# Patient Record
Sex: FEMALE | Race: WHITE | NOT HISPANIC OR LATINO | Employment: OTHER | URBAN - METROPOLITAN AREA
[De-identification: names, ages, dates, MRNs, and addresses within clinical notes are randomized per-mention and may not be internally consistent; named-entity substitution may affect disease eponyms.]

---

## 2017-01-15 ENCOUNTER — RESULTS ENCOUNTER (OUTPATIENT)
Dept: FAMILY MEDICINE CLINIC | Facility: CLINIC | Age: 62
End: 2017-01-15

## 2017-01-15 DIAGNOSIS — K58.2 IRRITABLE BOWEL SYNDROME WITH BOTH CONSTIPATION AND DIARRHEA: ICD-10-CM

## 2017-01-15 DIAGNOSIS — K57.30 DIVERTICULOSIS OF LARGE INTESTINE WITHOUT HEMORRHAGE: ICD-10-CM

## 2017-01-15 DIAGNOSIS — F33.9 RECURRENT MAJOR DEPRESSIVE DISORDER, REMISSION STATUS UNSPECIFIED (HCC): ICD-10-CM

## 2017-01-20 ENCOUNTER — OFFICE VISIT (OUTPATIENT)
Dept: FAMILY MEDICINE CLINIC | Facility: CLINIC | Age: 62
End: 2017-01-20

## 2017-01-20 ENCOUNTER — APPOINTMENT (OUTPATIENT)
Dept: LAB | Facility: HOSPITAL | Age: 62
End: 2017-01-20

## 2017-01-20 VITALS
SYSTOLIC BLOOD PRESSURE: 134 MMHG | WEIGHT: 185 LBS | BODY MASS INDEX: 29.73 KG/M2 | TEMPERATURE: 98.3 F | HEIGHT: 66 IN | OXYGEN SATURATION: 99 % | DIASTOLIC BLOOD PRESSURE: 88 MMHG | HEART RATE: 77 BPM

## 2017-01-20 DIAGNOSIS — R10.84 GENERALIZED ABDOMINAL PAIN: Primary | ICD-10-CM

## 2017-01-20 LAB
ALBUMIN SERPL-MCNC: 4.4 G/DL (ref 3.5–5.2)
ALBUMIN/GLOB SERPL: 1.5 G/DL
ALP SERPL-CCNC: 111 U/L (ref 39–117)
ALT SERPL W P-5'-P-CCNC: 24 U/L (ref 1–33)
ANION GAP SERPL CALCULATED.3IONS-SCNC: 15.1 MMOL/L
AST SERPL-CCNC: 22 U/L (ref 1–32)
BASOPHILS # BLD AUTO: 0.11 10*3/MM3 (ref 0–0.2)
BASOPHILS NFR BLD AUTO: 0.8 % (ref 0–1.5)
BILIRUB SERPL-MCNC: 0.5 MG/DL (ref 0.1–1.2)
BUN BLD-MCNC: 13 MG/DL (ref 8–23)
BUN/CREAT SERPL: 12.7 (ref 7–25)
CALCIUM SPEC-SCNC: 9.6 MG/DL (ref 8.6–10.5)
CHLORIDE SERPL-SCNC: 106 MMOL/L (ref 98–107)
CO2 SERPL-SCNC: 21.9 MMOL/L (ref 22–29)
CREAT BLD-MCNC: 1.02 MG/DL (ref 0.57–1)
DEPRECATED RDW RBC AUTO: 44.2 FL (ref 37–54)
EOSINOPHIL # BLD AUTO: 0.08 10*3/MM3 (ref 0–0.7)
EOSINOPHIL NFR BLD AUTO: 0.6 % (ref 0.3–6.2)
ERYTHROCYTE [DISTWIDTH] IN BLOOD BY AUTOMATED COUNT: 12.9 % (ref 11.7–13)
GFR SERPL CREATININE-BSD FRML MDRD: 55 ML/MIN/1.73
GLOBULIN UR ELPH-MCNC: 3 GM/DL
GLUCOSE BLD-MCNC: 100 MG/DL (ref 65–99)
HCT VFR BLD AUTO: 43.9 % (ref 35.6–45.5)
HGB BLD-MCNC: 14.4 G/DL (ref 11.9–15.5)
IMM GRANULOCYTES # BLD: 0.04 10*3/MM3 (ref 0–0.03)
IMM GRANULOCYTES NFR BLD: 0.3 % (ref 0–0.5)
LYMPHOCYTES # BLD AUTO: 3.38 10*3/MM3 (ref 0.9–4.8)
LYMPHOCYTES NFR BLD AUTO: 24.7 % (ref 19.6–45.3)
MCH RBC QN AUTO: 30.8 PG (ref 26.9–32)
MCHC RBC AUTO-ENTMCNC: 32.8 G/DL (ref 32.4–36.3)
MCV RBC AUTO: 93.8 FL (ref 80.5–98.2)
MONOCYTES # BLD AUTO: 0.75 10*3/MM3 (ref 0.2–1.2)
MONOCYTES NFR BLD AUTO: 5.5 % (ref 5–12)
NEUTROPHILS # BLD AUTO: 9.32 10*3/MM3 (ref 1.9–8.1)
NEUTROPHILS NFR BLD AUTO: 68.1 % (ref 42.7–76)
PLATELET # BLD AUTO: 338 10*3/MM3 (ref 140–500)
PMV BLD AUTO: 10.1 FL (ref 6–12)
POTASSIUM BLD-SCNC: 4.4 MMOL/L (ref 3.5–5.2)
PROT SERPL-MCNC: 7.4 G/DL (ref 6–8.5)
RBC # BLD AUTO: 4.68 10*6/MM3 (ref 3.9–5.2)
SODIUM BLD-SCNC: 143 MMOL/L (ref 136–145)
WBC NRBC COR # BLD: 13.68 10*3/MM3 (ref 4.5–10.7)

## 2017-01-20 PROCEDURE — 85025 COMPLETE CBC W/AUTO DIFF WBC: CPT | Performed by: INTERNAL MEDICINE

## 2017-01-20 PROCEDURE — 36415 COLL VENOUS BLD VENIPUNCTURE: CPT | Performed by: INTERNAL MEDICINE

## 2017-01-20 PROCEDURE — 99214 OFFICE O/P EST MOD 30 MIN: CPT | Performed by: INTERNAL MEDICINE

## 2017-01-20 PROCEDURE — 80053 COMPREHEN METABOLIC PANEL: CPT | Performed by: INTERNAL MEDICINE

## 2017-01-20 PROCEDURE — 74000 XR ABDOMEN KUB: CPT | Performed by: INTERNAL MEDICINE

## 2017-01-20 RX ORDER — METRONIDAZOLE 500 MG/1
500 TABLET ORAL 3 TIMES DAILY
Qty: 30 TABLET | Refills: 0 | Status: SHIPPED | OUTPATIENT
Start: 2017-01-20 | End: 2017-02-07

## 2017-01-20 RX ORDER — CIPROFLOXACIN 500 MG/1
500 TABLET, FILM COATED ORAL 2 TIMES DAILY
Qty: 20 TABLET | Refills: 0 | Status: SHIPPED | OUTPATIENT
Start: 2017-01-20 | End: 2017-02-07

## 2017-01-20 RX ORDER — DICYCLOMINE HYDROCHLORIDE 10 MG/1
10 CAPSULE ORAL 4 TIMES DAILY PRN
Qty: 30 CAPSULE | Refills: 1 | Status: SHIPPED | OUTPATIENT
Start: 2017-01-20 | End: 2017-08-01 | Stop reason: SDUPTHER

## 2017-01-20 NOTE — PROGRESS NOTES
Subjective   Magdalena Dickey is a 61 y.o. female who presents today for:    Abdominal Pain (Intermittent abdominal pain and distention) and Nausea    History of Present Illness   Constipated 8 days ago, no better with Miralax prior (routine use) relieved with mag citrate (loose stools).  Bloating and pain have persisted, but nausea has improved.     She has a history of recurrent abdominal pain that was last evaluated in 2015. A CT scan done in July, 2015, suspecting diverticulitis, did not show diverticulitis.      Ms. Dickey  reports that she quit smoking about 2 years ago. Her smoking use included Cigarettes. She has never used smokeless tobacco. She reports that she does not drink alcohol or use illicit drugs.         Current Outpatient Prescriptions:   •  ALPRAZolam (XANAX) 0.5 MG tablet, TAKE 1 TABLET BY MOUTH 2 TIMES A DAY, Disp: 60 tablet, Rfl: 0  •  DULoxetine (CYMBALTA) 60 MG capsule, Take 1 capsule by mouth Daily., Disp: 30 capsule, Rfl: 5  •  gabapentin (NEURONTIN) 600 MG tablet, Take 1 tablet by mouth 2 (Two) Times a Day., Disp: 180 tablet, Rfl: 1  •  Multiple Vitamin (MULTI-VITAMINS PO), Take  by mouth daily., Disp: , Rfl:   •  pantoprazole (PROTONIX) 40 MG EC tablet, Take 40 mg by mouth daily., Disp: , Rfl:   •  Polyethylene Glycol 3350 (MIRALAX PO), Take  by mouth Daily., Disp: , Rfl:   •  promethazine (PHENERGAN) 25 MG tablet, Take 25 mg by mouth as needed., Disp: , Rfl:   •  SUMAtriptan (IMITREX) 100 MG tablet, Take 1 tablet by mouth at onset of headache, may repeat every 2 hours as needed (max 200mg/day), Disp: 9 tablet, Rfl: 2  •  traZODone (DESYREL) 50 MG tablet, Take 1 tablet by mouth Every Night., Disp: 90 tablet, Rfl: 1      The following portions of the patient's history were reviewed and updated as appropriate: allergies, current medications, past social history and problem list.  S/p choley and s/p LETY/BSO; known diverticulosis.     Review of Systems   Constitutional: Positive for appetite  "change and chills (initially, improved now). Negative for fever.   Respiratory: Negative for shortness of breath.    Cardiovascular: Negative for chest pain.   Gastrointestinal: Positive for abdominal distention, abdominal pain and nausea (improved). Negative for blood in stool and vomiting.   Genitourinary: Positive for urgency. Negative for dysuria, flank pain and frequency.         Objective   Vitals:    01/20/17 0952   BP: 134/88   BP Location: Right arm   Patient Position: Sitting   Cuff Size: Adult   Pulse: 77   Temp: 98.3 °F (36.8 °C)   TempSrc: Oral   SpO2: 99%   Weight: 185 lb (83.9 kg)   Height: 66\" (167.6 cm)     Physical Exam   Constitutional: She is oriented to person, place, and time. She appears well-developed and well-nourished. No distress.   Eyes: Conjunctivae are normal. No scleral icterus.   Cardiovascular: Normal rate and regular rhythm.    Pulmonary/Chest: Effort normal and breath sounds normal.   Abdominal: Soft. Normal appearance and bowel sounds are normal. There is no hepatosplenomegaly. There is tenderness in the right upper quadrant, right lower quadrant and left lower quadrant. There is no tenderness at McBurney's point and negative Harvey's sign.   Neurological: She is alert and oriented to person, place, and time.   Psychiatric: She has a normal mood and affect. Her behavior is normal.       Assessment/Plan   Magdalena was seen today for abdominal pain and nausea.    Diagnoses and all orders for this visit:    Generalized abdominal pain  -     CBC & Differential  -     Comprehensive Metabolic Panel  -     XR Abdomen KUB (In Office)    Other orders  -     dicyclomine (BENTYL) 10 MG capsule; Take 1 capsule by mouth 4 (Four) Times a Day As Needed (abdominal pain).    She does not look toxic, and recommends that this is diverticulitis.  We will check plain films today, but if this shows something ominous, we will get a CT scan.  We will also ask her to get the labs at [facility] where she " works.  If there is evidence of a leukocytosis, we will pursue additional imaging and probably start her empirically on antibiotics.  Otherwise, we will try for symptom control with the Bentyl.    Supine views of the abdomen were reviewed today.  They showed nonspecific bowel gas pattern.  There is no overt abnormality seen.  There are gallbladder clips evident in the right upper quadrant.  There is no prior films for comparison.    Addendum: In looking at her labs from earlier today, it shows a leukocytosis with a left shift.  Although she has had persistently elevated white counts in the past, left shift is new.  We will treat empirically with ciprofloxacin and Flagyl.  We have counseled her to partake of a clear liquid diet this weekend and gradually advance of next week as tolerated.  She should avoid all alcohol with the Flagyl.  She knows to contact me this weekend since I am on call if any further problems develop.  She should contact me Sunday night if her symptoms do not begin to subside over the weekend so that we can arrange for CT scan on Monday morning.  Information in this addendum was discussed with the patient by phone after office hours, on the same date as her office visit.

## 2017-01-20 NOTE — MR AVS SNAPSHOT
Magdalena Dickey   1/20/2017 9:40 AM   Office Visit    Dept Phone:  [phone]   Encounter #:  [id number]    Provider:  Fermín Bella MD   Department:  [facility] INTERNAL MEDICINE                Your Full Care Plan              Today's Medication Changes          These changes are accurate as of: 1/20/17 10:51 AM.  If you have any questions, ask your nurse or doctor.               New Medication(s)Ordered:     dicyclomine 10 MG capsule   Commonly known as:  BENTYL   Take 1 capsule by mouth 4 (Four) Times a Day As Needed (abdominal pain).   Started by:  Fermín Bella MD         Medication(s)that have changed:     DULoxetine 60 MG capsule   Commonly known as:  CYMBALTA   Take 1 capsule by mouth Daily.   What changed:  Another medication with the same name was removed. Continue taking this medication, and follow the directions you see here.   Changed by:  Fermín Bella MD            Where to Get Your Medications      These medications were sent to [place] [address] [place] [address] [phone] [facility] [phone] [address] [address]     Phone:  [phone]     dicyclomine 10 MG capsule                  Your Updated Medication List          This list is accurate as of: 1/20/17 10:51 AM.  Always use your most recent med list.                ALPRAZolam 0.5 MG tablet   Commonly known as:  XANAX   TAKE 1 TABLET BY MOUTH 2 TIMES A DAY       dicyclomine 10 MG capsule   Commonly known as:  BENTYL   Take 1 capsule by mouth 4 (Four) Times a Day As Needed (abdominal pain).       DULoxetine 60 MG capsule   Commonly known as:  CYMBALTA   Take 1 capsule by mouth Daily.       gabapentin 600 MG tablet   Commonly known as:  NEURONTIN   Take 1 tablet by mouth 2 (Two) Times a Day.       MIRALAX PO       MULTI-VITAMINS PO       pantoprazole 40 MG EC tablet   Commonly known as:  PROTONIX       promethazine 25 MG tablet      Commonly known as:  PHENERGAN       SUMAtriptan 100 MG tablet   Commonly known as:  IMITREX   Take 1 tablet by mouth at onset of headache, may repeat every 2 hours as needed (max 200mg/day)       traZODone 50 MG tablet   Commonly known as:  DESYREL   Take 1 tablet by mouth Every Night.               We Performed the Following     CBC & Differential     Comprehensive Metabolic Panel     XR Abdomen KUB (In Office)       You Were Diagnosed With        Codes Comments    Generalized abdominal pain    -  Primary ICD-10-CM: R10.84  ICD-9-CM: 789.07       Instructions     None    Patient Instructions History      Upcoming Appointments     Visit Type Date Time Department    ACUTE           1/20/2017  9:40 AM [email or web address]    [email or web address]    OFFICE VISIT 2/7/2017  8:00 AM [facility] [place]  [facility]      [employer] Signup     Our records indicate that you have an active [employer] account.    You can view your After Visit Summary by going to [employer] and logging in with your Bl[employer] username and password.  If you don't have a [employer] username and password but a parent or guardian has access to your record, the parent or guardian should login with their own [employer] username and password and access your record to view the After Visit Summary.    If you have questions, you can email [email or web address] or call [phone] to talk to our Blend[employer] staff.  Remember, [employer] is NOT to be used for urgent needs.  For medical emergencies, dial 911.               Other Info from Your Visit           Your Appointments     Jan 31, 2017 12:00 PM EST   [facility] with [facility]   [facility] INTERNAL MEDICINE (--)    [address] [place] [id number]   [phone]            Feb 07, 2017  8:00 AM EST   Office Visit with Fermín Bella MD   [facility] INTERNAL MEDICINE (--)    [address] [place] [address]  "[address] [address]   [phone]           Arrive 15 minutes prior to appointment.              Allergies     No Known Drug Allergy        Reason for Visit     Abdominal Pain Intermittent abdominal pain and distention    Nausea           Vital Signs     Blood Pressure Pulse Temperature Height Weight Oxygen Saturation    134/88 (BP Location: Right arm, Patient Position: Sitting, Cuff Size: Adult) 77 98.3 °F (36.8 °C) (Oral) 66\" (167.6 cm) 185 lb (83.9 kg) 99%    Body Mass Index Smoking Status                29.86 kg/m2 Former Smoker          Problems and Diagnoses Noted     Generalized abdominal pain    -  Primary        "

## 2017-01-23 DIAGNOSIS — R10.84 GENERALIZED ABDOMINAL PAIN: Primary | ICD-10-CM

## 2017-01-23 NOTE — PROGRESS NOTES
1/23/16: Discussed with pt by phone last night her persistent nausea (w/out vomiting) and diffuse abdominal pain, minimally improved since onset and since starting ABX and trying clear liquid diet over the weekend.  Will get CT scan and look for colitis in light of her elevated WBC.    TAS

## 2017-01-24 ENCOUNTER — HOSPITAL ENCOUNTER (OUTPATIENT)
Dept: CT IMAGING | Facility: HOSPITAL | Age: 62
Discharge: HOME | End: 2017-01-24
Attending: INTERNAL MEDICINE | Admitting: INTERNAL MEDICINE

## 2017-01-24 DIAGNOSIS — R10.84 GENERALIZED ABDOMINAL PAIN: ICD-10-CM

## 2017-01-24 LAB — CREAT BLDA-MCNC: 1.1 MG/DL (ref 0.6–1.3)

## 2017-01-24 PROCEDURE — 0 IOPAMIDOL 61 % SOLUTION: Performed by: INTERNAL MEDICINE

## 2017-01-24 PROCEDURE — 25510000001 DIATRIZOATE MEGLUMINE & SODIUM PER 1 ML: Performed by: INTERNAL MEDICINE

## 2017-01-24 PROCEDURE — 82565 ASSAY OF CREATININE: CPT

## 2017-01-24 PROCEDURE — 74177 CT ABD & PELVIS W/CONTRAST: CPT

## 2017-01-24 RX ADMIN — IOPAMIDOL 85 ML: 612 INJECTION, SOLUTION INTRAVENOUS at 14:45

## 2017-01-24 RX ADMIN — DIATRIZOATE MEGLUMINE AND DIATRIZOATE SODIUM 30 ML: 660; 100 LIQUID ORAL; RECTAL at 14:45

## 2017-01-31 ENCOUNTER — OFFICE (OUTPATIENT)
Dept: URBAN - METROPOLITAN AREA OTHER 6 | Facility: OTHER | Age: 62
End: 2017-01-31

## 2017-01-31 ENCOUNTER — APPOINTMENT (OUTPATIENT)
Dept: LAB | Facility: HOSPITAL | Age: 62
End: 2017-01-31

## 2017-01-31 VITALS
SYSTOLIC BLOOD PRESSURE: 114 MMHG | WEIGHT: 185 LBS | DIASTOLIC BLOOD PRESSURE: 80 MMHG | HEART RATE: 76 BPM | HEIGHT: 66 IN

## 2017-01-31 DIAGNOSIS — R10.32 LEFT LOWER QUADRANT PAIN: ICD-10-CM

## 2017-01-31 DIAGNOSIS — R11.0 NAUSEA: ICD-10-CM

## 2017-01-31 DIAGNOSIS — K59.1 FUNCTIONAL DIARRHEA: ICD-10-CM

## 2017-01-31 DIAGNOSIS — R14.0 ABDOMINAL DISTENSION (GASEOUS): ICD-10-CM

## 2017-01-31 LAB
ALBUMIN SERPL-MCNC: 3.9 G/DL (ref 3.5–5.2)
ALBUMIN/GLOB SERPL: 1.4 G/DL
ALP SERPL-CCNC: 82 U/L (ref 39–117)
ALT SERPL W P-5'-P-CCNC: 24 U/L (ref 1–33)
ANION GAP SERPL CALCULATED.3IONS-SCNC: 13 MMOL/L
AST SERPL-CCNC: 19 U/L (ref 1–32)
BILIRUB SERPL-MCNC: 0.3 MG/DL (ref 0.1–1.2)
BUN BLD-MCNC: 13 MG/DL (ref 8–23)
BUN/CREAT SERPL: 11.3 (ref 7–25)
CALCIUM SPEC-SCNC: 9 MG/DL (ref 8.6–10.5)
CHLORIDE SERPL-SCNC: 107 MMOL/L (ref 98–107)
CO2 SERPL-SCNC: 22 MMOL/L (ref 22–29)
CREAT BLD-MCNC: 1.15 MG/DL (ref 0.57–1)
DEPRECATED RDW RBC AUTO: 44.4 FL (ref 37–54)
ERYTHROCYTE [DISTWIDTH] IN BLOOD BY AUTOMATED COUNT: 13.4 % (ref 11.7–13)
GFR SERPL CREATININE-BSD FRML MDRD: 48 ML/MIN/1.73
GLOBULIN UR ELPH-MCNC: 2.8 GM/DL
GLUCOSE BLD-MCNC: 99 MG/DL (ref 65–99)
HCT VFR BLD AUTO: 42.9 % (ref 35.6–45.5)
HGB BLD-MCNC: 14.2 G/DL (ref 11.9–15.5)
MCH RBC QN AUTO: 30.3 PG (ref 26.9–32)
MCHC RBC AUTO-ENTMCNC: 33.1 G/DL (ref 32.4–36.3)
MCV RBC AUTO: 91.7 FL (ref 80.5–98.2)
PLATELET # BLD AUTO: 306 10*3/MM3 (ref 140–500)
PMV BLD AUTO: 10.2 FL (ref 6–12)
POTASSIUM BLD-SCNC: 4.9 MMOL/L (ref 3.5–5.2)
PROT SERPL-MCNC: 6.7 G/DL (ref 6–8.5)
RBC # BLD AUTO: 4.68 10*6/MM3 (ref 3.9–5.2)
SODIUM BLD-SCNC: 142 MMOL/L (ref 136–145)
WBC NRBC COR # BLD: 10.09 10*3/MM3 (ref 4.5–10.7)

## 2017-01-31 PROCEDURE — 99213 OFFICE O/P EST LOW 20 MIN: CPT | Performed by: INTERNAL MEDICINE

## 2017-01-31 PROCEDURE — 36415 COLL VENOUS BLD VENIPUNCTURE: CPT | Performed by: INTERNAL MEDICINE

## 2017-01-31 PROCEDURE — 80053 COMPREHEN METABOLIC PANEL: CPT | Performed by: INTERNAL MEDICINE

## 2017-01-31 PROCEDURE — 85027 COMPLETE CBC AUTOMATED: CPT | Performed by: INTERNAL MEDICINE

## 2017-02-07 ENCOUNTER — OFFICE VISIT (OUTPATIENT)
Dept: FAMILY MEDICINE CLINIC | Facility: CLINIC | Age: 62
End: 2017-02-07

## 2017-02-07 VITALS
DIASTOLIC BLOOD PRESSURE: 88 MMHG | WEIGHT: 182.9 LBS | BODY MASS INDEX: 29.39 KG/M2 | SYSTOLIC BLOOD PRESSURE: 134 MMHG | OXYGEN SATURATION: 97 % | HEART RATE: 107 BPM | HEIGHT: 66 IN

## 2017-02-07 DIAGNOSIS — F33.9 RECURRENT MAJOR DEPRESSIVE DISORDER, REMISSION STATUS UNSPECIFIED (HCC): ICD-10-CM

## 2017-02-07 DIAGNOSIS — K58.2 IRRITABLE BOWEL SYNDROME WITH BOTH CONSTIPATION AND DIARRHEA: ICD-10-CM

## 2017-02-07 DIAGNOSIS — Z12.31 ENCOUNTER FOR SCREENING MAMMOGRAM FOR BREAST CANCER: ICD-10-CM

## 2017-02-07 DIAGNOSIS — Z79.899 ENCOUNTER FOR LONG-TERM (CURRENT) USE OF MEDICATIONS: ICD-10-CM

## 2017-02-07 DIAGNOSIS — F41.9 ANXIETY: Primary | ICD-10-CM

## 2017-02-07 PROCEDURE — 99213 OFFICE O/P EST LOW 20 MIN: CPT | Performed by: INTERNAL MEDICINE

## 2017-02-07 NOTE — PROGRESS NOTES
Subjective   Magdalena Dickey is a 61 y.o. female who presents today for:    Anxiety (4 month f/u & review labs)    History of Present Illness   Depression with anxiety chronically for years, with variable severity.  Symptoms of anxiety are largely related to stress at work, but are relieved by Xanax prn.  She usually only needs 1/2 tablet.  She goes through days (almost weeks) without needing it at times.  She tried taking it for a bout of severe abdominal pain without impact on those symptoms.  She also uses Cymbalta daily with good benefit and takes trazodone at night to help with sleep.  We have tried Prozac, Zoloft, bupropion, Lexapro, and buspirone in the past.    A recent episode of acute abdominal pain that was disabling was treated with Cipro and Flagyl for presumed colitis versus diverticulitis.  CT scan was obtained when symptoms did not improve right away and when we saw an elevated white count (13.9 K).  Symptoms gradually improved with antibiotics.  She has not had a colonoscopy in quite some time, so she contacted her gastroenterologist, Dr. Gerardo Tan, who also evaluated her.  We will do her colonoscopy next week.  Symptoms have fully resolved and she feels abdominal distention she had during the episode has resolved to the extent that she notices her clothes fitting differently.    Ms. Dickey  reports that she quit smoking about 2 years ago. Her smoking use included Cigarettes. She has never used smokeless tobacco. She reports that she does not drink alcohol or use illicit drugs.         Current Outpatient Prescriptions:   •  ALPRAZolam (XANAX) 0.5 MG tablet, TAKE 1 TABLET BY MOUTH 2 TIMES A DAY, Disp: 60 tablet, Rfl: 0  •  dicyclomine (BENTYL) 10 MG capsule, Take 1 capsule by mouth 4 (Four) Times a Day As Needed (abdominal pain)., Disp: 30 capsule, Rfl: 1  •  DULoxetine (CYMBALTA) 60 MG capsule, Take 1 capsule by mouth Daily., Disp: 30 capsule, Rfl: 5  •  gabapentin (NEURONTIN) 600 MG tablet, Take 1  "tablet by mouth 2 (Two) Times a Day., Disp: 180 tablet, Rfl: 1  •  Multiple Vitamin (MULTI-VITAMINS PO), Take  by mouth daily., Disp: , Rfl:   •  pantoprazole (PROTONIX) 40 MG EC tablet, Take 40 mg by mouth daily., Disp: , Rfl:   •  Polyethylene Glycol 3350 (MIRALAX PO), Take  by mouth Daily., Disp: , Rfl:   •  promethazine (PHENERGAN) 25 MG tablet, Take 25 mg by mouth as needed., Disp: , Rfl:   •  SUMAtriptan (IMITREX) 100 MG tablet, Take 1 tablet by mouth at onset of headache, may repeat every 2 hours as needed (max 200mg/day), Disp: 9 tablet, Rfl: 2  •  traZODone (DESYREL) 50 MG tablet, Take 1 tablet by mouth Every Night., Disp: 90 tablet, Rfl: 1      The following portions of the patient's history were reviewed and updated as appropriate: allergies, current medications, past social history and problem list.    Review of Systems   Constitutional: Negative for chills, fatigue, fever and unexpected weight change.   HENT: Negative for trouble swallowing.    Eyes: Negative for visual disturbance.   Respiratory: Negative for chest tightness and shortness of breath.    Cardiovascular: Negative for chest pain.   Gastrointestinal: Positive for abdominal pain (resolved) and constipation.        IBS symptoms   Genitourinary: Negative for decreased urine volume and difficulty urinating.   Neurological: Positive for headaches (migraines).   Hematological: Negative for adenopathy. Does not bruise/bleed easily.   Psychiatric/Behavioral: Positive for dysphoric mood and sleep disturbance. The patient is nervous/anxious.          Objective   Vitals:    02/07/17 0759   BP: 134/88   BP Location: Left arm   Patient Position: Sitting   Cuff Size: Large Adult   Pulse: 107  --> 80 on recheck   SpO2: 97%   Weight: 182 lb 14.4 oz (83 kg)   Height: 66\" (167.6 cm)     Physical Exam  Well-developed, well-nourished, in no acute distress.  Sclerae are anicteric and the conjunctivae are pink.  No thyromegaly or mass.  Regular rate and rhythm " without murmur.  Clear to auscultation bilaterall.  Normal respiratory effort.  Abdomen is soft, with normoactive bowel sounds.  There is no organomegaly or mass appreciated.  She is diffusely tender only with deep palpation.  No lower extremity edema.    Assessment/Plan   Magdalena was seen today for anxiety.    Diagnoses and all orders for this visit:    Anxiety  -     [id number] 7+Oxycodone-Bund    Recurrent major depressive disorder, remission status unspecified    Irritable bowel syndrome with both constipation and diarrhea    Encounter for long-term (current) use of medications  -     [id number] 7+Oxycodone-Bund    Encounter for screening mammogram for breast cancer  -     Mammo Screening Bilateral With CAD; Future    Mood appears adequately controlled with the current regimen.  However, the Cymbalta may be contributing to her bowel symptoms, but we'll await her colonoscopy from before making any decisions in this regard.  She should continue taking the Cymbalta during the day and the trazodone at night.  Xanax may be taken on a when necessary basis.  She reports having a sufficient quantity that she does not need a refill today.    We will review a Raciel report today and obtain a urine drug testing She needs a refill within the next 90 days.

## 2017-02-11 LAB
AMPHETAMINES UR QL SCN: NEGATIVE NG/ML
BARBITURATES UR QL SCN: NEGATIVE NG/ML
BENZODIAZ UR QL: NEGATIVE
BENZODIAZ UR QL: NORMAL NG/ML
BZE UR QL: NEGATIVE NG/ML
CANNABINOIDS UR QL SCN: NEGATIVE NG/ML
OPIATES UR QL SCN: NEGATIVE NG/ML
OXYCODONE+OXYMORPHONE UR QL SCN: NEGATIVE NG/ML
PCP UR QL: NEGATIVE NG/ML

## 2017-02-15 ENCOUNTER — ANESTHESIA EVENT (OUTPATIENT)
Dept: GASTROENTEROLOGY | Facility: HOSPITAL | Age: 62
End: 2017-02-15

## 2017-02-15 ENCOUNTER — ON CAMPUS - OUTPATIENT (OUTPATIENT)
Dept: URBAN - METROPOLITAN AREA HOSPITAL 114 | Facility: HOSPITAL | Age: 62
End: 2017-02-15

## 2017-02-15 ENCOUNTER — HOSPITAL ENCOUNTER (OUTPATIENT)
Facility: HOSPITAL | Age: 62
Setting detail: HOSPITAL OUTPATIENT SURGERY
Discharge: HOME | End: 2017-02-15
Attending: INTERNAL MEDICINE | Admitting: INTERNAL MEDICINE

## 2017-02-15 ENCOUNTER — ANESTHESIA (OUTPATIENT)
Dept: GASTROENTEROLOGY | Facility: HOSPITAL | Age: 62
End: 2017-02-15

## 2017-02-15 VITALS
DIASTOLIC BLOOD PRESSURE: 90 MMHG | BODY MASS INDEX: 29.01 KG/M2 | HEIGHT: 66 IN | SYSTOLIC BLOOD PRESSURE: 124 MMHG | OXYGEN SATURATION: 98 % | TEMPERATURE: 98.5 F | RESPIRATION RATE: 15 BRPM | HEART RATE: 60 BPM | WEIGHT: 180.5 LBS

## 2017-02-15 DIAGNOSIS — D12.9 BENIGN NEOPLASM OF ANUS AND ANAL CANAL: ICD-10-CM

## 2017-02-15 DIAGNOSIS — R10.13 DYSPEPSIA: ICD-10-CM

## 2017-02-15 DIAGNOSIS — K30 FUNCTIONAL DYSPEPSIA: ICD-10-CM

## 2017-02-15 DIAGNOSIS — R11.2 NAUSEA WITH VOMITING, UNSPECIFIED: ICD-10-CM

## 2017-02-15 DIAGNOSIS — R19.7 DIARRHEA, UNSPECIFIED: ICD-10-CM

## 2017-02-15 DIAGNOSIS — R10.84 GENERALIZED ABDOMINAL PAIN: ICD-10-CM

## 2017-02-15 DIAGNOSIS — R19.7 DIARRHEA: ICD-10-CM

## 2017-02-15 DIAGNOSIS — K29.50 UNSPECIFIED CHRONIC GASTRITIS WITHOUT BLEEDING: ICD-10-CM

## 2017-02-15 DIAGNOSIS — R10.9 ABDOMINAL PAIN: ICD-10-CM

## 2017-02-15 DIAGNOSIS — D12.3 BENIGN NEOPLASM OF TRANSVERSE COLON: ICD-10-CM

## 2017-02-15 DIAGNOSIS — K21.0 GASTRO-ESOPHAGEAL REFLUX DISEASE WITH ESOPHAGITIS: ICD-10-CM

## 2017-02-15 DIAGNOSIS — K57.30 DIVERTICULOSIS OF LARGE INTESTINE WITHOUT PERFORATION OR ABS: ICD-10-CM

## 2017-02-15 PROCEDURE — 88305 TISSUE EXAM BY PATHOLOGIST: CPT | Performed by: INTERNAL MEDICINE

## 2017-02-15 PROCEDURE — 43239 EGD BIOPSY SINGLE/MULTIPLE: CPT | Performed by: INTERNAL MEDICINE

## 2017-02-15 PROCEDURE — 45380 COLONOSCOPY AND BIOPSY: CPT | Performed by: INTERNAL MEDICINE

## 2017-02-15 PROCEDURE — 25010000002 ONDANSETRON PER 1 MG: Performed by: ANESTHESIOLOGY

## 2017-02-15 PROCEDURE — 25010000002 PROPOFOL 10 MG/ML EMULSION: Performed by: ANESTHESIOLOGY

## 2017-02-15 PROCEDURE — 25010000002 MIDAZOLAM PER 1 MG: Performed by: ANESTHESIOLOGY

## 2017-02-15 PROCEDURE — 88312 SPECIAL STAINS GROUP 1: CPT | Performed by: INTERNAL MEDICINE

## 2017-02-15 RX ORDER — SODIUM CHLORIDE, SODIUM LACTATE, POTASSIUM CHLORIDE, CALCIUM CHLORIDE 600; 310; 30; 20 MG/100ML; MG/100ML; MG/100ML; MG/100ML
1000 INJECTION, SOLUTION INTRAVENOUS CONTINUOUS PRN
Status: DISCONTINUED | OUTPATIENT
Start: 2017-02-15 | End: 2017-02-15 | Stop reason: HOSPADM

## 2017-02-15 RX ORDER — PROPOFOL 10 MG/ML
VIAL (ML) INTRAVENOUS AS NEEDED
Status: DISCONTINUED | OUTPATIENT
Start: 2017-02-15 | End: 2017-02-15 | Stop reason: SURG

## 2017-02-15 RX ORDER — LIDOCAINE HYDROCHLORIDE 20 MG/ML
INJECTION, SOLUTION INFILTRATION; PERINEURAL AS NEEDED
Status: DISCONTINUED | OUTPATIENT
Start: 2017-02-15 | End: 2017-02-15 | Stop reason: SURG

## 2017-02-15 RX ORDER — SODIUM CHLORIDE 0.9 % (FLUSH) 0.9 %
3 SYRINGE (ML) INJECTION AS NEEDED
Status: DISCONTINUED | OUTPATIENT
Start: 2017-02-15 | End: 2017-02-15 | Stop reason: HOSPADM

## 2017-02-15 RX ORDER — MIDAZOLAM HYDROCHLORIDE 1 MG/ML
INJECTION INTRAMUSCULAR; INTRAVENOUS AS NEEDED
Status: DISCONTINUED | OUTPATIENT
Start: 2017-02-15 | End: 2017-02-15 | Stop reason: SURG

## 2017-02-15 RX ORDER — PROPOFOL 10 MG/ML
VIAL (ML) INTRAVENOUS CONTINUOUS PRN
Status: DISCONTINUED | OUTPATIENT
Start: 2017-02-15 | End: 2017-02-15 | Stop reason: SURG

## 2017-02-15 RX ORDER — ONDANSETRON 2 MG/ML
INJECTION INTRAMUSCULAR; INTRAVENOUS AS NEEDED
Status: DISCONTINUED | OUTPATIENT
Start: 2017-02-15 | End: 2017-02-15 | Stop reason: SURG

## 2017-02-15 RX ORDER — LIDOCAINE HYDROCHLORIDE 10 MG/ML
0.5 INJECTION, SOLUTION INFILTRATION; PERINEURAL ONCE AS NEEDED
Status: DISCONTINUED | OUTPATIENT
Start: 2017-02-15 | End: 2017-02-15 | Stop reason: HOSPADM

## 2017-02-15 RX ADMIN — LIDOCAINE HYDROCHLORIDE 60 MG: 20 INJECTION, SOLUTION INFILTRATION; PERINEURAL at 08:50

## 2017-02-15 RX ADMIN — PROPOFOL 100 MCG/KG/MIN: 10 INJECTION, EMULSION INTRAVENOUS at 08:50

## 2017-02-15 RX ADMIN — ALFENTANIL HYDROCHLORIDE 250 MCG: 500 INJECTION, SOLUTION INTRAVENOUS at 08:49

## 2017-02-15 RX ADMIN — ONDANSETRON 4 MG: 2 INJECTION INTRAMUSCULAR; INTRAVENOUS at 08:51

## 2017-02-15 RX ADMIN — MIDAZOLAM HYDROCHLORIDE 1 MG: 1 INJECTION, SOLUTION INTRAMUSCULAR; INTRAVENOUS at 08:49

## 2017-02-15 RX ADMIN — PROPOFOL 100 MG: 10 INJECTION, EMULSION INTRAVENOUS at 08:50

## 2017-02-15 RX ADMIN — SODIUM CHLORIDE, POTASSIUM CHLORIDE, SODIUM LACTATE AND CALCIUM CHLORIDE 1000 ML: 600; 310; 30; 20 INJECTION, SOLUTION INTRAVENOUS at 07:58

## 2017-02-15 NOTE — ANESTHESIA PREPROCEDURE EVALUATION
Anesthesia Evaluation     Patient summary reviewed and Nursing notes reviewed   no history of anesthetic complications:  NPO Status: > 8 hours   Airway   Mallampati: II  Dental      Pulmonary - negative pulmonary ROS and normal exam   Cardiovascular - negative cardio ROS and normal exam        Neuro/Psych  GI/Hepatic/Renal/Endo    (+)  GERD well controlled,     Musculoskeletal     Abdominal    Substance History      OB/GYN          Other                                    Anesthesia Plan    ASA 2     MAC     intravenous induction   Anesthetic plan and risks discussed with patient.

## 2017-02-15 NOTE — DISCHARGE INSTRUCTIONS
For the next 24 hours patient needs to be with a responsible adult.    For 24 hours DO NOT drive, operate machinery, appliances, drink alcohol, make important decisions or sign legal documents.    Start with a light or bland diet and advance to regular diet as tolerated.    Follow recommendations on procedure report provided by your doctor.    Call Dr Andriy Tan for problems [phone]    Problems may include but not limited to: large amounts of bleeding, trouble breathing, repeated vomiting, severe unrelieved pain, fever or chills.   See Provation report.

## 2017-02-15 NOTE — ANESTHESIA POSTPROCEDURE EVALUATION
Patient: Magdalena Dickey    Procedure Summary     Date Anesthesia Start Anesthesia Stop Room / Location    02/15/17 0847 0939  [facility]       Procedure Diagnosis Surgeon Provider    COLONOSCOPY to cecum with biospsies with cold polypectomy (N/A ); ESOPHAGOGASTRODUODENOSCOPY with biopsies (N/A Esophagus) No diagnosis on file. MD Eddie Alanis MD          Anesthesia Type: MAC  Last vitals  BP      Temp      Pulse     Resp      SpO2        Post Anesthesia Care and Evaluation    Patient location during evaluation: bedside  Patient participation: complete - patient participated  Level of consciousness: awake and alert  Pain management: adequate  Airway patency: patent  Anesthetic complications: No anesthetic complications    Cardiovascular status: acceptable  Respiratory status: acceptable  Hydration status: acceptable

## 2017-02-15 NOTE — PLAN OF CARE
Problem: Patient Care Overview (Adult)  Goal: Adult Individualization and Mutuality  Outcome: Ongoing (interventions implemented as appropriate)    02/15/17 0739   Individualization   Patient Specific Interventions denies   Mutuality/Individual Preferences   What Anxieties, Fears or Concerns Do You Have About Your Health or Care? denies       Goal: Discharge Needs Assessment  Outcome: Ongoing (interventions implemented as appropriate)    02/15/17 0739   Discharge Needs Assessment   Concerns To Be Addressed no discharge needs identified   Discharge Disposition home or self-care         Problem: GI Endoscopy (Adult)  Goal: Signs and Symptoms of Listed Potential Problems Will be Absent or Manageable (GI Endoscopy)  Outcome: Ongoing (interventions implemented as appropriate)    02/15/17 0739   GI Endoscopy   Problems Assessed (GI Endoscopy) pain;bleeding;fluid imbalance;hypoxia/hypoxemia   Problems Present (GI Endoscopy) none

## 2017-02-15 NOTE — H&P
Patient Care Team:  Fermín Bella MD as PCP - General  Fermín Bella MD as PCP - Family Medicine    Chief complaint nausea , diarrhea    Subjective     History of Present Illness  Also LLQ pain, bloating   Review of Systems   Constitutional: Positive for fatigue.   HENT: Negative.    Eyes: Negative.    Respiratory: Negative.    Cardiovascular: Negative.    Gastrointestinal: Positive for diarrhea and nausea.   Endocrine: Negative.    Genitourinary: Negative.    Musculoskeletal: Negative.    Skin: Negative.    Allergic/Immunologic: Negative.    Neurological: Negative.    Hematological: Negative.    Psychiatric/Behavioral: Negative.         Past Medical History   Diagnosis Date   • Anxiety    • Depression 5/25/2016   • Diverticulosis of large intestine without hemorrhage 10/5/2016   • GERD (gastroesophageal reflux disease)    • Irritable bowel syndrome (IBS)    • Low back pain    • Lumbosacral disc disease    • Migraine      Past Surgical History   Procedure Laterality Date   • Cholecystectomy     • Hysterectomy       Family History   Problem Relation Age of Onset   • Alzheimer's disease Mother      SDAT   • Hypertension Mother    • Diabetes type II Mother    • Lung cancer Mother      POSSIBLE   • Heart attack Mother    • Alcohol abuse Father    • Prostate cancer Father    • Heart disease Father      THIRD DEGREE HEART BLOCK   • Ovarian cancer Sister    • No Known Problems Brother    • Ovarian cancer Sister    • Drug abuse Neg Hx      Social History   Substance Use Topics   • Smoking status: Former Smoker     Types: Cigarettes     Quit date: 3/10/2014   • Smokeless tobacco: Never Used   • Alcohol use No     Prescriptions Prior to Admission   Medication Sig Dispense Refill Last Dose   • ALPRAZolam (XANAX) 0.5 MG tablet TAKE 1 TABLET BY MOUTH 2 TIMES A DAY 60 tablet 0 2/14/2017 at Unknown time   • dicyclomine (BENTYL) 10 MG capsule Take 1 capsule by mouth 4 (Four) Times a Day As Needed (abdominal  pain). 30 capsule 1 Past Week at Unknown time   • DULoxetine (CYMBALTA) 60 MG capsule Take 1 capsule by mouth Daily. 30 capsule 5 2/14/2017 at Unknown time   • gabapentin (NEURONTIN) 600 MG tablet Take 1 tablet by mouth 2 (Two) Times a Day. 180 tablet 1 2/14/2017 at Unknown time   • Multiple Vitamin (MULTI-VITAMINS PO) Take  by mouth daily.   2/14/2017 at Unknown time   • pantoprazole (PROTONIX) 40 MG EC tablet Take 40 mg by mouth daily.   2/14/2017 at Unknown time   • Polyethylene Glycol 3350 (MIRALAX PO) Take  by mouth Daily.   Past Week at Unknown time   • promethazine (PHENERGAN) 25 MG tablet Take 25 mg by mouth as needed.   2/14/2017 at Unknown time   • SUMAtriptan (IMITREX) 100 MG tablet Take 1 tablet by mouth at onset of headache, may repeat every 2 hours as needed (max 200mg/day) 9 tablet 2 Past Month at Unknown time   • traZODone (DESYREL) 50 MG tablet Take 1 tablet by mouth Every Night. 90 tablet 1 Past Week at Unknown time     Allergies:  No known drug allergy    Objective      Vital Signs  Temp:  [98.5 °F (36.9 °C)] 98.5 °F (36.9 °C)  Heart Rate:  [69] 69  Resp:  [17] 17  BP: (135)/(87) 135/87    Physical Exam   Constitutional: She is oriented to person, place, and time. She appears well-developed and well-nourished.   HENT:   Mouth/Throat: Oropharynx is clear and moist.   Eyes: Conjunctivae are normal.   Cardiovascular: Normal rate and regular rhythm.    Pulmonary/Chest: Effort normal and breath sounds normal.   Abdominal: Soft. There is tenderness in the left lower quadrant.   Neurological: She is alert and oriented to person, place, and time.   Skin: Skin is warm and dry.   Psychiatric: She has a normal mood and affect.       Results Review:   I reviewed the patient's new clinical results.      Assessment/Plan     Active Problems:    * No active hospital problems. *      Assessment:  (Nausea  Dyspepsia  diarrhea).     Plan:   (Upper and lower tract endoscopy, risks, alternatives and benefits dicussed   with patient and patient is agreeable to proceed.).       I discussed the patients findings and my recommendations with patient, family and nursing staff    Gerardo Tan MD  02/15/17  8:51 AM

## 2017-02-16 LAB
CYTO UR: NORMAL
LAB AP CASE REPORT: NORMAL
Lab: NORMAL
PATH REPORT.FINAL DX SPEC: NORMAL
PATH REPORT.GROSS SPEC: NORMAL

## 2017-02-28 ENCOUNTER — HOSPITAL ENCOUNTER (OUTPATIENT)
Dept: MAMMOGRAPHY | Facility: HOSPITAL | Age: 62
Discharge: HOME | End: 2017-02-28
Attending: INTERNAL MEDICINE | Admitting: INTERNAL MEDICINE

## 2017-02-28 DIAGNOSIS — Z12.31 ENCOUNTER FOR SCREENING MAMMOGRAM FOR BREAST CANCER: ICD-10-CM

## 2017-02-28 PROCEDURE — G0202 SCR MAMMO BI INCL CAD: HCPCS

## 2017-04-25 RX ORDER — GABAPENTIN 600 MG/1
TABLET ORAL
Qty: 180 TABLET | Refills: 1 | Status: SHIPPED | OUTPATIENT
Start: 2017-04-25 | End: 2017-08-01 | Stop reason: SDUPTHER

## 2017-04-25 RX ORDER — TRAZODONE HYDROCHLORIDE 50 MG/1
TABLET ORAL
Qty: 90 TABLET | Refills: 1 | Status: SHIPPED | OUTPATIENT
Start: 2017-04-25 | End: 2017-10-18 | Stop reason: SDUPTHER

## 2017-04-26 ENCOUNTER — OFFICE VISIT (OUTPATIENT)
Dept: FAMILY MEDICINE CLINIC | Facility: CLINIC | Age: 62
End: 2017-04-26

## 2017-04-26 VITALS
SYSTOLIC BLOOD PRESSURE: 138 MMHG | DIASTOLIC BLOOD PRESSURE: 88 MMHG | OXYGEN SATURATION: 98 % | HEIGHT: 66 IN | HEART RATE: 97 BPM | WEIGHT: 189.2 LBS | BODY MASS INDEX: 30.41 KG/M2

## 2017-04-26 DIAGNOSIS — M79.10 MYALGIA: Primary | ICD-10-CM

## 2017-04-26 DIAGNOSIS — M54.50 CHRONIC BILATERAL LOW BACK PAIN WITHOUT SCIATICA: ICD-10-CM

## 2017-04-26 DIAGNOSIS — G89.29 CHRONIC BILATERAL LOW BACK PAIN WITHOUT SCIATICA: ICD-10-CM

## 2017-04-26 DIAGNOSIS — M25.50 PAIN IN JOINT INVOLVING MULTIPLE SITES: ICD-10-CM

## 2017-04-26 LAB — ERYTHROCYTE [SEDIMENTATION RATE] IN BLOOD BY WESTERGREN METHOD: 13 MM/HR (ref 0–30)

## 2017-04-26 PROCEDURE — 99214 OFFICE O/P EST MOD 30 MIN: CPT | Performed by: INTERNAL MEDICINE

## 2017-04-26 RX ORDER — METAXALONE 800 MG/1
800 TABLET ORAL 3 TIMES DAILY PRN
Qty: 60 TABLET | Refills: 0 | Status: SHIPPED | OUTPATIENT
Start: 2017-04-26 | End: 2017-05-14 | Stop reason: SDUPTHER

## 2017-04-26 NOTE — PROGRESS NOTES
Subjective   Magdalena Dickey is a 61 y.o. female who presents today for:    Back Pain; Hip Pain (both); and Shoulder Pain (both)    History of Present Illness       Ms. Dickey  reports that she quit smoking about 3 years ago. Her smoking use included Cigarettes. She has never used smokeless tobacco. She reports that she does not drink alcohol or use illicit drugs.         Current Outpatient Prescriptions:   •  ALPRAZolam (XANAX) 0.5 MG tablet, TAKE 1 TABLET BY MOUTH 2 TIMES A DAY (Patient taking differently: TAKE 1 TABLET BY MOUTH 2 TIMES A DAY AS NEEDED), Disp: 60 tablet, Rfl: 0  •  dicyclomine (BENTYL) 10 MG capsule, Take 1 capsule by mouth 4 (Four) Times a Day As Needed (abdominal pain)., Disp: 30 capsule, Rfl: 1  •  DULoxetine (CYMBALTA) 60 MG capsule, Take 1 capsule by mouth Daily., Disp: 30 capsule, Rfl: 5  •  gabapentin (NEURONTIN) 600 MG tablet, TAKE 1 TABLET BY MOUTH 2 (TWO) TIMES A DAY., Disp: 180 tablet, Rfl: 1  •  Multiple Vitamin (MULTI-VITAMINS PO), Take  by mouth daily., Disp: , Rfl:   •  pantoprazole (PROTONIX) 40 MG EC tablet, Take 40 mg by mouth daily., Disp: , Rfl:   •  Polyethylene Glycol 3350 (MIRALAX PO), Take  by mouth Daily., Disp: , Rfl:   •  promethazine (PHENERGAN) 25 MG tablet, Take 25 mg by mouth as needed., Disp: , Rfl:   •  SUMAtriptan (IMITREX) 100 MG tablet, Take 1 tablet by mouth at onset of headache, may repeat every 2 hours as needed (max 200mg/day), Disp: 9 tablet, Rfl: 2  •  traZODone (DESYREL) 50 MG tablet, TAKE 1 TABLET BY MOUTH EVERY NIGHT., Disp: 90 tablet, Rfl: 1      The following portions of the patient's history were reviewed and updated as appropriate: allergies, current medications, past social history and problem list.    Review of Systems   Gastrointestinal: Negative for abdominal pain.   Musculoskeletal: Positive for arthralgias, back pain, neck pain and neck stiffness.   Neurological: Positive for headaches (migraines; improving).   Psychiatric/Behavioral: Positive  "for dysphoric mood. The patient is nervous/anxious.        Objective   Vitals:    04/26/17 0754   BP: 138/88   BP Location: Left arm   Patient Position: Sitting   Cuff Size: Adult   Pulse: 97   SpO2: 98%   Weight: 189 lb 3.2 oz (85.8 kg)   Height: 66\" (167.6 cm)     Physical Exam  Well-developed, well-nourished, in no acute distress.  Sclerae are anicteric and conjunctiva are pink and not injected.  No erythema, warmth, or effusion about the glenohumeral joint.  Nontender to palpation about the GHJ's.  Full range of motion of both shoulders, with mild crepitus bilaterally and a click with external rotation on the left.  No pain with forced adduction; mild discomfort with abduction to the limits of range of motion (180°).  Nontender to palpation over the spinous processes of the cervical, thoracic, and lumbar spine.  Nontender over the SI areas bilaterally.  Functional range of motion at the spine, from the neck, to the lumbar spine.  Mild tightness, but no reproducible pain with right and left side bending at the cervical spine.  Mild pain bilaterally with hip flexion and internal rotation (only on the right); tight hamstrings bilaterally.  Straight leg raise and negative bowstring sign bilaterally.  5+/5 strength in both lower extremities.  Affect is blunted and mood is depressed.    Assessment/Plan   Magdalena was seen today for back pain, hip pain and shoulder pain.    Diagnoses and all orders for this visit:    Myalgia  -     Sedimentation Rate    Chronic bilateral low back pain without sciatica    Pain in joint involving multiple sites    Other orders  -     metaxalone (SKELAXIN) 800 MG tablet; Take 1 tablet by mouth 3 (Three) Times a Day As Needed for Muscle Spasms.    We will make sure the sedimentation rate is not elevated/ rule out PMR.    We discussed the probability that this is as much functional as it is anything else.  She has no focal findings on her exam.,  She admits to marked inactivity, and " "prolonged periods of time sitting at a desk working on her computer.  She needs to increase her activity.  I demonstrated for her stretching exercises for both the neck and the low back and hips.  We will try these first before referring her to physical therapy.  I also demonstrated strengthening exercises for her core muscles.    By her tone and her body language, it appears that she is dismissive of my recommendations today.  We will try Skelaxin to see if that helps to diminish some of her pain, but we discussed avoidance of narcotics and any other sedating muscle relaxants.  She expressed the desire to avoid NSAIDs because of her fear that they will \"poison her liver\".  I feel is the best way to avoid needing more medication to help with her pain is to improve her functional status.  We will reassess her progress in approximately 6 weeks.  We will not try any other medications until she is reevaluated and has tried stretching and strengthening exercises as discussed above.  "

## 2017-05-15 RX ORDER — METAXALONE 800 MG/1
TABLET ORAL
Qty: 60 TABLET | Refills: 0 | Status: SHIPPED | OUTPATIENT
Start: 2017-05-15 | End: 2017-10-12 | Stop reason: SDUPTHER

## 2017-07-20 RX ORDER — GABAPENTIN 600 MG/1
TABLET ORAL
Qty: 180 TABLET | Refills: 1 | OUTPATIENT
Start: 2017-07-20

## 2017-08-01 ENCOUNTER — OFFICE VISIT (OUTPATIENT)
Dept: FAMILY MEDICINE CLINIC | Facility: CLINIC | Age: 62
End: 2017-08-01

## 2017-08-01 VITALS
WEIGHT: 191.6 LBS | DIASTOLIC BLOOD PRESSURE: 84 MMHG | SYSTOLIC BLOOD PRESSURE: 138 MMHG | HEIGHT: 66 IN | HEART RATE: 107 BPM | OXYGEN SATURATION: 96 % | BODY MASS INDEX: 30.79 KG/M2

## 2017-08-01 DIAGNOSIS — M25.50 PAIN IN JOINT INVOLVING MULTIPLE SITES: ICD-10-CM

## 2017-08-01 DIAGNOSIS — F33.9 RECURRENT MAJOR DEPRESSIVE DISORDER, REMISSION STATUS UNSPECIFIED (HCC): ICD-10-CM

## 2017-08-01 DIAGNOSIS — K58.2 IRRITABLE BOWEL SYNDROME WITH BOTH CONSTIPATION AND DIARRHEA: Primary | ICD-10-CM

## 2017-08-01 DIAGNOSIS — M54.50 CHRONIC BILATERAL LOW BACK PAIN WITHOUT SCIATICA: ICD-10-CM

## 2017-08-01 DIAGNOSIS — F41.9 ANXIETY: ICD-10-CM

## 2017-08-01 DIAGNOSIS — R51.9 HEADACHE, UNSPECIFIED HEADACHE TYPE: ICD-10-CM

## 2017-08-01 DIAGNOSIS — L29.9 PRURITUS: ICD-10-CM

## 2017-08-01 DIAGNOSIS — G89.29 CHRONIC BILATERAL LOW BACK PAIN WITHOUT SCIATICA: ICD-10-CM

## 2017-08-01 PROCEDURE — 99214 OFFICE O/P EST MOD 30 MIN: CPT | Performed by: INTERNAL MEDICINE

## 2017-08-01 RX ORDER — SUMATRIPTAN 100 MG/1
TABLET, FILM COATED ORAL
Qty: 9 TABLET | Refills: 5 | Status: SHIPPED | OUTPATIENT
Start: 2017-08-01 | End: 2017-08-03 | Stop reason: SDUPTHER

## 2017-08-01 RX ORDER — DICYCLOMINE HYDROCHLORIDE 10 MG/1
10 CAPSULE ORAL 4 TIMES DAILY PRN
Qty: 30 CAPSULE | Refills: 5 | Status: SHIPPED | OUTPATIENT
Start: 2017-08-01 | End: 2018-08-29 | Stop reason: SDUPTHER

## 2017-08-01 RX ORDER — ALPRAZOLAM 0.5 MG/1
0.5 TABLET ORAL 2 TIMES DAILY PRN
Qty: 60 TABLET | Refills: 1 | Status: SHIPPED | OUTPATIENT
Start: 2017-08-01 | End: 2022-04-18 | Stop reason: SDUPTHER

## 2017-08-01 RX ORDER — DULOXETIN HYDROCHLORIDE 60 MG/1
60 CAPSULE, DELAYED RELEASE ORAL DAILY
Qty: 30 CAPSULE | Refills: 5 | Status: SHIPPED | OUTPATIENT
Start: 2017-08-01 | End: 2017-11-28

## 2017-08-01 RX ORDER — ACETAMINOPHEN AND CODEINE PHOSPHATE 300; 30 MG/1; MG/1
TABLET ORAL
Refills: 0 | COMMUNITY
Start: 2017-07-13 | End: 2017-10-12

## 2017-08-01 RX ORDER — DULOXETIN HYDROCHLORIDE 30 MG/1
30 CAPSULE, DELAYED RELEASE ORAL DAILY
Qty: 30 CAPSULE | Refills: 5 | Status: SHIPPED | OUTPATIENT
Start: 2017-08-01 | End: 2017-11-28

## 2017-08-01 RX ORDER — GABAPENTIN 600 MG/1
600 TABLET ORAL 2 TIMES DAILY
Qty: 180 TABLET | Refills: 1 | Status: SHIPPED | OUTPATIENT
Start: 2017-08-01 | End: 2018-01-27 | Stop reason: SDUPTHER

## 2017-08-01 NOTE — PROGRESS NOTES
Chief Complaint   Patient presents with   • Myalgia     CSE 6 week f/u   • Anxiety   • Migraine   • Depression     wants to up dose   • Med Refill     Depression with anxiety chronically for years, with variable severity.  Symptoms of anxiety are largely related to stress at work, and they are relieved by Xanax prn. She usually only needs 1/2 tablet.  She goes through days (almost weeks) without needing it at times.  She tried taking it for a bout of severe abdominal pain without impact on those symptoms.  She also uses Cymbalta daily with fair benefit and takes trazodone at night to help with sleep, with very good benefit. We have tried Prozac, Zoloft, bupropion, Lexapro, and buspirone in the past.    She also has diffuse joint and muscle pain, primarily affecting her neck and back, improved since we saw her last.  She has been doing neck stretching exercises and paying attention to her posture, but she has not tried doing any low back/hamstring stretching exercises.    Allergies   Allergen Reactions   • No Known Drug Allergy          She  reports that she quit smoking about 3 years ago. Her smoking use included Cigarettes. She has never used smokeless tobacco. She reports that she does not drink alcohol or use illicit drugs.     ROS:   General: Negative for weight loss / gain.  CV: Negative for chest pain and palpitations.  Gastrointestinal: Negative for abdominal pain.   Musculoskeletal: Positive for arthralgias, back pain, neck pain and neck stiffness.   Neurological: Positive for headaches (migraines; improving).   Psychiatric/Behavioral: Positive for dysphoric mood. The patient is nervous/anxious.    Skin: Generalized, intermittent pruritus    Magdalena was seen today for myalgia, anxiety, migraine, depression and med refill.    Diagnoses and all orders for this visit:    Irritable bowel syndrome with both constipation and diarrhea    Anxiety  -     ALPRAZolam (XANAX) 0.5 MG tablet; Take 1 tablet by mouth 2  (Two) Times a Day As Needed for Anxiety.  -     DULoxetine (CYMBALTA) 60 MG capsule; Take 1 capsule by mouth Daily.    Recurrent major depressive disorder, remission status unspecified    Headache, unspecified headache type  -     SUMAtriptan (IMITREX) 100 MG tablet; Take 1 tablet by mouth at onset of headache, may repeat every 2 hours as needed (max 200mg/day)    Chronic bilateral low back pain without sciatica    Pruritus    Other orders  -     dicyclomine (BENTYL) 10 MG capsule; Take 1 capsule by mouth 4 (Four) Times a Day As Needed (abdominal pain).  -     gabapentin (NEURONTIN) 600 MG tablet; Take 1 tablet by mouth 2 (Two) Times a Day.  -     DULoxetine (CYMBALTA) 30 MG capsule; Take 1 capsule by mouth Daily.      MAXIME report was obtained and reviewed.  Xanax and gabapentin were both prescribed for her today.  We will make no changes to her migraine medicine, but we will increase her Cymbalta, which hopefully will help with both.    She should continue to increase her activity and continue the regular stretches. I encouraged her to add the low back stretches demonstrated for her at the last ov.    She will continue the Bentyl for her IBS symptoms.  Cymbalta may make this worse, but it is needed to help with her mood.

## 2017-08-02 DIAGNOSIS — R51.9 HEADACHE, UNSPECIFIED HEADACHE TYPE: ICD-10-CM

## 2017-08-02 RX ORDER — TRIAMCINOLONE ACETONIDE 1 MG/G
CREAM TOPICAL 2 TIMES DAILY
Qty: 454 G | Refills: 0 | Status: SHIPPED | OUTPATIENT
Start: 2017-08-02 | End: 2019-11-19

## 2017-08-03 ENCOUNTER — APPOINTMENT (OUTPATIENT)
Dept: LAB | Facility: HOSPITAL | Age: 62
End: 2017-08-03

## 2017-08-03 LAB
ALBUMIN SERPL-MCNC: 4.1 G/DL (ref 3.5–5.2)
ALBUMIN/GLOB SERPL: 1.4 G/DL
ALP SERPL-CCNC: 101 U/L (ref 39–117)
ALT SERPL W P-5'-P-CCNC: 19 U/L (ref 1–33)
ANION GAP SERPL CALCULATED.3IONS-SCNC: 12.3 MMOL/L
AST SERPL-CCNC: 12 U/L (ref 1–32)
BASOPHILS # BLD AUTO: 0.12 10*3/MM3 (ref 0–0.2)
BASOPHILS NFR BLD AUTO: 1.2 % (ref 0–1.5)
BILIRUB SERPL-MCNC: 0.2 MG/DL (ref 0.1–1.2)
BUN BLD-MCNC: 8 MG/DL (ref 8–23)
BUN/CREAT SERPL: 9.2 (ref 7–25)
CALCIUM SPEC-SCNC: 9.2 MG/DL (ref 8.6–10.5)
CHLORIDE SERPL-SCNC: 108 MMOL/L (ref 98–107)
CO2 SERPL-SCNC: 24.7 MMOL/L (ref 22–29)
CREAT BLD-MCNC: 0.87 MG/DL (ref 0.57–1)
CRP SERPL-MCNC: 1.14 MG/DL (ref 0–0.5)
DEPRECATED RDW RBC AUTO: 44.3 FL (ref 37–54)
EOSINOPHIL # BLD AUTO: 0.38 10*3/MM3 (ref 0–0.7)
EOSINOPHIL NFR BLD AUTO: 3.7 % (ref 0.3–6.2)
ERYTHROCYTE [DISTWIDTH] IN BLOOD BY AUTOMATED COUNT: 12.9 % (ref 11.7–13)
GFR SERPL CREATININE-BSD FRML MDRD: 66 ML/MIN/1.73
GLOBULIN UR ELPH-MCNC: 3 GM/DL
GLUCOSE BLD-MCNC: 109 MG/DL (ref 65–99)
HCT VFR BLD AUTO: 44.3 % (ref 35.6–45.5)
HGB BLD-MCNC: 14.6 G/DL (ref 11.9–15.5)
IMM GRANULOCYTES # BLD: 0.06 10*3/MM3 (ref 0–0.03)
IMM GRANULOCYTES NFR BLD: 0.6 % (ref 0–0.5)
LYMPHOCYTES # BLD AUTO: 3.27 10*3/MM3 (ref 0.9–4.8)
LYMPHOCYTES NFR BLD AUTO: 32.2 % (ref 19.6–45.3)
MCH RBC QN AUTO: 31 PG (ref 26.9–32)
MCHC RBC AUTO-ENTMCNC: 33 G/DL (ref 32.4–36.3)
MCV RBC AUTO: 94.1 FL (ref 80.5–98.2)
MONOCYTES # BLD AUTO: 0.82 10*3/MM3 (ref 0.2–1.2)
MONOCYTES NFR BLD AUTO: 8.1 % (ref 5–12)
NEUTROPHILS # BLD AUTO: 5.51 10*3/MM3 (ref 1.9–8.1)
NEUTROPHILS NFR BLD AUTO: 54.2 % (ref 42.7–76)
PLATELET # BLD AUTO: 290 10*3/MM3 (ref 140–500)
PMV BLD AUTO: 10.2 FL (ref 6–12)
POTASSIUM BLD-SCNC: 4.4 MMOL/L (ref 3.5–5.2)
PROT SERPL-MCNC: 7.1 G/DL (ref 6–8.5)
RBC # BLD AUTO: 4.71 10*6/MM3 (ref 3.9–5.2)
SODIUM BLD-SCNC: 145 MMOL/L (ref 136–145)
TSH SERPL DL<=0.05 MIU/L-ACNC: 1.4 MIU/ML (ref 0.27–4.2)
WBC NRBC COR # BLD: 10.16 10*3/MM3 (ref 4.5–10.7)

## 2017-08-03 PROCEDURE — 86140 C-REACTIVE PROTEIN: CPT | Performed by: INTERNAL MEDICINE

## 2017-08-03 PROCEDURE — 80053 COMPREHEN METABOLIC PANEL: CPT | Performed by: INTERNAL MEDICINE

## 2017-08-03 PROCEDURE — 85025 COMPLETE CBC W/AUTO DIFF WBC: CPT | Performed by: INTERNAL MEDICINE

## 2017-08-03 PROCEDURE — 84443 ASSAY THYROID STIM HORMONE: CPT | Performed by: INTERNAL MEDICINE

## 2017-08-03 PROCEDURE — 86060 ANTISTREPTOLYSIN O TITER: CPT | Performed by: INTERNAL MEDICINE

## 2017-08-03 PROCEDURE — 36415 COLL VENOUS BLD VENIPUNCTURE: CPT | Performed by: INTERNAL MEDICINE

## 2017-08-03 RX ORDER — SUMATRIPTAN 100 MG/1
TABLET, FILM COATED ORAL
Qty: 9 TABLET | Refills: 2 | Status: SHIPPED | OUTPATIENT
Start: 2017-08-03 | End: 2017-09-28 | Stop reason: SDUPTHER

## 2017-08-04 LAB — ASO AB SERPL-ACNC: 200.1 IU/ML (ref 0–200)

## 2017-08-11 ENCOUNTER — HOSPITAL ENCOUNTER (OUTPATIENT)
Dept: PHYSICAL THERAPY | Facility: HOSPITAL | Age: 62
Setting detail: THERAPIES SERIES
Discharge: HOME | End: 2017-08-11
Attending: INTERNAL MEDICINE

## 2017-08-11 DIAGNOSIS — G89.29 CHRONIC BILATERAL LOW BACK PAIN WITHOUT SCIATICA: Primary | ICD-10-CM

## 2017-08-11 DIAGNOSIS — M54.50 CHRONIC BILATERAL LOW BACK PAIN WITHOUT SCIATICA: Primary | ICD-10-CM

## 2017-08-11 PROCEDURE — 97161 PT EVAL LOW COMPLEX 20 MIN: CPT | Performed by: PHYSICAL THERAPIST

## 2017-08-11 PROCEDURE — 97110 THERAPEUTIC EXERCISES: CPT | Performed by: PHYSICAL THERAPIST

## 2017-08-11 PROCEDURE — 97012 MECHANICAL TRACTION THERAPY: CPT | Performed by: PHYSICAL THERAPIST

## 2017-08-15 NOTE — THERAPY EVALUATION
Outpatient Physical Therapy Ortho Initial Evaluation  [facility]     Patient Name: Magdalena Dickey  : 1955  MRN: [id number]  Today's Date: 8/15/2017      Visit Date: 2017    Patient Active Problem List   Diagnosis   • Migraine without aura and without status migrainosus, not intractable   • Anxiety   • GERD (gastroesophageal reflux disease)   • Low back pain   • Depression   • Irritable bowel syndrome with both constipation and diarrhea   • Diverticulosis of large intestine without hemorrhage        Past Medical History:   Diagnosis Date   • Anxiety    • Depression 2016   • Diverticulosis of large intestine without hemorrhage 10/5/2016   • GERD (gastroesophageal reflux disease)    • Irritable bowel syndrome (IBS)    • Low back pain    • Lumbosacral disc disease    • Migraine    • Migraine without aura and without status migrainosus, not intractable 2016        Past Surgical History:   Procedure Laterality Date   • CHOLECYSTECTOMY     • COLONOSCOPY N/A 2/15/2017    Procedure: COLONOSCOPY to cecum with biospsies with cold polypectomy;  Surgeon: Gerardo Tan MD;  Location: [facility] ENDOSCOPY;  Service:    • ENDOSCOPY N/A 2/15/2017    Procedure: ESOPHAGOGASTRODUODENOSCOPY with biopsies;  Surgeon: Gerardo Tan MD;  Location: [facility] ENDOSCOPY;  Service:    • HYSTERECTOMY         Visit Dx:     ICD-10-CM ICD-9-CM   1. Chronic bilateral low back pain without sciatica M54.5 724.2    G89.29 338.29           17 1700   History   Chief Complaint Pain   Type of Pain Back pain;Hip pain   Date Current Problem(s) Began 17   Brief Description of Current Complaint LBP worsening over the past year. Had x-ray in past and showed degeneration. Worse with standing gin one spot (>10min), prolonged sitting >1 hour. Had CINDY's in past and that helped, within last 2 years. Did two shots. Has not done therapy. Pain is getting more constant. Hasn't' gone down L leg since second shot. Now goes to B hips  "at times. No formal exercise program \"not good at it.\" 5/10 today, 7/10 at worst. 0/10 at times. Uses heating pad, ibuprofen. MD gave muscle relaxer that is mild and it does help. 4/7 takes something. Job is mostly sitting but gets up and walks. No change with valsalva.    Patient/Caregiver Goals Relieve pain;Know what to do to help the symptoms   Fall Risk Assessment   Any falls in the past year: Yes   Number of falls reported in the last 12 months 1   Factors that contributed to the fall: Tripped  (up step)           08/11/17 1100   Sensory Screen for Light Touch- Lower Quarter Clearing   L5 (lateral lower leg/great toe) Right:;Diminished   Myotomal Screen- Lower Quarter Clearing   Hip flexion (L2) Bilateral:;4+ (Good +)   Knee extension (L3) Bilateral:;4+ (Good +)   Ankle DF (L4) Bilateral:;4+ (Good +)   Great toe extension (L5) Bilateral:;4+ (Good +)   Ankle PF (S1) Bilateral:;4+ (Good +)  (tested in sitting)   Knee flexion (S2) Bilateral:;4+ (Good +)   Lumbar ROM Screen- Lower Quarter Clearing   Lumbar Flexion Normal   Lumbar Extension Normal  (painful)   Lumbar Lateral Flexion Normal   Lumbar Rotation Normal   Lumbosacral Palpation   Quadratus Lumborum Bilateral:;Tender;Guarded/taut  (mod/severe tenderness)   Erector Spinae (Paraspinals) Bilateral:;Guarded/taut  (mild)   Lumbosacral Accessory Motions   Lumbosacral Accessory Motions Tested? (painful throughout, most L4, S1, sacrum)   Lumbar/SI Special Tests   SLR (Neural Tension) Bilateral:;Negative  (LAD (+) for decreased pain)   Sacral Spring Test (SI Dysfunction) Positive   Hip/Thigh Palpation   Gluteus Medius Right:;Tender;Guarded/taut;Trigger point   Lower Extremity Flexibility   Hamstrings Bilateral:;Moderately limited   Hip External Rotators Bilateral:;Mildly limited;Moderately limited   Hip Internal Rotators Bilateral:;Mildly limited;Moderately limited         08/11/17 1700   Moist Heat   Location Lumbar spine with LE over stool   Rx Minutes 12 mins "   Traction 13281   Traction Type Lumbar   Rx Minutes 10   Duration Intermittent   Position Hook-lying   Weight 50  (/35)   Hold 30   Relax 10           08/11/17 1100   Exercise 1   Exercise Name 1 SKTC   Reps 1 5   Time (Seconds) 1 5   Exercise 2   Exercise Name 2 DKTC   Reps 2 5   Time (Seconds) 2 5   Exercise 3   Exercise Name 3 LTR   Reps 3 5   Time (Seconds) 3 5   Exercise 4   Exercise Name 4 PPT   Reps 4 5           08/11/17 1150   Therapy Education   Education Details Evaluation findings, likely causes of symptoms, DDD, need for core strength and exercise, goals of therapy, treatment options.    Given HEP;Symptoms/condition management;Pain management;Posture/body mechanics   Program New   How Provided Verbal;Demonstration;Written   Provided to Patient   Level of Understanding Teach back education performed;Verbalized;Demonstrated          08/11/17 3296   PT Assessment   Functional Limitations Performance in leisure activities;Performance in self-care ADL;Limitation in home management;Limitations in community activities;Limitations in functional capacity and performance   Impairments Impaired muscle length;Joint mobility;Joint integrity;Pain;Muscle strength;Posture;Range of motion   Assessment Comments Pt. is a 62 year old female with long term history of evolving lumbar spine issues referred to outpatient physical therapy.  Pt. presents with painful lumbar AROM, painful lumbar PIVM, trigger points B QL, and Oswestry Disability score of 51% where 0% represents no perceived functional disability. Pt. currently has no formal exericse program. Pt. will benefit from skilled physical therapy to address these issues.    Please refer to paper survey for additional self-reported information Yes   Rehab Potential Good   Patient/caregiver participated in establishment of treatment plan and goals Yes   Patient would benefit from skilled therapy intervention Yes   PT Plan   PT Frequency 2x/week   Predicted Duration of  Therapy Intervention (days/wks) 6 weeks   Planned CPT's? PT EVAL LOW COMPLEXITY: 18565;PT ULTRASOUND EA 15 MIN: 42025;PT AQUATIC THERAPY EA 15 MIN: 82129;PT MANUAL THERAPY EA 15 MIN: 27726   PT Plan Comments Assess response to HEP, progress, trial of traction. Possibly needling to B QL.           HC PT HOT OR COLD PACK TREAT MCARE [phone]   8/11/2017 Nina Nash, PT GP 1 Filed      HC PT TRACTION LUMBAR [id number]   8/11/2017 Nina Nash, PT GP 1 Filed     HC PT THER PROC EA 15 MIN [id number]   8/11/2017 Nina Nash, PT GP 1 Filed     HC PT EVAL LOW COMPLEXITY 2 [id number]   8/11/2017 Nina Nash, PT GP 1 Filed                             08/11/17 1250   PT Short Term Goals   STG Date to Achieve 08/29/17   STG 1 Pt. will be independent and compliant with initial home exercise program.   STG 1 Progress New   STG 2 Pt. will report low back pain and B hips pain </= 4-5/10 to increase ease of preparing a meal.    STG 2 Progress New   STG 3 Pt. will report standing tolerance >15 minutes.    STG 3 Progress New   Long Term Goals   LTG Date to Achieve 09/14/17   LTG 1 Pt. will be independent and compliant with advanced home exercise program.   LTG 1 Progress New   LTG 2 Pt. will report low back pain </=2-3/10 to increase ease of sitting through dinner.   LTG 2 Progress New   LTG 3 Pt. will score </=31% on Oswestry Disability indicating decreased perceived functional disability.    LTG 3 Progress New   Time Calculation   PT Goal Re-Cert Due Date 09/14/17                Time Calculation:   Start Time: 1100  Stop Time: 1145  Time Calculation (min): 45 min         PT G-Codes  Outcome Measure Options: Abner Nash, PT  8/15/2017

## 2017-08-22 ENCOUNTER — HOSPITAL ENCOUNTER (OUTPATIENT)
Dept: PHYSICAL THERAPY | Facility: HOSPITAL | Age: 62
Setting detail: THERAPIES SERIES
Discharge: HOME | End: 2017-08-22

## 2017-08-22 DIAGNOSIS — M54.50 CHRONIC BILATERAL LOW BACK PAIN WITHOUT SCIATICA: Primary | ICD-10-CM

## 2017-08-22 DIAGNOSIS — G89.29 CHRONIC BILATERAL LOW BACK PAIN WITHOUT SCIATICA: Primary | ICD-10-CM

## 2017-08-22 PROCEDURE — 97012 MECHANICAL TRACTION THERAPY: CPT | Performed by: PHYSICAL THERAPIST

## 2017-08-22 PROCEDURE — 97110 THERAPEUTIC EXERCISES: CPT | Performed by: PHYSICAL THERAPIST

## 2017-08-22 NOTE — THERAPY TREATMENT NOTE
Outpatient Physical Therapy Ortho Treatment Note  [facility]     Patient Name: Magdalena Dickey  : 1955  MRN: [id number]  Today's Date: 2017      Visit Date: 2017    Visit Dx:    ICD-10-CM ICD-9-CM   1. Chronic bilateral low back pain without sciatica M54.5 724.2    G89.29 338.29       Patient Active Problem List   Diagnosis   • Migraine without aura and without status migrainosus, not intractable   • Anxiety   • GERD (gastroesophageal reflux disease)   • Low back pain   • Depression   • Irritable bowel syndrome with both constipation and diarrhea   • Diverticulosis of large intestine without hemorrhage        Past Medical History:   Diagnosis Date   • Anxiety    • Depression 2016   • Diverticulosis of large intestine without hemorrhage 10/5/2016   • GERD (gastroesophageal reflux disease)    • Irritable bowel syndrome (IBS)    • Low back pain    • Lumbosacral disc disease    • Migraine    • Migraine without aura and without status migrainosus, not intractable 2016        Past Surgical History:   Procedure Laterality Date   • CHOLECYSTECTOMY     • COLONOSCOPY N/A 2/15/2017    Procedure: COLONOSCOPY to cecum with biospsies with cold polypectomy;  Surgeon: Gerardo Tan MD;  Location: [facility] ENDOSCOPY;  Service:    • ENDOSCOPY N/A 2/15/2017    Procedure: ESOPHAGOGASTRODUODENOSCOPY with biopsies;  Surgeon: Gerardo Tan MD;  Location: [facility] ENDOSCOPY;  Service:    • HYSTERECTOMY                               PT Assessment/Plan       17 1614       PT Assessment    Assessment Comments Pt. reporting increased pain today with prolonged sitting in poor chairs in  meetings today. PRogressed ther ex and traction. Pt. reporting decreased pain in B hips at end of session, indicating centralization of symptoms.   -KJ     PT Plan    PT Plan Comments Assess repsonse to progressed HEP. Trial of DDN to B QL if indicated.   -KJ       User Key  (r) = Recorded By, (t) = Taken By, (c) =  Cosigned By    Initials Name Provider Type    JENNIFER Nash, MARY Physical Therapist                Modalities       08/22/17 1500          Moist Heat    Location Lumbar spine with LE over stool  -KJ      Rx Minutes 12 mins  -KJ      Traction 23630    Traction Type Lumbar  -KJ      Rx Minutes 10  -KJ      Position Hook-lying  -KJ      Weight 50   /35  -KJ      Hold 30  -KJ      Relax 10  -KJ        User Key  (r) = Recorded By, (t) = Taken By, (c) = Cosigned By    Initials Name Provider Type    JENNIFER Nash, MARY Physical Therapist                Exercises       08/22/17 1400          Subjective Comments    Subjective Comments My back is hurting today, more than usual. Had to sit in a few meetings with bad chairs.   -KJ      Subjective Pain    Able to rate subjective pain? yes  -KJ      Pre-Treatment Pain Level 5  -KJ      Exercise 1    Exercise Name 1 SKTC  -KJ      Reps 1 5  -KJ      Time (Seconds) 1 5  -KJ      Exercise 2    Exercise Name 2 DKTC  -KJ      Reps 2 5  -KJ      Time (Seconds) 2 5  -KJ      Exercise 3    Exercise Name 3 LTR  -KJ      Reps 3 5  -KJ      Time (Seconds) 3 5  -KJ      Exercise 4    Exercise Name 4 PPT  -KJ      Reps 4 5  -KJ      Exercise 5    Exercise Name 5 Nu-step L3 U/LE  -KJ      Time (Minutes) 5 6  -KJ      Exercise 6    Exercise Name 6 Shoulder extension with RTB and TA focus  -KJ      Reps 6 10  -KJ        User Key  (r) = Recorded By, (t) = Taken By, (c) = Cosigned By    Initials Name Provider Type    JENNIFER Nash PT Physical Therapist                               PT OP Goals       08/22/17 1600       PT Short Term Goals    STG Date to Achieve 08/29/17  -KJ     STG 1 Pt. will be independent and compliant with initial home exercise program.  -KJ     STG 1 Progress Progressing  -KJ     STG 1 Progress Comments Reporting compliance.   -KJ     STG 2 Pt. will report low back pain and B hips pain </= 4-5/10 to increase ease of preparing a meal.   -KJ     STG 2  Progress Ongoing  -KJ     STG 2 Progress Comments Reporting pain 5/10 today, will continue to monitor.   -KJ     STG 3 Pt. will report standing tolerance >15 minutes.   -KJ     STG 3 Progress Ongoing  -KJ     Long Term Goals    LTG Date to Achieve 09/14/17  -KJ     LTG 1 Pt. will be independent and compliant with advanced home exercise program.  -KJ     LTG 1 Progress Progressing  -KJ     LTG 1 Progress Comments Advanced today.   -KJ     LTG 2 Pt. will report low back pain </=2-3/10 to increase ease of sitting through dinner.  -KJ     LTG 2 Progress Ongoing  -KJ     LTG 2 Progress Comments Reporting pain 5/10 today, will continue to monitor.   -KJ     LTG 3 Pt. will score </=31% on Oswestry Disability indicating decreased perceived functional disability.   -KJ     LTG 3 Progress Ongoing  -KJ       User Key  (r) = Recorded By, (t) = Taken By, (c) = Cosigned By    Initials Name Provider Type    JENNIFER Nash PT Physical Therapist                Therapy Education       08/22/17 1517          Therapy Education    Given HEP;Symptoms/condition management;Pain management;Posture/body mechanics  -KJ      Program Reinforced  -KJ      How Provided Verbal;Demonstration;Written  -KJ      Provided to Patient  -KJ      Level of Understanding Teach back education performed;Verbalized;Demonstrated  -KJ        User Key  (r) = Recorded By, (t) = Taken By, (c) = Cosigned By    Initials Name Provider Type    JENNIFER Nash PT Physical Therapist                Time Calculation:   Start Time: 1445  Stop Time: 1530  Time Calculation (min): 45 min    Therapy Charges for Today     Code Description Service Date Service Provider Modifiers Qty    [id number]  PT HOT OR COLD PACK TREAT MCARE 8/22/2017 Nina Nash, PT GP 1    [id number]  PT TRACTION LUMBAR 8/22/2017 Nina Nash, PT GP 1    [id number]  PT THER PROC EA 15 MIN 8/22/2017 Nina Nash, PT GP 2                    Nina Nash  PT  8/22/2017

## 2017-08-24 ENCOUNTER — HOSPITAL ENCOUNTER (OUTPATIENT)
Dept: PHYSICAL THERAPY | Facility: HOSPITAL | Age: 62
Setting detail: THERAPIES SERIES
Discharge: HOME | End: 2017-08-24

## 2017-08-24 DIAGNOSIS — M54.50 CHRONIC BILATERAL LOW BACK PAIN WITHOUT SCIATICA: Primary | ICD-10-CM

## 2017-08-24 DIAGNOSIS — G89.29 CHRONIC BILATERAL LOW BACK PAIN WITHOUT SCIATICA: Primary | ICD-10-CM

## 2017-08-24 PROCEDURE — 97012 MECHANICAL TRACTION THERAPY: CPT | Performed by: PHYSICAL THERAPIST

## 2017-08-24 PROCEDURE — 97140 MANUAL THERAPY 1/> REGIONS: CPT | Performed by: PHYSICAL THERAPIST

## 2017-08-24 NOTE — THERAPY TREATMENT NOTE
Outpatient Physical Therapy Ortho Treatment Note  [facility]     Patient Name: Magdalena Dickey  : 1955  MRN: [id number]  Today's Date: 2017      Visit Date: 2017    Visit Dx:    ICD-10-CM ICD-9-CM   1. Chronic bilateral low back pain without sciatica M54.5 724.2    G89.29 338.29       Patient Active Problem List   Diagnosis   • Migraine without aura and without status migrainosus, not intractable   • Anxiety   • GERD (gastroesophageal reflux disease)   • Low back pain   • Depression   • Irritable bowel syndrome with both constipation and diarrhea   • Diverticulosis of large intestine without hemorrhage        Past Medical History:   Diagnosis Date   • Anxiety    • Depression 2016   • Diverticulosis of large intestine without hemorrhage 10/5/2016   • GERD (gastroesophageal reflux disease)    • Irritable bowel syndrome (IBS)    • Low back pain    • Lumbosacral disc disease    • Migraine    • Migraine without aura and without status migrainosus, not intractable 2016        Past Surgical History:   Procedure Laterality Date   • CHOLECYSTECTOMY     • COLONOSCOPY N/A 2/15/2017    Procedure: COLONOSCOPY to cecum with biospsies with cold polypectomy;  Surgeon: Gerardo Tan MD;  Location: [facility] ENDOSCOPY;  Service:    • ENDOSCOPY N/A 2/15/2017    Procedure: ESOPHAGOGASTRODUODENOSCOPY with biopsies;  Surgeon: Gerardo Tan MD;  Location: [facility] ENDOSCOPY;  Service:    • HYSTERECTOMY                               PT Assessment/Plan       17 0920       PT Assessment    Assessment Comments Initiated trial of DDN today and pt. felt an immediate release in muscle tension.   -KJ     PT Plan    PT Plan Comments Assess response to needling, continue if helpful. Continue traction. Progress core stab program.  -JENNIFER       User Key  (r) = Recorded By, (t) = Taken By, (c) = Cosigned By    Initials Name Provider Type    JENNIFER Nash, PT Physical Therapist                Modalities        08/24/17 0700          Subjective Comments    Subjective Comments I was sore Tuesday but then  better Wed so I think it's worth it. 2-3/10 today.   -KJ      Subjective Pain    Pre-Treatment Pain Level 3  -KJ      Moist Heat    Location Lumbar spine with LE over stool  -KJ      Rx Minutes 12 mins  -KJ      Ice    Location to B QL in supine 90/90 following needling.  -KJ      Rx Minutes 10 mins  -KJ      Traction 21640    Traction Type Lumbar  -KJ      Rx Minutes 12  -KJ      Position Hook-lying  -KJ      Weight 55   /35  -KJ      Hold 45  -KJ      Relax 10  -KJ        User Key  (r) = Recorded By, (t) = Taken By, (c) = Cosigned By    Initials Name Provider Type    JENNIFER Nash, PT Physical Therapist                Exercises       08/24/17 0700          Subjective Comments    Subjective Comments I was sore Tuesday but then  better Wed so I think it's worth it. 2-3/10 today.   -KJ      Subjective Pain    Pre-Treatment Pain Level 3  -KJ        User Key  (r) = Recorded By, (t) = Taken By, (c) = Cosigned By    Initials Name Provider Type    JENNIFER Nash, PT Physical Therapist                        Manual Rx (last 36 hours)      Manual Treatments       08/24/17 1300          Manual Rx 1    Manual Rx 1 Type Intramuscular manual therapy with DDN.  After reviewing all risks (including pneumothorax, bruising, infection, nerve injury, and soreness) written informed consent for dry needling was obtained.     Patient position during treatment: contralateral side lying with pillow between knees     Muscles treated: B QL     Response: several LTRs noted B, stronger on R. Immediate decrease in muscle tension noted.    Clean needle technique observed at all times, precautions for lung fields, neurovascular structures observed.     Manual palpation and assessment performed before, during, and after session.    Written after-care instruction issued.     -KJ        User Key  (r) = Recorded By, (t) = Taken By, (c) =  Cosigned By    Initials Name Provider Type    JENNIFER Nash PT Physical Therapist                    Therapy Education       08/24/17 0920          Therapy Education    Education Details Causes of trigger points, plan of care, needling, traction, benefits of therapy and expected soreness from treatments.   -KJ      Given HEP;Symptoms/condition management;Pain management;Posture/body mechanics  -KJ      Program Reinforced  -KJ      How Provided Verbal;Demonstration;Written  -KJ      Provided to Patient  -KJ      Level of Understanding Teach back education performed;Verbalized;Demonstrated  -KJ        User Key  (r) = Recorded By, (t) = Taken By, (c) = Cosigned By    Initials Name Provider Type    JENNIFER Nash PT Physical Therapist                Time Calculation:   Start Time: 0834  Stop Time: 0920  Time Calculation (min): 46 min    Therapy Charges for Today     Code Description Service Date Service Provider Modifiers Qty    64692235601 HC PT HOT OR COLD PACK TREAT MCARE 8/24/2017 Nina Nash, PT GP 1    61556044721 HC PT TRACTION LUMBAR 8/24/2017 Nina Nash, PT 59, GP 1    27589603053 HC PT MANUAL THERAPY EA 15 MIN 8/24/2017 Nina Nash, PT 59, GP 2                    Nina Nash, PT  8/24/2017

## 2017-08-28 ENCOUNTER — HOSPITAL ENCOUNTER (OUTPATIENT)
Dept: PHYSICAL THERAPY | Facility: HOSPITAL | Age: 62
Setting detail: THERAPIES SERIES
Discharge: HOME | End: 2017-08-28

## 2017-08-28 DIAGNOSIS — G89.29 CHRONIC BILATERAL LOW BACK PAIN WITHOUT SCIATICA: Primary | ICD-10-CM

## 2017-08-28 DIAGNOSIS — M54.50 CHRONIC BILATERAL LOW BACK PAIN WITHOUT SCIATICA: Primary | ICD-10-CM

## 2017-08-28 PROCEDURE — 97110 THERAPEUTIC EXERCISES: CPT | Performed by: PHYSICAL THERAPIST

## 2017-08-28 PROCEDURE — 97012 MECHANICAL TRACTION THERAPY: CPT | Performed by: PHYSICAL THERAPIST

## 2017-08-28 NOTE — THERAPY TREATMENT NOTE
Outpatient Physical Therapy Ortho Treatment Note  [facility]     Patient Name: Magdalena Dickey  : 1955  MRN: [id number]  Today's Date: 2017      Visit Date: 2017    Visit Dx:    ICD-10-CM ICD-9-CM   1. Chronic bilateral low back pain without sciatica M54.5 724.2    G89.29 338.29       Patient Active Problem List   Diagnosis   • Migraine without aura and without status migrainosus, not intractable   • Anxiety   • GERD (gastroesophageal reflux disease)   • Low back pain   • Depression   • Irritable bowel syndrome with both constipation and diarrhea   • Diverticulosis of large intestine without hemorrhage        Past Medical History:   Diagnosis Date   • Anxiety    • Depression 2016   • Diverticulosis of large intestine without hemorrhage 10/5/2016   • GERD (gastroesophageal reflux disease)    • Irritable bowel syndrome (IBS)    • Low back pain    • Lumbosacral disc disease    • Migraine    • Migraine without aura and without status migrainosus, not intractable 2016        Past Surgical History:   Procedure Laterality Date   • CHOLECYSTECTOMY     • COLONOSCOPY N/A 2/15/2017    Procedure: COLONOSCOPY to cecum with biospsies with cold polypectomy;  Surgeon: Gerardo Tan MD;  Location: [facility] ENDOSCOPY;  Service:    • ENDOSCOPY N/A 2/15/2017    Procedure: ESOPHAGOGASTRODUODENOSCOPY with biopsies;  Surgeon: Gerardo Tan MD;  Location: [facility] ENDOSCOPY;  Service:    • HYSTERECTOMY                               PT Assessment/Plan       17 0918       PT Assessment    Assessment Comments Pt. reporting significantly decreased pain after first session of needling to B QL. Main complaint now is R lateral hip pain consistent with bursitis. Pt. will benefit from needling to R gtuteals, possibly to QL's again.   -KJ     PT Plan    PT Plan Comments Assess response to progress ther ex, traction. Needle R gluteals, possibly B QL.   -KJ       User Key  (r) = Recorded By, (t) = Taken By, (c)  = Cosigned By    Initials Name Provider Type    JENNIFER Nash, PT Physical Therapist                Modalities       08/28/17 0800          Moist Heat    Location Lumbar spine with LE over stool  -KJ      Rx Minutes 15 mins  -KJ      Traction 63819    Traction Type Lumbar  -KJ      Rx Minutes 15  -KJ      Position Hook-lying  -KJ      Weight 60   /35  -KJ      Hold 45  -KJ      Relax 10  -KJ        User Key  (r) = Recorded By, (t) = Taken By, (c) = Cosigned By    Initials Name Provider Type    JENNIFER Nash, MARY Physical Therapist                Exercises       08/28/17 0800          Subjective Comments    Subjective Comments I loved the needling, so much better. 1/10. Can you needle  my hip? It's always sore (points to R greater trochanter). Dog walked up on it and it was so tender.   -KJ      Subjective Pain    Pre-Treatment Pain Level 3   in R hip, 1/10 in low back  -KJ      Exercise 1    Exercise Name 1 SKTC  -KJ      Reps 1 5  -KJ      Time (Seconds) 1 5  -KJ      Exercise 2    Exercise Name 2 DKTC  -KJ      Reps 2 5  -KJ      Time (Seconds) 2 5  -KJ      Exercise 3    Exercise Name 3 LTR  -KJ      Reps 3 5  -KJ      Time (Seconds) 3 5  -KJ      Exercise 4    Exercise Name 4 PPT  -KJ      Reps 4 15  -KJ      Exercise 5    Exercise Name 5 Nu-step L4 U/LE  -KJ      Time (Minutes) 5 8  -KJ      Exercise 6    Exercise Name 6 Shoulder extension with RTB and TA focus  -KJ      Reps 6 15  -KJ      Exercise 7    Exercise Name 7 Piriformis stretc  -KJ      Reps 7 3  -KJ      Time (Seconds) 7 20  -KJ      Exercise 8    Exercise Name 8 Hip adduction  -KJ      Reps 8 10  -KJ      Time (Seconds) 8 5  -KJ      Exercise 9    Exercise Name 9 Hip Abduction/Er in hooklying with TA focus   -KJ      Reps 9 10  -KJ      Additional Comments RTB  -KJ        User Key  (r) = Recorded By, (t) = Taken By, (c) = Cosigned By    Initials Name Provider Type    JENNIFER Nash PT Physical Therapist                                PT OP Goals       08/28/17 0900       PT Short Term Goals    STG Date to Achieve 08/29/17  -KJ     STG 1 Pt. will be independent and compliant with initial home exercise program.  -KJ     STG 1 Progress Met  -KJ     STG 2 Pt. will report low back pain and B hips pain </= 4-5/10 to increase ease of preparing a meal.   -KJ     STG 2 Progress Progressing  -KJ     STG 2 Progress Comments Decreased LBP to 1/10, R hip 3/10.  -KJ     STG 3 Pt. will report standing tolerance >15 minutes.   -KJ     STG 3 Progress Ongoing  -KJ     Long Term Goals    LTG Date to Achieve 09/14/17  -KJ     LTG 1 Pt. will be independent and compliant with advanced home exercise program.  -KJ     LTG 1 Progress Progressing  -KJ     LTG 1 Progress Comments Advanced today.   -KJ     LTG 2 Pt. will report low back pain </=2-3/10 to increase ease of sitting through dinner.  -KJ     LTG 2 Progress Progressing  -KJ     LTG 2 Progress Comments Decreased LBP to 1/10, R hip 3/10.  -KJ     LTG 3 Pt. will score </=31% on Oswestry Disability indicating decreased perceived functional disability.   -KJ     LTG 3 Progress Ongoing  -KJ     LTG 3 Progress Comments Not formally assessed to date.   -KJ       User Key  (r) = Recorded By, (t) = Taken By, (c) = Cosigned By    Initials Name Provider Type    JENNIFER Nash, PT Physical Therapist                Therapy Education       08/28/17 0916          Therapy Education    Education Details Causes of trigger points, their prognosis, importance of core strength, causes of lateral hip pain. Log roll.   -KJ      Given HEP;Symptoms/condition management;Pain management;Posture/body mechanics  -KJ      Program Progressed  -KJ      How Provided Verbal;Demonstration;Written  -KJ      Provided to Patient  -KJ      Level of Understanding Teach back education performed;Verbalized;Demonstrated  -KJ        User Key  (r) = Recorded By, (t) = Taken By, (c) = Cosigned By    Initials Name Provider Type    JENNIFER HUNTER  Saad, PT Physical Therapist                Time Calculation:   Start Time: 0832  Stop Time: 0930  Time Calculation (min): 58 min    Therapy Charges for Today     Code Description Service Date Service Provider Modifiers Qty    80768601549  PT HOT OR COLD PACK TREAT MCARE 8/28/2017 Nina Nash, PT GP 1    [id number]  PT TRACTION LUMBAR 8/28/2017 Nina Nash, PT GP 1    [id number]  PT THER PROC EA 15 MIN 8/28/2017 Nina Nash, PT GP 3                    Nina Nash, PT  8/28/2017

## 2017-08-31 ENCOUNTER — APPOINTMENT (OUTPATIENT)
Dept: PHYSICAL THERAPY | Facility: HOSPITAL | Age: 62
End: 2017-08-31

## 2017-09-05 ENCOUNTER — HOSPITAL ENCOUNTER (OUTPATIENT)
Dept: PHYSICAL THERAPY | Facility: HOSPITAL | Age: 62
Setting detail: THERAPIES SERIES
Discharge: HOME | End: 2017-09-05

## 2017-09-05 DIAGNOSIS — M54.50 CHRONIC BILATERAL LOW BACK PAIN WITHOUT SCIATICA: Primary | ICD-10-CM

## 2017-09-05 DIAGNOSIS — G89.29 CHRONIC BILATERAL LOW BACK PAIN WITHOUT SCIATICA: Primary | ICD-10-CM

## 2017-09-05 PROCEDURE — 97140 MANUAL THERAPY 1/> REGIONS: CPT | Performed by: PHYSICAL THERAPIST

## 2017-09-05 PROCEDURE — 97012 MECHANICAL TRACTION THERAPY: CPT | Performed by: PHYSICAL THERAPIST

## 2017-09-05 NOTE — THERAPY TREATMENT NOTE
Outpatient Physical Therapy Ortho Treatment Note  [facility]     Patient Name: Magdalena Dickey  : 1955  MRN: [id number]  Today's Date: 2017      Visit Date: 2017    Visit Dx:    ICD-10-CM ICD-9-CM   1. Chronic bilateral low back pain without sciatica M54.5 724.2    G89.29 338.29       Patient Active Problem List   Diagnosis   • Migraine without aura and without status migrainosus, not intractable   • Anxiety   • GERD (gastroesophageal reflux disease)   • Low back pain   • Depression   • Irritable bowel syndrome with both constipation and diarrhea   • Diverticulosis of large intestine without hemorrhage        Past Medical History:   Diagnosis Date   • Anxiety    • Depression 2016   • Diverticulosis of large intestine without hemorrhage 10/5/2016   • GERD (gastroesophageal reflux disease)    • Irritable bowel syndrome (IBS)    • Low back pain    • Lumbosacral disc disease    • Migraine    • Migraine without aura and without status migrainosus, not intractable 2016        Past Surgical History:   Procedure Laterality Date   • CHOLECYSTECTOMY     • COLONOSCOPY N/A 2/15/2017    Procedure: COLONOSCOPY to cecum with biospsies with cold polypectomy;  Surgeon: Gerardo Tan MD;  Location: [facility] ENDOSCOPY;  Service:    • ENDOSCOPY N/A 2/15/2017    Procedure: ESOPHAGOGASTRODUODENOSCOPY with biopsies;  Surgeon: Gerardo Tan MD;  Location: [facility] ENDOSCOPY;  Service:    • HYSTERECTOMY                               PT Assessment/Plan       17 1402       PT Assessment    Assessment Comments Pt. reporting overall decreased pain until today, increased low back pain, 3-4/10. Continued traction and needling today to B QL and piriformis and gluteus medius.   -KJ     PT Plan    PT Plan Comments Assess response to progressed traction and continued needling. Progress core stab. next session.   -KJ       User Key  (r) = Recorded By, (t) = Taken By, (c) = Cosigned By    Initials Name Provider  Type    JENNIFER Nash PT Physical Therapist                Modalities       09/05/17 1100          Subjective Comments    Subjective Comments Had been feeling so much better but back is hurting today, 3-4/10. Usually hips are worst but back is today. Maybe the weather.  -KJ      Subjective Pain    Pre-Treatment Pain Level 4  -KJ      Moist Heat    Location Lumbar spine with LE over stool  -KJ      Rx Minutes 15 mins  -KJ      Traction 16225    Traction Type Lumbar  -KJ      Rx Minutes 15  -KJ      Position Hook-lying  -KJ      Weight 65   /35  -KJ      Hold 45  -KJ      Relax 10  -KJ        User Key  (r) = Recorded By, (t) = Taken By, (c) = Cosigned By    Initials Name Provider Type    JENNIFER Nash PT Physical Therapist                Exercises       09/05/17 1100          Subjective Comments    Subjective Comments Had been feeling so much better but back is hurting today, 3-4/10. Usually hips are worst but back is today. Maybe the weather.  -KJ      Subjective Pain    Pre-Treatment Pain Level 4  -KJ        User Key  (r) = Recorded By, (t) = Taken By, (c) = Cosigned By    Initials Name Provider Type    JENNIFER Nash PT Physical Therapist                        Manual Rx (last 36 hours)      Manual Treatments       09/05/17 1300          Manual Rx 1    Manual Rx 1 Type Intramuscular manual therapy with DDN.    Patient position during treatment: contralateral side lying     Muscles treated: B QL, B gluteus medius and piriformis    Response: several LTRs noted B, minimal to no pain response.     Clean needle technique observed at all times, precautions for lung fields, neurovascular structures observed.     Manual palpation and assessment performed before, during, and after session.    -KJ        User Key  (r) = Recorded By, (t) = Taken By, (c) = Cosigned By    Initials Name Provider Type    JENNIFER Nash PT Physical Therapist                PT OP Goals       09/05/17 1400       PT  Short Term Goals    STG Date to Achieve 08/29/17  -KJ     STG 1 Pt. will be independent and compliant with initial home exercise program.  -KJ     STG 1 Progress Met  -KJ     STG 2 Pt. will report low back pain and B hips pain </= 4-5/10 to increase ease of preparing a meal.   -KJ     STG 2 Progress Met  -KJ     STG 2 Progress Comments Reporting pain 3-4/10 at worst.   -KJ     STG 3 Pt. will report standing tolerance >15 minutes.   -KJ     STG 3 Progress Ongoing  -KJ     Long Term Goals    LTG Date to Achieve 09/14/17  -KJ     LTG 1 Pt. will be independent and compliant with advanced home exercise program.  -KJ     LTG 1 Progress Progressing  -KJ     LTG 1 Progress Comments Advancing as needed.   -KJ     LTG 2 Pt. will report low back pain </=2-3/10 to increase ease of sitting through dinner.  -KJ     LTG 2 Progress Progressing  -KJ     LTG 2 Progress Comments Reporting pain 3-4/10 at worst.   -KJ     LTG 3 Pt. will score </=31% on Oswestry Disability indicating decreased perceived functional disability.   -KJ     LTG 3 Progress Ongoing  -KJ       User Key  (r) = Recorded By, (t) = Taken By, (c) = Cosigned By    Initials Name Provider Type    JENNIFER Nash, PT Physical Therapist                Therapy Education       09/05/17 1304          Therapy Education    Education Details Possible causes of pain, plan of care.   -KJ      Given HEP;Symptoms/condition management;Pain management;Posture/body mechanics  -KJ      Program Progressed  -KJ      How Provided Verbal;Demonstration;Written  -KJ      Provided to Patient  -KJ      Level of Understanding Teach back education performed;Verbalized;Demonstrated  -KJ        User Key  (r) = Recorded By, (t) = Taken By, (c) = Cosigned By    Initials Name Provider Type    JENNIFER Nash, PT Physical Therapist                Time Calculation:   Start Time: 1145  Stop Time: 1235  Time Calculation (min): 50 min    Therapy Charges for Today     Code Description Service  Date Service Provider Modifiers Qty    58620312348 HC PT HOT OR COLD PACK TREAT MCARE 9/5/2017 Nnia Nash, PT GP 1    67270137874 HC PT TRACTION LUMBAR 9/5/2017 Nina Nash, PT GP 1    [id number] HC PT MANUAL THERAPY EA 15 MIN 9/5/2017 Nina Nash, PT GP 2                    Nina Nash, PT  9/5/2017

## 2017-09-07 ENCOUNTER — HOSPITAL ENCOUNTER (OUTPATIENT)
Dept: PHYSICAL THERAPY | Facility: HOSPITAL | Age: 62
Setting detail: THERAPIES SERIES
Discharge: HOME | End: 2017-09-07

## 2017-09-07 DIAGNOSIS — M54.50 CHRONIC BILATERAL LOW BACK PAIN WITHOUT SCIATICA: Primary | ICD-10-CM

## 2017-09-07 DIAGNOSIS — G89.29 CHRONIC BILATERAL LOW BACK PAIN WITHOUT SCIATICA: Primary | ICD-10-CM

## 2017-09-07 PROCEDURE — 97012 MECHANICAL TRACTION THERAPY: CPT | Performed by: PHYSICAL THERAPIST

## 2017-09-07 PROCEDURE — 97110 THERAPEUTIC EXERCISES: CPT | Performed by: PHYSICAL THERAPIST

## 2017-09-07 NOTE — THERAPY TREATMENT NOTE
Outpatient Physical Therapy Ortho Treatment Note  [facility]     Patient Name: Magdalena Dickey  : 1955  MRN: [id number]  Today's Date: 2017      Visit Date: 2017    Visit Dx:    ICD-10-CM ICD-9-CM   1. Chronic bilateral low back pain without sciatica M54.5 724.2    G89.29 338.29       Patient Active Problem List   Diagnosis   • Migraine without aura and without status migrainosus, not intractable   • Anxiety   • GERD (gastroesophageal reflux disease)   • Low back pain   • Depression   • Irritable bowel syndrome with both constipation and diarrhea   • Diverticulosis of large intestine without hemorrhage        Past Medical History:   Diagnosis Date   • Anxiety    • Depression 2016   • Diverticulosis of large intestine without hemorrhage 10/5/2016   • GERD (gastroesophageal reflux disease)    • Irritable bowel syndrome (IBS)    • Low back pain    • Lumbosacral disc disease    • Migraine    • Migraine without aura and without status migrainosus, not intractable 2016        Past Surgical History:   Procedure Laterality Date   • CHOLECYSTECTOMY     • COLONOSCOPY N/A 2/15/2017    Procedure: COLONOSCOPY to cecum with biospsies with cold polypectomy;  Surgeon: Gerardo Tan MD;  Location: [facility] ENDOSCOPY;  Service:    • ENDOSCOPY N/A 2/15/2017    Procedure: ESOPHAGOGASTRODUODENOSCOPY with biopsies;  Surgeon: Gerardo Tan MD;  Location: [facility] ENDOSCOPY;  Service:    • HYSTERECTOMY                               PT Assessment/Plan       17 0920       PT Assessment    Assessment Comments Pt. reporting overall improvement with therapy, less hip and back pain today. Likely will benefit from one more session of needling. Consider D/C over next 1-2 weeks if still doing well.   -JENNIFER     PT Plan    PT Plan Comments Continue needling next session.   -JENNIFER       User Key  (r) = Recorded By, (t) = Taken By, (c) = Cosigned By    Initials Name Provider Type    JENNIFER Nash, PT Physical  Therapist                Modalities       09/07/17 0800          Moist Heat    Location Lumbar spine with LE over stool  -KJ      Rx Minutes 15 mins  -KJ      Traction 25633    Traction Type Lumbar  -KJ      Rx Minutes 15  -KJ      Position Hook-lying  -KJ      Weight 65   /35  -KJ      Hold 45  -KJ      Relax 10  -KJ        User Key  (r) = Recorded By, (t) = Taken By, (c) = Cosigned By    Initials Name Provider Type    JENNIFER Nash, MARY Physical Therapist                Exercises       09/07/17 0800          Subjective Comments    Subjective Comments I'm doing better, it's still there some, some tightness in the hips but better. Back pain was better after the needling. No issues sleeping.   -KJ      Subjective Pain    Pre-Treatment Pain Level 1  -KJ      Exercise 1    Exercise Name 1 SKTC  -KJ      Reps 1 5  -KJ      Time (Seconds) 1 5  -KJ      Exercise 2    Exercise Name 2 DKTC  -KJ      Reps 2 5  -KJ      Time (Seconds) 2 5  -KJ      Exercise 3    Exercise Name 3 LTR  -KJ      Reps 3 5  -KJ      Time (Seconds) 3 5  -KJ      Exercise 4    Exercise Name 4 PPT  -KJ      Reps 4 15  -KJ      Exercise 5    Exercise Name 5 Nu-step L4 U/LE  -KJ      Time (Minutes) 5 8  -KJ      Exercise 6    Exercise Name 6 Shoulder extension with RTB and TA focus  -KJ      Reps 6 15  -KJ      Exercise 7    Exercise Name 7 Piriformis stretc  -KJ      Reps 7 3  -KJ      Time (Seconds) 7 20  -KJ      Exercise 8    Exercise Name 8 Hip adduction  -KJ      Reps 8 10  -KJ      Time (Seconds) 8 5  -KJ      Exercise 9    Exercise Name 9 Hip Abduction/Er in hooklying with TA focus   -KJ      Reps 9 10  -KJ        User Key  (r) = Recorded By, (t) = Taken By, (c) = Cosigned By    Initials Name Provider Type    JENNIFER Nash PT Physical Therapist                                       Time Calculation:   Start Time: 0833  Stop Time: 0920  Time Calculation (min): 47 min    Therapy Charges for Today     Code Description Service Date  Service Provider Modifiers Qty    61946078991 HC PT HOT OR COLD PACK TREAT MCARE 9/7/2017 Nina Nash, PT GP 1    68301050998 HC PT TRACTION LUMBAR 9/7/2017 Nina Nash, PT GP 1    01534369986 HC PT THER PROC EA 15 MIN 9/7/2017 Nina Nash, PT GP 2                    Nina Nash, PT  9/7/2017

## 2017-09-07 NOTE — THERAPY TREATMENT NOTE
Outpatient Physical Therapy Ortho Treatment Note  [facility]     Patient Name: Magdalena Dickey  : 1955  MRN: [id number]  Today's Date: 2017      Visit Date: 2017    Visit Dx:    ICD-10-CM ICD-9-CM   1. Chronic bilateral low back pain without sciatica M54.5 724.2    G89.29 338.29       Patient Active Problem List   Diagnosis   • Migraine without aura and without status migrainosus, not intractable   • Anxiety   • GERD (gastroesophageal reflux disease)   • Low back pain   • Depression   • Irritable bowel syndrome with both constipation and diarrhea   • Diverticulosis of large intestine without hemorrhage        Past Medical History:   Diagnosis Date   • Anxiety    • Depression 2016   • Diverticulosis of large intestine without hemorrhage 10/5/2016   • GERD (gastroesophageal reflux disease)    • Irritable bowel syndrome (IBS)    • Low back pain    • Lumbosacral disc disease    • Migraine    • Migraine without aura and without status migrainosus, not intractable 2016        Past Surgical History:   Procedure Laterality Date   • CHOLECYSTECTOMY     • COLONOSCOPY N/A 2/15/2017    Procedure: COLONOSCOPY to cecum with biospsies with cold polypectomy;  Surgeon: Gerardo Tan MD;  Location: [facility] ENDOSCOPY;  Service:    • ENDOSCOPY N/A 2/15/2017    Procedure: ESOPHAGOGASTRODUODENOSCOPY with biopsies;  Surgeon: Gerardo Tan MD;  Location: [facility] ENDOSCOPY;  Service:    • HYSTERECTOMY                               PT Assessment/Plan       17 0920       PT Assessment    Assessment Comments Pt. reporting overall improvement with therapy, less hip and back pain today. Likely will benefit from one more session of needling. Consider D/C over next 1-2 weeks if still doing well.   -JENNIFER     PT Plan    PT Plan Comments Continue needling next session.   -JENNIFER       User Key  (r) = Recorded By, (t) = Taken By, (c) = Cosigned By    Initials Name Provider Type    JENNIFER Nash, PT Physical  Therapist                Modalities       09/07/17 0800          Moist Heat    Location Lumbar spine with LE over stool  -KJ      Rx Minutes 15 mins  -KJ      Traction 80393    Traction Type Lumbar  -KJ      Rx Minutes 15  -KJ      Position Hook-lying  -KJ      Weight 65   /35  -KJ      Hold 45  -KJ      Relax 10  -KJ        User Key  (r) = Recorded By, (t) = Taken By, (c) = Cosigned By    Initials Name Provider Type    JENNIFER Nash, MARY Physical Therapist                Exercises       09/07/17 0800          Subjective Comments    Subjective Comments I'm doing better, it's still there some, some tightness in the hips but better. Back pain was better after the needling. No issues sleeping.   -KJ      Subjective Pain    Pre-Treatment Pain Level 1  -KJ      Exercise 1    Exercise Name 1 SKTC  -KJ      Reps 1 5  -KJ      Time (Seconds) 1 5  -KJ      Exercise 2    Exercise Name 2 DKTC  -KJ      Reps 2 5  -KJ      Time (Seconds) 2 5  -KJ      Exercise 3    Exercise Name 3 LTR  -KJ      Reps 3 5  -KJ      Time (Seconds) 3 5  -KJ      Exercise 4    Exercise Name 4 PPT  -KJ      Reps 4 15  -KJ      Exercise 5    Exercise Name 5 Nu-step L4 U/LE  -KJ      Time (Minutes) 5 8  -KJ      Exercise 6    Exercise Name 6 Shoulder extension with RTB and TA focus  -KJ      Reps 6 15  -KJ      Exercise 7    Exercise Name 7 Piriformis stretc  -KJ      Reps 7 3  -KJ      Time (Seconds) 7 20  -KJ      Exercise 8    Exercise Name 8 Hip adduction  -KJ      Reps 8 10  -KJ      Time (Seconds) 8 5  -KJ      Exercise 9    Exercise Name 9 Hip Abduction/Er in hooklying with TA focus   -KJ      Reps 9 10  -KJ        User Key  (r) = Recorded By, (t) = Taken By, (c) = Cosigned By    Initials Name Provider Type    JENNIFER Nash PT Physical Therapist                                       Time Calculation:   Start Time: 0833  Stop Time: 0920  Time Calculation (min): 47 min    Therapy Charges for Today     Code Description Service Date  Service Provider Modifiers Qty    80136074526 HC PT HOT OR COLD PACK TREAT MCARE 9/7/2017 Nina Nash, PT GP 1    35083955898 HC PT TRACTION LUMBAR 9/7/2017 Nina Nash, PT GP 1    38041768810 HC PT THER PROC EA 15 MIN 9/7/2017 Nina Nash, PT GP 2                    Nina Nash, PT  9/7/2017

## 2017-09-12 ENCOUNTER — APPOINTMENT (OUTPATIENT)
Dept: PHYSICAL THERAPY | Facility: HOSPITAL | Age: 62
End: 2017-09-12

## 2017-09-14 ENCOUNTER — HOSPITAL ENCOUNTER (OUTPATIENT)
Dept: PHYSICAL THERAPY | Facility: HOSPITAL | Age: 62
Setting detail: THERAPIES SERIES
Discharge: HOME | End: 2017-09-14

## 2017-09-14 DIAGNOSIS — G89.29 CHRONIC BILATERAL LOW BACK PAIN WITHOUT SCIATICA: Primary | ICD-10-CM

## 2017-09-14 DIAGNOSIS — M54.50 CHRONIC BILATERAL LOW BACK PAIN WITHOUT SCIATICA: Primary | ICD-10-CM

## 2017-09-14 PROCEDURE — 97110 THERAPEUTIC EXERCISES: CPT | Performed by: PHYSICAL THERAPIST

## 2017-09-14 PROCEDURE — 97012 MECHANICAL TRACTION THERAPY: CPT | Performed by: PHYSICAL THERAPIST

## 2017-09-15 NOTE — THERAPY RE-EVALUATION
Outpatient Physical Therapy Ortho Re-Assessment  [facility]     Patient Name: Magdalena Dickey  : 1955  MRN: [id number]  Today's Date: 9/15/2017      Visit Date: 2017    Patient Active Problem List   Diagnosis   • Migraine without aura and without status migrainosus, not intractable   • Anxiety   • GERD (gastroesophageal reflux disease)   • Low back pain   • Depression   • Irritable bowel syndrome with both constipation and diarrhea   • Diverticulosis of large intestine without hemorrhage        Past Medical History:   Diagnosis Date   • Anxiety    • Depression 2016   • Diverticulosis of large intestine without hemorrhage 10/5/2016   • GERD (gastroesophageal reflux disease)    • Irritable bowel syndrome (IBS)    • Low back pain    • Lumbosacral disc disease    • Migraine    • Migraine without aura and without status migrainosus, not intractable 2016        Past Surgical History:   Procedure Laterality Date   • CHOLECYSTECTOMY     • COLONOSCOPY N/A 2/15/2017    Procedure: COLONOSCOPY to cecum with biospsies with cold polypectomy;  Surgeon: Gerardo Tan MD;  Location: [facility] ENDOSCOPY;  Service:    • ENDOSCOPY N/A 2/15/2017    Procedure: ESOPHAGOGASTRODUODENOSCOPY with biopsies;  Surgeon: Gerardo Tan MD;  Location: [facility] ENDOSCOPY;  Service:    • HYSTERECTOMY         Visit Dx:     ICD-10-CM ICD-9-CM   1. Chronic bilateral low back pain without sciatica M54.5 724.2    G89.29 338.29            17 1100   Subjective Comments   Subjective Comments Sore this morning in back, 6/10 down to 4/10. Sck so haven't been able to do as many exercises.    Subjective Pain   Pre-Treatment Pain Level 4   Moist Heat   Location Lumbar spine with LE over stool   Rx Minutes 15 mins   Traction 73513   Traction Type Lumbar   Rx Minutes 15   Position Hook-lying   Weight 65  (/35)   Hold 45   Relax 10             17 1246   Therapy Education   Education Details Possible cuases of pain, benefits of  MRI, if it would change care,    Given HEP;Symptoms/condition management;Pain management;Posture/body mechanics   Program Progressed   How Provided Verbal;Demonstration;Written   Provided to Patient   Level of Understanding Teach back education performed;Verbalized;Demonstrated             09/14/17 1251   PT Assessment   Functional Limitations Performance in leisure activities;Performance in self-care ADL;Limitation in home management;Limitations in community activities;Limitations in functional capacity and performance   Impairments Impaired muscle length;Joint mobility;Joint integrity;Pain;Muscle strength;Posture;Range of motion   Assessment Comments Pt. is a 62 year old female who has been seen for one month for low back and B hip pain who has responded well to therapy (especially lumbar traction and trigger point dry needlng) and now reports significantly decreased overall pain. She continues to have very limited standing tolerance. Pt. may benefit from lumbar imaging and/or CINDY's if pain continues or worsens. Pt. will benefit from continued skilled PT 1-2 more weeks with d/c likely.    Please refer to paper survey for additional self-reported information Yes   Rehab Potential Good   Patient/caregiver participated in establishment of treatment plan and goals Yes   Patient would benefit from skilled therapy intervention Yes   PT Plan   PT Frequency 1x/week   Predicted Duration of Therapy Intervention (days/wks) 3 weeks   Planned CPT's? PT AQUATIC THERAPY EA 15 MIN: 39574;PT ULTRASOUND EA 15 MIN: 90117;PT MANUAL THERAPY EA 15 MIN: 91363;PT NEUROMUSC RE-EDUCATION EA 15 MIN: 62226;PT HOT OR COLD PACK TREAT MCARE;PT TRACTION LUMBAR: 38318;PT ELECTRICAL STIM UNATTEND: ;PT THER PROC EA 15 MIN: 47261   PT Plan Comments Assess response to decreasing to once/week and holding needling today. Consider resuming needling next session with likely D/C soon.                                   09/14/17 1200   PT Short Term  Goals   STG Date to Achieve 08/29/17   STG 1 Pt. will be independent and compliant with initial home exercise program.   STG 1 Progress Met   STG 2 Pt. will report low back pain and B hips pain </= 4-5/10 to increase ease of preparing a meal.    STG 2 Progress Partially Met   STG 2 Progress Comments Up to 6/10 this moring.    STG 3 Pt. will report standing tolerance >15 minutes.    STG 3 Progress Ongoing   STG 3 Progress Comments Still very limited in low back.    Long Term Goals   LTG Date to Achieve 09/14/17   LTG 1 Pt. will be independent and compliant with advanced home exercise program.   LTG 1 Progress Progressing   LTG 2 Pt. will report low back pain </=2-3/10 to increase ease of sitting through dinner.   LTG 2 Progress Progressing   LTG 2 Progress Comments Up to 6/10 at times.    LTG 3 Pt. will score </=31% on Oswestry Disability indicating decreased perceived functional disability.    LTG 3 Progress Ongoing   LTG 3 Progress Comments Currently 40%                    Time Calculation:   Start Time: 1228  Stop Time: 1305  Time Calculation (min): 37 min     Therapy Charges for Today     Code Description Service Date Service Provider Modifiers Qty    49845305910 HC PT HOT OR COLD PACK TREAT MCARE 9/14/2017 Nina Nash, PT GP 1    52436907686 HC PT TRACTION LUMBAR 9/14/2017 Nina Nash, PT GP 1    39488448256 HC PT THER PROC EA 15 MIN 9/14/2017 Nina Nash, PT GP 1                    Nina Nash, PT  9/15/2017

## 2017-09-19 ENCOUNTER — APPOINTMENT (OUTPATIENT)
Dept: PHYSICAL THERAPY | Facility: HOSPITAL | Age: 62
End: 2017-09-19

## 2017-09-21 ENCOUNTER — HOSPITAL ENCOUNTER (OUTPATIENT)
Dept: PHYSICAL THERAPY | Facility: HOSPITAL | Age: 62
Setting detail: THERAPIES SERIES
Discharge: HOME | End: 2017-09-21

## 2017-09-21 DIAGNOSIS — M54.50 CHRONIC BILATERAL LOW BACK PAIN WITHOUT SCIATICA: Primary | ICD-10-CM

## 2017-09-21 DIAGNOSIS — G89.29 CHRONIC BILATERAL LOW BACK PAIN WITHOUT SCIATICA: Primary | ICD-10-CM

## 2017-09-21 PROCEDURE — 97012 MECHANICAL TRACTION THERAPY: CPT | Performed by: PHYSICAL THERAPIST

## 2017-09-21 PROCEDURE — 97110 THERAPEUTIC EXERCISES: CPT | Performed by: PHYSICAL THERAPIST

## 2017-09-21 PROCEDURE — 97140 MANUAL THERAPY 1/> REGIONS: CPT | Performed by: PHYSICAL THERAPIST

## 2017-09-22 DIAGNOSIS — R51.9 HEADACHE, UNSPECIFIED HEADACHE TYPE: ICD-10-CM

## 2017-09-22 NOTE — THERAPY TREATMENT NOTE
Outpatient Physical Therapy Ortho Treatment Note  [facility]     Patient Name: Magdalena Dickey  : 1955  MRN: [id number]  Today's Date: 2017      Visit Date: 2017    Visit Dx:    ICD-10-CM ICD-9-CM   1. Chronic bilateral low back pain without sciatica M54.5 724.2    G89.29 338.29       Patient Active Problem List   Diagnosis   • Migraine without aura and without status migrainosus, not intractable   • Anxiety   • GERD (gastroesophageal reflux disease)   • Low back pain   • Depression   • Irritable bowel syndrome with both constipation and diarrhea   • Diverticulosis of large intestine without hemorrhage        Past Medical History:   Diagnosis Date   • Anxiety    • Depression 2016   • Diverticulosis of large intestine without hemorrhage 10/5/2016   • GERD (gastroesophageal reflux disease)    • Irritable bowel syndrome (IBS)    • Low back pain    • Lumbosacral disc disease    • Migraine    • Migraine without aura and without status migrainosus, not intractable 2016        Past Surgical History:   Procedure Laterality Date   • CHOLECYSTECTOMY     • COLONOSCOPY N/A 2/15/2017    Procedure: COLONOSCOPY to cecum with biospsies with cold polypectomy;  Surgeon: Gerardo Tan MD;  Location: [facility] ENDOSCOPY;  Service:    • ENDOSCOPY N/A 2/15/2017    Procedure: ESOPHAGOGASTRODUODENOSCOPY with biopsies;  Surgeon: Gerardo Tan MD;  Location: [facility] ENDOSCOPY;  Service:    • HYSTERECTOMY               17 1100   Subjective Comments   Subjective Comments My back is a lot better, just the pain in my hips, 3-4/10   Subjective Pain   Pre-Treatment Pain Level 4   Moist Heat   Location Lumbar spine with LE over stool   Rx Minutes 15 mins   Traction 45149   Traction Type Lumbar   Rx Minutes 15   Position Hook-lying   Weight 70  (/35)   Hold 45   Relax 10                               Exercises       17 1723          Subjective Pain    Pre-Treatment Pain Level 4  -KJ        User Key  (r)  = Recorded By, (t) = Taken By, (c) = Cosigned By    Initials Name Provider Type    JENNIFER Nash PT Physical Therapist                        Manual Rx (last 36 hours)      Manual Treatments       09/21/17 1509          Manual Rx 1    Manual Rx 1 Type Intramuscular manual therapy with DDN.  Patient position during treatment: contralateral side lying with pillow between knees     Muscles treated: B QL and gluteus medius     Response: several LTRs in glut. Med and QL on L, less on R. Mild pain response.     Clean needle technique observed at all times, precautions for lung fields, neurovascular structures observed.     Manual palpation and assessment performed before, during, and after session.    -JENNIFER        User Key  (r) = Recorded By, (t) = Taken By, (c) = Cosigned By    Initials Name Provider Type    JENNIFER Nash PT Physical Therapist                        Time Calculation:   Start Time: 1215  Stop Time: 1305  Time Calculation (min): 50 min    Therapy Charges for Today     Code Description Service Date Service Provider Modifiers Qty    91405702019 HC PT TRACTION LUMBAR 9/21/2017 Nina Nash, PT 59, GP 1    95809740804 HC PT MANUAL THERAPY EA 15 MIN 9/21/2017 Nina Nash, PT 59, GP 1    14931416262 HC PT THER PROC EA 15 MIN 9/21/2017 Nina Nash, PT GP 1    39211021688 HC PT HOT OR COLD PACK TREAT MCARE 9/21/2017 Nina Nash, PT GP 1                    Nina Nash, PT  9/22/2017

## 2017-09-28 RX ORDER — SUMATRIPTAN 100 MG/1
TABLET, FILM COATED ORAL
Qty: 9 TABLET | Refills: 2 | Status: SHIPPED | OUTPATIENT
Start: 2017-09-28 | End: 2018-01-12

## 2017-09-29 ENCOUNTER — HOSPITAL ENCOUNTER (OUTPATIENT)
Dept: PHYSICAL THERAPY | Facility: HOSPITAL | Age: 62
Setting detail: THERAPIES SERIES
Discharge: HOME | End: 2017-09-29

## 2017-09-29 DIAGNOSIS — M54.50 CHRONIC BILATERAL LOW BACK PAIN WITHOUT SCIATICA: Primary | ICD-10-CM

## 2017-09-29 DIAGNOSIS — G89.29 CHRONIC BILATERAL LOW BACK PAIN WITHOUT SCIATICA: Primary | ICD-10-CM

## 2017-09-29 PROCEDURE — 97012 MECHANICAL TRACTION THERAPY: CPT | Performed by: PHYSICAL THERAPIST

## 2017-09-29 PROCEDURE — 97110 THERAPEUTIC EXERCISES: CPT | Performed by: PHYSICAL THERAPIST

## 2017-09-29 NOTE — THERAPY TREATMENT NOTE
Outpatient Physical Therapy Ortho Treatment Note  [facility]     Patient Name: Magdalena Dickey  : 1955  MRN: [id number]  Today's Date: 2017      Visit Date: 2017    Visit Dx:    ICD-10-CM ICD-9-CM   1. Chronic bilateral low back pain without sciatica M54.5 724.2    G89.29 338.29       Patient Active Problem List   Diagnosis   • Migraine without aura and without status migrainosus, not intractable   • Anxiety   • GERD (gastroesophageal reflux disease)   • Low back pain   • Depression   • Irritable bowel syndrome with both constipation and diarrhea   • Diverticulosis of large intestine without hemorrhage        Past Medical History:   Diagnosis Date   • Anxiety    • Depression 2016   • Diverticulosis of large intestine without hemorrhage 10/5/2016   • GERD (gastroesophageal reflux disease)    • Irritable bowel syndrome (IBS)    • Low back pain    • Lumbosacral disc disease    • Migraine    • Migraine without aura and without status migrainosus, not intractable 2016        Past Surgical History:   Procedure Laterality Date   • CHOLECYSTECTOMY     • COLONOSCOPY N/A 2/15/2017    Procedure: COLONOSCOPY to cecum with biospsies with cold polypectomy;  Surgeon: Gerardo Tan MD;  Location: [facility] ENDOSCOPY;  Service:    • ENDOSCOPY N/A 2/15/2017    Procedure: ESOPHAGOGASTRODUODENOSCOPY with biopsies;  Surgeon: Gerardo Tan MD;  Location: [facility] ENDOSCOPY;  Service:    • HYSTERECTOMY                               PT Assessment/Plan       17 1709       PT Assessment    Assessment Comments Pt. continuing to report severe pain at times, beyond reasonable expectations for known condition. Pt. would benefit from imaging and CINDY's. Will hold therapy at this time.   -KJ     PT Plan    PT Plan Comments Likely hold unless pt. benefits from today's session. Await MD decision regarding imaging and CINDY referral.   -JENNIFER       User Key  (r) = Recorded By, (t) = Taken By, (c) = Cosigned By     Initials Name Provider Type    JENNIFER Nash, PT Physical Therapist                Modalities       09/29/17 1300          Subjective Comments    Subjective Comments Super severe Saturday, in tears. Took muscle relaxers. Then no pain Sunday.  I was going to ask if it's time for MRi.   -KJ      Subjective Pain    Pre-Treatment Pain Level 2  -KJ      Moist Heat    Location Lumbar spine with LE over stool  -KJ      Rx Minutes 15 mins  -KJ      Traction 94815    Traction Type Lumbar  -KJ      Rx Minutes 15  -KJ      Position Hook-lying  -KJ      Weight 70   /35  -KJ      Hold 45  -KJ      Relax 10  -KJ        User Key  (r) = Recorded By, (t) = Taken By, (c) = Cosigned By    Initials Name Provider Type    JENNIFER Nash, PT Physical Therapist                Exercises       09/29/17 1300          Subjective Comments    Subjective Comments Super severe Saturday, in tears. Took muscle relaxers. Then no pain Sunday.  I was going to ask if it's time for MRi.   -KJ      Subjective Pain    Pre-Treatment Pain Level 2  -KJ      Exercise 1    Exercise Name 1 Reviewed piriformis and instructed in sitting instead of supine.   -KJ        User Key  (r) = Recorded By, (t) = Taken By, (c) = Cosigned By    Initials Name Provider Type    JENNIFER Nash, PT Physical Therapist                               PT OP Goals       09/29/17 1700       PT Short Term Goals    STG Date to Achieve 08/29/17  -KJ     STG 1 Pt. will be independent and compliant with initial home exercise program.  -KJ     STG 1 Progress Met  -KJ     STG 2 Pt. will report low back pain and B hips pain </= 4-5/10 to increase ease of preparing a meal.   -KJ     STG 2 Progress Partially Met  -KJ     STG 2 Progress Comments Up to 10/10 at times.   -KJ     STG 3 Pt. will report standing tolerance >15 minutes.   -KJ     STG 3 Progress Ongoing  -KJ     STG 3 Progress Comments Contines to be limited by B hip pain.   -KJ     Long Term Goals    LTG Date to Achieve  09/14/17  -KJ     LTG 1 Pt. will be independent and compliant with advanced home exercise program.  -KJ     LTG 1 Progress Progressing  -KJ     LTG 2 Pt. will report low back pain </=2-3/10 to increase ease of sitting through dinner.  -KJ     LTG 2 Progress Progressing  -KJ     LTG 2 Progress Comments Contines to be limited by B hip pain.   -KJ     LTG 3 Pt. will score </=31% on Oswestry Disability indicating decreased perceived functional disability.   -KJ     LTG 3 Progress Ongoing  -KJ       User Key  (r) = Recorded By, (t) = Taken By, (c) = Cosigned By    Initials Name Provider Type    JENNIFER Nash PT Physical Therapist                Therapy Education       09/29/17 1709          Therapy Education    Education Details Need for imaging, CINDY. Holding PT.   -KJ      Given HEP;Symptoms/condition management;Pain management;Posture/body mechanics  -KJ      Program Progressed  -KJ      How Provided Verbal;Demonstration;Written  -KJ      Provided to Patient  -KJ      Level of Understanding Teach back education performed;Verbalized;Demonstrated  -KJ        User Key  (r) = Recorded By, (t) = Taken By, (c) = Cosigned By    Initials Name Provider Type    JENNIFER Nash, PT Physical Therapist                Time Calculation:   Start Time: 1406  Stop Time: 1445  Time Calculation (min): 39 min    Therapy Charges for Today     Code Description Service Date Service Provider Modifiers Qty    95049106465  PT HOT OR COLD PACK TREAT MCARE 9/29/2017 Nina Nash, PT GP 1    34920879079 HC PT TRACTION LUMBAR 9/29/2017 Nina Nash, PT GP 1    32819039207 HC PT THER PROC EA 15 MIN 9/29/2017 Nina Nash, PT GP 1                    Nina Nash, PT  9/29/2017

## 2017-10-04 DIAGNOSIS — G89.29 CHRONIC BILATERAL LOW BACK PAIN WITHOUT SCIATICA: Primary | ICD-10-CM

## 2017-10-04 DIAGNOSIS — M54.50 CHRONIC BILATERAL LOW BACK PAIN WITHOUT SCIATICA: Primary | ICD-10-CM

## 2017-10-05 ENCOUNTER — APPOINTMENT (OUTPATIENT)
Dept: PHYSICAL THERAPY | Facility: HOSPITAL | Age: 62
End: 2017-10-05

## 2017-10-09 ENCOUNTER — HOSPITAL ENCOUNTER (OUTPATIENT)
Dept: GENERAL RADIOLOGY | Facility: HOSPITAL | Age: 62
Discharge: HOME | End: 2017-10-09
Attending: INTERNAL MEDICINE | Admitting: INTERNAL MEDICINE

## 2017-10-09 PROCEDURE — 72110 X-RAY EXAM L-2 SPINE 4/>VWS: CPT

## 2017-10-12 ENCOUNTER — APPOINTMENT (OUTPATIENT)
Dept: PHYSICAL THERAPY | Facility: HOSPITAL | Age: 62
End: 2017-10-12

## 2017-10-12 ENCOUNTER — OFFICE VISIT (OUTPATIENT)
Dept: FAMILY MEDICINE CLINIC | Facility: CLINIC | Age: 62
End: 2017-10-12

## 2017-10-12 VITALS
WEIGHT: 193.5 LBS | SYSTOLIC BLOOD PRESSURE: 138 MMHG | HEIGHT: 66 IN | BODY MASS INDEX: 31.1 KG/M2 | DIASTOLIC BLOOD PRESSURE: 90 MMHG | HEART RATE: 98 BPM | OXYGEN SATURATION: 98 %

## 2017-10-12 DIAGNOSIS — G89.29 CHRONIC BILATERAL LOW BACK PAIN WITHOUT SCIATICA: Primary | ICD-10-CM

## 2017-10-12 DIAGNOSIS — R93.7 ABNORMAL X-RAY OF LUMBAR SPINE: ICD-10-CM

## 2017-10-12 DIAGNOSIS — F41.8 DEPRESSION WITH ANXIETY: ICD-10-CM

## 2017-10-12 DIAGNOSIS — M54.50 CHRONIC BILATERAL LOW BACK PAIN WITHOUT SCIATICA: Primary | ICD-10-CM

## 2017-10-12 PROCEDURE — 99213 OFFICE O/P EST LOW 20 MIN: CPT | Performed by: INTERNAL MEDICINE

## 2017-10-12 RX ORDER — METAXALONE 800 MG/1
800 TABLET ORAL 3 TIMES DAILY
Qty: 90 TABLET | Refills: 1 | Status: SHIPPED | OUTPATIENT
Start: 2017-10-12 | End: 2017-12-26 | Stop reason: SDUPTHER

## 2017-10-12 NOTE — PROGRESS NOTES
Subjective   Magdalena Dickey is a 62 y.o. female who presents today for:    Back Pain (Review xrays)    History of Present Illness   She has had a progressive problem with myalgias and arthralgias, and in particular low back pain, since earlier this year.  Previous evaluations have been negative.  She was referred for physical therapy which has helped somewhat, but she has plateaued in her response.  She has persistent low back pain that is bilateral, perhaps a bit worse on the left.  It radiates into the buttocks/upper legs, but not lower.  She denies weakness in the lower legs.  She fell around January, landing on her but.  She denies any severe, residual pain thereafter.  The pain fluctuates in its severity; she may have some good days where she has mild aching in the low back and upper legs, and some days where the pain is disabling.  This occurs randomly, without precipitation.     Ms. Dickey  reports that she quit smoking about 3 years ago. Her smoking use included Cigarettes. She has never used smokeless tobacco. She reports that she does not drink alcohol or use illicit drugs.     Allergies   Allergen Reactions   • No Known Drug Allergy        Current Outpatient Prescriptions:   •  ALPRAZolam (XANAX) 0.5 MG tablet, Take 1 tablet by mouth 2 (Two) Times a Day As Needed for Anxiety., Disp: 60 tablet, Rfl: 1  •  dicyclomine (BENTYL) 10 MG capsule, Take 1 capsule by mouth 4 (Four) Times a Day As Needed (abdominal pain)., Disp: 30 capsule, Rfl: 5  •  DULoxetine (CYMBALTA) 30 MG capsule, Take 1 capsule by mouth Daily., Disp: 30 capsule, Rfl: 5  •  DULoxetine (CYMBALTA) 60 MG capsule, Take 1 capsule by mouth Daily., Disp: 30 capsule, Rfl: 5  •  gabapentin (NEURONTIN) 600 MG tablet, Take 1 tablet by mouth 2 (Two) Times a Day., Disp: 180 tablet, Rfl: 1  •  metaxalone (SKELAXIN) 800 MG tablet, Take 1 tablet by mouth 3 (Three) Times a Day., Disp: 90 tablet, Rfl: 1  •  Multiple Vitamin (MULTI-VITAMINS PO), Take  by mouth  "daily., Disp: , Rfl:   •  pantoprazole (PROTONIX) 40 MG EC tablet, Take 40 mg by mouth daily., Disp: , Rfl:   •  Polyethylene Glycol 3350 (MIRALAX PO), Take  by mouth Daily., Disp: , Rfl:   •  Probiotic Product (PROBIOTIC DAILY PO), Take  by mouth., Disp: , Rfl:   •  promethazine (PHENERGAN) 25 MG tablet, Take 25 mg by mouth as needed., Disp: , Rfl:   •  SUMAtriptan (IMITREX) 100 MG tablet, TAKE 1 TABLET BY MOUTH AT ONSET OF HEADACHE, MAY REPEAT EVERY 2 HOURS AS NEEDED (MAX 200MG/DAY), Disp: 9 tablet, Rfl: 2  •  traZODone (DESYREL) 50 MG tablet, TAKE 1 TABLET BY MOUTH EVERY NIGHT., Disp: 90 tablet, Rfl: 1  •  triamcinolone (KENALOG) 0.1 % cream, Apply  topically 2 (Two) Times a Day., Disp: 454 g, Rfl: 0      Review of Systems   Constitutional: Negative for activity change and appetite change.   Musculoskeletal: Positive for arthralgias, back pain and myalgias. Negative for gait problem and joint swelling.   Neurological: Negative for weakness and numbness.   Psychiatric/Behavioral: Positive for sleep disturbance (better with trazodone). The patient is nervous/anxious.          Objective   Vitals:    10/12/17 0930   BP: 138/90   BP Location: Left arm   Patient Position: Sitting   Cuff Size: Large Adult   Pulse: 98   SpO2: 98%   Weight: 193 lb 8 oz (87.8 kg)   Height: 66\" (167.6 cm)     Physical Exam   Overweight female, in no acute distress.  Mildly tender over the lumbar spine and the SI area on the left.  Functional range of motion at the back without pain.  Tight hamstrings bilaterally.  Full range of motion of both hips, without pain on internal and external rotation.  Nontender over the trochanteric bursa bilaterally, but tender over the IT bands of both legs, more so on the right than the left.  Lower extremity strength is 5+/5 all the way to the toes.  Deep tendon reflexes are 2/4 at the patella and absent at both Achilles bilaterally.  Negative straight leg raise and negative bowstring sign " bilaterally.    Assessment/Plan   Magdalena was seen today for back pain.    Diagnoses and all orders for this visit:    Chronic bilateral low back pain without sciatica  -     MRI Lumbar Spine Without Contrast    Abnormal x-ray of lumbar spine  -     MRI Lumbar Spine Without Contrast    Depression with anxiety  Comments:  Cymbalta: 60 mg/d through the end of Oct. Then take 30 mg/day for the first 2weeks of Nov; then take 30 mg every other day through the end of Nov.    Other orders  -     metaxalone (SKELAXIN) 800 MG tablet; Take 1 tablet by mouth 3 (Three) Times a Day.    Although there are no radicular findings, she has not responded to conservative treatment.  X-rays done prior to today's office visit were reviewed independently by me; the mild superior endplate compression deformity at L1 was evident, as was mild-moderate joint space narrowing at the lower lumbar spine.  Anterior spurring of the lower lumbar spine was also appreciated.  No spondylolisthesis is evident.    I have asked her to take the Skelaxin on a regular basis, continue the gabapentin unchanged, and begin weaning the Cymbalta since that has not helped with her pain along the way.  She will defer physical therapy pending our evaluation.  Additional recommendations will follow as per the results of the MRI and her response to the medication adjustments.

## 2017-10-18 RX ORDER — TRAZODONE HYDROCHLORIDE 50 MG/1
TABLET ORAL
Qty: 90 TABLET | Refills: 1 | Status: SHIPPED | OUTPATIENT
Start: 2017-10-18 | End: 2018-01-12

## 2017-10-19 ENCOUNTER — APPOINTMENT (OUTPATIENT)
Dept: PHYSICAL THERAPY | Facility: HOSPITAL | Age: 62
End: 2017-10-19

## 2017-10-22 ENCOUNTER — HOSPITAL ENCOUNTER (OUTPATIENT)
Dept: MRI IMAGING | Facility: HOSPITAL | Age: 62
Discharge: HOME | End: 2017-10-22
Attending: INTERNAL MEDICINE | Admitting: INTERNAL MEDICINE

## 2017-10-22 PROCEDURE — 72148 MRI LUMBAR SPINE W/O DYE: CPT

## 2017-11-24 ENCOUNTER — HOSPITAL ENCOUNTER (EMERGENCY)
Facility: HOSPITAL | Age: 62
Discharge: HOME | End: 2017-11-24
Attending: EMERGENCY MEDICINE | Admitting: EMERGENCY MEDICINE

## 2017-11-24 ENCOUNTER — APPOINTMENT (OUTPATIENT)
Dept: CT IMAGING | Facility: HOSPITAL | Age: 62
End: 2017-11-24

## 2017-11-24 VITALS
OXYGEN SATURATION: 98 % | BODY MASS INDEX: 30.53 KG/M2 | WEIGHT: 190 LBS | HEIGHT: 66 IN | TEMPERATURE: 98.6 F | DIASTOLIC BLOOD PRESSURE: 80 MMHG | SYSTOLIC BLOOD PRESSURE: 125 MMHG | RESPIRATION RATE: 18 BRPM | HEART RATE: 80 BPM

## 2017-11-24 DIAGNOSIS — K57.32 DIVERTICULITIS OF LARGE INTESTINE WITHOUT PERFORATION OR ABSCESS WITHOUT BLEEDING: Primary | ICD-10-CM

## 2017-11-24 LAB
ALBUMIN SERPL-MCNC: 4.2 G/DL (ref 3.5–5.2)
ALBUMIN/GLOB SERPL: 1.3 G/DL
ALP SERPL-CCNC: 106 U/L (ref 39–117)
ALT SERPL W P-5'-P-CCNC: 17 U/L (ref 1–33)
ANION GAP SERPL CALCULATED.3IONS-SCNC: 11.6 MMOL/L
AST SERPL-CCNC: 12 U/L (ref 1–32)
BASOPHILS # BLD AUTO: 0.04 10*3/MM3 (ref 0–0.2)
BASOPHILS NFR BLD AUTO: 0.3 % (ref 0–1.5)
BILIRUB SERPL-MCNC: 0.4 MG/DL (ref 0.1–1.2)
BILIRUB UR QL STRIP: NEGATIVE
BUN BLD-MCNC: 10 MG/DL (ref 8–23)
BUN/CREAT SERPL: 11.5 (ref 7–25)
CALCIUM SPEC-SCNC: 9.5 MG/DL (ref 8.6–10.5)
CHLORIDE SERPL-SCNC: 105 MMOL/L (ref 98–107)
CLARITY UR: CLEAR
CO2 SERPL-SCNC: 24.4 MMOL/L (ref 22–29)
COLOR UR: YELLOW
CREAT BLD-MCNC: 0.87 MG/DL (ref 0.57–1)
D-LACTATE SERPL-SCNC: 1.4 MMOL/L (ref 0.5–2)
DEPRECATED RDW RBC AUTO: 45.1 FL (ref 37–54)
EOSINOPHIL # BLD AUTO: 0.16 10*3/MM3 (ref 0–0.7)
EOSINOPHIL NFR BLD AUTO: 1.3 % (ref 0.3–6.2)
ERYTHROCYTE [DISTWIDTH] IN BLOOD BY AUTOMATED COUNT: 13.4 % (ref 11.7–13)
GFR SERPL CREATININE-BSD FRML MDRD: 66 ML/MIN/1.73
GLOBULIN UR ELPH-MCNC: 3.3 GM/DL
GLUCOSE BLD-MCNC: 92 MG/DL (ref 65–99)
GLUCOSE UR STRIP-MCNC: NEGATIVE MG/DL
HCT VFR BLD AUTO: 43 % (ref 35.6–45.5)
HGB BLD-MCNC: 14.4 G/DL (ref 11.9–15.5)
HGB UR QL STRIP.AUTO: NEGATIVE
HOLD SPECIMEN: NORMAL
HOLD SPECIMEN: NORMAL
IMM GRANULOCYTES # BLD: 0.05 10*3/MM3 (ref 0–0.03)
IMM GRANULOCYTES NFR BLD: 0.4 % (ref 0–0.5)
KETONES UR QL STRIP: NEGATIVE
LEUKOCYTE ESTERASE UR QL STRIP.AUTO: NEGATIVE
LIPASE SERPL-CCNC: 31 U/L (ref 13–60)
LYMPHOCYTES # BLD AUTO: 3.19 10*3/MM3 (ref 0.9–4.8)
LYMPHOCYTES NFR BLD AUTO: 26.5 % (ref 19.6–45.3)
MCH RBC QN AUTO: 31.2 PG (ref 26.9–32)
MCHC RBC AUTO-ENTMCNC: 33.5 G/DL (ref 32.4–36.3)
MCV RBC AUTO: 93.1 FL (ref 80.5–98.2)
MONOCYTES # BLD AUTO: 0.91 10*3/MM3 (ref 0.2–1.2)
MONOCYTES NFR BLD AUTO: 7.5 % (ref 5–12)
NEUTROPHILS # BLD AUTO: 7.71 10*3/MM3 (ref 1.9–8.1)
NEUTROPHILS NFR BLD AUTO: 64 % (ref 42.7–76)
NITRITE UR QL STRIP: NEGATIVE
NRBC BLD MANUAL-RTO: 0 /100 WBC (ref 0–0)
PH UR STRIP.AUTO: 6 [PH] (ref 5–8)
PLATELET # BLD AUTO: 294 10*3/MM3 (ref 140–500)
PMV BLD AUTO: 10.5 FL (ref 6–12)
POTASSIUM BLD-SCNC: 4.3 MMOL/L (ref 3.5–5.2)
PROT SERPL-MCNC: 7.5 G/DL (ref 6–8.5)
PROT UR QL STRIP: NEGATIVE
RBC # BLD AUTO: 4.62 10*6/MM3 (ref 3.9–5.2)
SODIUM BLD-SCNC: 141 MMOL/L (ref 136–145)
SP GR UR STRIP: 1.01 (ref 1–1.03)
UROBILINOGEN UR QL STRIP: NORMAL
WBC NRBC COR # BLD: 12.06 10*3/MM3 (ref 4.5–10.7)
WHOLE BLOOD HOLD SPECIMEN: NORMAL
WHOLE BLOOD HOLD SPECIMEN: NORMAL

## 2017-11-24 PROCEDURE — 80053 COMPREHEN METABOLIC PANEL: CPT | Performed by: EMERGENCY MEDICINE

## 2017-11-24 PROCEDURE — 83690 ASSAY OF LIPASE: CPT | Performed by: EMERGENCY MEDICINE

## 2017-11-24 PROCEDURE — 99284 EMERGENCY DEPT VISIT MOD MDM: CPT

## 2017-11-24 PROCEDURE — 25010000002 LEVOFLOXACIN PER 250 MG: Performed by: EMERGENCY MEDICINE

## 2017-11-24 PROCEDURE — 25010000002 HYDROMORPHONE PER 4 MG: Performed by: EMERGENCY MEDICINE

## 2017-11-24 PROCEDURE — 85025 COMPLETE CBC W/AUTO DIFF WBC: CPT | Performed by: EMERGENCY MEDICINE

## 2017-11-24 PROCEDURE — 0 IOPAMIDOL 61 % SOLUTION: Performed by: EMERGENCY MEDICINE

## 2017-11-24 PROCEDURE — 36415 COLL VENOUS BLD VENIPUNCTURE: CPT | Performed by: EMERGENCY MEDICINE

## 2017-11-24 PROCEDURE — 83605 ASSAY OF LACTIC ACID: CPT | Performed by: EMERGENCY MEDICINE

## 2017-11-24 PROCEDURE — 74177 CT ABD & PELVIS W/CONTRAST: CPT

## 2017-11-24 PROCEDURE — 96365 THER/PROPH/DIAG IV INF INIT: CPT

## 2017-11-24 PROCEDURE — 81003 URINALYSIS AUTO W/O SCOPE: CPT | Performed by: EMERGENCY MEDICINE

## 2017-11-24 PROCEDURE — 25010000002 ONDANSETRON PER 1 MG: Performed by: EMERGENCY MEDICINE

## 2017-11-24 PROCEDURE — 96375 TX/PRO/DX INJ NEW DRUG ADDON: CPT

## 2017-11-24 PROCEDURE — 96361 HYDRATE IV INFUSION ADD-ON: CPT

## 2017-11-24 RX ORDER — HYDROCODONE BITARTRATE AND ACETAMINOPHEN 5; 325 MG/1; MG/1
1 TABLET ORAL EVERY 6 HOURS PRN
Qty: 15 TABLET | Refills: 0 | Status: SHIPPED | OUTPATIENT
Start: 2017-11-24 | End: 2018-01-04

## 2017-11-24 RX ORDER — LEVOFLOXACIN 5 MG/ML
500 INJECTION, SOLUTION INTRAVENOUS ONCE
Status: COMPLETED | OUTPATIENT
Start: 2017-11-24 | End: 2017-11-24

## 2017-11-24 RX ORDER — AMOXICILLIN AND CLAVULANATE POTASSIUM 875; 125 MG/1; MG/1
1 TABLET, FILM COATED ORAL 2 TIMES DAILY
Qty: 14 TABLET | Refills: 0 | Status: SHIPPED | OUTPATIENT
Start: 2017-11-24 | End: 2017-12-26

## 2017-11-24 RX ORDER — HYDROMORPHONE HYDROCHLORIDE 1 MG/ML
0.5 INJECTION, SOLUTION INTRAMUSCULAR; INTRAVENOUS; SUBCUTANEOUS ONCE
Status: COMPLETED | OUTPATIENT
Start: 2017-11-24 | End: 2017-11-24

## 2017-11-24 RX ORDER — SODIUM CHLORIDE 0.9 % (FLUSH) 0.9 %
10 SYRINGE (ML) INJECTION AS NEEDED
Status: DISCONTINUED | OUTPATIENT
Start: 2017-11-24 | End: 2017-11-24 | Stop reason: HOSPADM

## 2017-11-24 RX ORDER — ONDANSETRON 2 MG/ML
4 INJECTION INTRAMUSCULAR; INTRAVENOUS ONCE
Status: COMPLETED | OUTPATIENT
Start: 2017-11-24 | End: 2017-11-24

## 2017-11-24 RX ADMIN — SODIUM CHLORIDE 1000 ML: 9 INJECTION, SOLUTION INTRAVENOUS at 14:47

## 2017-11-24 RX ADMIN — IOPAMIDOL 85 ML: 612 INJECTION, SOLUTION INTRAVENOUS at 15:10

## 2017-11-24 RX ADMIN — HYDROMORPHONE HYDROCHLORIDE 0.5 MG: 2 INJECTION INTRAMUSCULAR; INTRAVENOUS; SUBCUTANEOUS at 14:43

## 2017-11-24 RX ADMIN — LEVOFLOXACIN 500 MG: 5 INJECTION, SOLUTION INTRAVENOUS at 16:10

## 2017-11-24 RX ADMIN — ONDANSETRON 4 MG: 2 INJECTION INTRAMUSCULAR; INTRAVENOUS at 14:42

## 2017-11-24 NOTE — ED NOTES
Abdominal pain and terrible cramping since wed, pain has been since Monday.      Ashley Trinh RN  11/24/17 7725

## 2017-11-24 NOTE — DISCHARGE INSTRUCTIONS
Continue Xifaxan and start new antibiotic.  Use pain medications as needed and follow up with Dr. Tan.  Please return to the ED if symptoms worsen with fever or increasing pain.

## 2017-11-24 NOTE — ED PROVIDER NOTES
" EMERGENCY DEPARTMENT ENCOUNTER    CHIEF COMPLAINT  Chief Complaint: abdominal pain  History given by: pt  History limited by: nothing  Room Number: 27/27  PMD: Fermín Bella MD      HPI:  Pt is a 62 y.o. female who presents complaining of intermittent abdominal pain which began 5 days ago. The pt states that her pain began diffusely but localized to her LLQ. The pt describes her pain as a \"severe cramping.\" The pt also c/o diarrhea, nausea, and chills. The pt denies bloody stool, fever, and vomiting. The pt denies aggravating factors. She states that stretching help alleviate her pain. The pt was seen by GI and was given xifaxan.     Duration:  5 days  Onset: gradual  Timing: intermittent  Location: LLQ  Radiation: none  Quality: cramping  Intensity/Severity: moderate  Progression: unchanged  Associated Symptoms:  diarrhea, nausea, and chills  Aggravating Factors: none  Alleviating Factors: stretching  Previous Episodes: none  Treatment before arrival: The pt was given xifaxan which she has taken with no relief.       PAST MEDICAL HISTORY  Active Ambulatory Problems     Diagnosis Date Noted   • Migraine without aura and without status migrainosus, not intractable 05/25/2016   • GERD (gastroesophageal reflux disease) 05/25/2016   • Low back pain 05/25/2016   • Irritable bowel syndrome with both constipation and diarrhea 10/05/2016   • Diverticulosis of large intestine without hemorrhage 10/05/2016   • Depression with anxiety 10/12/2017     Resolved Ambulatory Problems     Diagnosis Date Noted   • No Resolved Ambulatory Problems     Past Medical History:   Diagnosis Date   • Anxiety    • Depression 5/25/2016   • Diverticulosis of large intestine without hemorrhage 10/5/2016   • GERD (gastroesophageal reflux disease)    • Irritable bowel syndrome (IBS)    • Low back pain    • Lumbosacral disc disease    • Migraine    • Migraine without aura and without status migrainosus, not intractable 5/25/2016       PAST " SURGICAL HISTORY  Past Surgical History:   Procedure Laterality Date   • CHOLECYSTECTOMY     • COLONOSCOPY N/A 2/15/2017    Procedure: COLONOSCOPY to cecum with biospsies with cold polypectomy;  Surgeon: Gerardo Tan MD;  Location: [facility] ENDOSCOPY;  Service:    • ENDOSCOPY N/A 2/15/2017    Procedure: ESOPHAGOGASTRODUODENOSCOPY with biopsies;  Surgeon: Gerardo Tan MD;  Location: [facility] ENDOSCOPY;  Service:    • HYSTERECTOMY         FAMILY HISTORY  Family History   Problem Relation Age of Onset   • Alzheimer's disease Mother      SDAT   • Hypertension Mother    • Diabetes type II Mother    • Lung cancer Mother      POSSIBLE   • Heart attack Mother    • Alcohol abuse Father    • Prostate cancer Father    • Heart disease Father      THIRD DEGREE HEART BLOCK   • Ovarian cancer Sister    • No Known Problems Brother    • Ovarian cancer Sister    • Drug abuse Neg Hx        SOCIAL HISTORY  Social History     Social History   • Marital status:      Spouse name: N/A   • Number of children: N/A   • Years of education: N/A     Occupational History   • Not on file.     Social History Main Topics   • Smoking status: Former Smoker     Types: Cigarettes     Quit date: 3/10/2014   • Smokeless tobacco: Never Used   • Alcohol use No   • Drug use: No   • Sexual activity: Defer     Other Topics Concern   • Not on file     Social History Narrative       ALLERGIES  No known drug allergy    REVIEW OF SYSTEMS  Review of Systems   Constitutional: Positive for chills. Negative for fever.   HENT: Negative for sore throat.    Eyes: Negative.    Respiratory: Negative for cough and shortness of breath.    Cardiovascular: Negative for chest pain.   Gastrointestinal: Positive for abdominal pain (LLQ), diarrhea and nausea. Negative for vomiting.   Genitourinary: Negative for dysuria.   Musculoskeletal: Negative for neck pain.   Skin: Negative for rash.   Allergic/Immunologic: Negative.    Neurological: Negative for weakness, numbness  and headaches.   Hematological: Negative.    Psychiatric/Behavioral: Negative.    All other systems reviewed and are negative.      PHYSICAL EXAM  ED Triage Vitals   Temp Heart Rate Resp BP SpO2   11/24/17 1133 11/24/17 1133 11/24/17 1133 11/24/17 1133 11/24/17 1133   98.8 °F (37.1 °C) 109 18 138/115 96 %      Temp src Heart Rate Source Patient Position BP Location FiO2 (%)   11/24/17 1133 -- -- -- --   Tympanic           Physical Exam   Constitutional: She is oriented to person, place, and time. She appears distressed (mild).   Eyes: EOM are normal.   Neck: Normal range of motion.   Cardiovascular: Normal rate, regular rhythm and normal heart sounds.    No murmur heard.  Pulmonary/Chest: Effort normal and breath sounds normal. No respiratory distress. She has no wheezes. She has no rales.   Abdominal: Soft. Bowel sounds are normal. There is tenderness (mild LLQ, moderate suprapubic) in the suprapubic area and left lower quadrant. There is guarding (involuntary to suprapubic).   Neurological: She is alert and oriented to person, place, and time. She has normal sensation and normal strength.   Skin: Skin is warm and dry.   Psychiatric: Affect normal.   Nursing note and vitals reviewed.      LAB RESULTS  Lab Results (last 24 hours)     Procedure Component Value Units Date/Time    CBC & Differential [[id number]] Collected:  11/24/17 1221    Specimen:  Blood Updated:  11/24/17 1251    Narrative:       The following orders were created for panel order CBC & Differential.  Procedure                               Abnormality         Status                     ---------                               -----------         ------                     CBC Auto Differential[[id number]]        Abnormal            Final result                 Please view results for these tests on the individual orders.    Comprehensive Metabolic Panel [[id number]] Collected:  11/24/17 1221    Specimen:  Blood Updated:  11/24/17 1258     Glucose 92  mg/dL      BUN 10 mg/dL      Creatinine 0.87 mg/dL      Sodium 141 mmol/L      Potassium 4.3 mmol/L      Chloride 105 mmol/L      CO2 24.4 mmol/L      Calcium 9.5 mg/dL      Total Protein 7.5 g/dL      Albumin 4.20 g/dL      ALT (SGPT) 17 U/L      AST (SGOT) 12 U/L      Alkaline Phosphatase 106 U/L      Total Bilirubin 0.4 mg/dL      eGFR Non African Amer 66 mL/min/1.73      Globulin 3.3 gm/dL      A/G Ratio 1.3 g/dL      BUN/Creatinine Ratio 11.5     Anion Gap 11.6 mmol/L     Lipase [[id number]]  (Normal) Collected:  11/24/17 1221    Specimen:  Blood Updated:  11/24/17 1258     Lipase 31 U/L     Lactic Acid, Plasma [[id number]]  (Normal) Collected:  11/24/17 1221    Specimen:  Blood Updated:  11/24/17 1246     Lactate 1.4 mmol/L     CBC Auto Differential [[id number]]  (Abnormal) Collected:  11/24/17 1221    Specimen:  Blood Updated:  11/24/17 1251     WBC 12.06 (H) 10*3/mm3      RBC 4.62 10*6/mm3      Hemoglobin 14.4 g/dL      Hematocrit 43.0 %      MCV 93.1 fL      MCH 31.2 pg      MCHC 33.5 g/dL      RDW 13.4 (H) %      RDW-SD 45.1 fl      MPV 10.5 fL      Platelets 294 10*3/mm3      Neutrophil % 64.0 %      Lymphocyte % 26.5 %      Monocyte % 7.5 %      Eosinophil % 1.3 %      Basophil % 0.3 %      Immature Grans % 0.4 %      Neutrophils, Absolute 7.71 10*3/mm3      Lymphocytes, Absolute 3.19 10*3/mm3      Monocytes, Absolute 0.91 10*3/mm3      Eosinophils, Absolute 0.16 10*3/mm3      Basophils, Absolute 0.04 10*3/mm3      Immature Grans, Absolute 0.05 (H) 10*3/mm3      nRBC 0.0 /100 WBC     Urinalysis With / Culture If Indicated - Urine, Clean Catch [[id number]]  (Normal) Collected:  11/24/17 1226    Specimen:  Urine from Urine, Clean Catch Updated:  11/24/17 1243     Color, UA Yellow     Appearance, UA Clear     pH, UA 6.0     Specific Gravity, UA 1.012     Glucose, UA Negative     Ketones, UA Negative     Bilirubin, UA Negative     Blood, UA Negative     Protein, UA Negative     Leuk Esterase, UA Negative      Nitrite, UA Negative     Urobilinogen, UA 1.0 E.U./dL    Narrative:       Urine microscopic not indicated.          I ordered the above labs and reviewed the results    RADIOLOGY  CT Abdomen Pelvis With Contrast   Final Result   FINDINGS:  1. Acute sigmoid diverticulitis without complication, no extravasation  obstruction free air abscess free fluid.  2. Bowel is otherwise normal without obstruction, free air nor  dilatation. The appendix is normal.  3. Mild hepatic steatosis, surgical absence of the gallbladder without  other hepatic nor biliary abnormality. For spleen, adrenal glands,  pancreas are normal.  4. No attenuating renal lesions are likely benign cysts though too small  accurate character accurately characterize.  5. Mild vascular calcification of the abdominal aorta with diffuse  luminal attenuation but no focal stenosis nor obstruction.  6. Stable 5.5 mm nodule at the left base peripherally no change since  02/20/2012, benign. Mild scarring at the lung bases without significant  abnormality on limited views.  7. Mild compression deformity of L1 stable since 07/23/2015, no  significant osseous abnormality     Results have been reviewed with ER healthcare provider Dr. Boone by Dr. Escobar per telephone at 15:25     This report was finalized on 11/24/2017 3:26 PM by Dr. Jaylon Escobar MD.        I ordered the above noted radiological studies. Interpreted by radiologist. Discussed with radiologist (Dr. Escobar). Reviewed by me in PACS.       PROCEDURES  Procedures      PROGRESS AND CONSULTS  ED Course     1204: Ordered labs for further evaluation.     1428: Ordered IVF. Ordered CT abd/pelvis for further evaluation.     1430: Ordered dilaudid and zofran for pain.     1529: Ordered Levaquin for sigmoid diverticulitis.     1530: Rechecked pt, she was resting comfortably. Discussed imaging results which showed small segment sigmoid diverticulitis. Discussed plan to discharge pt with antibiotics and pain  medication. The pt should f/o with GI. The pt agrees with and understands plan to discharge. All questions were addressed at this time.     MEDICAL DECISION MAKING  Results were reviewed/discussed with the patient and they were also made aware of online access. Pt also made aware that some labs, such as cultures, will not be resulted during ER visit and follow up with PMD is necessary.     MDM  Number of Diagnoses or Management Options     Amount and/or Complexity of Data Reviewed  Clinical lab tests: ordered and reviewed (WBC:12.06)  Tests in the radiology section of CPT®: ordered and reviewed (CT abd/pelvis: showed short segment sigmoid diverticulitis with no perferation and no abcess)  Discussion of test results with the performing providers: yes (Dr. Escobar (radiology) )  Decide to obtain previous medical records or to obtain history from someone other than the patient: yes  Review and summarize past medical records: yes    Patient Progress  Patient progress: stable         DIAGNOSIS  Final diagnoses:   Diverticulitis of large intestine without perforation or abscess without bleeding       DISPOSITION  DISCHARGE    Patient discharged in stable condition.    Reviewed implications of results, diagnosis, meds, responsibility to follow up, warning signs and symptoms of possible worsening, potential complications and reasons to return to ER.    Patient/Family voiced understanding of above instructions.    Discussed plan for discharge, as there is no emergent indication for admission.  Pt/family is agreeable and understands need for follow up and repeat testing.  Pt is aware that discharge does not mean that nothing is wrong but it indicates no emergency is present that requires admission and they must continue care with follow-up as given below or physician of their choice.     FOLLOW-UP  Fermín Bella MD  [address] [place] [address], [address]  [place] [id number] [phone]    Schedule an appointment as soon as  possible for a visit      Gerardo Tan MD  [address]  [place] [id number]  [phone]    Schedule an appointment as soon as possible for a visit           Medication List      New Prescriptions          amoxicillin-clavulanate 875-125 MG per tablet   Commonly known as:  AUGMENTIN   Take 1 tablet by mouth 2 (Two) Times a Day.       HYDROcodone-acetaminophen 5-325 MG per tablet   Commonly known as:  NORCO   Take 1 tablet by mouth Every 6 (Six) Hours As Needed for Moderate Pain .         Changed          DULoxetine 30 MG capsule   Commonly known as:  CYMBALTA   Take 1 capsule by mouth Daily.   What changed:  Another medication with the same name was removed. Continue   taking this medication, and follow the directions you see here.               Latest Documented Vital Signs:  As of 8:34 PM  BP- 125/80 HR- 80 Temp- 98.6 °F (37 °C) (Oral) O2 sat- 98%    --  Documentation assistance provided by lakshmi Baird for Dr. Boone.  Information recorded by the scribe was done at my direction and has been verified and validated by me.            Letitia Baird  11/24/17 1459       Andres Boone MD  11/24/17 0253

## 2017-11-27 ENCOUNTER — TELEPHONE (OUTPATIENT)
Dept: SOCIAL WORK | Facility: HOSPITAL | Age: 62
End: 2017-11-27

## 2017-11-27 NOTE — TELEPHONE ENCOUNTER
ED f/u call: states that she feels a bit better, is taking prescribed meds and has f/u sukh't w/ PCP tomorrow. No questions

## 2017-11-28 ENCOUNTER — OFFICE VISIT (OUTPATIENT)
Dept: FAMILY MEDICINE CLINIC | Facility: CLINIC | Age: 62
End: 2017-11-28

## 2017-11-28 ENCOUNTER — TELEPHONE (OUTPATIENT)
Dept: SURGERY | Facility: CLINIC | Age: 62
End: 2017-11-28

## 2017-11-28 VITALS
DIASTOLIC BLOOD PRESSURE: 78 MMHG | OXYGEN SATURATION: 98 % | BODY MASS INDEX: 31 KG/M2 | WEIGHT: 192.9 LBS | HEART RATE: 92 BPM | HEIGHT: 66 IN | SYSTOLIC BLOOD PRESSURE: 114 MMHG

## 2017-11-28 DIAGNOSIS — K57.92 ACUTE DIVERTICULITIS: Primary | ICD-10-CM

## 2017-11-28 DIAGNOSIS — K57.30 DIVERTICULOSIS OF LARGE INTESTINE WITHOUT HEMORRHAGE: ICD-10-CM

## 2017-11-28 PROCEDURE — 99214 OFFICE O/P EST MOD 30 MIN: CPT | Performed by: INTERNAL MEDICINE

## 2017-11-28 RX ORDER — PHENOL 1.4 %
600 AEROSOL, SPRAY (ML) MUCOUS MEMBRANE 2 TIMES DAILY
COMMUNITY

## 2017-11-28 NOTE — PROGRESS NOTES
Subjective   Magdalena Dickey is a 62 y.o. female who presents today for:    Diverticulitis ([facility] F/u)    History of Present Illness   Acute onset and rapid progression of left lower quadrant abdominal pain 1 week ago led to an ER visit 5 days ago.  There, CT scan showed diverticulitis with adjacent bowel involvement.  There was no perforation or abscess.  She was given Augmentin (added to the Xifaxan she had started midweek).  She presents today in follow-up.  She has continued left lower quadrant/suprapubic pain, but it is improved from 5 days ago.    She has a history of recurrent diverticulitis dating back to her 40s.    Ms. Dickey  reports that she quit smoking about 3 years ago. Her smoking use included Cigarettes. She has never used smokeless tobacco. She reports that she does not drink alcohol or use illicit drugs.     Allergies   Allergen Reactions   • No Known Drug Allergy        Current Outpatient Prescriptions:   •  ALPRAZolam (XANAX) 0.5 MG tablet, Take 1 tablet by mouth 2 (Two) Times a Day As Needed for Anxiety., Disp: 60 tablet, Rfl: 1  •  amoxicillin-clavulanate (AUGMENTIN) 875-125 MG per tablet, Take 1 tablet by mouth 2 (Two) Times a Day., Disp: 14 tablet, Rfl: 0  •  calcium carbonate (OS-MARY) 600 MG tablet, Take 600 mg by mouth Daily., Disp: , Rfl:   •  dicyclomine (BENTYL) 10 MG capsule, Take 1 capsule by mouth 4 (Four) Times a Day As Needed (abdominal pain)., Disp: 30 capsule, Rfl: 5  •  gabapentin (NEURONTIN) 600 MG tablet, Take 1 tablet by mouth 2 (Two) Times a Day., Disp: 180 tablet, Rfl: 1  •  HYDROcodone-acetaminophen (NORCO) 5-325 MG per tablet, Take 1 tablet by mouth Every 6 (Six) Hours As Needed for Moderate Pain ., Disp: 15 tablet, Rfl: 0  •  metaxalone (SKELAXIN) 800 MG tablet, Take 1 tablet by mouth 3 (Three) Times a Day., Disp: 90 tablet, Rfl: 1  •  Multiple Vitamin (MULTI-VITAMINS PO), Take  by mouth daily., Disp: , Rfl:   •  pantoprazole (PROTONIX) 40 MG EC tablet, Take 40 mg by mouth  "daily., Disp: , Rfl:   •  Polyethylene Glycol 3350 (MIRALAX PO), Take  by mouth Daily., Disp: , Rfl:   •  Probiotic Product (PROBIOTIC DAILY PO), Take  by mouth., Disp: , Rfl:   •  promethazine (PHENERGAN) 25 MG tablet, Take 25 mg by mouth as needed., Disp: , Rfl:   •  RifAXIMin (XIFAXAN PO), Take  by mouth 3 (Three) Times a Day., Disp: , Rfl:   •  SUMAtriptan (IMITREX) 100 MG tablet, TAKE 1 TABLET BY MOUTH AT ONSET OF HEADACHE, MAY REPEAT EVERY 2 HOURS AS NEEDED (MAX 200MG/DAY), Disp: 9 tablet, Rfl: 2  •  traZODone (DESYREL) 50 MG tablet, TAKE 1 TABLET BY MOUTH EVERY NIGHT., Disp: 90 tablet, Rfl: 1  •  triamcinolone (KENALOG) 0.1 % cream, Apply  topically 2 (Two) Times a Day., Disp: 454 g, Rfl: 0      Review of Systems   Constitutional: Positive for appetite change. Negative for chills, fever and unexpected weight change.   Respiratory: Negative for chest tightness and shortness of breath.    Gastrointestinal: Positive for abdominal pain. Negative for blood in stool, constipation and diarrhea.        Small amounts of loose stools multiple times a day.   Musculoskeletal: Positive for arthralgias and myalgias.       Objective   Vitals:    11/28/17 1137   BP: 114/78   BP Location: Right arm   Patient Position: Sitting   Cuff Size: Large Adult   Pulse: 92   SpO2: 98%   Weight: 192 lb 14.4 oz (87.5 kg)   Height: 66\" (167.6 cm)     Physical Exam    Well-developed, well-nourished, in no acute distress.  Regular rate and rhythm.  No murmur.  Abdomen is soft, with normoactive bowel sounds.  She is mildly tender in the right lower quadrant; palpation of the right lower quadrant does not induce pain on the left.  She is more significantly tender at the left lower quadrant and suprapubic area.  There is no rebound or guarding appreciated.    Assessment/Plan   Magdalena was seen today for diverticulitis.    Diagnoses and all orders for this visit:    Acute diverticulitis    Diverticulosis of large intestine without hemorrhage  -  "    Ambulatory Referral to General Surgery    She will finish the Augmentin and continue the Xifaxan together for her diverticulitis.  If her symptoms have not improved significantly by the end of this week, she will call us so that we can reimage her abdomen.  In the meantime, she will try going back to a more liquid diet, avoiding fried foods and dairy in particular.  She may advance her diet once her symptoms subside.    Because this represents about her 20th recurrence, I've asked her to see Dr. Davidson for consideration of an elective colectomy to remedy this problem.  Whether or not this helps with her IBS-type symptoms cannot be answered.  I do believe that eliminating this side of recurrent infection may help her feel better in other regards as well.    ER physician notes, labs (leukocytosis noted) and CT scan report (see history of present illness) were reviewed during the course of today's office visit.

## 2017-11-28 NOTE — TELEPHONE ENCOUNTER
----- Message from Eric Davidson MD sent at 11/28/2017  1:45 PM EST -----      ----- Message -----     From: Fermín Bella MD     Sent: 11/28/2017   1:37 PM       To: MD Eric Casey,  Sending you this nice lady, a [employer] employee with recurrent diverticulitis, for consideration of partial colectomy.  She had her umpteenth recurrence.  Colectomy time?  Thanks,  Vick

## 2017-12-26 ENCOUNTER — OFFICE VISIT (OUTPATIENT)
Dept: FAMILY MEDICINE CLINIC | Facility: CLINIC | Age: 62
End: 2017-12-26

## 2017-12-26 VITALS
SYSTOLIC BLOOD PRESSURE: 140 MMHG | HEART RATE: 92 BPM | WEIGHT: 196.2 LBS | OXYGEN SATURATION: 97 % | HEIGHT: 66 IN | BODY MASS INDEX: 31.53 KG/M2 | DIASTOLIC BLOOD PRESSURE: 84 MMHG

## 2017-12-26 DIAGNOSIS — G43.009 MIGRAINE WITHOUT AURA AND WITHOUT STATUS MIGRAINOSUS, NOT INTRACTABLE: ICD-10-CM

## 2017-12-26 DIAGNOSIS — G89.29 CHRONIC BILATERAL LOW BACK PAIN WITHOUT SCIATICA: Primary | ICD-10-CM

## 2017-12-26 DIAGNOSIS — M54.50 CHRONIC BILATERAL LOW BACK PAIN WITHOUT SCIATICA: Primary | ICD-10-CM

## 2017-12-26 DIAGNOSIS — K58.2 IRRITABLE BOWEL SYNDROME WITH BOTH CONSTIPATION AND DIARRHEA: ICD-10-CM

## 2017-12-26 DIAGNOSIS — F41.8 DEPRESSION WITH ANXIETY: ICD-10-CM

## 2017-12-26 DIAGNOSIS — K57.30 DIVERTICULOSIS OF LARGE INTESTINE WITHOUT HEMORRHAGE: ICD-10-CM

## 2017-12-26 PROCEDURE — 99214 OFFICE O/P EST MOD 30 MIN: CPT | Performed by: INTERNAL MEDICINE

## 2017-12-26 RX ORDER — ONDANSETRON 4 MG/1
4 TABLET, FILM COATED ORAL EVERY 8 HOURS PRN
Qty: 30 TABLET | Refills: 2 | Status: SHIPPED | OUTPATIENT
Start: 2017-12-26 | End: 2019-01-24 | Stop reason: SDUPTHER

## 2017-12-26 NOTE — PROGRESS NOTES
Subjective   Magdalena Dickey is a 62 y.o. female who presents today for:    Back Pain (F/u med review & MRI results)    History of Present Illness     She has had a progressive problem with myalgias and arthralgias, and in particular low back pain, since earlier this year.  Previous evaluations have been negative.  She was referred for physical therapy which has helped somewhat, but she has plateaued in her response.  She has persistent low back pain that is bilateral, perhaps a bit worse on the left.  It radiates into the buttocks/upper legs, but not lower.  She denies weakness in the lower legs.      She fell around January, landing on her but.  She denies any severe, residual pain thereafter.  The pain fluctuates in its severity; she may have some good days where she has mild aching in the low back and upper legs, and some days where the pain is disabling.  This occurs randomly, without precipitation.  Since starting Skelaxin and having her take it on a regular basis, her back pain has subsided.  She still does not do regular stretches or engage in regular exercise.  This has been limited by her recent flare and diverticulitis with persistent abdominal pain despite treatment.    Ms. Dickey  reports that she quit smoking about 3 years ago. Her smoking use included Cigarettes. She has never used smokeless tobacco. She reports that she does not drink alcohol or use illicit drugs.     Allergies   Allergen Reactions   • No Known Drug Allergy        Current Outpatient Prescriptions:   •  ALPRAZolam (XANAX) 0.5 MG tablet, Take 1 tablet by mouth 2 (Two) Times a Day As Needed for Anxiety., Disp: 60 tablet, Rfl: 1  •  calcium carbonate (OS-MARY) 600 MG tablet, Take 600 mg by mouth Daily., Disp: , Rfl:   •  dicyclomine (BENTYL) 10 MG capsule, Take 1 capsule by mouth 4 (Four) Times a Day As Needed (abdominal pain)., Disp: 30 capsule, Rfl: 5  •  gabapentin (NEURONTIN) 600 MG tablet, Take 1 tablet by mouth 2 (Two) Times a Day.,  "Disp: 180 tablet, Rfl: 1  •  HYDROcodone-acetaminophen (NORCO) 5-325 MG per tablet, Take 1 tablet by mouth Every 6 (Six) Hours As Needed for Moderate Pain ., Disp: 15 tablet, Rfl: 0  •  metaxalone (SKELAXIN) 800 MG tablet, Take 1 tablet by mouth 3 (Three) Times a Day., Disp: 90 tablet, Rfl: 1  •  Multiple Vitamin (MULTI-VITAMINS PO), Take  by mouth daily., Disp: , Rfl:   •  pantoprazole (PROTONIX) 40 MG EC tablet, Take 40 mg by mouth daily., Disp: , Rfl:   •  Polyethylene Glycol 3350 (MIRALAX PO), Take  by mouth Daily., Disp: , Rfl:   •  Probiotic Product (PROBIOTIC DAILY PO), Take  by mouth., Disp: , Rfl:   •  promethazine (PHENERGAN) 25 MG tablet, Take 25 mg by mouth as needed., Disp: , Rfl:   •  SUMAtriptan (IMITREX) 100 MG tablet, TAKE 1 TABLET BY MOUTH AT ONSET OF HEADACHE, MAY REPEAT EVERY 2 HOURS AS NEEDED (MAX 200MG/DAY), Disp: 9 tablet, Rfl: 2  •  traZODone (DESYREL) 50 MG tablet, TAKE 1 TABLET BY MOUTH EVERY NIGHT., Disp: 90 tablet, Rfl: 1  •  triamcinolone (KENALOG) 0.1 % cream, Apply  topically 2 (Two) Times a Day., Disp: 454 g, Rfl: 0      Review of Systems   Constitutional: Positive for fatigue and unexpected weight change (Gaining despite eating less per patient).   Gastrointestinal: Positive for abdominal pain and nausea. Negative for blood in stool, constipation and diarrhea.   Genitourinary: Negative.    Musculoskeletal: Positive for arthralgias and back pain.   Skin:        Changing mole   Psychiatric/Behavioral: Positive for dysphoric mood and sleep disturbance.         Objective   Vitals:    12/26/17 0759   BP: 140/84   BP Location: Left arm   Patient Position: Sitting   Cuff Size: Large Adult   Pulse: 92   SpO2: 97%   Weight: 89 kg (196 lb 3.2 oz)   Height: 167.6 cm (66\")     Physical Exam    Obese female in no acute distress.  Sclerae are anicteric and the conjunctivae are pink.  No point tenderness over the lumbar spine.  No paraspinal muscle tenderness.  No SI joint tenderness.  Station and " gait are normal.  Abdomen is soft, nondistended, with normoactive bowel sounds.  No abdominal bruit.  She has tenderness in the left lower quadrant and right lower quadrant.  No rebound or guarding.  No lower extremity edema.    Assessment/Plan   Magdalena was seen today for back pain.    Diagnoses and all orders for this visit:    Chronic bilateral low back pain without sciatica    Irritable bowel syndrome with both constipation and diarrhea  -     ondansetron (ZOFRAN) 4 MG tablet; Take 1 tablet by mouth Every 8 (Eight) Hours As Needed for Nausea or Vomiting.    Migraine without aura and without status migrainosus, not intractable    Diverticulosis of large intestine without hemorrhage    Depression with anxiety      For the back pain, she will continue with the Skelaxin unchanged.  MRI was reviewed with the patient today, showing a compression deformity of L1 and mild facet arthropathy but no other significant abnormality.  According to the CT report from November (for her diverticulitis) the wedge shaped deformity of L1 has been present for at least 2 years.    Because of the weight gain and the GI symptoms, she will cut the gabapentin back to 300 mg in the morning and 600 mg in the evening.  If she does well with this, she should cut back to 300 mg twice a day.    We will stop the Phenergan and start her on Zofran which she has used in the past.  Our goal is to decrease the sedation that she has been getting while on the Phenergan.    She is on several other medications which can affect her GI tract, but we will make no other changes at this time.    She has an appointment with Dr. Davidson on January 4 to discuss partial colectomy to address her recurrent diverticulitis.    She remains frustrated by her inability to lose weight, the persistent back pain, and the persistent GI problems.  Is much as I would like to cut back on the Cymbalta to see if that helps with her diet, I'm afraid her mood will deteriorate off  of it.

## 2017-12-27 RX ORDER — METAXALONE 800 MG/1
TABLET ORAL
Qty: 90 TABLET | Refills: 1 | Status: SHIPPED | OUTPATIENT
Start: 2017-12-27 | End: 2018-01-12

## 2018-01-04 ENCOUNTER — OFFICE VISIT (OUTPATIENT)
Dept: SURGERY | Facility: CLINIC | Age: 63
End: 2018-01-04

## 2018-01-04 VITALS — BODY MASS INDEX: 31.82 KG/M2 | OXYGEN SATURATION: 99 % | HEART RATE: 75 BPM | WEIGHT: 198 LBS | HEIGHT: 66 IN

## 2018-01-04 DIAGNOSIS — K57.32 DIVERTICULITIS OF LARGE INTESTINE WITHOUT PERFORATION OR ABSCESS WITHOUT BLEEDING: Primary | ICD-10-CM

## 2018-01-04 PROCEDURE — 99244 OFF/OP CNSLTJ NEW/EST MOD 40: CPT | Performed by: SURGERY

## 2018-01-05 NOTE — PROGRESS NOTES
SUMMARY (A/P):    62-year-old lady with long history of recurrent sigmoid diverticulitis who symptoms are becoming progressively more severe and frequent.  She wishes to proceed with laparoscopic sigmoid colectomy.  She understands the rationale for the procedure, the alternatives to the procedure, and the risks including but not limited to bleeding, infection, conversion to open procedure, injury to surrounding structures such as the ureter, and postoperative anastomotic leak requiring reoperation and temporary ostomy.      CC:  Referred for consultation by Dr. Bella for recurrent diverticulitis    HPI:  62-year-old lady with 10 year history of recurrent diverticulitis.  She says she averages about 3 episodes per year but that symptoms are becoming more severe and more frequent.  Presentation typically consists of moderate to severe right lower quadrant and suprapubic abdominal pain as well as some left lower quadrant pain.  There is no associated fever or chills.  The symptoms have always resolved with oral antibiotics.    PHYSICAL EXAM:   Constitutional: Well-developed well-nourished, no acute distress   Heart rate 75   Weight (pounds) 198   BMI 32   Height (inches) 66  Eyes: Conjunctiva normal, sclera nonicteric  ENMT: Hearing grossly normal, oral mucosa moist  Neck: Supple, no palpable mass, normal thyroid, trachea midline  Respiratory: Clear to auscultation, normal inspiratory effort  Cardiovascular: Regular rate, no murmur, no carotid bruit, no peripheral edema, no jugular venous distention  Gastrointestinal: Soft, nontender, no palpable mass, no hepatosplenomegaly, negative for hernia, bowel sounds normal  Lymphatics (palpable nodes):  cervical-negative, axillary-negative  Skin:  Warm, dry, no rash on visualized skin surfaces  Musculoskeletal: Symmetric strength, normal gait  Psychiatric: Alert and oriented ×3, normal affect     ALLERGIES: reviewed, in Epic    MEDICATIONS: reviewed, in Epic    PMH:     Gastroesophageal reflux disease  Migraines  Diverticulitis    PSH:    -Colonoscopy 2017 Dr. Tan  -Open cholecystectomy 1980  -Total abdominal hysterectomy with bilateral salpingo-oophorectomy 2001    FAMILY HISTORY:    Negative for colorectal cancer  2 sisters with ovarian cancer    SOCIAL HISTORY:   Denies tobacco use  Denies alcohol use    ROS:  No chest pain or shortness of air.  All other systems reviewed and negative other than presenting complaints.    RADIOLOGY/ENDOSCOPY:    -CT abdomen pelvis 11/24/2017 demonstrated acute sigmoid diverticulitis without complication.  I reviewed the images and concur.  -CT abdomen pelvis 1/24/2017 mild colitis    LABS:  (11/24/2017)  WBC 12, hemoglobin 14.4, platelets 294  Comprehensive metabolic panel normal    AMELIA IRIZARRY M.D.

## 2018-01-12 ENCOUNTER — APPOINTMENT (OUTPATIENT)
Dept: PREADMISSION TESTING | Facility: HOSPITAL | Age: 63
End: 2018-01-12

## 2018-01-12 VITALS
OXYGEN SATURATION: 97 % | TEMPERATURE: 98.1 F | DIASTOLIC BLOOD PRESSURE: 80 MMHG | RESPIRATION RATE: 20 BRPM | SYSTOLIC BLOOD PRESSURE: 138 MMHG | HEIGHT: 66 IN | WEIGHT: 196 LBS | HEART RATE: 77 BPM | BODY MASS INDEX: 31.5 KG/M2

## 2018-01-12 DIAGNOSIS — K57.32 DIVERTICULITIS OF LARGE INTESTINE WITHOUT PERFORATION OR ABSCESS WITHOUT BLEEDING: ICD-10-CM

## 2018-01-12 LAB
ALBUMIN SERPL-MCNC: 4.1 G/DL (ref 3.5–5.2)
ALBUMIN/GLOB SERPL: 1.4 G/DL
ALP SERPL-CCNC: 85 U/L (ref 39–117)
ALT SERPL W P-5'-P-CCNC: 16 U/L (ref 1–33)
ANION GAP SERPL CALCULATED.3IONS-SCNC: 12.5 MMOL/L
AST SERPL-CCNC: 15 U/L (ref 1–32)
BASOPHILS # BLD AUTO: 0.07 10*3/MM3 (ref 0–0.2)
BASOPHILS NFR BLD AUTO: 1 % (ref 0–1.5)
BILIRUB SERPL-MCNC: 0.3 MG/DL (ref 0.1–1.2)
BUN BLD-MCNC: 15 MG/DL (ref 8–23)
BUN/CREAT SERPL: 15.5 (ref 7–25)
CALCIUM SPEC-SCNC: 8.8 MG/DL (ref 8.6–10.5)
CHLORIDE SERPL-SCNC: 105 MMOL/L (ref 98–107)
CO2 SERPL-SCNC: 24.5 MMOL/L (ref 22–29)
CREAT BLD-MCNC: 0.97 MG/DL (ref 0.57–1)
DEPRECATED RDW RBC AUTO: 44.8 FL (ref 37–54)
EOSINOPHIL # BLD AUTO: 0.14 10*3/MM3 (ref 0–0.7)
EOSINOPHIL NFR BLD AUTO: 1.9 % (ref 0.3–6.2)
ERYTHROCYTE [DISTWIDTH] IN BLOOD BY AUTOMATED COUNT: 13.2 % (ref 11.7–13)
GFR SERPL CREATININE-BSD FRML MDRD: 58 ML/MIN/1.73
GLOBULIN UR ELPH-MCNC: 2.9 GM/DL
GLUCOSE BLD-MCNC: 99 MG/DL (ref 65–99)
HCT VFR BLD AUTO: 39.4 % (ref 35.6–45.5)
HGB BLD-MCNC: 13.3 G/DL (ref 11.9–15.5)
IMM GRANULOCYTES # BLD: 0.02 10*3/MM3 (ref 0–0.03)
IMM GRANULOCYTES NFR BLD: 0.3 % (ref 0–0.5)
LYMPHOCYTES # BLD AUTO: 2.21 10*3/MM3 (ref 0.9–4.8)
LYMPHOCYTES NFR BLD AUTO: 30.8 % (ref 19.6–45.3)
MCH RBC QN AUTO: 31.3 PG (ref 26.9–32)
MCHC RBC AUTO-ENTMCNC: 33.8 G/DL (ref 32.4–36.3)
MCV RBC AUTO: 92.7 FL (ref 80.5–98.2)
MONOCYTES # BLD AUTO: 0.53 10*3/MM3 (ref 0.2–1.2)
MONOCYTES NFR BLD AUTO: 7.4 % (ref 5–12)
NEUTROPHILS # BLD AUTO: 4.21 10*3/MM3 (ref 1.9–8.1)
NEUTROPHILS NFR BLD AUTO: 58.6 % (ref 42.7–76)
PLATELET # BLD AUTO: 282 10*3/MM3 (ref 140–500)
PMV BLD AUTO: 10.3 FL (ref 6–12)
POTASSIUM BLD-SCNC: 4.3 MMOL/L (ref 3.5–5.2)
PROT SERPL-MCNC: 7 G/DL (ref 6–8.5)
RBC # BLD AUTO: 4.25 10*6/MM3 (ref 3.9–5.2)
SODIUM BLD-SCNC: 142 MMOL/L (ref 136–145)
WBC NRBC COR # BLD: 7.18 10*3/MM3 (ref 4.5–10.7)

## 2018-01-12 PROCEDURE — 80053 COMPREHEN METABOLIC PANEL: CPT | Performed by: SURGERY

## 2018-01-12 PROCEDURE — 85025 COMPLETE CBC W/AUTO DIFF WBC: CPT | Performed by: SURGERY

## 2018-01-12 PROCEDURE — 36415 COLL VENOUS BLD VENIPUNCTURE: CPT

## 2018-01-12 RX ORDER — METAXALONE 800 MG/1
800 TABLET ORAL 3 TIMES DAILY
COMMUNITY
End: 2018-03-08 | Stop reason: SDUPTHER

## 2018-01-12 RX ORDER — TRAZODONE HYDROCHLORIDE 50 MG/1
50 TABLET ORAL NIGHTLY
COMMUNITY
End: 2018-04-16 | Stop reason: SDUPTHER

## 2018-01-12 RX ORDER — SUMATRIPTAN 100 MG/1
100 TABLET, FILM COATED ORAL
COMMUNITY
End: 2018-11-29 | Stop reason: SDUPTHER

## 2018-01-12 NOTE — DISCHARGE INSTRUCTIONS
Take the following medications the morning of surgery with a small sip of water:    PROTONIX AND GABAPENTIN    ARRIVE AT 7:00    General Instructions:  • Do not eat solid food after midnight the night before surgery.  • You may drink clear liquids day of surgery but must stop at least one hour before your hospital arrival time.  • It is beneficial for you to have a clear drink that contains carbohydrates the day of surgery.  We suggest a 12 to 20 ounce bottle of Gatorade or Powerade for non-diabetic patients or a 12 to 20 ounce bottle of G2 or Powerade Zero for diabetic patients. (Pediatric patients, are not advised to drink a 12 to 20 ounce carbohydrate drink)    Clear liquids are liquids you can see through.  Nothing red in color.     Plain water                               Sports drinks  Sodas                                   Gelatin (Jell-O)  Fruit juices without pulp such as white grape juice and apple juice  Popsicles that contain no fruit or yogurt  Tea or coffee (no cream or milk added)  Gatorade / Powerade  G2 / Powerade Zero    • Infants may have breast milk up to four hours before surgery.  • Infants drinking formula may drink formula up to six hours before surgery.   • Patients who avoid smoking, chewing tobacco and alcohol for 4 weeks prior to surgery have a reduced risk of post-operative complications.  Quit smoking as many days before surgery as you can.  • Do not smoke, use chewing tobacco or drink alcohol the day of surgery.   • If applicable bring your C-PAP/ BI-PAP machine.  • Bring any papers given to you in the doctor’s office.  • Wear clean comfortable clothes and socks.  • Do not wear contact lenses or make-up.  Bring a case for your glasses.   • Bring crutches or walker if applicable.  • Remove all piercings.  Leave jewelry and any other valuables at home.  • Hair extensions with metal clips must be removed prior to surgery.  • The Pre-Admission Testing nurse will instruct you to bring  medications if unable to obtain an accurate list in Pre-Admission Testing.        If you were given a blood bank ID arm band remember to bring it with you the day of surgery.    Preventing a Surgical Site Infection:  • For 2 to 3 days before surgery, avoid shaving with a razor because the razor can irritate skin and make it easier to develop an infection.  • The night prior to surgery sleep in a clean bed with clean clothing.  Do not allow pets to sleep with you.  • Shower on the morning of surgery using a fresh bar of anti-bacterial soap (such as Dial) and clean washcloth.  Dry with a clean towel and dress in clean clothing.  • Ask your surgeon if you will be receiving antibiotics prior to surgery.  • Make sure you, your family, and all healthcare providers clean their hands with soap and water or an alcohol based hand  before caring for you or your wound.    Day of surgery:  Upon arrival, a Pre-op nurse and Anesthesiologist will review your health history, obtain vital signs, and answer questions you may have.  The only belongings needed at this time will be your home medications and if applicable your C-PAP/BI-PAP machine.  If you are staying overnight your family can leave the rest of your belongings in the car and bring them to your room later.  A Pre-op nurse will start an IV and you may receive medication in preparation for surgery, including something to help you relax.  Your family will be able to see you in the Pre-op area.  While you are in surgery your family should notify the waiting room  if they leave the waiting room area and provide a contact phone number.    Please be aware that surgery does come with discomfort.  We want to make every effort to control your discomfort so please discuss any uncontrolled symptoms with your nurse.   Your doctor will most likely have prescribed pain medications.      If you are going home after surgery you will receive individualized written care  instructions before being discharged.  A responsible adult must drive you to and from the hospital on the day of your surgery and stay with you for 24 hours.    If you are staying overnight following surgery, you will be transported to your hospital room following the recovery period.  [facility] has all private rooms.    If you have any questions please call Pre-Admission Testing at [phone].  Deductibles and co-payments are collected on the day of service. Please be prepared to pay the required co-pay, deductible or deposit on the day of service as defined by your plan.

## 2018-01-16 ENCOUNTER — APPOINTMENT (OUTPATIENT)
Dept: GENERAL RADIOLOGY | Facility: HOSPITAL | Age: 63
End: 2018-01-16

## 2018-01-16 ENCOUNTER — ANESTHESIA (OUTPATIENT)
Dept: PERIOP | Facility: HOSPITAL | Age: 63
End: 2018-01-16

## 2018-01-16 ENCOUNTER — ANESTHESIA EVENT (OUTPATIENT)
Dept: PERIOP | Facility: HOSPITAL | Age: 63
End: 2018-01-16

## 2018-01-16 ENCOUNTER — HOSPITAL ENCOUNTER (INPATIENT)
Facility: HOSPITAL | Age: 63
LOS: 3 days | Discharge: HOME | End: 2018-01-19
Attending: SURGERY | Admitting: SURGERY

## 2018-01-16 DIAGNOSIS — K57.32 DIVERTICULITIS OF LARGE INTESTINE WITHOUT PERFORATION OR ABSCESS WITHOUT BLEEDING: ICD-10-CM

## 2018-01-16 LAB
ANION GAP SERPL CALCULATED.3IONS-SCNC: 13.2 MMOL/L
ARTERIAL PATENCY WRIST A: ABNORMAL
ARTERIAL PATENCY WRIST A: POSITIVE
ARTERIAL PATENCY WRIST A: POSITIVE
ATMOSPHERIC PRESS: 764.7 MMHG
ATMOSPHERIC PRESS: 765.2 MMHG
ATMOSPHERIC PRESS: 765.7 MMHG
BASE EXCESS BLDA CALC-SCNC: -3.9 MMOL/L (ref 0–2)
BASE EXCESS BLDA CALC-SCNC: -7.1 MMOL/L (ref 0–2)
BASE EXCESS BLDA CALC-SCNC: -8.3 MMOL/L (ref 0–2)
BASOPHILS # BLD AUTO: 0.02 10*3/MM3 (ref 0–0.2)
BASOPHILS NFR BLD AUTO: 0.1 % (ref 0–1.5)
BDY SITE: ABNORMAL
BUN BLD-MCNC: 13 MG/DL (ref 8–23)
BUN/CREAT SERPL: 15.3 (ref 7–25)
CALCIUM SPEC-SCNC: 8.1 MG/DL (ref 8.6–10.5)
CHLORIDE SERPL-SCNC: 105 MMOL/L (ref 98–107)
CO2 SERPL-SCNC: 19.8 MMOL/L (ref 22–29)
CREAT BLD-MCNC: 0.85 MG/DL (ref 0.57–1)
DEPRECATED RDW RBC AUTO: 44.8 FL (ref 37–54)
EOSINOPHIL # BLD AUTO: 0 10*3/MM3 (ref 0–0.7)
EOSINOPHIL NFR BLD AUTO: 0 % (ref 0.3–6.2)
ERYTHROCYTE [DISTWIDTH] IN BLOOD BY AUTOMATED COUNT: 13.1 % (ref 11.7–13)
GFR SERPL CREATININE-BSD FRML MDRD: 68 ML/MIN/1.73
GLUCOSE BLD-MCNC: 182 MG/DL (ref 65–99)
GLUCOSE BLDC GLUCOMTR-MCNC: 148 MG/DL (ref 70–130)
GLUCOSE BLDC GLUCOMTR-MCNC: 171 MG/DL (ref 70–130)
HCO3 BLDA-SCNC: 20.8 MMOL/L (ref 22–28)
HCO3 BLDA-SCNC: 21.1 MMOL/L (ref 22–28)
HCO3 BLDA-SCNC: 21.2 MMOL/L (ref 22–28)
HCT VFR BLD AUTO: 37 % (ref 35.6–45.5)
HGB BLD-MCNC: 12.3 G/DL (ref 11.9–15.5)
HOROWITZ INDEX BLD+IHG-RTO: 100 %
HOROWITZ INDEX BLD+IHG-RTO: 100 %
IMM GRANULOCYTES # BLD: 0.08 10*3/MM3 (ref 0–0.03)
IMM GRANULOCYTES NFR BLD: 0.4 % (ref 0–0.5)
LYMPHOCYTES # BLD AUTO: 1.02 10*3/MM3 (ref 0.9–4.8)
LYMPHOCYTES NFR BLD AUTO: 5 % (ref 19.6–45.3)
MCH RBC QN AUTO: 31.1 PG (ref 26.9–32)
MCHC RBC AUTO-ENTMCNC: 33.2 G/DL (ref 32.4–36.3)
MCV RBC AUTO: 93.7 FL (ref 80.5–98.2)
MODALITY: ABNORMAL
MONOCYTES # BLD AUTO: 0.82 10*3/MM3 (ref 0.2–1.2)
MONOCYTES NFR BLD AUTO: 4 % (ref 5–12)
NEUTROPHILS # BLD AUTO: 18.53 10*3/MM3 (ref 1.9–8.1)
NEUTROPHILS NFR BLD AUTO: 90.5 % (ref 42.7–76)
O2 A-A PPRESDIFF RESPIRATORY: 0.1 MMHG
O2 A-A PPRESDIFF RESPIRATORY: 0.3 MMHG
PCO2 BLDA: 37.5 MM HG (ref 35–45)
PCO2 BLDA: 53.5 MM HG (ref 35–45)
PCO2 BLDA: 56.7 MM HG (ref 35–45)
PEEP RESPIRATORY: 5 CM[H2O]
PEEP RESPIRATORY: 5 CM[H2O]
PH BLDA: 7.17 PH UNITS (ref 7.35–7.45)
PH BLDA: 7.21 PH UNITS (ref 7.35–7.45)
PH BLDA: 7.36 PH UNITS (ref 7.35–7.45)
PLATELET # BLD AUTO: 261 10*3/MM3 (ref 140–500)
PMV BLD AUTO: 10.3 FL (ref 6–12)
PO2 BLDA: 101.8 MM HG (ref 80–100)
PO2 BLDA: 226.4 MM HG (ref 80–100)
PO2 BLDA: 63.2 MM HG (ref 80–100)
POTASSIUM BLD-SCNC: 3.6 MMOL/L (ref 3.5–5.2)
RBC # BLD AUTO: 3.95 10*6/MM3 (ref 3.9–5.2)
SAO2 % BLDCOA: 91 % (ref 92–99)
SAO2 % BLDCOA: 95.8 % (ref 92–99)
SAO2 % BLDCOA: 99.6 % (ref 92–99)
SET MECH RESP RATE: 16
SET MECH RESP RATE: 18
SET MECH RESP RATE: 24
SODIUM BLD-SCNC: 138 MMOL/L (ref 136–145)
TOTAL RATE: 20 BREATHS/MINUTE
TOTAL RATE: 24 BREATHS/MINUTE
TROPONIN T SERPL-MCNC: <0.01 NG/ML (ref 0–0.03)
VENTILATOR MODE: ABNORMAL
VENTILATOR MODE: ABNORMAL
VT ON VENT VENT: 400 ML
VT ON VENT VENT: 500 ML
WBC NRBC COR # BLD: 20.47 10*3/MM3 (ref 4.5–10.7)

## 2018-01-16 PROCEDURE — 25010000002 PROPOFOL 10 MG/ML EMULSION: Performed by: NURSE ANESTHETIST, CERTIFIED REGISTERED

## 2018-01-16 PROCEDURE — 25010000002 PROPOFOL 10 MG/ML EMULSION

## 2018-01-16 PROCEDURE — 25010000002 HYDROMORPHONE PER 4 MG: Performed by: NURSE ANESTHETIST, CERTIFIED REGISTERED

## 2018-01-16 PROCEDURE — 25010000002 FENTANYL CITRATE (PF) 100 MCG/2ML SOLUTION: Performed by: NURSE ANESTHETIST, CERTIFIED REGISTERED

## 2018-01-16 PROCEDURE — 25010000002 DEXAMETHASONE PER 1 MG: Performed by: NURSE ANESTHETIST, CERTIFIED REGISTERED

## 2018-01-16 PROCEDURE — 44213 LAP MOBIL SPLENIC FL ADD-ON: CPT | Performed by: SPECIALIST/TECHNOLOGIST, OTHER

## 2018-01-16 PROCEDURE — 94002 VENT MGMT INPAT INIT DAY: CPT

## 2018-01-16 PROCEDURE — 44204 LAPARO PARTIAL COLECTOMY: CPT | Performed by: SPECIALIST/TECHNOLOGIST, OTHER

## 2018-01-16 PROCEDURE — 44213 LAP MOBIL SPLENIC FL ADD-ON: CPT | Performed by: SURGERY

## 2018-01-16 PROCEDURE — 36600 WITHDRAWAL OF ARTERIAL BLOOD: CPT

## 2018-01-16 PROCEDURE — 0DTN4ZZ RESECTION OF SIGMOID COLON, PERCUTANEOUS ENDOSCOPIC APPROACH: ICD-10-PCS | Performed by: SURGERY

## 2018-01-16 PROCEDURE — 25010000002 ONDANSETRON PER 1 MG: Performed by: NURSE ANESTHETIST, CERTIFIED REGISTERED

## 2018-01-16 PROCEDURE — 71045 X-RAY EXAM CHEST 1 VIEW: CPT

## 2018-01-16 PROCEDURE — 94799 UNLISTED PULMONARY SVC/PX: CPT

## 2018-01-16 PROCEDURE — 25010000002 HYDRALAZINE PER 20 MG: Performed by: NURSE ANESTHETIST, CERTIFIED REGISTERED

## 2018-01-16 PROCEDURE — 25010000002 LORAZEPAM PER 2 MG

## 2018-01-16 PROCEDURE — 84484 ASSAY OF TROPONIN QUANT: CPT | Performed by: INTERNAL MEDICINE

## 2018-01-16 PROCEDURE — 25010000002 CEFOXITIN PER 1 G: Performed by: SURGERY

## 2018-01-16 PROCEDURE — 82803 BLOOD GASES ANY COMBINATION: CPT

## 2018-01-16 PROCEDURE — 25010000002 MIDAZOLAM PER 1 MG: Performed by: ANESTHESIOLOGY

## 2018-01-16 PROCEDURE — 88307 TISSUE EXAM BY PATHOLOGIST: CPT | Performed by: SURGERY

## 2018-01-16 PROCEDURE — 25010000002 PROPOFOL 1000 MG/ML EMULSION: Performed by: INTERNAL MEDICINE

## 2018-01-16 PROCEDURE — 82962 GLUCOSE BLOOD TEST: CPT

## 2018-01-16 PROCEDURE — 85025 COMPLETE CBC W/AUTO DIFF WBC: CPT | Performed by: INTERNAL MEDICINE

## 2018-01-16 PROCEDURE — 44204 LAPARO PARTIAL COLECTOMY: CPT | Performed by: SURGERY

## 2018-01-16 PROCEDURE — 80048 BASIC METABOLIC PNL TOTAL CA: CPT | Performed by: INTERNAL MEDICINE

## 2018-01-16 PROCEDURE — 25010000002 FENTANYL CITRATE (PF) 250 MCG/5ML SOLUTION

## 2018-01-16 PROCEDURE — 25010000002 FENTANYL CITRATE (PF) 100 MCG/2ML SOLUTION: Performed by: INTERNAL MEDICINE

## 2018-01-16 RX ORDER — PROMETHAZINE HYDROCHLORIDE 25 MG/ML
12.5 INJECTION, SOLUTION INTRAMUSCULAR; INTRAVENOUS ONCE AS NEEDED
Status: DISCONTINUED | OUTPATIENT
Start: 2018-01-16 | End: 2018-01-16 | Stop reason: HOSPADM

## 2018-01-16 RX ORDER — NALOXONE HCL 0.4 MG/ML
0.2 VIAL (ML) INJECTION AS NEEDED
Status: DISCONTINUED | OUTPATIENT
Start: 2018-01-16 | End: 2018-01-16 | Stop reason: HOSPADM

## 2018-01-16 RX ORDER — OXYCODONE AND ACETAMINOPHEN 7.5; 325 MG/1; MG/1
1 TABLET ORAL ONCE AS NEEDED
Status: DISCONTINUED | OUTPATIENT
Start: 2018-01-16 | End: 2018-01-16 | Stop reason: HOSPADM

## 2018-01-16 RX ORDER — ALPRAZOLAM 0.5 MG/1
0.5 TABLET ORAL 2 TIMES DAILY PRN
Status: DISCONTINUED | OUTPATIENT
Start: 2018-01-16 | End: 2018-01-19 | Stop reason: HOSPADM

## 2018-01-16 RX ORDER — LORAZEPAM 2 MG/ML
INJECTION INTRAMUSCULAR
Status: COMPLETED
Start: 2018-01-16 | End: 2018-01-16

## 2018-01-16 RX ORDER — CEFTRIAXONE SODIUM 1 G/50ML
1 INJECTION, SOLUTION INTRAVENOUS EVERY 24 HOURS
Status: DISCONTINUED | OUTPATIENT
Start: 2018-01-17 | End: 2018-01-18

## 2018-01-16 RX ORDER — MIDAZOLAM HYDROCHLORIDE 1 MG/ML
1 INJECTION INTRAMUSCULAR; INTRAVENOUS
Status: DISCONTINUED | OUTPATIENT
Start: 2018-01-16 | End: 2018-01-16 | Stop reason: HOSPADM

## 2018-01-16 RX ORDER — LIDOCAINE HYDROCHLORIDE 40 MG/ML
SOLUTION TOPICAL AS NEEDED
Status: DISCONTINUED | OUTPATIENT
Start: 2018-01-16 | End: 2018-01-16 | Stop reason: SURG

## 2018-01-16 RX ORDER — PHYSOSTIGMINE SALICYLATE 1 MG/ML
1 INJECTION INTRAVENOUS ONCE
Status: COMPLETED | OUTPATIENT
Start: 2018-01-16 | End: 2018-01-16

## 2018-01-16 RX ORDER — ALVIMOPAN 12 MG/1
12 CAPSULE ORAL ONCE
Status: COMPLETED | OUTPATIENT
Start: 2018-01-16 | End: 2018-01-16

## 2018-01-16 RX ORDER — PANTOPRAZOLE SODIUM 40 MG/1
40 TABLET, DELAYED RELEASE ORAL EVERY MORNING
Status: DISCONTINUED | OUTPATIENT
Start: 2018-01-17 | End: 2018-01-19 | Stop reason: HOSPADM

## 2018-01-16 RX ORDER — HYDROCODONE BITARTRATE AND ACETAMINOPHEN 7.5; 325 MG/1; MG/1
1 TABLET ORAL ONCE AS NEEDED
Status: DISCONTINUED | OUTPATIENT
Start: 2018-01-16 | End: 2018-01-16 | Stop reason: HOSPADM

## 2018-01-16 RX ORDER — HYDROMORPHONE HCL 110MG/55ML
0.5 PATIENT CONTROLLED ANALGESIA SYRINGE INTRAVENOUS
Status: DISCONTINUED | OUTPATIENT
Start: 2018-01-16 | End: 2018-01-19 | Stop reason: HOSPADM

## 2018-01-16 RX ORDER — FAMOTIDINE 10 MG/ML
20 INJECTION, SOLUTION INTRAVENOUS ONCE
Status: COMPLETED | OUTPATIENT
Start: 2018-01-16 | End: 2018-01-16

## 2018-01-16 RX ORDER — BUPIVACAINE HYDROCHLORIDE AND EPINEPHRINE 5; 5 MG/ML; UG/ML
INJECTION, SOLUTION PERINEURAL AS NEEDED
Status: DISCONTINUED | OUTPATIENT
Start: 2018-01-16 | End: 2018-01-16 | Stop reason: HOSPADM

## 2018-01-16 RX ORDER — SODIUM CHLORIDE 0.9 % (FLUSH) 0.9 %
1-10 SYRINGE (ML) INJECTION AS NEEDED
Status: DISCONTINUED | OUTPATIENT
Start: 2018-01-16 | End: 2018-01-16 | Stop reason: HOSPADM

## 2018-01-16 RX ORDER — PROMETHAZINE HYDROCHLORIDE 25 MG/1
25 TABLET ORAL ONCE AS NEEDED
Status: DISCONTINUED | OUTPATIENT
Start: 2018-01-16 | End: 2018-01-16 | Stop reason: HOSPADM

## 2018-01-16 RX ORDER — LABETALOL HYDROCHLORIDE 5 MG/ML
5 INJECTION, SOLUTION INTRAVENOUS
Status: DISCONTINUED | OUTPATIENT
Start: 2018-01-16 | End: 2018-01-16 | Stop reason: HOSPADM

## 2018-01-16 RX ORDER — PROPOFOL 10 MG/ML
VIAL (ML) INTRAVENOUS
Status: COMPLETED
Start: 2018-01-16 | End: 2018-01-16

## 2018-01-16 RX ORDER — PROMETHAZINE HYDROCHLORIDE 25 MG/1
12.5 TABLET ORAL ONCE AS NEEDED
Status: DISCONTINUED | OUTPATIENT
Start: 2018-01-16 | End: 2018-01-16 | Stop reason: HOSPADM

## 2018-01-16 RX ORDER — MIDAZOLAM HYDROCHLORIDE 1 MG/ML
INJECTION INTRAMUSCULAR; INTRAVENOUS
Status: DISPENSED
Start: 2018-01-16 | End: 2018-01-17

## 2018-01-16 RX ORDER — FENTANYL CITRATE 50 UG/ML
INJECTION, SOLUTION INTRAMUSCULAR; INTRAVENOUS
Status: COMPLETED
Start: 2018-01-16 | End: 2018-01-16

## 2018-01-16 RX ORDER — ONDANSETRON 2 MG/ML
4 INJECTION INTRAMUSCULAR; INTRAVENOUS EVERY 4 HOURS PRN
Status: DISCONTINUED | OUTPATIENT
Start: 2018-01-16 | End: 2018-01-19 | Stop reason: HOSPADM

## 2018-01-16 RX ORDER — FENTANYL CITRATE 50 UG/ML
50 INJECTION, SOLUTION INTRAMUSCULAR; INTRAVENOUS
Status: DISCONTINUED | OUTPATIENT
Start: 2018-01-16 | End: 2018-01-16 | Stop reason: HOSPADM

## 2018-01-16 RX ORDER — NALOXONE HCL 0.4 MG/ML
0.1 VIAL (ML) INJECTION
Status: DISCONTINUED | OUTPATIENT
Start: 2018-01-16 | End: 2018-01-19 | Stop reason: HOSPADM

## 2018-01-16 RX ORDER — MAGNESIUM HYDROXIDE 1200 MG/15ML
LIQUID ORAL AS NEEDED
Status: DISCONTINUED | OUTPATIENT
Start: 2018-01-16 | End: 2018-01-16 | Stop reason: HOSPADM

## 2018-01-16 RX ORDER — LIDOCAINE HYDROCHLORIDE 10 MG/ML
0.5 INJECTION, SOLUTION EPIDURAL; INFILTRATION; INTRACAUDAL; PERINEURAL ONCE AS NEEDED
Status: DISCONTINUED | OUTPATIENT
Start: 2018-01-16 | End: 2018-01-16 | Stop reason: HOSPADM

## 2018-01-16 RX ORDER — HYDROMORPHONE HCL 110MG/55ML
PATIENT CONTROLLED ANALGESIA SYRINGE INTRAVENOUS AS NEEDED
Status: DISCONTINUED | OUTPATIENT
Start: 2018-01-16 | End: 2018-01-16 | Stop reason: SURG

## 2018-01-16 RX ORDER — ONDANSETRON 2 MG/ML
INJECTION INTRAMUSCULAR; INTRAVENOUS AS NEEDED
Status: DISCONTINUED | OUTPATIENT
Start: 2018-01-16 | End: 2018-01-16 | Stop reason: SURG

## 2018-01-16 RX ORDER — DEXMEDETOMIDINE HYDROCHLORIDE 4 UG/ML
0.2 INJECTION, SOLUTION INTRAVENOUS
Status: DISCONTINUED | OUTPATIENT
Start: 2018-01-16 | End: 2018-01-17

## 2018-01-16 RX ORDER — SODIUM CHLORIDE, SODIUM LACTATE, POTASSIUM CHLORIDE, CALCIUM CHLORIDE 600; 310; 30; 20 MG/100ML; MG/100ML; MG/100ML; MG/100ML
9 INJECTION, SOLUTION INTRAVENOUS CONTINUOUS
Status: DISCONTINUED | OUTPATIENT
Start: 2018-01-16 | End: 2018-01-16

## 2018-01-16 RX ORDER — LORAZEPAM 2 MG/ML
0.5 INJECTION INTRAMUSCULAR ONCE
Status: COMPLETED | OUTPATIENT
Start: 2018-01-16 | End: 2018-01-16

## 2018-01-16 RX ORDER — ONDANSETRON 4 MG/1
4 TABLET, FILM COATED ORAL EVERY 8 HOURS PRN
Status: DISCONTINUED | OUTPATIENT
Start: 2018-01-16 | End: 2018-01-18

## 2018-01-16 RX ORDER — HYDRALAZINE HYDROCHLORIDE 20 MG/ML
5 INJECTION INTRAMUSCULAR; INTRAVENOUS
Status: DISCONTINUED | OUTPATIENT
Start: 2018-01-16 | End: 2018-01-16 | Stop reason: HOSPADM

## 2018-01-16 RX ORDER — FLUMAZENIL 0.1 MG/ML
0.2 INJECTION INTRAVENOUS AS NEEDED
Status: DISCONTINUED | OUTPATIENT
Start: 2018-01-16 | End: 2018-01-16 | Stop reason: HOSPADM

## 2018-01-16 RX ORDER — HYDROMORPHONE HCL 110MG/55ML
0.5 PATIENT CONTROLLED ANALGESIA SYRINGE INTRAVENOUS
Status: DISCONTINUED | OUTPATIENT
Start: 2018-01-16 | End: 2018-01-16 | Stop reason: HOSPADM

## 2018-01-16 RX ORDER — FENTANYL CITRATE 50 UG/ML
INJECTION, SOLUTION INTRAMUSCULAR; INTRAVENOUS AS NEEDED
Status: DISCONTINUED | OUTPATIENT
Start: 2018-01-16 | End: 2018-01-16 | Stop reason: SURG

## 2018-01-16 RX ORDER — EPHEDRINE SULFATE 50 MG/ML
5 INJECTION, SOLUTION INTRAVENOUS ONCE AS NEEDED
Status: DISCONTINUED | OUTPATIENT
Start: 2018-01-16 | End: 2018-01-16 | Stop reason: HOSPADM

## 2018-01-16 RX ORDER — SODIUM CHLORIDE 9 MG/ML
INJECTION, SOLUTION INTRAVENOUS AS NEEDED
Status: DISCONTINUED | OUTPATIENT
Start: 2018-01-16 | End: 2018-01-16 | Stop reason: HOSPADM

## 2018-01-16 RX ORDER — HYDROCODONE BITARTRATE AND ACETAMINOPHEN 5; 325 MG/1; MG/1
2 TABLET ORAL EVERY 4 HOURS PRN
Status: DISCONTINUED | OUTPATIENT
Start: 2018-01-16 | End: 2018-01-19 | Stop reason: HOSPADM

## 2018-01-16 RX ORDER — ROCURONIUM BROMIDE 10 MG/ML
INJECTION, SOLUTION INTRAVENOUS AS NEEDED
Status: DISCONTINUED | OUTPATIENT
Start: 2018-01-16 | End: 2018-01-16 | Stop reason: SURG

## 2018-01-16 RX ORDER — PROMETHAZINE HYDROCHLORIDE 25 MG/1
25 SUPPOSITORY RECTAL ONCE AS NEEDED
Status: DISCONTINUED | OUTPATIENT
Start: 2018-01-16 | End: 2018-01-16 | Stop reason: HOSPADM

## 2018-01-16 RX ORDER — FENTANYL CITRATE 50 UG/ML
75 INJECTION, SOLUTION INTRAMUSCULAR; INTRAVENOUS
Status: DISCONTINUED | OUTPATIENT
Start: 2018-01-16 | End: 2018-01-17

## 2018-01-16 RX ORDER — LIDOCAINE HYDROCHLORIDE 20 MG/ML
INJECTION, SOLUTION INFILTRATION; PERINEURAL AS NEEDED
Status: DISCONTINUED | OUTPATIENT
Start: 2018-01-16 | End: 2018-01-16 | Stop reason: SURG

## 2018-01-16 RX ORDER — DIPHENHYDRAMINE HYDROCHLORIDE 50 MG/ML
12.5 INJECTION INTRAMUSCULAR; INTRAVENOUS
Status: DISCONTINUED | OUTPATIENT
Start: 2018-01-16 | End: 2018-01-16 | Stop reason: HOSPADM

## 2018-01-16 RX ORDER — SODIUM CHLORIDE 9 MG/ML
75 INJECTION, SOLUTION INTRAVENOUS CONTINUOUS
Status: DISCONTINUED | OUTPATIENT
Start: 2018-01-16 | End: 2018-01-19

## 2018-01-16 RX ORDER — PROPOFOL 10 MG/ML
VIAL (ML) INTRAVENOUS AS NEEDED
Status: DISCONTINUED | OUTPATIENT
Start: 2018-01-16 | End: 2018-01-16 | Stop reason: SURG

## 2018-01-16 RX ORDER — ONDANSETRON 2 MG/ML
4 INJECTION INTRAMUSCULAR; INTRAVENOUS ONCE AS NEEDED
Status: DISCONTINUED | OUTPATIENT
Start: 2018-01-16 | End: 2018-01-16 | Stop reason: HOSPADM

## 2018-01-16 RX ORDER — DEXAMETHASONE SODIUM PHOSPHATE 10 MG/ML
INJECTION INTRAMUSCULAR; INTRAVENOUS AS NEEDED
Status: DISCONTINUED | OUTPATIENT
Start: 2018-01-16 | End: 2018-01-16 | Stop reason: SURG

## 2018-01-16 RX ORDER — MIDAZOLAM HYDROCHLORIDE 1 MG/ML
2 INJECTION INTRAMUSCULAR; INTRAVENOUS
Status: DISCONTINUED | OUTPATIENT
Start: 2018-01-16 | End: 2018-01-16 | Stop reason: HOSPADM

## 2018-01-16 RX ADMIN — Medication 1 MG: at 07:09

## 2018-01-16 RX ADMIN — PROPOFOL 50 MG: 10 INJECTION, EMULSION INTRAVENOUS at 11:28

## 2018-01-16 RX ADMIN — FENTANYL CITRATE 100 MCG: 50 INJECTION INTRAMUSCULAR; INTRAVENOUS at 10:23

## 2018-01-16 RX ADMIN — FENTANYL CITRATE 50 MCG: 50 INJECTION INTRAMUSCULAR; INTRAVENOUS at 13:19

## 2018-01-16 RX ADMIN — ONDANSETRON 4 MG: 2 INJECTION INTRAMUSCULAR; INTRAVENOUS at 11:42

## 2018-01-16 RX ADMIN — LORAZEPAM 0.5 MG: 2 INJECTION INTRAMUSCULAR at 14:11

## 2018-01-16 RX ADMIN — FAMOTIDINE 20 MG: 10 INJECTION, SOLUTION INTRAVENOUS at 07:09

## 2018-01-16 RX ADMIN — FENTANYL CITRATE 100 MCG: 50 INJECTION INTRAMUSCULAR; INTRAVENOUS at 10:04

## 2018-01-16 RX ADMIN — LORAZEPAM 0.5 MG: 2 INJECTION INTRAMUSCULAR; INTRAVENOUS at 14:11

## 2018-01-16 RX ADMIN — DEXAMETHASONE SODIUM PHOSPHATE 6 MG: 10 INJECTION INTRAMUSCULAR; INTRAVENOUS at 10:30

## 2018-01-16 RX ADMIN — DEXMEDETOMIDINE HYDROCHLORIDE 0.2 MCG/KG/HR: 4 INJECTION, SOLUTION INTRAVENOUS at 14:25

## 2018-01-16 RX ADMIN — PROPOFOL 20 MCG/KG/MIN: 10 INJECTION, EMULSION INTRAVENOUS at 21:04

## 2018-01-16 RX ADMIN — SODIUM CHLORIDE, POTASSIUM CHLORIDE, SODIUM LACTATE AND CALCIUM CHLORIDE: 600; 310; 30; 20 INJECTION, SOLUTION INTRAVENOUS at 11:15

## 2018-01-16 RX ADMIN — SUGAMMADEX 350 MG: 100 INJECTION, SOLUTION INTRAVENOUS at 11:39

## 2018-01-16 RX ADMIN — Medication 1 MG: at 12:15

## 2018-01-16 RX ADMIN — LIDOCAINE HYDROCHLORIDE 1 EACH: 40 SPRAY LARYNGEAL; TRANSTRACHEAL at 10:05

## 2018-01-16 RX ADMIN — HYDROMORPHONE HYDROCHLORIDE 0.5 MG: 2 INJECTION, SOLUTION INTRAMUSCULAR; INTRAVENOUS; SUBCUTANEOUS at 13:16

## 2018-01-16 RX ADMIN — HYDROMORPHONE HYDROCHLORIDE 0.5 MG: 2 INJECTION, SOLUTION INTRAMUSCULAR; INTRAVENOUS; SUBCUTANEOUS at 13:11

## 2018-01-16 RX ADMIN — PROPOFOL 10 MCG/KG/MIN: 10 INJECTION, EMULSION INTRAVENOUS at 14:59

## 2018-01-16 RX ADMIN — ALVIMOPAN 12 MG: 12 CAPSULE ORAL at 09:35

## 2018-01-16 RX ADMIN — CEFOXITIN SODIUM 2 G: 2 POWDER, FOR SOLUTION INTRAVENOUS at 17:10

## 2018-01-16 RX ADMIN — PROPOFOL 150 MG: 10 INJECTION, EMULSION INTRAVENOUS at 10:04

## 2018-01-16 RX ADMIN — HYDRALAZINE HYDROCHLORIDE 5 MG: 20 INJECTION, SOLUTION INTRAMUSCULAR; INTRAVENOUS at 12:25

## 2018-01-16 RX ADMIN — PHYSOSTIGMINE SALICYLATE 1 MG: 1 INJECTION INTRAVENOUS at 13:45

## 2018-01-16 RX ADMIN — FENTANYL CITRATE 50 MCG: 50 INJECTION INTRAMUSCULAR; INTRAVENOUS at 13:37

## 2018-01-16 RX ADMIN — HYDRALAZINE HYDROCHLORIDE 5 MG: 20 INJECTION, SOLUTION INTRAMUSCULAR; INTRAVENOUS at 12:31

## 2018-01-16 RX ADMIN — Medication 1 MG: at 12:28

## 2018-01-16 RX ADMIN — HYDRALAZINE HYDROCHLORIDE 5 MG: 20 INJECTION, SOLUTION INTRAMUSCULAR; INTRAVENOUS at 12:45

## 2018-01-16 RX ADMIN — SODIUM CHLORIDE, POTASSIUM CHLORIDE, SODIUM LACTATE AND CALCIUM CHLORIDE 9 ML/HR: 600; 310; 30; 20 INJECTION, SOLUTION INTRAVENOUS at 07:09

## 2018-01-16 RX ADMIN — FENTANYL CITRATE 50 MCG: 50 INJECTION INTRAMUSCULAR; INTRAVENOUS at 10:50

## 2018-01-16 RX ADMIN — DEXMEDETOMIDINE HYDROCHLORIDE 0.5 MCG/KG/HR: 4 INJECTION, SOLUTION INTRAVENOUS at 21:04

## 2018-01-16 RX ADMIN — FLUMAZENIL 1 MG: 0.1 INJECTION, SOLUTION INTRAVENOUS at 14:28

## 2018-01-16 RX ADMIN — HYDROMORPHONE HYDROCHLORIDE 0.5 MG: 2 INJECTION, SOLUTION INTRAMUSCULAR; INTRAVENOUS; SUBCUTANEOUS at 12:41

## 2018-01-16 RX ADMIN — HYDROMORPHONE HYDROCHLORIDE 0.5 MG: 2 INJECTION, SOLUTION INTRAMUSCULAR; INTRAVENOUS; SUBCUTANEOUS at 12:26

## 2018-01-16 RX ADMIN — FENTANYL CITRATE 50 MCG: 50 INJECTION INTRAMUSCULAR; INTRAVENOUS at 10:30

## 2018-01-16 RX ADMIN — CEFOXITIN SODIUM 2 G: 2 POWDER, FOR SOLUTION INTRAVENOUS at 22:50

## 2018-01-16 RX ADMIN — HYDROMORPHONE HYDROCHLORIDE 0.5 MG: 2 INJECTION INTRAMUSCULAR; INTRAVENOUS; SUBCUTANEOUS at 10:45

## 2018-01-16 RX ADMIN — SODIUM CHLORIDE 75 ML/HR: 9 INJECTION, SOLUTION INTRAVENOUS at 16:45

## 2018-01-16 RX ADMIN — PROPOFOL 50 MG: 10 INJECTION, EMULSION INTRAVENOUS at 10:23

## 2018-01-16 RX ADMIN — ROCURONIUM BROMIDE 50 MG: 10 INJECTION INTRAVENOUS at 10:04

## 2018-01-16 RX ADMIN — LIDOCAINE HYDROCHLORIDE 60 MG: 20 INJECTION, SOLUTION INFILTRATION; PERINEURAL at 10:04

## 2018-01-16 RX ADMIN — FENTANYL CITRATE 75 MCG: 50 INJECTION, SOLUTION INTRAMUSCULAR; INTRAVENOUS at 23:03

## 2018-01-16 RX ADMIN — CEFOXITIN 2 G: 2 INJECTION, POWDER, FOR SOLUTION INTRAVENOUS at 10:15

## 2018-01-16 RX ADMIN — ROCURONIUM BROMIDE 20 MG: 10 INJECTION INTRAVENOUS at 11:06

## 2018-01-16 RX ADMIN — PROPOFOL 50 MG: 10 INJECTION, EMULSION INTRAVENOUS at 10:45

## 2018-01-16 NOTE — PLAN OF CARE
Problem: Patient Care Overview (Adult)  Goal: Plan of Care Review  Outcome: Ongoing (interventions implemented as appropriate)   01/16/18 0657   Coping/Psychosocial Response Interventions   Plan Of Care Reviewed With patient   Patient Care Overview   Progress no change     Goal: Adult Individualization and Mutuality  Outcome: Ongoing (interventions implemented as appropriate)   01/16/18 0657   Individualization   Patient Specific Preferences Call pt liz   Patient Specific Goals No infection after surgery.   Patient Specific Interventions Teach good hand hygiene.     Goal: Discharge Needs Assessment  Outcome: Ongoing (interventions implemented as appropriate)   01/16/18 0657   Discharge Needs Assessment   Concerns To Be Addressed denies needs/concerns at this time   Current Health   Anticipated Changes Related to Illness none   Self-Care   Equipment Currently Used at Home none       Problem: Perioperative Period (Adult)  Goal: Signs and Symptoms of Listed Potential Problems Will be Absent or Manageable (Perioperative Period)  Outcome: Ongoing (interventions implemented as appropriate)   01/16/18 0657   Perioperative Period   Problems Present (Perioperative Period) none

## 2018-01-16 NOTE — CONSULTS
Group: [facility]         CRITICAL CARE/PULMONARY CONSULT NOTE    Patient Identification:  Magdalena Dickey  62 y.o.  female  1955  [id number]            Requesting physician: Dr. Eric Davidson, Dr. Stanley Cummings    Reason for Consultation:  Acute postoperative hypoxic respiratory failure    CC: Delirium, shortness of breath and hypoxia after surgery    History of Present Illness:  62-year-old  female who presents for elective sigmoid colectomy with mobilization of splenic flexure by Dr. Davidson.  The patient apparently has a history of colonic diverticulitis without abscess/bleeding/perforation.  The surgical procedure was laparoscopic, and uneventful.  However, during postoperative anesthesia recovery, after extubation, she suddenly became very agitated.  She was manifesting delirium in spite of having received numerous medications such as fentanyl, Ativan and Dilaudid.  She then became lethargic and developed shallow respirations and became hypoxic requiring high FiO2 and reintubation by anesthesiologist.  She is now on the ventilator, in the intensive care unit, sedated, unresponsive.    I reviewed history and physical dictated by Dr. Davidson on January 4, 2018      Review of Systems   Unable to perform ROS: Intubated       Past Medical History:  Past Medical History:   Diagnosis Date   • Anxiety    • Colon polyp 2017   • Depression 5/25/2016   • Diverticulitis of colon    • Diverticulosis of large intestine without hemorrhage 10/5/2016   • GERD (gastroesophageal reflux disease)    • Hiatal hernia    • Irritable bowel syndrome (IBS)    • Liver disease     Mild Hepatic Steatosis   • Low back pain    • Lumbosacral disc disease    • Migraine    • Migraine without aura and without status migrainosus, not intractable 5/25/2016       Past Surgical History:  Past Surgical History:   Procedure Laterality Date   • CHOLECYSTECTOMY N/A 1980   • COLONOSCOPY N/A 2/15/2017    Procedure: COLONOSCOPY  to cecum with biospsies with cold polypectomy;  Surgeon: Gerardo Tan MD;  Location:  [facility];  Service:    • COLONOSCOPY N/A 03/01/2003    Internal hemorrhoids-Dr. Gerardo Tan   • DIAGNOSTIC LAPAROSCOPY N/A 06/06/2001    Cul-de-sac endometriosis and adhesions-Dr. Aamir Christine   • ENDOSCOPY N/A 2/15/2017    Procedure: ESOPHAGOGASTRODUODENOSCOPY with biopsies;  Surgeon: Gerardo Tan MD;  Location:  [facility];  Service:    • ENDOSCOPY N/A 02/04/2009    Small Hiatal hernia-Dr. Gerardo Tan   • TOTAL ABDOMINAL HYSTERECTOMY WITH SALPINGO OOPHORECTOMY Bilateral 07/31/2001    Dr. Aamir Christine        Home Meds:  Prescriptions Prior to Admission   Medication Sig Dispense Refill Last Dose   • calcium carbonate (OS-AMRY) 600 MG tablet Take 600 mg by mouth Daily.   Past Week at Unknown time   • gabapentin (NEURONTIN) 600 MG tablet Take 1 tablet by mouth 2 (Two) Times a Day. 180 tablet 1 1/15/2018 at 0800   • metaxalone (SKELAXIN) 800 MG tablet Take 800 mg by mouth 3 (Three) Times a Day.   1/15/2018 at 0800   • Multiple Vitamin (MULTI-VITAMINS PO) Take 1 tablet by mouth Daily.   Past Week at Unknown time   • pantoprazole (PROTONIX) 40 MG EC tablet Take 40 mg by mouth Every Morning.   1/15/2018 at 0800   • Probiotic Product (PROBIOTIC DAILY PO) Take 1 capsule by mouth Daily.   Past Week at Unknown time   • SUMAtriptan (IMITREX) 100 MG tablet Take 100 mg by mouth Every 2 (Two) Hours As Needed for Migraine.   Past Week at Unknown time   • ALPRAZolam (XANAX) 0.5 MG tablet Take 1 tablet by mouth 2 (Two) Times a Day As Needed for Anxiety. 60 tablet 1 1/14/2018   • dicyclomine (BENTYL) 10 MG capsule Take 1 capsule by mouth 4 (Four) Times a Day As Needed (abdominal pain). 30 capsule 5 1/14/2018   • ondansetron (ZOFRAN) 4 MG tablet Take 1 tablet by mouth Every 8 (Eight) Hours As Needed for Nausea or Vomiting. 30 tablet 2 1/14/2018   • Polyethylene Glycol 3350 (MIRALAX PO) Take  by mouth Daily As Needed.   Unknown at  "Unknown time   • traZODone (DESYREL) 50 MG tablet Take 50 mg by mouth Every Night.   1/14/2018   • triamcinolone (KENALOG) 0.1 % cream Apply  topically 2 (Two) Times a Day. (Patient taking differently: Apply 1 application topically As Needed.) 454 g 0 Unknown at Unknown time       Allergies:  Allergies   Allergen Reactions   • No Known Drug Allergy        Social History:   Social History     Social History   • Marital status:      Spouse name: N/A   • Number of children: N/A   • Years of education: N/A     Occupational History   • Not on file.     Social History Main Topics   • Smoking status: Former Smoker     Types: Cigarettes     Quit date: 3/10/2014   • Smokeless tobacco: Never Used   • Alcohol use No   • Drug use: No   • Sexual activity: Defer     Other Topics Concern   • Not on file     Social History Narrative       Family History:  Family History   Problem Relation Age of Onset   • Alzheimer's disease Mother      SDAT   • Hypertension Mother    • Diabetes type II Mother    • Lung cancer Mother      POSSIBLE   • Heart attack Mother    • Cancer Mother      lung   • COPD Mother    • Depression Mother    • Diabetes Mother    • Heart disease Mother    • Miscarriages / Stillbirths Mother    • Alcohol abuse Father    • Prostate cancer Father    • Heart disease Father      THIRD DEGREE HEART BLOCK   • Arthritis Father    • Cancer Father      prostate   • Drug abuse Father    • Learning disabilities Father    • Ovarian cancer Sister    • No Known Problems Brother    • Ovarian cancer Sister    • Arthritis Maternal Grandmother      rheumatoid   • Arthritis Sister    • Cancer Sister      ovarian   • Depression Sister    • Hypertension Sister    • Cancer Sister      ovarian   • Depression Sister    • Hypertension Sister    • Malig Hyperthermia Neg Hx        Physical Exam:  BP 93/58  Pulse 72  Temp 97.8 °F (36.6 °C) (Oral)   Resp 24  Ht 167.6 cm (66\")  Wt 86.9 kg (191 lb 9.6 oz)  SpO2 94%  BMI 30.93 kg/m2 " Body mass index is 30.93 kg/(m^2). 94% 86.9 kg (191 lb 9.6 oz)  Physical Exam   Constitutional: No distress.   HENT:   Head: Normocephalic.   Oral exam shows endotracheal tube in good position   Eyes: Conjunctivae are normal. No scleral icterus.   Neck: No tracheal deviation present. No thyromegaly present.   Cardiovascular: Normal rate, regular rhythm and normal heart sounds.    Pulmonary/Chest: No respiratory distress. She has no wheezes. She exhibits no tenderness.   Abdominal: She exhibits no distension.   Small abdominal incisions from laparoscopy are clean, dry and intact.  There is a drain in the right hemiabdomen   Musculoskeletal: She exhibits no deformity.   Neurological:   Patient is intubated, sedated and does not follow any commands.  She does grimace to painful sternal rub   Skin: Skin is warm. No rash noted. No erythema.   Psychiatric:   Cannot be assessed because patient is intubated and mechanically ventilated       LABS:  Lab Results   Component Value Date    CALCIUM 8.1 (L) 01/16/2018     Results from last 7 days  Lab Units 01/16/18  1539 01/12/18  0750   SODIUM mmol/L 138 142   POTASSIUM mmol/L 3.6 4.3   CHLORIDE mmol/L 105 105   CO2 mmol/L 19.8* 24.5   BUN mg/dL 13 15   CREATININE mg/dL 0.85 0.97   GLUCOSE mg/dL 182* 99   CALCIUM mg/dL 8.1* 8.8   WBC 10*3/mm3 20.47* 7.18   HEMOGLOBIN g/dL 12.3 13.3   PLATELETS 10*3/mm3 261 282   ALT (SGPT) U/L  --  16   AST (SGOT) U/L  --  15     Lab Results   Component Value Date    TROPONINT <0.010 01/16/2018       Results from last 7 days  Lab Units 01/16/18  1539   TROPONIN T ng/mL <0.010               Results from last 7 days  Lab Units 01/16/18  1700 01/16/18  1529 01/16/18  1435   PH, ARTERIAL pH units 7.358 7.206* 7.173*   PCO2, ARTERIAL mm Hg 37.5 53.5* 56.7*   PO2 ART mm Hg 63.2* 226.4* 101.8*   MODALITY  Adult Vent Adult Vent face tent   O2 SATURATION CALC % 91.0* 99.6* 95.8                 Lab Results   Component Value Date    TSH 1.400 08/03/2017      Estimated Creatinine Clearance: 76.2 mL/min (by C-G formula based on Cr of 0.85).         Imaging: I personally visualized the images of Portable chest x-ray.  Tip of endotracheal tube is approximately 2 cm from the nik.  She does have a right perihilar fullness suggesting Ultravate and possible aspiration      Assessment:  Diverticulitis of large intestine without perforation or abscess without bleeding  Postoperative delirium  Postoperative respiratory failure  Possible aspiration pneumonia  Status post laparoscopic sigmoid colectomy      Recommendations:  Admit to the intensive care unit.  Provide mechanical ventilation support.  Order ABG and chest x-ray for the morning.  Chest x-ray now suggest possible aspiration with right perihilar infiltrate.  It seems as if during her post anesthesia recovery time, she required additional narcotics and benzodiazepines, which caused respiratory depression, hypoxemia and possible aspiration.  I will start Rocephin for coverage of aspiration pneumonia.  We treat the patient's pain with IV narcotics.  We will stop all sedatives in the morning for sedation vacation, and neurologic assessment.  Hope to be able to extubate her tomorrow.  We will use Lovenox for DVT prophylaxis and Pepcid for stress ulcer prophylaxis.  Lovenox will be started tomorrow if okay with general surgery.    Total critical care time 35 minutes, excluding any separately billable procedure          Serafin Jane MD  1/16/2018  6:07 PM      Much of this encounter note is an electronic transcription/translation of spoken language to printed text using Dragon Software.

## 2018-01-16 NOTE — SIGNIFICANT NOTE
called. Explained to him that patient woke up in emergent delirium and that she is being treated appropriately. Told him we would keep him updated.

## 2018-01-16 NOTE — ANESTHESIA POSTPROCEDURE EVALUATION
"Patient: Magdalena Dickey    Procedure Summary     Date Anesthesia Start Anesthesia Stop Room / Location    01/16/18 1001 1159  [facility] / [facility]       Procedure Diagnosis Surgeon Provider    LAPAROSCOPIC SIGMOID COLON RESECTION WITH MOBILIZATION OF SPLENIC FLEXURE (N/A Abdomen) Diverticulitis of large intestine without perforation or abscess without bleeding  (Diverticulitis of large intestine without perforation or abscess without bleeding [K57.32]) MD Liu Casey MD          Anesthesia Type: general  Last vitals  BP   (!) 144/112 (01/16/18 1245)   Temp   36.4 °C (97.6 °F) (01/16/18 1154)   Pulse   88 (01/16/18 1245)   Resp   16 (01/16/18 1230)     SpO2   97 % (01/16/18 1230)     Post Anesthesia Care and Evaluation    Patient location during evaluation: PACU  Patient participation: complete - patient cannot participate  Level of consciousness: awake and responsive to noxious stimuli  Pain score: 2  Pain management: adequate  Airway patency: patent  Anesthetic complications: No anesthetic complications    Cardiovascular status: acceptable and stable  Respiratory status: acceptable, ETT, intubated and ventilator  Hydration status: acceptable    Comments: BP (!) 144/112  Pulse 88  Temp 36.4 °C (97.6 °F) (Oral)   Resp 16  Ht 167.6 cm (66\")  Wt 86.9 kg (191 lb 9.6 oz)  SpO2 97%  BMI 30.93 kg/m2  Pt developed delirium and became hypercarbic and acidotic requiring re-intubation.  Pt is being admitted to ICU      "

## 2018-01-16 NOTE — PLAN OF CARE
Problem: Patient Care Overview (Adult)  Goal: Plan of Care Review  Outcome: Ongoing (interventions implemented as appropriate)   01/16/18 1717   Coping/Psychosocial Response Interventions   Plan Of Care Reviewed With spouse   Patient Care Overview   Progress no change   Outcome Evaluation   Outcome Summary/Follow up Plan From pacu on vent and sedation. ruben well. moderately sedated weaning sedation slowly lap sites clean dry and intact nic drain with serosang drainage.  updated on ptcondition and plan of care   01/16/18 1717   Coping/Psychosocial Response Interventions   Plan Of Care Reviewed With spouse   Patient Care Overview   Progress no change   Outcome Evaluation   Outcome Summary/Follow up Plan From pacu on vent and sedation. ruben well. moderately sedated weaning sedation slowly lap sites clean dry and intact nic drain with serosang drainage.  un          Problem: Mechanical Ventilation, Invasive (Adult)  Goal: Signs and Symptoms of Listed Potential Problems Will be Absent or Manageable (Mechanical Ventilation, Invasive)  Outcome: Ongoing (interventions implemented as appropriate)      Problem: Fall Risk (Adult)  Goal: Identify Related Risk Factors and Signs and Symptoms  Outcome: Ongoing (interventions implemented as appropriate)    Goal: Absence of Falls  Outcome: Ongoing (interventions implemented as appropriate)      Problem: Pain, Acute (Adult)  Goal: Identify Related Risk Factors and Signs and Symptoms  Outcome: Ongoing (interventions implemented as appropriate)    Goal: Acceptable Pain Control/Comfort Level  Outcome: Ongoing (interventions implemented as appropriate)

## 2018-01-16 NOTE — OP NOTE
PREOPERATIVE DIAGNOSIS:  Chronic recurrent diverticulitis    POSTOPERATIVE DIAGNOSIS (FINDINGS):  Same    PROCEDURE:  Laparoscopic sigmoid colectomy with mobilization of splenic flexure    SURGEON:  Eric Davidson MD    ASSISTANT:  Brisa Reeves    ANESTHESIA:  General    EBL:  Minimal    SPECIMEN(S):  Sigmoid colon    DESCRIPTION:  In supine position under general anesthetic prepped and draped usual sterile manner.  Half percent Marcaine with epinephrine infiltrated in all incision sites.  Small incision made above the umbilicus and Veress needle inserted with upwards traction on the abdominal wall.  Abdomen slated 15 mmHg.  5 mm Optiview trocar inserted followed by left lower quadrant 5, right lower quadrant 12, right midabdomen 5 mm trochars.  Inferior mesenteric vein was isolated and divided with the vascular loaded stapler.  The splenic flexure was completely mobilized with the Harmonic scalpel.  The inferior mesenteric artery was isolated and divided with the vascular loaded stapler.  The sigmoid colon and descending colon were then completely mobilized with the harmonic scalpel dividing all retroperitoneal attachments.  Peritoneum was opened on either side of the rectum and the rectum was skeletonized at the rectosigmoid junction and divided just distal to that with the blue loaded stapler after skeletonizing the rectum with the Harmonic scalpel.  The left ureter was identified and carefully avoided throughout the procedure.  Left lower quadrant 5 mm trocar incision was extended in oblique manner and in an muscle-splitting manner perineal cavity was entered and a small wound protector was inserted.  The bowel was exteriorized.  A point of transection on the mid to proximal descending colon was chosen well proximal to any gross evidence of prior diverticulitis.  Bowel was skeletonized with the Harmonic scalpel and a 29 mm anvil was inserted and pursestring sutured in with 2-0 Prolene.  The bowel was returned  to the peritoneal cavity.  The fascia was closed in 2 layers of running 0 PDS.  Abdomen was reinsufflated and a purse inserted transrectally and 2 ends were brought together and fired.  2 complete donuts were seen in the stapling device.  Airtight anastomosis was confirmed within a saline filled pelvis.  There was no tension on the anastomosis.  19 Mongolian Agustín drain was placed in the pelvis.  Good hemostasis was noted.  CO2 was released.  Fascia of the pulmonary trocar site closed with 0 Vicryl.  Skin edges 5-0 Vicryl subcuticular.  Tolerated well, stable to recovery room.    Eric Davidson M.D.

## 2018-01-16 NOTE — ANESTHESIA PROCEDURE NOTES
Airway  Urgency: elective    Date/Time: 1/16/2018 10:05 AM  Airway not difficult    General Information and Staff    Patient location during procedure: OR  Anesthesiologist: EVON CASSIDY  CRNA: YEFRI GORDILLO    Indications and Patient Condition  Indications for airway management: airway protection    Preoxygenated: yes  MILS maintained throughout  Mask difficulty assessment: 1 - vent by mask    Final Airway Details  Final airway type: endotracheal airway      Successful airway: ETT  Cuffed: yes   Successful intubation technique: direct laryngoscopy  Endotracheal tube insertion site: oral  Blade: Chaya  Blade size: #3  ETT size: 7.0 mm  Cormack-Lehane Classification: grade IIa - partial view of glottis  Placement verified by: chest auscultation and capnometry   Measured from: gums  ETT to gums (cm): 20  Number of attempts at approach: 1

## 2018-01-16 NOTE — ANESTHESIA PREPROCEDURE EVALUATION
Anesthesia Evaluation     Patient summary reviewed and Nursing notes reviewed   NPO Solid Status: > 8 hours  NPO Liquid Status: > 2 hours     Airway   Mallampati: II  no difficulty expected  Dental - normal exam   (+) upper dentures and edentulous    Pulmonary     breath sounds clear to auscultation  (+) a smoker Former,     ROS comment: Quit 3 years ago  Cardiovascular     ECG reviewed  Rhythm: regular  Rate: normal        Neuro/Psych  (+) headaches, psychiatric history,     GI/Hepatic/Renal/Endo    (+) obesity,  hiatal hernia, GERD, liver disease,     Musculoskeletal     Abdominal    Substance History      OB/GYN          Other                                                Anesthesia Plan    ASA 3     general     intravenous induction

## 2018-01-17 ENCOUNTER — APPOINTMENT (OUTPATIENT)
Dept: GENERAL RADIOLOGY | Facility: HOSPITAL | Age: 63
End: 2018-01-17

## 2018-01-17 PROBLEM — T88.59XA DELAYED EMERGENCE FROM ANESTHESIA: Status: ACTIVE | Noted: 2018-01-17

## 2018-01-17 LAB
ANION GAP SERPL CALCULATED.3IONS-SCNC: 14 MMOL/L
BASOPHILS # BLD AUTO: 0.01 10*3/MM3 (ref 0–0.2)
BASOPHILS NFR BLD AUTO: 0.1 % (ref 0–1.5)
BUN BLD-MCNC: 11 MG/DL (ref 8–23)
BUN/CREAT SERPL: 13.4 (ref 7–25)
CALCIUM SPEC-SCNC: 8.4 MG/DL (ref 8.6–10.5)
CHLORIDE SERPL-SCNC: 107 MMOL/L (ref 98–107)
CO2 SERPL-SCNC: 17 MMOL/L (ref 22–29)
CREAT BLD-MCNC: 0.82 MG/DL (ref 0.57–1)
CYTO UR: NORMAL
D-LACTATE SERPL-SCNC: 1.4 MMOL/L (ref 0.5–2)
DEPRECATED RDW RBC AUTO: 43.4 FL (ref 37–54)
EOSINOPHIL # BLD AUTO: 0.01 10*3/MM3 (ref 0–0.7)
EOSINOPHIL NFR BLD AUTO: 0.1 % (ref 0.3–6.2)
ERYTHROCYTE [DISTWIDTH] IN BLOOD BY AUTOMATED COUNT: 12.7 % (ref 11.7–13)
GFR SERPL CREATININE-BSD FRML MDRD: 71 ML/MIN/1.73
GLUCOSE BLD-MCNC: 123 MG/DL (ref 65–99)
GLUCOSE BLDC GLUCOMTR-MCNC: 101 MG/DL (ref 70–130)
GLUCOSE BLDC GLUCOMTR-MCNC: 102 MG/DL (ref 70–130)
GLUCOSE BLDC GLUCOMTR-MCNC: 130 MG/DL (ref 70–130)
GLUCOSE BLDC GLUCOMTR-MCNC: 162 MG/DL (ref 70–130)
GLUCOSE BLDC GLUCOMTR-MCNC: 86 MG/DL (ref 70–130)
GLUCOSE BLDC GLUCOMTR-MCNC: 92 MG/DL (ref 70–130)
HCT VFR BLD AUTO: 35.5 % (ref 35.6–45.5)
HGB BLD-MCNC: 11.5 G/DL (ref 11.9–15.5)
IMM GRANULOCYTES # BLD: 0.03 10*3/MM3 (ref 0–0.03)
IMM GRANULOCYTES NFR BLD: 0.2 % (ref 0–0.5)
LAB AP CASE REPORT: NORMAL
LYMPHOCYTES # BLD AUTO: 1.58 10*3/MM3 (ref 0.9–4.8)
LYMPHOCYTES NFR BLD AUTO: 12.9 % (ref 19.6–45.3)
Lab: NORMAL
MAGNESIUM SERPL-MCNC: 2.3 MG/DL (ref 1.6–2.4)
MCH RBC QN AUTO: 30.4 PG (ref 26.9–32)
MCHC RBC AUTO-ENTMCNC: 32.4 G/DL (ref 32.4–36.3)
MCV RBC AUTO: 93.9 FL (ref 80.5–98.2)
MONOCYTES # BLD AUTO: 0.92 10*3/MM3 (ref 0.2–1.2)
MONOCYTES NFR BLD AUTO: 7.5 % (ref 5–12)
NEUTROPHILS # BLD AUTO: 9.68 10*3/MM3 (ref 1.9–8.1)
NEUTROPHILS NFR BLD AUTO: 79.2 % (ref 42.7–76)
NT-PROBNP SERPL-MCNC: 163.2 PG/ML (ref 5–900)
PATH REPORT.FINAL DX SPEC: NORMAL
PATH REPORT.GROSS SPEC: NORMAL
PHOSPHATE SERPL-MCNC: 3.6 MG/DL (ref 2.5–4.5)
PLATELET # BLD AUTO: 223 10*3/MM3 (ref 140–500)
PMV BLD AUTO: 10.4 FL (ref 6–12)
POTASSIUM BLD-SCNC: 4 MMOL/L (ref 3.5–5.2)
PROCALCITONIN SERPL-MCNC: 0.46 NG/ML (ref 0.1–0.25)
RBC # BLD AUTO: 3.78 10*6/MM3 (ref 3.9–5.2)
SODIUM BLD-SCNC: 138 MMOL/L (ref 136–145)
WBC NRBC COR # BLD: 12.23 10*3/MM3 (ref 4.5–10.7)

## 2018-01-17 PROCEDURE — 87205 SMEAR GRAM STAIN: CPT | Performed by: INTERNAL MEDICINE

## 2018-01-17 PROCEDURE — 25010000002 KETOROLAC TROMETHAMINE PER 15 MG: Performed by: SURGERY

## 2018-01-17 PROCEDURE — 94003 VENT MGMT INPAT SUBQ DAY: CPT

## 2018-01-17 PROCEDURE — 25010000002 HYDROMORPHONE PER 4 MG: Performed by: SURGERY

## 2018-01-17 PROCEDURE — 84100 ASSAY OF PHOSPHORUS: CPT | Performed by: INTERNAL MEDICINE

## 2018-01-17 PROCEDURE — 94799 UNLISTED PULMONARY SVC/PX: CPT

## 2018-01-17 PROCEDURE — 83735 ASSAY OF MAGNESIUM: CPT | Performed by: INTERNAL MEDICINE

## 2018-01-17 PROCEDURE — 25010000002 ENOXAPARIN PER 10 MG: Performed by: SURGERY

## 2018-01-17 PROCEDURE — 87070 CULTURE OTHR SPECIMN AEROBIC: CPT | Performed by: INTERNAL MEDICINE

## 2018-01-17 PROCEDURE — 71045 X-RAY EXAM CHEST 1 VIEW: CPT

## 2018-01-17 PROCEDURE — 83880 ASSAY OF NATRIURETIC PEPTIDE: CPT | Performed by: INTERNAL MEDICINE

## 2018-01-17 PROCEDURE — 82962 GLUCOSE BLOOD TEST: CPT

## 2018-01-17 PROCEDURE — 25010000002 CEFTRIAXONE PER 250 MG: Performed by: INTERNAL MEDICINE

## 2018-01-17 PROCEDURE — 85025 COMPLETE CBC W/AUTO DIFF WBC: CPT | Performed by: SURGERY

## 2018-01-17 PROCEDURE — 83605 ASSAY OF LACTIC ACID: CPT | Performed by: INTERNAL MEDICINE

## 2018-01-17 PROCEDURE — 25010000002 FENTANYL CITRATE (PF) 100 MCG/2ML SOLUTION: Performed by: INTERNAL MEDICINE

## 2018-01-17 PROCEDURE — 25010000002 CEFOXITIN PER 1 G: Performed by: SURGERY

## 2018-01-17 PROCEDURE — 25010000002 PROPOFOL 1000 MG/ML EMULSION: Performed by: INTERNAL MEDICINE

## 2018-01-17 PROCEDURE — 84145 PROCALCITONIN (PCT): CPT | Performed by: INTERNAL MEDICINE

## 2018-01-17 PROCEDURE — 80048 BASIC METABOLIC PNL TOTAL CA: CPT | Performed by: SURGERY

## 2018-01-17 PROCEDURE — 5A1935Z RESPIRATORY VENTILATION, LESS THAN 24 CONSECUTIVE HOURS: ICD-10-PCS | Performed by: SURGERY

## 2018-01-17 PROCEDURE — 0BH17EZ INSERTION OF ENDOTRACHEAL AIRWAY INTO TRACHEA, VIA NATURAL OR ARTIFICIAL OPENING: ICD-10-PCS | Performed by: SURGERY

## 2018-01-17 PROCEDURE — 25010000002 FENTANYL CITRATE (PF) 250 MCG/5ML SOLUTION: Performed by: INTERNAL MEDICINE

## 2018-01-17 PROCEDURE — 25010000002 DIPHENHYDRAMINE PER 50 MG: Performed by: INTERNAL MEDICINE

## 2018-01-17 RX ORDER — FENTANYL CITRATE 50 UG/ML
50 INJECTION, SOLUTION INTRAMUSCULAR; INTRAVENOUS
Status: DISCONTINUED | OUTPATIENT
Start: 2018-01-17 | End: 2018-01-18

## 2018-01-17 RX ORDER — FENTANYL CITRATE 50 UG/ML
25 INJECTION, SOLUTION INTRAMUSCULAR; INTRAVENOUS
Status: DISCONTINUED | OUTPATIENT
Start: 2018-01-17 | End: 2018-01-18

## 2018-01-17 RX ORDER — DIPHENHYDRAMINE HYDROCHLORIDE 50 MG/ML
12.5 INJECTION INTRAMUSCULAR; INTRAVENOUS EVERY 6 HOURS PRN
Status: DISCONTINUED | OUTPATIENT
Start: 2018-01-17 | End: 2018-01-19 | Stop reason: HOSPADM

## 2018-01-17 RX ORDER — KETOROLAC TROMETHAMINE 30 MG/ML
15 INJECTION, SOLUTION INTRAMUSCULAR; INTRAVENOUS EVERY 6 HOURS
Status: COMPLETED | OUTPATIENT
Start: 2018-01-17 | End: 2018-01-19

## 2018-01-17 RX ADMIN — CEFOXITIN SODIUM 2 G: 2 POWDER, FOR SOLUTION INTRAVENOUS at 04:42

## 2018-01-17 RX ADMIN — KETOROLAC TROMETHAMINE 15 MG: 30 INJECTION, SOLUTION INTRAMUSCULAR at 15:34

## 2018-01-17 RX ADMIN — SODIUM CHLORIDE 75 ML/HR: 9 INJECTION, SOLUTION INTRAVENOUS at 16:35

## 2018-01-17 RX ADMIN — DIPHENHYDRAMINE HYDROCHLORIDE 12.5 MG: 50 INJECTION, SOLUTION INTRAMUSCULAR; INTRAVENOUS at 22:05

## 2018-01-17 RX ADMIN — ENOXAPARIN SODIUM 40 MG: 40 INJECTION SUBCUTANEOUS at 10:39

## 2018-01-17 RX ADMIN — CEFTRIAXONE SODIUM 1 G: 1 INJECTION, SOLUTION INTRAVENOUS at 10:40

## 2018-01-17 RX ADMIN — FENTANYL CITRATE 75 MCG: 50 INJECTION, SOLUTION INTRAMUSCULAR; INTRAVENOUS at 02:07

## 2018-01-17 RX ADMIN — FENTANYL CITRATE 25 MCG: 50 INJECTION, SOLUTION INTRAMUSCULAR; INTRAVENOUS at 11:43

## 2018-01-17 RX ADMIN — FENTANYL CITRATE 25 MCG: 50 INJECTION, SOLUTION INTRAMUSCULAR; INTRAVENOUS at 12:41

## 2018-01-17 RX ADMIN — KETOROLAC TROMETHAMINE 15 MG: 30 INJECTION, SOLUTION INTRAMUSCULAR at 21:53

## 2018-01-17 RX ADMIN — FENTANYL CITRATE 75 MCG: 50 INJECTION, SOLUTION INTRAMUSCULAR; INTRAVENOUS at 08:26

## 2018-01-17 RX ADMIN — SODIUM CHLORIDE 75 ML/HR: 9 INJECTION, SOLUTION INTRAVENOUS at 05:17

## 2018-01-17 RX ADMIN — HYDROMORPHONE HYDROCHLORIDE 0.5 MG: 2 INJECTION, SOLUTION INTRAMUSCULAR; INTRAVENOUS; SUBCUTANEOUS at 19:48

## 2018-01-17 RX ADMIN — HYDROMORPHONE HYDROCHLORIDE 0.5 MG: 2 INJECTION, SOLUTION INTRAMUSCULAR; INTRAVENOUS; SUBCUTANEOUS at 16:34

## 2018-01-17 RX ADMIN — FENTANYL CITRATE 50 MCG: 50 INJECTION, SOLUTION INTRAMUSCULAR; INTRAVENOUS at 14:25

## 2018-01-17 RX ADMIN — PROPOFOL 15 MCG/KG/MIN: 10 INJECTION, EMULSION INTRAVENOUS at 05:17

## 2018-01-17 NOTE — PLAN OF CARE
Problem: Patient Care Overview (Adult)  Goal: Plan of Care Review  Outcome: Ongoing (interventions implemented as appropriate)   01/17/18 0634   Coping/Psychosocial Response Interventions   Plan Of Care Reviewed With patient   Patient Care Overview   Progress improving   Outcome Evaluation   Outcome Summary/Follow up Plan Sedation weaned during night. Pt. tolerating vent and following commands. Lap sites remain clean and dry. Booker site with scant serosanguineous drainage noted. BP within normal limits during night. Plans to potentially extubate today. Continuing to monitor.        Problem: Mechanical Ventilation, Invasive (Adult)  Goal: Signs and Symptoms of Listed Potential Problems Will be Absent or Manageable (Mechanical Ventilation, Invasive)  Outcome: Ongoing (interventions implemented as appropriate)      Problem: Fall Risk (Adult)  Goal: Absence of Falls  Outcome: Ongoing (interventions implemented as appropriate)      Problem: Pain, Acute (Adult)  Goal: Acceptable Pain Control/Comfort Level  Outcome: Ongoing (interventions implemented as appropriate)

## 2018-01-17 NOTE — PROGRESS NOTES
Discharge Planning Assessment  [facility]     Patient Name: Magdalena Dickey  MRN: [id number]  Today's Date: 1/17/2018    Admit Date: 1/16/2018          Discharge Needs Assessment       01/17/18 1027    Living Environment    Lives With spouse    Living Arrangements house    Home Accessibility stairs within home;bed and bath on same level    Transportation Available family or friend will provide;car    Living Environment    Quality Of Family Relationships supportive    Able to Return to Prior Living Arrangements yes    Discharge Needs Assessment    Equipment Currently Used at Home none            Discharge Plan       01/17/18 1028    Case Management/Social Work Plan    Plan undetermined; follow for patient's treatment progress.     Patient/Family In Agreement With Plan yes    Additional Comments CCP met with patient at bedside. Patient is on vent but was able to respond to questions by nodding her head. CCP role explained and discharge planning discussed. Face sheet verified. Patient lives with her spouse, in a house and has steps within the home. Patient has not used home health or been to SNF. Patient is independent with her ADLs and does not use medical equipment. CCP will continue to follow for patient's treatment course. Cat Silva W           Discharge Placement     No information found                Demographic Summary       01/17/18 1026    Referral Information    Admission Type inpatient    Arrived From admitted as an inpatient    Referral Source admission list    Reason For Consult discharge planning    Primary Care Physician Information    Name Fermín Kendall             Functional Status       01/17/18 1026    Functional Status Current    Ambulation 4-->completely dependent    Transferring 4-->completely dependent    Toileting 4-->completely dependent    Bathing 4-->completely dependent    Dressing 4-->completely dependent    Eating 4-->completely dependent    Communication 2-->difficulty speaking  (not related to language barrier)    Functional Status Prior    Ambulation 0-->independent    Transferring 0-->independent    Toileting 0-->independent    Bathing 0-->independent    Dressing 0-->independent    Eating 0-->independent    Communication 0-->understands/communicates without difficulty    IADL    Medications independent    Meal Preparation independent    Housekeeping independent    Laundry independent    Shopping independent    Oral Care independent    Activity Tolerance    Current Activity Limitations none    Employment/Financial    Financial Concerns none            Psychosocial     None            Abuse/Neglect     None            Legal     None            Substance Abuse     None            Patient Forms     None          ZEENAT Hummel

## 2018-01-17 NOTE — PROGRESS NOTES
Dr. ALO Jane    [facility] CORONARY CARE    1/17/2018    Patient ID:  Name:  Magdalena Dickey  MRN:  [id number]  1955  62 y.o.  female            CC/Reason for visit: Follow-up for emergence delirium, hypoxic respiratory failure    HPI: Patient remains intubated, mechanically ventilated.  Sedation has been stopped for sedation vacation and ventilation weaning trial but the patient continues to exhibit apneic episodes, and falls asleep during spontaneous weaning trial.    Mechanical ventilator settings were reviewed and adjusted by me.    Vitals:  Vitals:    01/17/18 0632 01/17/18 0635 01/17/18 0702 01/17/18 0752   BP: 95/63  104/66    BP Location:       Patient Position:       Pulse: 67 65 64    Resp:  24     Temp:    98.5 °F (36.9 °C)   TempSrc:       SpO2: 100% 100% 100%    Weight:       Height:         FiO2 (%): 35 %     Body mass index is 30.93 kg/(m^2).    Intake/Output Summary (Last 24 hours) at 01/17/18 0904  Last data filed at 01/17/18 0521   Gross per 24 hour   Intake          2619.97 ml   Output             1585 ml   Net          1034.97 ml       Exam:  GEN:  Intubated, mechanically ventilated, arousable but falls asleep without stimulation.  EYES:   anicteric sclera bilat  ENT:    Oral exam shows endotracheal tube in good position  LUNGS: Clear breath sounds bilat, nonlabored breathing  CV:  Normal S1S2, without murmur, no edema  ABD:  The abdomen is only mildly distended, nontender.  She has incisions which are clean, dry and intact  EXT:  No cyanosis or clubbing    Scheduled meds:    ceftriaxone 1 g Intravenous Q24H   enoxaparin 40 mg Subcutaneous Daily   pantoprazole 40 mg Oral QAM     IV meds:                        dexmedetomidine 0.2 mcg/kg/hr (Order-Specific) Last Rate: 0.4 mcg/kg/hr (01/17/18 0712)   propofol 20 mcg/kg/min Last Rate: 15 mcg/kg/min (01/17/18 0517)   sodium chloride 75 mL/hr Last Rate: 75 mL/hr (01/17/18 0517)       Data Review:   I reviewed the patient's  medications and new clinical results.  Lab Results   Component Value Date    CALCIUM 8.4 (L) 01/17/2018    PHOS 3.6 01/17/2018    MG 2.3 01/17/2018       Results from last 7 days  Lab Units 01/17/18  0457 01/16/18  1539 01/12/18  0750   SODIUM mmol/L 138 138 142   POTASSIUM mmol/L 4.0 3.6 4.3   CHLORIDE mmol/L 107 105 105   CO2 mmol/L 17.0* 19.8* 24.5   BUN mg/dL 11 13 15   CREATININE mg/dL 0.82 0.85 0.97   CALCIUM mg/dL 8.4* 8.1* 8.8   BILIRUBIN mg/dL  --   --  0.3   ALK PHOS U/L  --   --  85   ALT (SGPT) U/L  --   --  16   AST (SGOT) U/L  --   --  15   GLUCOSE mg/dL 123* 182* 99   WBC 10*3/mm3 12.23* 20.47* 7.18   HEMOGLOBIN g/dL 11.5* 12.3 13.3   PLATELETS 10*3/mm3 223 261 282   PROBNP pg/mL 163.2  --   --    PROCALCITONIN ng/mL 0.46*  --   --            Results from last 7 days  Lab Units 01/16/18  1700 01/16/18  1529 01/16/18  1435   PH, ARTERIAL pH units 7.358 7.206* 7.173*   PCO2, ARTERIAL mm Hg 37.5 53.5* 56.7*   PO2 ART mm Hg 63.2* 226.4* 101.8*   MODALITY  Adult Vent Adult Vent face tent   O2 SATURATION CALC % 91.0* 99.6* 95.8     I directly visualized chest x-ray this morning.  She does not have any infiltrate on the right side which was seen yesterday.  That image may have simply been bronchovascular crowding at the hilum due to poor expansion of the lung.  The endotracheal tube is in good position.      ASSESSMENT:   Principal Problem:    Diverticulitis of large intestine without perforation or abscess without bleeding  Diverticulitis of large intestine without perforation or abscess without bleeding    Postoperative respiratory failure  Possible aspiration pneumonia  Status post laparoscopic sigmoid colectomy      PLAN:  We are going to try to perform spontaneous breathing trial and see if I can extubate her today.  She still remains very sleepy even though all sedatives have been stopped.  We are trying to stimulate her so that she can breathe, because she has been exhibiting apneic spells while on  spontaneous breathing trial.    Her abdominal surgical sites appear good.    I think this patient essentially manifested general anesthesia, and I'm hoping to get her off the ventilator later today.      Serafin Jane MD  1/17/2018

## 2018-01-18 PROCEDURE — 25010000002 KETOROLAC TROMETHAMINE PER 15 MG: Performed by: SURGERY

## 2018-01-18 PROCEDURE — 94799 UNLISTED PULMONARY SVC/PX: CPT

## 2018-01-18 PROCEDURE — 25010000002 ENOXAPARIN PER 10 MG: Performed by: SURGERY

## 2018-01-18 PROCEDURE — 25010000002 DIPHENHYDRAMINE PER 50 MG: Performed by: INTERNAL MEDICINE

## 2018-01-18 PROCEDURE — 25010000002 HYDROMORPHONE PER 4 MG: Performed by: SURGERY

## 2018-01-18 RX ORDER — TRAZODONE HYDROCHLORIDE 50 MG/1
50 TABLET ORAL NIGHTLY
Status: DISCONTINUED | OUTPATIENT
Start: 2018-01-18 | End: 2018-01-19 | Stop reason: HOSPADM

## 2018-01-18 RX ADMIN — HYDROCODONE BITARTRATE AND ACETAMINOPHEN 2 TABLET: 5; 325 TABLET ORAL at 23:08

## 2018-01-18 RX ADMIN — HYDROMORPHONE HYDROCHLORIDE 0.5 MG: 2 INJECTION, SOLUTION INTRAMUSCULAR; INTRAVENOUS; SUBCUTANEOUS at 04:37

## 2018-01-18 RX ADMIN — SODIUM CHLORIDE 75 ML/HR: 9 INJECTION, SOLUTION INTRAVENOUS at 18:11

## 2018-01-18 RX ADMIN — HYDROCODONE BITARTRATE AND ACETAMINOPHEN 2 TABLET: 5; 325 TABLET ORAL at 09:14

## 2018-01-18 RX ADMIN — KETOROLAC TROMETHAMINE 15 MG: 30 INJECTION, SOLUTION INTRAMUSCULAR at 09:04

## 2018-01-18 RX ADMIN — DIPHENHYDRAMINE HYDROCHLORIDE 12.5 MG: 50 INJECTION, SOLUTION INTRAMUSCULAR; INTRAVENOUS at 15:23

## 2018-01-18 RX ADMIN — KETOROLAC TROMETHAMINE 15 MG: 30 INJECTION, SOLUTION INTRAMUSCULAR at 21:14

## 2018-01-18 RX ADMIN — HYDROCODONE BITARTRATE AND ACETAMINOPHEN 2 TABLET: 5; 325 TABLET ORAL at 12:58

## 2018-01-18 RX ADMIN — HYDROMORPHONE HYDROCHLORIDE 0.5 MG: 2 INJECTION, SOLUTION INTRAMUSCULAR; INTRAVENOUS; SUBCUTANEOUS at 00:56

## 2018-01-18 RX ADMIN — HYDROCODONE BITARTRATE AND ACETAMINOPHEN 2 TABLET: 5; 325 TABLET ORAL at 18:10

## 2018-01-18 RX ADMIN — SODIUM CHLORIDE 75 ML/HR: 9 INJECTION, SOLUTION INTRAVENOUS at 06:11

## 2018-01-18 RX ADMIN — KETOROLAC TROMETHAMINE 15 MG: 30 INJECTION, SOLUTION INTRAMUSCULAR at 15:27

## 2018-01-18 RX ADMIN — KETOROLAC TROMETHAMINE 15 MG: 30 INJECTION, SOLUTION INTRAMUSCULAR at 03:22

## 2018-01-18 RX ADMIN — TRAZODONE HYDROCHLORIDE 50 MG: 50 TABLET ORAL at 21:14

## 2018-01-18 RX ADMIN — ENOXAPARIN SODIUM 40 MG: 40 INJECTION SUBCUTANEOUS at 09:07

## 2018-01-18 RX ADMIN — PANTOPRAZOLE SODIUM 40 MG: 40 TABLET, DELAYED RELEASE ORAL at 09:10

## 2018-01-18 NOTE — PROGRESS NOTES
"  Dr. ALO Jane    [facility] CORONARY CARE    1/18/2018    Patient ID:  Name:  Magdalena Dickey  MRN:  [id number]  1955  62 y.o.  female            CC/Reason for visit: Follow-up for emergence delirium, hypoxic respiratory failure    HPI: Patient is doing very well today.  No new complaints.    Vitals:  Vitals:    01/18/18 0502 01/18/18 0603 01/18/18 0702 01/18/18 0844   BP: 126/81 133/64 137/74    BP Location:       Patient Position:       Pulse:       Resp:       Temp:   98.5 °F (36.9 °C)    TempSrc:   Oral    SpO2: 95% 99% 95%    Weight:       Height:    167.6 cm (65.98\")     FiO2 (%): 35 %     Body mass index is 30.93 kg/(m^2).    Intake/Output Summary (Last 24 hours) at 01/18/18 0935  Last data filed at 01/18/18 0615   Gross per 24 hour   Intake           1711.5 ml   Output              540 ml   Net           1171.5 ml       Exam:  GEN:  No distress, appears stated age  EYES:   EOM-i, anicteric sclera bilat  ENT:    External ears/nose normal, OP clear  NECK:  Supple, midline trachea  LUNGS: Clear breath sounds bilat, nonlabored breathing  CV:  Normal S1S2, without murmur, no edema  ABD:  Abdominal incisions are healing well.  EXT:  No cyanosis or clubbing    Scheduled meds:    ceftriaxone 1 g Intravenous Q24H   enoxaparin 40 mg Subcutaneous Daily   ketorolac 15 mg Intravenous Q6H   pantoprazole 40 mg Oral QAM     IV meds:                        sodium chloride 75 mL/hr Last Rate: 75 mL/hr (01/18/18 0611)       Data Review:   I reviewed the patient's medications and new clinical results.  Lab Results   Component Value Date    CALCIUM 8.4 (L) 01/17/2018    PHOS 3.6 01/17/2018    MG 2.3 01/17/2018       Results from last 7 days  Lab Units 01/17/18  0457 01/16/18  1539 01/12/18  0750   SODIUM mmol/L 138 138 142   POTASSIUM mmol/L 4.0 3.6 4.3   CHLORIDE mmol/L 107 105 105   CO2 mmol/L 17.0* 19.8* 24.5   BUN mg/dL 11 13 15   CREATININE mg/dL 0.82 0.85 0.97   CALCIUM mg/dL 8.4* 8.1* 8.8   BILIRUBIN " mg/dL  --   --  0.3   ALK PHOS U/L  --   --  85   ALT (SGPT) U/L  --   --  16   AST (SGOT) U/L  --   --  15   GLUCOSE mg/dL 123* 182* 99   WBC 10*3/mm3 12.23* 20.47* 7.18   HEMOGLOBIN g/dL 11.5* 12.3 13.3   PLATELETS 10*3/mm3 223 261 282   PROBNP pg/mL 163.2  --   --    PROCALCITONIN ng/mL 0.46*  --   --        Results from last 7 days  Lab Units 01/17/18  1056 01/17/18  0713   GRAM STAIN RESULT  Rare (1+) WBCs seen  No organisms seen Many (4+) WBCs seen  Occasional Gram negative coccobacilli             Results from last 7 days  Lab Units 01/16/18  1539   TROPONIN T ng/mL <0.010       Results from last 7 days  Lab Units 01/16/18  1700 01/16/18  1529 01/16/18  1435   PH, ARTERIAL pH units 7.358 7.206* 7.173*   PCO2, ARTERIAL mm Hg 37.5 53.5* 56.7*   PO2 ART mm Hg 63.2* 226.4* 101.8*   MODALITY  Adult Vent Adult Vent face tent   O2 SATURATION CALC % 91.0* 99.6* 95.8           ASSESSMENT:   Principal Problem:    Diverticulitis of large intestine without perforation or abscess without bleeding  Active Problems:    Delayed emergence from anesthesia  Acute respiratory failure due to anesthetics  Status post laparoscopic sigmoid colectomy      PLAN:  Chest x-ray did not show significant infiltrate the second day after being on mechanical ventilation.  I'm going to stop antibiotics.    Transfer out of ICU today.        Serafin Jane MD  1/18/2018

## 2018-01-18 NOTE — PLAN OF CARE
Problem: Patient Care Overview (Adult)  Goal: Plan of Care Review  Outcome: Ongoing (interventions implemented as appropriate)   01/18/18 0656   Coping/Psychosocial Response Interventions   Plan Of Care Reviewed With patient   Patient Care Overview   Progress improving   Outcome Evaluation   Outcome Summary/Follow up Plan Pt. with no acute events overnight. Pain treated with dilaudid and scheduled meds. Pt. ambulated in paz and to bathroom with no difficulty. Plans for patient to move to floor today. Continuing to monitor.        Problem: Fall Risk (Adult)  Goal: Identify Related Risk Factors and Signs and Symptoms  Outcome: Ongoing (interventions implemented as appropriate)    Goal: Absence of Falls  Outcome: Ongoing (interventions implemented as appropriate)      Problem: Pain, Acute (Adult)  Goal: Identify Related Risk Factors and Signs and Symptoms  Outcome: Ongoing (interventions implemented as appropriate)    Goal: Acceptable Pain Control/Comfort Level  Outcome: Ongoing (interventions implemented as appropriate)      Problem: Pressure Ulcer Risk (Jeevan Scale) (Adult,Obstetrics,Pediatric)  Goal: Skin Integrity  Outcome: Ongoing (interventions implemented as appropriate)

## 2018-01-18 NOTE — PROGRESS NOTES
Doing well, feels much better    Abdomen is soft, incisions are healing well, ANGEL LUIS is serous  Afebrile vital signs stable  Has had bowel function    -Start full liquid diet, advance as tolerated  -DC ANGEL LUIS drain  -Transfer out of intensive care unit

## 2018-01-18 NOTE — CONSULTS
"Adult Nutrition  Assessment/PES    Patient Name:  Magdalena Dickey  YOB: 1955  MRN: [id number]  Admit Date:  1/16/2018    Assessment Date:  1/18/2018            Reason for Assessment       01/18/18 0843    Reason for Assessment    Reason For Assessment/Visit multidisciplinary rounds    Diagnosis Diagnosis    Gastrointestinal Diverticulitis;IBS              Nutrition/Diet History       01/18/18 0843    Nutrition/Diet History    Typical Food/Fluid Intake tolerating clears, no request at this time            Anthropometrics       01/18/18 0844    Anthropometrics    Height 167.6 cm (65.98\")    RD Documented Current Weight  86.9 kg (191 lb 9.6 oz)    Ideal Body Weight (IBW)    Ideal Body Weight (IBW), Female 59.94    Body Mass Index (BMI)    BMI Grade 30 - 34.9- obesity - grade I            Labs/Tests/Procedures/Meds       01/18/18 0844    Labs/Tests/Procedures/Meds    Diagnostic Test/Procedure Review reviewed    Labs/Tests Review Reviewed;Hgb Hct;WBC    Medication Review Reviewed, pertinent;Antibiotic;Anticoag;Antacid    Significant Vitals reviewed              Estimated/Assessed Needs       01/18/18 0845    Calculation Measurements    Weight Used For Calculations 86.9 kg (191 lb 9.3 oz)    Estimated/Assessed Energy Needs    Energy Need Method Kcal/kg    kcal/kg 25    25 Kcal/Kg (kcal) 2172.5    Estimated/Assessed Protein Needs    Weight Used for Protein Calculation 86.9 kg (191 lb 9.3 oz)    Protein (gm/kg) 1.0    1.0 Gm Protein (gm) 86.9    Estimated/Assessed Fluid Needs    Fluid Need Method RDA method    RDA Method (mL)  34328            Nutrition Prescription Ordered       01/18/18 0846    Nutrition Prescription PO    Current PO Diet Clear Liquid              Problem/Interventions:        Problem 1       01/18/18 0846    Nutrition Diagnoses Problem 1    Problem 1 Altered GI Function    Etiology (related to) Medical Diagnosis    Gastrointestinal Diverticulosis                    Intervention Goal       " 01/18/18 0846    Intervention Goal    General Maintain nutrition    PO Advance diet;Tolerate PO    Weight No significant weight loss            Nutrition Intervention       01/18/18 0847    Nutrition Intervention    RD/Tech Action Follow Tx progress;Care plan reviewd;Interview for preference              Education/Evaluation       01/18/18 0847    Education    Education Will Instruct as appropriate    Monitor/Evaluation    Monitor Per protocol        Electronically signed by:  Lulu Belcher RD  01/18/18 8:47 AM

## 2018-01-19 VITALS
HEIGHT: 66 IN | TEMPERATURE: 97.8 F | SYSTOLIC BLOOD PRESSURE: 131 MMHG | WEIGHT: 191.6 LBS | RESPIRATION RATE: 16 BRPM | BODY MASS INDEX: 30.79 KG/M2 | DIASTOLIC BLOOD PRESSURE: 86 MMHG | HEART RATE: 64 BPM | OXYGEN SATURATION: 98 %

## 2018-01-19 LAB
BACTERIA SPEC RESP CULT: NO GROWTH
BACTERIA SPEC RESP CULT: NO GROWTH
GRAM STN SPEC: NORMAL

## 2018-01-19 PROCEDURE — 25010000002 KETOROLAC TROMETHAMINE PER 15 MG: Performed by: SURGERY

## 2018-01-19 PROCEDURE — 25010000002 ENOXAPARIN PER 10 MG: Performed by: SURGERY

## 2018-01-19 RX ORDER — HYDROCODONE BITARTRATE AND ACETAMINOPHEN 5; 325 MG/1; MG/1
TABLET ORAL
Qty: 30 TABLET | Refills: 0 | Status: SHIPPED | OUTPATIENT
Start: 2018-01-19 | End: 2018-01-25

## 2018-01-19 RX ADMIN — HYDROCODONE BITARTRATE AND ACETAMINOPHEN 2 TABLET: 5; 325 TABLET ORAL at 08:52

## 2018-01-19 RX ADMIN — PANTOPRAZOLE SODIUM 40 MG: 40 TABLET, DELAYED RELEASE ORAL at 06:16

## 2018-01-19 RX ADMIN — ENOXAPARIN SODIUM 40 MG: 40 INJECTION SUBCUTANEOUS at 08:52

## 2018-01-19 RX ADMIN — HYDROCODONE BITARTRATE AND ACETAMINOPHEN 2 TABLET: 5; 325 TABLET ORAL at 13:09

## 2018-01-19 RX ADMIN — HYDROCODONE BITARTRATE AND ACETAMINOPHEN 2 TABLET: 5; 325 TABLET ORAL at 04:13

## 2018-01-19 RX ADMIN — KETOROLAC TROMETHAMINE 15 MG: 30 INJECTION, SOLUTION INTRAMUSCULAR at 04:14

## 2018-01-19 RX ADMIN — SODIUM CHLORIDE 75 ML/HR: 9 INJECTION, SOLUTION INTRAVENOUS at 06:53

## 2018-01-19 RX ADMIN — KETOROLAC TROMETHAMINE 15 MG: 30 INJECTION, SOLUTION INTRAMUSCULAR at 08:52

## 2018-01-19 NOTE — PLAN OF CARE
Problem: Patient Care Overview (Adult)  Goal: Plan of Care Review  Outcome: Ongoing (interventions implemented as appropriate)   01/19/18 0521   Outcome Evaluation   Outcome Summary/Follow up Plan doing well, adequate pain control, abd soft, active bowel sounds, tolerating diet,no nausea, voiding, no BM during the night, passing flatus, VSS     Goal: Adult Individualization and Mutuality  Outcome: Ongoing (interventions implemented as appropriate)    Goal: Discharge Needs Assessment  Outcome: Ongoing (interventions implemented as appropriate)      Problem: Perioperative Period (Adult)  Goal: Signs and Symptoms of Listed Potential Problems Will be Absent or Manageable (Perioperative Period)  Outcome: Ongoing (interventions implemented as appropriate)      Problem: Mechanical Ventilation, Invasive (Adult)  Goal: Signs and Symptoms of Listed Potential Problems Will be Absent or Manageable (Mechanical Ventilation, Invasive)  Outcome: Outcome(s) achieved Date Met: 01/19/18      Problem: Fall Risk (Adult)  Goal: Identify Related Risk Factors and Signs and Symptoms  Outcome: Outcome(s) achieved Date Met: 01/19/18    Goal: Absence of Falls  Outcome: Ongoing (interventions implemented as appropriate)      Problem: Pain, Acute (Adult)  Goal: Identify Related Risk Factors and Signs and Symptoms  Outcome: Outcome(s) achieved Date Met: 01/19/18    Goal: Acceptable Pain Control/Comfort Level  Outcome: Ongoing (interventions implemented as appropriate)      Problem: Pressure Ulcer Risk (Jeevan Scale) (Adult,Obstetrics,Pediatric)  Goal: Identify Related Risk Factors and Signs and Symptoms  Outcome: Outcome(s) achieved Date Met: 01/19/18    Goal: Skin Integrity  Outcome: Ongoing (interventions implemented as appropriate)

## 2018-01-19 NOTE — DISCHARGE SUMMARY
DATE OF ADMIT:  1/16/18    DATE OF DISCHARGE:  1/19/18    DIAGNOSIS:  Diverticulitis    FINAL PATHOLOGY:  Diverticulitis    PROCEDURES:  Laparoscopic sigmoid colectomy 1/16/2018    SUMMARY OF HOSPITAL COURSE:   In her early postoperative course and recovery she seemed to have a paradoxical reaction to medication whereby she became extremely confused and agitated to the point that she had to be reintubated, sedated, and admitted to the ICU.  Over the next 2 days her condition markedly improved.  By the date of discharge she was awake, alert, oriented.  She was tolerating regular diet and having bowel function.  Abdomen was soft and incisions were healing well.  She is discharged home with hydrocodone for pain and follow up with me in 1 week.    Eric Davidson M.D.

## 2018-01-19 NOTE — PROGRESS NOTES
Continued Stay Note  [facility]     Patient Name: Magdalena Dickey  MRN: [id number]  Today's Date: 1/19/2018    Admit Date: 1/16/2018          Discharge Plan       01/19/18 1001    Case Management/Social Work Plan    Plan Home w/o needs    Additional Comments I met with pt and her  Vikram, pt said she is feeling much better and anticipates being discharged today, pt said she plans home w/o needs. Vikram confirmed he will be home to assist if needed.               Discharge Codes     None            Jodi Mesa RN

## 2018-01-25 ENCOUNTER — OFFICE VISIT (OUTPATIENT)
Dept: SURGERY | Facility: CLINIC | Age: 63
End: 2018-01-25

## 2018-01-25 DIAGNOSIS — Z09 FOLLOW UP: Primary | ICD-10-CM

## 2018-01-25 PROCEDURE — 99024 POSTOP FOLLOW-UP VISIT: CPT | Performed by: SURGERY

## 2018-01-27 NOTE — PROGRESS NOTES
Laparoscopic sigmoid colectomy 1/16/2018    Pathology: Diverticulitis    Office visit: Incisions are healing well and she is doing well.  Reporting no problems from surgery.  Will return as needed.

## 2018-02-06 ENCOUNTER — TELEPHONE (OUTPATIENT)
Dept: SURGERY | Facility: CLINIC | Age: 63
End: 2018-02-06

## 2018-02-06 DIAGNOSIS — K56.609 SMALL BOWEL OBSTRUCTION (HCC): Primary | ICD-10-CM

## 2018-02-06 RX ORDER — GABAPENTIN 600 MG/1
300 TABLET ORAL 2 TIMES DAILY
Qty: 90 TABLET | Refills: 0 | Status: SHIPPED | OUTPATIENT
Start: 2018-02-06 | End: 2018-05-01 | Stop reason: SDUPTHER

## 2018-02-06 NOTE — TELEPHONE ENCOUNTER
Patient calling with c/o severe nausea and constipation s/p lap sigmoid resection on 1/16/18. Having small BMs that has not given her any relief from the constipation. She has been taking Miralax QD. Has not gone back on her Probiotic since surgery. No abd pain or bloating. Advised her start on OTC stool softeners and take Miralax BID. She was also advised to start back on her probiotic. The nausea is relieved by Phenergan, but it puts her to sleep. Zofran has not helped. Patient states the nausea started a few days ago (has not had any nausea after sx until now). Please advise.

## 2018-02-06 NOTE — TELEPHONE ENCOUNTER
Spoke w/ patient, relayed Dr. Davidson's response. Order entered for Acute Abd Series, patient currently has Phenergan and will proceed to pharmacy to get Mag Citrate and begin taking until X-ray is scheduled.  Patient verbalized understanding.

## 2018-02-06 NOTE — TELEPHONE ENCOUNTER
-Send her for acute abdominal series to rule out small bowel obstruction  -Can refill Phenergan as needed  -Might be worth trying bottle of magnesium citrate for a few days to see if this will help

## 2018-03-08 RX ORDER — METAXALONE 800 MG/1
TABLET ORAL
Qty: 90 TABLET | Refills: 1 | Status: SHIPPED | OUTPATIENT
Start: 2018-03-08 | End: 2018-05-08 | Stop reason: SDUPTHER

## 2018-04-16 RX ORDER — TRAZODONE HYDROCHLORIDE 50 MG/1
TABLET ORAL
Qty: 90 TABLET | Refills: 1 | Status: SHIPPED | OUTPATIENT
Start: 2018-04-16 | End: 2018-06-20

## 2018-05-01 ENCOUNTER — OFFICE VISIT (OUTPATIENT)
Dept: FAMILY MEDICINE CLINIC | Facility: CLINIC | Age: 63
End: 2018-05-01

## 2018-05-01 VITALS
WEIGHT: 189.85 LBS | BODY MASS INDEX: 30.51 KG/M2 | OXYGEN SATURATION: 97 % | DIASTOLIC BLOOD PRESSURE: 82 MMHG | SYSTOLIC BLOOD PRESSURE: 114 MMHG | HEART RATE: 92 BPM | HEIGHT: 66 IN

## 2018-05-01 DIAGNOSIS — Z79.899 ENCOUNTER FOR LONG-TERM (CURRENT) USE OF MEDICATIONS: ICD-10-CM

## 2018-05-01 DIAGNOSIS — M25.511 ACUTE PAIN OF RIGHT SHOULDER: ICD-10-CM

## 2018-05-01 DIAGNOSIS — F41.8 DEPRESSION WITH ANXIETY: Primary | ICD-10-CM

## 2018-05-01 DIAGNOSIS — G43.009 MIGRAINE WITHOUT AURA AND WITHOUT STATUS MIGRAINOSUS, NOT INTRACTABLE: ICD-10-CM

## 2018-05-01 PROCEDURE — 99214 OFFICE O/P EST MOD 30 MIN: CPT | Performed by: INTERNAL MEDICINE

## 2018-05-01 RX ORDER — GABAPENTIN 600 MG/1
600 TABLET ORAL 2 TIMES DAILY
Qty: 60 TABLET | Refills: 3 | Status: SHIPPED | OUTPATIENT
Start: 2018-05-01 | End: 2018-06-28 | Stop reason: SDUPTHER

## 2018-05-01 RX ORDER — DULOXETIN HYDROCHLORIDE 60 MG/1
60 CAPSULE, DELAYED RELEASE ORAL DAILY
Qty: 30 CAPSULE | Refills: 5 | Status: SHIPPED | OUTPATIENT
Start: 2018-05-01 | End: 2018-06-28 | Stop reason: SDUPTHER

## 2018-05-01 RX ORDER — DULOXETIN HYDROCHLORIDE 60 MG/1
60 CAPSULE, DELAYED RELEASE ORAL DAILY
COMMUNITY
End: 2018-05-01 | Stop reason: SDUPTHER

## 2018-05-01 NOTE — PROGRESS NOTES
Subjective   Magdalena Dickey is a 62 y.o. female who presents today for:    Back Pain (CSE) and Anxiety (Cymbalta refill)    History of Present Illness     She has had a progressive problem with myalgias and arthralgias, and in particular low back pain, since early 2017.  Previous evaluations have been negative, with only a chronic-appearing L1 compression fracture.  She was referred for physical therapy which has helped somewhat, but she plateaued in her response.  She has persistent low back pain that is bilateral, perhaps a bit worse on the left.  It radiates into the buttocks/upper legs, but not lower.  She denies weakness in the lower legs.       She fell around 1/2017, landing on her butt.  She denied any severe, residual pain thereafter.  The pain fluctuates in its severity; she may have some good days where she has mild aching in the low back and upper legs, and some days where the pain is disabling.  This occurs randomly, without precipitation.  Since starting Skelaxin and having her take it on a regular basis (tid), her back pain has improved.  She still does not do regular stretches or engage in regular exercise.      She also takes gabapentin 600 mg twice a day to prevent headaches.    Depression with anxiety chronically for years, with variable severity.  Symptoms of anxiety are largely related to stress at work, but are relieved by Xanax prn.  She usually only needs 1/2 tablet.  She goes through days (almost weeks) without needing it at times.  She tried taking it for a bout of severe abdominal pain without impact on those symptoms.  She also uses Cymbalta daily with good benefit and takes trazodone at night to help with sleep.  We have tried Prozac, Zoloft, bupropion, Lexapro, and buspirone in the past.  She tried decreasing her Cymbalta due to an inability to lose weight, but she did not do well with this.  She is back to 60 mg a day and feels somewhat better.    She is dealing with sadness from her  's recent health decline (COPD).    Right shoulder pain for the past 3 weeks without trauma, although she has a h/o adhesive capsulitis in both shoulders, treated conservatively.  It is a constant ache with more pain with certain movements.  She also uses a TENS unit and take  tid and has tried massage therapy, also.  We injected the shoulder in 2015 with good benefit.    Ms. Dickey  reports that she quit smoking about 4 years ago. Her smoking use included Cigarettes. She has never used smokeless tobacco. She reports that she does not drink alcohol or use drugs.     Allergies   Allergen Reactions   • No Known Drug Allergy        Current Outpatient Prescriptions:   •  ALPRAZolam (XANAX) 0.5 MG tablet, Take 1 tablet by mouth 2 (Two) Times a Day As Needed for Anxiety., Disp: 60 tablet, Rfl: 1  •  calcium carbonate (OS-MARY) 600 MG tablet, Take 600 mg by mouth Daily., Disp: , Rfl:   •  dicyclomine (BENTYL) 10 MG capsule, Take 1 capsule by mouth 4 (Four) Times a Day As Needed (abdominal pain)., Disp: 30 capsule, Rfl: 5  •  DULoxetine (CYMBALTA) 60 MG capsule, Take 60 mg by mouth Daily., Disp: , Rfl:   •  gabapentin (NEURONTIN) 600 MG tablet, Take 0.5 tablets by mouth 2 (Two) Times a Day. (Patient taking differently: Take 600 mg by mouth 2 (Two) Times a Day.), Disp: 90 tablet, Rfl: 0  •  metaxalone (SKELAXIN) 800 MG tablet, TAKE 1 TABLET BY MOUTH 3 TIMES A DAY, Disp: 90 tablet, Rfl: 1  •  Multiple Vitamin (MULTI-VITAMINS PO), Take 1 tablet by mouth Daily., Disp: , Rfl:   •  ondansetron (ZOFRAN) 4 MG tablet, Take 1 tablet by mouth Every 8 (Eight) Hours As Needed for Nausea or Vomiting., Disp: 30 tablet, Rfl: 2  •  pantoprazole (PROTONIX) 40 MG EC tablet, Take 40 mg by mouth Every Morning., Disp: , Rfl:   •  Polyethylene Glycol 3350 (MIRALAX PO), Take  by mouth Daily As Needed., Disp: , Rfl:   •  Probiotic Product (PROBIOTIC DAILY PO), Take 1 capsule by mouth Daily., Disp: , Rfl:   •  SUMAtriptan (IMITREX) 100  "MG tablet, Take 100 mg by mouth Every 2 (Two) Hours As Needed for Migraine., Disp: , Rfl:   •  traZODone (DESYREL) 50 MG tablet, TAKE 1 TABLET BY MOUTH EVERY NIGHT., Disp: 90 tablet, Rfl: 1  •  triamcinolone (KENALOG) 0.1 % cream, Apply  topically 2 (Two) Times a Day. (Patient taking differently: Apply 1 application topically As Needed.), Disp: 454 g, Rfl: 0      Review of Systems   Constitutional: Negative for activity change and appetite change.   Cardiovascular: Negative for chest pain.   Musculoskeletal: Positive for arthralgias, back pain and myalgias. Negative for gait problem and joint swelling.   Neurological: Negative for weakness and numbness.   Psychiatric/Behavioral: Positive for sleep disturbance (better with trazodone). The patient is nervous/anxious.          Objective   Vitals:    05/01/18 1045   BP: 114/82   BP Location: Right arm   Patient Position: Sitting   Cuff Size: Large Adult   Pulse: 92   SpO2: 97%   Weight: 86.1 kg (189 lb 13.6 oz)   Height: 167.6 cm (66\")     Physical Exam    Overweight female in no acute distress.  Mood is sad because of her 's declining health; affect is appropriate.  She does get tearful when we discuss his health.  No thyromegaly or mass.  Regular rate and rhythm.  No murmur.  Clear to auscultation bilaterally with good breath sounds throughout all lung fields.  No chest wall pain with deep inspiration.  Tender along the superior and lateral margins of the right scapula.  No tenderness about the lateral aspect of the right shoulder. Mild crepitus with internal and external rotation.  Mild pain with forced adduction.  Mild discomfort with abduction, but full range of motion noted.  No significant pain with empty can test; no give way.  Mild crepitus with internal and external rotation at the left arm.  Full range of motion without pain.  Adkins no erythema, warmth, or effusion about either shoulder.          Magdalena was seen today for back pain and " anxiety.    Diagnoses and all orders for this visit:    Depression with anxiety  -     DULoxetine (CYMBALTA) 60 MG capsule; Take 1 capsule by mouth Daily.  Continue duloxetine at 60 mg, although she had been maintained on 90 in the past.  She continues to do well at the lower dose.  She does not do well when we take her off of it.  We will need to keep an eye on her mood because of her fear that her 's health will be in decline.    Migraine without aura and without status migrainosus, not intractable  -     [id number] 7+Oxycodone-Bund - Urine, Clean Catch  -     gabapentin (NEURONTIN) 600 MG tablet; Take 1 tablet by mouth 2 (Two) Times a Day.  Stable on the gabapentin.  We will make no change in the dose.  She will try taking 600 mg every morning and 300 mg at night.  It has not caused excess sedation when taking 600 mg.    Acute pain of right shoulder  Continue the oral NSAID and add diclofenac gel topically during the day.  If no improvement in the next few weeks, she will return for a steroid injection.  This has given her very good benefit in the past.  She will continue with range of motion and stretching exercises as demonstrated for her in the past.    Encounter for long-term (current) use of medications  -     [id number] 7+Oxycodone-Bund - Urine, Clean Catch    No Xanax Rx this time.  [facility] report was obtained and reviewed.

## 2018-05-07 LAB
AMPHETAMINES UR QL SCN: NEGATIVE NG/ML
BARBITURATES UR QL SCN: NEGATIVE NG/ML
BENZODIAZ UR QL SCN: NORMAL NG/ML
BENZODIAZ UR QL: NEGATIVE
BZE UR QL: NEGATIVE NG/ML
CANNABINOIDS UR QL SCN: NEGATIVE NG/ML
OPIATES UR QL SCN: NEGATIVE NG/ML
OXYCODONE+OXYMORPHONE UR QL SCN: NEGATIVE NG/ML
PCP UR QL: NEGATIVE NG/ML

## 2018-05-07 RX ORDER — GABAPENTIN 600 MG/1
TABLET ORAL
Qty: 90 TABLET | Refills: 0 | OUTPATIENT
Start: 2018-05-07

## 2018-05-08 ENCOUNTER — OFFICE VISIT (OUTPATIENT)
Dept: FAMILY MEDICINE CLINIC | Facility: CLINIC | Age: 63
End: 2018-05-08

## 2018-05-08 VITALS
DIASTOLIC BLOOD PRESSURE: 84 MMHG | BODY MASS INDEX: 31.12 KG/M2 | SYSTOLIC BLOOD PRESSURE: 132 MMHG | OXYGEN SATURATION: 95 % | HEIGHT: 66 IN | HEART RATE: 105 BPM | WEIGHT: 193.6 LBS

## 2018-05-08 DIAGNOSIS — M75.81 TENDINITIS OF RIGHT ROTATOR CUFF: ICD-10-CM

## 2018-05-08 DIAGNOSIS — M25.511 ACUTE PAIN OF RIGHT SHOULDER: Primary | ICD-10-CM

## 2018-05-08 PROCEDURE — 20610 DRAIN/INJ JOINT/BURSA W/O US: CPT | Performed by: INTERNAL MEDICINE

## 2018-05-08 RX ORDER — TRIAMCINOLONE ACETONIDE 40 MG/ML
80 INJECTION, SUSPENSION INTRA-ARTICULAR; INTRAMUSCULAR ONCE
Status: COMPLETED | OUTPATIENT
Start: 2018-05-08 | End: 2018-05-08

## 2018-05-08 RX ORDER — METAXALONE 800 MG/1
TABLET ORAL
Qty: 90 TABLET | Refills: 1 | Status: SHIPPED | OUTPATIENT
Start: 2018-05-08 | End: 2018-06-20

## 2018-05-08 RX ADMIN — TRIAMCINOLONE ACETONIDE 80 MG: 40 INJECTION, SUSPENSION INTRA-ARTICULAR; INTRAMUSCULAR at 17:03

## 2018-05-08 NOTE — PROGRESS NOTES
Procedure: Steroid Injection of the Shoulder    Indication: Worsening right shoulder pain    Diagnosis:    Encounter Diagnoses   Name Primary?   • Acute pain of right shoulder Yes   • Tendinitis of right rotator cuff          Description of procedure:    The risks, benefits, and alternatives of a steroid injection of the right shoulder were discussed with the patient and consent was obtained.  The posterolateral aspect of the right shoulder was prepped with Betadine ×3 and alcohol ×1.  Ethyl Chloride spray was used for topical anesthesia.  Under aseptic technique, the right shoulder was injected with 2 cc of Lidocaine 1% without Epi and 2 cc of Kenalog- 40 mg/mL via the posterolateral approach.  The site was cleansed of Betadine and a bandage was applied.    The patient tolerated the procedure well and there were no complications.    The patient was instructed to use ice for postinjection flare and to return to the office for signs or symptoms of an infection.

## 2018-05-17 ENCOUNTER — TELEPHONE (OUTPATIENT)
Dept: FAMILY MEDICINE CLINIC | Facility: CLINIC | Age: 63
End: 2018-05-17

## 2018-05-17 DIAGNOSIS — M25.511 ACUTE PAIN OF RIGHT SHOULDER: Primary | ICD-10-CM

## 2018-05-17 NOTE — TELEPHONE ENCOUNTER
Status post right shoulder injection for presumed tendinitis 9 days ago.  She has realized no significant benefit.  She has less pain in the morning but the pain escalates over the course of the day and is most severe at night.  Ibuprofen 800 mg 3 times a day has not given her any benefit.  She has dropped back to 600 mg due to dyspepsia, likely because of the prolonged NSAID use.  She does take a PPI.    I recommended we take a step back and get x-rays of the right shoulder.  Given her prolonged smoking history, we will also do a chest x-ray since she is now complaining of pain father down into the arm than just the shoulder.    No other recommendations are made at this time.  We may need to refer her to an orthopedic surgeon for additional evaluation and recommendations.  We will make that determination after we see the results of her x-rays.    TAS

## 2018-05-18 ENCOUNTER — HOSPITAL ENCOUNTER (OUTPATIENT)
Dept: GENERAL RADIOLOGY | Facility: HOSPITAL | Age: 63
Discharge: HOME | End: 2018-05-18

## 2018-05-18 ENCOUNTER — HOSPITAL ENCOUNTER (OUTPATIENT)
Dept: GENERAL RADIOLOGY | Facility: HOSPITAL | Age: 63
Discharge: HOME | End: 2018-05-18
Admitting: INTERNAL MEDICINE

## 2018-05-18 DIAGNOSIS — M25.511 ACUTE PAIN OF RIGHT SHOULDER: ICD-10-CM

## 2018-05-18 PROCEDURE — 73030 X-RAY EXAM OF SHOULDER: CPT

## 2018-05-18 PROCEDURE — 71046 X-RAY EXAM CHEST 2 VIEWS: CPT

## 2018-05-21 RX ORDER — PREDNISONE 10 MG/1
TABLET ORAL
Qty: 30 TABLET | Refills: 0 | Status: SHIPPED | OUTPATIENT
Start: 2018-05-21 | End: 2018-05-30

## 2018-05-21 NOTE — PROGRESS NOTES
X-ray unrevealing.  Steroid injection was unhelpful.  We will try a short course of steroids to see if that gives her some benefit.  If not, or if the pain recurs once the steroid taper has been completed, we will refer her to an orthopedic surgeon for additional evaluation and treatment.  She has seen Dr. Sullivan in the past.

## 2018-05-29 ENCOUNTER — PATIENT MESSAGE (OUTPATIENT)
Dept: FAMILY MEDICINE CLINIC | Facility: CLINIC | Age: 63
End: 2018-05-29

## 2018-05-30 DIAGNOSIS — M25.511 ACUTE PAIN OF RIGHT SHOULDER: Primary | ICD-10-CM

## 2018-05-30 RX ORDER — DICLOFENAC SODIUM 75 MG/1
75 TABLET, DELAYED RELEASE ORAL 2 TIMES DAILY
Qty: 60 TABLET | Refills: 0 | Status: ON HOLD | OUTPATIENT
Start: 2018-05-30 | End: 2018-06-21

## 2018-05-30 NOTE — TELEPHONE ENCOUNTER
Discussed with patient.  Referral to orthopedic made.  Trial of diclofenac instead of ibuprofen and the steroids.    TAS

## 2018-05-30 NOTE — TELEPHONE ENCOUNTER
From: Magdalena Dickey  To: Fermín Bella MD  Sent: 5/29/2018 10:43 AM EDT  Subject: Non-Urgent Medical Question    Dr. Bella,   Hope you had a nice holiday weekend.   There is still no improvement in my shoulder pain, despite adding the steroids. The pain is still radiating into my elbow. It has been 3 weeks since the first injection, and I know you said you could not do it again for 30 days.  I have been using Biofreeze, ibuprofen and skelaxin three times a day. Ice whenever I can. I am not sleeping well due to the pain. Next steps?   thanks,   Madelaine Dickey  [birth year]

## 2018-05-31 ENCOUNTER — TELEPHONE (OUTPATIENT)
Dept: ORTHOPEDIC SURGERY | Facility: CLINIC | Age: 63
End: 2018-05-31

## 2018-05-31 NOTE — TELEPHONE ENCOUNTER
----- Message from Luis Mooney MD sent at 5/30/2018  5:24 PM EDT -----  Regarding: RE: another favor?  No problem at all.  Adele, can you please try to work her in?  Thanks.    Hope your mom is doing OK, Vick!      ----- Message -----  From: Fermín Bella MD  Sent: 5/30/2018   4:52 PM  To: Luis Mooney MD  Subject: another favor?                                   Dr. 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  She denies trauma, recent or remote.  She has a remote history of right shoulder pain that responded well to a shoulder injection (approximately 20 years ago), and has had no significant problem with it since then.  X-rays of the shoulder revealed no significant pathology.    Pain rapidly intensified, so I injected her shoulder 5/8/18.  She had partial benefit, but only for a few days.  Within 1 week, symptoms had returned and intensity was as bad as prior to the injection.  A PO steroid taper has not helped.  She has added fairly regular NSAID use to the steroids with only minimal benefit.    I would appreciate your assistance in further evaluating and treating this problem.    Thank you for all you do,    Vick

## 2018-06-06 ENCOUNTER — OFFICE VISIT (OUTPATIENT)
Dept: ORTHOPEDIC SURGERY | Facility: CLINIC | Age: 63
End: 2018-06-06

## 2018-06-06 VITALS — HEIGHT: 66 IN | TEMPERATURE: 99.6 F | WEIGHT: 191 LBS | BODY MASS INDEX: 30.7 KG/M2

## 2018-06-06 DIAGNOSIS — G89.29 CHRONIC RIGHT SHOULDER PAIN: Primary | ICD-10-CM

## 2018-06-06 DIAGNOSIS — M25.511 CHRONIC RIGHT SHOULDER PAIN: Primary | ICD-10-CM

## 2018-06-06 PROCEDURE — 99203 OFFICE O/P NEW LOW 30 MIN: CPT | Performed by: ORTHOPAEDIC SURGERY

## 2018-06-08 NOTE — PROGRESS NOTES
"  Patient: Magdalena Dickey    YOB: 1955    Medical Record Number: [id number]    Chief Complaints:   Right shoulder and arm pain    History of Present Illness:     62 y.o. female patient who comes in today for evaluation of her right upper extremity.  She tells me that she woke up with pain in the shoulder and shoulder blade about 3 months ago.  She does not recall any specific inciting event or factor.  She tells me that he \"felt like I got kicked by a horse\".  She did have similar problems in the shoulder several years ago.  She had an injection that helped tremendously.  She thought that another injection might help and so she had a second injection about a month ago which did not help at all.  She describes her pain as severe, constant, and aching.  She has noticed swelling.  The pain is worse with standing and driving.  The pain occasionally shoots down her arm.  She also mentions occasional numbness and tingling.    Allergies:   Allergies   Allergen Reactions   • No Known Drug Allergy        Home Medications:    Current Outpatient Prescriptions:   •  ALPRAZolam (XANAX) 0.5 MG tablet, Take 1 tablet by mouth 2 (Two) Times a Day As Needed for Anxiety., Disp: 60 tablet, Rfl: 1  •  calcium carbonate (OS-MARY) 600 MG tablet, Take 600 mg by mouth Daily., Disp: , Rfl:   •  diclofenac (VOLTAREN) 75 MG EC tablet, Take 1 tablet by mouth 2 (Two) Times a Day., Disp: 60 tablet, Rfl: 0  •  dicyclomine (BENTYL) 10 MG capsule, Take 1 capsule by mouth 4 (Four) Times a Day As Needed (abdominal pain)., Disp: 30 capsule, Rfl: 5  •  DULoxetine (CYMBALTA) 60 MG capsule, Take 1 capsule by mouth Daily., Disp: 30 capsule, Rfl: 5  •  gabapentin (NEURONTIN) 600 MG tablet, Take 1 tablet by mouth 2 (Two) Times a Day., Disp: 60 tablet, Rfl: 3  •  metaxalone (SKELAXIN) 800 MG tablet, TAKE 1 TABLET BY MOUTH 3 TIMES A DAY, Disp: 90 tablet, Rfl: 1  •  Multiple Vitamin (MULTI-VITAMINS PO), Take 1 tablet by mouth Daily., Disp: , Rfl: "   •  ondansetron (ZOFRAN) 4 MG tablet, Take 1 tablet by mouth Every 8 (Eight) Hours As Needed for Nausea or Vomiting., Disp: 30 tablet, Rfl: 2  •  pantoprazole (PROTONIX) 40 MG EC tablet, Take 40 mg by mouth Every Morning., Disp: , Rfl:   •  Polyethylene Glycol 3350 (MIRALAX PO), Take  by mouth Daily As Needed., Disp: , Rfl:   •  Probiotic Product (PROBIOTIC DAILY PO), Take 1 capsule by mouth Daily., Disp: , Rfl:   •  SUMAtriptan (IMITREX) 100 MG tablet, Take 100 mg by mouth Every 2 (Two) Hours As Needed for Migraine., Disp: , Rfl:   •  traZODone (DESYREL) 50 MG tablet, TAKE 1 TABLET BY MOUTH EVERY NIGHT., Disp: 90 tablet, Rfl: 1  •  triamcinolone (KENALOG) 0.1 % cream, Apply  topically 2 (Two) Times a Day. (Patient taking differently: Apply 1 application topically As Needed.), Disp: 454 g, Rfl: 0    Past Medical History:   Diagnosis Date   • Anxiety    • Colon polyp 2017   • Depression 5/25/2016   • Diverticulitis of colon    • Diverticulosis of large intestine without hemorrhage 10/5/2016   • GERD (gastroesophageal reflux disease)    • Hiatal hernia    • Irritable bowel syndrome (IBS)    • Liver disease     Mild Hepatic Steatosis   • Low back pain    • Lumbosacral disc disease    • Migraine    • Migraine without aura and without status migrainosus, not intractable 5/25/2016       Past Surgical History:   Procedure Laterality Date   • CHOLECYSTECTOMY N/A 1980   • COLON RESECTION N/A 1/16/2018    Procedure: LAPAROSCOPIC SIGMOID COLON RESECTION WITH MOBILIZATION OF SPLENIC FLEXURE;  Surgeon: Eric Davidson MD;  Location: [facility] OR;  Service:    • COLONOSCOPY N/A 2/15/2017    Procedure: COLONOSCOPY to cecum with biospsies with cold polypectomy;  Surgeon: Gerardo Tan MD;  Location: [facility] ENDOSCOPY;  Service:    • COLONOSCOPY N/A 03/01/2003    Internal hemorrhoids-Dr. Gerardo Tan   • DIAGNOSTIC LAPAROSCOPY N/A 06/06/2001    Cul-de-sac endometriosis and adhesions-Dr. Aamir Christine   • ENDOSCOPY N/A 2/15/2017     Procedure: ESOPHAGOGASTRODUODENOSCOPY with biopsies;  Surgeon: Gerardo Tan MD;  Location: [facility] ENDOSCOPY;  Service:    • ENDOSCOPY N/A 02/04/2009    Small Hiatal hernia-Dr. Gerardo Tan   • TOTAL ABDOMINAL HYSTERECTOMY WITH SALPINGO OOPHORECTOMY Bilateral 07/31/2001    Dr. Aamir Christine       Social History     Occupational History   • Not on file.     Social History Main Topics   • Smoking status: Former Smoker     Types: Cigarettes     Quit date: 3/10/2014   • Smokeless tobacco: Never Used   • Alcohol use No   • Drug use: No   • Sexual activity: Defer      Social History     Social History Narrative   • No narrative on file       Family History   Problem Relation Age of Onset   • Alzheimer's disease Mother         SDAT   • Hypertension Mother    • Diabetes type II Mother    • Lung cancer Mother         POSSIBLE   • Heart attack Mother    • Cancer Mother         lung   • COPD Mother    • Depression Mother    • Diabetes Mother    • Heart disease Mother    • Miscarriages / Stillbirths Mother    • Alcohol abuse Father    • Prostate cancer Father    • Heart disease Father         THIRD DEGREE HEART BLOCK   • Arthritis Father    • Cancer Father         prostate   • Drug abuse Father    • Learning disabilities Father    • Ovarian cancer Sister    • No Known Problems Brother    • Ovarian cancer Sister    • Arthritis Maternal Grandmother         rheumatoid   • Arthritis Sister    • Cancer Sister         ovarian   • Depression Sister    • Hypertension Sister    • Cancer Sister         ovarian   • Depression Sister    • Hypertension Sister    • Malig Hyperthermia Neg Hx        Review of Systems:      Constitutional: Denies fever, shaking or chills   Eyes: Denies change in visual acuity   HEENT: Denies nasal congestion or sore throat   Respiratory: Denies cough or shortness of breath   Cardiovascular: Denies chest pain or edema  Endocrine: Denies tremors, palpitations, intolerance of heat or cold, polyuria,  "polydipsia.  GI: Denies abdominal pain, nausea, vomiting, bloody stools or diarrhea  : Denies frequency, urgency, incontinence, retention, or nocturia.  Musculoskeletal: Denies numbness tingling or loss of motor function except as above  Integument: Denies rash, lesion or ulceration   Neurologic: Denies headache or focal weakness, deficits  Heme: Denies epistaxis, spontaneous or excessive bleeding, epistaxis, hematuria, melena, fatigue, enlarged or tender lymph nodes.      All other pertinent positives and negatives as noted above in HPI.    Physical Exam: 62 y.o. female  Vitals:    06/06/18 1625   Temp: 99.6 °F (37.6 °C)   TempSrc: Temporal Artery    Weight: 86.6 kg (191 lb)   Height: 167.6 cm (66\")       General:  Patient is awake and alert.  Appears in no acute distress or discomfort.    Psych:  Affect and demeanor are appropriate.    Eyes:  Conjunctiva and sclera appear grossly normal.  Eyes track well and EOM seem to be intact.    Ears:  No gross abnormalities.  Hearing adequate for the exam.    Cardiovascular:  Regular rate and rhythm.    Lungs:  Good chest expansion.  Breathing unlabored.    Lymph:  No palpable adenopathy about neck or axilla.    Neck:  Supple.  Normal ROM.  A Spurling's maneuver does reproduce posterior scapular pain but no pain down the arm.    Right upper extremity:  Skin benign and intact without evidence for swelling, masses or atrophy.  No palpable masses.  No focal areas of exquisite tenderness.  Full active ROM.  No evident instability or apprehension.  Positive Neer and Gentile impingement maneuvers.  Negative Speeds, Yergason's and active compression maneuvers.  Pain and subtle weakness with resistive testing of elevation in scapular plane.  Negative Hornblower's and ER lag sign.  Good strength in wrist and hand.  Intact sensation in arm, hand.  Palpable radial pulse with brisk cap refill.         Radiology:   AP and lateral views of the right shoulder are reviewed to evaluate the " patient's complaint.  No comparison films are immediately available.  The x-rays show no obvious acute abnormalities, lesions, masses, significant degenerative changes, or other concerning findings.  The acromiohumeral interval is normal.      Assessment/Plan:   Right rotator cuff tendinopathy and subacromial/subdeltoid bursitis, possible associated cervical radiculopathy    My impression is that she probably has a combination of issues.  She has a lot of intrinsic pain in the shoulder but I think she may have some cross over from the neck.  Her failure of conservative treatment, I think that further workup is indicated at this point.  I have recommended MRIs of both the shoulder and neck.  I will call her with the results.  We will come up with a plan pending review of those studies.    Luis Mooney MD    06/06/2018    CC to Fermín Bella MD

## 2018-06-11 ENCOUNTER — HOSPITAL ENCOUNTER (OUTPATIENT)
Dept: MRI IMAGING | Facility: HOSPITAL | Age: 63
Discharge: HOME | End: 2018-06-11
Attending: ORTHOPAEDIC SURGERY | Admitting: ORTHOPAEDIC SURGERY

## 2018-06-11 DIAGNOSIS — G89.29 CHRONIC RIGHT SHOULDER PAIN: ICD-10-CM

## 2018-06-11 DIAGNOSIS — M25.511 CHRONIC RIGHT SHOULDER PAIN: ICD-10-CM

## 2018-06-11 PROCEDURE — 73221 MRI JOINT UPR EXTREM W/O DYE: CPT

## 2018-06-12 DIAGNOSIS — M54.2 NECK AND SHOULDER PAIN: Primary | ICD-10-CM

## 2018-06-12 DIAGNOSIS — M25.519 NECK AND SHOULDER PAIN: Primary | ICD-10-CM

## 2018-06-12 NOTE — TELEPHONE ENCOUNTER
I spoke with her and we discussed the MRI results for her shoulder.  She tells me that her pain is unbearable but her shoulder MRI looks relatively benign.  This may be coming from her neck.  I am going to order the MRI of her cervical spine.  I will call her with the results.  She did request a prescription strength medicine.  I am going to call in treatment for her.  Risks were discussed.

## 2018-06-13 RX ORDER — TRAMADOL HYDROCHLORIDE 50 MG/1
TABLET ORAL
Qty: 60 TABLET | Refills: 0 | OUTPATIENT
Start: 2018-06-13 | End: 2018-06-22 | Stop reason: HOSPADM

## 2018-06-14 ENCOUNTER — HOSPITAL ENCOUNTER (OUTPATIENT)
Dept: MRI IMAGING | Facility: HOSPITAL | Age: 63
Discharge: HOME | End: 2018-06-14
Attending: ORTHOPAEDIC SURGERY | Admitting: ORTHOPAEDIC SURGERY

## 2018-06-14 ENCOUNTER — HOSPITAL ENCOUNTER (OUTPATIENT)
Dept: MRI IMAGING | Facility: HOSPITAL | Age: 63
Discharge: HOME | End: 2018-06-14
Attending: ORTHOPAEDIC SURGERY

## 2018-06-14 DIAGNOSIS — M54.2 NECK AND SHOULDER PAIN: ICD-10-CM

## 2018-06-14 DIAGNOSIS — M25.519 NECK AND SHOULDER PAIN: ICD-10-CM

## 2018-06-14 PROCEDURE — 72141 MRI NECK SPINE W/O DYE: CPT

## 2018-06-15 ENCOUNTER — TELEPHONE (OUTPATIENT)
Dept: ORTHOPEDIC SURGERY | Facility: CLINIC | Age: 63
End: 2018-06-15

## 2018-06-15 NOTE — TELEPHONE ENCOUNTER
I spoke with her and gave her her MRI results.  She continues to have pain but denies any progressive neurologic symptoms.  I explained that she has a large disc pinching the nerves in her neck.  This appears to be significant enough to account for her symptoms.  Dr. Palacios has agreed to see her this upcoming Tuesday at 1220.  She will follow-up with him.

## 2018-06-16 ENCOUNTER — APPOINTMENT (OUTPATIENT)
Dept: MRI IMAGING | Facility: HOSPITAL | Age: 63
End: 2018-06-16
Attending: ORTHOPAEDIC SURGERY

## 2018-06-19 ENCOUNTER — OFFICE VISIT (OUTPATIENT)
Dept: ORTHOPEDIC SURGERY | Facility: CLINIC | Age: 63
End: 2018-06-19

## 2018-06-19 VITALS — WEIGHT: 190.2 LBS | HEIGHT: 66 IN | BODY MASS INDEX: 30.57 KG/M2 | TEMPERATURE: 98 F

## 2018-06-19 DIAGNOSIS — M47.22 CERVICAL SPONDYLOSIS WITH RADICULOPATHY: ICD-10-CM

## 2018-06-19 DIAGNOSIS — M54.2 SPINE PAIN, CERVICAL: Primary | ICD-10-CM

## 2018-06-19 PROCEDURE — 99215 OFFICE O/P EST HI 40 MIN: CPT | Performed by: ORTHOPAEDIC SURGERY

## 2018-06-19 PROCEDURE — 72040 X-RAY EXAM NECK SPINE 2-3 VW: CPT | Performed by: ORTHOPAEDIC SURGERY

## 2018-06-19 RX ORDER — CEFAZOLIN SODIUM 2 G/100ML
2 INJECTION, SOLUTION INTRAVENOUS ONCE
Status: CANCELLED | OUTPATIENT
Start: 2018-06-21 | End: 2018-06-21

## 2018-06-19 NOTE — PROGRESS NOTES
New patient or new problem visit    Chief Complaint   Patient presents with   • Cervical Spine - Follow-up       HPI:  She is seen today request of Dr. Mooney for neck and shoulder pain.  She describes a greater than 3 month history of radiating pain and numbness in the right arm going down to the dorsum of the hand.  She is failed to improve with intramuscular and oral steroids, tramadol, rest, and time.  The pain is excruciating at times and the numbness has her concerned    PFSH: See chart- reviewed    Review of Systems   Constitutional: Negative for chills, fever and unexpected weight change.   HENT: Negative for trouble swallowing and voice change.    Eyes: Negative for visual disturbance.   Respiratory: Negative for cough and shortness of breath.    Cardiovascular: Negative for chest pain and leg swelling.   Gastrointestinal: Negative for abdominal pain, nausea and vomiting.   Endocrine: Negative for cold intolerance and heat intolerance.   Genitourinary: Negative for difficulty urinating, frequency and urgency.   Skin: Negative for rash and wound.   Allergic/Immunologic: Negative for immunocompromised state.   Neurological: Positive for numbness. Negative for weakness.   Hematological: Bruises/bleeds easily.   Psychiatric/Behavioral: Negative for dysphoric mood. The patient is nervous/anxious.    next field    Constitutional: Vital signs above-noted.  Awake, alert and oriented    Psychiatric: Affect and insight do not appear grossly disturbed.    Pulmonary: Breathing is unlabored, color is good.    Skin: Warm, dry and normal turgor    Cardiac:  radial pulses intact.  No arm edema.    Eyesight and hearing appear adequate for examination purposes    Musculoskeletal:  Posture is unremarkable to coronal and sagittal inspection.  Motion appears undisturbed.  The skin about the area is intact.  There is no palpable or visible deformity.  There is no local spasm. There is moderate tenderness to percussion and  palpation of the spine.     Neurologic:  In the bilateral upper extremities there is no evidence of atrophy.  Motor function is undisturbed in shoulder abduction, elbow flexion, wrist extension, finger extension, triceps extension, or finger abduction   .  Sensation appears symmetrically intact to light touch.  Reflexes are 2+ and symmetrical in the biceps, brachioradialis, and triceps on the left but absent in the right triceps. Longoria test is negative.  Gait appears undisturbed.  Spurling test is negative.    MEDICAL DECISION MAKING    XRAY: Plain film x-rays cervical spine show mild spondylosis at C5 6 and appear otherwise unremarkable no comparison views are available.  MRI scan of the cervical spine shows a large right-sided extruded cephalad migrated herniated disc causing right C6 7 severe compression as well as cord decompression and deformity causing moderate spinal stenosis without signal change.  56 shows a combination hard/soft disc narrowing the right foramen.  The radiologist the opinion is similar to my own.  He notes moderate to severe narrowing at 56 but I do believe this is all chronic and he seems to recognize that as well.    Other: n/a    Impression: C6 7 subacute herniated disc and chronic C5-C6 7 disc degeneration which is been previously asymptomatic.  The reflex changes supports a C7 origin along with common sense.  I think we can safely omit C5 6 with liver feel that the we will leave much residual pain.  The one could argue wear out sooner eventually with the adjacent level instrumented fusion but I told the patient that the keeping this simply would be the best plan.  I plan then C6 7 anterior cervical discectomy and fusion with allograft and plate.I discussed the risks and benefits of anterior cervical discectomy and fusion with instrumentation and allograft or mechanical strut placement.  Risks include adverse anesthetic events such as death, stroke and myocardial infarction.  More  specific surgical complications include infection, nonunion, and persistent pain.  Less likely problems include hardware failure, hoarseness, prolonged difficulty swallowing, visceral or vascular injury, pneumothorax, graft extrusion, and paralysis, among others. There is a 90 percent chance of success.   Alternatives were also discussed.  There is some risk of neurologic worsening without surgery, and certainly natural history has been given adequate time to take effect.  She has been suffering immensely meantime.  After careful consideration the patient wishes to proceed with surgery.    Plan: 6

## 2018-06-20 ENCOUNTER — APPOINTMENT (OUTPATIENT)
Dept: PREADMISSION TESTING | Facility: HOSPITAL | Age: 63
End: 2018-06-20

## 2018-06-20 ENCOUNTER — ANESTHESIA EVENT (OUTPATIENT)
Dept: PERIOP | Facility: HOSPITAL | Age: 63
End: 2018-06-20

## 2018-06-20 VITALS
WEIGHT: 189.1 LBS | DIASTOLIC BLOOD PRESSURE: 84 MMHG | TEMPERATURE: 97.3 F | HEART RATE: 84 BPM | OXYGEN SATURATION: 99 % | SYSTOLIC BLOOD PRESSURE: 146 MMHG | RESPIRATION RATE: 16 BRPM | HEIGHT: 66 IN | BODY MASS INDEX: 30.39 KG/M2

## 2018-06-20 LAB
ANION GAP SERPL CALCULATED.3IONS-SCNC: 12.5 MMOL/L
BUN BLD-MCNC: 13 MG/DL (ref 8–23)
BUN/CREAT SERPL: 14.3 (ref 7–25)
CALCIUM SPEC-SCNC: 9.3 MG/DL (ref 8.6–10.5)
CHLORIDE SERPL-SCNC: 106 MMOL/L (ref 98–107)
CO2 SERPL-SCNC: 22.5 MMOL/L (ref 22–29)
CREAT BLD-MCNC: 0.91 MG/DL (ref 0.57–1)
DEPRECATED RDW RBC AUTO: 46.7 FL (ref 37–54)
ERYTHROCYTE [DISTWIDTH] IN BLOOD BY AUTOMATED COUNT: 13.8 % (ref 11.7–13)
GFR SERPL CREATININE-BSD FRML MDRD: 63 ML/MIN/1.73
GLUCOSE BLD-MCNC: 103 MG/DL (ref 65–99)
HCT VFR BLD AUTO: 43.3 % (ref 35.6–45.5)
HGB BLD-MCNC: 14 G/DL (ref 11.9–15.5)
MCH RBC QN AUTO: 30.2 PG (ref 26.9–32)
MCHC RBC AUTO-ENTMCNC: 32.3 G/DL (ref 32.4–36.3)
MCV RBC AUTO: 93.5 FL (ref 80.5–98.2)
PLATELET # BLD AUTO: 314 10*3/MM3 (ref 140–500)
PMV BLD AUTO: 9.8 FL (ref 6–12)
POTASSIUM BLD-SCNC: 4.3 MMOL/L (ref 3.5–5.2)
RBC # BLD AUTO: 4.63 10*6/MM3 (ref 3.9–5.2)
SODIUM BLD-SCNC: 141 MMOL/L (ref 136–145)
WBC NRBC COR # BLD: 9.33 10*3/MM3 (ref 4.5–10.7)

## 2018-06-20 PROCEDURE — 36415 COLL VENOUS BLD VENIPUNCTURE: CPT

## 2018-06-20 PROCEDURE — 85027 COMPLETE CBC AUTOMATED: CPT | Performed by: ORTHOPAEDIC SURGERY

## 2018-06-20 PROCEDURE — 80048 BASIC METABOLIC PNL TOTAL CA: CPT | Performed by: ORTHOPAEDIC SURGERY

## 2018-06-20 PROCEDURE — 93010 ELECTROCARDIOGRAM REPORT: CPT | Performed by: INTERNAL MEDICINE

## 2018-06-20 PROCEDURE — 93005 ELECTROCARDIOGRAM TRACING: CPT

## 2018-06-20 RX ORDER — METAXALONE 800 MG/1
800 TABLET ORAL 3 TIMES DAILY
COMMUNITY
End: 2018-07-26 | Stop reason: SDUPTHER

## 2018-06-20 RX ORDER — TRAZODONE HYDROCHLORIDE 50 MG/1
50 TABLET ORAL NIGHTLY
COMMUNITY
End: 2018-10-10

## 2018-06-21 ENCOUNTER — HOSPITAL ENCOUNTER (OUTPATIENT)
Facility: HOSPITAL | Age: 63
Discharge: HOME | End: 2018-06-22
Attending: ORTHOPAEDIC SURGERY | Admitting: ORTHOPAEDIC SURGERY

## 2018-06-21 ENCOUNTER — APPOINTMENT (OUTPATIENT)
Dept: GENERAL RADIOLOGY | Facility: HOSPITAL | Age: 63
End: 2018-06-21

## 2018-06-21 ENCOUNTER — ANESTHESIA (OUTPATIENT)
Dept: PERIOP | Facility: HOSPITAL | Age: 63
End: 2018-06-21

## 2018-06-21 DIAGNOSIS — M47.22 CERVICAL SPONDYLOSIS WITH RADICULOPATHY: ICD-10-CM

## 2018-06-21 DIAGNOSIS — M54.2 SPINE PAIN, CERVICAL: ICD-10-CM

## 2018-06-21 PROCEDURE — C1713 ANCHOR/SCREW BN/BN,TIS/BN: HCPCS | Performed by: ORTHOPAEDIC SURGERY

## 2018-06-21 PROCEDURE — 25010000002 FENTANYL CITRATE (PF) 100 MCG/2ML SOLUTION: Performed by: ANESTHESIOLOGY

## 2018-06-21 PROCEDURE — 72020 X-RAY EXAM OF SPINE 1 VIEW: CPT

## 2018-06-21 PROCEDURE — 25010000002 MAGNESIUM SULFATE PER 500 MG OF MAGNESIUM: Performed by: ANESTHESIOLOGY

## 2018-06-21 PROCEDURE — 22845 INSERT SPINE FIXATION DEVICE: CPT | Performed by: ORTHOPAEDIC SURGERY

## 2018-06-21 PROCEDURE — 25010000002 MIDAZOLAM PER 1 MG: Performed by: ANESTHESIOLOGY

## 2018-06-21 PROCEDURE — G0378 HOSPITAL OBSERVATION PER HR: HCPCS

## 2018-06-21 PROCEDURE — 25010000002 HYDROMORPHONE PER 4 MG: Performed by: ANESTHESIOLOGY

## 2018-06-21 PROCEDURE — 25010000002 ONDANSETRON PER 1 MG: Performed by: ANESTHESIOLOGY

## 2018-06-21 PROCEDURE — 25010000002 DEXAMETHASONE PER 1 MG: Performed by: ANESTHESIOLOGY

## 2018-06-21 PROCEDURE — 25010000003 CEFAZOLIN IN DEXTROSE 2-4 GM/100ML-% SOLUTION: Performed by: ORTHOPAEDIC SURGERY

## 2018-06-21 PROCEDURE — 25010000002 NEOSTIGMINE 10 MG/10ML SOLUTION: Performed by: ANESTHESIOLOGY

## 2018-06-21 PROCEDURE — 25010000002 PROPOFOL 10 MG/ML EMULSION: Performed by: ANESTHESIOLOGY

## 2018-06-21 PROCEDURE — 20931 SP BONE ALGRFT STRUCT ADD-ON: CPT | Performed by: ORTHOPAEDIC SURGERY

## 2018-06-21 PROCEDURE — L0120 CERV FLEX N/ADJ FOAM PRE OTS: HCPCS | Performed by: ORTHOPAEDIC SURGERY

## 2018-06-21 PROCEDURE — 76000 FLUOROSCOPY <1 HR PHYS/QHP: CPT

## 2018-06-21 PROCEDURE — 22551 ARTHRD ANT NTRBDY CERVICAL: CPT | Performed by: ORTHOPAEDIC SURGERY

## 2018-06-21 DEVICE — SCRW EAGLE/SWIFT PLS S/TAP 12MM: Type: IMPLANTABLE DEVICE | Site: SPINE CERVICAL | Status: FUNCTIONAL

## 2018-06-21 DEVICE — ALLOGRFT SPINE ACDF VERTIGRAFT WEDGE FZ 7DEG 8.22X6.75MM: Type: IMPLANTABLE DEVICE | Site: SPINE CERVICAL | Status: FUNCTIONAL

## 2018-06-21 DEVICE — IMPLANTABLE DEVICE: Type: IMPLANTABLE DEVICE | Site: SPINE CERVICAL | Status: FUNCTIONAL

## 2018-06-21 RX ORDER — MIDAZOLAM HYDROCHLORIDE 1 MG/ML
2 INJECTION INTRAMUSCULAR; INTRAVENOUS
Status: DISCONTINUED | OUTPATIENT
Start: 2018-06-21 | End: 2018-06-21 | Stop reason: HOSPADM

## 2018-06-21 RX ORDER — ONDANSETRON 4 MG/1
4 TABLET, FILM COATED ORAL EVERY 8 HOURS PRN
Status: DISCONTINUED | OUTPATIENT
Start: 2018-06-21 | End: 2018-06-22 | Stop reason: HOSPADM

## 2018-06-21 RX ORDER — MIDAZOLAM HYDROCHLORIDE 1 MG/ML
1 INJECTION INTRAMUSCULAR; INTRAVENOUS
Status: DISCONTINUED | OUTPATIENT
Start: 2018-06-21 | End: 2018-06-21 | Stop reason: HOSPADM

## 2018-06-21 RX ORDER — OXYCODONE HYDROCHLORIDE AND ACETAMINOPHEN 5; 325 MG/1; MG/1
2 TABLET ORAL EVERY 4 HOURS PRN
Status: DISCONTINUED | OUTPATIENT
Start: 2018-06-21 | End: 2018-06-22 | Stop reason: HOSPADM

## 2018-06-21 RX ORDER — FENTANYL CITRATE 50 UG/ML
INJECTION, SOLUTION INTRAMUSCULAR; INTRAVENOUS AS NEEDED
Status: DISCONTINUED | OUTPATIENT
Start: 2018-06-21 | End: 2018-06-21 | Stop reason: SURG

## 2018-06-21 RX ORDER — ACETAMINOPHEN 325 MG/1
650 TABLET ORAL EVERY 4 HOURS PRN
Status: DISCONTINUED | OUTPATIENT
Start: 2018-06-21 | End: 2018-06-22 | Stop reason: HOSPADM

## 2018-06-21 RX ORDER — SUMATRIPTAN 100 MG/1
100 TABLET, FILM COATED ORAL
Status: DISCONTINUED | OUTPATIENT
Start: 2018-06-21 | End: 2018-06-22 | Stop reason: HOSPADM

## 2018-06-21 RX ORDER — HYDROMORPHONE HCL IN 0.9% NACL 10 MG/50ML
PATIENT CONTROLLED ANALGESIA SYRINGE INTRAVENOUS CONTINUOUS
Status: DISCONTINUED | OUTPATIENT
Start: 2018-06-21 | End: 2018-06-22 | Stop reason: HOSPADM

## 2018-06-21 RX ORDER — TRAZODONE HYDROCHLORIDE 50 MG/1
50 TABLET ORAL NIGHTLY
Status: DISCONTINUED | OUTPATIENT
Start: 2018-06-21 | End: 2018-06-22 | Stop reason: HOSPADM

## 2018-06-21 RX ORDER — PROMETHAZINE HYDROCHLORIDE 25 MG/ML
12.5 INJECTION, SOLUTION INTRAMUSCULAR; INTRAVENOUS ONCE AS NEEDED
Status: DISCONTINUED | OUTPATIENT
Start: 2018-06-21 | End: 2018-06-21 | Stop reason: HOSPADM

## 2018-06-21 RX ORDER — SODIUM CHLORIDE 0.9 % (FLUSH) 0.9 %
1-10 SYRINGE (ML) INJECTION AS NEEDED
Status: DISCONTINUED | OUTPATIENT
Start: 2018-06-21 | End: 2018-06-22 | Stop reason: HOSPADM

## 2018-06-21 RX ORDER — NALOXONE HCL 0.4 MG/ML
0.1 VIAL (ML) INJECTION
Status: DISCONTINUED | OUTPATIENT
Start: 2018-06-21 | End: 2018-06-22 | Stop reason: HOSPADM

## 2018-06-21 RX ORDER — DULOXETIN HYDROCHLORIDE 60 MG/1
60 CAPSULE, DELAYED RELEASE ORAL DAILY
Status: DISCONTINUED | OUTPATIENT
Start: 2018-06-22 | End: 2018-06-22 | Stop reason: HOSPADM

## 2018-06-21 RX ORDER — CEFAZOLIN SODIUM 2 G/100ML
2 INJECTION, SOLUTION INTRAVENOUS EVERY 8 HOURS
Status: COMPLETED | OUTPATIENT
Start: 2018-06-22 | End: 2018-06-22

## 2018-06-21 RX ORDER — DEXAMETHASONE 4 MG/1
4 TABLET ORAL EVERY 6 HOURS SCHEDULED
Status: DISCONTINUED | OUTPATIENT
Start: 2018-06-22 | End: 2018-06-22 | Stop reason: HOSPADM

## 2018-06-21 RX ORDER — BISACODYL 10 MG
10 SUPPOSITORY, RECTAL RECTAL DAILY PRN
Status: DISCONTINUED | OUTPATIENT
Start: 2018-06-21 | End: 2018-06-22 | Stop reason: HOSPADM

## 2018-06-21 RX ORDER — DIPHENHYDRAMINE HYDROCHLORIDE 50 MG/ML
12.5 INJECTION INTRAMUSCULAR; INTRAVENOUS
Status: DISCONTINUED | OUTPATIENT
Start: 2018-06-21 | End: 2018-06-21 | Stop reason: HOSPADM

## 2018-06-21 RX ORDER — LIDOCAINE HYDROCHLORIDE 20 MG/ML
INJECTION, SOLUTION INFILTRATION; PERINEURAL AS NEEDED
Status: DISCONTINUED | OUTPATIENT
Start: 2018-06-21 | End: 2018-06-21 | Stop reason: SURG

## 2018-06-21 RX ORDER — DIPHENHYDRAMINE HCL 25 MG
25 CAPSULE ORAL EVERY 6 HOURS PRN
Status: DISCONTINUED | OUTPATIENT
Start: 2018-06-21 | End: 2018-06-22 | Stop reason: HOSPADM

## 2018-06-21 RX ORDER — PROMETHAZINE HYDROCHLORIDE 25 MG/1
25 TABLET ORAL ONCE AS NEEDED
Status: DISCONTINUED | OUTPATIENT
Start: 2018-06-21 | End: 2018-06-21 | Stop reason: HOSPADM

## 2018-06-21 RX ORDER — POLYETHYLENE GLYCOL 3350 17 G/17G
17 POWDER, FOR SOLUTION ORAL DAILY PRN
Status: DISCONTINUED | OUTPATIENT
Start: 2018-06-21 | End: 2018-06-22 | Stop reason: HOSPADM

## 2018-06-21 RX ORDER — MAGNESIUM HYDROXIDE 1200 MG/15ML
LIQUID ORAL AS NEEDED
Status: DISCONTINUED | OUTPATIENT
Start: 2018-06-21 | End: 2018-06-21 | Stop reason: HOSPADM

## 2018-06-21 RX ORDER — SODIUM CHLORIDE 0.9 % (FLUSH) 0.9 %
1-10 SYRINGE (ML) INJECTION AS NEEDED
Status: DISCONTINUED | OUTPATIENT
Start: 2018-06-21 | End: 2018-06-21 | Stop reason: HOSPADM

## 2018-06-21 RX ORDER — ONDANSETRON 2 MG/ML
4 INJECTION INTRAMUSCULAR; INTRAVENOUS ONCE AS NEEDED
Status: DISCONTINUED | OUTPATIENT
Start: 2018-06-21 | End: 2018-06-21 | Stop reason: HOSPADM

## 2018-06-21 RX ORDER — PROMETHAZINE HYDROCHLORIDE 25 MG/1
25 SUPPOSITORY RECTAL ONCE AS NEEDED
Status: DISCONTINUED | OUTPATIENT
Start: 2018-06-21 | End: 2018-06-21 | Stop reason: HOSPADM

## 2018-06-21 RX ORDER — LIDOCAINE HYDROCHLORIDE 10 MG/ML
0.5 INJECTION, SOLUTION EPIDURAL; INFILTRATION; INTRACAUDAL; PERINEURAL ONCE AS NEEDED
Status: DISCONTINUED | OUTPATIENT
Start: 2018-06-21 | End: 2018-06-21 | Stop reason: HOSPADM

## 2018-06-21 RX ORDER — GLYCOPYRROLATE 0.2 MG/ML
INJECTION INTRAMUSCULAR; INTRAVENOUS AS NEEDED
Status: DISCONTINUED | OUTPATIENT
Start: 2018-06-21 | End: 2018-06-21 | Stop reason: SURG

## 2018-06-21 RX ORDER — PROMETHAZINE HYDROCHLORIDE 25 MG/1
12.5 TABLET ORAL ONCE AS NEEDED
Status: DISCONTINUED | OUTPATIENT
Start: 2018-06-21 | End: 2018-06-21 | Stop reason: HOSPADM

## 2018-06-21 RX ORDER — FLUMAZENIL 0.1 MG/ML
0.2 INJECTION INTRAVENOUS AS NEEDED
Status: DISCONTINUED | OUTPATIENT
Start: 2018-06-21 | End: 2018-06-21 | Stop reason: HOSPADM

## 2018-06-21 RX ORDER — PROPOFOL 10 MG/ML
VIAL (ML) INTRAVENOUS AS NEEDED
Status: DISCONTINUED | OUTPATIENT
Start: 2018-06-21 | End: 2018-06-21 | Stop reason: SURG

## 2018-06-21 RX ORDER — ONDANSETRON 2 MG/ML
INJECTION INTRAMUSCULAR; INTRAVENOUS AS NEEDED
Status: DISCONTINUED | OUTPATIENT
Start: 2018-06-21 | End: 2018-06-21 | Stop reason: SURG

## 2018-06-21 RX ORDER — HYDROMORPHONE HYDROCHLORIDE 1 MG/ML
0.5 INJECTION, SOLUTION INTRAMUSCULAR; INTRAVENOUS; SUBCUTANEOUS
Status: DISCONTINUED | OUTPATIENT
Start: 2018-06-21 | End: 2018-06-21 | Stop reason: HOSPADM

## 2018-06-21 RX ORDER — NALOXONE HCL 0.4 MG/ML
0.2 VIAL (ML) INJECTION AS NEEDED
Status: DISCONTINUED | OUTPATIENT
Start: 2018-06-21 | End: 2018-06-21 | Stop reason: HOSPADM

## 2018-06-21 RX ORDER — MAGNESIUM SULFATE HEPTAHYDRATE 500 MG/ML
INJECTION, SOLUTION INTRAMUSCULAR; INTRAVENOUS AS NEEDED
Status: DISCONTINUED | OUTPATIENT
Start: 2018-06-21 | End: 2018-06-21 | Stop reason: SURG

## 2018-06-21 RX ORDER — CEFAZOLIN SODIUM 2 G/100ML
2 INJECTION, SOLUTION INTRAVENOUS ONCE
Status: COMPLETED | OUTPATIENT
Start: 2018-06-21 | End: 2018-06-21

## 2018-06-21 RX ORDER — PANTOPRAZOLE SODIUM 40 MG/1
40 TABLET, DELAYED RELEASE ORAL EVERY MORNING
Status: DISCONTINUED | OUTPATIENT
Start: 2018-06-22 | End: 2018-06-22 | Stop reason: HOSPADM

## 2018-06-21 RX ORDER — LABETALOL HYDROCHLORIDE 5 MG/ML
5 INJECTION, SOLUTION INTRAVENOUS
Status: DISCONTINUED | OUTPATIENT
Start: 2018-06-21 | End: 2018-06-21 | Stop reason: HOSPADM

## 2018-06-21 RX ORDER — DICYCLOMINE HYDROCHLORIDE 10 MG/1
10 CAPSULE ORAL 4 TIMES DAILY PRN
Status: DISCONTINUED | OUTPATIENT
Start: 2018-06-21 | End: 2018-06-22 | Stop reason: HOSPADM

## 2018-06-21 RX ORDER — OXYCODONE AND ACETAMINOPHEN 7.5; 325 MG/1; MG/1
1 TABLET ORAL ONCE AS NEEDED
Status: DISCONTINUED | OUTPATIENT
Start: 2018-06-21 | End: 2018-06-21 | Stop reason: HOSPADM

## 2018-06-21 RX ORDER — FENTANYL CITRATE 50 UG/ML
50 INJECTION, SOLUTION INTRAMUSCULAR; INTRAVENOUS
Status: DISCONTINUED | OUTPATIENT
Start: 2018-06-21 | End: 2018-06-21 | Stop reason: HOSPADM

## 2018-06-21 RX ORDER — ROCURONIUM BROMIDE 10 MG/ML
INJECTION, SOLUTION INTRAVENOUS AS NEEDED
Status: DISCONTINUED | OUTPATIENT
Start: 2018-06-21 | End: 2018-06-21 | Stop reason: SURG

## 2018-06-21 RX ORDER — DEXAMETHASONE SODIUM PHOSPHATE 10 MG/ML
INJECTION INTRAMUSCULAR; INTRAVENOUS AS NEEDED
Status: DISCONTINUED | OUTPATIENT
Start: 2018-06-21 | End: 2018-06-21 | Stop reason: SURG

## 2018-06-21 RX ORDER — SODIUM CHLORIDE AND POTASSIUM CHLORIDE 150; 450 MG/100ML; MG/100ML
100 INJECTION, SOLUTION INTRAVENOUS CONTINUOUS
Status: DISCONTINUED | OUTPATIENT
Start: 2018-06-21 | End: 2018-06-22 | Stop reason: HOSPADM

## 2018-06-21 RX ORDER — DEXAMETHASONE SODIUM PHOSPHATE 4 MG/ML
4 INJECTION, SOLUTION INTRA-ARTICULAR; INTRALESIONAL; INTRAMUSCULAR; INTRAVENOUS; SOFT TISSUE EVERY 6 HOURS SCHEDULED
Status: DISCONTINUED | OUTPATIENT
Start: 2018-06-22 | End: 2018-06-22 | Stop reason: HOSPADM

## 2018-06-21 RX ORDER — HYDROCODONE BITARTRATE AND ACETAMINOPHEN 7.5; 325 MG/1; MG/1
1 TABLET ORAL ONCE AS NEEDED
Status: DISCONTINUED | OUTPATIENT
Start: 2018-06-21 | End: 2018-06-21 | Stop reason: HOSPADM

## 2018-06-21 RX ORDER — FAMOTIDINE 10 MG/ML
20 INJECTION, SOLUTION INTRAVENOUS ONCE
Status: COMPLETED | OUTPATIENT
Start: 2018-06-21 | End: 2018-06-21

## 2018-06-21 RX ORDER — ALPRAZOLAM 0.5 MG/1
0.5 TABLET ORAL 2 TIMES DAILY PRN
Status: DISCONTINUED | OUTPATIENT
Start: 2018-06-21 | End: 2018-06-22 | Stop reason: HOSPADM

## 2018-06-21 RX ORDER — METAXALONE 800 MG/1
800 TABLET ORAL 3 TIMES DAILY
Status: DISCONTINUED | OUTPATIENT
Start: 2018-06-21 | End: 2018-06-22 | Stop reason: HOSPADM

## 2018-06-21 RX ORDER — NEOSTIGMINE METHYLSULFATE 1 MG/ML
INJECTION, SOLUTION INTRAVENOUS AS NEEDED
Status: DISCONTINUED | OUTPATIENT
Start: 2018-06-21 | End: 2018-06-21 | Stop reason: SURG

## 2018-06-21 RX ORDER — KETAMINE HYDROCHLORIDE 10 MG/ML
INJECTION INTRAMUSCULAR; INTRAVENOUS AS NEEDED
Status: DISCONTINUED | OUTPATIENT
Start: 2018-06-21 | End: 2018-06-21 | Stop reason: SURG

## 2018-06-21 RX ORDER — EPHEDRINE SULFATE 50 MG/ML
5 INJECTION, SOLUTION INTRAVENOUS ONCE AS NEEDED
Status: DISCONTINUED | OUTPATIENT
Start: 2018-06-21 | End: 2018-06-21 | Stop reason: HOSPADM

## 2018-06-21 RX ORDER — SODIUM CHLORIDE, SODIUM LACTATE, POTASSIUM CHLORIDE, CALCIUM CHLORIDE 600; 310; 30; 20 MG/100ML; MG/100ML; MG/100ML; MG/100ML
9 INJECTION, SOLUTION INTRAVENOUS CONTINUOUS
Status: DISCONTINUED | OUTPATIENT
Start: 2018-06-21 | End: 2018-06-22 | Stop reason: HOSPADM

## 2018-06-21 RX ADMIN — LIDOCAINE HYDROCHLORIDE 100 MG: 20 INJECTION, SOLUTION INFILTRATION; PERINEURAL at 17:32

## 2018-06-21 RX ADMIN — GLYCOPYRROLATE 0.2 MG: 0.2 INJECTION INTRAMUSCULAR; INTRAVENOUS at 17:32

## 2018-06-21 RX ADMIN — FENTANYL CITRATE 50 MCG: 50 INJECTION, SOLUTION INTRAMUSCULAR; INTRAVENOUS at 19:10

## 2018-06-21 RX ADMIN — HYDROMORPHONE HYDROCHLORIDE 0.5 MG: 1 INJECTION, SOLUTION INTRAMUSCULAR; INTRAVENOUS; SUBCUTANEOUS at 19:25

## 2018-06-21 RX ADMIN — GLYCOPYRROLATE 0.4 MG: 0.2 INJECTION INTRAMUSCULAR; INTRAVENOUS at 18:50

## 2018-06-21 RX ADMIN — SODIUM CHLORIDE, POTASSIUM CHLORIDE, SODIUM LACTATE AND CALCIUM CHLORIDE: 600; 310; 30; 20 INJECTION, SOLUTION INTRAVENOUS at 18:50

## 2018-06-21 RX ADMIN — KETAMINE HYDROCHLORIDE 30 MG: 10 INJECTION, SOLUTION INTRAMUSCULAR; INTRAVENOUS at 17:32

## 2018-06-21 RX ADMIN — FENTANYL CITRATE 50 MCG: 50 INJECTION INTRAMUSCULAR; INTRAVENOUS at 19:04

## 2018-06-21 RX ADMIN — MIDAZOLAM 2 MG: 1 INJECTION INTRAMUSCULAR; INTRAVENOUS at 14:29

## 2018-06-21 RX ADMIN — ACETAMINOPHEN 650 MG: 325 TABLET, FILM COATED ORAL at 22:38

## 2018-06-21 RX ADMIN — HYDROMORPHONE HYDROCHLORIDE: 10 INJECTION INTRAMUSCULAR; INTRAVENOUS; SUBCUTANEOUS at 20:11

## 2018-06-21 RX ADMIN — FAMOTIDINE 20 MG: 10 INJECTION, SOLUTION INTRAVENOUS at 14:29

## 2018-06-21 RX ADMIN — SODIUM CHLORIDE, POTASSIUM CHLORIDE, SODIUM LACTATE AND CALCIUM CHLORIDE 9 ML/HR: 600; 310; 30; 20 INJECTION, SOLUTION INTRAVENOUS at 14:26

## 2018-06-21 RX ADMIN — NEOSTIGMINE METHYLSULFATE 2 MG: 1 INJECTION INTRAVENOUS at 18:45

## 2018-06-21 RX ADMIN — GLYCOPYRROLATE 0.4 MG: 0.2 INJECTION INTRAMUSCULAR; INTRAVENOUS at 18:45

## 2018-06-21 RX ADMIN — FENTANYL CITRATE 50 MCG: 50 INJECTION INTRAMUSCULAR; INTRAVENOUS at 17:52

## 2018-06-21 RX ADMIN — HYDROMORPHONE HYDROCHLORIDE 0.5 MG: 1 INJECTION, SOLUTION INTRAMUSCULAR; INTRAVENOUS; SUBCUTANEOUS at 19:45

## 2018-06-21 RX ADMIN — NEOSTIGMINE METHYLSULFATE 3 MG: 1 INJECTION INTRAVENOUS at 18:50

## 2018-06-21 RX ADMIN — DEXAMETHASONE SODIUM PHOSPHATE 4 MG: 10 INJECTION INTRAMUSCULAR; INTRAVENOUS at 18:01

## 2018-06-21 RX ADMIN — MAGNESIUM SULFATE HEPTAHYDRATE 2 G: 500 INJECTION, SOLUTION INTRAMUSCULAR; INTRAVENOUS at 17:43

## 2018-06-21 RX ADMIN — CEFAZOLIN SODIUM 2 G: 2 INJECTION, SOLUTION INTRAVENOUS at 17:38

## 2018-06-21 RX ADMIN — FENTANYL CITRATE 50 MCG: 50 INJECTION INTRAMUSCULAR; INTRAVENOUS at 17:32

## 2018-06-21 RX ADMIN — FENTANYL CITRATE 50 MCG: 50 INJECTION, SOLUTION INTRAMUSCULAR; INTRAVENOUS at 19:30

## 2018-06-21 RX ADMIN — FENTANYL CITRATE 50 MCG: 50 INJECTION INTRAMUSCULAR; INTRAVENOUS at 18:53

## 2018-06-21 RX ADMIN — ROCURONIUM BROMIDE 30 MG: 10 INJECTION INTRAVENOUS at 17:32

## 2018-06-21 RX ADMIN — KETAMINE HYDROCHLORIDE 20 MG: 10 INJECTION, SOLUTION INTRAMUSCULAR; INTRAVENOUS at 18:17

## 2018-06-21 RX ADMIN — PROPOFOL 150 MG: 10 INJECTION, EMULSION INTRAVENOUS at 17:32

## 2018-06-21 RX ADMIN — FENTANYL CITRATE 50 MCG: 50 INJECTION, SOLUTION INTRAMUSCULAR; INTRAVENOUS at 16:03

## 2018-06-21 RX ADMIN — DEXAMETHASONE SODIUM PHOSPHATE 8 MG: 10 INJECTION INTRAMUSCULAR; INTRAVENOUS at 17:32

## 2018-06-21 RX ADMIN — ONDANSETRON 4 MG: 2 INJECTION INTRAMUSCULAR; INTRAVENOUS at 18:32

## 2018-06-21 NOTE — ANESTHESIA PROCEDURE NOTES
Airway  Airway not difficult    General Information and Staff    Anesthesiologist: ALBERTO GRANT    Indications and Patient Condition    Preoxygenated: yes  Mask difficulty assessment: 1 - vent by mask    Final Airway Details  Final airway type: endotracheal airway      Successful airway: ETT  Cuffed: yes   Successful intubation technique: direct laryngoscopy  Endotracheal tube insertion site: oral  Blade: Chaya  Blade size: #3  ETT size: 7.0 mm  Cormack-Lehane Classification: grade IIa - partial view of glottis  Placement verified by: chest auscultation and capnometry   Measured from: gums  ETT to gums (cm): 22    Additional Comments  Teeth checked after intubation, no damage noted.

## 2018-06-21 NOTE — ANESTHESIA PREPROCEDURE EVALUATION
" Anesthesia Evaluation     Patient summary reviewed   history of anesthetic complications (Woke up \"crazy\" with last surgery.):  NPO Solid Status: > 8 hours  NPO Liquid Status: > 4 hours           Airway   Mallampati: II  TM distance: >3 FB  Dental      Pulmonary    Cardiovascular     ECG reviewed  Rhythm: regular  Rate: normal        Neuro/Psych  (+) headaches, psychiatric history Anxiety and Depression,     GI/Hepatic/Renal/Endo    (+)  hiatal hernia, GERD,      ROS Comment: IBS, recent colon resection for recurrent diverticulitis.    Musculoskeletal     (+) neck pain,   Abdominal    Substance History      OB/GYN          Other   (+) arthritis                     Anesthesia Plan    ASA 2     general     intravenous induction   Anesthetic plan and risks discussed with patient.      "

## 2018-06-22 VITALS
HEIGHT: 66 IN | SYSTOLIC BLOOD PRESSURE: 112 MMHG | WEIGHT: 187.44 LBS | OXYGEN SATURATION: 93 % | TEMPERATURE: 97.6 F | BODY MASS INDEX: 30.12 KG/M2 | HEART RATE: 71 BPM | DIASTOLIC BLOOD PRESSURE: 65 MMHG | RESPIRATION RATE: 18 BRPM

## 2018-06-22 PROCEDURE — 63710000001 DEXAMETHASONE PER 0.25 MG: Performed by: ORTHOPAEDIC SURGERY

## 2018-06-22 PROCEDURE — 99024 POSTOP FOLLOW-UP VISIT: CPT | Performed by: ORTHOPAEDIC SURGERY

## 2018-06-22 PROCEDURE — 25810000003 POTASSIUM CHLORIDE PER 2 MEQ: Performed by: ORTHOPAEDIC SURGERY

## 2018-06-22 PROCEDURE — 25010000003 CEFAZOLIN IN DEXTROSE 2-4 GM/100ML-% SOLUTION: Performed by: ORTHOPAEDIC SURGERY

## 2018-06-22 PROCEDURE — G0378 HOSPITAL OBSERVATION PER HR: HCPCS

## 2018-06-22 RX ORDER — OXYCODONE HYDROCHLORIDE AND ACETAMINOPHEN 5; 325 MG/1; MG/1
2 TABLET ORAL EVERY 4 HOURS PRN
Qty: 35 TABLET | Refills: 0 | Status: SHIPPED | OUTPATIENT
Start: 2018-06-22 | End: 2018-07-01

## 2018-06-22 RX ADMIN — DEXAMETHASONE 4 MG: 4 TABLET ORAL at 00:48

## 2018-06-22 RX ADMIN — METAXALONE 800 MG: 800 TABLET ORAL at 08:25

## 2018-06-22 RX ADMIN — CEFAZOLIN SODIUM 2 G: 2 INJECTION, SOLUTION INTRAVENOUS at 02:42

## 2018-06-22 RX ADMIN — CEFAZOLIN SODIUM 2 G: 2 INJECTION, SOLUTION INTRAVENOUS at 11:49

## 2018-06-22 RX ADMIN — TRAZODONE HYDROCHLORIDE 50 MG: 50 TABLET ORAL at 00:48

## 2018-06-22 RX ADMIN — METAXALONE 800 MG: 800 TABLET ORAL at 00:48

## 2018-06-22 RX ADMIN — DEXAMETHASONE 4 MG: 4 TABLET ORAL at 06:07

## 2018-06-22 RX ADMIN — POLYETHYLENE GLYCOL 3350 17 G: 17 POWDER, FOR SOLUTION ORAL at 08:25

## 2018-06-22 RX ADMIN — OXYCODONE HYDROCHLORIDE AND ACETAMINOPHEN 2 TABLET: 5; 325 TABLET ORAL at 12:38

## 2018-06-22 RX ADMIN — DEXAMETHASONE 4 MG: 4 TABLET ORAL at 11:50

## 2018-06-22 RX ADMIN — POTASSIUM CHLORIDE AND SODIUM CHLORIDE 100 ML/HR: 450; 150 INJECTION, SOLUTION INTRAVENOUS at 00:48

## 2018-06-22 RX ADMIN — PANTOPRAZOLE SODIUM 40 MG: 40 TABLET, DELAYED RELEASE ORAL at 06:07

## 2018-06-22 RX ADMIN — OXYCODONE HYDROCHLORIDE AND ACETAMINOPHEN 2 TABLET: 5; 325 TABLET ORAL at 08:25

## 2018-06-22 RX ADMIN — DULOXETINE HYDROCHLORIDE 60 MG: 60 CAPSULE, DELAYED RELEASE ORAL at 08:25

## 2018-06-22 NOTE — ANESTHESIA POSTPROCEDURE EVALUATION
"Patient: Magdalena Dickey    Procedure Summary     Date:  06/21/18 Room / Location:  [facility] OR  / [facility] MAIN OR    Anesthesia Start:  1725 Anesthesia Stop:  1906    Procedure:  C6 7 anterior cervical discectomy and fusion with allograft and plate (N/A Spine Cervical) Diagnosis:       Cervical spondylosis with radiculopathy      Spine pain, cervical      (Cervical spondylosis with radiculopathy [M47.22])      (Spine pain, cervical [M54.2])    Surgeon:  Jacobo De Dios MD Provider:  Fermín Gilbert MD    Anesthesia Type:  general ASA Status:  2          Anesthesia Type: general  Last vitals  BP   141/72 (06/21/18 2015)   Temp   36.5 °C (97.7 °F) (06/21/18 1900)   Pulse   88 (06/21/18 2015)   Resp   16 (06/21/18 2015)     SpO2   96 % (06/21/18 2015)     Post Anesthesia Care and Evaluation    Patient location during evaluation: bedside  Patient participation: complete - patient participated  Level of consciousness: awake and alert  Pain management: adequate  Airway patency: patent  Anesthetic complications: No anesthetic complications    Cardiovascular status: acceptable  Respiratory status: acceptable  Hydration status: acceptable    Comments: /72 (BP Location: Right arm, Patient Position: Lying)   Pulse 88   Temp 36.5 °C (97.7 °F) (Oral)   Resp 16   Ht 167.6 cm (65.98\")   Wt 85 kg (187 lb 7 oz)   SpO2 96%   BMI 30.27 kg/m²       "

## 2018-06-28 RX ORDER — DICLOFENAC SODIUM 75 MG/1
75 TABLET, DELAYED RELEASE ORAL 2 TIMES DAILY
Qty: 180 TABLET | Refills: 0 | Status: SHIPPED | OUTPATIENT
Start: 2018-06-28 | End: 2019-03-05 | Stop reason: SDUPTHER

## 2018-06-28 RX ORDER — GABAPENTIN 600 MG/1
600 TABLET ORAL 2 TIMES DAILY
Qty: 180 TABLET | Refills: 0 | Status: SHIPPED | OUTPATIENT
Start: 2018-06-28 | End: 2018-09-14 | Stop reason: SDUPTHER

## 2018-06-28 RX ORDER — DULOXETIN HYDROCHLORIDE 60 MG/1
60 CAPSULE, DELAYED RELEASE ORAL DAILY
Qty: 90 CAPSULE | Refills: 0 | Status: SHIPPED | OUTPATIENT
Start: 2018-06-28

## 2018-07-13 ENCOUNTER — OFFICE VISIT (OUTPATIENT)
Dept: ORTHOPEDIC SURGERY | Facility: CLINIC | Age: 63
End: 2018-07-13

## 2018-07-13 VITALS — HEIGHT: 66 IN | TEMPERATURE: 97.3 F | BODY MASS INDEX: 30.7 KG/M2 | WEIGHT: 191 LBS

## 2018-07-13 DIAGNOSIS — M43.22 CERVICAL VERTEBRAL FUSION: Primary | ICD-10-CM

## 2018-07-13 PROCEDURE — 99024 POSTOP FOLLOW-UP VISIT: CPT | Performed by: ORTHOPAEDIC SURGERY

## 2018-07-13 PROCEDURE — 72020 X-RAY EXAM OF SPINE 1 VIEW: CPT | Performed by: ORTHOPAEDIC SURGERY

## 2018-07-13 NOTE — PROGRESS NOTES
Postoperatively the patient expresses normal incisional pain.  There is little or no radiating pain.  Voice remains airy and swallow intact.  Good strength on exam.  The wound is intact.  Lateral xray of the cervical spine was obtained to evaluate hardware position and fusion bone an shows good position thereof and are unchanged from intraoperative images.  Instructions given.  We'll need cervical lateral xray on return visit.

## 2018-07-25 ENCOUNTER — TELEPHONE (OUTPATIENT)
Dept: ORTHOPEDIC SURGERY | Facility: CLINIC | Age: 63
End: 2018-07-25

## 2018-07-25 NOTE — TELEPHONE ENCOUNTER
----- Message from Magdalena Dickey sent at 7/25/2018  7:59 AM EDT -----  Regarding: Non-Urgent Medical Question  Contact: [phone]    MY CHART MESSAGE:    Dr. De Dios:  I am 5 weeks tomorrow post op a 1 level discectomy.   When can I start taking my NSAIDs?   I am having some pain since I returned to work.    thanks,   Madelaine Dickey

## 2018-07-26 RX ORDER — METAXALONE 800 MG/1
800 TABLET ORAL 3 TIMES DAILY
Qty: 90 TABLET | Refills: 1 | Status: SHIPPED | OUTPATIENT
Start: 2018-07-26 | End: 2019-01-22 | Stop reason: SDUPTHER

## 2018-07-26 RX ORDER — METAXALONE 800 MG/1
TABLET ORAL
Qty: 90 TABLET | Refills: 1 | OUTPATIENT
Start: 2018-07-26

## 2018-08-07 ENCOUNTER — DOCUMENTATION (OUTPATIENT)
Dept: PHYSICAL THERAPY | Facility: HOSPITAL | Age: 63
End: 2018-08-07

## 2018-08-07 DIAGNOSIS — M54.50 CHRONIC BILATERAL LOW BACK PAIN WITHOUT SCIATICA: Primary | ICD-10-CM

## 2018-08-07 DIAGNOSIS — G89.29 CHRONIC BILATERAL LOW BACK PAIN WITHOUT SCIATICA: Primary | ICD-10-CM

## 2018-08-07 NOTE — THERAPY DISCHARGE NOTE
Outpatient Physical Therapy Discharge Summary         Patient Name: Magdalena Dickey  : 1955  MRN: [id number]    Today's Date: 2018    Visit Dx:    ICD-10-CM ICD-9-CM   1. Chronic bilateral low back pain without sciatica M54.5 724.2    G89.29 338.29             PT OP Goals     Row Name 18 0900          PT Short Term Goals    STG Date to Achieve 17  -KJ     STG 1 Pt. will be independent and compliant with initial home exercise program.  -KJ     STG 1 Progress Met  -KJ     STG 2 Pt. will report low back pain and B hips pain </= 4-5/10 to increase ease of preparing a meal.   -KJ     STG 2 Progress Partially Met  -KJ     STG 2 Progress Comments Up to 10/10 at times.   -KJ     STG 3 Pt. will report standing tolerance >15 minutes.   -KJ     STG 3 Progress Ongoing  -KJ     STG 3 Progress Comments Contines to be limited by B hip pain.   -KJ        Long Term Goals    LTG Date to Achieve 17  -KJ     LTG 1 Pt. will be independent and compliant with advanced home exercise program.  -KJ     LTG 1 Progress Met  -KJ     LTG 2 Pt. will report low back pain </=2-3/10 to increase ease of sitting through dinner.  -KJ     LTG 2 Progress Not Met  -KJ     LTG 2 Progress Comments Up to 10/10 at times.   -KJ     LTG 3 Pt. will score </=31% on Oswestry Disability indicating decreased perceived functional disability.   -KJ     LTG 3 Progress Not Met  -KJ     LTG 3 Progress Comments 40% at last assessment.   -KJ       User Key  (r) = Recorded By, (t) = Taken By, (c) = Cosigned By    Initials Name Provider Type    Nina Rios, PT Physical Therapist          OP PT Discharge Summary  Date of Discharge: 18  Reason for Discharge: Maximum functional potential achieved (Pt. continuing to report severe pain at times, beyond reasonable expectations for known condition. Pt. would benefit from imaging and CINDY's. Will hold therapy at this time.)  Outcomes Achieved: Patient able to partially acheive  established goals  Discharge Destination: Home with home program      Time Calculation:        Therapy Suggested Charges     Code   Minutes Charges    None                       Nina Nash, PT  8/7/2018

## 2018-08-24 ENCOUNTER — OFFICE VISIT (OUTPATIENT)
Dept: ORTHOPEDIC SURGERY | Facility: CLINIC | Age: 63
End: 2018-08-24

## 2018-08-24 VITALS — WEIGHT: 191 LBS | HEIGHT: 66 IN | BODY MASS INDEX: 30.7 KG/M2 | TEMPERATURE: 98.7 F

## 2018-08-24 DIAGNOSIS — Z09 SURGERY FOLLOW-UP: Primary | ICD-10-CM

## 2018-08-24 PROCEDURE — 99024 POSTOP FOLLOW-UP VISIT: CPT | Performed by: ORTHOPAEDIC SURGERY

## 2018-08-24 PROCEDURE — 72020 X-RAY EXAM OF SPINE 1 VIEW: CPT | Performed by: ORTHOPAEDIC SURGERY

## 2018-08-30 RX ORDER — DICYCLOMINE HYDROCHLORIDE 10 MG/1
10 CAPSULE ORAL 4 TIMES DAILY PRN
Qty: 30 CAPSULE | Refills: 1 | Status: SHIPPED | OUTPATIENT
Start: 2018-08-30 | End: 2019-03-05 | Stop reason: SDUPTHER

## 2018-09-14 ENCOUNTER — OFFICE VISIT (OUTPATIENT)
Dept: FAMILY MEDICINE CLINIC | Facility: CLINIC | Age: 63
End: 2018-09-14

## 2018-09-14 VITALS
WEIGHT: 195.8 LBS | HEIGHT: 66 IN | DIASTOLIC BLOOD PRESSURE: 82 MMHG | HEART RATE: 78 BPM | OXYGEN SATURATION: 97 % | BODY MASS INDEX: 31.47 KG/M2 | SYSTOLIC BLOOD PRESSURE: 134 MMHG

## 2018-09-14 DIAGNOSIS — K21.9 GASTROESOPHAGEAL REFLUX DISEASE, ESOPHAGITIS PRESENCE NOT SPECIFIED: ICD-10-CM

## 2018-09-14 DIAGNOSIS — K58.2 IRRITABLE BOWEL SYNDROME WITH BOTH CONSTIPATION AND DIARRHEA: ICD-10-CM

## 2018-09-14 DIAGNOSIS — G43.009 MIGRAINE WITHOUT AURA AND WITHOUT STATUS MIGRAINOSUS, NOT INTRACTABLE: Primary | ICD-10-CM

## 2018-09-14 PROCEDURE — 99214 OFFICE O/P EST MOD 30 MIN: CPT | Performed by: INTERNAL MEDICINE

## 2018-09-14 RX ORDER — GABAPENTIN 600 MG/1
600 TABLET ORAL 2 TIMES DAILY
Qty: 180 TABLET | Refills: 0 | Status: SHIPPED | OUTPATIENT
Start: 2018-09-14 | End: 2018-12-14 | Stop reason: SDUPTHER

## 2018-09-14 RX ORDER — DOCUSATE SODIUM 100 MG/1
100 CAPSULE, LIQUID FILLED ORAL 2 TIMES DAILY
COMMUNITY
End: 2019-09-18

## 2018-09-14 RX ORDER — BUPROPION HYDROCHLORIDE 150 MG/1
150 TABLET ORAL DAILY
COMMUNITY
End: 2018-12-14

## 2018-09-14 NOTE — PROGRESS NOTES
Subjective   Magdalena Dickey is a 63 y.o. female who presents today for:    Migraine (refill Gabapentin)    History of Present Illness   Migraines developed in her late 20s/early 30s, and did not change once she went through menopause.  She takes gabapentin 600 mg twice a day.  She has not needed her sumatriptan within the past 2 months.  The gabapentin does not cause excess sedation.    She is now seeing a psychiatrist and was recently started on Wellbutrin XL.  This has not caused an increase in her headaches.     She still complains of diffuse myalgias that go back quite some time.  She has intermittent muscle cramps as well that are also chronic.     She has reflux disease for which she takes pantoprazole with good benefit.  She has not tried cutting back on the dose with a frequency.    She also has symptoms consistent with IBS with both diarrhea and constipation at times.  She usually takes Colace twice a day; occasionally she needs MiraLAX as well.    Ms. Dickey  reports that she quit smoking about 4 years ago. Her smoking use included Cigarettes. She has never used smokeless tobacco. She reports that she does not drink alcohol or use drugs.     No Known Allergies    Current Outpatient Prescriptions:   •  ALPRAZolam (XANAX) 0.5 MG tablet, Take 1 tablet by mouth 2 (Two) Times a Day As Needed for Anxiety., Disp: 60 tablet, Rfl: 1  •  buPROPion XL (WELLBUTRIN XL) 150 MG 24 hr tablet, Take 150 mg by mouth Daily., Disp: , Rfl:   •  calcium carbonate (OS-MARY) 600 MG tablet, Take 600 mg by mouth Daily., Disp: , Rfl:   •  diclofenac (VOLTAREN) 75 MG EC tablet, Take 1 tablet by mouth 2 (Two) Times a Day., Disp: 180 tablet, Rfl: 0  •  dicyclomine (BENTYL) 10 MG capsule, TAKE 1 CAPSULE BY MOUTH 4 (FOUR) TIMES A DAY AS NEEDED (ABDOMINAL PAIN)., Disp: 30 capsule, Rfl: 1  •  docusate sodium (COLACE) 100 MG capsule, Take 100 mg by mouth 2 (Two) Times a Day., Disp: , Rfl:   •  DULoxetine (CYMBALTA) 60 MG capsule, Take 1  "capsule by mouth Daily., Disp: 90 capsule, Rfl: 0  •  gabapentin (NEURONTIN) 600 MG tablet, Take 1 tablet by mouth 2 (Two) Times a Day., Disp: 180 tablet, Rfl: 0  •  metaxalone (SKELAXIN) 800 MG tablet, Take 1 tablet by mouth 3 (Three) Times a Day., Disp: 90 tablet, Rfl: 1  •  Multiple Vitamin (MULTI-VITAMINS PO), Take 1 tablet by mouth Daily. HOLD FOR SURGERY, Disp: , Rfl:   •  ondansetron (ZOFRAN) 4 MG tablet, Take 1 tablet by mouth Every 8 (Eight) Hours As Needed for Nausea or Vomiting., Disp: 30 tablet, Rfl: 2  •  pantoprazole (PROTONIX) 40 MG EC tablet, Take 40 mg by mouth Every Morning., Disp: , Rfl:   •  Polyethylene Glycol 3350 (MIRALAX PO), Take  by mouth Daily As Needed., Disp: , Rfl:   •  Probiotic Product (PROBIOTIC DAILY PO), Take 1 capsule by mouth Daily., Disp: , Rfl:   •  SUMAtriptan (IMITREX) 100 MG tablet, Take 100 mg by mouth Every 2 (Two) Hours As Needed for Migraine., Disp: , Rfl:   •  traZODone (DESYREL) 50 MG tablet, Take 50 mg by mouth Every Night., Disp: , Rfl:   •  triamcinolone (KENALOG) 0.1 % cream, Apply  topically 2 (Two) Times a Day. (Patient taking differently: Apply 1 application topically As Needed. ON BACK), Disp: 454 g, Rfl: 0      Review of Systems   Constitutional: Negative for fatigue and unexpected weight change.   Gastrointestinal: Positive for constipation and diarrhea.        Reflux, controlled   Musculoskeletal: Positive for myalgias.   Neurological: Positive for headaches.   Psychiatric/Behavioral: Positive for dysphoric mood. Negative for sleep disturbance. The patient is nervous/anxious.          Objective   Vitals:    09/14/18 0828   BP: 134/82   Pulse: 78   SpO2: 97%   Weight: 88.8 kg (195 lb 12.8 oz)   Height: 167.6 cm (66\")     Physical Exam   Constitutional: She is oriented to person, place, and time. She appears well-developed and well-nourished.   Eyes: Conjunctivae are normal. No scleral icterus.   Cardiovascular: Normal rate, regular rhythm and normal heart " sounds.    Pulmonary/Chest: Effort normal and breath sounds normal.   Abdominal: Soft. Bowel sounds are normal. There is no tenderness.   Neurological: She is alert and oriented to person, place, and time. Coordination normal.   Psychiatric: She has a normal mood and affect. Her behavior is normal.             Magdalena was seen today for migraine.    Diagnoses and all orders for this visit:    Migraine without aura and without status migrainosus, not intractable    Irritable bowel syndrome with both constipation and diarrhea    Gastroesophageal reflux disease, esophagitis presence not specified    She is doing well with the gabapentin, so we will continue it, unchanged.  She may continue taking the sumatriptan and when needed.  She will contact us if the migraine headache frequency escalates significantly.  [facility] report was obtained and reviewed.    We also touched on the other problems as described in the history of present illness.  First, I have asked her to start taking a magnesium supplement to see if that helps with her muscle cramps and myalgias.  She will also try cutting back on the Protonix, taking it every other day and daily when necessary.  We also touched on possibly using peppermint oil at some point down the road to help with her IBS, but we will defer doing that for now since that may exacerbate the reflux.    We also talked about her supplements, asking her to take calcium citrate 1200 mg a day and vitamin D 1000 mg daily.

## 2018-10-10 RX ORDER — TRAZODONE HYDROCHLORIDE 50 MG/1
TABLET ORAL
Qty: 90 TABLET | Refills: 3 | Status: SHIPPED | OUTPATIENT
Start: 2018-10-10 | End: 2019-03-05 | Stop reason: SDUPTHER

## 2018-10-26 ENCOUNTER — TRANSCRIBE ORDERS (OUTPATIENT)
Dept: LAB | Facility: HOSPITAL | Age: 63
End: 2018-10-26

## 2018-10-26 ENCOUNTER — OFFICE (OUTPATIENT)
Dept: URBAN - METROPOLITAN AREA CLINIC 65 | Facility: CLINIC | Age: 63
End: 2018-10-26

## 2018-10-26 ENCOUNTER — LAB (OUTPATIENT)
Dept: LAB | Facility: HOSPITAL | Age: 63
End: 2018-10-26
Attending: INTERNAL MEDICINE

## 2018-10-26 VITALS
SYSTOLIC BLOOD PRESSURE: 124 MMHG | HEART RATE: 80 BPM | DIASTOLIC BLOOD PRESSURE: 82 MMHG | HEIGHT: 66 IN | WEIGHT: 198 LBS

## 2018-10-26 DIAGNOSIS — R11.0 NAUSEA: ICD-10-CM

## 2018-10-26 DIAGNOSIS — R14.0 BLOATING: ICD-10-CM

## 2018-10-26 DIAGNOSIS — R10.32 LEFT LOWER QUADRANT PAIN: ICD-10-CM

## 2018-10-26 DIAGNOSIS — R14.0 ABDOMINAL DISTENTION: ICD-10-CM

## 2018-10-26 DIAGNOSIS — R10.11 ABDOMINAL PAIN, RIGHT UPPER QUADRANT: ICD-10-CM

## 2018-10-26 DIAGNOSIS — R10.11 RIGHT UPPER QUADRANT PAIN: ICD-10-CM

## 2018-10-26 DIAGNOSIS — R14.0 ABDOMINAL DISTENSION (GASEOUS): ICD-10-CM

## 2018-10-26 DIAGNOSIS — R10.11 ABDOMINAL PAIN, RIGHT UPPER QUADRANT: Primary | ICD-10-CM

## 2018-10-26 DIAGNOSIS — R10.32 ABDOMINAL PAIN, LEFT LOWER QUADRANT: ICD-10-CM

## 2018-10-26 LAB
ALBUMIN SERPL-MCNC: 4.4 G/DL (ref 3.5–5.2)
ALBUMIN/GLOB SERPL: 1.5 G/DL
ALP SERPL-CCNC: 108 U/L (ref 39–117)
ALT SERPL W P-5'-P-CCNC: 22 U/L (ref 1–33)
AMYLASE SERPL-CCNC: 38 U/L (ref 28–100)
ANION GAP SERPL CALCULATED.3IONS-SCNC: 11.5 MMOL/L
AST SERPL-CCNC: 19 U/L (ref 1–32)
BACTERIA UR QL AUTO: NORMAL /HPF
BASOPHILS # BLD AUTO: 0.09 10*3/MM3 (ref 0–0.2)
BASOPHILS NFR BLD AUTO: 0.7 % (ref 0–1.5)
BILIRUB SERPL-MCNC: 0.3 MG/DL (ref 0.1–1.2)
BILIRUB UR QL STRIP: NEGATIVE
BUN BLD-MCNC: 11 MG/DL (ref 8–23)
BUN/CREAT SERPL: 11 (ref 7–25)
CALCIUM SPEC-SCNC: 9.7 MG/DL (ref 8.6–10.5)
CHLORIDE SERPL-SCNC: 104 MMOL/L (ref 98–107)
CLARITY UR: CLEAR
CO2 SERPL-SCNC: 24.5 MMOL/L (ref 22–29)
COLOR UR: YELLOW
CREAT BLD-MCNC: 1 MG/DL (ref 0.57–1)
DEPRECATED RDW RBC AUTO: 42.6 FL (ref 37–54)
EOSINOPHIL # BLD AUTO: 0.19 10*3/MM3 (ref 0–0.7)
EOSINOPHIL NFR BLD AUTO: 1.5 % (ref 0.3–6.2)
ERYTHROCYTE [DISTWIDTH] IN BLOOD BY AUTOMATED COUNT: 12.9 % (ref 11.7–13)
GFR SERPL CREATININE-BSD FRML MDRD: 56 ML/MIN/1.73
GLOBULIN UR ELPH-MCNC: 3 GM/DL
GLUCOSE BLD-MCNC: 102 MG/DL (ref 65–99)
GLUCOSE UR STRIP-MCNC: NEGATIVE MG/DL
HCT VFR BLD AUTO: 42.6 % (ref 35.6–45.5)
HGB BLD-MCNC: 14.2 G/DL (ref 11.9–15.5)
HGB UR QL STRIP.AUTO: NEGATIVE
HYALINE CASTS UR QL AUTO: NORMAL /LPF
IMM GRANULOCYTES # BLD: 0.06 10*3/MM3 (ref 0–0.03)
IMM GRANULOCYTES NFR BLD: 0.5 % (ref 0–0.5)
KETONES UR QL STRIP: NEGATIVE
LEUKOCYTE ESTERASE UR QL STRIP.AUTO: ABNORMAL
LIPASE SERPL-CCNC: 40 U/L (ref 13–60)
LYMPHOCYTES # BLD AUTO: 3.98 10*3/MM3 (ref 0.9–4.8)
LYMPHOCYTES NFR BLD AUTO: 31.8 % (ref 19.6–45.3)
MCH RBC QN AUTO: 30 PG (ref 26.9–32)
MCHC RBC AUTO-ENTMCNC: 33.3 G/DL (ref 32.4–36.3)
MCV RBC AUTO: 90.1 FL (ref 80.5–98.2)
MONOCYTES # BLD AUTO: 0.94 10*3/MM3 (ref 0.2–1.2)
MONOCYTES NFR BLD AUTO: 7.5 % (ref 5–12)
NEUTROPHILS # BLD AUTO: 7.3 10*3/MM3 (ref 1.9–8.1)
NEUTROPHILS NFR BLD AUTO: 58.5 % (ref 42.7–76)
NITRITE UR QL STRIP: NEGATIVE
PH UR STRIP.AUTO: 5.5 [PH] (ref 5–8)
PLATELET # BLD AUTO: 321 10*3/MM3 (ref 140–500)
PMV BLD AUTO: 10.3 FL (ref 6–12)
POTASSIUM BLD-SCNC: 4.5 MMOL/L (ref 3.5–5.2)
PROT SERPL-MCNC: 7.4 G/DL (ref 6–8.5)
PROT UR QL STRIP: NEGATIVE
RBC # BLD AUTO: 4.73 10*6/MM3 (ref 3.9–5.2)
RBC # UR: NORMAL /HPF
REF LAB TEST METHOD: NORMAL
SODIUM BLD-SCNC: 140 MMOL/L (ref 136–145)
SP GR UR STRIP: 1.02 (ref 1–1.03)
SQUAMOUS #/AREA URNS HPF: NORMAL /HPF
UROBILINOGEN UR QL STRIP: ABNORMAL
WBC NRBC COR # BLD: 12.5 10*3/MM3 (ref 4.5–10.7)
WBC UR QL AUTO: NORMAL /HPF

## 2018-10-26 PROCEDURE — 36415 COLL VENOUS BLD VENIPUNCTURE: CPT

## 2018-10-26 PROCEDURE — 85025 COMPLETE CBC W/AUTO DIFF WBC: CPT

## 2018-10-26 PROCEDURE — 83690 ASSAY OF LIPASE: CPT

## 2018-10-26 PROCEDURE — 80053 COMPREHEN METABOLIC PANEL: CPT

## 2018-10-26 PROCEDURE — 99213 OFFICE O/P EST LOW 20 MIN: CPT | Performed by: INTERNAL MEDICINE

## 2018-10-26 PROCEDURE — 81001 URINALYSIS AUTO W/SCOPE: CPT

## 2018-10-26 PROCEDURE — 82150 ASSAY OF AMYLASE: CPT

## 2018-10-26 RX ORDER — METRONIDAZOLE 500 MG/1
1500 TABLET, FILM COATED ORAL
Qty: 30 | Refills: 0 | Status: COMPLETED
Start: 2018-10-26 | End: 2023-09-12

## 2018-10-26 RX ORDER — CIPROFLOXACIN 500 MG/1
1000 TABLET, FILM COATED ORAL
Qty: 20 | Refills: 0 | Status: COMPLETED
Start: 2018-10-26 | End: 2023-09-12

## 2018-10-30 ENCOUNTER — TRANSCRIBE ORDERS (OUTPATIENT)
Dept: ADMINISTRATIVE | Facility: HOSPITAL | Age: 63
End: 2018-10-30

## 2018-10-30 DIAGNOSIS — R10.12 LEFT UPPER QUADRANT PAIN: ICD-10-CM

## 2018-10-30 DIAGNOSIS — R10.31 RIGHT LOWER QUADRANT ABDOMINAL PAIN: Primary | ICD-10-CM

## 2018-10-30 DIAGNOSIS — R14.0 BLOATING: ICD-10-CM

## 2018-10-30 DIAGNOSIS — R11.0 NAUSEA: ICD-10-CM

## 2018-10-31 ENCOUNTER — OFFICE VISIT (OUTPATIENT)
Dept: ORTHOPEDIC SURGERY | Facility: CLINIC | Age: 63
End: 2018-10-31

## 2018-10-31 VITALS — WEIGHT: 198 LBS | BODY MASS INDEX: 31.82 KG/M2 | TEMPERATURE: 98.3 F | HEIGHT: 66 IN

## 2018-10-31 DIAGNOSIS — Z98.1 HX OF FUSION OF CERVICAL SPINE: Primary | ICD-10-CM

## 2018-10-31 PROCEDURE — 99213 OFFICE O/P EST LOW 20 MIN: CPT | Performed by: ORTHOPAEDIC SURGERY

## 2018-10-31 PROCEDURE — 72020 X-RAY EXAM OF SPINE 1 VIEW: CPT | Performed by: ORTHOPAEDIC SURGERY

## 2018-10-31 RX ORDER — CIPROFLOXACIN 500 MG/1
500 TABLET, FILM COATED ORAL 2 TIMES DAILY
Refills: 0 | COMMUNITY
Start: 2018-10-26 | End: 2018-12-14

## 2018-10-31 RX ORDER — METRONIDAZOLE 500 MG/1
TABLET ORAL
Refills: 0 | COMMUNITY
Start: 2018-10-26 | End: 2018-12-14

## 2018-10-31 NOTE — PROGRESS NOTES
4 months out from anterior cervical discectomy and fusion and some neck discomfort but no arm pain.  Overall she is very happy with the result.  The wound is healed nicely and cervical lateral x-rays suggest a solid fusion compared to films 2 months ago.  Instructions given.  She will follow-up when necessary

## 2018-11-01 ENCOUNTER — HOSPITAL ENCOUNTER (OUTPATIENT)
Dept: CT IMAGING | Facility: HOSPITAL | Age: 63
Discharge: HOME | End: 2018-11-01
Attending: INTERNAL MEDICINE | Admitting: INTERNAL MEDICINE

## 2018-11-01 DIAGNOSIS — R10.12 LEFT UPPER QUADRANT PAIN: ICD-10-CM

## 2018-11-01 DIAGNOSIS — R10.31 RIGHT LOWER QUADRANT ABDOMINAL PAIN: ICD-10-CM

## 2018-11-01 DIAGNOSIS — R14.0 BLOATING: ICD-10-CM

## 2018-11-01 DIAGNOSIS — R11.0 NAUSEA: ICD-10-CM

## 2018-11-01 LAB — CREAT BLDA-MCNC: 0.9 MG/DL (ref 0.6–1.3)

## 2018-11-01 PROCEDURE — 82565 ASSAY OF CREATININE: CPT

## 2018-11-01 PROCEDURE — 74177 CT ABD & PELVIS W/CONTRAST: CPT

## 2018-11-01 PROCEDURE — 25010000002 IOPAMIDOL 61 % SOLUTION: Performed by: INTERNAL MEDICINE

## 2018-11-01 RX ADMIN — IOPAMIDOL 85 ML: 612 INJECTION, SOLUTION INTRAVENOUS at 07:49

## 2018-11-28 DIAGNOSIS — R51.9 HEADACHE, UNSPECIFIED HEADACHE TYPE: ICD-10-CM

## 2018-11-29 RX ORDER — SUMATRIPTAN 100 MG/1
TABLET, FILM COATED ORAL
Qty: 9 TABLET | Refills: 1 | Status: SHIPPED | OUTPATIENT
Start: 2018-11-29 | End: 2020-02-03

## 2018-12-14 ENCOUNTER — OFFICE VISIT (OUTPATIENT)
Dept: FAMILY MEDICINE CLINIC | Facility: CLINIC | Age: 63
End: 2018-12-14

## 2018-12-14 VITALS
WEIGHT: 202.6 LBS | HEART RATE: 101 BPM | BODY MASS INDEX: 32.56 KG/M2 | DIASTOLIC BLOOD PRESSURE: 84 MMHG | OXYGEN SATURATION: 95 % | SYSTOLIC BLOOD PRESSURE: 138 MMHG | HEIGHT: 66 IN

## 2018-12-14 DIAGNOSIS — G43.009 MIGRAINE WITHOUT AURA AND WITHOUT STATUS MIGRAINOSUS, NOT INTRACTABLE: Primary | ICD-10-CM

## 2018-12-14 DIAGNOSIS — K58.2 IRRITABLE BOWEL SYNDROME WITH BOTH CONSTIPATION AND DIARRHEA: ICD-10-CM

## 2018-12-14 PROCEDURE — 99213 OFFICE O/P EST LOW 20 MIN: CPT | Performed by: INTERNAL MEDICINE

## 2018-12-14 RX ORDER — GABAPENTIN 600 MG/1
600 TABLET ORAL 2 TIMES DAILY
Qty: 180 TABLET | Refills: 0 | Status: SHIPPED | OUTPATIENT
Start: 2018-12-14 | End: 2019-03-05 | Stop reason: SDUPTHER

## 2018-12-14 NOTE — PROGRESS NOTES
"Subjective   Magdalenawiley Dickey is a 63 y.o. female who presents today for:    Migraine (3 month f/u CSE) and Irritable Bowel Syndrome    History of Present Illness   Migraines developed in her late 20s/early 30s, and did not change once she went through menopause.  She takes gabapentin 600 mg twice a day.  She has not needed her sumatriptan within the past 2 months.  The gabapentin does not cause excess sedation.  However, she does complain of extreme fatigue.    Additionally, she awakens every 2-3 hours to urinate.  Usually, she goes to bed around 10 PM and awakens around 6:30.  However, she awakens \"exhausted\".    She sees a psychiatrist for depression and was started on Wellbutrin XL without any increase in her headaches.    She sees a gastroenterologist and is treated for IBS.  Recently, she had an elevated white count and a flare and abdominal pain that was treated with Cipro and Flagyl.  She feels much better.  Additionally, she is taking a magnesium supplement to help with her chronic constipation, and she feels her bowels are moving much more easily.        Ms. Dickey  reports that she quit smoking about 4 years ago. Her smoking use included cigarettes. she has never used smokeless tobacco. She reports that she does not drink alcohol or use drugs.     No Known Allergies    Current Outpatient Medications:   •  MAGNESIUM PO, Take 2 tablets by mouth Daily., Disp: , Rfl:   •  ALPRAZolam (XANAX) 0.5 MG tablet, Take 1 tablet by mouth 2 (Two) Times a Day As Needed for Anxiety., Disp: 60 tablet, Rfl: 1  •  calcium carbonate (OS-MARY) 600 MG tablet, Take 600 mg by mouth 2 (Two) Times a Day., Disp: , Rfl:   •  diclofenac (VOLTAREN) 75 MG EC tablet, Take 1 tablet by mouth 2 (Two) Times a Day., Disp: 180 tablet, Rfl: 0  •  dicyclomine (BENTYL) 10 MG capsule, TAKE 1 CAPSULE BY MOUTH 4 (FOUR) TIMES A DAY AS NEEDED (ABDOMINAL PAIN)., Disp: 30 capsule, Rfl: 1  •  docusate sodium (COLACE) 100 MG capsule, Take 100 mg by mouth 2 " "(Two) Times a Day., Disp: , Rfl:   •  DULoxetine (CYMBALTA) 60 MG capsule, Take 1 capsule by mouth Daily., Disp: 90 capsule, Rfl: 0  •  gabapentin (NEURONTIN) 600 MG tablet, Take 1 tablet by mouth 2 (Two) Times a Day., Disp: 180 tablet, Rfl: 0  •  metaxalone (SKELAXIN) 800 MG tablet, Take 1 tablet by mouth 3 (Three) Times a Day., Disp: 90 tablet, Rfl: 1  •  Multiple Vitamin (MULTI-VITAMINS PO), Take 1 tablet by mouth Daily. HOLD FOR SURGERY, Disp: , Rfl:   •  ondansetron (ZOFRAN) 4 MG tablet, Take 1 tablet by mouth Every 8 (Eight) Hours As Needed for Nausea or Vomiting., Disp: 30 tablet, Rfl: 2  •  pantoprazole (PROTONIX) 40 MG EC tablet, Take 40 mg by mouth Every Morning., Disp: , Rfl:   •  Polyethylene Glycol 3350 (MIRALAX PO), Take  by mouth Daily As Needed., Disp: , Rfl:   •  Probiotic Product (PROBIOTIC DAILY PO), Take 1 capsule by mouth Daily., Disp: , Rfl:   •  SUMAtriptan (IMITREX) 100 MG tablet, TAKE 1 TABLET BY MOUTH AT ONSET OF HEADACHE, MAY REPEAT EVERY 2 HOURS AS NEEDED (MAX 200MG/DAY), Disp: 9 tablet, Rfl: 1  •  traZODone (DESYREL) 50 MG tablet, TAKE 1 TABLET BY MOUTH EVERY NIGHT., Disp: 90 tablet, Rfl: 3  •  triamcinolone (KENALOG) 0.1 % cream, Apply  topically 2 (Two) Times a Day. (Patient taking differently: Apply 1 application topically As Needed. ON BACK), Disp: 454 g, Rfl: 0      Review of Systems   Constitutional: Positive for fatigue. Negative for unexpected weight change.   Gastrointestinal: Negative for constipation (Improved with the Mg++) and diarrhea.   Genitourinary:        Nocturia- chronic   Neurological: Positive for headaches.   Psychiatric/Behavioral: Positive for dysphoric mood. The patient is nervous/anxious.          Objective   Vitals:    12/14/18 1712   BP: 138/84   BP Location: Right arm   Patient Position: Sitting   Cuff Size: Large Adult   Pulse: 101   SpO2: 95%   Weight: 91.9 kg (202 lb 9.6 oz)   Height: 167.6 cm (66\")     Physical Exam    Overweight female in no acute " distress.  Alert and oriented ×4.  She answers all questions appropriately.  Affect is somewhat blunted; this is fairly normal for her.  Sclerae are anicteric and the conjunctivae were pink.  No thyromegaly or mass.  No thyroid tenderness.  Regular rate and rhythm.  No murmur noted.  Abdomen is soft and nontender with normoactive bowel sounds.        Magdalena was seen today for migraine and irritable bowel syndrome.    Diagnoses and all orders for this visit:    Migraine without aura and without status migrainosus, not intractable  -     gabapentin (NEURONTIN) 600 MG tablet; Take 1 tablet by mouth 2 (Two) Times a Day.    Irritable bowel syndrome with both constipation and diarrhea    [facility] report was obtained and reviewed during the course of today's office visit.  Teen the gabapentin unchanged for now.  This may be contributing to her fatigue, but it has escalated without a change in her gabapentin dose.  I have asked her to try increasing the trazodone at bedtime to see if that helps her sleep through the night.  Its anticholinergic effects may also help with her urinary symptoms.  Additionally, for the fatigue, she may empirically try the addition of B12.      For the IBS, we briefly touched on other all targeted treatments.  For now, she will continue with magnesium.

## 2019-01-22 RX ORDER — METAXALONE 800 MG/1
TABLET ORAL
Qty: 90 TABLET | Refills: 1 | Status: SHIPPED | OUTPATIENT
Start: 2019-01-22 | End: 2019-06-24 | Stop reason: SDUPTHER

## 2019-01-24 RX ORDER — ONDANSETRON 4 MG/1
4 TABLET, FILM COATED ORAL EVERY 8 HOURS PRN
Qty: 30 TABLET | Refills: 2 | Status: SHIPPED | OUTPATIENT
Start: 2019-01-24 | End: 2019-03-05 | Stop reason: SDUPTHER

## 2019-03-05 ENCOUNTER — OFFICE VISIT (OUTPATIENT)
Dept: FAMILY MEDICINE CLINIC | Facility: CLINIC | Age: 64
End: 2019-03-05

## 2019-03-05 VITALS
BODY MASS INDEX: 32.17 KG/M2 | OXYGEN SATURATION: 96 % | HEIGHT: 66 IN | HEART RATE: 94 BPM | WEIGHT: 200.2 LBS | DIASTOLIC BLOOD PRESSURE: 82 MMHG | SYSTOLIC BLOOD PRESSURE: 134 MMHG

## 2019-03-05 DIAGNOSIS — M47.22 CERVICAL SPONDYLOSIS WITH RADICULOPATHY: ICD-10-CM

## 2019-03-05 DIAGNOSIS — G47.00 INSOMNIA, UNSPECIFIED TYPE: ICD-10-CM

## 2019-03-05 DIAGNOSIS — Z12.39 ENCOUNTER FOR SCREENING FOR MALIGNANT NEOPLASM OF BREAST: ICD-10-CM

## 2019-03-05 DIAGNOSIS — K21.9 GASTROESOPHAGEAL REFLUX DISEASE, ESOPHAGITIS PRESENCE NOT SPECIFIED: ICD-10-CM

## 2019-03-05 DIAGNOSIS — G43.009 MIGRAINE WITHOUT AURA AND WITHOUT STATUS MIGRAINOSUS, NOT INTRACTABLE: ICD-10-CM

## 2019-03-05 DIAGNOSIS — K58.2 IRRITABLE BOWEL SYNDROME WITH BOTH CONSTIPATION AND DIARRHEA: Primary | ICD-10-CM

## 2019-03-05 PROCEDURE — 99214 OFFICE O/P EST MOD 30 MIN: CPT | Performed by: INTERNAL MEDICINE

## 2019-03-05 RX ORDER — GABAPENTIN 600 MG/1
300 TABLET ORAL 2 TIMES DAILY
Qty: 90 TABLET | Refills: 0 | Status: SHIPPED | OUTPATIENT
Start: 2019-03-05 | End: 2019-06-24

## 2019-03-05 RX ORDER — DICYCLOMINE HYDROCHLORIDE 10 MG/1
10 CAPSULE ORAL 4 TIMES DAILY PRN
Qty: 60 CAPSULE | Refills: 2 | Status: SHIPPED | OUTPATIENT
Start: 2019-03-05 | End: 2021-08-09 | Stop reason: ALTCHOICE

## 2019-03-05 RX ORDER — ONDANSETRON 4 MG/1
4 TABLET, FILM COATED ORAL EVERY 8 HOURS PRN
Qty: 30 TABLET | Refills: 2 | Status: SHIPPED | OUTPATIENT
Start: 2019-03-05 | End: 2020-06-23 | Stop reason: SDUPTHER

## 2019-03-05 RX ORDER — TRAZODONE HYDROCHLORIDE 50 MG/1
TABLET ORAL
Qty: 180 TABLET | Refills: 2 | Status: SHIPPED | OUTPATIENT
Start: 2019-03-05 | End: 2019-05-28 | Stop reason: SDUPTHER

## 2019-03-05 NOTE — PROGRESS NOTES
Subjective   Magdalena Dickey is a 63 y.o. female who presents today for:    Migraine (CSE) and Irritable Bowel Syndrome    History of Present Illness     She has migraine headaches that date back to her late 20s or early 30s.  In starting gabapentin and titrating the dose up to 600 mg twice a day, she has done well.  She has taken Imitrex once since we saw her in September.  She tolerates the gabapentin without excess sedation.    I also believe that it is helping with diffuse myalgias that date back quite some time as well.  She still requires diclofenac to help with neck pain and Skelaxin help with low back pain.  Both of these have given her good benefit without excess sedation as well.    She also takes duloxetine for chronic depression.  I believe this also helps with her pain.  She was started on Wellbutrin by a psychiatrist but developed a tremor.  And stopping the Wellbutrin, the tremor has dissipated.    Lastly, and most concerning for her, she complains of IBS symptoms of bloating and abdominal pain.  She has chronic constipation that is helped with a probiotic, stool softener, and occasional MiraLAX doses.  Reflux has been part of her GI symptomatology, but that is well controlled with Protonix.  Occasionally, she needs Zofran to help with nausea for upper GI symptoms.  Similarly, she takes Bentyl for some of the abdominal bloating and cramping.  Both of these medications help, but she tries to avoid taking them on a regular basis.    Ms. Dickey  reports that she quit smoking about 4 years ago. Her smoking use included cigarettes. she has never used smokeless tobacco. She reports that she does not drink alcohol or use drugs.     No Known Allergies    Current Outpatient Medications:   •  ALPRAZolam (XANAX) 0.5 MG tablet, Take 1 tablet by mouth 2 (Two) Times a Day As Needed for Anxiety., Disp: 60 tablet, Rfl: 1  •  calcium carbonate (OS-MARY) 600 MG tablet, Take 600 mg by mouth 2 (Two) Times a Day., Disp: ,  Rfl:   •  diclofenac (VOLTAREN) 50 MG EC tablet, Take 1 tablet by mouth 3 (Three) Times a Day., Disp: 270 tablet, Rfl: 0  •  dicyclomine (BENTYL) 10 MG capsule, Take 1 capsule by mouth 4 (Four) Times a Day As Needed (abdominal pain)., Disp: 60 capsule, Rfl: 2  •  docusate sodium (COLACE) 100 MG capsule, Take 100 mg by mouth 2 (Two) Times a Day., Disp: , Rfl:   •  DULoxetine (CYMBALTA) 60 MG capsule, Take 1 capsule by mouth Daily., Disp: 90 capsule, Rfl: 0  •  gabapentin (NEURONTIN) 600 MG tablet, Take 0.5 tablets by mouth 2 (Two) Times a Day., Disp: 90 tablet, Rfl: 0  •  MAGNESIUM PO, Take 2 tablets by mouth Daily., Disp: , Rfl:   •  metaxalone (SKELAXIN) 800 MG tablet, TAKE 1 TABLET BY MOUTH THREE TIMES A DAY, Disp: 90 tablet, Rfl: 1  •  Multiple Vitamin (MULTI-VITAMINS PO), Take 1 tablet by mouth Daily. HOLD FOR SURGERY, Disp: , Rfl:   •  ondansetron (ZOFRAN) 4 MG tablet, Take 1 tablet by mouth Every 8 (Eight) Hours As Needed for Nausea or Vomiting., Disp: 30 tablet, Rfl: 2  •  pantoprazole (PROTONIX) 40 MG EC tablet, Take 40 mg by mouth Every Morning., Disp: , Rfl:   •  Polyethylene Glycol 3350 (MIRALAX PO), Take  by mouth Daily As Needed., Disp: , Rfl:   •  Probiotic Product (PROBIOTIC DAILY PO), Take 1 capsule by mouth Daily., Disp: , Rfl:   •  SUMAtriptan (IMITREX) 100 MG tablet, TAKE 1 TABLET BY MOUTH AT ONSET OF HEADACHE, MAY REPEAT EVERY 2 HOURS AS NEEDED (MAX 200MG/DAY), Disp: 9 tablet, Rfl: 1  •  traZODone (DESYREL) 50 MG tablet, 1-2 tabs PO qhs for insomnia, Disp: 180 tablet, Rfl: 2  •  triamcinolone (KENALOG) 0.1 % cream, Apply  topically 2 (Two) Times a Day. (Patient taking differently: Apply 1 application topically As Needed. ON BACK), Disp: 454 g, Rfl: 0  •  ultra-purified peppermint oil (IBGARD) 90 mg capsule capsule, Take 2 capsules by mouth 3 (Three) Times a Day. Start w/ 1 bid and increase to 2 tid if needed., Disp: 180 capsule, Rfl: prn      Review of Systems   Constitutional: Positive for  "fatigue. Negative for chills, diaphoresis, fever and unexpected weight change.   HENT: Negative for trouble swallowing.    Eyes: Negative for visual disturbance.   Respiratory: Positive for shortness of breath.    Cardiovascular: Negative for chest pain, palpitations and leg swelling.   Gastrointestinal: Positive for abdominal distention, abdominal pain, constipation and nausea. Negative for blood in stool, diarrhea and vomiting.   Genitourinary: Negative for difficulty urinating.   Musculoskeletal: Positive for arthralgias, back pain, myalgias, neck pain and neck stiffness.   Neurological: Positive for tremors (Resolved after stopping Wellbutrin) and headaches.   Hematological: Negative for adenopathy. Does not bruise/bleed easily.   Psychiatric/Behavioral: Positive for dysphoric mood and sleep disturbance.         Objective   Vitals:    03/05/19 0850 03/05/19 0953   BP: 148/100 134/82   BP Location: Left arm Left arm   Patient Position: Sitting    Cuff Size: Large Adult Large Adult   Pulse: 94    SpO2: 96%    Weight: 90.8 kg (200 lb 3.2 oz)    Height: 167.6 cm (66\")      Physical Exam    Well-developed, well-nourished, well-kept and in no acute distress.  Alert and oriented x4 and answers all questions appropriately.  Insight and judgment appear to be fully intact.  Sclera are anicteric and the conjunctive are pink.  No thyromegaly or mass.  Regular rate and rhythm.  Faint, early, 1/6 systolic murmur at the right upper sternal border.  Normal S1 and S2.  No rub or gallop.  Chest is clear bilaterally.  Abdomen is soft and nontender.  No abdominal bruit.  Bowel sounds are normoactive.  No lower extremity edema.  Station, gait, and coordination are normal.  There is no gross motor neurologic deficit.        Magdalena was seen today for migraine and irritable bowel syndrome.    Diagnoses and all orders for this visit:    Irritable bowel syndrome with both constipation and diarrhea  Gastroesophageal reflux disease, " esophagitis presence not specified    We discussed a trial of the peppermint oil.  She will start with 1 capsule twice a day and increase it if necessary and if tolerated.  I did explain that it could increase her reflux symptoms, and in that regard it may not be an option for her.  If she cannot tolerate the peppermint oil capsules, she will continue with the Bentyl and Zofran when needed.    Migraine without aura and without status migrainosus, not intractable  -     gabapentin (NEURONTIN) 600 MG tablet; Take 0.5 tablets by mouth 2 (Two) Times a Day.  She will continue to try to wean the gabapentin if able.  A written prescription was given to her today so that if she needs it down the road, she can continue taking 1/2 tablet twice a day for migraine prevention.  It has done well in this regard.  [facility] report was obtained and reviewed during the course of today's office visit.    Cervical spondylosis with radiculopathy  Continue the Skelaxin unchanged.  She should also continue taking the diclofenac.  We change the dose to 50 mg for her to take twice a day.  Hopefully, this will give her the same benefit is a 75 mg dose and afford her an extra dose to take for breakthrough symptoms.  She should stop taking ibuprofen along with the diclofenac.  She may also take Tylenol 1000 mg up to 3 times a day if necessary for breakthrough pain with diclofenac.    Insomnia, unspecified type  Continue the trazodone.  She may experience a slight difficulty with insomnia as she walks away from the gabapentin.  She may either resume taking the gabapentin at night (especially if her headaches recur), or she may increase the trazodone to 100 mg nightly.    Other orders  -     ondansetron (ZOFRAN) 4 MG tablet; Take 1 tablet by mouth Every 8 (Eight) Hours As Needed for Nausea or Vomiting.  -     dicyclomine (BENTYL) 10 MG capsule; Take 1 capsule by mouth 4 (Four) Times a Day As Needed (abdominal pain).  -     diclofenac (VOLTAREN) 50  MG EC tablet; Take 1 tablet by mouth 3 (Three) Times a Day.  -     traZODone (DESYREL) 50 MG tablet; 1-2 tabs PO qhs for insomnia  -     ultra-purified peppermint oil (IBGARD) 90 mg capsule capsule; Take 2 capsules by mouth 3 (Three) Times a Day. Start w/ 1 bid and increase to 2 tid if needed.

## 2019-03-07 ENCOUNTER — TRANSCRIBE ORDERS (OUTPATIENT)
Dept: ADMINISTRATIVE | Facility: HOSPITAL | Age: 64
End: 2019-03-07

## 2019-03-07 DIAGNOSIS — Z12.31 VISIT FOR SCREENING MAMMOGRAM: Primary | ICD-10-CM

## 2019-03-08 ENCOUNTER — HOSPITAL ENCOUNTER (OUTPATIENT)
Dept: MAMMOGRAPHY | Facility: HOSPITAL | Age: 64
Discharge: HOME | End: 2019-03-08
Admitting: INTERNAL MEDICINE

## 2019-03-08 DIAGNOSIS — Z12.31 VISIT FOR SCREENING MAMMOGRAM: ICD-10-CM

## 2019-03-08 PROCEDURE — 77063 BREAST TOMOSYNTHESIS BI: CPT

## 2019-03-08 PROCEDURE — 77067 SCR MAMMO BI INCL CAD: CPT

## 2019-03-18 RX ORDER — DULOXETIN HYDROCHLORIDE 60 MG/1
CAPSULE, DELAYED RELEASE ORAL
Qty: 90 CAPSULE | Refills: 0 | Status: SHIPPED | OUTPATIENT
Start: 2019-03-18 | End: 2019-06-24 | Stop reason: SDUPTHER

## 2019-04-09 ENCOUNTER — HOSPITAL ENCOUNTER (EMERGENCY)
Facility: HOSPITAL | Age: 64
Discharge: HOME | End: 2019-04-09
Attending: EMERGENCY MEDICINE | Admitting: EMERGENCY MEDICINE

## 2019-04-09 ENCOUNTER — APPOINTMENT (OUTPATIENT)
Dept: GENERAL RADIOLOGY | Facility: HOSPITAL | Age: 64
End: 2019-04-09

## 2019-04-09 VITALS
BODY MASS INDEX: 32.85 KG/M2 | RESPIRATION RATE: 17 BRPM | TEMPERATURE: 97.7 F | WEIGHT: 204.4 LBS | HEART RATE: 68 BPM | SYSTOLIC BLOOD PRESSURE: 157 MMHG | DIASTOLIC BLOOD PRESSURE: 96 MMHG | HEIGHT: 66 IN | OXYGEN SATURATION: 95 %

## 2019-04-09 DIAGNOSIS — R00.2 PALPITATIONS: Primary | ICD-10-CM

## 2019-04-09 DIAGNOSIS — R19.7 DIARRHEA, UNSPECIFIED TYPE: ICD-10-CM

## 2019-04-09 LAB
ALBUMIN SERPL-MCNC: 4.3 G/DL (ref 3.5–5.2)
ALBUMIN/GLOB SERPL: 1.7 G/DL
ALP SERPL-CCNC: 85 U/L (ref 39–117)
ALT SERPL W P-5'-P-CCNC: 26 U/L (ref 1–33)
ANION GAP SERPL CALCULATED.3IONS-SCNC: 16 MMOL/L
AST SERPL-CCNC: 23 U/L (ref 1–32)
BASOPHILS # BLD AUTO: 0.08 10*3/MM3 (ref 0–0.2)
BASOPHILS NFR BLD AUTO: 0.8 % (ref 0–1.5)
BILIRUB SERPL-MCNC: 0.2 MG/DL (ref 0.2–1.2)
BUN BLD-MCNC: 18 MG/DL (ref 8–23)
BUN/CREAT SERPL: 16.2 (ref 7–25)
CALCIUM SPEC-SCNC: 9.6 MG/DL (ref 8.6–10.5)
CHLORIDE SERPL-SCNC: 107 MMOL/L (ref 98–107)
CO2 SERPL-SCNC: 20 MMOL/L (ref 22–29)
CREAT BLD-MCNC: 1.11 MG/DL (ref 0.57–1)
DEPRECATED RDW RBC AUTO: 41.4 FL (ref 37–54)
EOSINOPHIL # BLD AUTO: 0.17 10*3/MM3 (ref 0–0.4)
EOSINOPHIL NFR BLD AUTO: 1.8 % (ref 0.3–6.2)
ERYTHROCYTE [DISTWIDTH] IN BLOOD BY AUTOMATED COUNT: 12.5 % (ref 12.3–15.4)
GFR SERPL CREATININE-BSD FRML MDRD: 50 ML/MIN/1.73
GLOBULIN UR ELPH-MCNC: 2.5 GM/DL
GLUCOSE BLD-MCNC: 127 MG/DL (ref 65–99)
HCT VFR BLD AUTO: 40.2 % (ref 34–46.6)
HGB BLD-MCNC: 13.3 G/DL (ref 12–15.9)
IMM GRANULOCYTES # BLD AUTO: 0.04 10*3/MM3 (ref 0–0.05)
IMM GRANULOCYTES NFR BLD AUTO: 0.4 % (ref 0–0.5)
LYMPHOCYTES # BLD AUTO: 2.46 10*3/MM3 (ref 0.7–3.1)
LYMPHOCYTES NFR BLD AUTO: 25.4 % (ref 19.6–45.3)
MAGNESIUM SERPL-MCNC: 2.4 MG/DL (ref 1.6–2.4)
MCH RBC QN AUTO: 30.2 PG (ref 26.6–33)
MCHC RBC AUTO-ENTMCNC: 33.1 G/DL (ref 31.5–35.7)
MCV RBC AUTO: 91.4 FL (ref 79–97)
MONOCYTES # BLD AUTO: 0.76 10*3/MM3 (ref 0.1–0.9)
MONOCYTES NFR BLD AUTO: 7.9 % (ref 5–12)
NEUTROPHILS # BLD AUTO: 6.16 10*3/MM3 (ref 1.4–7)
NEUTROPHILS NFR BLD AUTO: 63.7 % (ref 42.7–76)
NRBC BLD AUTO-RTO: 0 /100 WBC (ref 0–0)
PLATELET # BLD AUTO: 274 10*3/MM3 (ref 140–450)
PMV BLD AUTO: 10.5 FL (ref 6–12)
POTASSIUM BLD-SCNC: 4.2 MMOL/L (ref 3.5–5.2)
PROT SERPL-MCNC: 6.8 G/DL (ref 6–8.5)
RBC # BLD AUTO: 4.4 10*6/MM3 (ref 3.77–5.28)
SODIUM BLD-SCNC: 143 MMOL/L (ref 136–145)
T4 FREE SERPL-MCNC: 1.13 NG/DL (ref 0.93–1.7)
TROPONIN T SERPL-MCNC: <0.01 NG/ML (ref 0–0.03)
TSH SERPL DL<=0.05 MIU/L-ACNC: 1.06 MIU/ML (ref 0.27–4.2)
WBC NRBC COR # BLD: 9.67 10*3/MM3 (ref 3.4–10.8)

## 2019-04-09 PROCEDURE — 80053 COMPREHEN METABOLIC PANEL: CPT | Performed by: EMERGENCY MEDICINE

## 2019-04-09 PROCEDURE — 83735 ASSAY OF MAGNESIUM: CPT | Performed by: EMERGENCY MEDICINE

## 2019-04-09 PROCEDURE — 84484 ASSAY OF TROPONIN QUANT: CPT | Performed by: EMERGENCY MEDICINE

## 2019-04-09 PROCEDURE — 84443 ASSAY THYROID STIM HORMONE: CPT | Performed by: EMERGENCY MEDICINE

## 2019-04-09 PROCEDURE — 85025 COMPLETE CBC W/AUTO DIFF WBC: CPT | Performed by: EMERGENCY MEDICINE

## 2019-04-09 PROCEDURE — 93005 ELECTROCARDIOGRAM TRACING: CPT | Performed by: EMERGENCY MEDICINE

## 2019-04-09 PROCEDURE — 99284 EMERGENCY DEPT VISIT MOD MDM: CPT

## 2019-04-09 PROCEDURE — 71046 X-RAY EXAM CHEST 2 VIEWS: CPT

## 2019-04-09 PROCEDURE — 84439 ASSAY OF FREE THYROXINE: CPT | Performed by: EMERGENCY MEDICINE

## 2019-04-09 PROCEDURE — 93010 ELECTROCARDIOGRAM REPORT: CPT | Performed by: INTERNAL MEDICINE

## 2019-04-09 PROCEDURE — 93005 ELECTROCARDIOGRAM TRACING: CPT

## 2019-04-09 RX ORDER — SODIUM CHLORIDE 0.9 % (FLUSH) 0.9 %
10 SYRINGE (ML) INJECTION AS NEEDED
Status: DISCONTINUED | OUTPATIENT
Start: 2019-04-09 | End: 2019-04-09 | Stop reason: HOSPADM

## 2019-04-09 RX ADMIN — SODIUM CHLORIDE, POTASSIUM CHLORIDE, SODIUM LACTATE AND CALCIUM CHLORIDE 1000 ML: 600; 310; 30; 20 INJECTION, SOLUTION INTRAVENOUS at 15:42

## 2019-04-09 NOTE — ED TRIAGE NOTES
Pt states her heart is racing and is having chest pain. Pt also c/o diarrhea and weakness for the last few days.

## 2019-04-09 NOTE — ED PROVIDER NOTES
" EMERGENCY DEPARTMENT ENCOUNTER    Room Number:  18/18  Date seen:  4/9/2019  Time seen: 3:24 PM  PCP: Fermín Bella MD  Historian: patient      HPI:  Chief Complaint: constant heart palpitations  A complete HPI/ROS/PMH/PSH/SH/FH are unobtainable due to: n/a  Context: Magdalena Dickey is a 63 y.o. female who presents to the ED c/o constant heart palpitations and neck tightness that started at 1000 this morning. Pt denies CP, but describes the neck tightness as a \"pressure\". Pt has hx of heart palpitations, but adds that her palpitations have never lasted this long before. Pt also c/o \"liquid\" diarrhea and R-sided abd \"soreness\". Pt denies ever using a Holter monitor.    Pain Location: heart  Radiation: none  Quality: constant heart palpitations  Intensity/Severity: moderate  Duration: since 1000 this morning  Onset quality: gradual  Timing: constant  Progression: unchanged  Aggravating Factors: none  Alleviating Factors: none  Previous Episodes:  Pt has hx of heart palpitations, but adds that her palpitations have never lasted this long before.  Treatment before arrival: none  Associated Symptoms: neck tightness, \"liquid\" diarrhea, and R-sided abd \"soreness\".    PAST MEDICAL HISTORY  Active Ambulatory Problems     Diagnosis Date Noted   • Migraine without aura and without status migrainosus, not intractable 05/25/2016   • GERD (gastroesophageal reflux disease) 05/25/2016   • Low back pain 05/25/2016   • Irritable bowel syndrome with both constipation and diarrhea 10/05/2016   • Diverticulosis of large intestine without hemorrhage 10/05/2016   • Depression with anxiety 10/12/2017   • Diverticulitis of large intestine without perforation or abscess without bleeding 01/04/2018   • Delayed emergence from anesthesia 01/17/2018   • Cervical spondylosis with radiculopathy 06/19/2018   • Spine pain, cervical 06/19/2018     Resolved Ambulatory Problems     Diagnosis Date Noted   • No Resolved Ambulatory Problems "     Past Medical History:   Diagnosis Date   • Anxiety    • Colon polyp 2017   • Delayed emergence from anesthesia 2018   • Depression 5/25/2016   • Diverticulitis of colon    • Diverticulosis of large intestine without hemorrhage 10/5/2016   • GERD (gastroesophageal reflux disease)    • Hiatal hernia    • History of gastric ulcer    • Irritable bowel syndrome (IBS)    • Low back pain    • Lumbosacral disc disease    • Migraine without aura and without status migrainosus, not intractable 5/25/2016   • Shoulder pain          PAST SURGICAL HISTORY  Past Surgical History:   Procedure Laterality Date   • ANTERIOR CERVICAL DISCECTOMY W/ FUSION N/A 6/21/2018    Procedure: C6 7 anterior cervical discectomy and fusion with allograft and plate;  Surgeon: Jacobo De Dios MD;  Location: [facility] MAIN OR;  Service: Neurosurgery   • CHOLECYSTECTOMY N/A 1980   • COLON RESECTION N/A 1/16/2018    Procedure: LAPAROSCOPIC SIGMOID COLON RESECTION WITH MOBILIZATION OF SPLENIC FLEXURE;  Surgeon: Eric Davidson MD;  Location: [facility] MAIN OR;  Service:    • COLONOSCOPY N/A 2/15/2017    Procedure: COLONOSCOPY to cecum with biospsies with cold polypectomy;  Surgeon: Gerardo Tan MD;  Location: [facility] ENDOSCOPY;  Service:    • COLONOSCOPY N/A 03/01/2003    Internal hemorrhoids-Dr. Gerardo Tna   • DIAGNOSTIC LAPAROSCOPY N/A 06/06/2001    Cul-de-sac endometriosis and adhesions-Dr. Aamir Christine   • ENDOSCOPY N/A 2/15/2017    Procedure: ESOPHAGOGASTRODUODENOSCOPY with biopsies;  Surgeon: Gerardo Tan MD;  Location: [facility] ENDOSCOPY;  Service:    • ENDOSCOPY N/A 02/04/2009    Small Hiatal hernia-Dr. Gerardo Tan   • TOTAL ABDOMINAL HYSTERECTOMY WITH SALPINGO OOPHORECTOMY Bilateral 07/31/2001    Dr. Aamir Christine         FAMILY HISTORY  Family History   Problem Relation Age of Onset   • Alzheimer's disease Mother         SDAT   • Hypertension Mother    • Diabetes type II Mother    • Lung cancer Mother         POSSIBLE   • Heart attack  "Mother    • Cancer Mother         lung   • COPD Mother    • Depression Mother    • Diabetes Mother    • Heart disease Mother    • Miscarriages / Stillbirths Mother    • Alcohol abuse Father    • Prostate cancer Father    • Heart disease Father         THIRD DEGREE HEART BLOCK   • Arthritis Father    • Cancer Father         prostate   • Drug abuse Father    • Learning disabilities Father    • Ovarian cancer Sister    • No Known Problems Brother    • Ovarian cancer Sister    • Arthritis Maternal Grandmother         rheumatoid   • Arthritis Sister    • Cancer Sister         ovarian   • Depression Sister    • Hypertension Sister    • Cancer Sister         ovarian   • Depression Sister    • Hypertension Sister    • Malig Hyperthermia Neg Hx          SOCIAL HISTORY  Social History     Socioeconomic History   • Marital status:      Spouse name: Not on file   • Number of children: Not on file   • Years of education: Not on file   • Highest education level: Not on file   Tobacco Use   • Smoking status: Former Smoker     Types: Cigarettes     Last attempt to quit: 3/10/2014     Years since quittin.0   • Smokeless tobacco: Never Used   Substance and Sexual Activity   • Alcohol use: No     Comment: SELDOM   • Drug use: No   • Sexual activity: Defer         ALLERGIES  Patient has no known allergies.        REVIEW OF SYSTEMS  Review of Systems   Constitutional: Negative for fever.   HENT: Negative for sore throat.    Respiratory: Negative for shortness of breath.    Cardiovascular: Positive for palpitations ( constant). Negative for chest pain.   Gastrointestinal: Positive for abdominal pain (R-sided abd \"soreness\") and diarrhea (\"liquid\").   Endocrine: Negative for polyuria.   Genitourinary: Negative for dysuria.   Musculoskeletal: Negative for neck pain.        Pt c/o neck tightness that pt describes as a \"pressure\"   Skin: Negative for rash.   Neurological: Negative for headaches.   All other systems reviewed and " are negative.           PHYSICAL EXAM  ED Triage Vitals [04/09/19 1420]   Temp Heart Rate Resp BP SpO2   97.7 °F (36.5 °C) 108 -- -- 96 %      Temp src Heart Rate Source Patient Position BP Location FiO2 (%)   Tympanic Monitor -- -- --         GENERAL: not distressed  HENT: nares patent  EYES: no scleral icterus  CV: regular rhythm, regular rate, 2+ radial pulses bilaterally  RESPIRATORY: normal effort, CTAB, no chest wall tenderness  ABDOMEN: soft, nontender  MUSCULOSKELETAL: no deformity,  no BLE edema or calf tenderness bilaterally  NEURO: alert, DE LEON, FC  SKIN: warm, dry    Vital signs and nursing notes reviewed.        LAB RESULTS  Recent Results (from the past 24 hour(s))   Comprehensive Metabolic Panel    Collection Time: 04/09/19  3:37 PM   Result Value Ref Range    Glucose 127 (H) 65 - 99 mg/dL    BUN 18 8 - 23 mg/dL    Creatinine 1.11 (H) 0.57 - 1.00 mg/dL    Sodium 143 136 - 145 mmol/L    Potassium 4.2 3.5 - 5.2 mmol/L    Chloride 107 98 - 107 mmol/L    CO2 20.0 (L) 22.0 - 29.0 mmol/L    Calcium 9.6 8.6 - 10.5 mg/dL    Total Protein 6.8 6.0 - 8.5 g/dL    Albumin 4.30 3.50 - 5.20 g/dL    ALT (SGPT) 26 1 - 33 U/L    AST (SGOT) 23 1 - 32 U/L    Alkaline Phosphatase 85 39 - 117 U/L    Total Bilirubin 0.2 0.2 - 1.2 mg/dL    eGFR Non African Amer 50 (L) >60 mL/min/1.73    Globulin 2.5 gm/dL    A/G Ratio 1.7 g/dL    BUN/Creatinine Ratio 16.2 7.0 - 25.0    Anion Gap 16.0 mmol/L   TSH    Collection Time: 04/09/19  3:37 PM   Result Value Ref Range    TSH 1.060 0.270 - 4.200 mIU/mL   Magnesium    Collection Time: 04/09/19  3:37 PM   Result Value Ref Range    Magnesium 2.4 1.6 - 2.4 mg/dL   T4, Free    Collection Time: 04/09/19  3:37 PM   Result Value Ref Range    Free T4 1.13 0.93 - 1.70 ng/dL   Troponin    Collection Time: 04/09/19  3:37 PM   Result Value Ref Range    Troponin T <0.010 0.000 - 0.030 ng/mL   CBC Auto Differential    Collection Time: 04/09/19  3:37 PM   Result Value Ref Range    WBC 9.67 3.40 - 10.80  10*3/mm3    RBC 4.40 3.77 - 5.28 10*6/mm3    Hemoglobin 13.3 12.0 - 15.9 g/dL    Hematocrit 40.2 34.0 - 46.6 %    MCV 91.4 79.0 - 97.0 fL    MCH 30.2 26.6 - 33.0 pg    MCHC 33.1 31.5 - 35.7 g/dL    RDW 12.5 12.3 - 15.4 %    RDW-SD 41.4 37.0 - 54.0 fl    MPV 10.5 6.0 - 12.0 fL    Platelets 274 140 - 450 10*3/mm3    Neutrophil % 63.7 42.7 - 76.0 %    Lymphocyte % 25.4 19.6 - 45.3 %    Monocyte % 7.9 5.0 - 12.0 %    Eosinophil % 1.8 0.3 - 6.2 %    Basophil % 0.8 0.0 - 1.5 %    Immature Grans % 0.4 0.0 - 0.5 %    Neutrophils, Absolute 6.16 1.40 - 7.00 10*3/mm3    Lymphocytes, Absolute 2.46 0.70 - 3.10 10*3/mm3    Monocytes, Absolute 0.76 0.10 - 0.90 10*3/mm3    Eosinophils, Absolute 0.17 0.00 - 0.40 10*3/mm3    Basophils, Absolute 0.08 0.00 - 0.20 10*3/mm3    Immature Grans, Absolute 0.04 0.00 - 0.05 10*3/mm3    nRBC 0.0 0.0 - 0.0 /100 WBC       Ordered the above labs and reviewed the results.        RADIOLOGY  XR Chest 2 View   Final Result   No focal pulmonary consolidation. Tortuous aorta. Follow-up   as clinically indicated.       This report was finalized on 4/9/2019 4:13 PM by Dr. Nate Mcmahon M.D.                   PROCEDURES  Procedures        EKG:           EKG time: 1435  Rhythm/Rate: sinus, rate 92  P waves and AZ: BUNNY  QRS, axis: nl   ST and T waves: nl     Interpreted Contemporaneously by me, independently viewed. EKG unchanged compared to prior 6/20/18.      MEDICATIONS GIVEN IN ER  Medications   sodium chloride 0.9 % flush 10 mL (not administered)   lactated ringers bolus 1,000 mL (0 mL Intravenous Stopped 4/9/19 1740)       PROGRESS AND CONSULTS     1531 LR Bolus ordered for further tx. CXR, Troponin, and Labs ordered for further evaluation.    1726 Rechecked pt. Pt is resting comfortably. Pt states that her palpitations have improved here in the ER, but says that she felt weak walking around the ER. Notified pt that her kidney functions are slightly elevated, but that her lab results are  otherwise unremarkable. Pt states that she is comfortable with going home. Discussed the plan to discharge the pt home. I instructed the pt to follow up with her PCP as needed. Pt understands and agrees with the plan, all questions answered.      MEDICAL DECISION MAKING      MDM  Number of Diagnoses or Management Options  Diarrhea, unspecified type:   Palpitations:   Diagnosis management comments: Presents with palpitations and associated diarrhea.  She denies having any actual chest pain although does note that there is some discomfort that goes up to her neck.  This is very typical in nature and I have very low pretest probability for ACS.  Her EKG and troponin are negative.  I do not think that a second troponin is clinically indicated.  She states that she has been having constant sensation of palpitations although there is no evidence of arrhythmia on telemetry or her EKG.  Patient does also have a history of chronic diarrhea.  States that it feels somewhat worse today than prior.  She was unable to provide a stool sample.  I do not believe this is due to acute infectious process.  On repeat exam, patient does not have any abdominal tenderness.  I see no indication for CT scan of the abdomen.  Patient appears clinically well and I believe that she is safe and appropriate for discharge home.  I gave her good return precautions.       Amount and/or Complexity of Data Reviewed  Clinical lab tests: reviewed and ordered (Troponin - <0.010)  Tests in the radiology section of CPT®: ordered and reviewed (CXR - negative acute)  Tests in the medicine section of CPT®: ordered and reviewed (EKG - see procedure note)  Decide to obtain previous medical records or to obtain history from someone other than the patient: yes  Independent visualization of images, tracings, or specimens: yes (No pneumonia)               DIAGNOSIS  Final diagnoses:   Palpitations   Diarrhea, unspecified type          DISPOSITION  DISCHARGE    Patient discharged in stable condition.    Reviewed implications of results, diagnosis, meds, responsibility to follow up, warning signs and symptoms of possible worsening, potential complications and reasons to return to ER.    Patient/Family voiced understanding of above instructions.    Discussed plan for discharge, as there is no emergent indication for admission. Patient referred to primary care provider for BP management due to today's BP. Pt/family is agreeable and understands need for follow up and repeat testing.  Pt is aware that discharge does not mean that nothing is wrong but it indicates no emergency is present that requires admission and they must continue care with follow-up as given below or physician of their choice.     FOLLOW-UP  Fermín Bella MD  [phone] [phone]    Schedule an appointment as soon as possible for a visit   As needed         Medication List      Changed    triamcinolone 0.1 % cream  Commonly known as:  KENALOG  Apply  topically 2 (Two) Times a Day.  What changed:    how much to take  when to take this  reasons to take this  additional instructions                      Latest Documented Vital Signs:  As of 5:47 PM  BP- 157/96 HR- 68 Temp- 97.7 °F (36.5 °C) (Tympanic) O2 sat- 95%        --  Documentation assistance provided by lakshmi Bennett for Dr. Omar MD.  Information recorded by the emeritaibe was done at my direction and has been verified and validated by me.         Shahbaz Bennett  04/09/19 4923       Fermín Nelson II, MD  04/09/19 0402

## 2019-05-28 RX ORDER — TRAZODONE HYDROCHLORIDE 50 MG/1
TABLET ORAL
Qty: 180 TABLET | Refills: 0 | Status: SHIPPED | OUTPATIENT
Start: 2019-05-28 | End: 2019-08-30 | Stop reason: SDUPTHER

## 2019-06-24 ENCOUNTER — APPOINTMENT (OUTPATIENT)
Dept: LAB | Facility: HOSPITAL | Age: 64
End: 2019-06-24

## 2019-06-24 ENCOUNTER — OFFICE VISIT (OUTPATIENT)
Dept: INTERNAL MEDICINE | Facility: CLINIC | Age: 64
End: 2019-06-24

## 2019-06-24 VITALS — WEIGHT: 199 LBS | BODY MASS INDEX: 31.98 KG/M2 | HEIGHT: 66 IN

## 2019-06-24 DIAGNOSIS — M47.22 CERVICAL SPONDYLOSIS WITH RADICULOPATHY: ICD-10-CM

## 2019-06-24 DIAGNOSIS — Z00.00 HEALTH MAINTENANCE EXAMINATION: ICD-10-CM

## 2019-06-24 DIAGNOSIS — K21.9 GASTROESOPHAGEAL REFLUX DISEASE, ESOPHAGITIS PRESENCE NOT SPECIFIED: Primary | ICD-10-CM

## 2019-06-24 DIAGNOSIS — I10 ELEVATED BLOOD PRESSURE READING IN OFFICE WITH DIAGNOSIS OF HYPERTENSION: ICD-10-CM

## 2019-06-24 DIAGNOSIS — R06.09 DYSPNEA ON EXERTION: ICD-10-CM

## 2019-06-24 DIAGNOSIS — G43.009 MIGRAINE WITHOUT AURA AND WITHOUT STATUS MIGRAINOSUS, NOT INTRACTABLE: ICD-10-CM

## 2019-06-24 LAB
ALBUMIN SERPL-MCNC: 4.2 G/DL (ref 3.5–5.2)
ALBUMIN/GLOB SERPL: 1.4 G/DL
ALP SERPL-CCNC: 94 U/L (ref 39–117)
ALT SERPL W P-5'-P-CCNC: 27 U/L (ref 1–33)
ANION GAP SERPL CALCULATED.3IONS-SCNC: 12.8 MMOL/L
AST SERPL-CCNC: 19 U/L (ref 1–32)
BASOPHILS # BLD AUTO: 0.09 10*3/MM3 (ref 0–0.2)
BASOPHILS NFR BLD AUTO: 0.7 % (ref 0–1.5)
BILIRUB SERPL-MCNC: 0.3 MG/DL (ref 0.2–1.2)
BUN BLD-MCNC: 20 MG/DL (ref 8–23)
BUN/CREAT SERPL: 18.9 (ref 7–25)
CALCIUM SPEC-SCNC: 9 MG/DL (ref 8.6–10.5)
CHLORIDE SERPL-SCNC: 105 MMOL/L (ref 98–107)
CHOLEST SERPL-MCNC: 153 MG/DL (ref 0–200)
CO2 SERPL-SCNC: 22.2 MMOL/L (ref 22–29)
CREAT BLD-MCNC: 1.06 MG/DL (ref 0.57–1)
DEPRECATED RDW RBC AUTO: 43.4 FL (ref 37–54)
EOSINOPHIL # BLD AUTO: 0.25 10*3/MM3 (ref 0–0.4)
EOSINOPHIL NFR BLD AUTO: 2 % (ref 0.3–6.2)
ERYTHROCYTE [DISTWIDTH] IN BLOOD BY AUTOMATED COUNT: 12.9 % (ref 12.3–15.4)
GFR SERPL CREATININE-BSD FRML MDRD: 52 ML/MIN/1.73
GLOBULIN UR ELPH-MCNC: 3 GM/DL
GLUCOSE BLD-MCNC: 99 MG/DL (ref 65–99)
HCT VFR BLD AUTO: 42.4 % (ref 34–46.6)
HDLC SERPL-MCNC: 36 MG/DL (ref 40–60)
HGB BLD-MCNC: 14 G/DL (ref 12–15.9)
IMM GRANULOCYTES # BLD AUTO: 0.11 10*3/MM3 (ref 0–0.05)
IMM GRANULOCYTES NFR BLD AUTO: 0.9 % (ref 0–0.5)
LDLC SERPL CALC-MCNC: 57 MG/DL (ref 0–100)
LDLC/HDLC SERPL: 1.57 {RATIO}
LYMPHOCYTES # BLD AUTO: 3.58 10*3/MM3 (ref 0.7–3.1)
LYMPHOCYTES NFR BLD AUTO: 29.2 % (ref 19.6–45.3)
MCH RBC QN AUTO: 30.4 PG (ref 26.6–33)
MCHC RBC AUTO-ENTMCNC: 33 G/DL (ref 31.5–35.7)
MCV RBC AUTO: 92.2 FL (ref 79–97)
MONOCYTES # BLD AUTO: 0.88 10*3/MM3 (ref 0.1–0.9)
MONOCYTES NFR BLD AUTO: 7.2 % (ref 5–12)
NEUTROPHILS # BLD AUTO: 7.37 10*3/MM3 (ref 1.7–7)
NEUTROPHILS NFR BLD AUTO: 60 % (ref 42.7–76)
NRBC BLD AUTO-RTO: 0 /100 WBC (ref 0–0.2)
PLATELET # BLD AUTO: 348 10*3/MM3 (ref 140–450)
PMV BLD AUTO: 9.9 FL (ref 6–12)
POTASSIUM BLD-SCNC: 3.9 MMOL/L (ref 3.5–5.2)
PROT SERPL-MCNC: 7.2 G/DL (ref 6–8.5)
RBC # BLD AUTO: 4.6 10*6/MM3 (ref 3.77–5.28)
SODIUM BLD-SCNC: 140 MMOL/L (ref 136–145)
TRIGL SERPL-MCNC: 302 MG/DL (ref 0–150)
VLDLC SERPL-MCNC: 60.4 MG/DL (ref 5–40)
WBC NRBC COR # BLD: 12.28 10*3/MM3 (ref 3.4–10.8)

## 2019-06-24 PROCEDURE — 99396 PREV VISIT EST AGE 40-64: CPT | Performed by: INTERNAL MEDICINE

## 2019-06-24 PROCEDURE — 80053 COMPREHEN METABOLIC PANEL: CPT | Performed by: INTERNAL MEDICINE

## 2019-06-24 PROCEDURE — 80061 LIPID PANEL: CPT | Performed by: INTERNAL MEDICINE

## 2019-06-24 PROCEDURE — 85025 COMPLETE CBC W/AUTO DIFF WBC: CPT | Performed by: INTERNAL MEDICINE

## 2019-06-24 PROCEDURE — 36415 COLL VENOUS BLD VENIPUNCTURE: CPT | Performed by: INTERNAL MEDICINE

## 2019-06-24 RX ORDER — METAXALONE 800 MG/1
800 TABLET ORAL 3 TIMES DAILY
Qty: 90 TABLET | Refills: 1 | Status: SHIPPED | OUTPATIENT
Start: 2019-06-24 | End: 2019-08-17 | Stop reason: SDUPTHER

## 2019-06-24 RX ORDER — PRAZOSIN HYDROCHLORIDE 1 MG/1
CAPSULE ORAL
Refills: 0 | COMMUNITY
Start: 2019-06-10 | End: 2019-09-18

## 2019-06-24 RX ORDER — GABAPENTIN 600 MG/1
600 TABLET ORAL 2 TIMES DAILY
Qty: 90 TABLET | Refills: 0 | COMMUNITY
Start: 2019-06-24 | End: 2019-07-15 | Stop reason: SDUPTHER

## 2019-06-24 RX ORDER — LURASIDONE HYDROCHLORIDE 60 MG/1
60 TABLET, FILM COATED ORAL DAILY
COMMUNITY
End: 2019-09-18 | Stop reason: ALTCHOICE

## 2019-06-24 NOTE — PROGRESS NOTES
Subjective   Magdalena Dickey is a 63 y.o. female and is here for a comprehensive physical exam. The patient reports problems - with shortness of breath.  Thinks even slight amt of activity brings it on.  Just walking in from garage over the weekend she noticed it.  She does not exercise. Occ gets some chest pressure- + fam history of CAD. She also notes some diffuse muscle cramps but attributes that to being out of the metaxalone.  She was tried on magnesium but she had diffuse diarrhea issues.  She doesn't think she noticed much difference.      Pt is UTD with mammo           Social History     Socioeconomic History   • Marital status:      Spouse name: Not on file   • Number of children: Not on file   • Years of education: Not on file   • Highest education level: Not on file   Tobacco Use   • Smoking status: Former Smoker     Types: Cigarettes     Last attempt to quit: 3/10/2014     Years since quittin.2   • Smokeless tobacco: Never Used   Substance and Sexual Activity   • Alcohol use: No     Comment: SELDOM   • Drug use: No   • Sexual activity: Yes     Partners: Male     Birth control/protection: Post-menopausal       Family History   Problem Relation Age of Onset   • Alzheimer's disease Mother         SDAT   • Hypertension Mother    • Diabetes type II Mother    • Lung cancer Mother         POSSIBLE   • Heart attack Mother    • Cancer Mother         lung   • COPD Mother    • Depression Mother    • Diabetes Mother    • Heart disease Mother    • Miscarriages / Stillbirths Mother    • Alcohol abuse Father    • Prostate cancer Father    • Heart disease Father         THIRD DEGREE HEART BLOCK   • Arthritis Father    • Cancer Father         prostate   • Drug abuse Father    • Learning disabilities Father    • Ovarian cancer Sister    • No Known Problems Brother    • Ovarian cancer Sister    • Arthritis Maternal Grandmother         rheumatoid   • Arthritis Sister    • Cancer Sister         ovarian   •  Depression Sister    • Hypertension Sister    • Cancer Sister         ovarian   • Depression Sister    • Hypertension Sister    • Malig Hyperthermia Neg Hx           Past Medical History:   Diagnosis Date   • Anxiety    • Colon polyp 2017   • Delayed emergence from anesthesia 2018   • Depression 5/25/2016   • Diverticulitis of colon    • Diverticulosis of large intestine without hemorrhage 10/5/2016   • GERD (gastroesophageal reflux disease)    • Hiatal hernia    • History of gastric ulcer    • Irritable bowel syndrome (IBS)    • Low back pain    • Lumbosacral disc disease    • Migraine without aura and without status migrainosus, not intractable 5/25/2016   • Shoulder pain     RIGHT          Current Outpatient Medications:   •  ALPRAZolam (XANAX) 0.5 MG tablet, Take 1 tablet by mouth 2 (Two) Times a Day As Needed for Anxiety., Disp: 60 tablet, Rfl: 1  •  calcium carbonate (OS-MARY) 600 MG tablet, Take 600 mg by mouth 2 (Two) Times a Day., Disp: , Rfl:   •  diclofenac (VOLTAREN) 50 MG EC tablet, prn, Disp: 90 tablet, Rfl: 0  •  dicyclomine (BENTYL) 10 MG capsule, Take 1 capsule by mouth 4 (Four) Times a Day As Needed (abdominal pain)., Disp: 60 capsule, Rfl: 2  •  docusate sodium (COLACE) 100 MG capsule, Take 100 mg by mouth 2 (Two) Times a Day., Disp: , Rfl:   •  DULoxetine (CYMBALTA) 60 MG capsule, Take 1 capsule by mouth Daily., Disp: 90 capsule, Rfl: 0  •  gabapentin (NEURONTIN) 600 MG tablet, Take 1 tablet by mouth 2 (Two) Times a Day., Disp: 90 tablet, Rfl: 0  •  lurasidone HCl (LATUDA) 60 MG tablet tablet, Take 60 mg by mouth Daily., Disp: , Rfl:   •  metaxalone (SKELAXIN) 800 MG tablet, Take 1 tablet by mouth 3 (Three) Times a Day., Disp: 90 tablet, Rfl: 1  •  Multiple Vitamin (MULTI-VITAMINS PO), Take 1 tablet by mouth Daily. HOLD FOR SURGERY, Disp: , Rfl:   •  ondansetron (ZOFRAN) 4 MG tablet, Take 1 tablet by mouth Every 8 (Eight) Hours As Needed for Nausea or Vomiting., Disp: 30 tablet, Rfl: 2  •   "pantoprazole (PROTONIX) 40 MG EC tablet, Take 40 mg by mouth Every Morning., Disp: , Rfl:   •  Polyethylene Glycol 3350 (MIRALAX PO), Take  by mouth Daily As Needed., Disp: , Rfl:   •  prazosin (MINIPRESS) 1 MG capsule, TAKE 1 TO 2 CAPSULES BY MOUTH TWICE A DAY, Disp: , Rfl: 0  •  Probiotic Product (PROBIOTIC DAILY PO), Take 1 capsule by mouth Daily., Disp: , Rfl:   •  SUMAtriptan (IMITREX) 100 MG tablet, TAKE 1 TABLET BY MOUTH AT ONSET OF HEADACHE, MAY REPEAT EVERY 2 HOURS AS NEEDED (MAX 200MG/DAY), Disp: 9 tablet, Rfl: 1  •  traZODone (DESYREL) 50 MG tablet, 1-2 tabs PO qhs for insomnia, Disp: 180 tablet, Rfl: 0  •  triamcinolone (KENALOG) 0.1 % cream, Apply  topically 2 (Two) Times a Day. (Patient taking differently: Apply 1 application topically As Needed. ON BACK), Disp: 454 g, Rfl: 0  •  ultra-purified peppermint oil (IBGARD) 90 mg capsule capsule, Take 2 capsules by mouth 3 (Three) Times a Day. Start w/ 1 bid and increase to 2 tid if needed., Disp: 180 capsule, Rfl: prn    Review of Systems    Review of Systems   Constitution: Positive for weight gain. Negative for weakness.   HENT: Negative.    Cardiovascular: Positive for dyspnea on exertion. Negative for irregular heartbeat and palpitations.   Respiratory: Positive for shortness of breath. Negative for cough.    Endocrine: Negative.    Skin: Negative.    Musculoskeletal: Positive for muscle cramps.   Gastrointestinal: Positive for heartburn. Constipation: since her resection for diverticulitis.   Genitourinary: Negative.    Neurological: Headaches: under good control with daily gabapentin.   Psychiatric/Behavioral: Negative.        There were no vitals filed for this visit.    Vitals:    06/24/19 0858   Weight: 90.3 kg (199 lb)   Height: 167.6 cm (66\")       Objective     Physical Exam   Constitutional: She is oriented to person, place, and time. No distress.   HENT:   Head: Normocephalic.   Eyes: Conjunctivae are normal. Pupils are equal, round, and " reactive to light. No scleral icterus.   Neck: No thyromegaly present.   Cardiovascular: Normal rate and regular rhythm.   No murmur heard.  Pulmonary/Chest: Effort normal and breath sounds normal. She exhibits no mass. Right breast exhibits no inverted nipple, no mass, no nipple discharge and no skin change. Left breast exhibits no inverted nipple, no mass, no nipple discharge and no skin change.   Abdominal: Soft. Bowel sounds are normal.   Lymphadenopathy:     She has no cervical adenopathy.   Neurological: She is alert and oriented to person, place, and time. She displays normal reflexes. Coordination normal.   Skin: Skin is warm and dry.   Psychiatric: She has a normal mood and affect. Her behavior is normal.       Procedures       Assessment/Plan         Magdalena was seen today for annual exam.    Diagnoses and all orders for this visit:    Gastroesophageal reflux disease, esophagitis presence not specified    Migraine without aura and without status migrainosus, not intractable  Comments:  stable  Orders:  -     gabapentin (NEURONTIN) 600 MG tablet; Take 1 tablet by mouth 2 (Two) Times a Day.  -     metaxalone (SKELAXIN) 800 MG tablet; Take 1 tablet by mouth 3 (Three) Times a Day.    Cervical spondylosis with radiculopathy  -     diclofenac (VOLTAREN) 50 MG EC tablet; prn    Dyspnea on exertion  Comments:  check stress echo given family history, symptoms and age.   Orders:  -     CBC & Differential  -     Adult Stress Echo W/ Cont or Stress Agent if Necessary Per Protocol    Health maintenance examination  -     Comprehensive Metabolic Panel  -     Lipid Panel With LDL / HDL Ratio    Elevated blood pressure reading in office with diagnosis of hypertension  Comments:  will check BP regularly and call if remains > 130/90        Return in about 3 months (around 9/24/2019).

## 2019-06-25 DIAGNOSIS — M47.22 CERVICAL SPONDYLOSIS WITH RADICULOPATHY: ICD-10-CM

## 2019-06-25 RX ORDER — DULOXETIN HYDROCHLORIDE 60 MG/1
CAPSULE, DELAYED RELEASE ORAL
Qty: 90 CAPSULE | Refills: 0 | Status: SHIPPED | OUTPATIENT
Start: 2019-06-25 | End: 2019-09-18

## 2019-06-26 DIAGNOSIS — R06.09 DYSPNEA ON EXERTION: Primary | ICD-10-CM

## 2019-07-02 ENCOUNTER — HOSPITAL ENCOUNTER (OUTPATIENT)
Dept: CARDIOLOGY | Facility: HOSPITAL | Age: 64
Discharge: HOME | End: 2019-07-02
Admitting: INTERNAL MEDICINE

## 2019-07-02 VITALS
HEIGHT: 66 IN | SYSTOLIC BLOOD PRESSURE: 130 MMHG | BODY MASS INDEX: 31.98 KG/M2 | HEART RATE: 99 BPM | DIASTOLIC BLOOD PRESSURE: 92 MMHG | WEIGHT: 199 LBS

## 2019-07-02 LAB
AORTIC ROOT ANNULUS: 1.7 CM
ASCENDING AORTA: 3.2 CM
BH CV ECHO MEAS - ACS: 2.1 CM
BH CV ECHO MEAS - AO MAX PG: 9.5 MMHG
BH CV ECHO MEAS - AO V2 MAX: 154.2 CM/SEC
BH CV ECHO MEAS - ASC AORTA: 3.2 CM
BH CV ECHO MEAS - BSA(HAYCOCK): 2.1 M^2
BH CV ECHO MEAS - BSA: 2 M^2
BH CV ECHO MEAS - BZI_BMI: 32.1 KILOGRAMS/M^2
BH CV ECHO MEAS - BZI_METRIC_HEIGHT: 167.6 CM
BH CV ECHO MEAS - BZI_METRIC_WEIGHT: 90.3 KG
BH CV ECHO MEAS - EDV(MOD-SP2): 43 ML
BH CV ECHO MEAS - EDV(MOD-SP4): 31 ML
BH CV ECHO MEAS - EDV(TEICH): 77.6 ML
BH CV ECHO MEAS - EF(CUBED): 76.6 %
BH CV ECHO MEAS - EF(MOD-BP): 65 %
BH CV ECHO MEAS - EF(MOD-SP2): 60.5 %
BH CV ECHO MEAS - EF(MOD-SP4): 74.2 %
BH CV ECHO MEAS - EF(TEICH): 69.1 %
BH CV ECHO MEAS - ESV(MOD-SP2): 17 ML
BH CV ECHO MEAS - ESV(MOD-SP4): 8 ML
BH CV ECHO MEAS - ESV(TEICH): 24 ML
BH CV ECHO MEAS - FS: 38.4 %
BH CV ECHO MEAS - IVS/LVPW: 0.88
BH CV ECHO MEAS - IVSD: 1 CM
BH CV ECHO MEAS - LAT PEAK E' VEL: 6 CM/SEC
BH CV ECHO MEAS - LV DIASTOLIC VOL/BSA (35-75): 15.5 ML/M^2
BH CV ECHO MEAS - LV MASS(C)D: 154.4 GRAMS
BH CV ECHO MEAS - LV MASS(C)DI: 77.3 GRAMS/M^2
BH CV ECHO MEAS - LV SYSTOLIC VOL/BSA (12-30): 4 ML/M^2
BH CV ECHO MEAS - LVIDD: 4.2 CM
BH CV ECHO MEAS - LVIDS: 2.6 CM
BH CV ECHO MEAS - LVLD AP2: 6.5 CM
BH CV ECHO MEAS - LVLD AP4: 6.3 CM
BH CV ECHO MEAS - LVLS AP2: 5.3 CM
BH CV ECHO MEAS - LVLS AP4: 6 CM
BH CV ECHO MEAS - LVPWD: 1.2 CM
BH CV ECHO MEAS - MED PEAK E' VEL: 5 CM/SEC
BH CV ECHO MEAS - MV A DUR: 0.1 SEC
BH CV ECHO MEAS - MV A MAX VEL: 92.4 CM/SEC
BH CV ECHO MEAS - MV DEC SLOPE: 492.2 CM/SEC^2
BH CV ECHO MEAS - MV DEC TIME: 0.2 SEC
BH CV ECHO MEAS - MV E MAX VEL: 61.8 CM/SEC
BH CV ECHO MEAS - MV E/A: 0.67
BH CV ECHO MEAS - MV P1/2T MAX VEL: 83.4 CM/SEC
BH CV ECHO MEAS - MV P1/2T: 49.6 MSEC
BH CV ECHO MEAS - MVA P1/2T LCG: 2.6 CM^2
BH CV ECHO MEAS - MVA(P1/2T): 4.4 CM^2
BH CV ECHO MEAS - PULM A REVS DUR: 0.11 SEC
BH CV ECHO MEAS - PULM A REVS VEL: 39.9 CM/SEC
BH CV ECHO MEAS - PULM DIAS VEL: 37.8 CM/SEC
BH CV ECHO MEAS - PULM S/D: 1.6
BH CV ECHO MEAS - PULM SYS VEL: 59.3 CM/SEC
BH CV ECHO MEAS - SI(CUBED): 28 ML/M^2
BH CV ECHO MEAS - SI(MOD-SP2): 13 ML/M^2
BH CV ECHO MEAS - SI(MOD-SP4): 11.5 ML/M^2
BH CV ECHO MEAS - SI(TEICH): 26.8 ML/M^2
BH CV ECHO MEAS - SV(CUBED): 55.9 ML
BH CV ECHO MEAS - SV(MOD-SP2): 26 ML
BH CV ECHO MEAS - SV(MOD-SP4): 23 ML
BH CV ECHO MEAS - SV(TEICH): 53.6 ML
BH CV ECHO MEAS - TAPSE (>1.6): 1.9 CM2
BH CV ECHO MEASUREMENTS AVERAGE E/E' RATIO: 11.24
BH CV STRESS BP STAGE 1: NORMAL
BH CV STRESS DURATION MIN STAGE 1: 3
BH CV STRESS DURATION MIN STAGE 2: 1
BH CV STRESS DURATION SEC STAGE 1: 0
BH CV STRESS DURATION SEC STAGE 2: 35
BH CV STRESS ECHO POST STRESS EJECTION FRACTION EF: 70 %
BH CV STRESS GRADE STAGE 1: 10
BH CV STRESS GRADE STAGE 2: 12
BH CV STRESS HR STAGE 1: 129
BH CV STRESS HR STAGE 2: 141
BH CV STRESS METS STAGE 1: 5
BH CV STRESS METS STAGE 2: 7.5
BH CV STRESS PROTOCOL 1: NORMAL
BH CV STRESS SPEED STAGE 1: 1.7
BH CV STRESS SPEED STAGE 2: 2.5
BH CV STRESS STAGE 1: 1
BH CV STRESS STAGE 2: 2
BH CV XLRA - RV BASE: 1.9 CM
BH CV XLRA - TDI S': 12 CM/SEC
LEFT ATRIUM VOLUME INDEX: 9 ML/M2
LV EF 2D ECHO EST: 65 %
MAXIMAL PREDICTED HEART RATE: 156 BPM
PERCENT MAX PREDICTED HR: 90.38 %
SINUS: 2.9 CM
STJ: 3.3 CM
STRESS BASELINE BP: NORMAL MMHG
STRESS BASELINE HR: 99 BPM
STRESS PERCENT HR: 106 %
STRESS POST ESTIMATED WORKLOAD: 6 METS
STRESS POST EXERCISE DUR MIN: 4 MIN
STRESS POST EXERCISE DUR SEC: 35 SEC
STRESS POST PEAK BP: NORMAL MMHG
STRESS POST PEAK HR: 141 BPM
STRESS TARGET HR: 133 BPM

## 2019-07-02 PROCEDURE — 93350 STRESS TTE ONLY: CPT

## 2019-07-02 PROCEDURE — 93320 DOPPLER ECHO COMPLETE: CPT | Performed by: INTERNAL MEDICINE

## 2019-07-02 PROCEDURE — 93350 STRESS TTE ONLY: CPT | Performed by: INTERNAL MEDICINE

## 2019-07-02 PROCEDURE — 93325 DOPPLER ECHO COLOR FLOW MAPG: CPT

## 2019-07-02 PROCEDURE — 93325 DOPPLER ECHO COLOR FLOW MAPG: CPT | Performed by: INTERNAL MEDICINE

## 2019-07-02 PROCEDURE — 93016 CV STRESS TEST SUPVJ ONLY: CPT | Performed by: INTERNAL MEDICINE

## 2019-07-02 PROCEDURE — 93320 DOPPLER ECHO COMPLETE: CPT

## 2019-07-02 PROCEDURE — 93018 CV STRESS TEST I&R ONLY: CPT | Performed by: INTERNAL MEDICINE

## 2019-07-02 PROCEDURE — 93017 CV STRESS TEST TRACING ONLY: CPT

## 2019-07-15 DIAGNOSIS — G43.009 MIGRAINE WITHOUT AURA AND WITHOUT STATUS MIGRAINOSUS, NOT INTRACTABLE: ICD-10-CM

## 2019-07-16 RX ORDER — GABAPENTIN 600 MG/1
600 TABLET ORAL 2 TIMES DAILY
Qty: 90 TABLET | Refills: 0 | COMMUNITY
Start: 2019-07-16 | End: 2019-07-17 | Stop reason: SDUPTHER

## 2019-07-17 DIAGNOSIS — G43.009 MIGRAINE WITHOUT AURA AND WITHOUT STATUS MIGRAINOSUS, NOT INTRACTABLE: ICD-10-CM

## 2019-07-17 RX ORDER — GABAPENTIN 600 MG/1
TABLET ORAL
Qty: 90 TABLET | Refills: 1 | Status: SHIPPED | OUTPATIENT
Start: 2019-07-17 | End: 2019-12-17 | Stop reason: SDUPTHER

## 2019-08-17 DIAGNOSIS — G43.009 MIGRAINE WITHOUT AURA AND WITHOUT STATUS MIGRAINOSUS, NOT INTRACTABLE: ICD-10-CM

## 2019-08-19 RX ORDER — METAXALONE 800 MG/1
TABLET ORAL
Qty: 90 TABLET | Refills: 1 | Status: SHIPPED | OUTPATIENT
Start: 2019-08-19 | End: 2019-10-18 | Stop reason: SDUPTHER

## 2019-08-26 RX ORDER — TRAZODONE HYDROCHLORIDE 50 MG/1
TABLET ORAL
Qty: 180 TABLET | Refills: 0 | OUTPATIENT
Start: 2019-08-26

## 2019-08-30 RX ORDER — TRAZODONE HYDROCHLORIDE 50 MG/1
TABLET ORAL
Qty: 180 TABLET | Refills: 0 | Status: SHIPPED | OUTPATIENT
Start: 2019-08-30 | End: 2019-11-26 | Stop reason: SDUPTHER

## 2019-09-18 ENCOUNTER — OFFICE VISIT (OUTPATIENT)
Dept: GASTROENTEROLOGY | Facility: CLINIC | Age: 64
End: 2019-09-18

## 2019-09-18 VITALS
WEIGHT: 201.6 LBS | BODY MASS INDEX: 32.4 KG/M2 | DIASTOLIC BLOOD PRESSURE: 82 MMHG | TEMPERATURE: 98.9 F | SYSTOLIC BLOOD PRESSURE: 138 MMHG | HEIGHT: 66 IN

## 2019-09-18 DIAGNOSIS — K59.1 FUNCTIONAL DIARRHEA: Primary | ICD-10-CM

## 2019-09-18 DIAGNOSIS — Z90.49 HISTORY OF COLON RESECTION: ICD-10-CM

## 2019-09-18 DIAGNOSIS — R15.9 FULL INCONTINENCE OF FECES: ICD-10-CM

## 2019-09-18 PROCEDURE — 99204 OFFICE O/P NEW MOD 45 MIN: CPT | Performed by: INTERNAL MEDICINE

## 2019-09-18 NOTE — PROGRESS NOTES
Chief Complaint   Patient presents with   • Fecal incontience x 2 years   • Diarrhea       Subjective     HPI    Magdalena Dickey is a 64 y.o. female with a past medical history noted below who presents for evaluation of diarrhea and fecal incontinence.  Symptoms present for about 2 years.  She describes intermittent episodes-- every few weeks-- of uncontrollable diarrhea.  She will have the urge to have a bowel movement and then suddenly she will have liquid stool diarrhea.  She says it is uncontrollable.  Associated with complete incontinence.  She has not yet identified any particular triggers.  The episodes happen too quickly that she cannot take any medicine to stop it.  She denies that she has any preceding symptoms that indicate she is going to have diarrhea on a particular day.  In between episodes, her bowel movements are stable, normal, and formed.    S/p sigmoid colon resection for diverticulitis; however she reports that her diarrhea episode started before that.    S/p kee 20 years ago    Mother with polyps, she has also had polyps    No smoking, rare ETOH    Works as bed board manager      Past Medical History:   Diagnosis Date   • Anxiety    • Colon polyp 2017   • Delayed emergence from anesthesia 2018   • Depression 5/25/2016   • Diverticulitis of colon    • Diverticulosis of large intestine without hemorrhage 10/5/2016   • GERD (gastroesophageal reflux disease)    • Hiatal hernia    • History of gastric ulcer    • Irritable bowel syndrome (IBS)    • Low back pain    • Lumbosacral disc disease    • Migraine without aura and without status migrainosus, not intractable 5/25/2016   • Shoulder pain     RIGHT         Current Outpatient Medications:   •  ALPRAZolam (XANAX) 0.5 MG tablet, Take 1 tablet by mouth 2 (Two) Times a Day As Needed for Anxiety., Disp: 60 tablet, Rfl: 1  •  calcium carbonate (OS-MARY) 600 MG tablet, Take 600 mg by mouth 2 (Two) Times a Day., Disp: , Rfl:   •  dicyclomine (BENTYL) 10  MG capsule, Take 1 capsule by mouth 4 (Four) Times a Day As Needed (abdominal pain)., Disp: 60 capsule, Rfl: 2  •  DULoxetine (CYMBALTA) 60 MG capsule, Take 1 capsule by mouth Daily., Disp: 90 capsule, Rfl: 0  •  gabapentin (NEURONTIN) 600 MG tablet, Take one-half tablets by mouth twice daily, Disp: 90 tablet, Rfl: 1  •  metaxalone (SKELAXIN) 800 MG tablet, TAKE 1 TABLET BY MOUTH THREE TIMES A DAY, Disp: 90 tablet, Rfl: 1  •  Multiple Vitamin (MULTI-VITAMINS PO), Take 1 tablet by mouth Daily. HOLD FOR SURGERY, Disp: , Rfl:   •  ofloxacin (FLOXIN) 0.3 % otic solution, Instill 1-2 drops in the right ear twice daily for 7 days., Disp: 10 mL, Rfl: 0  •  ondansetron (ZOFRAN) 4 MG tablet, Take 1 tablet by mouth Every 8 (Eight) Hours As Needed for Nausea or Vomiting., Disp: 30 tablet, Rfl: 2  •  pantoprazole (PROTONIX) 40 MG EC tablet, Take 40 mg by mouth Every Morning., Disp: , Rfl:   •  Probiotic Product (PROBIOTIC DAILY PO), Take 1 capsule by mouth Daily., Disp: , Rfl:   •  SUMAtriptan (IMITREX) 100 MG tablet, TAKE 1 TABLET BY MOUTH AT ONSET OF HEADACHE, MAY REPEAT EVERY 2 HOURS AS NEEDED (MAX 200MG/DAY), Disp: 9 tablet, Rfl: 1  •  traZODone (DESYREL) 50 MG tablet, 1-2 tabs PO qhs for insomnia, Disp: 180 tablet, Rfl: 0  •  triamcinolone (KENALOG) 0.1 % cream, Apply  topically 2 (Two) Times a Day. (Patient taking differently: Apply 1 application topically As Needed. ON BACK), Disp: 454 g, Rfl: 0    No Known Allergies    Social History     Socioeconomic History   • Marital status:      Spouse name: Not on file   • Number of children: Not on file   • Years of education: Not on file   • Highest education level: Not on file   Tobacco Use   • Smoking status: Former Smoker     Types: Cigarettes     Last attempt to quit: 3/10/2014     Years since quittin.5   • Smokeless tobacco: Never Used   Substance and Sexual Activity   • Alcohol use: Yes     Comment: SELDOM   • Drug use: No   • Sexual activity: Yes     Partners:  Male     Birth control/protection: Post-menopausal       Family History   Problem Relation Age of Onset   • Alzheimer's disease Mother         SDAT   • Hypertension Mother    • Diabetes type II Mother    • Lung cancer Mother         POSSIBLE   • Heart attack Mother    • Cancer Mother         lung   • COPD Mother    • Depression Mother    • Diabetes Mother    • Heart disease Mother    • Miscarriages / Stillbirths Mother    • Alcohol abuse Father    • Prostate cancer Father    • Heart disease Father         THIRD DEGREE HEART BLOCK   • Arthritis Father    • Cancer Father         prostate   • Drug abuse Father    • Learning disabilities Father    • Ovarian cancer Sister    • No Known Problems Brother    • Ovarian cancer Sister    • Arthritis Maternal Grandmother         rheumatoid   • Arthritis Sister    • Cancer Sister         ovarian   • Depression Sister    • Hypertension Sister    • Cancer Sister         ovarian   • Depression Sister    • Hypertension Sister    • Malig Hyperthermia Neg Hx        Review of Systems   Constitutional: Negative for activity change, appetite change and fatigue.   HENT: Negative for sore throat and trouble swallowing.    Respiratory: Negative.    Cardiovascular: Negative.    Gastrointestinal: Positive for diarrhea. Negative for abdominal distention, abdominal pain and blood in stool.        Fecal incontinence   Endocrine: Negative for cold intolerance and heat intolerance.   Genitourinary: Negative for difficulty urinating, dysuria and frequency.   Musculoskeletal: Negative for arthralgias, back pain and myalgias.   Skin: Negative.    Hematological: Negative for adenopathy. Does not bruise/bleed easily.   All other systems reviewed and are negative.      Objective     Vitals:    09/18/19 1323   BP: 138/82   Temp: 98.9 °F (37.2 °C)         09/18/19  1323   Weight: 91.4 kg (201 lb 9.6 oz)     Body mass index is 32.54 kg/m².    Physical Exam   Constitutional: She is oriented to person,  place, and time. She appears well-developed and well-nourished. No distress.   HENT:   Head: Normocephalic and atraumatic.   Right Ear: External ear normal.   Left Ear: External ear normal.   Nose: Nose normal.   Mouth/Throat: Oropharynx is clear and moist.   Eyes: Conjunctivae and EOM are normal. Right eye exhibits no discharge. Left eye exhibits no discharge. No scleral icterus.   Neck: Normal range of motion. Neck supple. No thyromegaly present.   No supraclavicular adenopathy   Cardiovascular: Normal rate, regular rhythm, normal heart sounds and intact distal pulses. Exam reveals no gallop.   No murmur heard.  No lower extremity edema   Pulmonary/Chest: Effort normal and breath sounds normal. No respiratory distress. She has no wheezes.   Abdominal: Soft. Normal appearance and bowel sounds are normal. She exhibits no distension and no mass. There is no hepatosplenomegaly. There is no tenderness. There is no rigidity, no rebound and no guarding. No hernia.   Genitourinary:   Genitourinary Comments: Rectal exam deferred   Musculoskeletal: Normal range of motion. She exhibits no edema or tenderness.   No atrophy of upper or lower extremities.  Normal digits and nails of both hands.   Lymphadenopathy:     She has no cervical adenopathy.   Neurological: She is alert and oriented to person, place, and time. She displays no atrophy. Coordination normal.   Skin: Skin is warm and dry. No rash noted. She is not diaphoretic. No erythema.   Psychiatric: She has a normal mood and affect. Her behavior is normal. Judgment and thought content normal.   Vitals reviewed.      WBC   Date Value Ref Range Status   06/24/2019 12.28 (H) 3.40 - 10.80 10*3/mm3 Final     RBC   Date Value Ref Range Status   06/24/2019 4.60 3.77 - 5.28 10*6/mm3 Final     Hemoglobin   Date Value Ref Range Status   06/24/2019 14.0 12.0 - 15.9 g/dL Final     Hematocrit   Date Value Ref Range Status   06/24/2019 42.4 34.0 - 46.6 % Final     MCV   Date Value  Ref Range Status   06/24/2019 92.2 79.0 - 97.0 fL Final     MCH   Date Value Ref Range Status   06/24/2019 30.4 26.6 - 33.0 pg Final     MCHC   Date Value Ref Range Status   06/24/2019 33.0 31.5 - 35.7 g/dL Final     RDW   Date Value Ref Range Status   06/24/2019 12.9 12.3 - 15.4 % Final     RDW-SD   Date Value Ref Range Status   06/24/2019 43.4 37.0 - 54.0 fl Final     MPV   Date Value Ref Range Status   06/24/2019 9.9 6.0 - 12.0 fL Final     Platelets   Date Value Ref Range Status   06/24/2019 348 140 - 450 10*3/mm3 Final     Neutrophil %   Date Value Ref Range Status   06/24/2019 60.0 42.7 - 76.0 % Final     Lymphocyte %   Date Value Ref Range Status   06/24/2019 29.2 19.6 - 45.3 % Final     Monocyte %   Date Value Ref Range Status   06/24/2019 7.2 5.0 - 12.0 % Final     Eosinophil %   Date Value Ref Range Status   06/24/2019 2.0 0.3 - 6.2 % Final     Basophil %   Date Value Ref Range Status   06/24/2019 0.7 0.0 - 1.5 % Final     Immature Grans %   Date Value Ref Range Status   06/24/2019 0.9 (H) 0.0 - 0.5 % Final     Neutrophils, Absolute   Date Value Ref Range Status   06/24/2019 7.37 (H) 1.70 - 7.00 10*3/mm3 Final     Lymphocytes, Absolute   Date Value Ref Range Status   06/24/2019 3.58 (H) 0.70 - 3.10 10*3/mm3 Final     Monocytes, Absolute   Date Value Ref Range Status   06/24/2019 0.88 0.10 - 0.90 10*3/mm3 Final     Eosinophils, Absolute   Date Value Ref Range Status   06/24/2019 0.25 0.00 - 0.40 10*3/mm3 Final     Basophils, Absolute   Date Value Ref Range Status   06/24/2019 0.09 0.00 - 0.20 10*3/mm3 Final     Immature Grans, Absolute   Date Value Ref Range Status   06/24/2019 0.11 (H) 0.00 - 0.05 10*3/mm3 Final     nRBC   Date Value Ref Range Status   06/24/2019 0.0 0.0 - 0.2 /100 WBC Final       Glucose   Date Value Ref Range Status   06/24/2019 99 65 - 99 mg/dL Final     Sodium   Date Value Ref Range Status   06/24/2019 140 136 - 145 mmol/L Final     Potassium   Date Value Ref Range Status   06/24/2019  3.9 3.5 - 5.2 mmol/L Final     CO2   Date Value Ref Range Status   06/24/2019 22.2 22.0 - 29.0 mmol/L Final     Chloride   Date Value Ref Range Status   06/24/2019 105 98 - 107 mmol/L Final     Anion Gap   Date Value Ref Range Status   06/24/2019 12.8 mmol/L Final     Creatinine   Date Value Ref Range Status   06/24/2019 1.06 (H) 0.57 - 1.00 mg/dL Final   11/01/2018 0.90 0.60 - 1.30 mg/dL Final     Comment:     Serial Number: [id number]:  [id number]     BUN   Date Value Ref Range Status   06/24/2019 20 8 - 23 mg/dL Final     BUN/Creatinine Ratio   Date Value Ref Range Status   06/24/2019 18.9 7.0 - 25.0 Final     Calcium   Date Value Ref Range Status   06/24/2019 9.0 8.6 - 10.5 mg/dL Final     eGFR Non  Amer   Date Value Ref Range Status   06/24/2019 52 (L) >60 mL/min/1.73 Final     Alkaline Phosphatase   Date Value Ref Range Status   06/24/2019 94 39 - 117 U/L Final     Total Protein   Date Value Ref Range Status   06/24/2019 7.2 6.0 - 8.5 g/dL Final     ALT (SGPT)   Date Value Ref Range Status   06/24/2019 27 1 - 33 U/L Final     AST (SGOT)   Date Value Ref Range Status   06/24/2019 19 1 - 32 U/L Final     Total Bilirubin   Date Value Ref Range Status   06/24/2019 0.3 0.2 - 1.2 mg/dL Final     Albumin   Date Value Ref Range Status   06/24/2019 4.20 3.50 - 5.20 g/dL Final     Globulin   Date Value Ref Range Status   06/24/2019 3.0 gm/dL Final         Imaging Results (last 7 days)     ** No results found for the last 168 hours. **            No notes on file    Assessment/Plan    Diarrhea: Intermittent episodes, no apparent precipitants.  Suspected IBS versus food issues as she is status post cholecystectomy    Incontinence of feces: Only for liquid stools    History of colon resection: Sigmoid resection for recurrent diverticulitis    Plan  We will try some conservative medication measures.  I discussed with her that her condition is very difficult to treat as these episodes are intermittent and come without  warning.  I am going to have her start 2 mg of loperamide every day to maintain firm stools.  I am going to have her start a daily fiber supplement to bulk stools and hopefully prevent too constipating of an effect of the Imodium.  I would like for her to try at least a month completely dairy free to see if this is impacting her symptoms    If she is not getting better with these measures, then will need to send her to Dr. Tia Jin for anorectal manometry and consideration of a sacral stimulator    Magdalena was seen today for fecal incontience x 2 years and diarrhea.    Diagnoses and all orders for this visit:    Functional diarrhea    Full incontinence of feces    History of colon resection        I have discussed the above plan with the patient.  They verbalize understanding and are in agreement with the plan.  They have been advised to contact the office for any questions, concerns, or changes related to their health.    Dictated utilizing Dragon dictation

## 2019-09-18 NOTE — PATIENT INSTRUCTIONS
Imodium 2mg every morning, if too constipating can cut in half    Fiber supplementation with metamucil, fiber con, or citrucel daily    No dairy for 4 weeks  For any additional questions, concerns or changes to your condition after today's office visit please contact the office at [phone].

## 2019-09-24 ENCOUNTER — RESULTS ENCOUNTER (OUTPATIENT)
Dept: INTERNAL MEDICINE | Facility: CLINIC | Age: 64
End: 2019-09-24

## 2019-09-24 ENCOUNTER — OFFICE VISIT (OUTPATIENT)
Dept: INTERNAL MEDICINE | Facility: CLINIC | Age: 64
End: 2019-09-24

## 2019-09-24 VITALS
DIASTOLIC BLOOD PRESSURE: 80 MMHG | HEART RATE: 74 BPM | BODY MASS INDEX: 32.47 KG/M2 | HEIGHT: 66 IN | SYSTOLIC BLOOD PRESSURE: 128 MMHG | WEIGHT: 202 LBS

## 2019-09-24 DIAGNOSIS — E78.1 HYPERTRIGLYCERIDEMIA: ICD-10-CM

## 2019-09-24 DIAGNOSIS — D72.829 LEUKOCYTOSIS, UNSPECIFIED TYPE: ICD-10-CM

## 2019-09-24 DIAGNOSIS — R03.0 ELEVATED BP WITHOUT DIAGNOSIS OF HYPERTENSION: Primary | ICD-10-CM

## 2019-09-24 DIAGNOSIS — R06.09 DYSPNEA ON EXERTION: ICD-10-CM

## 2019-09-24 PROCEDURE — 99213 OFFICE O/P EST LOW 20 MIN: CPT | Performed by: INTERNAL MEDICINE

## 2019-09-24 RX ORDER — ARIPIPRAZOLE 10 MG/1
TABLET ORAL
COMMUNITY
Start: 2019-09-20 | End: 2019-11-19

## 2019-09-24 NOTE — PROGRESS NOTES
Assessment and Plan  Magdalena was seen today for hypertension.    Diagnoses and all orders for this visit:    Elevated BP without diagnosis of hypertension  Comments:  No change in therapy now.  Will continue to monitor with goal < 130/80.      Hypertriglyceridemia  -     Comprehensive Metabolic Panel; Future  -     Lipid Panel With LDL / HDL Ratio; Future    Leukocytosis, unspecified type  -     CBC & Differential; Future      F/U and Patient Instructions    Return in about 6 months (around 3/24/2020) for Annual physical.  There are no Patient Instructions on file for this visit.    Subjective    Magdalena Dickey is a 64 y.o. female being seen in our office today for Hypertension     History of the Present Illness  HPI Here to f/u BP readings- 116-142/65-84  Psychiatrist has been adjusting her medications- added Abilify.    Takes neurontin daily, has weaned down to 1/2 BID in hopes of being able to d/c.  No     Patient History        Significant Past History  The following portions of the patient's history were reviewed and updated as appropriate:PMHroutine: Social history , Current Medications, Active Problem List and Health Maintenance              Social History  She  reports that she quit smoking about 5 years ago. Her smoking use included cigarettes. She has never used smokeless tobacco. She reports that she drinks alcohol. She reports that she does not use drugs.                         Review of Symptoms  Review of Systems   Constitution: Negative for weight loss.   Cardiovascular: Negative.    Respiratory: Negative.    Genitourinary: Negative.    Neurological: Negative.    Psychiatric/Behavioral: Positive for depression (working with psychiatrist).     Objective  Vital Signs         BP Readings from Last 1 Encounters:   09/24/19 128/80     Wt Readings from Last 3 Encounters:   09/24/19 91.6 kg (202 lb)   09/18/19 91.4 kg (201 lb 9.6 oz)   07/02/19 90.3 kg (199 lb)   Body mass index is 32.6 kg/m².           Physical Exam   Physical Exam   Constitutional: No distress.   Cardiovascular: Normal rate, regular rhythm and normal heart sounds.   Pulmonary/Chest: Effort normal and breath sounds normal.   Musculoskeletal: She exhibits no edema.     Data Reviewed    No results found for this or any previous visit (from the past 2016 hour(s)).

## 2019-10-18 DIAGNOSIS — G43.009 MIGRAINE WITHOUT AURA AND WITHOUT STATUS MIGRAINOSUS, NOT INTRACTABLE: ICD-10-CM

## 2019-10-18 RX ORDER — METAXALONE 800 MG/1
TABLET ORAL
Qty: 90 TABLET | Refills: 1 | Status: SHIPPED | OUTPATIENT
Start: 2019-10-18 | End: 2020-03-10

## 2019-11-19 ENCOUNTER — OFFICE VISIT (OUTPATIENT)
Dept: GASTROENTEROLOGY | Facility: CLINIC | Age: 64
End: 2019-11-19

## 2019-11-19 VITALS
HEIGHT: 66 IN | DIASTOLIC BLOOD PRESSURE: 76 MMHG | WEIGHT: 206.4 LBS | TEMPERATURE: 99.1 F | SYSTOLIC BLOOD PRESSURE: 122 MMHG | BODY MASS INDEX: 33.17 KG/M2

## 2019-11-19 DIAGNOSIS — Z90.49 HISTORY OF COLON RESECTION: ICD-10-CM

## 2019-11-19 DIAGNOSIS — K58.2 IRRITABLE BOWEL SYNDROME WITH BOTH CONSTIPATION AND DIARRHEA: ICD-10-CM

## 2019-11-19 DIAGNOSIS — R15.9 FULL INCONTINENCE OF FECES: Primary | ICD-10-CM

## 2019-11-19 PROCEDURE — 99214 OFFICE O/P EST MOD 30 MIN: CPT | Performed by: INTERNAL MEDICINE

## 2019-11-19 NOTE — PROGRESS NOTES
Chief Complaint   Patient presents with   • Follow-up     Subjective     HPI  Magdalena Dickey is a 64 y.o. female who presents follow-up of intermittent diarrhea episodes, fecal incontinence, history of colon resection for diverticulitis.    She is doing well with 1 imodium table a day.  She has only had one incontinence episode.     Taking 2 fiber caps a day    Now currently with no BMs in ~ 4 days.  Starting to feel a little bloated but not bad.    Past Medical History:   Diagnosis Date   • Anxiety    • Colon polyp    • Delayed emergence from anesthesia    • Depression 2016   • Diverticulitis of colon    • Diverticulosis of large intestine without hemorrhage 10/5/2016   • GERD (gastroesophageal reflux disease)    • Hiatal hernia    • History of gastric ulcer    • Low back pain    • Lumbosacral disc disease    • Migraine without aura and without status migrainosus, not intractable 2016       Social History     Socioeconomic History   • Marital status:      Spouse name: Not on file   • Number of children: Not on file   • Years of education: Not on file   • Highest education level: Not on file   Tobacco Use   • Smoking status: Former Smoker     Types: Cigarettes     Last attempt to quit: 3/10/2014     Years since quittin.6   • Smokeless tobacco: Never Used   Substance and Sexual Activity   • Alcohol use: Yes     Comment: SELDOM   • Drug use: No   • Sexual activity: Yes     Partners: Male     Birth control/protection: Post-menopausal         Current Outpatient Medications:   •  ALPRAZolam (XANAX) 0.5 MG tablet, Take 1 tablet by mouth 2 (Two) Times a Day As Needed for Anxiety., Disp: 60 tablet, Rfl: 1  •  calcium carbonate (OS-MARY) 600 MG tablet, Take 600 mg by mouth 2 (Two) Times a Day., Disp: , Rfl:   •  dicyclomine (BENTYL) 10 MG capsule, Take 1 capsule by mouth 4 (Four) Times a Day As Needed (abdominal pain)., Disp: 60 capsule, Rfl: 2  •  DULoxetine (CYMBALTA) 60 MG capsule, Take 1  capsule by mouth Daily., Disp: 90 capsule, Rfl: 0  •  gabapentin (NEURONTIN) 600 MG tablet, Take one-half tablets by mouth twice daily, Disp: 90 tablet, Rfl: 1  •  Loperamide HCl (IMODIUM A-D PO), Take  by mouth Daily., Disp: , Rfl:   •  metaxalone (SKELAXIN) 800 MG tablet, TAKE 1 TABLET BY MOUTH THREE TIMES A DAY, Disp: 90 tablet, Rfl: 1  •  Methylcellulose, Laxative, (CITRUCEL PO), Take  by mouth Daily., Disp: , Rfl:   •  Multiple Vitamin (MULTI-VITAMINS PO), Take 1 tablet by mouth Daily. HOLD FOR SURGERY, Disp: , Rfl:   •  ondansetron (ZOFRAN) 4 MG tablet, Take 1 tablet by mouth Every 8 (Eight) Hours As Needed for Nausea or Vomiting., Disp: 30 tablet, Rfl: 2  •  pantoprazole (PROTONIX) 40 MG EC tablet, Take 40 mg by mouth Every Morning., Disp: , Rfl:   •  Probiotic Product (PROBIOTIC DAILY PO), Take 1 capsule by mouth Daily., Disp: , Rfl:   •  SUMAtriptan (IMITREX) 100 MG tablet, TAKE 1 TABLET BY MOUTH AT ONSET OF HEADACHE, MAY REPEAT EVERY 2 HOURS AS NEEDED (MAX 200MG/DAY), Disp: 9 tablet, Rfl: 1  •  traZODone (DESYREL) 50 MG tablet, 1-2 tabs PO qhs for insomnia, Disp: 180 tablet, Rfl: 0  •  ofloxacin (FLOXIN) 0.3 % otic solution, Instill 1-2 drops in the right ear twice daily for 7 days., Disp: 10 mL, Rfl: 0    Review of Systems   Constitutional: Negative for activity change, appetite change, chills and fever.   HENT: Negative for trouble swallowing.    Respiratory: Negative.    Cardiovascular: Negative.  Negative for chest pain.   Gastrointestinal: Positive for constipation. Negative for abdominal distention, abdominal pain, anal bleeding, diarrhea, nausea and vomiting.   Genitourinary: Negative for dysuria, frequency and hematuria.       Objective   Vitals:    11/19/19 1437   BP: 122/76   Temp: 99.1 °F (37.3 °C)         11/19/19  1437   Weight: 93.6 kg (206 lb 6.4 oz)     Body mass index is 33.31 kg/m².      Physical Exam   Constitutional: She is oriented to person, place, and time. She appears well-developed  and well-nourished. No distress.   HENT:   Head: Normocephalic.   Right Ear: External ear normal.   Left Ear: External ear normal.   Nose: Nose normal.   Mouth/Throat: Oropharynx is clear and moist.   Scrape on nose from a fall   Eyes: Conjunctivae and EOM are normal. Right eye exhibits no discharge. Left eye exhibits no discharge. No scleral icterus.   Neck: Normal range of motion. Neck supple. No thyromegaly present.   No supraclavicular adenopathy   Cardiovascular: Normal rate, regular rhythm, normal heart sounds and intact distal pulses. Exam reveals no gallop.   No murmur heard.  No lower extremity edema   Pulmonary/Chest: Effort normal and breath sounds normal. No respiratory distress. She has no wheezes.   Abdominal: Soft. Normal appearance and bowel sounds are normal. She exhibits no distension and no mass. There is no hepatosplenomegaly. There is no tenderness. There is no rigidity, no rebound and no guarding. No hernia.   Genitourinary:   Genitourinary Comments: Rectal exam deferred   Musculoskeletal: Normal range of motion. She exhibits no edema or tenderness.   No atrophy of upper or lower extremities.  Normal digits and nails of both hands.   Lymphadenopathy:     She has no cervical adenopathy.   Neurological: She is alert and oriented to person, place, and time. She displays no atrophy. Coordination normal.   Skin: Skin is warm and dry. No rash noted. She is not diaphoretic. No erythema.   Psychiatric: She has a normal mood and affect. Her behavior is normal. Judgment and thought content normal.   Vitals reviewed.      WBC   Date Value Ref Range Status   06/24/2019 12.28 (H) 3.40 - 10.80 10*3/mm3 Final     RBC   Date Value Ref Range Status   06/24/2019 4.60 3.77 - 5.28 10*6/mm3 Final     Hemoglobin   Date Value Ref Range Status   06/24/2019 14.0 12.0 - 15.9 g/dL Final     Hematocrit   Date Value Ref Range Status   06/24/2019 42.4 34.0 - 46.6 % Final     MCV   Date Value Ref Range Status   06/24/2019  92.2 79.0 - 97.0 fL Final     MCH   Date Value Ref Range Status   06/24/2019 30.4 26.6 - 33.0 pg Final     MCHC   Date Value Ref Range Status   06/24/2019 33.0 31.5 - 35.7 g/dL Final     RDW   Date Value Ref Range Status   06/24/2019 12.9 12.3 - 15.4 % Final     RDW-SD   Date Value Ref Range Status   06/24/2019 43.4 37.0 - 54.0 fl Final     MPV   Date Value Ref Range Status   06/24/2019 9.9 6.0 - 12.0 fL Final     Platelets   Date Value Ref Range Status   06/24/2019 348 140 - 450 10*3/mm3 Final     Neutrophil %   Date Value Ref Range Status   06/24/2019 60.0 42.7 - 76.0 % Final     Lymphocyte %   Date Value Ref Range Status   06/24/2019 29.2 19.6 - 45.3 % Final     Monocyte %   Date Value Ref Range Status   06/24/2019 7.2 5.0 - 12.0 % Final     Eosinophil %   Date Value Ref Range Status   06/24/2019 2.0 0.3 - 6.2 % Final     Basophil %   Date Value Ref Range Status   06/24/2019 0.7 0.0 - 1.5 % Final     Immature Grans %   Date Value Ref Range Status   06/24/2019 0.9 (H) 0.0 - 0.5 % Final     Neutrophils, Absolute   Date Value Ref Range Status   06/24/2019 7.37 (H) 1.70 - 7.00 10*3/mm3 Final     Lymphocytes, Absolute   Date Value Ref Range Status   06/24/2019 3.58 (H) 0.70 - 3.10 10*3/mm3 Final     Monocytes, Absolute   Date Value Ref Range Status   06/24/2019 0.88 0.10 - 0.90 10*3/mm3 Final     Eosinophils, Absolute   Date Value Ref Range Status   06/24/2019 0.25 0.00 - 0.40 10*3/mm3 Final     Basophils, Absolute   Date Value Ref Range Status   06/24/2019 0.09 0.00 - 0.20 10*3/mm3 Final     Immature Grans, Absolute   Date Value Ref Range Status   06/24/2019 0.11 (H) 0.00 - 0.05 10*3/mm3 Final     nRBC   Date Value Ref Range Status   06/24/2019 0.0 0.0 - 0.2 /100 WBC Final       Lab Results   Component Value Date    GLUCOSE 99 06/24/2019    BUN 20 06/24/2019    CREATININE 1.06 (H) 06/24/2019    EGFRIFNONA 52 (L) 06/24/2019    BCR 18.9 06/24/2019    CO2 22.2 06/24/2019    CALCIUM 9.0 06/24/2019    ALBUMIN 4.20  06/24/2019    AST 19 06/24/2019    ALT 27 06/24/2019         Imaging Results (Last 7 Days)     ** No results found for the last 168 hours. **            Assessment/Plan    Fecal incontinence: Improved on Imodium    Mixed irritable bowel syndrome: Now a bit constipated on the Imodium    History of colon resection: History of diverticulitis    Plan  I am going to have her hold the Imodium for a day and then resume it when she starts having bowel movements again  Increase fiber supplementation  We discussed that she is going to need anorectal manometry and evaluation by Dr. Jin if her symptoms worsen or persist    Magdalena was seen today for follow-up.    Diagnoses and all orders for this visit:    Full incontinence of feces    History of colon resection    Irritable bowel syndrome with both constipation and diarrhea        Dictated utilizing Dragon dictation

## 2019-11-19 NOTE — PATIENT INSTRUCTIONS
Hold the imodium tomorrow.  Half dose the next day after you have a BM    Increase the fiber supplement to 3 caps daily    For any additional questions, concerns or changes to your condition after today's office visit please contact the office at [phone].   Sildenafil 50mg sent

## 2019-11-26 RX ORDER — TRAZODONE HYDROCHLORIDE 50 MG/1
TABLET ORAL
Qty: 180 TABLET | Refills: 0 | Status: SHIPPED | OUTPATIENT
Start: 2019-11-26 | End: 2020-02-17

## 2019-12-17 DIAGNOSIS — G43.009 MIGRAINE WITHOUT AURA AND WITHOUT STATUS MIGRAINOSUS, NOT INTRACTABLE: ICD-10-CM

## 2019-12-17 RX ORDER — GABAPENTIN 600 MG/1
TABLET ORAL
Qty: 90 TABLET | Refills: 1 | Status: ON HOLD | OUTPATIENT
Start: 2019-12-17 | End: 2020-08-24

## 2020-02-03 DIAGNOSIS — R51.9 HEADACHE, UNSPECIFIED HEADACHE TYPE: ICD-10-CM

## 2020-02-03 RX ORDER — SUMATRIPTAN 100 MG/1
TABLET, FILM COATED ORAL
Qty: 8 TABLET | Refills: 2 | Status: SHIPPED | OUTPATIENT
Start: 2020-02-03 | End: 2020-08-14

## 2020-02-17 RX ORDER — TRAZODONE HYDROCHLORIDE 50 MG/1
TABLET ORAL
Qty: 180 TABLET | Refills: 0 | Status: SHIPPED | OUTPATIENT
Start: 2020-02-17 | End: 2020-05-18

## 2020-02-19 ENCOUNTER — OFFICE VISIT (OUTPATIENT)
Dept: GASTROENTEROLOGY | Facility: CLINIC | Age: 65
End: 2020-02-19

## 2020-02-19 VITALS
WEIGHT: 206.8 LBS | SYSTOLIC BLOOD PRESSURE: 124 MMHG | BODY MASS INDEX: 33.23 KG/M2 | TEMPERATURE: 98.1 F | DIASTOLIC BLOOD PRESSURE: 72 MMHG | HEIGHT: 66 IN

## 2020-02-19 DIAGNOSIS — R15.1 FECAL SMEARING: Primary | ICD-10-CM

## 2020-02-19 DIAGNOSIS — Z86.010 PERSONAL HISTORY OF COLONIC POLYPS: ICD-10-CM

## 2020-02-19 DIAGNOSIS — K58.0 IRRITABLE BOWEL SYNDROME WITH DIARRHEA: ICD-10-CM

## 2020-02-19 PROBLEM — Z86.0100 PERSONAL HISTORY OF COLONIC POLYPS: Status: ACTIVE | Noted: 2020-02-19

## 2020-02-19 PROCEDURE — 99214 OFFICE O/P EST MOD 30 MIN: CPT | Performed by: INTERNAL MEDICINE

## 2020-02-19 RX ORDER — LURASIDONE HYDROCHLORIDE 40 MG/1
TABLET, FILM COATED ORAL
COMMUNITY
Start: 2020-01-31 | End: 2022-01-17 | Stop reason: SDUPTHER

## 2020-02-19 NOTE — PROGRESS NOTES
Chief Complaint   Patient presents with   • Fecal Incontinence     Subjective     HPI  Magdalena Dickey is a 64 y.o. female who presents follow-up of intermittent diarrhea episodes, fecal incontinence, history of colon resection for diverticulitis.    She is doing about the same since her last visit.  She is taking 2 mg of Imodium every morning.  She is still having some rare episodes of fecal incontinence, with smearing of liquid stool.  It is infrequent but unpredictable.  Still bothersome to her.  The Imodium is mostly controlling the diarrhea and she is not getting constipated at this dose.    No blood or mucus in the stool.  No abdominal cramping with symptoms.        Past Medical History:   Diagnosis Date   • Anxiety    • Colon polyp    • Delayed emergence from anesthesia 2018   • Depression 2016   • Diverticulitis of colon    • Diverticulosis of large intestine without hemorrhage 10/5/2016   • GERD (gastroesophageal reflux disease)    • Hiatal hernia    • History of gastric ulcer    • Low back pain    • Lumbosacral disc disease    • Migraine without aura and without status migrainosus, not intractable 2016       Social History     Socioeconomic History   • Marital status:      Spouse name: Not on file   • Number of children: Not on file   • Years of education: Not on file   • Highest education level: Not on file   Tobacco Use   • Smoking status: Former Smoker     Types: Cigarettes     Last attempt to quit: 3/10/2014     Years since quittin.9   • Smokeless tobacco: Never Used   Substance and Sexual Activity   • Alcohol use: Yes     Comment: SELDOM   • Drug use: No   • Sexual activity: Yes     Partners: Male     Birth control/protection: Post-menopausal         Current Outpatient Medications:   •  ALPRAZolam (XANAX) 0.5 MG tablet, Take 1 tablet by mouth 2 (Two) Times a Day As Needed for Anxiety., Disp: 60 tablet, Rfl: 1  •  calcium carbonate (OS-MARY) 600 MG tablet, Take 600 mg by mouth  2 (Two) Times a Day., Disp: , Rfl:   •  dicyclomine (BENTYL) 10 MG capsule, Take 1 capsule by mouth 4 (Four) Times a Day As Needed (abdominal pain)., Disp: 60 capsule, Rfl: 2  •  DULoxetine (CYMBALTA) 60 MG capsule, Take 1 capsule by mouth Daily., Disp: 90 capsule, Rfl: 0  •  gabapentin (NEURONTIN) 600 MG tablet, TAKE ONE-HALF TABLETS BY MOUTH TWICE DAILY, Disp: 90 tablet, Rfl: 1  •  LATUDA 40 MG tablet tablet, , Disp: , Rfl:   •  Loperamide HCl (IMODIUM A-D PO), Take  by mouth Daily., Disp: , Rfl:   •  metaxalone (SKELAXIN) 800 MG tablet, TAKE 1 TABLET BY MOUTH THREE TIMES A DAY, Disp: 90 tablet, Rfl: 1  •  Methylcellulose, Laxative, (CITRUCEL PO), Take  by mouth Daily., Disp: , Rfl:   •  Multiple Vitamin (MULTI-VITAMINS PO), Take 1 tablet by mouth Daily. HOLD FOR SURGERY, Disp: , Rfl:   •  ondansetron (ZOFRAN) 4 MG tablet, Take 1 tablet by mouth Every 8 (Eight) Hours As Needed for Nausea or Vomiting., Disp: 30 tablet, Rfl: 2  •  pantoprazole (PROTONIX) 40 MG EC tablet, Take 40 mg by mouth Every Morning., Disp: , Rfl:   •  Probiotic Product (PROBIOTIC DAILY PO), Take 1 capsule by mouth Daily., Disp: , Rfl:   •  SUMAtriptan (IMITREX) 100 MG tablet, TAKE 1 TABLET BY MOUTH AT ONSET OF HEADACHE, MAY REPEAT EVERY 2 HOURS AS NEEDED (MAX 200MG/DAY), Disp: 8 tablet, Rfl: 2  •  traZODone (DESYREL) 50 MG tablet, TAKE 1 TO 2 TABLETS BY MOUTH AT BEDTIME FOR INSOMNIA, Disp: 180 tablet, Rfl: 0  •  ofloxacin (FLOXIN) 0.3 % otic solution, Instill 1-2 drops in the right ear twice daily for 7 days., Disp: 10 mL, Rfl: 0  •  riFAXIMin (XIFAXAN) 550 MG tablet, Take 1 tablet by mouth Every 8 (Eight) Hours for 14 days., Disp: 42 tablet, Rfl: 0    Review of Systems   Constitutional: Negative for activity change, appetite change, chills, fatigue and fever.   HENT: Negative for sore throat and trouble swallowing.    Respiratory: Negative.    Cardiovascular: Negative.  Negative for chest pain.   Gastrointestinal: Positive for diarrhea.  Negative for abdominal distention, abdominal pain, anal bleeding, blood in stool, constipation, nausea and vomiting.        Fecal incontinence   Endocrine: Negative for cold intolerance and heat intolerance.   Genitourinary: Negative for difficulty urinating, dysuria, frequency and hematuria.   Musculoskeletal: Negative for arthralgias, back pain and myalgias.   Skin: Negative.    Hematological: Negative for adenopathy. Does not bruise/bleed easily.   All other systems reviewed and are negative.      Objective   Vitals:    02/19/20 1451   BP: 124/72   Temp: 98.1 °F (36.7 °C)         02/19/20  1451   Weight: 93.8 kg (206 lb 12.8 oz)     Body mass index is 33.38 kg/m².      Physical Exam   Constitutional: She is oriented to person, place, and time. She appears well-developed and well-nourished. No distress.   HENT:   Head: Normocephalic and atraumatic.   Right Ear: External ear normal.   Left Ear: External ear normal.   Nose: Nose normal.   Mouth/Throat: Oropharynx is clear and moist.   Scrape on nose from a fall   Eyes: Conjunctivae and EOM are normal. Right eye exhibits no discharge. Left eye exhibits no discharge. No scleral icterus.   Neck: Normal range of motion. Neck supple. No thyromegaly present.   No supraclavicular adenopathy   Cardiovascular: Normal rate, regular rhythm, normal heart sounds and intact distal pulses. Exam reveals no gallop.   No murmur heard.  No lower extremity edema   Pulmonary/Chest: Effort normal and breath sounds normal. No respiratory distress. She has no wheezes.   Abdominal: Soft. Normal appearance and bowel sounds are normal. She exhibits no distension and no mass. There is no hepatosplenomegaly. There is no tenderness. There is no rigidity, no rebound and no guarding. No hernia.   Genitourinary:   Genitourinary Comments: Rectal exam deferred   Musculoskeletal: Normal range of motion. She exhibits no edema or tenderness.   No atrophy of upper or lower extremities.  Normal digits and  nails of both hands.   Lymphadenopathy:     She has no cervical adenopathy.   Neurological: She is alert and oriented to person, place, and time. She displays no atrophy. Coordination normal.   Skin: Skin is warm and dry. No rash noted. She is not diaphoretic. No erythema.   Psychiatric: She has a normal mood and affect. Her behavior is normal. Judgment and thought content normal.   Vitals reviewed.      WBC   Date Value Ref Range Status   06/24/2019 12.28 (H) 3.40 - 10.80 10*3/mm3 Final     RBC   Date Value Ref Range Status   06/24/2019 4.60 3.77 - 5.28 10*6/mm3 Final     Hemoglobin   Date Value Ref Range Status   06/24/2019 14.0 12.0 - 15.9 g/dL Final     Hematocrit   Date Value Ref Range Status   06/24/2019 42.4 34.0 - 46.6 % Final     MCV   Date Value Ref Range Status   06/24/2019 92.2 79.0 - 97.0 fL Final     MCH   Date Value Ref Range Status   06/24/2019 30.4 26.6 - 33.0 pg Final     MCHC   Date Value Ref Range Status   06/24/2019 33.0 31.5 - 35.7 g/dL Final     RDW   Date Value Ref Range Status   06/24/2019 12.9 12.3 - 15.4 % Final     RDW-SD   Date Value Ref Range Status   06/24/2019 43.4 37.0 - 54.0 fl Final     MPV   Date Value Ref Range Status   06/24/2019 9.9 6.0 - 12.0 fL Final     Platelets   Date Value Ref Range Status   06/24/2019 348 140 - 450 10*3/mm3 Final     Neutrophil %   Date Value Ref Range Status   06/24/2019 60.0 42.7 - 76.0 % Final     Lymphocyte %   Date Value Ref Range Status   06/24/2019 29.2 19.6 - 45.3 % Final     Monocyte %   Date Value Ref Range Status   06/24/2019 7.2 5.0 - 12.0 % Final     Eosinophil %   Date Value Ref Range Status   06/24/2019 2.0 0.3 - 6.2 % Final     Basophil %   Date Value Ref Range Status   06/24/2019 0.7 0.0 - 1.5 % Final     Immature Grans %   Date Value Ref Range Status   06/24/2019 0.9 (H) 0.0 - 0.5 % Final     Neutrophils, Absolute   Date Value Ref Range Status   06/24/2019 7.37 (H) 1.70 - 7.00 10*3/mm3 Final     Lymphocytes, Absolute   Date Value Ref  Range Status   06/24/2019 3.58 (H) 0.70 - 3.10 10*3/mm3 Final     Monocytes, Absolute   Date Value Ref Range Status   06/24/2019 0.88 0.10 - 0.90 10*3/mm3 Final     Eosinophils, Absolute   Date Value Ref Range Status   06/24/2019 0.25 0.00 - 0.40 10*3/mm3 Final     Basophils, Absolute   Date Value Ref Range Status   06/24/2019 0.09 0.00 - 0.20 10*3/mm3 Final     Immature Grans, Absolute   Date Value Ref Range Status   06/24/2019 0.11 (H) 0.00 - 0.05 10*3/mm3 Final     nRBC   Date Value Ref Range Status   06/24/2019 0.0 0.0 - 0.2 /100 WBC Final       Lab Results   Component Value Date    GLUCOSE 99 06/24/2019    BUN 20 06/24/2019    CREATININE 1.06 (H) 06/24/2019    EGFRIFNONA 52 (L) 06/24/2019    BCR 18.9 06/24/2019    CO2 22.2 06/24/2019    CALCIUM 9.0 06/24/2019    ALBUMIN 4.20 06/24/2019    AST 19 06/24/2019    ALT 27 06/24/2019         Imaging Results (Last 7 Days)     ** No results found for the last 168 hours. **            Assessment/Plan    Fecal incontinence: About the same as last time 5 months ago.  Better than when I first saw her but still having episodes about 1 time per month    IBS-D: Mostly does well on low-dose Imodium every day along with daily fiber supplementation    Personal history of colon polyps: Colonoscopy with Dr. Tan in 2017, 2 tubular adenomas      Plan  Continue Imodium.    Trial of Xifaxan for IBS-D.  Hopefully this gives her resolution of the still intermittent fecal incontinence episodes  If she persists, then will need to consider evaluation with Dr. Jin    Repeat colonoscopy in 2022    Magdalena was seen today for fecal incontinence.    Diagnoses and all orders for this visit:    Fecal smearing    Irritable bowel syndrome with diarrhea    Personal history of colonic polyps    Other orders  -     riFAXIMin (XIFAXAN) 550 MG tablet; Take 1 tablet by mouth Every 8 (Eight) Hours for 14 days.        Dictated utilizing Dragon dictation

## 2020-02-21 ENCOUNTER — PRIOR AUTHORIZATION (OUTPATIENT)
Dept: GASTROENTEROLOGY | Facility: CLINIC | Age: 65
End: 2020-02-21

## 2020-03-10 DIAGNOSIS — G43.009 MIGRAINE WITHOUT AURA AND WITHOUT STATUS MIGRAINOSUS, NOT INTRACTABLE: ICD-10-CM

## 2020-03-10 RX ORDER — METAXALONE 800 MG/1
TABLET ORAL
Qty: 90 TABLET | Refills: 1 | Status: SHIPPED | OUTPATIENT
Start: 2020-03-10 | End: 2020-05-11

## 2020-03-20 ENCOUNTER — LAB (OUTPATIENT)
Dept: LAB | Facility: HOSPITAL | Age: 65
End: 2020-03-20

## 2020-03-20 DIAGNOSIS — D72.829 LEUKOCYTOSIS, UNSPECIFIED TYPE: ICD-10-CM

## 2020-03-20 DIAGNOSIS — E78.1 HYPERTRIGLYCERIDEMIA: ICD-10-CM

## 2020-03-20 LAB
ALBUMIN SERPL-MCNC: 4.5 G/DL (ref 3.5–5.2)
ALBUMIN/GLOB SERPL: 1.7 G/DL
ALP SERPL-CCNC: 100 U/L (ref 39–117)
ALT SERPL W P-5'-P-CCNC: 18 U/L (ref 1–33)
ANION GAP SERPL CALCULATED.3IONS-SCNC: 10.9 MMOL/L (ref 5–15)
AST SERPL-CCNC: 18 U/L (ref 1–32)
BASOPHILS # BLD AUTO: 0.08 10*3/MM3 (ref 0–0.2)
BASOPHILS NFR BLD AUTO: 0.6 % (ref 0–1.5)
BILIRUB SERPL-MCNC: 0.3 MG/DL (ref 0.2–1.2)
BUN BLD-MCNC: 11 MG/DL (ref 8–23)
BUN/CREAT SERPL: 11.8 (ref 7–25)
CALCIUM SPEC-SCNC: 9.5 MG/DL (ref 8.6–10.5)
CHLORIDE SERPL-SCNC: 104 MMOL/L (ref 98–107)
CHOLEST SERPL-MCNC: 176 MG/DL (ref 0–200)
CO2 SERPL-SCNC: 21.1 MMOL/L (ref 22–29)
CREAT BLD-MCNC: 0.93 MG/DL (ref 0.57–1)
DEPRECATED RDW RBC AUTO: 41.7 FL (ref 37–54)
EOSINOPHIL # BLD AUTO: 0.49 10*3/MM3 (ref 0–0.4)
EOSINOPHIL NFR BLD AUTO: 3.7 % (ref 0.3–6.2)
ERYTHROCYTE [DISTWIDTH] IN BLOOD BY AUTOMATED COUNT: 13 % (ref 12.3–15.4)
GFR SERPL CREATININE-BSD FRML MDRD: 61 ML/MIN/1.73
GLOBULIN UR ELPH-MCNC: 2.7 GM/DL
GLUCOSE BLD-MCNC: 87 MG/DL (ref 65–99)
HCT VFR BLD AUTO: 41.2 % (ref 34–46.6)
HDLC SERPL-MCNC: 48 MG/DL (ref 40–60)
HGB BLD-MCNC: 14.3 G/DL (ref 12–15.9)
IMM GRANULOCYTES # BLD AUTO: 0.07 10*3/MM3 (ref 0–0.05)
IMM GRANULOCYTES NFR BLD AUTO: 0.5 % (ref 0–0.5)
LDLC SERPL CALC-MCNC: 87 MG/DL (ref 0–100)
LDLC/HDLC SERPL: 1.82 {RATIO}
LYMPHOCYTES # BLD AUTO: 3.58 10*3/MM3 (ref 0.7–3.1)
LYMPHOCYTES NFR BLD AUTO: 27 % (ref 19.6–45.3)
MCH RBC QN AUTO: 31 PG (ref 26.6–33)
MCHC RBC AUTO-ENTMCNC: 34.7 G/DL (ref 31.5–35.7)
MCV RBC AUTO: 89.2 FL (ref 79–97)
MONOCYTES # BLD AUTO: 0.96 10*3/MM3 (ref 0.1–0.9)
MONOCYTES NFR BLD AUTO: 7.3 % (ref 5–12)
NEUTROPHILS # BLD AUTO: 8.06 10*3/MM3 (ref 1.7–7)
NEUTROPHILS NFR BLD AUTO: 60.9 % (ref 42.7–76)
NRBC BLD AUTO-RTO: 0 /100 WBC (ref 0–0.2)
PLATELET # BLD AUTO: 309 10*3/MM3 (ref 140–450)
PMV BLD AUTO: 9.7 FL (ref 6–12)
POTASSIUM BLD-SCNC: 4.3 MMOL/L (ref 3.5–5.2)
PROT SERPL-MCNC: 7.2 G/DL (ref 6–8.5)
RBC # BLD AUTO: 4.62 10*6/MM3 (ref 3.77–5.28)
SODIUM BLD-SCNC: 136 MMOL/L (ref 136–145)
TRIGL SERPL-MCNC: 203 MG/DL (ref 0–150)
VLDLC SERPL-MCNC: 40.6 MG/DL (ref 5–40)
WBC NRBC COR # BLD: 13.24 10*3/MM3 (ref 3.4–10.8)

## 2020-03-20 PROCEDURE — 80053 COMPREHEN METABOLIC PANEL: CPT

## 2020-03-20 PROCEDURE — 85025 COMPLETE CBC W/AUTO DIFF WBC: CPT

## 2020-03-20 PROCEDURE — 80061 LIPID PANEL: CPT

## 2020-03-20 PROCEDURE — 36415 COLL VENOUS BLD VENIPUNCTURE: CPT

## 2020-03-24 ENCOUNTER — OFFICE VISIT (OUTPATIENT)
Dept: INTERNAL MEDICINE | Facility: CLINIC | Age: 65
End: 2020-03-24

## 2020-03-24 VITALS
HEART RATE: 76 BPM | DIASTOLIC BLOOD PRESSURE: 70 MMHG | BODY MASS INDEX: 32.62 KG/M2 | HEIGHT: 66 IN | WEIGHT: 203 LBS | SYSTOLIC BLOOD PRESSURE: 118 MMHG

## 2020-03-24 DIAGNOSIS — R07.9 CHEST PAIN, UNSPECIFIED TYPE: ICD-10-CM

## 2020-03-24 DIAGNOSIS — G43.009 MIGRAINE WITHOUT AURA AND WITHOUT STATUS MIGRAINOSUS, NOT INTRACTABLE: ICD-10-CM

## 2020-03-24 DIAGNOSIS — R03.0 ELEVATED BP WITHOUT DIAGNOSIS OF HYPERTENSION: Primary | ICD-10-CM

## 2020-03-24 DIAGNOSIS — E78.1 HYPERTRIGLYCERIDEMIA: ICD-10-CM

## 2020-03-24 PROCEDURE — 99214 OFFICE O/P EST MOD 30 MIN: CPT | Performed by: INTERNAL MEDICINE

## 2020-03-24 NOTE — PROGRESS NOTES
Assessment and Plan  Magdalena was seen today for hot flashes and chest pain.    Diagnoses and all orders for this visit:    Elevated BP without diagnosis of hypertension  Comments:  Much improved- continue to follow.    Hypertriglyceridemia  Comments:  improved with much less dairy product in her diet.      Migraine without aura and without status migrainosus, not intractable  Comments:  No change.    Chest pain, unspecified type  Comments:  new problem - no clear etiology- ? GI.  To follow symptoms closely- may consider alprazolam 1-2 times daily acutely to see if improves.       F/U and Patient Instructions    Return in about 4 months (around 7/24/2020).  There are no Patient Instructions on file for this visit.    Subjective    Magdalena Dickey is a 64 y.o. female being seen in our office today for Hot Flashes and Chest Pain     History of the Present Illness  HPI For about the last week she has had some CP, mainly at night- TUMS, etc doesn't relieve it.  She is able to go to sleep and then wake up without it.  Thought related to OJ but stopped drinking it and no change. Today, she has in the am, where she up until now, has had only in the afternoon. Can't tell what makes worse or better.  No associated sx. She does take alprazolam prn anxiety- took 1/2 last night with it but didn't help.  No dysphagia.  Started PPI BID about a week ago for this problem.    Elevated WBC has been present for at least 3 years- never any further evaluations.  Recently, she feels hot all of the time (6 months)  Imodium and Citrucel are helping chronic diarrhea.    Migraine last week, no different than baseline.  Feels her anxiety/depression are doing well- sees psychiatrist.       Patient History        Significant Past History  The following portions of the patient's history were reviewed and updated as appropriate:PMHroutine: Social history , Allergies, Current Medications, Active Problem List and Health Maintenance               Social History  She  reports that she quit smoking about 6 years ago. Her smoking use included cigarettes. She has never used smokeless tobacco. She reports that she drinks alcohol. She reports that she does not use drugs.                         Review of Symptoms  Review of Systems   Constitution: Negative.   HENT: Negative.    Cardiovascular: Negative.    Musculoskeletal: Negative.    Gastrointestinal: Positive for heartburn. Negative for change in bowel habit and dysphagia.   Neurological: Negative.    Psychiatric/Behavioral: Negative.      Objective  Vital Signs         BP Readings from Last 1 Encounters:   03/24/20 118/70     Wt Readings from Last 3 Encounters:   03/24/20 92.1 kg (203 lb)   02/19/20 93.8 kg (206 lb 12.8 oz)   11/19/19 93.6 kg (206 lb 6.4 oz)   Body mass index is 32.77 kg/m².          Physical Exam   Physical Exam   Constitutional: No distress.   Cardiovascular: Normal rate and regular rhythm.   Pulmonary/Chest: Effort normal and breath sounds normal.   Abdominal: Soft. Bowel sounds are normal.   Musculoskeletal: She exhibits no edema.   No CW pain     Psychiatric: She has a normal mood and affect.     Data Reviewed    Recent Results (from the past 2016 hour(s))   Comprehensive Metabolic Panel    Collection Time: 03/20/20 11:42 AM   Result Value Ref Range    Glucose 87 65 - 99 mg/dL    BUN 11 8 - 23 mg/dL    Creatinine 0.93 0.57 - 1.00 mg/dL    Sodium 136 136 - 145 mmol/L    Potassium 4.3 3.5 - 5.2 mmol/L    Chloride 104 98 - 107 mmol/L    CO2 21.1 (L) 22.0 - 29.0 mmol/L    Calcium 9.5 8.6 - 10.5 mg/dL    Total Protein 7.2 6.0 - 8.5 g/dL    Albumin 4.50 3.50 - 5.20 g/dL    ALT (SGPT) 18 1 - 33 U/L    AST (SGOT) 18 1 - 32 U/L    Alkaline Phosphatase 100 39 - 117 U/L    Total Bilirubin 0.3 0.2 - 1.2 mg/dL    eGFR Non African Amer 61 >60 mL/min/1.73    Globulin 2.7 gm/dL    A/G Ratio 1.7 g/dL    BUN/Creatinine Ratio 11.8 7.0 - 25.0    Anion Gap 10.9 5.0 - 15.0 mmol/L   Lipid Panel With LDL / HDL  Ratio    Collection Time: 03/20/20 11:42 AM   Result Value Ref Range    Total Cholesterol 176 0 - 200 mg/dL    Triglycerides 203 (H) 0 - 150 mg/dL    HDL Cholesterol 48 40 - 60 mg/dL    LDL Cholesterol  87 0 - 100 mg/dL    VLDL Cholesterol 40.6 (H) 5 - 40 mg/dL    LDL/HDL Ratio 1.82    CBC Auto Differential    Collection Time: 03/20/20 11:42 AM   Result Value Ref Range    WBC 13.24 (H) 3.40 - 10.80 10*3/mm3    RBC 4.62 3.77 - 5.28 10*6/mm3    Hemoglobin 14.3 12.0 - 15.9 g/dL    Hematocrit 41.2 34.0 - 46.6 %    MCV 89.2 79.0 - 97.0 fL    MCH 31.0 26.6 - 33.0 pg    MCHC 34.7 31.5 - 35.7 g/dL    RDW 13.0 12.3 - 15.4 %    RDW-SD 41.7 37.0 - 54.0 fl    MPV 9.7 6.0 - 12.0 fL    Platelets 309 140 - 450 10*3/mm3    Neutrophil % 60.9 42.7 - 76.0 %    Lymphocyte % 27.0 19.6 - 45.3 %    Monocyte % 7.3 5.0 - 12.0 %    Eosinophil % 3.7 0.3 - 6.2 %    Basophil % 0.6 0.0 - 1.5 %    Immature Grans % 0.5 0.0 - 0.5 %    Neutrophils, Absolute 8.06 (H) 1.70 - 7.00 10*3/mm3    Lymphocytes, Absolute 3.58 (H) 0.70 - 3.10 10*3/mm3    Monocytes, Absolute 0.96 (H) 0.10 - 0.90 10*3/mm3    Eosinophils, Absolute 0.49 (H) 0.00 - 0.40 10*3/mm3    Basophils, Absolute 0.08 0.00 - 0.20 10*3/mm3    Immature Grans, Absolute 0.07 (H) 0.00 - 0.05 10*3/mm3    nRBC 0.0 0.0 - 0.2 /100 WBC

## 2020-04-16 ENCOUNTER — OFFICE VISIT (OUTPATIENT)
Dept: INTERNAL MEDICINE | Facility: CLINIC | Age: 65
End: 2020-04-16

## 2020-04-16 DIAGNOSIS — B34.9 VIRAL SYNDROME: Primary | ICD-10-CM

## 2020-04-16 PROCEDURE — 99212 OFFICE O/P EST SF 10 MIN: CPT | Performed by: INTERNAL MEDICINE

## 2020-04-16 NOTE — PROGRESS NOTES
Assessment and Plan  Magdalena was seen today for fever and migraine.    Diagnoses and all orders for this visit:    Viral syndrome  Comments:  symptoms reviewed and not consistent with COVID-19 at this point.  She will continue to social distance and monitor closely.  To call with any changes.       F/U and Patient Instructions    No follow-ups on file.  There are no Patient Instructions on file for this visit.    Subjective    Magdalena Dickey is a 64 y.o. female being seen in our office today for Fever (low grade) and Migraine     History of the Present Illness  HPI   You have chosen to receive care through a telehealth visit.  Do you consent to use a video/audio connection for your medical care today? Yes      Every afternoon this week she has some achiness- temp to 99.5 at highest.   She treats with ibuprofen with resolution until the next afternoon.  No associated sx- SOB, cough, congestion, GI sx.   She does have a headache- no migraine- primarily frontal, no tenderness to palp.  No sinus drainage, rhinorrhea.   She is primarily worried because her  has significant respiratory issues.       Patient History        Significant Past History  The following portions of the patient's history were reviewed and updated as appropriate:PMHroutine: Social history , Allergies, Current Medications, Active Problem List and Health Maintenance              Social History  She  reports that she quit smoking about 6 years ago. Her smoking use included cigarettes. She has never used smokeless tobacco. She reports that she drinks alcohol. She reports that she does not use drugs.                         Review of Symptoms  Review of Systems   Constitution: Positive for chills. Negative for decreased appetite, fever, malaise/fatigue and night sweats.   HENT: Negative for congestion, ear pain and sore throat.    Eyes: Negative.    Cardiovascular: Negative.    Respiratory: Negative.    Endocrine: Negative.    Musculoskeletal:  Negative.    Gastrointestinal: Negative.    Genitourinary: Negative.    Neurological: Positive for headaches. Negative for light-headedness and weakness.   Psychiatric/Behavioral: Negative.      Objective  Vital Signs         BP Readings from Last 1 Encounters:   03/24/20 118/70     Wt Readings from Last 3 Encounters:   03/24/20 92.1 kg (203 lb)   02/19/20 93.8 kg (206 lb 12.8 oz)   11/19/19 93.6 kg (206 lb 6.4 oz)   There is no height or weight on file to calculate BMI.          Physical Exam   Physical Exam  Data Reviewed    Recent Results (from the past 2016 hour(s))   Comprehensive Metabolic Panel    Collection Time: 03/20/20 11:42 AM   Result Value Ref Range    Glucose 87 65 - 99 mg/dL    BUN 11 8 - 23 mg/dL    Creatinine 0.93 0.57 - 1.00 mg/dL    Sodium 136 136 - 145 mmol/L    Potassium 4.3 3.5 - 5.2 mmol/L    Chloride 104 98 - 107 mmol/L    CO2 21.1 (L) 22.0 - 29.0 mmol/L    Calcium 9.5 8.6 - 10.5 mg/dL    Total Protein 7.2 6.0 - 8.5 g/dL    Albumin 4.50 3.50 - 5.20 g/dL    ALT (SGPT) 18 1 - 33 U/L    AST (SGOT) 18 1 - 32 U/L    Alkaline Phosphatase 100 39 - 117 U/L    Total Bilirubin 0.3 0.2 - 1.2 mg/dL    eGFR Non African Amer 61 >60 mL/min/1.73    Globulin 2.7 gm/dL    A/G Ratio 1.7 g/dL    BUN/Creatinine Ratio 11.8 7.0 - 25.0    Anion Gap 10.9 5.0 - 15.0 mmol/L   Lipid Panel With LDL / HDL Ratio    Collection Time: 03/20/20 11:42 AM   Result Value Ref Range    Total Cholesterol 176 0 - 200 mg/dL    Triglycerides 203 (H) 0 - 150 mg/dL    HDL Cholesterol 48 40 - 60 mg/dL    LDL Cholesterol  87 0 - 100 mg/dL    VLDL Cholesterol 40.6 (H) 5 - 40 mg/dL    LDL/HDL Ratio 1.82    CBC Auto Differential    Collection Time: 03/20/20 11:42 AM   Result Value Ref Range    WBC 13.24 (H) 3.40 - 10.80 10*3/mm3    RBC 4.62 3.77 - 5.28 10*6/mm3    Hemoglobin 14.3 12.0 - 15.9 g/dL    Hematocrit 41.2 34.0 - 46.6 %    MCV 89.2 79.0 - 97.0 fL    MCH 31.0 26.6 - 33.0 pg    MCHC 34.7 31.5 - 35.7 g/dL    RDW 13.0 12.3 - 15.4 %     RDW-SD 41.7 37.0 - 54.0 fl    MPV 9.7 6.0 - 12.0 fL    Platelets 309 140 - 450 10*3/mm3    Neutrophil % 60.9 42.7 - 76.0 %    Lymphocyte % 27.0 19.6 - 45.3 %    Monocyte % 7.3 5.0 - 12.0 %    Eosinophil % 3.7 0.3 - 6.2 %    Basophil % 0.6 0.0 - 1.5 %    Immature Grans % 0.5 0.0 - 0.5 %    Neutrophils, Absolute 8.06 (H) 1.70 - 7.00 10*3/mm3    Lymphocytes, Absolute 3.58 (H) 0.70 - 3.10 10*3/mm3    Monocytes, Absolute 0.96 (H) 0.10 - 0.90 10*3/mm3    Eosinophils, Absolute 0.49 (H) 0.00 - 0.40 10*3/mm3    Basophils, Absolute 0.08 0.00 - 0.20 10*3/mm3    Immature Grans, Absolute 0.07 (H) 0.00 - 0.05 10*3/mm3    nRBC 0.0 0.0 - 0.2 /100 WBC

## 2020-05-10 DIAGNOSIS — G43.009 MIGRAINE WITHOUT AURA AND WITHOUT STATUS MIGRAINOSUS, NOT INTRACTABLE: ICD-10-CM

## 2020-05-11 RX ORDER — TOPIRAMATE 25 MG/1
TABLET ORAL
Qty: 120 TABLET | Refills: 0 | Status: SHIPPED | OUTPATIENT
Start: 2020-05-11 | End: 2020-06-02

## 2020-05-11 RX ORDER — METAXALONE 800 MG/1
TABLET ORAL
Qty: 90 TABLET | Refills: 1 | Status: SHIPPED | OUTPATIENT
Start: 2020-05-11 | End: 2020-07-06

## 2020-05-18 RX ORDER — TRAZODONE HYDROCHLORIDE 50 MG/1
TABLET ORAL
Qty: 180 TABLET | Refills: 0 | Status: SHIPPED | OUTPATIENT
Start: 2020-05-18 | End: 2020-08-13

## 2020-06-02 RX ORDER — TOPIRAMATE 25 MG/1
TABLET ORAL
Qty: 120 TABLET | Refills: 0 | Status: SHIPPED | OUTPATIENT
Start: 2020-06-02 | End: 2020-06-29

## 2020-06-23 ENCOUNTER — OFFICE VISIT (OUTPATIENT)
Dept: GASTROENTEROLOGY | Facility: CLINIC | Age: 65
End: 2020-06-23

## 2020-06-23 VITALS
SYSTOLIC BLOOD PRESSURE: 122 MMHG | BODY MASS INDEX: 32.47 KG/M2 | WEIGHT: 202 LBS | TEMPERATURE: 96.9 F | HEIGHT: 66 IN | DIASTOLIC BLOOD PRESSURE: 74 MMHG

## 2020-06-23 DIAGNOSIS — K58.0 IRRITABLE BOWEL SYNDROME WITH DIARRHEA: Primary | ICD-10-CM

## 2020-06-23 DIAGNOSIS — K57.32 DIVERTICULITIS OF LARGE INTESTINE WITHOUT PERFORATION OR ABSCESS WITHOUT BLEEDING: ICD-10-CM

## 2020-06-23 DIAGNOSIS — R15.9 FULL INCONTINENCE OF FECES: ICD-10-CM

## 2020-06-23 DIAGNOSIS — R11.0 NAUSEA: ICD-10-CM

## 2020-06-23 PROCEDURE — 99214 OFFICE O/P EST MOD 30 MIN: CPT | Performed by: INTERNAL MEDICINE

## 2020-06-23 RX ORDER — ONDANSETRON 4 MG/1
4 TABLET, FILM COATED ORAL EVERY 8 HOURS PRN
Qty: 30 TABLET | Refills: 2 | Status: SHIPPED | OUTPATIENT
Start: 2020-06-23 | End: 2020-12-15 | Stop reason: SDUPTHER

## 2020-06-23 RX ORDER — PANTOPRAZOLE SODIUM 40 MG/1
40 TABLET, DELAYED RELEASE ORAL EVERY MORNING
Qty: 90 TABLET | Refills: 0 | Status: SHIPPED | OUTPATIENT
Start: 2020-06-23 | End: 2020-10-06

## 2020-06-23 RX ORDER — BUPROPION HYDROCHLORIDE 150 MG/1
150 TABLET ORAL EVERY MORNING
COMMUNITY
Start: 2020-06-07 | End: 2021-11-10 | Stop reason: SDUPTHER

## 2020-06-23 NOTE — PROGRESS NOTES
Chief Complaint   Patient presents with   • Irritable Bowel Syndrome     Subjective     HPI  Magdalena Dickey is a 64 y.o. female who presents follow-up of intermittent diarrhea episodes, fecal incontinence, history of colon resection for diverticulitis.    Doing well with daily imodium, citrucel.  She has not had any further episodes of fecal incontinence.  She did get a little constipated with the Imodium recently, held it for a few days and then had some diarrhea.  Even then no further episodes of incontinence.    She does endorse that she stays bloated quite often.  She has a lot of gas.  She is on a probiotic.    He is also complaining of some nausea.  She feels this is worsened over the past 3 weeks.  Her granddaughter has just passed away of a violent crime.  She feels that that is causing some of the symptoms.  She has been on Zofran in the past for relief and is asking for renewal of this.    She is eating and drinking well.  She does have to avoid spicy foods due to reflux.  She is stable on pantoprazole        Past Medical History:   Diagnosis Date   • Anxiety    • Colon polyp 2017   • Delayed emergence from anesthesia 2018   • Depression 2016   • Diverticulitis of colon    • Diverticulosis of large intestine without hemorrhage 10/5/2016   • GERD (gastroesophageal reflux disease)    • Hiatal hernia    • History of gastric ulcer    • Low back pain    • Lumbosacral disc disease    • Migraine without aura and without status migrainosus, not intractable 2016       Social History     Socioeconomic History   • Marital status:      Spouse name: Not on file   • Number of children: Not on file   • Years of education: Not on file   • Highest education level: Not on file   Tobacco Use   • Smoking status: Former Smoker     Types: Cigarettes     Last attempt to quit: 3/10/2014     Years since quittin.2   • Smokeless tobacco: Never Used   Substance and Sexual Activity   • Alcohol use: Yes      Comment: SELDOM   • Drug use: No   • Sexual activity: Yes     Partners: Male     Birth control/protection: Post-menopausal         Current Outpatient Medications:   •  ALPRAZolam (XANAX) 0.5 MG tablet, Take 1 tablet by mouth 2 (Two) Times a Day As Needed for Anxiety., Disp: 60 tablet, Rfl: 1  •  buPROPion XL (WELLBUTRIN XL) 150 MG 24 hr tablet, Take 150 mg by mouth Every Morning., Disp: , Rfl:   •  calcium carbonate (OS-MARY) 600 MG tablet, Take 600 mg by mouth 2 (Two) Times a Day., Disp: , Rfl:   •  dicyclomine (BENTYL) 10 MG capsule, Take 1 capsule by mouth 4 (Four) Times a Day As Needed (abdominal pain)., Disp: 60 capsule, Rfl: 2  •  DULoxetine (CYMBALTA) 60 MG capsule, Take 1 capsule by mouth Daily., Disp: 90 capsule, Rfl: 0  •  gabapentin (NEURONTIN) 600 MG tablet, TAKE ONE-HALF TABLETS BY MOUTH TWICE DAILY, Disp: 90 tablet, Rfl: 1  •  LATUDA 40 MG tablet tablet, , Disp: , Rfl:   •  metaxalone (SKELAXIN) 800 MG tablet, TAKE 1 TABLET BY MOUTH THREE TIMES A DAY, Disp: 90 tablet, Rfl: 1  •  Methylcellulose, Laxative, (CITRUCEL PO), Take  by mouth Daily., Disp: , Rfl:   •  Multiple Vitamin (MULTI-VITAMINS PO), Take 1 tablet by mouth Daily. HOLD FOR SURGERY, Disp: , Rfl:   •  ondansetron (ZOFRAN) 4 MG tablet, Take 1 tablet by mouth Every 8 (Eight) Hours As Needed for Nausea or Vomiting., Disp: 30 tablet, Rfl: 2  •  pantoprazole (PROTONIX) 40 MG EC tablet, Take 1 tablet by mouth Every Morning., Disp: 90 tablet, Rfl: 0  •  Probiotic Product (PROBIOTIC DAILY PO), Take 1 capsule by mouth Daily., Disp: , Rfl:   •  SUMAtriptan (IMITREX) 100 MG tablet, TAKE 1 TABLET BY MOUTH AT ONSET OF HEADACHE, MAY REPEAT EVERY 2 HOURS AS NEEDED (MAX 200MG/DAY), Disp: 8 tablet, Rfl: 2  •  topiramate (TOPAMAX) 25 MG tablet, TAKE 1 TABLET BY MOUTH AT BEDTIME FOR 3 DAYS, THEN 1 TABLET TWICE DAILY FOR 7 DAYS, THEN 2 TABLETS AT BEDTIME FOR 3 DAYS, THEN 2 TABLETS TWICE DAILY, Disp: 120 tablet, Rfl: 0  •  traZODone (DESYREL) 50 MG tablet, TAKE 1  TO 2 TABLETS BY MOUTH AT BEDTIME FOR INSOMNIA, Disp: 180 tablet, Rfl: 0  •  Loperamide HCl (IMODIUM A-D PO), Take  by mouth Daily., Disp: , Rfl:     Review of Systems   Constitutional: Negative for activity change, appetite change, chills, fatigue and fever.   HENT: Negative for sore throat and trouble swallowing.    Respiratory: Negative.    Cardiovascular: Negative.  Negative for chest pain.   Gastrointestinal: Positive for abdominal distention, diarrhea and nausea. Negative for abdominal pain, anal bleeding, blood in stool, constipation and vomiting.        Fecal incontinence   Endocrine: Negative for cold intolerance and heat intolerance.   Genitourinary: Negative for difficulty urinating, dysuria, frequency and hematuria.   Musculoskeletal: Negative for arthralgias, back pain and myalgias.   Skin: Negative.    Hematological: Negative for adenopathy. Does not bruise/bleed easily.   All other systems reviewed and are negative.      Objective   Vitals:    06/23/20 1530   BP: 122/74   Temp: 96.9 °F (36.1 °C)         06/23/20  1530   Weight: 91.6 kg (202 lb)     Body mass index is 32.6 kg/m².      Physical Exam   Constitutional: She is oriented to person, place, and time. She appears well-developed and well-nourished. No distress.   HENT:   Head: Normocephalic and atraumatic.   Right Ear: External ear normal.   Left Ear: External ear normal.   Nose: Nose normal.   Mouth/Throat: Oropharynx is clear and moist.   Scrape on nose from a fall   Eyes: Conjunctivae and EOM are normal. Right eye exhibits no discharge. Left eye exhibits no discharge. No scleral icterus.   Neck: Normal range of motion. Neck supple. No thyromegaly present.   No supraclavicular adenopathy   Cardiovascular: Normal rate, regular rhythm, normal heart sounds and intact distal pulses. Exam reveals no gallop.   No murmur heard.  No lower extremity edema   Pulmonary/Chest: Effort normal and breath sounds normal. No respiratory distress. She has no  wheezes.   Abdominal: Soft. Normal appearance and bowel sounds are normal. She exhibits no distension and no mass. There is no hepatosplenomegaly. There is no tenderness. There is no rigidity, no rebound and no guarding. No hernia.   Genitourinary:   Genitourinary Comments: Rectal exam deferred   Musculoskeletal: Normal range of motion. She exhibits no edema or tenderness.   No atrophy of upper or lower extremities.  Normal digits and nails of both hands.   Lymphadenopathy:     She has no cervical adenopathy.   Neurological: She is alert and oriented to person, place, and time. She displays no atrophy. Coordination normal.   Skin: Skin is warm and dry. No rash noted. She is not diaphoretic. No erythema.   Psychiatric: She has a normal mood and affect. Her behavior is normal. Judgment and thought content normal.   Vitals reviewed.      WBC   Date Value Ref Range Status   03/20/2020 13.24 (H) 3.40 - 10.80 10*3/mm3 Final     RBC   Date Value Ref Range Status   03/20/2020 4.62 3.77 - 5.28 10*6/mm3 Final     Hemoglobin   Date Value Ref Range Status   03/20/2020 14.3 12.0 - 15.9 g/dL Final     Hematocrit   Date Value Ref Range Status   03/20/2020 41.2 34.0 - 46.6 % Final     MCV   Date Value Ref Range Status   03/20/2020 89.2 79.0 - 97.0 fL Final     MCH   Date Value Ref Range Status   03/20/2020 31.0 26.6 - 33.0 pg Final     MCHC   Date Value Ref Range Status   03/20/2020 34.7 31.5 - 35.7 g/dL Final     RDW   Date Value Ref Range Status   03/20/2020 13.0 12.3 - 15.4 % Final     RDW-SD   Date Value Ref Range Status   03/20/2020 41.7 37.0 - 54.0 fl Final     MPV   Date Value Ref Range Status   03/20/2020 9.7 6.0 - 12.0 fL Final     Platelets   Date Value Ref Range Status   03/20/2020 309 140 - 450 10*3/mm3 Final     Neutrophil %   Date Value Ref Range Status   03/20/2020 60.9 42.7 - 76.0 % Final     Lymphocyte %   Date Value Ref Range Status   03/20/2020 27.0 19.6 - 45.3 % Final     Monocyte %   Date Value Ref Range Status    03/20/2020 7.3 5.0 - 12.0 % Final     Eosinophil %   Date Value Ref Range Status   03/20/2020 3.7 0.3 - 6.2 % Final     Basophil %   Date Value Ref Range Status   03/20/2020 0.6 0.0 - 1.5 % Final     Immature Grans %   Date Value Ref Range Status   03/20/2020 0.5 0.0 - 0.5 % Final     Neutrophils, Absolute   Date Value Ref Range Status   03/20/2020 8.06 (H) 1.70 - 7.00 10*3/mm3 Final     Lymphocytes, Absolute   Date Value Ref Range Status   03/20/2020 3.58 (H) 0.70 - 3.10 10*3/mm3 Final     Monocytes, Absolute   Date Value Ref Range Status   03/20/2020 0.96 (H) 0.10 - 0.90 10*3/mm3 Final     Eosinophils, Absolute   Date Value Ref Range Status   03/20/2020 0.49 (H) 0.00 - 0.40 10*3/mm3 Final     Basophils, Absolute   Date Value Ref Range Status   03/20/2020 0.08 0.00 - 0.20 10*3/mm3 Final     Immature Grans, Absolute   Date Value Ref Range Status   03/20/2020 0.07 (H) 0.00 - 0.05 10*3/mm3 Final     nRBC   Date Value Ref Range Status   03/20/2020 0.0 0.0 - 0.2 /100 WBC Final       Lab Results   Component Value Date    GLUCOSE 87 03/20/2020    BUN 11 03/20/2020    CREATININE 0.93 03/20/2020    EGFRIFNONA 61 03/20/2020    BCR 11.8 03/20/2020    CO2 21.1 (L) 03/20/2020    CALCIUM 9.5 03/20/2020    ALBUMIN 4.50 03/20/2020    AST 18 03/20/2020    ALT 18 03/20/2020         Imaging Results (Last 7 Days)     ** No results found for the last 168 hours. **            Assessment/Plan    1.  IBS-D: Stable on daily Imodium, Citrucel.    2.  Fecal incontinence: No further episodes    3.  Nausea: Recent flare with likely situational issues    4.  Intestinal gas and bloating: Possibly related to a probiotic    Plan  Continue Imodium  Continue Citrucel  Zofran as needed for nausea  Stop probiotic  Gas-X with all meals  Repeat colonoscopy in 2022    Magdalena was seen today for irritable bowel syndrome.    Diagnoses and all orders for this visit:    Irritable bowel syndrome with diarrhea    Full incontinence of feces    Diverticulitis  of large intestine without perforation or abscess without bleeding    Nausea    Other orders  -     ondansetron (ZOFRAN) 4 MG tablet; Take 1 tablet by mouth Every 8 (Eight) Hours As Needed for Nausea or Vomiting.  -     pantoprazole (PROTONIX) 40 MG EC tablet; Take 1 tablet by mouth Every Morning.        Dictated utilizing Dragon dictation

## 2020-06-29 RX ORDER — TOPIRAMATE 25 MG/1
TABLET ORAL
Qty: 120 TABLET | Refills: 0 | Status: SHIPPED | OUTPATIENT
Start: 2020-06-29 | End: 2020-07-23 | Stop reason: SDUPTHER

## 2020-07-03 DIAGNOSIS — G43.009 MIGRAINE WITHOUT AURA AND WITHOUT STATUS MIGRAINOSUS, NOT INTRACTABLE: ICD-10-CM

## 2020-07-06 RX ORDER — METAXALONE 800 MG/1
TABLET ORAL
Qty: 90 TABLET | Refills: 1 | Status: SHIPPED | OUTPATIENT
Start: 2020-07-06 | End: 2021-03-02 | Stop reason: SDUPTHER

## 2020-07-23 ENCOUNTER — OFFICE VISIT (OUTPATIENT)
Dept: INTERNAL MEDICINE | Facility: CLINIC | Age: 65
End: 2020-07-23

## 2020-07-23 VITALS
DIASTOLIC BLOOD PRESSURE: 70 MMHG | HEIGHT: 66 IN | TEMPERATURE: 97.7 F | WEIGHT: 202 LBS | BODY MASS INDEX: 32.47 KG/M2 | HEART RATE: 74 BPM | SYSTOLIC BLOOD PRESSURE: 130 MMHG

## 2020-07-23 DIAGNOSIS — G43.009 MIGRAINE WITHOUT AURA AND WITHOUT STATUS MIGRAINOSUS, NOT INTRACTABLE: ICD-10-CM

## 2020-07-23 DIAGNOSIS — E78.1 HYPERTRIGLYCERIDEMIA: ICD-10-CM

## 2020-07-23 DIAGNOSIS — Z11.59 ENCOUNTER FOR HEPATITIS C SCREENING TEST FOR LOW RISK PATIENT: Primary | ICD-10-CM

## 2020-07-23 PROCEDURE — 90471 IMMUNIZATION ADMIN: CPT | Performed by: INTERNAL MEDICINE

## 2020-07-23 PROCEDURE — 99213 OFFICE O/P EST LOW 20 MIN: CPT | Performed by: INTERNAL MEDICINE

## 2020-07-23 PROCEDURE — 90670 PCV13 VACCINE IM: CPT | Performed by: INTERNAL MEDICINE

## 2020-07-23 RX ORDER — TOPIRAMATE 50 MG/1
50 TABLET, FILM COATED ORAL 2 TIMES DAILY
Qty: 180 TABLET | Refills: 1 | Status: SHIPPED | OUTPATIENT
Start: 2020-07-23 | End: 2021-03-02 | Stop reason: SDUPTHER

## 2020-07-23 NOTE — PROGRESS NOTES
Assessment and Plan  Magdalena was seen today for hypertension.    Diagnoses and all orders for this visit:    Encounter for hepatitis C screening test for low risk patient  -     Hepatitis C Antibody; Future    Migraine without aura and without status migrainosus, not intractable  Comments:  better, continue same Topamax  Orders:  -     topiramate (TOPAMAX) 50 MG tablet; Take 1 tablet by mouth 2 (Two) Times a Day.    Hypertriglyceridemia  Comments:  improved, no change.  Orders:  -     Comprehensive Metabolic Panel; Future  -     Lipid Panel With / Chol / HDL Ratio; Future    Other orders  -     Pneumococcal Conjugate Vaccine 13-Valent All (PCV13)      F/U and Patient Instructions    Return in about 6 months (around 1/23/2021) for Annual physical, Lab Before FUP.  There are no Patient Instructions on file for this visit.    Subjective    Magdalena Dickey is a 65 y.o. female being seen in our office today for Hypertension     History of the Present Illness  HPI  Here to f/u migraines and elevated BP-   Feels good on the Topamax- only had to use triptan once.  She feel better, BP  Patient History        Significant Past History  The following portions of the patient's history were reviewed and updated as appropriate:PMHroutine: Social history , Allergies, Current Medications, Active Problem List and Health Maintenance              Social History  She  reports that she quit smoking about 6 years ago. Her smoking use included cigarettes. She has never used smokeless tobacco. She reports that she drinks alcohol. She reports that she does not use drugs.                         Review of Symptoms  Review of Systems   Constitution: Negative.   Cardiovascular: Negative.    Musculoskeletal: Negative.    Gastrointestinal: Negative.    Psychiatric/Behavioral: Negative.      Objective  Vital Signs         BP Readings from Last 1 Encounters:   07/23/20 130/70     Wt Readings from Last 3 Encounters:   07/23/20 91.6 kg (202 lb)    06/23/20 91.6 kg (202 lb)   03/24/20 92.1 kg (203 lb)   Body mass index is 32.6 kg/m².          Physical Exam   Physical Exam   Constitutional: No distress.   Cardiovascular: Normal rate and regular rhythm.   Abdominal: Soft.   Musculoskeletal: She exhibits no edema.     Data Reviewed    No results found for this or any previous visit (from the past 2016 hour(s)).

## 2020-08-04 ENCOUNTER — OFFICE VISIT (OUTPATIENT)
Dept: ORTHOPEDIC SURGERY | Facility: CLINIC | Age: 65
End: 2020-08-04

## 2020-08-04 VITALS — TEMPERATURE: 97.3 F | HEIGHT: 66 IN | BODY MASS INDEX: 32.47 KG/M2 | WEIGHT: 202 LBS

## 2020-08-04 DIAGNOSIS — M47.22 CERVICAL SPONDYLOSIS WITH RADICULOPATHY: ICD-10-CM

## 2020-08-04 DIAGNOSIS — R52 PAIN: Primary | ICD-10-CM

## 2020-08-04 PROCEDURE — 72020 X-RAY EXAM OF SPINE 1 VIEW: CPT | Performed by: ORTHOPAEDIC SURGERY

## 2020-08-04 PROCEDURE — 99213 OFFICE O/P EST LOW 20 MIN: CPT | Performed by: ORTHOPAEDIC SURGERY

## 2020-08-04 RX ORDER — METHYLPREDNISOLONE 4 MG/1
TABLET ORAL
Qty: 21 TABLET | Refills: 0 | OUTPATIENT
Start: 2020-08-04 | End: 2020-08-15 | Stop reason: HOSPADM

## 2020-08-04 NOTE — PROGRESS NOTES
New patient or new problem visit    CC: Neck pain    HPI: She is about 2 years out from see 6 7 anterior cervical discectomy and fusion with cage and plate and has some recurrent neck pain not much in way of arm pain.  It is been going on about a week.  No history of trauma.    PFSH: See attached    ROS: See attached    PE: Awake alert oriented.  Seems comfortable enough.  Moves the arms well good strength sensation intact reflexes intact tenderness mid cervical region to deep palpation percussion    XRAY: Solid C6-7 fusion degenerative changes above unchanged from prior films.    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  Follow-up as needed.

## 2020-08-13 RX ORDER — TRAZODONE HYDROCHLORIDE 50 MG/1
TABLET ORAL
Qty: 180 TABLET | Refills: 1 | Status: SHIPPED | OUTPATIENT
Start: 2020-08-13 | End: 2022-04-18

## 2020-08-14 DIAGNOSIS — R51.9 HEADACHE, UNSPECIFIED HEADACHE TYPE: ICD-10-CM

## 2020-08-14 RX ORDER — SUMATRIPTAN 100 MG/1
TABLET, FILM COATED ORAL
Qty: 9 TABLET | Refills: 2 | Status: SHIPPED | OUTPATIENT
Start: 2020-08-14 | End: 2022-04-18

## 2020-08-17 ENCOUNTER — OFFICE VISIT (OUTPATIENT)
Dept: INTERNAL MEDICINE | Facility: CLINIC | Age: 65
End: 2020-08-17

## 2020-08-17 DIAGNOSIS — Z20.822 EXPOSURE TO COVID-19 VIRUS: Primary | ICD-10-CM

## 2020-08-17 PROCEDURE — 99442 PR PHYS/QHP TELEPHONE EVALUATION 11-20 MIN: CPT | Performed by: INTERNAL MEDICINE

## 2020-08-17 NOTE — PROGRESS NOTES
Assessment and Plan  Diagnoses and all orders for this visit:    Exposure to COVID-19 virus  Comments:  symptoms suggestive but test neg- explained false neg- to monitor symptoms closely.  Recommend 10 day quarantine at [facility]. Call with concerns.     spent 17 minutes on the telephone with patient   F/U and Patient Instructions    No follow-ups on file.  There are no Patient Instructions on file for this visit.    Subjective    Magdalena Dickey is a 65 y.o. female being seen in our office today for No chief complaint on file.     History of the Present Illness  HPI You have chosen to receive care through a telephone visit. Do you consent to use a telephone visit for your medical care today? Yes  She was exposed to Covid at work by a co-worker.  She was exposed over 3 days (M,T,W).  They do not wear masks in the office.  They work in close proximity with each other.    Temp 99.5, cough with very mild shortness of breath and body aches.  Has become hoarse, sore throat.  Her smell and taste are ok.  Wonders what to do about going back to work with negative test.     Patient History        Significant Past History  The following portions of the patient's history were reviewed and updated as appropriate:PMHroutine: Social history , Allergies, Current Medications, Active Problem List and Health Maintenance              Social History  She  reports that she quit smoking about 6 years ago. Her smoking use included cigarettes. She has never used smokeless tobacco. She reports that she drinks alcohol. She reports that she does not use drugs.                         Review of Symptoms  Review of Systems   Constitution: Positive for chills and malaise/fatigue. Negative for decreased appetite.   HENT: Positive for sore throat. Negative for congestion.    Cardiovascular: Negative.    Respiratory: Positive for cough and shortness of breath.    Musculoskeletal: Negative.    Gastrointestinal: Negative.      Objective  Vital  Signs         BP Readings from Last 1 Encounters:   08/15/20 (!) 168/119     Wt Readings from Last 3 Encounters:   08/15/20 90.7 kg (200 lb)   08/04/20 91.6 kg (202 lb)   07/23/20 91.6 kg (202 lb)   There is no height or weight on file to calculate BMI.          Physical Exam   Physical Exam  Data Reviewed    Recent Results (from the past 2016 hour(s))   COVID-19,[facility] ROUTINE, NP/OP SWAB IN TRANSPORT MEDIA OR ESWAB 72 HR TAT - Swab, Nasopharynx    Collection Time: 08/15/20 12:34 PM   Result Value Ref Range    SARS-CoV-2, SHEELA Not Detected Not Detected

## 2020-08-23 ENCOUNTER — APPOINTMENT (OUTPATIENT)
Dept: GENERAL RADIOLOGY | Facility: HOSPITAL | Age: 65
End: 2020-08-23

## 2020-08-23 ENCOUNTER — HOSPITAL ENCOUNTER (INPATIENT)
Facility: HOSPITAL | Age: 65
LOS: 11 days | Discharge: HOME | End: 2020-09-04
Attending: EMERGENCY MEDICINE | Admitting: INTERNAL MEDICINE

## 2020-08-23 DIAGNOSIS — U07.1 PNEUMONIA DUE TO COVID-19 VIRUS: Primary | ICD-10-CM

## 2020-08-23 DIAGNOSIS — J12.82 PNEUMONIA DUE TO COVID-19 VIRUS: Primary | ICD-10-CM

## 2020-08-23 LAB
ALBUMIN SERPL-MCNC: 3.6 G/DL (ref 3.5–5.2)
ALBUMIN/GLOB SERPL: 1.5 G/DL
ALP SERPL-CCNC: 71 U/L (ref 39–117)
ALT SERPL W P-5'-P-CCNC: 45 U/L (ref 1–33)
ANION GAP SERPL CALCULATED.3IONS-SCNC: 13.9 MMOL/L (ref 5–15)
AST SERPL-CCNC: 51 U/L (ref 1–32)
BASOPHILS # BLD AUTO: 0.01 10*3/MM3 (ref 0–0.2)
BASOPHILS NFR BLD AUTO: 0.2 % (ref 0–1.5)
BILIRUB SERPL-MCNC: 0.6 MG/DL (ref 0–1.2)
BUN SERPL-MCNC: 12 MG/DL (ref 8–23)
BUN/CREAT SERPL: 13.8 (ref 7–25)
CALCIUM SPEC-SCNC: 8.1 MG/DL (ref 8.6–10.5)
CHLORIDE SERPL-SCNC: 103 MMOL/L (ref 98–107)
CO2 SERPL-SCNC: 20.1 MMOL/L (ref 22–29)
CREAT SERPL-MCNC: 0.87 MG/DL (ref 0.57–1)
DEPRECATED RDW RBC AUTO: 46.2 FL (ref 37–54)
EOSINOPHIL # BLD AUTO: 0 10*3/MM3 (ref 0–0.4)
EOSINOPHIL NFR BLD AUTO: 0 % (ref 0.3–6.2)
ERYTHROCYTE [DISTWIDTH] IN BLOOD BY AUTOMATED COUNT: 13.4 % (ref 12.3–15.4)
GFR SERPL CREATININE-BSD FRML MDRD: 65 ML/MIN/1.73
GLOBULIN UR ELPH-MCNC: 2.4 GM/DL
GLUCOSE SERPL-MCNC: 123 MG/DL (ref 65–99)
HCT VFR BLD AUTO: 38.2 % (ref 34–46.6)
HGB BLD-MCNC: 12.9 G/DL (ref 12–15.9)
IMM GRANULOCYTES # BLD AUTO: 0.03 10*3/MM3 (ref 0–0.05)
IMM GRANULOCYTES NFR BLD AUTO: 0.5 % (ref 0–0.5)
LYMPHOCYTES # BLD AUTO: 0.79 10*3/MM3 (ref 0.7–3.1)
LYMPHOCYTES NFR BLD AUTO: 13.8 % (ref 19.6–45.3)
MCH RBC QN AUTO: 31.2 PG (ref 26.6–33)
MCHC RBC AUTO-ENTMCNC: 33.8 G/DL (ref 31.5–35.7)
MCV RBC AUTO: 92.3 FL (ref 79–97)
MONOCYTES # BLD AUTO: 0.45 10*3/MM3 (ref 0.1–0.9)
MONOCYTES NFR BLD AUTO: 7.9 % (ref 5–12)
NEUTROPHILS NFR BLD AUTO: 4.43 10*3/MM3 (ref 1.7–7)
NEUTROPHILS NFR BLD AUTO: 77.6 % (ref 42.7–76)
NRBC BLD AUTO-RTO: 0 /100 WBC (ref 0–0.2)
NT-PROBNP SERPL-MCNC: 23.9 PG/ML (ref 0–900)
PLATELET # BLD AUTO: 169 10*3/MM3 (ref 140–450)
PMV BLD AUTO: 10 FL (ref 6–12)
POTASSIUM SERPL-SCNC: 3.6 MMOL/L (ref 3.5–5.2)
PROCALCITONIN SERPL-MCNC: 0.09 NG/ML (ref 0–0.25)
PROT SERPL-MCNC: 6 G/DL (ref 6–8.5)
RBC # BLD AUTO: 4.14 10*6/MM3 (ref 3.77–5.28)
SODIUM SERPL-SCNC: 137 MMOL/L (ref 136–145)
TROPONIN T SERPL-MCNC: <0.01 NG/ML (ref 0–0.03)
WBC # BLD AUTO: 5.71 10*3/MM3 (ref 3.4–10.8)

## 2020-08-23 PROCEDURE — 80053 COMPREHEN METABOLIC PANEL: CPT | Performed by: EMERGENCY MEDICINE

## 2020-08-23 PROCEDURE — 84484 ASSAY OF TROPONIN QUANT: CPT | Performed by: EMERGENCY MEDICINE

## 2020-08-23 PROCEDURE — 82550 ASSAY OF CK (CPK): CPT | Performed by: INTERNAL MEDICINE

## 2020-08-23 PROCEDURE — 85025 COMPLETE CBC W/AUTO DIFF WBC: CPT | Performed by: EMERGENCY MEDICINE

## 2020-08-23 PROCEDURE — 99285 EMERGENCY DEPT VISIT HI MDM: CPT

## 2020-08-23 PROCEDURE — 84145 PROCALCITONIN (PCT): CPT | Performed by: EMERGENCY MEDICINE

## 2020-08-23 PROCEDURE — 71045 X-RAY EXAM CHEST 1 VIEW: CPT

## 2020-08-23 PROCEDURE — 0202U NFCT DS 22 TRGT SARS-COV-2: CPT | Performed by: EMERGENCY MEDICINE

## 2020-08-23 PROCEDURE — 82728 ASSAY OF FERRITIN: CPT | Performed by: INTERNAL MEDICINE

## 2020-08-23 PROCEDURE — 36415 COLL VENOUS BLD VENIPUNCTURE: CPT

## 2020-08-23 PROCEDURE — 93005 ELECTROCARDIOGRAM TRACING: CPT | Performed by: EMERGENCY MEDICINE

## 2020-08-23 PROCEDURE — 93010 ELECTROCARDIOGRAM REPORT: CPT | Performed by: INTERNAL MEDICINE

## 2020-08-23 PROCEDURE — 83880 ASSAY OF NATRIURETIC PEPTIDE: CPT | Performed by: EMERGENCY MEDICINE

## 2020-08-23 PROCEDURE — 86140 C-REACTIVE PROTEIN: CPT | Performed by: INTERNAL MEDICINE

## 2020-08-23 RX ORDER — SODIUM CHLORIDE 0.9 % (FLUSH) 0.9 %
10 SYRINGE (ML) INJECTION AS NEEDED
Status: DISCONTINUED | OUTPATIENT
Start: 2020-08-23 | End: 2020-08-24

## 2020-08-24 PROBLEM — U07.1 PNEUMONIA DUE TO COVID-19 VIRUS: Status: ACTIVE | Noted: 2020-08-24

## 2020-08-24 PROBLEM — J12.82 PNEUMONIA DUE TO COVID-19 VIRUS: Status: ACTIVE | Noted: 2020-08-24

## 2020-08-24 LAB
ABO GROUP BLD: NORMAL
B PARAPERT DNA SPEC QL NAA+PROBE: NOT DETECTED
B PERT DNA SPEC QL NAA+PROBE: NOT DETECTED
BLD GP AB SCN SERPL QL: NEGATIVE
C PNEUM DNA NPH QL NAA+NON-PROBE: NOT DETECTED
CK SERPL-CCNC: 317 U/L (ref 20–180)
CRP SERPL-MCNC: 4.92 MG/DL (ref 0–0.5)
D DIMER PPP FEU-MCNC: 0.58 MCGFEU/ML (ref 0–0.49)
FERRITIN SERPL-MCNC: 916 NG/ML (ref 13–150)
FLUAV H1 2009 PAND RNA NPH QL NAA+PROBE: NOT DETECTED
FLUAV H1 HA GENE NPH QL NAA+PROBE: NOT DETECTED
FLUAV H3 RNA NPH QL NAA+PROBE: NOT DETECTED
FLUAV SUBTYP SPEC NAA+PROBE: NOT DETECTED
FLUBV RNA ISLT QL NAA+PROBE: NOT DETECTED
HADV DNA SPEC NAA+PROBE: NOT DETECTED
HCOV 229E RNA SPEC QL NAA+PROBE: NOT DETECTED
HCOV HKU1 RNA SPEC QL NAA+PROBE: NOT DETECTED
HCOV NL63 RNA SPEC QL NAA+PROBE: NOT DETECTED
HCOV OC43 RNA SPEC QL NAA+PROBE: NOT DETECTED
HMPV RNA NPH QL NAA+NON-PROBE: NOT DETECTED
HPIV1 RNA SPEC QL NAA+PROBE: NOT DETECTED
HPIV2 RNA SPEC QL NAA+PROBE: NOT DETECTED
HPIV3 RNA NPH QL NAA+PROBE: NOT DETECTED
HPIV4 P GENE NPH QL NAA+PROBE: NOT DETECTED
M PNEUMO IGG SER IA-ACNC: NOT DETECTED
RESP PATH DNA+RNA PNL NPH NAA+NON-PROBE: DETECTED
RH BLD: POSITIVE
RHINOVIRUS RNA SPEC NAA+PROBE: NOT DETECTED
RSV RNA NPH QL NAA+NON-PROBE: NOT DETECTED
T&S EXPIRATION DATE: NORMAL

## 2020-08-24 PROCEDURE — 86927 PLASMA FRESH FROZEN: CPT

## 2020-08-24 PROCEDURE — XW13325 TRANSFUSION OF CONVALESCENT PLASMA (NONAUTOLOGOUS) INTO PERIPHERAL VEIN, PERCUTANEOUS APPROACH, NEW TECHNOLOGY GROUP 5: ICD-10-PCS | Performed by: INTERNAL MEDICINE

## 2020-08-24 PROCEDURE — 63710000001 DEXAMETHASONE PER 0.25 MG: Performed by: INTERNAL MEDICINE

## 2020-08-24 PROCEDURE — 86900 BLOOD TYPING SEROLOGIC ABO: CPT | Performed by: INTERNAL MEDICINE

## 2020-08-24 PROCEDURE — 85379 FIBRIN DEGRADATION QUANT: CPT | Performed by: INTERNAL MEDICINE

## 2020-08-24 PROCEDURE — XW033E5 INTRODUCTION OF REMDESIVIR ANTI-INFECTIVE INTO PERIPHERAL VEIN, PERCUTANEOUS APPROACH, NEW TECHNOLOGY GROUP 5: ICD-10-PCS | Performed by: INTERNAL MEDICINE

## 2020-08-24 PROCEDURE — 25010000002 DEXAMETHASONE SODIUM PHOSPHATE 100 MG/10ML SOLUTION 10 ML VIAL: Performed by: EMERGENCY MEDICINE

## 2020-08-24 PROCEDURE — 86850 RBC ANTIBODY SCREEN: CPT | Performed by: INTERNAL MEDICINE

## 2020-08-24 PROCEDURE — 86901 BLOOD TYPING SEROLOGIC RH(D): CPT | Performed by: INTERNAL MEDICINE

## 2020-08-24 PROCEDURE — 25010000002 ENOXAPARIN PER 10 MG: Performed by: INTERNAL MEDICINE

## 2020-08-24 RX ORDER — NITROGLYCERIN 0.4 MG/1
0.4 TABLET SUBLINGUAL
Status: DISCONTINUED | OUTPATIENT
Start: 2020-08-24 | End: 2020-09-04 | Stop reason: HOSPADM

## 2020-08-24 RX ORDER — ACETAMINOPHEN 650 MG/1
650 SUPPOSITORY RECTAL EVERY 4 HOURS PRN
Status: DISCONTINUED | OUTPATIENT
Start: 2020-08-24 | End: 2020-08-25

## 2020-08-24 RX ORDER — ONDANSETRON 2 MG/ML
4 INJECTION INTRAMUSCULAR; INTRAVENOUS EVERY 6 HOURS PRN
Status: DISCONTINUED | OUTPATIENT
Start: 2020-08-24 | End: 2020-09-04 | Stop reason: HOSPADM

## 2020-08-24 RX ORDER — DULOXETIN HYDROCHLORIDE 60 MG/1
60 CAPSULE, DELAYED RELEASE ORAL DAILY
Status: DISCONTINUED | OUTPATIENT
Start: 2020-08-24 | End: 2020-09-04 | Stop reason: HOSPADM

## 2020-08-24 RX ORDER — TOPIRAMATE 50 MG/1
50 TABLET, FILM COATED ORAL 2 TIMES DAILY
Status: DISCONTINUED | OUTPATIENT
Start: 2020-08-24 | End: 2020-09-04 | Stop reason: HOSPADM

## 2020-08-24 RX ORDER — LURASIDONE HYDROCHLORIDE 40 MG/1
40 TABLET, FILM COATED ORAL DAILY
Status: DISCONTINUED | OUTPATIENT
Start: 2020-08-24 | End: 2020-09-04 | Stop reason: HOSPADM

## 2020-08-24 RX ORDER — ACETAMINOPHEN 325 MG/1
650 TABLET ORAL EVERY 4 HOURS PRN
Status: DISCONTINUED | OUTPATIENT
Start: 2020-08-24 | End: 2020-08-25

## 2020-08-24 RX ORDER — DICYCLOMINE HYDROCHLORIDE 10 MG/1
10 CAPSULE ORAL 4 TIMES DAILY PRN
Status: DISCONTINUED | OUTPATIENT
Start: 2020-08-24 | End: 2020-09-04 | Stop reason: HOSPADM

## 2020-08-24 RX ORDER — BUPROPION HYDROCHLORIDE 150 MG/1
150 TABLET ORAL EVERY MORNING
Status: DISCONTINUED | OUTPATIENT
Start: 2020-08-24 | End: 2020-09-04 | Stop reason: HOSPADM

## 2020-08-24 RX ORDER — SODIUM CHLORIDE 9 MG/ML
100 INJECTION, SOLUTION INTRAVENOUS CONTINUOUS
Status: DISCONTINUED | OUTPATIENT
Start: 2020-08-24 | End: 2020-08-24

## 2020-08-24 RX ORDER — PANTOPRAZOLE SODIUM 40 MG/1
40 TABLET, DELAYED RELEASE ORAL EVERY MORNING
Status: DISCONTINUED | OUTPATIENT
Start: 2020-08-24 | End: 2020-09-04 | Stop reason: HOSPADM

## 2020-08-24 RX ORDER — ALPRAZOLAM 0.5 MG/1
0.5 TABLET ORAL 3 TIMES DAILY PRN
Status: DISCONTINUED | OUTPATIENT
Start: 2020-08-24 | End: 2020-09-04 | Stop reason: HOSPADM

## 2020-08-24 RX ORDER — SODIUM CHLORIDE 0.9 % (FLUSH) 0.9 %
10 SYRINGE (ML) INJECTION EVERY 12 HOURS SCHEDULED
Status: DISCONTINUED | OUTPATIENT
Start: 2020-08-24 | End: 2020-08-24

## 2020-08-24 RX ORDER — SODIUM CHLORIDE 0.9 % (FLUSH) 0.9 %
10 SYRINGE (ML) INJECTION AS NEEDED
Status: DISCONTINUED | OUTPATIENT
Start: 2020-08-24 | End: 2020-08-24

## 2020-08-24 RX ORDER — LOPERAMIDE HYDROCHLORIDE 2 MG/1
2 CAPSULE ORAL 4 TIMES DAILY PRN
Status: DISCONTINUED | OUTPATIENT
Start: 2020-08-24 | End: 2020-09-04 | Stop reason: HOSPADM

## 2020-08-24 RX ORDER — WHEAT DEXTRIN 3 G/4 G
1 POWDER IN PACKET (EA) ORAL DAILY
Status: DISCONTINUED | OUTPATIENT
Start: 2020-08-24 | End: 2020-09-04 | Stop reason: HOSPADM

## 2020-08-24 RX ORDER — ACETAMINOPHEN 160 MG/5ML
650 SOLUTION ORAL EVERY 4 HOURS PRN
Status: DISCONTINUED | OUTPATIENT
Start: 2020-08-24 | End: 2020-08-25

## 2020-08-24 RX ADMIN — DEXAMETHASONE 6 MG: 4 TABLET ORAL at 10:03

## 2020-08-24 RX ADMIN — Medication 1 EACH: at 18:02

## 2020-08-24 RX ADMIN — DEXAMETHASONE SODIUM PHOSPHATE 10 MG: 10 INJECTION, SOLUTION INTRAMUSCULAR; INTRAVENOUS at 01:45

## 2020-08-24 RX ADMIN — TOPIRAMATE 50 MG: 50 TABLET, FILM COATED ORAL at 10:02

## 2020-08-24 RX ADMIN — BUPROPION HYDROCHLORIDE 150 MG: 150 TABLET, FILM COATED, EXTENDED RELEASE ORAL at 15:53

## 2020-08-24 RX ADMIN — LOPERAMIDE HYDROCHLORIDE 2 MG: 2 CAPSULE ORAL at 18:12

## 2020-08-24 RX ADMIN — TOPIRAMATE 50 MG: 50 TABLET, FILM COATED ORAL at 20:24

## 2020-08-24 RX ADMIN — SODIUM CHLORIDE, PRESERVATIVE FREE 10 ML: 5 INJECTION INTRAVENOUS at 02:37

## 2020-08-24 RX ADMIN — PANTOPRAZOLE SODIUM 40 MG: 40 TABLET, DELAYED RELEASE ORAL at 10:03

## 2020-08-24 RX ADMIN — DULOXETINE HYDROCHLORIDE 60 MG: 60 CAPSULE, DELAYED RELEASE ORAL at 15:53

## 2020-08-24 RX ADMIN — REMDESIVIR 200 MG: 100 INJECTION, POWDER, LYOPHILIZED, FOR SOLUTION INTRAVENOUS at 18:00

## 2020-08-24 RX ADMIN — SODIUM CHLORIDE 100 ML/HR: 9 INJECTION, SOLUTION INTRAVENOUS at 02:36

## 2020-08-24 RX ADMIN — ENOXAPARIN SODIUM 40 MG: 40 INJECTION SUBCUTANEOUS at 10:04

## 2020-08-24 RX ADMIN — LURASIDONE HYDROCHLORIDE 40 MG: 40 TABLET, FILM COATED ORAL at 12:49

## 2020-08-25 ENCOUNTER — EPISODE CHANGES (OUTPATIENT)
Dept: CASE MANAGEMENT | Facility: OTHER | Age: 65
End: 2020-08-25

## 2020-08-25 ENCOUNTER — APPOINTMENT (OUTPATIENT)
Dept: GENERAL RADIOLOGY | Facility: HOSPITAL | Age: 65
End: 2020-08-25

## 2020-08-25 LAB
ALBUMIN SERPL-MCNC: 3.4 G/DL (ref 3.5–5.2)
ALBUMIN/GLOB SERPL: 1.2 G/DL
ALP SERPL-CCNC: 67 U/L (ref 39–117)
ALT SERPL W P-5'-P-CCNC: 51 U/L (ref 1–33)
ANION GAP SERPL CALCULATED.3IONS-SCNC: 10.1 MMOL/L (ref 5–15)
ARTERIAL PATENCY WRIST A: POSITIVE
AST SERPL-CCNC: 53 U/L (ref 1–32)
ATMOSPHERIC PRESS: 750.3 MMHG
BASE EXCESS BLDA CALC-SCNC: -3.6 MMOL/L (ref 0–2)
BASOPHILS # BLD AUTO: 0.01 10*3/MM3 (ref 0–0.2)
BASOPHILS NFR BLD AUTO: 0.1 % (ref 0–1.5)
BDY SITE: ABNORMAL
BH BB BLOOD EXPIRATION DATE: NORMAL
BH BB BLOOD TYPE BARCODE: 6200
BH BB DISPENSE STATUS: NORMAL
BH BB PRODUCT CODE: NORMAL
BH BB UNIT NUMBER: NORMAL
BILIRUB SERPL-MCNC: 0.4 MG/DL (ref 0–1.2)
BUN SERPL-MCNC: 15 MG/DL (ref 8–23)
BUN/CREAT SERPL: 21.4 (ref 7–25)
CALCIUM SPEC-SCNC: 9 MG/DL (ref 8.6–10.5)
CHLORIDE SERPL-SCNC: 112 MMOL/L (ref 98–107)
CK SERPL-CCNC: 307 U/L (ref 20–180)
CO2 SERPL-SCNC: 21.9 MMOL/L (ref 22–29)
CREAT SERPL-MCNC: 0.7 MG/DL (ref 0.57–1)
CRP SERPL-MCNC: 3.79 MG/DL (ref 0–0.5)
D DIMER PPP FEU-MCNC: 0.63 MCGFEU/ML (ref 0–0.49)
DEPRECATED RDW RBC AUTO: 41.4 FL (ref 37–54)
EOSINOPHIL # BLD AUTO: 0 10*3/MM3 (ref 0–0.4)
EOSINOPHIL NFR BLD AUTO: 0 % (ref 0.3–6.2)
ERYTHROCYTE [DISTWIDTH] IN BLOOD BY AUTOMATED COUNT: 13 % (ref 12.3–15.4)
FERRITIN SERPL-MCNC: 876 NG/ML (ref 13–150)
FIBRINOGEN PPP-MCNC: 501 MG/DL (ref 219–464)
GFR SERPL CREATININE-BSD FRML MDRD: 84 ML/MIN/1.73
GLOBULIN UR ELPH-MCNC: 2.9 GM/DL
GLUCOSE SERPL-MCNC: 115 MG/DL (ref 65–99)
HCO3 BLDA-SCNC: 19.6 MMOL/L (ref 22–28)
HCT VFR BLD AUTO: 36.4 % (ref 34–46.6)
HGB BLD-MCNC: 12.5 G/DL (ref 12–15.9)
IMM GRANULOCYTES # BLD AUTO: 0.06 10*3/MM3 (ref 0–0.05)
IMM GRANULOCYTES NFR BLD AUTO: 0.6 % (ref 0–0.5)
L PNEUMO1 AG UR QL IA: NEGATIVE
LDH SERPL-CCNC: 447 U/L (ref 135–214)
LYMPHOCYTES # BLD AUTO: 1.09 10*3/MM3 (ref 0.7–3.1)
LYMPHOCYTES NFR BLD AUTO: 11.5 % (ref 19.6–45.3)
MCH RBC QN AUTO: 30.2 PG (ref 26.6–33)
MCHC RBC AUTO-ENTMCNC: 34.3 G/DL (ref 31.5–35.7)
MCV RBC AUTO: 87.9 FL (ref 79–97)
MODALITY: ABNORMAL
MONOCYTES # BLD AUTO: 0.62 10*3/MM3 (ref 0.1–0.9)
MONOCYTES NFR BLD AUTO: 6.6 % (ref 5–12)
NEUTROPHILS NFR BLD AUTO: 7.66 10*3/MM3 (ref 1.7–7)
NEUTROPHILS NFR BLD AUTO: 81.2 % (ref 42.7–76)
NRBC BLD AUTO-RTO: 0 /100 WBC (ref 0–0.2)
PCO2 BLDA: 29.4 MM HG (ref 35–45)
PH BLDA: 7.43 PH UNITS (ref 7.35–7.45)
PLATELET # BLD AUTO: 224 10*3/MM3 (ref 140–450)
PMV BLD AUTO: 10.2 FL (ref 6–12)
PO2 BLDA: 62.7 MM HG (ref 80–100)
POTASSIUM SERPL-SCNC: 3.6 MMOL/L (ref 3.5–5.2)
PROT SERPL-MCNC: 6.3 G/DL (ref 6–8.5)
RBC # BLD AUTO: 4.14 10*6/MM3 (ref 3.77–5.28)
S PNEUM AG SPEC QL LA: NEGATIVE
SAO2 % BLDCOA: 92.7 % (ref 92–99)
SODIUM SERPL-SCNC: 144 MMOL/L (ref 136–145)
TOTAL RATE: 28 BREATHS/MINUTE
UNIT  ABO: NORMAL
UNIT  RH: NORMAL
WBC # BLD AUTO: 9.44 10*3/MM3 (ref 3.4–10.8)

## 2020-08-25 PROCEDURE — 94799 UNLISTED PULMONARY SVC/PX: CPT

## 2020-08-25 PROCEDURE — 36415 COLL VENOUS BLD VENIPUNCTURE: CPT | Performed by: INTERNAL MEDICINE

## 2020-08-25 PROCEDURE — 25010000002 ENOXAPARIN PER 10 MG: Performed by: INTERNAL MEDICINE

## 2020-08-25 PROCEDURE — 82550 ASSAY OF CK (CPK): CPT | Performed by: INTERNAL MEDICINE

## 2020-08-25 PROCEDURE — 87899 AGENT NOS ASSAY W/OPTIC: CPT | Performed by: INTERNAL MEDICINE

## 2020-08-25 PROCEDURE — 85384 FIBRINOGEN ACTIVITY: CPT | Performed by: NURSE PRACTITIONER

## 2020-08-25 PROCEDURE — 71045 X-RAY EXAM CHEST 1 VIEW: CPT

## 2020-08-25 PROCEDURE — 63710000001 DEXAMETHASONE PER 0.25 MG: Performed by: INTERNAL MEDICINE

## 2020-08-25 PROCEDURE — 82803 BLOOD GASES ANY COMBINATION: CPT

## 2020-08-25 PROCEDURE — 25010000002 FUROSEMIDE PER 20 MG: Performed by: INTERNAL MEDICINE

## 2020-08-25 PROCEDURE — 85379 FIBRIN DEGRADATION QUANT: CPT | Performed by: NURSE PRACTITIONER

## 2020-08-25 PROCEDURE — 87040 BLOOD CULTURE FOR BACTERIA: CPT | Performed by: INTERNAL MEDICINE

## 2020-08-25 PROCEDURE — 85025 COMPLETE CBC W/AUTO DIFF WBC: CPT | Performed by: NURSE PRACTITIONER

## 2020-08-25 PROCEDURE — 83615 LACTATE (LD) (LDH) ENZYME: CPT | Performed by: NURSE PRACTITIONER

## 2020-08-25 PROCEDURE — 36600 WITHDRAWAL OF ARTERIAL BLOOD: CPT

## 2020-08-25 PROCEDURE — 82728 ASSAY OF FERRITIN: CPT | Performed by: INTERNAL MEDICINE

## 2020-08-25 PROCEDURE — 86140 C-REACTIVE PROTEIN: CPT | Performed by: INTERNAL MEDICINE

## 2020-08-25 PROCEDURE — 80053 COMPREHEN METABOLIC PANEL: CPT | Performed by: NURSE PRACTITIONER

## 2020-08-25 RX ORDER — ACETAMINOPHEN 650 MG/1
650 SUPPOSITORY RECTAL EVERY 4 HOURS PRN
Status: DISCONTINUED | OUTPATIENT
Start: 2020-08-25 | End: 2020-09-04 | Stop reason: HOSPADM

## 2020-08-25 RX ORDER — ACETAMINOPHEN 160 MG/5ML
650 SOLUTION ORAL EVERY 4 HOURS PRN
Status: DISCONTINUED | OUTPATIENT
Start: 2020-08-25 | End: 2020-09-04 | Stop reason: HOSPADM

## 2020-08-25 RX ORDER — FUROSEMIDE 10 MG/ML
40 INJECTION INTRAMUSCULAR; INTRAVENOUS ONCE
Status: COMPLETED | OUTPATIENT
Start: 2020-08-25 | End: 2020-08-25

## 2020-08-25 RX ORDER — ACETAMINOPHEN 325 MG/1
650 TABLET ORAL EVERY 4 HOURS PRN
Status: DISCONTINUED | OUTPATIENT
Start: 2020-08-25 | End: 2020-09-04 | Stop reason: HOSPADM

## 2020-08-25 RX ADMIN — DEXAMETHASONE 6 MG: 4 TABLET ORAL at 08:29

## 2020-08-25 RX ADMIN — ACETAMINOPHEN 650 MG: 325 TABLET, FILM COATED ORAL at 08:29

## 2020-08-25 RX ADMIN — DOXYCYCLINE 100 MG: 100 INJECTION, POWDER, LYOPHILIZED, FOR SOLUTION INTRAVENOUS at 18:43

## 2020-08-25 RX ADMIN — ENOXAPARIN SODIUM 40 MG: 40 INJECTION SUBCUTANEOUS at 08:33

## 2020-08-25 RX ADMIN — DULOXETINE HYDROCHLORIDE 60 MG: 60 CAPSULE, DELAYED RELEASE ORAL at 08:29

## 2020-08-25 RX ADMIN — Medication 1 EACH: at 08:29

## 2020-08-25 RX ADMIN — TOPIRAMATE 50 MG: 50 TABLET, FILM COATED ORAL at 08:29

## 2020-08-25 RX ADMIN — ACETAMINOPHEN 650 MG: 325 TABLET, FILM COATED ORAL at 16:46

## 2020-08-25 RX ADMIN — LOPERAMIDE HYDROCHLORIDE 2 MG: 2 CAPSULE ORAL at 08:41

## 2020-08-25 RX ADMIN — LURASIDONE HYDROCHLORIDE 40 MG: 40 TABLET, FILM COATED ORAL at 08:29

## 2020-08-25 RX ADMIN — PANTOPRAZOLE SODIUM 40 MG: 40 TABLET, DELAYED RELEASE ORAL at 08:31

## 2020-08-25 RX ADMIN — TOPIRAMATE 50 MG: 50 TABLET, FILM COATED ORAL at 21:42

## 2020-08-25 RX ADMIN — REMDESIVIR 100 MG: 100 INJECTION, POWDER, LYOPHILIZED, FOR SOLUTION INTRAVENOUS at 12:48

## 2020-08-25 RX ADMIN — FUROSEMIDE 40 MG: 10 INJECTION, SOLUTION INTRAMUSCULAR; INTRAVENOUS at 16:43

## 2020-08-25 RX ADMIN — BUPROPION HYDROCHLORIDE 150 MG: 150 TABLET, FILM COATED, EXTENDED RELEASE ORAL at 08:27

## 2020-08-25 RX ADMIN — ALPRAZOLAM 0.5 MG: 0.5 TABLET ORAL at 15:23

## 2020-08-26 LAB
ALBUMIN SERPL-MCNC: 3.2 G/DL (ref 3.5–5.2)
ALBUMIN/GLOB SERPL: 1.1 G/DL
ALP SERPL-CCNC: 65 U/L (ref 39–117)
ALT SERPL W P-5'-P-CCNC: 58 U/L (ref 1–33)
ANION GAP SERPL CALCULATED.3IONS-SCNC: 9.2 MMOL/L (ref 5–15)
AST SERPL-CCNC: 46 U/L (ref 1–32)
BILIRUB SERPL-MCNC: 0.4 MG/DL (ref 0–1.2)
BUN SERPL-MCNC: 21 MG/DL (ref 8–23)
BUN/CREAT SERPL: 33.3 (ref 7–25)
CALCIUM SPEC-SCNC: 8.6 MG/DL (ref 8.6–10.5)
CHLORIDE SERPL-SCNC: 112 MMOL/L (ref 98–107)
CK SERPL-CCNC: 195 U/L (ref 20–180)
CO2 SERPL-SCNC: 21.8 MMOL/L (ref 22–29)
CREAT SERPL-MCNC: 0.63 MG/DL (ref 0.57–1)
CRP SERPL-MCNC: 6.6 MG/DL (ref 0–0.5)
DEPRECATED RDW RBC AUTO: 43.5 FL (ref 37–54)
ERYTHROCYTE [DISTWIDTH] IN BLOOD BY AUTOMATED COUNT: 13.2 % (ref 12.3–15.4)
FERRITIN SERPL-MCNC: 750 NG/ML (ref 13–150)
GFR SERPL CREATININE-BSD FRML MDRD: 95 ML/MIN/1.73
GLOBULIN UR ELPH-MCNC: 2.8 GM/DL
GLUCOSE SERPL-MCNC: 95 MG/DL (ref 65–99)
HCT VFR BLD AUTO: 37.6 % (ref 34–46.6)
HGB BLD-MCNC: 12.5 G/DL (ref 12–15.9)
LYMPHOCYTES # BLD MANUAL: 0.44 10*3/MM3 (ref 0.7–3.1)
LYMPHOCYTES NFR BLD MANUAL: 5 % (ref 19.6–45.3)
LYMPHOCYTES NFR BLD MANUAL: 5 % (ref 5–12)
MAGNESIUM SERPL-MCNC: 2.4 MG/DL (ref 1.6–2.4)
MCH RBC QN AUTO: 30.2 PG (ref 26.6–33)
MCHC RBC AUTO-ENTMCNC: 33.2 G/DL (ref 31.5–35.7)
MCV RBC AUTO: 90.8 FL (ref 79–97)
MONOCYTES # BLD AUTO: 0.44 10*3/MM3 (ref 0.1–0.9)
NEUTROPHILS # BLD AUTO: 7.93 10*3/MM3 (ref 1.7–7)
NEUTROPHILS NFR BLD MANUAL: 90 % (ref 42.7–76)
PLAT MORPH BLD: NORMAL
PLATELET # BLD AUTO: 258 10*3/MM3 (ref 140–450)
PMV BLD AUTO: 10.2 FL (ref 6–12)
POTASSIUM SERPL-SCNC: 3 MMOL/L (ref 3.5–5.2)
PROT SERPL-MCNC: 6 G/DL (ref 6–8.5)
RBC # BLD AUTO: 4.14 10*6/MM3 (ref 3.77–5.28)
RBC MORPH BLD: NORMAL
SODIUM SERPL-SCNC: 143 MMOL/L (ref 136–145)
WBC # BLD AUTO: 8.81 10*3/MM3 (ref 3.4–10.8)
WBC MORPH BLD: NORMAL

## 2020-08-26 PROCEDURE — 82728 ASSAY OF FERRITIN: CPT | Performed by: INTERNAL MEDICINE

## 2020-08-26 PROCEDURE — 85007 BL SMEAR W/DIFF WBC COUNT: CPT | Performed by: NURSE PRACTITIONER

## 2020-08-26 PROCEDURE — 80053 COMPREHEN METABOLIC PANEL: CPT | Performed by: NURSE PRACTITIONER

## 2020-08-26 PROCEDURE — 86140 C-REACTIVE PROTEIN: CPT | Performed by: INTERNAL MEDICINE

## 2020-08-26 PROCEDURE — 36415 COLL VENOUS BLD VENIPUNCTURE: CPT | Performed by: INTERNAL MEDICINE

## 2020-08-26 PROCEDURE — 83735 ASSAY OF MAGNESIUM: CPT | Performed by: INTERNAL MEDICINE

## 2020-08-26 PROCEDURE — 63710000001 DEXAMETHASONE PER 0.25 MG: Performed by: INTERNAL MEDICINE

## 2020-08-26 PROCEDURE — 25010000002 ENOXAPARIN PER 10 MG: Performed by: INTERNAL MEDICINE

## 2020-08-26 PROCEDURE — 82550 ASSAY OF CK (CPK): CPT | Performed by: INTERNAL MEDICINE

## 2020-08-26 PROCEDURE — 85025 COMPLETE CBC W/AUTO DIFF WBC: CPT | Performed by: NURSE PRACTITIONER

## 2020-08-26 RX ORDER — MAGNESIUM SULFATE HEPTAHYDRATE 40 MG/ML
4 INJECTION, SOLUTION INTRAVENOUS AS NEEDED
Status: DISCONTINUED | OUTPATIENT
Start: 2020-08-26 | End: 2020-09-04 | Stop reason: HOSPADM

## 2020-08-26 RX ORDER — POTASSIUM CHLORIDE 7.45 MG/ML
10 INJECTION INTRAVENOUS
Status: DISCONTINUED | OUTPATIENT
Start: 2020-08-26 | End: 2020-09-04 | Stop reason: HOSPADM

## 2020-08-26 RX ORDER — LOPERAMIDE HYDROCHLORIDE 2 MG/1
2 CAPSULE ORAL EVERY MORNING
Status: DISCONTINUED | OUTPATIENT
Start: 2020-08-27 | End: 2020-09-04 | Stop reason: HOSPADM

## 2020-08-26 RX ORDER — POTASSIUM CHLORIDE 1.5 G/1.77G
40 POWDER, FOR SOLUTION ORAL AS NEEDED
Status: DISCONTINUED | OUTPATIENT
Start: 2020-08-26 | End: 2020-09-04 | Stop reason: HOSPADM

## 2020-08-26 RX ORDER — MAGNESIUM SULFATE HEPTAHYDRATE 40 MG/ML
2 INJECTION, SOLUTION INTRAVENOUS AS NEEDED
Status: DISCONTINUED | OUTPATIENT
Start: 2020-08-26 | End: 2020-09-04 | Stop reason: HOSPADM

## 2020-08-26 RX ORDER — POTASSIUM CHLORIDE 750 MG/1
40 CAPSULE, EXTENDED RELEASE ORAL AS NEEDED
Status: DISCONTINUED | OUTPATIENT
Start: 2020-08-26 | End: 2020-09-04 | Stop reason: HOSPADM

## 2020-08-26 RX ADMIN — BUPROPION HYDROCHLORIDE 150 MG: 150 TABLET, FILM COATED, EXTENDED RELEASE ORAL at 06:51

## 2020-08-26 RX ADMIN — POTASSIUM CHLORIDE 40 MEQ: 10 CAPSULE, COATED, EXTENDED RELEASE ORAL at 11:46

## 2020-08-26 RX ADMIN — DOXYCYCLINE 100 MG: 100 INJECTION, POWDER, LYOPHILIZED, FOR SOLUTION INTRAVENOUS at 06:52

## 2020-08-26 RX ADMIN — PANTOPRAZOLE SODIUM 40 MG: 40 TABLET, DELAYED RELEASE ORAL at 06:51

## 2020-08-26 RX ADMIN — LOPERAMIDE HYDROCHLORIDE 2 MG: 2 CAPSULE ORAL at 09:42

## 2020-08-26 RX ADMIN — ACETAMINOPHEN 650 MG: 325 TABLET, FILM COATED ORAL at 14:03

## 2020-08-26 RX ADMIN — POTASSIUM CHLORIDE 40 MEQ: 10 CAPSULE, COATED, EXTENDED RELEASE ORAL at 16:24

## 2020-08-26 RX ADMIN — ENOXAPARIN SODIUM 40 MG: 40 INJECTION SUBCUTANEOUS at 20:40

## 2020-08-26 RX ADMIN — Medication 1 EACH: at 08:11

## 2020-08-26 RX ADMIN — DULOXETINE HYDROCHLORIDE 60 MG: 60 CAPSULE, DELAYED RELEASE ORAL at 08:11

## 2020-08-26 RX ADMIN — TOPIRAMATE 50 MG: 50 TABLET, FILM COATED ORAL at 20:40

## 2020-08-26 RX ADMIN — REMDESIVIR 100 MG: 100 INJECTION, POWDER, LYOPHILIZED, FOR SOLUTION INTRAVENOUS at 14:03

## 2020-08-26 RX ADMIN — TOPIRAMATE 50 MG: 50 TABLET, FILM COATED ORAL at 08:11

## 2020-08-26 RX ADMIN — LURASIDONE HYDROCHLORIDE 40 MG: 40 TABLET, FILM COATED ORAL at 08:11

## 2020-08-26 RX ADMIN — DOXYCYCLINE 100 MG: 100 INJECTION, POWDER, LYOPHILIZED, FOR SOLUTION INTRAVENOUS at 18:34

## 2020-08-26 RX ADMIN — DEXAMETHASONE 6 MG: 4 TABLET ORAL at 08:11

## 2020-08-26 RX ADMIN — ENOXAPARIN SODIUM 40 MG: 40 INJECTION SUBCUTANEOUS at 11:46

## 2020-08-27 ENCOUNTER — APPOINTMENT (OUTPATIENT)
Dept: GENERAL RADIOLOGY | Facility: HOSPITAL | Age: 65
End: 2020-08-27

## 2020-08-27 LAB
ALBUMIN SERPL-MCNC: 3.2 G/DL (ref 3.5–5.2)
ALBUMIN/GLOB SERPL: 1.1 G/DL
ALP SERPL-CCNC: 69 U/L (ref 39–117)
ALT SERPL W P-5'-P-CCNC: 50 U/L (ref 1–33)
ANION GAP SERPL CALCULATED.3IONS-SCNC: 9 MMOL/L (ref 5–15)
AST SERPL-CCNC: 31 U/L (ref 1–32)
BILIRUB SERPL-MCNC: 0.4 MG/DL (ref 0–1.2)
BUN SERPL-MCNC: 19 MG/DL (ref 8–23)
BUN/CREAT SERPL: 27.5 (ref 7–25)
CALCIUM SPEC-SCNC: 8.8 MG/DL (ref 8.6–10.5)
CHLORIDE SERPL-SCNC: 113 MMOL/L (ref 98–107)
CK SERPL-CCNC: 117 U/L (ref 20–180)
CO2 SERPL-SCNC: 19 MMOL/L (ref 22–29)
CREAT SERPL-MCNC: 0.69 MG/DL (ref 0.57–1)
CRP SERPL-MCNC: 5.01 MG/DL (ref 0–0.5)
D DIMER PPP FEU-MCNC: 1 MCGFEU/ML (ref 0–0.49)
DEPRECATED RDW RBC AUTO: 41.8 FL (ref 37–54)
ERYTHROCYTE [DISTWIDTH] IN BLOOD BY AUTOMATED COUNT: 12.9 % (ref 12.3–15.4)
FERRITIN SERPL-MCNC: 655 NG/ML (ref 13–150)
FIBRINOGEN PPP-MCNC: 536 MG/DL (ref 219–464)
GFR SERPL CREATININE-BSD FRML MDRD: 85 ML/MIN/1.73
GLOBULIN UR ELPH-MCNC: 2.9 GM/DL
GLUCOSE BLDC GLUCOMTR-MCNC: 117 MG/DL (ref 70–130)
GLUCOSE BLDC GLUCOMTR-MCNC: 126 MG/DL (ref 70–130)
GLUCOSE BLDC GLUCOMTR-MCNC: 131 MG/DL (ref 70–130)
GLUCOSE BLDC GLUCOMTR-MCNC: 95 MG/DL (ref 70–130)
GLUCOSE SERPL-MCNC: 85 MG/DL (ref 65–99)
HCT VFR BLD AUTO: 38.7 % (ref 34–46.6)
HGB BLD-MCNC: 13.2 G/DL (ref 12–15.9)
LDH SERPL-CCNC: 509 U/L (ref 135–214)
LYMPHOCYTES # BLD MANUAL: 1.05 10*3/MM3 (ref 0.7–3.1)
LYMPHOCYTES NFR BLD MANUAL: 8 % (ref 5–12)
LYMPHOCYTES NFR BLD MANUAL: 9 % (ref 19.6–45.3)
MCH RBC QN AUTO: 30.3 PG (ref 26.6–33)
MCHC RBC AUTO-ENTMCNC: 34.1 G/DL (ref 31.5–35.7)
MCV RBC AUTO: 89 FL (ref 79–97)
MONOCYTES # BLD AUTO: 0.94 10*3/MM3 (ref 0.1–0.9)
NEUTROPHILS # BLD AUTO: 9.72 10*3/MM3 (ref 1.7–7)
NEUTROPHILS NFR BLD MANUAL: 83 % (ref 42.7–76)
PLAT MORPH BLD: NORMAL
PLATELET # BLD AUTO: 336 10*3/MM3 (ref 140–450)
PMV BLD AUTO: 10.1 FL (ref 6–12)
POTASSIUM SERPL-SCNC: 3.4 MMOL/L (ref 3.5–5.2)
POTASSIUM SERPL-SCNC: 4.4 MMOL/L (ref 3.5–5.2)
PROT SERPL-MCNC: 6.1 G/DL (ref 6–8.5)
RBC # BLD AUTO: 4.35 10*6/MM3 (ref 3.77–5.28)
RBC MORPH BLD: NORMAL
SODIUM SERPL-SCNC: 141 MMOL/L (ref 136–145)
WBC # BLD AUTO: 11.71 10*3/MM3 (ref 3.4–10.8)
WBC MORPH BLD: NORMAL

## 2020-08-27 PROCEDURE — 85007 BL SMEAR W/DIFF WBC COUNT: CPT | Performed by: NURSE PRACTITIONER

## 2020-08-27 PROCEDURE — 94799 UNLISTED PULMONARY SVC/PX: CPT

## 2020-08-27 PROCEDURE — 85025 COMPLETE CBC W/AUTO DIFF WBC: CPT | Performed by: NURSE PRACTITIONER

## 2020-08-27 PROCEDURE — 84132 ASSAY OF SERUM POTASSIUM: CPT | Performed by: INTERNAL MEDICINE

## 2020-08-27 PROCEDURE — 63710000001 DEXAMETHASONE PER 0.25 MG: Performed by: INTERNAL MEDICINE

## 2020-08-27 PROCEDURE — 85384 FIBRINOGEN ACTIVITY: CPT | Performed by: NURSE PRACTITIONER

## 2020-08-27 PROCEDURE — 82550 ASSAY OF CK (CPK): CPT | Performed by: INTERNAL MEDICINE

## 2020-08-27 PROCEDURE — 83615 LACTATE (LD) (LDH) ENZYME: CPT | Performed by: NURSE PRACTITIONER

## 2020-08-27 PROCEDURE — 36415 COLL VENOUS BLD VENIPUNCTURE: CPT | Performed by: NURSE PRACTITIONER

## 2020-08-27 PROCEDURE — 82728 ASSAY OF FERRITIN: CPT | Performed by: INTERNAL MEDICINE

## 2020-08-27 PROCEDURE — 82962 GLUCOSE BLOOD TEST: CPT

## 2020-08-27 PROCEDURE — 71045 X-RAY EXAM CHEST 1 VIEW: CPT

## 2020-08-27 PROCEDURE — 85379 FIBRIN DEGRADATION QUANT: CPT | Performed by: NURSE PRACTITIONER

## 2020-08-27 PROCEDURE — 86140 C-REACTIVE PROTEIN: CPT | Performed by: INTERNAL MEDICINE

## 2020-08-27 PROCEDURE — 25010000002 ENOXAPARIN PER 10 MG: Performed by: INTERNAL MEDICINE

## 2020-08-27 PROCEDURE — 80053 COMPREHEN METABOLIC PANEL: CPT | Performed by: NURSE PRACTITIONER

## 2020-08-27 RX ORDER — BUDESONIDE AND FORMOTEROL FUMARATE DIHYDRATE 160; 4.5 UG/1; UG/1
2 AEROSOL RESPIRATORY (INHALATION)
Status: DISCONTINUED | OUTPATIENT
Start: 2020-08-27 | End: 2020-09-04 | Stop reason: HOSPADM

## 2020-08-27 RX ORDER — DEXMEDETOMIDINE HYDROCHLORIDE 4 UG/ML
.2-1.5 INJECTION, SOLUTION INTRAVENOUS
Status: DISCONTINUED | OUTPATIENT
Start: 2020-08-27 | End: 2020-08-27

## 2020-08-27 RX ORDER — DEXMEDETOMIDINE HYDROCHLORIDE 4 UG/ML
.2-1.5 INJECTION, SOLUTION INTRAVENOUS
Status: DISCONTINUED | OUTPATIENT
Start: 2020-08-27 | End: 2020-09-02

## 2020-08-27 RX ADMIN — REMDESIVIR 100 MG: 100 INJECTION, POWDER, LYOPHILIZED, FOR SOLUTION INTRAVENOUS at 12:01

## 2020-08-27 RX ADMIN — DEXAMETHASONE 6 MG: 4 TABLET ORAL at 05:06

## 2020-08-27 RX ADMIN — ALPRAZOLAM 0.5 MG: 0.5 TABLET ORAL at 18:04

## 2020-08-27 RX ADMIN — BUDESONIDE AND FORMOTEROL FUMARATE DIHYDRATE 2 PUFF: 160; 4.5 AEROSOL RESPIRATORY (INHALATION) at 21:06

## 2020-08-27 RX ADMIN — ACETAMINOPHEN 650 MG: 325 TABLET, FILM COATED ORAL at 22:39

## 2020-08-27 RX ADMIN — POTASSIUM CHLORIDE 40 MEQ: 10 CAPSULE, COATED, EXTENDED RELEASE ORAL at 08:14

## 2020-08-27 RX ADMIN — BUPROPION HYDROCHLORIDE 150 MG: 150 TABLET, FILM COATED, EXTENDED RELEASE ORAL at 06:08

## 2020-08-27 RX ADMIN — POTASSIUM CHLORIDE 40 MEQ: 10 CAPSULE, COATED, EXTENDED RELEASE ORAL at 11:55

## 2020-08-27 RX ADMIN — TOPIRAMATE 50 MG: 50 TABLET, FILM COATED ORAL at 08:14

## 2020-08-27 RX ADMIN — DOXYCYCLINE 100 MG: 100 INJECTION, POWDER, LYOPHILIZED, FOR SOLUTION INTRAVENOUS at 05:01

## 2020-08-27 RX ADMIN — ENOXAPARIN SODIUM 40 MG: 40 INJECTION SUBCUTANEOUS at 09:24

## 2020-08-27 RX ADMIN — DEXMEDETOMIDINE HYDROCHLORIDE 0.2 MCG/KG/HR: 100 INJECTION, SOLUTION INTRAVENOUS at 22:03

## 2020-08-27 RX ADMIN — LOPERAMIDE HYDROCHLORIDE 2 MG: 2 CAPSULE ORAL at 06:08

## 2020-08-27 RX ADMIN — DULOXETINE HYDROCHLORIDE 60 MG: 60 CAPSULE, DELAYED RELEASE ORAL at 08:14

## 2020-08-27 RX ADMIN — Medication 1 EACH: at 11:55

## 2020-08-27 RX ADMIN — TOPIRAMATE 50 MG: 50 TABLET, FILM COATED ORAL at 22:38

## 2020-08-27 RX ADMIN — LURASIDONE HYDROCHLORIDE 40 MG: 40 TABLET, FILM COATED ORAL at 08:13

## 2020-08-27 RX ADMIN — ENOXAPARIN SODIUM 40 MG: 40 INJECTION SUBCUTANEOUS at 20:52

## 2020-08-27 RX ADMIN — DOXYCYCLINE 100 MG: 100 INJECTION, POWDER, LYOPHILIZED, FOR SOLUTION INTRAVENOUS at 20:51

## 2020-08-27 RX ADMIN — BUDESONIDE AND FORMOTEROL FUMARATE DIHYDRATE 2 PUFF: 160; 4.5 AEROSOL RESPIRATORY (INHALATION) at 04:52

## 2020-08-27 RX ADMIN — PANTOPRAZOLE SODIUM 40 MG: 40 TABLET, DELAYED RELEASE ORAL at 06:07

## 2020-08-28 LAB
ALBUMIN SERPL-MCNC: 3.1 G/DL (ref 3.5–5.2)
ALBUMIN/GLOB SERPL: 1 G/DL
ALP SERPL-CCNC: 66 U/L (ref 39–117)
ALT SERPL W P-5'-P-CCNC: 43 U/L (ref 1–33)
ANION GAP SERPL CALCULATED.3IONS-SCNC: 10.2 MMOL/L (ref 5–15)
AST SERPL-CCNC: 33 U/L (ref 1–32)
BASOPHILS # BLD AUTO: 0.02 10*3/MM3 (ref 0–0.2)
BASOPHILS NFR BLD AUTO: 0.2 % (ref 0–1.5)
BILIRUB SERPL-MCNC: 0.6 MG/DL (ref 0–1.2)
BUN SERPL-MCNC: 21 MG/DL (ref 8–23)
BUN/CREAT SERPL: 30 (ref 7–25)
CALCIUM SPEC-SCNC: 8.6 MG/DL (ref 8.6–10.5)
CHLORIDE SERPL-SCNC: 112 MMOL/L (ref 98–107)
CK SERPL-CCNC: 97 U/L (ref 20–180)
CO2 SERPL-SCNC: 17.8 MMOL/L (ref 22–29)
CREAT SERPL-MCNC: 0.7 MG/DL (ref 0.57–1)
CRP SERPL-MCNC: 5.75 MG/DL (ref 0–0.5)
DEPRECATED RDW RBC AUTO: 45.5 FL (ref 37–54)
EOSINOPHIL # BLD AUTO: 0.02 10*3/MM3 (ref 0–0.4)
EOSINOPHIL NFR BLD AUTO: 0.2 % (ref 0.3–6.2)
ERYTHROCYTE [DISTWIDTH] IN BLOOD BY AUTOMATED COUNT: 13.3 % (ref 12.3–15.4)
FERRITIN SERPL-MCNC: 553 NG/ML (ref 13–150)
GFR SERPL CREATININE-BSD FRML MDRD: 84 ML/MIN/1.73
GLOBULIN UR ELPH-MCNC: 3 GM/DL
GLUCOSE SERPL-MCNC: 108 MG/DL (ref 65–99)
HCT VFR BLD AUTO: 38.9 % (ref 34–46.6)
HGB BLD-MCNC: 12.9 G/DL (ref 12–15.9)
IMM GRANULOCYTES # BLD AUTO: 0.12 10*3/MM3 (ref 0–0.05)
IMM GRANULOCYTES NFR BLD AUTO: 1.1 % (ref 0–0.5)
LYMPHOCYTES # BLD AUTO: 1.15 10*3/MM3 (ref 0.7–3.1)
LYMPHOCYTES NFR BLD AUTO: 10.6 % (ref 19.6–45.3)
MCH RBC QN AUTO: 30.9 PG (ref 26.6–33)
MCHC RBC AUTO-ENTMCNC: 33.2 G/DL (ref 31.5–35.7)
MCV RBC AUTO: 93.1 FL (ref 79–97)
MONOCYTES # BLD AUTO: 0.56 10*3/MM3 (ref 0.1–0.9)
MONOCYTES NFR BLD AUTO: 5.2 % (ref 5–12)
NEUTROPHILS NFR BLD AUTO: 82.7 % (ref 42.7–76)
NEUTROPHILS NFR BLD AUTO: 9 10*3/MM3 (ref 1.7–7)
NRBC BLD AUTO-RTO: 0 /100 WBC (ref 0–0.2)
PLATELET # BLD AUTO: 349 10*3/MM3 (ref 140–450)
PMV BLD AUTO: 10.2 FL (ref 6–12)
POTASSIUM SERPL-SCNC: 4.1 MMOL/L (ref 3.5–5.2)
PROT SERPL-MCNC: 6.1 G/DL (ref 6–8.5)
RBC # BLD AUTO: 4.18 10*6/MM3 (ref 3.77–5.28)
SODIUM SERPL-SCNC: 140 MMOL/L (ref 136–145)
WBC # BLD AUTO: 10.87 10*3/MM3 (ref 3.4–10.8)

## 2020-08-28 PROCEDURE — 80053 COMPREHEN METABOLIC PANEL: CPT | Performed by: NURSE PRACTITIONER

## 2020-08-28 PROCEDURE — 82728 ASSAY OF FERRITIN: CPT | Performed by: INTERNAL MEDICINE

## 2020-08-28 PROCEDURE — 82550 ASSAY OF CK (CPK): CPT | Performed by: INTERNAL MEDICINE

## 2020-08-28 PROCEDURE — 94799 UNLISTED PULMONARY SVC/PX: CPT

## 2020-08-28 PROCEDURE — 94640 AIRWAY INHALATION TREATMENT: CPT

## 2020-08-28 PROCEDURE — 63710000001 DEXAMETHASONE PER 0.25 MG: Performed by: INTERNAL MEDICINE

## 2020-08-28 PROCEDURE — 85025 COMPLETE CBC W/AUTO DIFF WBC: CPT | Performed by: NURSE PRACTITIONER

## 2020-08-28 PROCEDURE — 25010000002 ENOXAPARIN PER 10 MG: Performed by: INTERNAL MEDICINE

## 2020-08-28 PROCEDURE — 86140 C-REACTIVE PROTEIN: CPT | Performed by: INTERNAL MEDICINE

## 2020-08-28 RX ORDER — SUMATRIPTAN 100 MG/1
100 TABLET, FILM COATED ORAL
Status: DISCONTINUED | OUTPATIENT
Start: 2020-08-28 | End: 2020-09-04 | Stop reason: HOSPADM

## 2020-08-28 RX ADMIN — LURASIDONE HYDROCHLORIDE 40 MG: 40 TABLET, FILM COATED ORAL at 08:29

## 2020-08-28 RX ADMIN — SUMATRIPTAN SUCCINATE 100 MG: 100 TABLET ORAL at 10:57

## 2020-08-28 RX ADMIN — BUDESONIDE AND FORMOTEROL FUMARATE DIHYDRATE 2 PUFF: 160; 4.5 AEROSOL RESPIRATORY (INHALATION) at 20:20

## 2020-08-28 RX ADMIN — DULOXETINE HYDROCHLORIDE 60 MG: 60 CAPSULE, DELAYED RELEASE ORAL at 08:29

## 2020-08-28 RX ADMIN — DEXMEDETOMIDINE HYDROCHLORIDE 0.4 MCG/KG/HR: 100 INJECTION, SOLUTION INTRAVENOUS at 04:33

## 2020-08-28 RX ADMIN — DEXAMETHASONE 6 MG: 4 TABLET ORAL at 08:29

## 2020-08-28 RX ADMIN — ENOXAPARIN SODIUM 40 MG: 40 INJECTION SUBCUTANEOUS at 08:29

## 2020-08-28 RX ADMIN — LOPERAMIDE HYDROCHLORIDE 2 MG: 2 CAPSULE ORAL at 08:29

## 2020-08-28 RX ADMIN — ENOXAPARIN SODIUM 40 MG: 40 INJECTION SUBCUTANEOUS at 20:59

## 2020-08-28 RX ADMIN — REMDESIVIR 100 MG: 100 INJECTION, POWDER, LYOPHILIZED, FOR SOLUTION INTRAVENOUS at 14:47

## 2020-08-28 RX ADMIN — SUMATRIPTAN SUCCINATE 100 MG: 100 TABLET ORAL at 14:46

## 2020-08-28 RX ADMIN — TOPIRAMATE 50 MG: 50 TABLET, FILM COATED ORAL at 08:29

## 2020-08-28 RX ADMIN — BUPROPION HYDROCHLORIDE 150 MG: 150 TABLET, FILM COATED, EXTENDED RELEASE ORAL at 08:29

## 2020-08-28 RX ADMIN — PANTOPRAZOLE SODIUM 40 MG: 40 TABLET, DELAYED RELEASE ORAL at 08:29

## 2020-08-28 RX ADMIN — Medication 1 EACH: at 08:29

## 2020-08-28 RX ADMIN — DOXYCYCLINE 100 MG: 100 INJECTION, POWDER, LYOPHILIZED, FOR SOLUTION INTRAVENOUS at 17:14

## 2020-08-28 RX ADMIN — DOXYCYCLINE 100 MG: 100 INJECTION, POWDER, LYOPHILIZED, FOR SOLUTION INTRAVENOUS at 05:32

## 2020-08-28 RX ADMIN — TOPIRAMATE 50 MG: 50 TABLET, FILM COATED ORAL at 21:00

## 2020-08-28 RX ADMIN — BUDESONIDE AND FORMOTEROL FUMARATE DIHYDRATE 2 PUFF: 160; 4.5 AEROSOL RESPIRATORY (INHALATION) at 06:54

## 2020-08-29 LAB
ALBUMIN SERPL-MCNC: 3.1 G/DL (ref 3.5–5.2)
ALBUMIN/GLOB SERPL: 0.9 G/DL
ALP SERPL-CCNC: 68 U/L (ref 39–117)
ALT SERPL W P-5'-P-CCNC: 34 U/L (ref 1–33)
ANION GAP SERPL CALCULATED.3IONS-SCNC: 8.6 MMOL/L (ref 5–15)
AST SERPL-CCNC: 22 U/L (ref 1–32)
BASOPHILS # BLD AUTO: 0.02 10*3/MM3 (ref 0–0.2)
BASOPHILS NFR BLD AUTO: 0.2 % (ref 0–1.5)
BILIRUB SERPL-MCNC: 0.6 MG/DL (ref 0–1.2)
BUN SERPL-MCNC: 20 MG/DL (ref 8–23)
BUN/CREAT SERPL: 29.9 (ref 7–25)
CALCIUM SPEC-SCNC: 8.9 MG/DL (ref 8.6–10.5)
CHLORIDE SERPL-SCNC: 111 MMOL/L (ref 98–107)
CK SERPL-CCNC: 67 U/L (ref 20–180)
CO2 SERPL-SCNC: 21.4 MMOL/L (ref 22–29)
CREAT SERPL-MCNC: 0.67 MG/DL (ref 0.57–1)
CRP SERPL-MCNC: 13.61 MG/DL (ref 0–0.5)
D DIMER PPP FEU-MCNC: 1.97 MCGFEU/ML (ref 0–0.49)
DEPRECATED RDW RBC AUTO: 40.8 FL (ref 37–54)
EOSINOPHIL # BLD AUTO: 0.11 10*3/MM3 (ref 0–0.4)
EOSINOPHIL NFR BLD AUTO: 1 % (ref 0.3–6.2)
ERYTHROCYTE [DISTWIDTH] IN BLOOD BY AUTOMATED COUNT: 12.5 % (ref 12.3–15.4)
FERRITIN SERPL-MCNC: 535 NG/ML (ref 13–150)
FIBRINOGEN PPP-MCNC: 565 MG/DL (ref 219–464)
GFR SERPL CREATININE-BSD FRML MDRD: 88 ML/MIN/1.73
GLOBULIN UR ELPH-MCNC: 3.3 GM/DL
GLUCOSE SERPL-MCNC: 106 MG/DL (ref 65–99)
HCT VFR BLD AUTO: 38.8 % (ref 34–46.6)
HGB BLD-MCNC: 13.2 G/DL (ref 12–15.9)
IMM GRANULOCYTES # BLD AUTO: 0.14 10*3/MM3 (ref 0–0.05)
IMM GRANULOCYTES NFR BLD AUTO: 1.3 % (ref 0–0.5)
LDH SERPL-CCNC: 472 U/L (ref 135–214)
LYMPHOCYTES # BLD AUTO: 1.26 10*3/MM3 (ref 0.7–3.1)
LYMPHOCYTES NFR BLD AUTO: 12 % (ref 19.6–45.3)
MCH RBC QN AUTO: 30.2 PG (ref 26.6–33)
MCHC RBC AUTO-ENTMCNC: 34 G/DL (ref 31.5–35.7)
MCV RBC AUTO: 88.8 FL (ref 79–97)
MONOCYTES # BLD AUTO: 0.78 10*3/MM3 (ref 0.1–0.9)
MONOCYTES NFR BLD AUTO: 7.4 % (ref 5–12)
NEUTROPHILS NFR BLD AUTO: 78.1 % (ref 42.7–76)
NEUTROPHILS NFR BLD AUTO: 8.2 10*3/MM3 (ref 1.7–7)
NRBC BLD AUTO-RTO: 0 /100 WBC (ref 0–0.2)
PLATELET # BLD AUTO: 442 10*3/MM3 (ref 140–450)
PMV BLD AUTO: 10.1 FL (ref 6–12)
POTASSIUM SERPL-SCNC: 3.6 MMOL/L (ref 3.5–5.2)
PROT SERPL-MCNC: 6.4 G/DL (ref 6–8.5)
RBC # BLD AUTO: 4.37 10*6/MM3 (ref 3.77–5.28)
SODIUM SERPL-SCNC: 141 MMOL/L (ref 136–145)
WBC # BLD AUTO: 10.51 10*3/MM3 (ref 3.4–10.8)

## 2020-08-29 PROCEDURE — 36415 COLL VENOUS BLD VENIPUNCTURE: CPT | Performed by: NURSE PRACTITIONER

## 2020-08-29 PROCEDURE — 82550 ASSAY OF CK (CPK): CPT | Performed by: INTERNAL MEDICINE

## 2020-08-29 PROCEDURE — 83615 LACTATE (LD) (LDH) ENZYME: CPT | Performed by: NURSE PRACTITIONER

## 2020-08-29 PROCEDURE — 85379 FIBRIN DEGRADATION QUANT: CPT | Performed by: NURSE PRACTITIONER

## 2020-08-29 PROCEDURE — 94799 UNLISTED PULMONARY SVC/PX: CPT

## 2020-08-29 PROCEDURE — 82728 ASSAY OF FERRITIN: CPT | Performed by: INTERNAL MEDICINE

## 2020-08-29 PROCEDURE — 25010000002 ENOXAPARIN PER 10 MG: Performed by: INTERNAL MEDICINE

## 2020-08-29 PROCEDURE — 85025 COMPLETE CBC W/AUTO DIFF WBC: CPT | Performed by: NURSE PRACTITIONER

## 2020-08-29 PROCEDURE — 86140 C-REACTIVE PROTEIN: CPT | Performed by: INTERNAL MEDICINE

## 2020-08-29 PROCEDURE — 63710000001 DEXAMETHASONE PER 0.25 MG: Performed by: INTERNAL MEDICINE

## 2020-08-29 PROCEDURE — 85384 FIBRINOGEN ACTIVITY: CPT | Performed by: NURSE PRACTITIONER

## 2020-08-29 PROCEDURE — 80053 COMPREHEN METABOLIC PANEL: CPT | Performed by: NURSE PRACTITIONER

## 2020-08-29 RX ADMIN — ENOXAPARIN SODIUM 40 MG: 40 INJECTION SUBCUTANEOUS at 20:22

## 2020-08-29 RX ADMIN — DULOXETINE HYDROCHLORIDE 60 MG: 60 CAPSULE, DELAYED RELEASE ORAL at 08:19

## 2020-08-29 RX ADMIN — ALPRAZOLAM 0.5 MG: 0.5 TABLET ORAL at 10:55

## 2020-08-29 RX ADMIN — DEXAMETHASONE 6 MG: 4 TABLET ORAL at 08:19

## 2020-08-29 RX ADMIN — ENOXAPARIN SODIUM 40 MG: 40 INJECTION SUBCUTANEOUS at 10:22

## 2020-08-29 RX ADMIN — TOPIRAMATE 50 MG: 50 TABLET, FILM COATED ORAL at 08:19

## 2020-08-29 RX ADMIN — BUPROPION HYDROCHLORIDE 150 MG: 150 TABLET, FILM COATED, EXTENDED RELEASE ORAL at 07:01

## 2020-08-29 RX ADMIN — REMDESIVIR 100 MG: 100 INJECTION, POWDER, LYOPHILIZED, FOR SOLUTION INTRAVENOUS at 12:32

## 2020-08-29 RX ADMIN — POTASSIUM CHLORIDE 40 MEQ: 10 CAPSULE, COATED, EXTENDED RELEASE ORAL at 17:49

## 2020-08-29 RX ADMIN — LOPERAMIDE HYDROCHLORIDE 2 MG: 2 CAPSULE ORAL at 07:01

## 2020-08-29 RX ADMIN — PANTOPRAZOLE SODIUM 40 MG: 40 TABLET, DELAYED RELEASE ORAL at 07:00

## 2020-08-29 RX ADMIN — LURASIDONE HYDROCHLORIDE 40 MG: 40 TABLET, FILM COATED ORAL at 08:19

## 2020-08-29 RX ADMIN — DOXYCYCLINE 100 MG: 100 INJECTION, POWDER, LYOPHILIZED, FOR SOLUTION INTRAVENOUS at 17:49

## 2020-08-29 RX ADMIN — TOPIRAMATE 50 MG: 50 TABLET, FILM COATED ORAL at 20:22

## 2020-08-29 RX ADMIN — DOXYCYCLINE 100 MG: 100 INJECTION, POWDER, LYOPHILIZED, FOR SOLUTION INTRAVENOUS at 07:00

## 2020-08-29 RX ADMIN — POTASSIUM CHLORIDE 40 MEQ: 10 CAPSULE, COATED, EXTENDED RELEASE ORAL at 12:32

## 2020-08-29 RX ADMIN — BUDESONIDE AND FORMOTEROL FUMARATE DIHYDRATE 2 PUFF: 160; 4.5 AEROSOL RESPIRATORY (INHALATION) at 06:59

## 2020-08-29 RX ADMIN — BUDESONIDE AND FORMOTEROL FUMARATE DIHYDRATE 2 PUFF: 160; 4.5 AEROSOL RESPIRATORY (INHALATION) at 20:36

## 2020-08-30 ENCOUNTER — APPOINTMENT (OUTPATIENT)
Dept: GENERAL RADIOLOGY | Facility: HOSPITAL | Age: 65
End: 2020-08-30

## 2020-08-30 LAB
ALBUMIN SERPL-MCNC: 3.1 G/DL (ref 3.5–5.2)
ALBUMIN/GLOB SERPL: 1 G/DL
ALP SERPL-CCNC: 62 U/L (ref 39–117)
ALT SERPL W P-5'-P-CCNC: 26 U/L (ref 1–33)
ANION GAP SERPL CALCULATED.3IONS-SCNC: 10.1 MMOL/L (ref 5–15)
AST SERPL-CCNC: 18 U/L (ref 1–32)
BACTERIA SPEC AEROBE CULT: NORMAL
BACTERIA SPEC AEROBE CULT: NORMAL
BASOPHILS # BLD AUTO: 0.03 10*3/MM3 (ref 0–0.2)
BASOPHILS NFR BLD AUTO: 0.2 % (ref 0–1.5)
BILIRUB SERPL-MCNC: 0.6 MG/DL (ref 0–1.2)
BUN SERPL-MCNC: 25 MG/DL (ref 8–23)
BUN/CREAT SERPL: 37.3 (ref 7–25)
CALCIUM SPEC-SCNC: 8.8 MG/DL (ref 8.6–10.5)
CHLORIDE SERPL-SCNC: 108 MMOL/L (ref 98–107)
CK SERPL-CCNC: 46 U/L (ref 20–180)
CO2 SERPL-SCNC: 20.9 MMOL/L (ref 22–29)
CREAT SERPL-MCNC: 0.67 MG/DL (ref 0.57–1)
CRP SERPL-MCNC: 5.91 MG/DL (ref 0–0.5)
D DIMER PPP FEU-MCNC: 2.24 MCGFEU/ML (ref 0–0.49)
DEPRECATED RDW RBC AUTO: 39.7 FL (ref 37–54)
EOSINOPHIL # BLD AUTO: 0.41 10*3/MM3 (ref 0–0.4)
EOSINOPHIL NFR BLD AUTO: 3.4 % (ref 0.3–6.2)
ERYTHROCYTE [DISTWIDTH] IN BLOOD BY AUTOMATED COUNT: 12.6 % (ref 12.3–15.4)
FERRITIN SERPL-MCNC: 489 NG/ML (ref 13–150)
GFR SERPL CREATININE-BSD FRML MDRD: 88 ML/MIN/1.73
GLOBULIN UR ELPH-MCNC: 3.1 GM/DL
GLUCOSE SERPL-MCNC: 95 MG/DL (ref 65–99)
HCT VFR BLD AUTO: 37.9 % (ref 34–46.6)
HGB BLD-MCNC: 13.1 G/DL (ref 12–15.9)
IMM GRANULOCYTES # BLD AUTO: 0.22 10*3/MM3 (ref 0–0.05)
IMM GRANULOCYTES NFR BLD AUTO: 1.8 % (ref 0–0.5)
LYMPHOCYTES # BLD AUTO: 1.25 10*3/MM3 (ref 0.7–3.1)
LYMPHOCYTES NFR BLD AUTO: 10.3 % (ref 19.6–45.3)
MCH RBC QN AUTO: 30.3 PG (ref 26.6–33)
MCHC RBC AUTO-ENTMCNC: 34.6 G/DL (ref 31.5–35.7)
MCV RBC AUTO: 87.7 FL (ref 79–97)
MONOCYTES # BLD AUTO: 0.99 10*3/MM3 (ref 0.1–0.9)
MONOCYTES NFR BLD AUTO: 8.2 % (ref 5–12)
NEUTROPHILS NFR BLD AUTO: 76.1 % (ref 42.7–76)
NEUTROPHILS NFR BLD AUTO: 9.19 10*3/MM3 (ref 1.7–7)
NRBC BLD AUTO-RTO: 0 /100 WBC (ref 0–0.2)
PLATELET # BLD AUTO: 466 10*3/MM3 (ref 140–450)
PMV BLD AUTO: 10.1 FL (ref 6–12)
POTASSIUM SERPL-SCNC: 3.8 MMOL/L (ref 3.5–5.2)
PROT SERPL-MCNC: 6.2 G/DL (ref 6–8.5)
RBC # BLD AUTO: 4.32 10*6/MM3 (ref 3.77–5.28)
SODIUM SERPL-SCNC: 139 MMOL/L (ref 136–145)
WBC # BLD AUTO: 12.09 10*3/MM3 (ref 3.4–10.8)

## 2020-08-30 PROCEDURE — 71045 X-RAY EXAM CHEST 1 VIEW: CPT

## 2020-08-30 PROCEDURE — 85025 COMPLETE CBC W/AUTO DIFF WBC: CPT | Performed by: NURSE PRACTITIONER

## 2020-08-30 PROCEDURE — 63710000001 DEXAMETHASONE PER 0.25 MG: Performed by: INTERNAL MEDICINE

## 2020-08-30 PROCEDURE — 82550 ASSAY OF CK (CPK): CPT | Performed by: INTERNAL MEDICINE

## 2020-08-30 PROCEDURE — 86140 C-REACTIVE PROTEIN: CPT | Performed by: INTERNAL MEDICINE

## 2020-08-30 PROCEDURE — 82728 ASSAY OF FERRITIN: CPT | Performed by: INTERNAL MEDICINE

## 2020-08-30 PROCEDURE — 25010000002 ENOXAPARIN PER 10 MG: Performed by: INTERNAL MEDICINE

## 2020-08-30 PROCEDURE — 94799 UNLISTED PULMONARY SVC/PX: CPT

## 2020-08-30 PROCEDURE — 80053 COMPREHEN METABOLIC PANEL: CPT | Performed by: NURSE PRACTITIONER

## 2020-08-30 PROCEDURE — 85379 FIBRIN DEGRADATION QUANT: CPT | Performed by: INTERNAL MEDICINE

## 2020-08-30 PROCEDURE — 36415 COLL VENOUS BLD VENIPUNCTURE: CPT | Performed by: INTERNAL MEDICINE

## 2020-08-30 RX ADMIN — ENOXAPARIN SODIUM 40 MG: 40 INJECTION SUBCUTANEOUS at 20:01

## 2020-08-30 RX ADMIN — DEXAMETHASONE 6 MG: 4 TABLET ORAL at 08:48

## 2020-08-30 RX ADMIN — BUDESONIDE AND FORMOTEROL FUMARATE DIHYDRATE 2 PUFF: 160; 4.5 AEROSOL RESPIRATORY (INHALATION) at 07:06

## 2020-08-30 RX ADMIN — Medication 1 EACH: at 08:48

## 2020-08-30 RX ADMIN — ACETAMINOPHEN 650 MG: 325 TABLET, FILM COATED ORAL at 06:29

## 2020-08-30 RX ADMIN — BUDESONIDE AND FORMOTEROL FUMARATE DIHYDRATE 2 PUFF: 160; 4.5 AEROSOL RESPIRATORY (INHALATION) at 20:33

## 2020-08-30 RX ADMIN — LURASIDONE HYDROCHLORIDE 40 MG: 40 TABLET, FILM COATED ORAL at 08:48

## 2020-08-30 RX ADMIN — LOPERAMIDE HYDROCHLORIDE 2 MG: 2 CAPSULE ORAL at 06:29

## 2020-08-30 RX ADMIN — PANTOPRAZOLE SODIUM 40 MG: 40 TABLET, DELAYED RELEASE ORAL at 06:29

## 2020-08-30 RX ADMIN — TOPIRAMATE 50 MG: 50 TABLET, FILM COATED ORAL at 08:48

## 2020-08-30 RX ADMIN — REMDESIVIR 100 MG: 100 INJECTION, POWDER, LYOPHILIZED, FOR SOLUTION INTRAVENOUS at 12:11

## 2020-08-30 RX ADMIN — DOXYCYCLINE 100 MG: 100 INJECTION, POWDER, LYOPHILIZED, FOR SOLUTION INTRAVENOUS at 06:29

## 2020-08-30 RX ADMIN — DULOXETINE HYDROCHLORIDE 60 MG: 60 CAPSULE, DELAYED RELEASE ORAL at 08:48

## 2020-08-30 RX ADMIN — ENOXAPARIN SODIUM 40 MG: 40 INJECTION SUBCUTANEOUS at 08:48

## 2020-08-30 RX ADMIN — TOPIRAMATE 50 MG: 50 TABLET, FILM COATED ORAL at 20:01

## 2020-08-30 RX ADMIN — BUPROPION HYDROCHLORIDE 150 MG: 150 TABLET, FILM COATED, EXTENDED RELEASE ORAL at 06:29

## 2020-08-31 LAB
ALBUMIN SERPL-MCNC: 2.9 G/DL (ref 3.5–5.2)
ALBUMIN/GLOB SERPL: 0.9 G/DL
ALP SERPL-CCNC: 58 U/L (ref 39–117)
ALT SERPL W P-5'-P-CCNC: 24 U/L (ref 1–33)
ANION GAP SERPL CALCULATED.3IONS-SCNC: 9.7 MMOL/L (ref 5–15)
AST SERPL-CCNC: 16 U/L (ref 1–32)
BASOPHILS # BLD AUTO: 0.04 10*3/MM3 (ref 0–0.2)
BASOPHILS NFR BLD AUTO: 0.4 % (ref 0–1.5)
BILIRUB SERPL-MCNC: 0.5 MG/DL (ref 0–1.2)
BUN SERPL-MCNC: 25 MG/DL (ref 8–23)
BUN/CREAT SERPL: 33.8 (ref 7–25)
CALCIUM SPEC-SCNC: 9.1 MG/DL (ref 8.6–10.5)
CHLORIDE SERPL-SCNC: 107 MMOL/L (ref 98–107)
CK SERPL-CCNC: 42 U/L (ref 20–180)
CO2 SERPL-SCNC: 19.3 MMOL/L (ref 22–29)
CREAT SERPL-MCNC: 0.74 MG/DL (ref 0.57–1)
CRP SERPL-MCNC: 3.99 MG/DL (ref 0–0.5)
D DIMER PPP FEU-MCNC: 1.93 MCGFEU/ML (ref 0–0.49)
DEPRECATED RDW RBC AUTO: 41.8 FL (ref 37–54)
EOSINOPHIL # BLD AUTO: 0.23 10*3/MM3 (ref 0–0.4)
EOSINOPHIL NFR BLD AUTO: 2 % (ref 0.3–6.2)
ERYTHROCYTE [DISTWIDTH] IN BLOOD BY AUTOMATED COUNT: 13.1 % (ref 12.3–15.4)
FERRITIN SERPL-MCNC: 457 NG/ML (ref 13–150)
FIBRINOGEN PPP-MCNC: 515 MG/DL (ref 219–464)
GFR SERPL CREATININE-BSD FRML MDRD: 79 ML/MIN/1.73
GLOBULIN UR ELPH-MCNC: 3.1 GM/DL
GLUCOSE SERPL-MCNC: 89 MG/DL (ref 65–99)
HCT VFR BLD AUTO: 37.8 % (ref 34–46.6)
HGB BLD-MCNC: 13 G/DL (ref 12–15.9)
IMM GRANULOCYTES # BLD AUTO: 0.23 10*3/MM3 (ref 0–0.05)
IMM GRANULOCYTES NFR BLD AUTO: 2 % (ref 0–0.5)
LDH SERPL-CCNC: 326 U/L (ref 135–214)
LYMPHOCYTES # BLD AUTO: 1.31 10*3/MM3 (ref 0.7–3.1)
LYMPHOCYTES NFR BLD AUTO: 11.7 % (ref 19.6–45.3)
MCH RBC QN AUTO: 30.6 PG (ref 26.6–33)
MCHC RBC AUTO-ENTMCNC: 34.4 G/DL (ref 31.5–35.7)
MCV RBC AUTO: 88.9 FL (ref 79–97)
MONOCYTES # BLD AUTO: 1.03 10*3/MM3 (ref 0.1–0.9)
MONOCYTES NFR BLD AUTO: 9.2 % (ref 5–12)
NEUTROPHILS NFR BLD AUTO: 74.7 % (ref 42.7–76)
NEUTROPHILS NFR BLD AUTO: 8.39 10*3/MM3 (ref 1.7–7)
NRBC BLD AUTO-RTO: 0 /100 WBC (ref 0–0.2)
PLATELET # BLD AUTO: 502 10*3/MM3 (ref 140–450)
PMV BLD AUTO: 9.9 FL (ref 6–12)
POTASSIUM SERPL-SCNC: 3.8 MMOL/L (ref 3.5–5.2)
PROT SERPL-MCNC: 6 G/DL (ref 6–8.5)
RBC # BLD AUTO: 4.25 10*6/MM3 (ref 3.77–5.28)
SODIUM SERPL-SCNC: 136 MMOL/L (ref 136–145)
WBC # BLD AUTO: 11.23 10*3/MM3 (ref 3.4–10.8)

## 2020-08-31 PROCEDURE — 80053 COMPREHEN METABOLIC PANEL: CPT | Performed by: NURSE PRACTITIONER

## 2020-08-31 PROCEDURE — 86140 C-REACTIVE PROTEIN: CPT | Performed by: INTERNAL MEDICINE

## 2020-08-31 PROCEDURE — 94799 UNLISTED PULMONARY SVC/PX: CPT

## 2020-08-31 PROCEDURE — 85379 FIBRIN DEGRADATION QUANT: CPT | Performed by: NURSE PRACTITIONER

## 2020-08-31 PROCEDURE — 25010000002 ENOXAPARIN PER 10 MG: Performed by: INTERNAL MEDICINE

## 2020-08-31 PROCEDURE — 63710000001 DEXAMETHASONE PER 0.25 MG: Performed by: INTERNAL MEDICINE

## 2020-08-31 PROCEDURE — 85025 COMPLETE CBC W/AUTO DIFF WBC: CPT | Performed by: NURSE PRACTITIONER

## 2020-08-31 PROCEDURE — 82728 ASSAY OF FERRITIN: CPT | Performed by: INTERNAL MEDICINE

## 2020-08-31 PROCEDURE — 85384 FIBRINOGEN ACTIVITY: CPT | Performed by: NURSE PRACTITIONER

## 2020-08-31 PROCEDURE — 83615 LACTATE (LD) (LDH) ENZYME: CPT | Performed by: NURSE PRACTITIONER

## 2020-08-31 PROCEDURE — 82550 ASSAY OF CK (CPK): CPT | Performed by: INTERNAL MEDICINE

## 2020-08-31 RX ADMIN — LOPERAMIDE HYDROCHLORIDE 2 MG: 2 CAPSULE ORAL at 06:22

## 2020-08-31 RX ADMIN — DEXAMETHASONE 6 MG: 4 TABLET ORAL at 08:32

## 2020-08-31 RX ADMIN — LURASIDONE HYDROCHLORIDE 40 MG: 40 TABLET, FILM COATED ORAL at 08:32

## 2020-08-31 RX ADMIN — BUDESONIDE AND FORMOTEROL FUMARATE DIHYDRATE 2 PUFF: 160; 4.5 AEROSOL RESPIRATORY (INHALATION) at 20:19

## 2020-08-31 RX ADMIN — BUDESONIDE AND FORMOTEROL FUMARATE DIHYDRATE 2 PUFF: 160; 4.5 AEROSOL RESPIRATORY (INHALATION) at 07:00

## 2020-08-31 RX ADMIN — DULOXETINE HYDROCHLORIDE 60 MG: 60 CAPSULE, DELAYED RELEASE ORAL at 08:31

## 2020-08-31 RX ADMIN — Medication 1 EACH: at 08:34

## 2020-08-31 RX ADMIN — TOPIRAMATE 50 MG: 50 TABLET, FILM COATED ORAL at 21:08

## 2020-08-31 RX ADMIN — BUPROPION HYDROCHLORIDE 150 MG: 150 TABLET, FILM COATED, EXTENDED RELEASE ORAL at 06:22

## 2020-08-31 RX ADMIN — ENOXAPARIN SODIUM 40 MG: 40 INJECTION SUBCUTANEOUS at 21:08

## 2020-08-31 RX ADMIN — PANTOPRAZOLE SODIUM 40 MG: 40 TABLET, DELAYED RELEASE ORAL at 06:22

## 2020-08-31 RX ADMIN — ALPRAZOLAM 0.5 MG: 0.5 TABLET ORAL at 10:19

## 2020-08-31 RX ADMIN — REMDESIVIR 100 MG: 100 INJECTION, POWDER, LYOPHILIZED, FOR SOLUTION INTRAVENOUS at 12:30

## 2020-08-31 RX ADMIN — TOPIRAMATE 50 MG: 50 TABLET, FILM COATED ORAL at 08:31

## 2020-08-31 RX ADMIN — ENOXAPARIN SODIUM 40 MG: 40 INJECTION SUBCUTANEOUS at 08:31

## 2020-09-01 LAB
ALBUMIN SERPL-MCNC: 3 G/DL (ref 3.5–5.2)
ALBUMIN/GLOB SERPL: 1 G/DL
ALP SERPL-CCNC: 56 U/L (ref 39–117)
ALT SERPL W P-5'-P-CCNC: 23 U/L (ref 1–33)
ANION GAP SERPL CALCULATED.3IONS-SCNC: 7.7 MMOL/L (ref 5–15)
AST SERPL-CCNC: 15 U/L (ref 1–32)
BASOPHILS # BLD AUTO: 0.05 10*3/MM3 (ref 0–0.2)
BASOPHILS NFR BLD AUTO: 0.5 % (ref 0–1.5)
BILIRUB SERPL-MCNC: 0.5 MG/DL (ref 0–1.2)
BUN SERPL-MCNC: 24 MG/DL (ref 8–23)
BUN/CREAT SERPL: 33.3 (ref 7–25)
CALCIUM SPEC-SCNC: 8.7 MG/DL (ref 8.6–10.5)
CHLORIDE SERPL-SCNC: 107 MMOL/L (ref 98–107)
CK SERPL-CCNC: 42 U/L (ref 20–180)
CO2 SERPL-SCNC: 23.3 MMOL/L (ref 22–29)
CREAT SERPL-MCNC: 0.72 MG/DL (ref 0.57–1)
CRP SERPL-MCNC: 2.75 MG/DL (ref 0–0.5)
DEPRECATED RDW RBC AUTO: 42.6 FL (ref 37–54)
EOSINOPHIL # BLD AUTO: 0.16 10*3/MM3 (ref 0–0.4)
EOSINOPHIL NFR BLD AUTO: 1.6 % (ref 0.3–6.2)
ERYTHROCYTE [DISTWIDTH] IN BLOOD BY AUTOMATED COUNT: 13 % (ref 12.3–15.4)
FERRITIN SERPL-MCNC: 514 NG/ML (ref 13–150)
GFR SERPL CREATININE-BSD FRML MDRD: 81 ML/MIN/1.73
GLOBULIN UR ELPH-MCNC: 2.9 GM/DL
GLUCOSE SERPL-MCNC: 92 MG/DL (ref 65–99)
HCT VFR BLD AUTO: 38.1 % (ref 34–46.6)
HGB BLD-MCNC: 13 G/DL (ref 12–15.9)
IMM GRANULOCYTES # BLD AUTO: 0.26 10*3/MM3 (ref 0–0.05)
IMM GRANULOCYTES NFR BLD AUTO: 2.5 % (ref 0–0.5)
LYMPHOCYTES # BLD AUTO: 1.45 10*3/MM3 (ref 0.7–3.1)
LYMPHOCYTES NFR BLD AUTO: 14.1 % (ref 19.6–45.3)
MCH RBC QN AUTO: 30.7 PG (ref 26.6–33)
MCHC RBC AUTO-ENTMCNC: 34.1 G/DL (ref 31.5–35.7)
MCV RBC AUTO: 90.1 FL (ref 79–97)
MONOCYTES # BLD AUTO: 1.05 10*3/MM3 (ref 0.1–0.9)
MONOCYTES NFR BLD AUTO: 10.2 % (ref 5–12)
NEUTROPHILS NFR BLD AUTO: 7.28 10*3/MM3 (ref 1.7–7)
NEUTROPHILS NFR BLD AUTO: 71.1 % (ref 42.7–76)
NRBC BLD AUTO-RTO: 0 /100 WBC (ref 0–0.2)
PLATELET # BLD AUTO: 462 10*3/MM3 (ref 140–450)
PMV BLD AUTO: 9.9 FL (ref 6–12)
POTASSIUM SERPL-SCNC: 3.8 MMOL/L (ref 3.5–5.2)
PROT SERPL-MCNC: 5.9 G/DL (ref 6–8.5)
RBC # BLD AUTO: 4.23 10*6/MM3 (ref 3.77–5.28)
SODIUM SERPL-SCNC: 138 MMOL/L (ref 136–145)
WBC # BLD AUTO: 10.25 10*3/MM3 (ref 3.4–10.8)

## 2020-09-01 PROCEDURE — 86140 C-REACTIVE PROTEIN: CPT | Performed by: INTERNAL MEDICINE

## 2020-09-01 PROCEDURE — 25010000002 ENOXAPARIN PER 10 MG: Performed by: INTERNAL MEDICINE

## 2020-09-01 PROCEDURE — 80053 COMPREHEN METABOLIC PANEL: CPT | Performed by: NURSE PRACTITIONER

## 2020-09-01 PROCEDURE — 82728 ASSAY OF FERRITIN: CPT | Performed by: INTERNAL MEDICINE

## 2020-09-01 PROCEDURE — 82550 ASSAY OF CK (CPK): CPT | Performed by: INTERNAL MEDICINE

## 2020-09-01 PROCEDURE — 94799 UNLISTED PULMONARY SVC/PX: CPT

## 2020-09-01 PROCEDURE — 85025 COMPLETE CBC W/AUTO DIFF WBC: CPT | Performed by: NURSE PRACTITIONER

## 2020-09-01 PROCEDURE — 63710000001 DEXAMETHASONE PER 0.25 MG: Performed by: INTERNAL MEDICINE

## 2020-09-01 PROCEDURE — 36415 COLL VENOUS BLD VENIPUNCTURE: CPT | Performed by: INTERNAL MEDICINE

## 2020-09-01 RX ORDER — METAXALONE 800 MG/1
800 TABLET ORAL 3 TIMES DAILY
Status: DISCONTINUED | OUTPATIENT
Start: 2020-09-01 | End: 2020-09-04 | Stop reason: HOSPADM

## 2020-09-01 RX ADMIN — LOPERAMIDE HYDROCHLORIDE 2 MG: 2 CAPSULE ORAL at 05:04

## 2020-09-01 RX ADMIN — TOPIRAMATE 50 MG: 50 TABLET, FILM COATED ORAL at 07:45

## 2020-09-01 RX ADMIN — TOPIRAMATE 50 MG: 50 TABLET, FILM COATED ORAL at 21:14

## 2020-09-01 RX ADMIN — PANTOPRAZOLE SODIUM 40 MG: 40 TABLET, DELAYED RELEASE ORAL at 05:04

## 2020-09-01 RX ADMIN — METAXALONE 800 MG: 800 TABLET ORAL at 21:14

## 2020-09-01 RX ADMIN — ENOXAPARIN SODIUM 40 MG: 40 INJECTION SUBCUTANEOUS at 21:14

## 2020-09-01 RX ADMIN — BUDESONIDE AND FORMOTEROL FUMARATE DIHYDRATE 2 PUFF: 160; 4.5 AEROSOL RESPIRATORY (INHALATION) at 08:14

## 2020-09-01 RX ADMIN — METAXALONE 800 MG: 800 TABLET ORAL at 17:30

## 2020-09-01 RX ADMIN — Medication 1 EACH: at 07:44

## 2020-09-01 RX ADMIN — LURASIDONE HYDROCHLORIDE 40 MG: 40 TABLET, FILM COATED ORAL at 07:44

## 2020-09-01 RX ADMIN — ENOXAPARIN SODIUM 40 MG: 40 INJECTION SUBCUTANEOUS at 07:45

## 2020-09-01 RX ADMIN — DEXAMETHASONE 6 MG: 4 TABLET ORAL at 07:44

## 2020-09-01 RX ADMIN — DULOXETINE HYDROCHLORIDE 60 MG: 60 CAPSULE, DELAYED RELEASE ORAL at 07:44

## 2020-09-01 RX ADMIN — BUPROPION HYDROCHLORIDE 150 MG: 150 TABLET, FILM COATED, EXTENDED RELEASE ORAL at 05:04

## 2020-09-01 RX ADMIN — REMDESIVIR 100 MG: 100 INJECTION, POWDER, LYOPHILIZED, FOR SOLUTION INTRAVENOUS at 12:17

## 2020-09-01 NOTE — PROGRESS NOTES
"      [facility]         Dr Mcguire Sayied   LOS: 8 days   Patient Care Team:  Sun Guerrier MD as PCP - General (Internal Medicine)  Boogie Parks Jr., MD as Consulting Physician (Psychiatry)  Ava Fonseca, JACOBO as Ambulatory  ([facility])    Chief Complaint: Acute hypoxemic respiratory failure COVID-19 pneumonia/ARDS with cytokine storm leukocytosis anemia    Interval History: Much improved down to high flow oxygen nasal cannula.  She feels overall much better.    REVIEW OF SYSTEMS:   CARDIOVASCULAR: No chest pain, chest pressure or chest discomfort. No palpitations or edema.   RESPIRATORY: Shortness of breath with exertion  GASTROINTESTINAL: No anorexia, nausea, vomiting or diarrhea. No abdominal pain or blood.   HEMATOLOGIC: No bleeding or bruising.     Ventilator/Non-Invasive Ventilation Settings (From admission, onward)    None            Vital Signs  Temp:  [96.4 °F (35.8 °C)-97.7 °F (36.5 °C)] 96.5 °F (35.8 °C)  Heart Rate:  [] 95  Resp:  [16-24] 20  BP: (119-138)/(65-76) 138/69    Intake/Output Summary (Last 24 hours) at 9/1/2020 1057  Last data filed at 9/1/2020 0500  Gross per 24 hour   Intake 360 ml   Output 1250 ml   Net -890 ml     Flowsheet Rows      First Filed Value   Admission Height  167.6 cm (66\") Documented at 08/23/2020 2243   Admission Weight  84.4 kg (186 lb) Documented at 08/23/2020 2243          Physical Exam:  Patient is examined using the personal protective equipment as per guidelines from infection control for this particular patient as enacted.  Hand hygiene was performed before and after patient interaction.   General Appearance:   High flow nasal cannula oxygen.  Awake following commands.  Desats with minimal exertion  Neck midline trachea no thyromegaly   Lungs:    Diminished breath sound crackles bilaterally    Heart:    Regular rhythm and normal rate, normal S1 and S2, no            murmur, no gallop, no rub, no click   Chest Wall:    No " abnormalities observed   Abdomen:     Normal bowel sounds, no masses, no organomegaly, soft        non-tender, non-distended, no guarding, no rebound                tenderness   Extremities:   Moves all extremities well, no edema, no cyanosis, no             redness  CNS no focal neurological deficits normal sensory exam  Skin no rashes no nodules  Musculoskeletal no cyanosis no clubbing normal range of motion     Results Review:        Results from last 7 days   Lab Units 09/01/20  0601 08/31/20  0603 08/30/20  0835   SODIUM mmol/L 138 136 139   POTASSIUM mmol/L 3.8 3.8 3.8   CHLORIDE mmol/L 107 107 108*   CO2 mmol/L 23.3 19.3* 20.9*   BUN mg/dL 24* 25* 25*   CREATININE mg/dL 0.72 0.74 0.67   GLUCOSE mg/dL 92 89 95   CALCIUM mg/dL 8.7 9.1 8.8     Results from last 7 days   Lab Units 09/01/20  0601 08/31/20  0603 08/30/20  0835   CK TOTAL U/L 42 42 46     Results from last 7 days   Lab Units 09/01/20  0601 08/31/20  0603 08/30/20  0835   WBC 10*3/mm3 10.25 11.23* 12.09*   HEMOGLOBIN g/dL 13.0 13.0 13.1   HEMATOCRIT % 38.1 37.8 37.9   PLATELETS 10*3/mm3 462* 502* 466*             Results from last 7 days   Lab Units 08/26/20  0604   MAGNESIUM mg/dL 2.4         Results from last 7 days   Lab Units 08/25/20  1810   PH, ARTERIAL pH units 7.431   PO2 ART mm Hg 62.7*   PCO2, ARTERIAL mm Hg 29.4*   HCO3 ART mmol/L 19.6*       I reviewed the patient's new clinical results.  I personally viewed and interpreted the patient's CXR        Medication Review:     budesonide-formoterol 2 puff Inhalation BID - RT   buPROPion  mg Oral QAM   dexamethasone 6 mg Oral Daily With Breakfast   DULoxetine 60 mg Oral Daily   enoxaparin 40 mg Subcutaneous Q12H   INV [id number] remdesivir in NS IVPB 100 mg Intravenous Q24H   loperamide 2 mg Oral QAM   lurasidone 40 mg Oral Daily   pantoprazole 40 mg Oral QAM   topiramate 50 mg Oral BID   wheat dextrin 1 each Oral Daily         dexmedetomidine 0.2-1.5 mcg/kg/hr Last Rate: Stopped (08/28/20  1714)       ASSESSMENT:   1. Bilateral COVID-19 pneumonia  2. Acute hypoxic respiratory failure, secondary #1  3. Cytokine storm  4. Elevated d-dimer  5. Elevated transaminases, likely secondary to viral illness  6. Leukocytosis, likely steroid-induced  7. Mild anemia, worse but no active bleeding  8. Diarrhea, resolved  9. Hypokalemia, corrected  10. Hyperchloremia  11. Anxiety/agitation     Pertinent medical problems:  · Depression  · GERD    PLAN:  Wean down FiO2 to keep sats above 90%.  Continue weaning down FiO2 on OptiFlow as tolerated.  High flow oxygen tolerating well.  Dexamethasone 6 mg daily for 10 days  Antiviral therapy/Remdesivir and doxycycline per infectious diseases.  High-dose DVT prophylaxis 40 mg twice daily.  Improving and stable leukocytosis  Continue supportive care  Discussed with nursing staff  Precedex drip as needed  Blood glucose monitoring per ICU protocol  ICU core measures  Transfer patient out of the ICU today        Maynor Caballero MD  09/01/20  10:57

## 2020-09-01 NOTE — PROGRESS NOTES
Continued Stay Note  [facility]     Patient Name: Magdalena Dickey  MRN: [id number]  Today's Date: 9/1/2020    Admit Date: 8/23/2020    Discharge Plan     Row Name 09/01/20 1229       Plan    Plan  Undetermined    Plan Comments  Spoke to félix GRAHAM Workers compensation  She requested Form 106.          Discharge Codes    No documentation.             Sherrie Cunha RN

## 2020-09-01 NOTE — PAYOR COMM NOTE
"Magdalena Mcintyre (65 y.o. Female)               ATTENTION;   Berta Vallecillo RN , Medical Case Manager, Clinicals for review case ref #[id number]            Reply to Didi Panda N  ur Fax                                        Date of Birth Social Security Number Address Home Phone MRN    1955  [address] [place] [address]  [id number]    Tenriism Marital Status          Yarsanism        Admission Date Admission Type Admitting Provider Attending Provider Department, Room/Bed    8/23/20 Emergency Juan Amaro MD Kapila, Abhishek, MD [address], [address]/1    Discharge Date Discharge Disposition Discharge Destination                       Attending Provider:  Ambrocio Ramirez MD    Allergies:  No Known Allergies    Isolation:  Enh Drop/Con   Infection:  COVID (confirmed) (08/24/20)   Code Status:  CPR    Ht:  167.6 cm (65.98\")   Wt:  87.1 kg (192 lb 0.3 oz)    Admission Cmt:  None   Principal Problem:  Pneumonia due to COVID-19 virus [U07.1,J12.89]                 Active Insurance as of 8/23/2020     Primary Coverage     Payor Plan Insurance Group Employer/Plan Group    [facility]      Payor Plan Address Payor Plan Phone Number Payor Plan Fax Number Effective Dates    [address] [phone]  8/12/2020 - None Entered    [facility] [facility]       Subscriber Name Subscriber Birth Date Member ID       MAGDALENA MCINTYRE 1955 [id number]                 Emergency Contacts      (Rel.) Home Phone Work Phone Mobile Phone    Vikram Mcintyre (Spouse) -- -- [phone]    Armani Rolon (Son) -- -- [phone]               History & Physical      Cora Feliciano APRN at 08/24/20 0132     Attestation signed by Juan Amaro MD at 08/24/20 1216    Addendum: I have reviewed the history and plan as obtained by ORIANA Langley. I have personally examined the patient. I have reviewed available " clinical results, imaging results, EKG/Telemetry results, and prior records. My exam confirms her physical findings and I agree with the plan as listed above, with the addition of the following:    Exam:  General appears ill AA  HEENT NCAT EOMI  CV RRR  Lung decreased breath sounds bilaterally, no wheezes  Abdomen ND NT  Extremity no cyanosis or edema  Neuro CN II through XII grossly intact    Patient is having acute hypoxic respiratory failure secondary to covid19 pneumonia.  She reports her  at home also has COVID.  She is currently requiring 4 L of oxygen.  We will continue dexamethasone.  Consult infectious disease and pulmonology.  Start Lovenox for DVT prophylaxis.  New    She reports that she takes Imodium and Citrucel chronically at home and if she does not take that she develops diarrhea.  She would like to continue these and I will resume them for now.    Juan Amaro MD  [facility]  08/24/20  12:13    Dictated portions using Dragon dictation software.  During the entire encounter, I was wearing recommended PPE including face mask and eye protection. Hand sanitization was performed prior to entering room and upon exit.                         Patient Name:  Magdalena Dickey  YOB: 1955  MRN:  [id number]  Admit Date:  8/23/2020  Patient Care Team:  Sun Guerrier MD as PCP - General (Internal Medicine)  Boogie Parks Jr., MD as Consulting Physician (Psychiatry)      Subjective   History Present Illness     Chief Complaint   Patient presents with   • Shortness of Breath     History of Present Illness   Mrs. Dickey is a 65-year-old female with history of GERD, depression with anxiety, migraine headaches, history of colon resection, cervical radiculopathy who presents to the emergency room with shortness of breath and cough.  History and physical is obtained by review of chart and speaking with ED provider, I did not personally speak with the patient.   Patient planes of shortness of breath especially with exertion, been worsening over the last 3 days, she was exposed to COVID-19 at work, she did have a test performed on 8/15/2020 which was negative, however her  tested positive at that time.  Patient was instructed by her PCP at that time to self quarantine because he felt like that was a false negative.  She denies any fever, but her shortness of breath is gotten worse, she does have a dry cough.  Emergency room initially her O2 sats on room air were 87%, she is put on 2 L nasal cannula with sats up to 95, she appears to be in no acute distress according to the ED provider.  Patient blood glucose 123, sodium 137, CO2 slightly low at 20.1, creatinine 1.87, BUN 12, pro calcitonin 0.09, white blood cell count 5.7, hemoglobin 12.9.  X-ray of her chest showed bilateral peripheral pulmonary opacities, suspicious for COVID pneumonia.      Review of Systems   Constitutional: Positive for fatigue. Negative for appetite change and fever.   HENT: Negative for nosebleeds and trouble swallowing.    Eyes: Negative for photophobia, redness and visual disturbance.   Respiratory: Positive for cough and shortness of breath. Negative for chest tightness and wheezing.         Exposure to covid 19 at work   Cardiovascular: Negative for chest pain, palpitations and leg swelling.   Gastrointestinal: Negative for abdominal distention, abdominal pain, nausea and vomiting.   Endocrine: Negative.    Genitourinary: Negative.    Musculoskeletal: Negative for gait problem and joint swelling.   Skin: Negative.    Neurological: Negative for dizziness, seizures, speech difficulty, light-headedness and headaches.   Hematological: Negative.    Psychiatric/Behavioral: Negative for behavioral problems and confusion.        Personal History     Past Medical History:   Diagnosis Date   • Anxiety    • Colon polyp 2017   • Delayed emergence from anesthesia 2018   • Depression 5/25/2016   •  Diverticulitis of colon    • Diverticulosis of large intestine without hemorrhage 10/5/2016   • GERD (gastroesophageal reflux disease)    • Hiatal hernia    • History of gastric ulcer    • Low back pain    • Lumbosacral disc disease    • Migraine without aura and without status migrainosus, not intractable 5/25/2016     Past Surgical History:   Procedure Laterality Date   • ANTERIOR CERVICAL DISCECTOMY W/ FUSION N/A 6/21/2018    Procedure: C6 7 anterior cervical discectomy and fusion with allograft and plate;  Surgeon: Jacobo De Dios MD;  Location: [facility] MAIN OR;  Service: Neurosurgery   • CHOLECYSTECTOMY N/A 1980   • COLON RESECTION N/A 1/16/2018    Procedure: LAPAROSCOPIC SIGMOID COLON RESECTION WITH MOBILIZATION OF SPLENIC FLEXURE;  Surgeon: Eric Davidson MD;  Location: [facility] MAIN OR;  Service:    • COLONOSCOPY N/A 2/15/2017    Procedure: COLONOSCOPY to cecum with biospsies with cold polypectomy;  Surgeon: Gerardo Tan MD;  Location: [facility] ENDOSCOPY;  Service:    • COLONOSCOPY N/A 03/01/2003    Internal hemorrhoids-Dr. Gerardo Tan   • DIAGNOSTIC LAPAROSCOPY N/A 06/06/2001    Cul-de-sac endometriosis and adhesions-Dr. Aamir Christine   • ENDOSCOPY N/A 2/15/2017    Procedure: ESOPHAGOGASTRODUODENOSCOPY with biopsies;  Surgeon: Gerardo Tan MD;  Location: [facility] ENDOSCOPY;  Service:    • ENDOSCOPY N/A 02/04/2009    Small Hiatal hernia-Dr. Gerardo Tan   • TOTAL ABDOMINAL HYSTERECTOMY WITH SALPINGO OOPHORECTOMY Bilateral 07/31/2001    Dr. Aamir Christine     Family History   Problem Relation Age of Onset   • Alzheimer's disease Mother         SDAT   • Hypertension Mother    • Diabetes type II Mother    • Lung cancer Mother         POSSIBLE   • Heart attack Mother    • Cancer Mother         lung   • COPD Mother    • Depression Mother    • Diabetes Mother    • Heart disease Mother    • Miscarriages / Stillbirths Mother    • Alcohol abuse Father    • Prostate cancer Father    • Heart disease Father          THIRD DEGREE HEART BLOCK   • Arthritis Father    • Cancer Father         prostate   • Drug abuse Father    • Learning disabilities Father    • Ovarian cancer Sister    • No Known Problems Brother    • Ovarian cancer Sister    • Arthritis Maternal Grandmother         rheumatoid   • Arthritis Sister    • Cancer Sister         ovarian   • Depression Sister    • Hypertension Sister    • Cancer Sister         ovarian   • Depression Sister    • Hypertension Sister    • Malig Hyperthermia Neg Hx      Social History     Tobacco Use   • Smoking status: Former Smoker     Types: Cigarettes     Last attempt to quit: 3/10/2014     Years since quittin.4   • Smokeless tobacco: Never Used   Substance Use Topics   • Alcohol use: Yes     Comment: SELDOM   • Drug use: No     No current facility-administered medications on file prior to encounter.      Current Outpatient Medications on File Prior to Encounter   Medication Sig Dispense Refill   • ALPRAZolam (XANAX) 0.5 MG tablet Take 1 tablet by mouth 2 (Two) Times a Day As Needed for Anxiety. 60 tablet 1   • buPROPion XL (WELLBUTRIN XL) 150 MG 24 hr tablet Take 150 mg by mouth Every Morning.     • calcium carbonate (OS-MARY) 600 MG tablet Take 600 mg by mouth 2 (Two) Times a Day.     • dicyclomine (BENTYL) 10 MG capsule Take 1 capsule by mouth 4 (Four) Times a Day As Needed (abdominal pain). 60 capsule 2   • DULoxetine (CYMBALTA) 60 MG capsule Take 1 capsule by mouth Daily. 90 capsule 0   • gabapentin (NEURONTIN) 600 MG tablet TAKE ONE-HALF TABLETS BY MOUTH TWICE DAILY 90 tablet 1   • LATUDA 40 MG tablet tablet      • Loperamide HCl (IMODIUM A-D PO) Take  by mouth Daily.     • metaxalone (SKELAXIN) 800 MG tablet TAKE 1 TABLET BY MOUTH THREE TIMES A DAY 90 tablet 1   • Methylcellulose, Laxative, (CITRUCEL PO) Take  by mouth Daily.     • Multiple Vitamin (MULTI-VITAMINS PO) Take 1 tablet by mouth Daily. HOLD FOR SURGERY     • ondansetron (ZOFRAN) 4 MG tablet Take 1 tablet by mouth  Every 8 (Eight) Hours As Needed for Nausea or Vomiting. 30 tablet 2   • pantoprazole (PROTONIX) 40 MG EC tablet Take 1 tablet by mouth Every Morning. 90 tablet 0   • SUMAtriptan (IMITREX) 100 MG tablet TAKE 1 TABLET BY MOUTH AT ONSET OF HEADACHE, MAY REPEAT EVERY 2 HOURS AS NEEDED (MAX 200MG/DAY) 9 tablet 2   • topiramate (TOPAMAX) 50 MG tablet Take 1 tablet by mouth 2 (Two) Times a Day. 180 tablet 1   • traZODone (DESYREL) 50 MG tablet TAKE 1 TO 2 TABLETS BY MOUTH AT BEDTIME FOR INSOMNIA 180 tablet 1     No Known Allergies    Objective    Objective     Vital Signs  Temp:  [97.9 °F (36.6 °C)] 97.9 °F (36.6 °C)  Heart Rate:  [90-97] 90  Resp:  [20] 20  BP: (134-143)/(79-90) 143/80  SpO2:  [94 %-96 %] 95 %  on  Flow (L/min):  [2] 2;   Device (Oxygen Therapy): nasal cannula  Body mass index is 30.02 kg/m².    Physical Exam  Defer physical exam to attending physician, patient stable in the emergency room according to the ED note and speaking with the ED provider    Results Review:  I reviewed the patient's new clinical results.  I reviewed the patient's new imaging results and agree with the interpretation.  I reviewed the patient's other test results and agree with the interpretation  I personally viewed and interpreted the patient's EKG/Telemetry data  Discussed with ED provider.    Lab Results (last 24 hours)     Procedure Component Value Units Date/Time    CBC & Differential [[id number]] Collected:  08/23/20 2252    Specimen:  Blood Updated:  08/23/20 2308    Narrative:       The following orders were created for panel order CBC & Differential.  Procedure                               Abnormality         Status                     ---------                               -----------         ------                     CBC Auto Differential[[id number]]        Abnormal            Final result                 Please view results for these tests on the individual orders.    Comprehensive Metabolic Panel [[id number]]   (Abnormal) Collected:  08/23/20 2252    Specimen:  Blood Updated:  08/23/20 2332     Glucose 123 mg/dL      BUN 12 mg/dL      Creatinine 0.87 mg/dL      Sodium 137 mmol/L      Potassium 3.6 mmol/L      Chloride 103 mmol/L      CO2 20.1 mmol/L      Calcium 8.1 mg/dL      Total Protein 6.0 g/dL      Albumin 3.60 g/dL      ALT (SGPT) 45 U/L      AST (SGOT) 51 U/L      Alkaline Phosphatase 71 U/L      Total Bilirubin 0.6 mg/dL      eGFR Non African Amer 65 mL/min/1.73      Globulin 2.4 gm/dL      A/G Ratio 1.5 g/dL      BUN/Creatinine Ratio 13.8     Anion Gap 13.9 mmol/L     Narrative:       GFR Normal >60  Chronic Kidney Disease <60  Kidney Failure <15      BNP [[id number]]  (Normal) Collected:  08/23/20 2252    Specimen:  Blood Updated:  08/23/20 2337     proBNP 23.9 pg/mL     Narrative:       Among patients with dyspnea, NT-proBNP is highly sensitive for the detection of acute congestive heart failure. In addition NT-proBNP of <300 pg/ml effectively rules out acute congestive heart failure with 99% negative predictive value.    Results may be falsely decreased if patient taking Biotin.      Troponin [[id number]]  (Normal) Collected:  08/23/20 2252    Specimen:  Blood Updated:  08/23/20 2337     Troponin T <0.010 ng/mL     Narrative:       Troponin T Reference Range:  <= 0.03 ng/mL-   Negative for AMI  >0.03 ng/mL-     Abnormal for myocardial necrosis.  Clinicians would have to utilize clinical acumen, EKG, Troponin and serial changes to determine if it is an Acute Myocardial Infarction or myocardial injury due to an underlying chronic condition.       Results may be falsely decreased if patient taking Biotin.      Procalcitonin [[id number]]  (Normal) Collected:  08/23/20 2252    Specimen:  Blood Updated:  08/23/20 2337     Procalcitonin 0.09 ng/mL     Narrative:       As a Marker for Sepsis (Non-Neonates):   1. <0.5 ng/mL represents a low risk of severe sepsis and/or septic shock.  1. >2 ng/mL represents a high risk of  "severe sepsis and/or septic shock.    As a Marker for Lower Respiratory Tract Infections that require antibiotic therapy:  PCT on Admission     Antibiotic Therapy             6-12 Hrs later  > 0.5                Strongly Recommended            >0.25 - <0.5         Recommended  0.1 - 0.25           Discouraged                   Remeasure/reassess PCT  <0.1                 Strongly Discouraged          Remeasure/reassess PCT      As 28 day mortality risk marker: \"Change in Procalcitonin Result\" (> 80 % or <=80 %) if Day 0 (or Day 1) and Day 4 values are available. Refer to http://www.[facility]Books[facility]-pct-calculator.com/   Change in PCT <=80 %   A decrease of PCT levels below or equal to 80 % defines a positive change in PCT test result representing a higher risk for 28-day all-cause mortality of patients diagnosed with severe sepsis or septic shock.  Change in PCT > 80 %   A decrease of PCT levels of more than 80 % defines a negative change in PCT result representing a lower risk for 28-day all-cause mortality of patients diagnosed with severe sepsis or septic shock.                Results may be falsely decreased if patient taking Biotin.     Respiratory Panel PCR w/COVID-19(SARS-CoV-2) ASHKAN/SHRAVAN/SURAJ/PAD In-House, NP Swab in UTM/VTM, 3-4 HR TAT - Swab, Nasopharynx [[id number]]  (Abnormal) Collected:  08/23/20 2252    Specimen:  Swab from Nasopharynx Updated:  08/24/20 0019     ADENOVIRUS, PCR Not Detected     Coronavirus 229E Not Detected     Coronavirus HKU1 Not Detected     Coronavirus NL63 Not Detected     Coronavirus OC43 Not Detected     COVID19 Detected     Human Metapneumovirus Not Detected     Human Rhinovirus/Enterovirus Not Detected     Influenza A PCR Not Detected     Influenza A H1 Not Detected     Influenza A H1 2009 PCR Not Detected     Influenza A H3 Not Detected     Influenza B PCR Not Detected     Parainfluenza Virus 1 Not Detected     Parainfluenza Virus 2 Not Detected     Parainfluenza Virus 3 Not Detected     " Parainfluenza Virus 4 Not Detected     RSV, PCR Not Detected     Bordetella pertussis pcr Not Detected     Bordetella parapertussis PCR Not Detected     Chlamydophila pneumoniae PCR Not Detected     Mycoplasma pneumo by PCR Not Detected    Narrative:       Fact sheet for providers: https://docs.[employer]/wp-content/uploads/FLI5966-7735-CJ1.1-EUA-Provider-Fact-Sheet-3.pdf    Fact sheet for patients: https://docs.[employer]/wp-content/uploads/WZG0741-0591-TW8.1-EUA-Patient-Fact-Sheet-1.pdf    CBC Auto Differential [[id number]]  (Abnormal) Collected:  08/23/20 2252    Specimen:  Blood Updated:  08/23/20 2308     WBC 5.71 10*3/mm3      RBC 4.14 10*6/mm3      Hemoglobin 12.9 g/dL      Hematocrit 38.2 %      MCV 92.3 fL      MCH 31.2 pg      MCHC 33.8 g/dL      RDW 13.4 %      RDW-SD 46.2 fl      MPV 10.0 fL      Platelets 169 10*3/mm3      Neutrophil % 77.6 %      Lymphocyte % 13.8 %      Monocyte % 7.9 %      Eosinophil % 0.0 %      Basophil % 0.2 %      Immature Grans % 0.5 %      Neutrophils, Absolute 4.43 10*3/mm3      Lymphocytes, Absolute 0.79 10*3/mm3      Monocytes, Absolute 0.45 10*3/mm3      Eosinophils, Absolute 0.00 10*3/mm3      Basophils, Absolute 0.01 10*3/mm3      Immature Grans, Absolute 0.03 10*3/mm3      nRBC 0.0 /100 WBC           Imaging Results (Last 24 Hours)     Procedure Component Value Units Date/Time    XR Chest 1 View [[id number]] Collected:  08/23/20 2354     Updated:  08/24/20 0000    Narrative:       PORTABLE CHEST RADIOGRAPH     HISTORY: Shortness of air. Rule out COVID 19.     COMPARISON: 04/09/2019     FINDINGS:  Heart size is within normal limits for portable technique. Bilateral  pulmonary opacities are seen. Appearance is nonspecific, but is  suspicious for COVID. No pneumothorax or pleural effusion is identified.       Impression:       Bilateral peripheral pulmonary opacities. Appearance is nonspecific, but  is certainly suspicious for COVID pneumonia.     This report was  finalized on 8/23/2020 11:57 PM by Dr. Danita Miller M.D.             Results for orders placed in visit on 06/24/19   Adult Stress Echo W/ Cont or Stress Agent if Necessary Per Protocol    Narrative · Estimated EF = 65%.  · Left ventricular systolic function is normal.  · Left ventricular diastolic dysfunction (grade I) consistent with   impaired relaxation.  · Normal stress echo with no significant echocardiographic evidence for   myocardial ischemia.          ECG 12 Lead   Preliminary Result   HEART RATE= 95  bpm   RR Interval= 632  ms   OR Interval= 161  ms   P Horizontal Axis= -30  deg   P Front Axis= 55  deg   QRSD Interval= 97  ms   QT Interval= 361  ms   QRS Axis= 60  deg   T Wave Axis= 22  deg   - NORMAL ECG -   Sinus rhythm   Electronically Signed By:    Date and Time of Study: 2020-08-23 23:18:19           Assessment/Plan     Active Hospital Problems    Diagnosis POA   • **Pneumonia due to COVID-19 virus [U07.1, J12.89] Yes   • Hypertriglyceridemia [E78.1] Yes   • History of colon resection [Z90.49] Not Applicable   • Cervical spondylosis with radiculopathy [M47.22] Yes     Added automatically from request for surgery [id number]     • Depression with anxiety [F41.8] Yes   • GERD (gastroesophageal reflux disease) [K21.9] Yes     Mrs. Dickey is a 65-year-old female with history of GERD, depression with anxiety, migraine headaches, history of colon resection, cervical radiculopathy who presents to the emergency room with shortness of breath and cough.    Pneumonia due to COVID-19  -Supportive care  -Patient was given a dose of Decadron in the emergency room, will defer further need to attending physician in the a.m., to see if patient has any worsening or improvement in symptoms  -Patient stable at this time, will not consult, pulmonology, but may need to be consulted if patient starts to require more oxygen or worsening of condition  -Incentive spirometer  -Continuous O2 saturation monitoring, O2 to keep  sats above 93, instructed nursing to call if patient requires increase in oxygen need  -Recheck labs in the a.m.    Cervical radiculopathy  -Continue Neurontin    Depression with anxiety with anxiety  -Continue Cymbalta, Wellbutrin, Topamax      · I discussed the patient's findings and my recommendations with patient and ED provider.    VTE Prophylaxis - SCDs.  Code Status - Full code.       Cora SHER Braden APRPAULY  [facility]  08/24/20  01:35      Electronically signed by Juan Amaro MD at 08/24/20 1216          Emergency Department Notes      Barbi Topete, RN at 08/23/20 2232        To ER via EMS from home.  C/o SOA with exertion, generalized weakness, fatigue x 2 days.  Pt was positive for Covid yesterday.  Her test was negative.  PCP told her that her's was probably a false negative.     Pt states symptoms started approx 1 week ago with cough and SOA.  States tonight she has severe SOA even walking to the bathroom.   RA Sat 87% on EMS arrival.     Pt in mask at triage.  [facility] staff in appropriate PPE.    Electronically signed by Barbi Topete RN at 08/23/20 2237     Bart Thomas MD at 08/23/20 2244           EMERGENCY DEPARTMENT ENCOUNTER    Room Number:  LUIS/LUIS  Date of encounter:  8/24/2020  PCP: Sun Guerrier MD  Historian: Patient      HPI:  Chief Complaint: Shortness of breath  A complete HPI/ROS/PMH/PSH/SH/FH are unobtainable due to: Nothing    Context: Magdalena Dickey is a 65 y.o. female who presents to the ED c/o moderate severity shortness of breath, particular with exertion.  Is been worsening over the last 3 days.  Patient is also had a nonproductive cough, fatigue, chills, sore throat and headache which go back about a week now.  She is not taking anything for it and saw her PCP 3 days ago.  Both she and her  had COVID-19 swabs performed.  His was positive and hers was negative.  Her PCP said very clearly at that time he felt hers was a false  negative and instructed her to quarantine.    The reason she comes back to the ED tonight is specifically for the worsening shortness of breath.  In triage she was reported to be 87% on room air.          ALLERGIES  Patient has no known allergies.        REVIEW OF SYSTEMS  Review of Systems     All systems reviewed and negative except for those discussed in HPI.       PHYSICAL EXAM    I have reviewed the triage vital signs and nursing notes.    ED Triage Vitals [08/23/20 2236]   Temp Heart Rate Resp BP SpO2   97.9 °F (36.6 °C) 92 20 134/90 94 %      Temp src Heart Rate Source Patient Position BP Location FiO2 (%)   Tympanic Monitor -- -- --       Physical Exam  GENERAL: Awake alert and oriented, generally ill-appearing  HENT: nares patent  EYES: no scleral icterus  CV: regular rhythm, regular rate  RESPIRATORY: Mild tachypnea, few scattered wheezes and crackles but overall good air exchange.  There is no significant respiratory distress  ABDOMEN: soft  MUSCULOSKELETAL: no deformity  NEURO: alert, moves all extremities, follows commands  SKIN: warm, dry        LAB RESULTS  Recent Results (from the past 24 hour(s))   Comprehensive Metabolic Panel    Collection Time: 08/23/20 10:52 PM   Result Value Ref Range    Glucose 123 (H) 65 - 99 mg/dL    BUN 12 8 - 23 mg/dL    Creatinine 0.87 0.57 - 1.00 mg/dL    Sodium 137 136 - 145 mmol/L    Potassium 3.6 3.5 - 5.2 mmol/L    Chloride 103 98 - 107 mmol/L    CO2 20.1 (L) 22.0 - 29.0 mmol/L    Calcium 8.1 (L) 8.6 - 10.5 mg/dL    Total Protein 6.0 6.0 - 8.5 g/dL    Albumin 3.60 3.50 - 5.20 g/dL    ALT (SGPT) 45 (H) 1 - 33 U/L    AST (SGOT) 51 (H) 1 - 32 U/L    Alkaline Phosphatase 71 39 - 117 U/L    Total Bilirubin 0.6 0.0 - 1.2 mg/dL    eGFR Non African Amer 65 >60 mL/min/1.73    Globulin 2.4 gm/dL    A/G Ratio 1.5 g/dL    BUN/Creatinine Ratio 13.8 7.0 - 25.0    Anion Gap 13.9 5.0 - 15.0 mmol/L   BNP    Collection Time: 08/23/20 10:52 PM   Result Value Ref Range    proBNP 23.9  0.0 - 900.0 pg/mL   Troponin    Collection Time: 08/23/20 10:52 PM   Result Value Ref Range    Troponin T <0.010 0.000 - 0.030 ng/mL   Procalcitonin    Collection Time: 08/23/20 10:52 PM   Result Value Ref Range    Procalcitonin 0.09 0.00 - 0.25 ng/mL   Respiratory Panel PCR w/COVID-19(SARS-CoV-2) ASHKAN/SHRAVAN/SURAJ/PAD In-House, NP Swab in UTM/VTM, 3-4 HR TAT - Swab, Nasopharynx    Collection Time: 08/23/20 10:52 PM   Result Value Ref Range    ADENOVIRUS, PCR Not Detected Not Detected    Coronavirus 229E Not Detected Not Detected    Coronavirus HKU1 Not Detected Not Detected    Coronavirus NL63 Not Detected Not Detected    Coronavirus OC43 Not Detected Not Detected    COVID19 Detected (C) Not Detected - Ref. Range    Human Metapneumovirus Not Detected Not Detected    Human Rhinovirus/Enterovirus Not Detected Not Detected    Influenza A PCR Not Detected Not Detected    Influenza A H1 Not Detected Not Detected    Influenza A H1 2009 PCR Not Detected Not Detected    Influenza A H3 Not Detected Not Detected    Influenza B PCR Not Detected Not Detected    Parainfluenza Virus 1 Not Detected Not Detected    Parainfluenza Virus 2 Not Detected Not Detected    Parainfluenza Virus 3 Not Detected Not Detected    Parainfluenza Virus 4 Not Detected Not Detected    RSV, PCR Not Detected Not Detected    Bordetella pertussis pcr Not Detected Not Detected    Bordetella parapertussis PCR Not Detected Not Detected    Chlamydophila pneumoniae PCR Not Detected Not Detected    Mycoplasma pneumo by PCR Not Detected Not Detected   CBC Auto Differential    Collection Time: 08/23/20 10:52 PM   Result Value Ref Range    WBC 5.71 3.40 - 10.80 10*3/mm3    RBC 4.14 3.77 - 5.28 10*6/mm3    Hemoglobin 12.9 12.0 - 15.9 g/dL    Hematocrit 38.2 34.0 - 46.6 %    MCV 92.3 79.0 - 97.0 fL    MCH 31.2 26.6 - 33.0 pg    MCHC 33.8 31.5 - 35.7 g/dL    RDW 13.4 12.3 - 15.4 %    RDW-SD 46.2 37.0 - 54.0 fl    MPV 10.0 6.0 - 12.0 fL    Platelets 169 140 - 450 10*3/mm3      Neutrophil % 77.6 (H) 42.7 - 76.0 %    Lymphocyte % 13.8 (L) 19.6 - 45.3 %    Monocyte % 7.9 5.0 - 12.0 %    Eosinophil % 0.0 (L) 0.3 - 6.2 %    Basophil % 0.2 0.0 - 1.5 %    Immature Grans % 0.5 0.0 - 0.5 %    Neutrophils, Absolute 4.43 1.70 - 7.00 10*3/mm3    Lymphocytes, Absolute 0.79 0.70 - 3.10 10*3/mm3    Monocytes, Absolute 0.45 0.10 - 0.90 10*3/mm3    Eosinophils, Absolute 0.00 0.00 - 0.40 10*3/mm3    Basophils, Absolute 0.01 0.00 - 0.20 10*3/mm3    Immature Grans, Absolute 0.03 0.00 - 0.05 10*3/mm3    nRBC 0.0 0.0 - 0.2 /100 WBC       Ordered the above labs and independently reviewed the results.        RADIOLOGY  Xr Chest 1 View    Result Date: 8/23/2020  PORTABLE CHEST RADIOGRAPH  HISTORY: Shortness of air. Rule out COVID 19.  COMPARISON: 04/09/2019  FINDINGS: Heart size is within normal limits for portable technique. Bilateral pulmonary opacities are seen. Appearance is nonspecific, but is suspicious for COVID. No pneumothorax or pleural effusion is identified.      Bilateral peripheral pulmonary opacities. Appearance is nonspecific, but is certainly suspicious for COVID pneumonia.  This report was finalized on 8/23/2020 11:57 PM by Dr. Danita Miller M.D.        I ordered the above noted radiological studies. Reviewed by me and discussed with radiologist.  See dictation for official radiology interpretation.      PROCEDURES    Procedures      MEDICATIONS GIVEN IN ER    Medications   sodium chloride 0.9 % flush 10 mL (has no administration in time range)   sodium chloride 0.9 % flush 10 mL (has no administration in time range)   sodium chloride 0.9 % flush 10 mL (has no administration in time range)   nitroglycerin (NITROSTAT) SL tablet 0.4 mg (has no administration in time range)   sodium chloride 0.9 % infusion (has no administration in time range)   acetaminophen (TYLENOL) tablet 650 mg (has no administration in time range)     Or   acetaminophen (TYLENOL) 160 MG/5ML solution 650 mg (has no  administration in time range)     Or   acetaminophen (TYLENOL) suppository 650 mg (has no administration in time range)   ondansetron (ZOFRAN) injection 4 mg (has no administration in time range)   dexamethasone (DECADRON) 10 mg/20ml NS IV syringe (10 mg Intravenous Given 8/24/20 0145)         PROGRESS, DATA ANALYSIS, CONSULTS, AND MEDICAL DECISION MAKING    All labs have been independently reviewed by me.  All radiology studies have been reviewed by me and discussed with radiologist dictating the report.   EKG's independently viewed and interpreted by me.  Discussion below represents my analysis of pertinent findings related to patient's condition, differential diagnosis, treatment plan and final disposition.        ED Course as of Aug 24 0157   Mon Aug 24, 2020   0155 CBC normal    [DP]   0155 Chemistry fairly unremarkable    [DP]   0155 Troponin, proBNP and procalcitonin all negative    [DP]   0156 EKG at 2318  Sinus rhythm at 95  Normal MS, QRS and QT  No acute ST segment abnormalities to suggest ischemia and this is unchanged when compared to June 24, 2019    [DP]   0156 Chest x-ray shows moderate amount of bilateral patchy interstitial infiltrates consistent with COVID-19 pneumonia    [DP]   0156 At this point the patient is requiring 2 L of oxygen by nasal cannula to maintain O2 sats over 90%.    [DP]   0156 Spoke with Kathe the nurse practitioner from [facility] and she agrees to admit the patient to a telemetry bed on behalf of Dr. Short.  We also discussed that since the patient had some respiratory distress and was requiring oxygen that we would give her a dose of Decadron and reevaluate in the morning    [DP]      ED Course User Index  [DP] Bart Thomas MD       Patient wore a surgical mask and I wore full, covered appropriate enhanced PPE including Capper, gown, gloves and shoe covering    AS OF 01:57 VITALS:    BP - 139/80  HR - 91  TEMP - 97.9 °F (36.6 °C) (Tympanic)  O2 SATS -  96%        DIAGNOSIS  Final diagnoses:   Pneumonia due to COVID-19 virus         DISPOSITION  Admit to telemetry           Bart Thomas MD  08/24/20 0157      Electronically signed by Bart Thomas MD at 08/24/20 0157                Orders (last 7 days)      Start     Ordered    09/01/20 1600  metaxalone (SKELAXIN) tablet 800 mg  3 Times Daily      09/01/20 1354    09/01/20 1101  Transfer Patient  Once      09/01/20 1101    09/01/20 0600  CBC (No Diff)  Morning Draw,   Status:  Canceled      08/31/20 0942    09/01/20 0600  Basic Metabolic Panel  Morning Draw,   Status:  Canceled      08/31/20 0942    09/01/20 0600  CBC Auto Differential  PROCEDURE ONCE      09/01/20 0002    08/31/20 0600  CBC Auto Differential  PROCEDURE ONCE      08/31/20 0002    08/30/20 0600  XR Chest 1 View  1 Time Imaging      08/29/20 1122    08/30/20 0600  CBC (No Diff)  Morning Draw,   Status:  Canceled      08/29/20 1122    08/30/20 0600  Basic Metabolic Panel  Morning Draw,   Status:  Canceled      08/29/20 1122    08/30/20 0600  Ferritin  Morning Draw,   Status:  Canceled      08/29/20 1122    08/30/20 0600  D-dimer, Quantitative  Morning Draw      08/29/20 1122    08/30/20 0600  CBC Auto Differential  PROCEDURE ONCE      08/30/20 0002    08/29/20 1032  Respiratory communication  Once     Comments:  Please start Optiflow 60L    08/29/20 1032    08/29/20 0600  CBC Auto Differential  PROCEDURE ONCE      08/29/20 0002    08/28/20 0852  SUMAtriptan (IMITREX) tablet 100 mg  Every 2 Hours PRN      08/28/20 0853    08/28/20 0600  CBC Auto Differential  PROCEDURE ONCE      08/28/20 0001    08/27/20 2245  dexmedetomidine (PRECEDEX) 400 mcg/100 mL (4 mcg/mL) infusion  Titrated,   Status:  Discontinued      08/27/20 2151 08/27/20 2245  dexmedetomidine (PRECEDEX) 400 mcg/100 mL (4 mcg/mL) infusion  Titrated      08/27/20 2152 08/27/20 2008  POC Glucose Once  Once      08/27/20 2003 08/27/20 1800  Dietary Nutrition Supplements Boost  Plus (Ensure Enlive, Ensure Plus)  Daily With Lunch & Dinner      08/27/20 1236    08/27/20 1651  POC Glucose Once  Once      08/27/20 1642    08/27/20 1216  POC Glucose Once  Once      08/27/20 1202    08/27/20 0819  Diet Soft Texture; [employer]; Cardiac; Safe Tray  Diet Effective Now     Comments:  covid    08/27/20 0819    08/27/20 0801  POC Glucose Once  Once      08/27/20 0757    08/27/20 0735  Diet Regular; Cardiac; Safe Tray  Diet Effective Now,   Status:  Canceled     Comments:  covid    08/27/20 0735    08/27/20 0700  loperamide (IMODIUM) capsule 2 mg  Every Morning      08/26/20 1253    08/27/20 0650  Manual Differential  Once      08/27/20 0649    08/27/20 0635  XR Chest 1 View  1 Time Imaging      08/27/20 0635    08/27/20 0600  CBC Auto Differential  PROCEDURE ONCE      08/27/20 0001    08/27/20 0445  budesonide-formoterol (SYMBICORT) 160-4.5 MCG/ACT inhaler 2 puff  2 Times Daily - RT      08/27/20 0354    08/26/20 2100  enoxaparin (LOVENOX) syringe 40 mg  Every 12 Hours      08/26/20 1326    08/26/20 1046  Magnesium  Once      08/26/20 1045    08/26/20 1045  Patient Currently On Electrolyte Replacement Protocol - Please Refer to MAR for Protocol Details  Misc Nursing Order (Specify)  Daily     Comments:  Patient Currently On Electrolyte Replacement Protocol - Please Refer to MAR for Protocol Details    08/26/20 1044    08/26/20 1044  Magnesium Sulfate 2 gram Bolus, followed by 8 gram infusion (total Mg dose 10 grams)- Mg less than or equal to 1mg/dL  As Needed      08/26/20 1044    08/26/20 1044  Magnesium Sulfate 2 gram / 50mL Infusion (GIVE X 3 BAGS TO EQUAL 6GM TOTAL DOSE) - Mg 1.1 - 1.5 mg/dl  As Needed      08/26/20 1044    08/26/20 1044  Magnesium Sulfate 4 gram infusion- Mg 1.6-1.9 mg/dL  As Needed      08/26/20 1044    08/26/20 1044  potassium chloride (MICRO-K) CR capsule 40 mEq  As Needed      08/26/20 1044    08/26/20 1044  potassium chloride (KLOR-CON) packet 40 mEq  As Needed       08/26/20 1044    08/26/20 1044  potassium chloride 10 mEq in 100 mL IVPB  Every 1 Hour PRN      08/26/20 1044    08/26/20 0650  Manual Differential  Once      08/26/20 0649    08/26/20 0600  CBC Auto Differential  PROCEDURE ONCE      08/26/20 0002    08/25/20 1819  Blood Gas, Arterial  Once      08/25/20 1810    08/25/20 1800  doxycycline (VIBRAMYCIN) 100 mg/100 mL 0.9% NS MBP  Every 12 Hours      08/25/20 1638    08/25/20 1757  Blood Gas, Arterial  STAT      08/25/20 1757    08/25/20 1730  furosemide (LASIX) injection 40 mg  Once      08/25/20 1630    08/25/20 1703  XR Chest 1 View  1 Time Imaging      08/25/20 1659    08/25/20 1659  XR Chest PA & Lateral  1 Time Imaging,   Status:  Canceled      08/25/20 1659    08/25/20 1638  Blood Culture - Blood,  Once      08/25/20 1638    08/25/20 1638  Blood Culture - Blood,  Once     Comments:  From a different site than #1.      08/25/20 1638    08/25/20 1632  Legionella Antigen, Urine - Urine, Urine, Clean Catch  Once      08/25/20 1631    08/25/20 1631  S. Pneumo Ag Urine or CSF - Urine, Urine, Clean Catch  Once      08/25/20 1631    08/25/20 1300  INV GS-5734 remdesivir 100 mg in sodium chloride 0.9 % 250 mL IVPB (powder vial)  Every 24 Hours      08/24/20 1626    08/25/20 0839  acetaminophen (TYLENOL) tablet 650 mg  Every 4 Hours PRN      08/25/20 0840    08/25/20 0839  acetaminophen (TYLENOL) 160 MG/5ML solution 650 mg  Every 4 Hours PRN      08/25/20 0840    08/25/20 0839  acetaminophen (TYLENOL) suppository 650 mg  Every 4 Hours PRN      08/25/20 0840    08/25/20 0600  CBC & Differential  Daily      08/24/20 0132    08/25/20 0600  Comprehensive Metabolic Panel  Daily      08/24/20 0132    08/25/20 0600  Lactate Dehydrogenase  Every 48 Hours      08/24/20 0132 08/25/20 0600  D-dimer, Quantitative  Every 48 Hours      08/24/20 0132    08/25/20 0600  Fibrinogen  Every 48 Hours      08/24/20 0132 08/25/20 0600  C-reactive Protein  Daily      08/24/20 0835     08/25/20 0600  CK  Daily      08/24/20 0835    08/25/20 0600  Ferritin  Daily      08/24/20 0835    08/24/20 1400  loperamide (IMODIUM) capsule 2 mg  4 Times Daily PRN      08/24/20 1216    08/24/20 1400  wheat dextrin (BENEFIBER ON THE GO) powder 1 each  Daily      08/24/20 1239    08/24/20 1000  dexamethasone (DECADRON) tablet 6 mg  Daily With Breakfast      08/24/20 0832    08/24/20 0930  buPROPion XL (WELLBUTRIN XL) 24 hr tablet 150 mg  Every Morning      08/24/20 0837    08/24/20 0930  DULoxetine (CYMBALTA) DR capsule 60 mg  Daily      08/24/20 0837    08/24/20 0930  lurasidone (LATUDA) tablet 40 mg  Daily      08/24/20 0837    08/24/20 0930  pantoprazole (PROTONIX) EC tablet 40 mg  Every Morning      08/24/20 0837    08/24/20 0930  topiramate (TOPAMAX) tablet 50 mg  2 Times Daily      08/24/20 0837    08/24/20 0930  enoxaparin (LOVENOX) syringe 40 mg  Every 24 Hours,   Status:  Discontinued      08/24/20 0838    08/24/20 0835  dicyclomine (BENTYL) capsule 10 mg  4 Times Daily PRN      08/24/20 0837    08/24/20 0835  ALPRAZolam (XANAX) tablet 0.5 mg  3 Times Daily PRN      08/24/20 0837    08/24/20 0600  Incentive Spirometry  Every 4 Hours While Awake      08/24/20 0132    08/24/20 0126  ondansetron (ZOFRAN) injection 4 mg  Every 6 Hours PRN      08/24/20 0132    08/24/20 0125  nitroglycerin (NITROSTAT) SL tablet 0.4 mg  Every 5 Minutes PRN      08/24/20 0132    Unscheduled  Telemetry - Pulse Oximetry  Continuous PRN     Comments:  If Patient Develops Unresponsiveness, Acute Dyspnea, Cyanosis or Suspected Hypoxemia Start Continuous Pulse Ox Monitoring, Apply Oxygen & Notify Provider    08/24/20 0132    Unscheduled  Oxygen Therapy- Nasal Cannula; Titrate for SPO2: 90% - 95%  Continuous PRN     Comments:  If Patient Develops Unresponsiveness, Acute Dyspnea, Cyanosis or Suspected Hypoxemia Start Continuous Pulse Ox Monitoring, Apply Oxygen & Notify Provider    08/24/20 0132    Unscheduled  ECG 12 Lead  As Needed      Comments:  Nurse to Release if Patient Expericences Acute Chest Pain or Dysrhythmias    20 0132    Unscheduled  Potassium  As Needed     Comments:  For Ventricular Arrhythmias      20 0132    Unscheduled  Magnesium  As Needed     Comments:  For Ventricular Arrhythmias      20 013    Unscheduled  Troponin  As Needed     Comments:  For Chest Pain      20 013    Unscheduled  Blood Gas, Arterial  As Needed     Comments:  Per O2 PolicyNotify Physician      20 0132    Unscheduled  Up With Assistance  As Needed      20 0132    Unscheduled  Magnesium  As Needed      20 1044    Unscheduled  Potassium  As Needed      20 1044    --  SCANNED EKG      20 0000    --  SCANNED - TELEMETRY        20 0000                   Physician Progress Notes (all)      Millie Arias MD at 20 1210            Infectious Diseases Progress Note    Millie Arias MD     [facility]  [place]: 8 days  Patient Identification:  Name: Magdalena Dickey  Age: 65 y.o.  Sex: female  :  1955  MRN: [id number]         Primary Care Physician: Sun Guerrier MD            Subjective: Feeling stronger and better.  Oxygen requirement is decreasing.  Interval History:    · Started on Remdesivir on 2020  · Received convalescent plasma on 2020 started at 2251 finished at 0003 -  20  [id number]  [id number] ABO Rh A+  Objective:    Scheduled Meds:    budesonide-formoterol 2 puff Inhalation BID - RT   buPROPion  mg Oral QAM   dexamethasone 6 mg Oral Daily With Breakfast   DULoxetine 60 mg Oral Daily   enoxaparin 40 mg Subcutaneous Q12H   INV [id number] remdesivir in NS IVPB 100 mg Intravenous Q24H   loperamide 2 mg Oral QAM   lurasidone 40 mg Oral Daily   pantoprazole 40 mg Oral QAM   topiramate 50 mg Oral BID   wheat dextrin 1 each Oral Daily     Continuous Infusions:    dexmedetomidine 0.2-1.5 mcg/kg/hr Last Rate: Stopped (20 5913)       Vital signs in last 24  "hours:  Temp:  [96.4 °F (35.8 °C)-97.7 °F (36.5 °C)] 96.5 °F (35.8 °C)  Heart Rate:  [68-95] 95  Resp:  [16-24] 20  BP: (119-138)/(65-76) 138/69    Intake/Output:    Intake/Output Summary (Last 24 hours) at 9/1/2020 1210  Last data filed at 9/1/2020 0500  Gross per 24 hour   Intake 360 ml   Output 1250 ml   Net -890 ml       Exam:  /69   Pulse 95   Temp 96.5 °F (35.8 °C) (Oral)   Resp 20   Ht 167.6 cm (65.98\")   Wt 87.1 kg (192 lb 0.3 oz)   SpO2 95%   BMI 31.01 kg/m²    Patient is examined using the personal protective equipment as per guidelines from infection control for this particular patient as enacted.  Hand washing was performed before and after patient interaction.  General Appearance: Sitting in the chair next to her bed in ICU.  Appears to have a better color and disposition.  FiO2 was decreased to high flow nasal cannula                          Head:    Normocephalic, without obvious abnormality, atraumatic                           Eyes:    PERRL, conjunctivae/corneas clear, EOM's intact, both eyes                         Throat:   Lips, tongue, gums normal; oral mucosa pink and moist                           Neck:   Supple, symmetrical, trachea midline, no JVD                         Lungs:    Bibasilar crackles                 Chest Wall:    No tenderness or deformity                          Heart:  S1-S2 regular                  Abdomen:   Soft nontender                 Extremities:   Extremities normal, atraumatic, no cyanosis or edema                        Pulses:   Pulses palpable in all extremities                            Skin:   Skin is warm and dry,  no rashes or palpable lesions                  Neurologic: Grossly nonfocal       Data Review:    I reviewed the patient's new clinical results.  Results from last 7 days   Lab Units 09/01/20  0601 08/31/20  0603 08/30/20  0835 08/29/20  0624 08/28/20  0429 08/27/20  0556 08/26/20  0604   WBC 10*3/mm3 10.25 11.23* 12.09* 10.51 " 10.87* 11.71* 8.81   HEMOGLOBIN g/dL 13.0 13.0 13.1 13.2 12.9 13.2 12.5   PLATELETS 10*3/mm3 462* 502* 466* 442 349 336 258     Results from last 7 days   Lab Units 09/01/20  0601 08/31/20  0603 08/30/20  0835 08/29/20  0624 08/28/20  0429 08/27/20  1716 08/27/20  0556 08/26/20  0604   SODIUM mmol/L 138 136 139 141 140  --  141 143   POTASSIUM mmol/L 3.8 3.8 3.8 3.6 4.1 4.4 3.4* 3.0*   CHLORIDE mmol/L 107 107 108* 111* 112*  --  113* 112*   CO2 mmol/L 23.3 19.3* 20.9* 21.4* 17.8*  --  19.0* 21.8*   BUN mg/dL 24* 25* 25* 20 21  --  19 21   CREATININE mg/dL 0.72 0.74 0.67 0.67 0.70  --  0.69 0.63   CALCIUM mg/dL 8.7 9.1 8.8 8.9 8.6  --  8.8 8.6   GLUCOSE mg/dL 92 89 95 106* 108*  --  85 95     ECG 12 Lead   Final Result   HEART RATE= 95  bpm   RR Interval= 632  ms   MT Interval= 161  ms   P Horizontal Axis= -30  deg   P Front Axis= 55  deg   QRSD Interval= 97  ms   QT Interval= 361  ms   QRS Axis= 60  deg   T Wave Axis= 22  deg   - NORMAL ECG -   Sinus rhythm   NO SIGNIFICANT CHANGE FROM PREVIOUS ECG   Electronically Signed By: Evonne Lantigua ([facility]) 24-Aug-2020 12:33:04   Date and Time of Study: 2020-08-23 23:18:19      SCANNED EKG   Final Result              Assessment:    Pneumonia due to COVID-19 virus    GERD (gastroesophageal reflux disease)    Depression with anxiety    Cervical spondylosis with radiculopathy    History of colon resection    Hypertriglyceridemia  Appears to have worsened in terms of oxygen requirement and sense of wellbeing -  Reason for this change in status could include:   -Progression of COVID-19 pneumonia with evolving acute lung injury   -Volume overload due to administration of convalescent plasma   -Concomitant superimposed bacterial pneumonia     Recommendations:   · Continue aggressive supportive care with steroids and ongoing use of oxygen supplementation to keep saturation above 90%.    · X-ray worse   · Empirically use 1 dose of Lasix as she has significant crackles on examination  "and keep an eye on her intake and output.    · Completed short course of empiric antibiotic treatment for bacterial pneumonia.  · Extend Remdesivir for 10 days.        Millie Arias MD  9/1/2020  12:10          Electronically signed by Millie Arias MD at 09/01/20 1212     Maynor Caballero MD at 09/01/20 1057                [facility]         Dr Maynor Caballero   LOS: 8 days   Patient Care Team:  Sun Guerrier MD as PCP - General (Internal Medicine)  Boogie Parks Jr., MD as Consulting Physician (Psychiatry)  Ava Fonseca, JACOBO as Ambulatory  ([facility])    Chief Complaint: Acute hypoxemic respiratory failure COVID-19 pneumonia/ARDS with cytokine storm leukocytosis anemia    Interval History: Much improved down to high flow oxygen nasal cannula.  She feels overall much better.    REVIEW OF SYSTEMS:   CARDIOVASCULAR: No chest pain, chest pressure or chest discomfort. No palpitations or edema.   RESPIRATORY: Shortness of breath with exertion  GASTROINTESTINAL: No anorexia, nausea, vomiting or diarrhea. No abdominal pain or blood.   HEMATOLOGIC: No bleeding or bruising.           Vital Signs  Temp:  [96.4 °F (35.8 °C)-97.7 °F (36.5 °C)] 96.5 °F (35.8 °C)  Heart Rate:  [] 95  Resp:  [16-24] 20  BP: (119-138)/(65-76) 138/69    Intake/Output Summary (Last 24 hours) at 9/1/2020 1057  Last data filed at 9/1/2020 0500  Gross per 24 hour   Intake 360 ml   Output 1250 ml   Net -890 ml     Flowsheet Rows      First Filed Value   Admission Height  167.6 cm (66\") Documented at 08/23/2020 2243   Admission Weight  84.4 kg (186 lb) Documented at 08/23/2020 2243          Physical Exam:  Patient is examined using the personal protective equipment as per guidelines from infection control for this particular patient as enacted.  Hand hygiene was performed before and after patient interaction.   General Appearance:   High flow nasal cannula oxygen.  Awake following commands.  Desats with minimal " exertion  Neck midline trachea no thyromegaly   Lungs:    Diminished breath sound crackles bilaterally    Heart:    Regular rhythm and normal rate, normal S1 and S2, no            murmur, no gallop, no rub, no click   Chest Wall:    No abnormalities observed   Abdomen:     Normal bowel sounds, no masses, no organomegaly, soft        non-tender, non-distended, no guarding, no rebound                tenderness   Extremities:   Moves all extremities well, no edema, no cyanosis, no             redness  CNS no focal neurological deficits normal sensory exam  Skin no rashes no nodules  Musculoskeletal no cyanosis no clubbing normal range of motion     Results Review:        Results from last 7 days   Lab Units 09/01/20  0601 08/31/20  0603 08/30/20  0835   SODIUM mmol/L 138 136 139   POTASSIUM mmol/L 3.8 3.8 3.8   CHLORIDE mmol/L 107 107 108*   CO2 mmol/L 23.3 19.3* 20.9*   BUN mg/dL 24* 25* 25*   CREATININE mg/dL 0.72 0.74 0.67   GLUCOSE mg/dL 92 89 95   CALCIUM mg/dL 8.7 9.1 8.8     Results from last 7 days   Lab Units 09/01/20  0601 08/31/20  0603 08/30/20  0835   CK TOTAL U/L 42 42 46     Results from last 7 days   Lab Units 09/01/20  0601 08/31/20  0603 08/30/20  0835   WBC 10*3/mm3 10.25 11.23* 12.09*   HEMOGLOBIN g/dL 13.0 13.0 13.1   HEMATOCRIT % 38.1 37.8 37.9   PLATELETS 10*3/mm3 462* 502* 466*             Results from last 7 days   Lab Units 08/26/20  0604   MAGNESIUM mg/dL 2.4         Results from last 7 days   Lab Units 08/25/20  1810   PH, ARTERIAL pH units 7.431   PO2 ART mm Hg 62.7*   PCO2, ARTERIAL mm Hg 29.4*   HCO3 ART mmol/L 19.6*       I reviewed the patient's new clinical results.  I personally viewed and interpreted the patient's CXR        Medication Review:     budesonide-formoterol 2 puff Inhalation BID - RT   buPROPion  mg Oral QAM   dexamethasone 6 mg Oral Daily With Breakfast   DULoxetine 60 mg Oral Daily   enoxaparin 40 mg Subcutaneous Q12H   INV [id number] remdesivir in NS IVPB 100 mg  Intravenous Q24H   loperamide 2 mg Oral QAM   lurasidone 40 mg Oral Daily   pantoprazole 40 mg Oral QAM   topiramate 50 mg Oral BID   wheat dextrin 1 each Oral Daily         dexmedetomidine 0.2-1.5 mcg/kg/hr Last Rate: Stopped (20 9401)       ASSESSMENT:   6. Bilateral COVID-19 pneumonia  7. Acute hypoxic respiratory failure, secondary #1  8. Cytokine storm  9. Elevated d-dimer  10. Elevated transaminases, likely secondary to viral illness  11. Leukocytosis, likely steroid-induced  12. Mild anemia, worse but no active bleeding  13. Diarrhea, resolved  14. Hypokalemia, corrected  15. Hyperchloremia  16. Anxiety/agitation     Pertinent medical problems:  · Depression  · GERD    PLAN:  Wean down FiO2 to keep sats above 90%.  Continue weaning down FiO2 on OptiFlow as tolerated.  High flow oxygen tolerating well.  Dexamethasone 6 mg daily for 10 days  Antiviral therapy/Remdesivir and doxycycline per infectious diseases.  High-dose DVT prophylaxis 40 mg twice daily.  Improving and stable leukocytosis  Continue supportive care  Discussed with nursing staff  Precedex drip as needed  Blood glucose monitoring per ICU protocol  ICU core measures  Transfer patient out of the ICU today        Maynor Caballero MD  20  10:57            Electronically signed by Maynor Caballero MD at 20 1100     Millie Arias MD at 20 1213            Infectious Diseases Progress Note    Millie Arias MD     [facility]  Los: 7 days  Patient Identification:  Name: Magdalena Dickey  Age: 65 y.o.  Sex: female  :  1955  MRN: [id number]         Primary Care Physician: Sun Guerrier MD            Subjective: Feeling stronger.  Still gets out of breath with activity.  Appetite is improving.  Denies any fever and chills.  Still requiring significant oxygen support.  Interval History:    · Started on Remdesivir on 2020  · Received convalescent plasma on 2020 started at 1172 finished at 9346 -  20  " [id number]  [id number] ABO Rh A+  Objective:    Scheduled Meds:    budesonide-formoterol 2 puff Inhalation BID - RT   buPROPion  mg Oral QAM   dexamethasone 6 mg Oral Daily With Breakfast   DULoxetine 60 mg Oral Daily   enoxaparin 40 mg Subcutaneous Q12H   INV [id number] remdesivir in NS IVPB 100 mg Intravenous Q24H   loperamide 2 mg Oral QAM   lurasidone 40 mg Oral Daily   pantoprazole 40 mg Oral QAM   topiramate 50 mg Oral BID   wheat dextrin 1 each Oral Daily     Continuous Infusions:    dexmedetomidine 0.2-1.5 mcg/kg/hr Last Rate: Stopped (08/28/20 1714)       Vital signs in last 24 hours:  Temp:  [96.9 °F (36.1 °C)-98.4 °F (36.9 °C)] 97 °F (36.1 °C)  Heart Rate:  [] 101  Resp:  [20-29] 20  BP: (110-135)/(64-83) 135/83    Intake/Output:    Intake/Output Summary (Last 24 hours) at 8/31/2020 1213  Last data filed at 8/31/2020 0840  Gross per 24 hour   Intake 320 ml   Output 200 ml   Net 120 ml       Exam:  /83   Pulse 101   Temp 97 °F (36.1 °C) (Oral)   Resp 20   Ht 167.6 cm (65.98\")   Wt 86.1 kg (189 lb 12.8 oz) Comment: not weighed by RD  SpO2 99%   BMI 30.65 kg/m²    Patient is examined using the personal protective equipment as per guidelines from infection control for this particular patient as enacted.  Hand washing was performed before and after patient interaction.  General Appearance:  Appears to be much more comfortable and does not appear to be in distress at rest and on 15 liter/min high flow O2                          Head:    Normocephalic, without obvious abnormality, atraumatic                           Eyes:    PERRL, conjunctivae/corneas clear, EOM's intact, both eyes                         Throat:   Lips, tongue, gums normal; oral mucosa pink and moist                           Neck:   Supple, symmetrical, trachea midline, no JVD                         Lungs:    Bibasilar crackles                 Chest Wall:    No tenderness or deformity                          Heart: "  S1-S2 regular                  Abdomen:   Soft nontender                 Extremities:   Extremities normal, atraumatic, no cyanosis or edema                        Pulses:   Pulses palpable in all extremities                            Skin:   Skin is warm and dry,  no rashes or palpable lesions                  Neurologic: Grossly nonfocal       Data Review:    I reviewed the patient's new clinical results.  Results from last 7 days   Lab Units 08/31/20  0603 08/30/20  0835 08/29/20  0624 08/28/20  0429 08/27/20  0556 08/26/20  0604 08/25/20  0644   WBC 10*3/mm3 11.23* 12.09* 10.51 10.87* 11.71* 8.81 9.44   HEMOGLOBIN g/dL 13.0 13.1 13.2 12.9 13.2 12.5 12.5   PLATELETS 10*3/mm3 502* 466* 442 349 336 258 224     Results from last 7 days   Lab Units 08/31/20  0603 08/30/20  0835 08/29/20  0624 08/28/20  0429 08/27/20  1716 08/27/20  0556 08/26/20  0604 08/25/20  0644   SODIUM mmol/L 136 139 141 140  --  141 143 144   POTASSIUM mmol/L 3.8 3.8 3.6 4.1 4.4 3.4* 3.0* 3.6   CHLORIDE mmol/L 107 108* 111* 112*  --  113* 112* 112*   CO2 mmol/L 19.3* 20.9* 21.4* 17.8*  --  19.0* 21.8* 21.9*   BUN mg/dL 25* 25* 20 21  --  19 21 15   CREATININE mg/dL 0.74 0.67 0.67 0.70  --  0.69 0.63 0.70   CALCIUM mg/dL 9.1 8.8 8.9 8.6  --  8.8 8.6 9.0   GLUCOSE mg/dL 89 95 106* 108*  --  85 95 115*     ECG 12 Lead   Final Result   HEART RATE= 95  bpm   RR Interval= 632  ms   VA Interval= 161  ms   P Horizontal Axis= -30  deg   P Front Axis= 55  deg   QRSD Interval= 97  ms   QT Interval= 361  ms   QRS Axis= 60  deg   T Wave Axis= 22  deg   - NORMAL ECG -   Sinus rhythm   NO SIGNIFICANT CHANGE FROM PREVIOUS ECG   Electronically Signed By: [facility] 24-Aug-2020 12:33:04   Date and Time of Study: 2020-08-23 23:18:19      SCANNED EKG   Final Result              Assessment:    Pneumonia due to COVID-19 virus    GERD (gastroesophageal reflux disease)    Depression with anxiety    Cervical spondylosis with radiculopathy    History of  colon resection    Hypertriglyceridemia  Appears to have worsened in terms of oxygen requirement and sense of wellbeing -  Reason for this change in status could include:   -Progression of COVID-19 pneumonia with evolving acute lung injury   -Volume overload due to administration of convalescent plasma   -Concomitant superimposed bacterial pneumonia     Recommendations:   · Continue aggressive supportive care with steroids and ongoing use of oxygen supplementation to keep saturation above 90%.    · X-ray worse   · Empirically use 1 dose of Lasix as she has significant crackles on examination and keep an eye on her intake and output.    · Continue short course of empiric antibiotic treatment for bacterial pneumonia.  · Extend Remdesivir for 10 days.        Millie Arias MD  8/31/2020  12:13    Much of this encounter note is an electronic transcription/translation of spoken language to printed text. The electronic translation of spoken language may permit erroneous, or at times, nonsensical words or phrases to be inadvertently transcribed; Although I have reviewed the note for such errors, some may still exist      Electronically signed by Millie Arias MD at 08/31/20 1421     Maynor Caballero MD at 08/31/20 0940                [place] [facility]         Dr Mcguire Sayanderson   LOS: 7 days   Patient Care Team:  Sun Guerrier MD as PCP - General (Internal Medicine)  Boogie Parks Jr., MD as Consulting Physician (Psychiatry)  Ava oFnseca, RN as Ambulatory  ([facility])    Chief Complaint: Acute hypoxemic respiratory failure COVID-19 pneumonia/ARDS with cytokine storm leukocytosis anemia    Interval History: Currently on OptiFlow tolerating well.  Sats being maintained above 95% at this time.  Remains weak and short of breath with minimal exertion    REVIEW OF SYSTEMS:   CARDIOVASCULAR: No chest pain, chest pressure or chest discomfort. No palpitations or edema.   RESPIRATORY: Shortness of breath  "with minimal exertion  GASTROINTESTINAL: No anorexia, nausea, vomiting or diarrhea. No abdominal pain or blood.   HEMATOLOGIC: No bleeding or bruising.           Vital Signs  Temp:  [96.9 °F (36.1 °C)-98.4 °F (36.9 °C)] 97 °F (36.1 °C)  Heart Rate:  [64-95] 78  Resp:  [24-29] 24  BP: (104-133)/(64-81) 133/77    Intake/Output Summary (Last 24 hours) at 8/31/2020 0940  Last data filed at 8/31/2020 0840  Gross per 24 hour   Intake 750 ml   Output 200 ml   Net 550 ml     Flowsheet Rows      First Filed Value   Admission Height  167.6 cm (66\") Documented at 08/23/2020 2243   Admission Weight  84.4 kg (186 lb) Documented at 08/23/2020 2243          Physical Exam:  Patient is examined using the personal protective equipment as per guidelines from infection control for this particular patient as enacted.  Hand hygiene was performed before and after patient interaction.   General Appearance:   On nonrebreather oxygen.  Awake following commands.  Desats with minimal exertion  Neck midline trachea no thyromegaly   Lungs:    Diminished breath sound crackles bilaterally    Heart:    Regular rhythm and normal rate, normal S1 and S2, no            murmur, no gallop, no rub, no click   Chest Wall:    No abnormalities observed   Abdomen:     Normal bowel sounds, no masses, no organomegaly, soft        non-tender, non-distended, no guarding, no rebound                tenderness   Extremities:   Moves all extremities well, no edema, no cyanosis, no             redness  CNS no focal neurological deficits normal sensory exam  Skin no rashes no nodules  Musculoskeletal no cyanosis no clubbing normal range of motion     Results Review:        Results from last 7 days   Lab Units 08/31/20  0603 08/30/20  0835 08/29/20  0624   SODIUM mmol/L 136 139 141   POTASSIUM mmol/L 3.8 3.8 3.6   CHLORIDE mmol/L 107 108* 111*   CO2 mmol/L 19.3* 20.9* 21.4*   BUN mg/dL 25* 25* 20   CREATININE mg/dL 0.74 0.67 0.67   GLUCOSE mg/dL 89 95 106*   CALCIUM " mg/dL 9.1 8.8 8.9     Results from last 7 days   Lab Units 08/31/20  0603 08/30/20  0835 08/29/20  0624   CK TOTAL U/L 42 46 67     Results from last 7 days   Lab Units 08/31/20  0603 08/30/20  0835 08/29/20  0624   WBC 10*3/mm3 11.23* 12.09* 10.51   HEMOGLOBIN g/dL 13.0 13.1 13.2   HEMATOCRIT % 37.8 37.9 38.8   PLATELETS 10*3/mm3 502* 466* 442             Results from last 7 days   Lab Units 08/26/20  0604   MAGNESIUM mg/dL 2.4         Results from last 7 days   Lab Units 08/25/20  1810   PH, ARTERIAL pH units 7.431   PO2 ART mm Hg 62.7*   PCO2, ARTERIAL mm Hg 29.4*   HCO3 ART mmol/L 19.6*       I reviewed the patient's new clinical results.  I personally viewed and interpreted the patient's CXR        Medication Review:     budesonide-formoterol 2 puff Inhalation BID - RT   buPROPion  mg Oral QAM   dexamethasone 6 mg Oral Daily With Breakfast   DULoxetine 60 mg Oral Daily   enoxaparin 40 mg Subcutaneous Q12H   INV [id number] remdesivir in NS IVPB 100 mg Intravenous Q24H   loperamide 2 mg Oral QAM   lurasidone 40 mg Oral Daily   pantoprazole 40 mg Oral QAM   topiramate 50 mg Oral BID   wheat dextrin 1 each Oral Daily         dexmedetomidine 0.2-1.5 mcg/kg/hr Last Rate: Stopped (08/28/20 4138)       ASSESSMENT:   22. Bilateral COVID-19 pneumonia  23. Acute hypoxic respiratory failure, secondary #1  24. Cytokine storm  25. Elevated d-dimer  26. Elevated transaminases, likely secondary to viral illness  27. Leukocytosis, likely steroid-induced  28. Mild anemia, worse but no active bleeding  29. Diarrhea, resolved  30. Hypokalemia, corrected  31. Hyperchloremia  32. Anxiety/agitation     Pertinent medical problems:  · Depression  · GERD    PLAN:  Wean down FiO2 to keep sats above 90%.  Continue weaning down FiO2 on OptiFlow as tolerated.  May be able to transition to regular nasal cannula high flow oxygen.  Dexamethasone 6 mg daily for 10 days  Antiviral therapy/Remdesivir and doxycycline per infectious  "diseases.  High-dose DVT prophylaxis 40 mg twice daily.  Improving and stable leukocytosis  Continue supportive care  Discussed with nursing staff  Precedex drip as needed  Blood glucose monitoring per ICU protocol  ICU core measures  Once oxygen requirement improves may transfer out of the ICU.        Maynor Caballero MD  08/31/20  09:40            Electronically signed by Maynor Caballero MD at 08/31/20 0942     Maynor Caballero MD at 08/30/20 1053                [place] [facility]         Dr Maynor Caballero   LOS: 6 days   Patient Care Team:  Sun Guerrier MD as PCP - General (Internal Medicine)  Boogie Parks Jr., MD as Consulting Physician (Psychiatry)  Ava Fonseca, JACOBO as Ambulatory  ([facility])    Chief Complaint: Acute hypoxemic respiratory failure COVID-19 pneumonia/ARDS with cytokine storm leukocytosis anemia    Interval History: She looks more comfortable on OptiFlow this morning.  Feels much better this morning.    REVIEW OF SYSTEMS:   CARDIOVASCULAR: No chest pain, chest pressure or chest discomfort. No palpitations or edema.   RESPIRATORY: Shortness of breath at rest  GASTROINTESTINAL: No anorexia, nausea, vomiting or diarrhea. No abdominal pain or blood.   HEMATOLOGIC: No bleeding or bruising.           Vital Signs  Temp:  [97 °F (36.1 °C)-98.2 °F (36.8 °C)] 97 °F (36.1 °C)  Heart Rate:  [] 89  Resp:  [21-22] 22  BP: (124-137)/(65-81) 130/77    Intake/Output Summary (Last 24 hours) at 8/30/2020 1053  Last data filed at 8/30/2020 0629  Gross per 24 hour   Intake 1200 ml   Output 900 ml   Net 300 ml     Flowsheet Rows      First Filed Value   Admission Height  167.6 cm (66\") Documented at 08/23/2020 2243   Admission Weight  84.4 kg (186 lb) Documented at 08/23/2020 2243          Physical Exam:  Patient is examined using the personal protective equipment as per guidelines from infection control for this particular patient as enacted.  Hand hygiene was performed before and " after patient interaction.   General Appearance:   On nonrebreather oxygen.  Awake following commands.  Desats with minimal exertion  Neck midline trachea no thyromegaly   Lungs:    Diminished breath sound crackles bilaterally    Heart:    Regular rhythm and normal rate, normal S1 and S2, no            murmur, no gallop, no rub, no click   Chest Wall:    No abnormalities observed   Abdomen:     Normal bowel sounds, no masses, no organomegaly, soft        non-tender, non-distended, no guarding, no rebound                tenderness   Extremities:   Moves all extremities well, no edema, no cyanosis, no             redness  CNS no focal neurological deficits normal sensory exam  Skin no rashes no nodules  Musculoskeletal no cyanosis no clubbing normal range of motion     Results Review:        Results from last 7 days   Lab Units 08/30/20  0835 08/29/20  0624 08/28/20  0429   SODIUM mmol/L 139 141 140   POTASSIUM mmol/L 3.8 3.6 4.1   CHLORIDE mmol/L 108* 111* 112*   CO2 mmol/L 20.9* 21.4* 17.8*   BUN mg/dL 25* 20 21   CREATININE mg/dL 0.67 0.67 0.70   GLUCOSE mg/dL 95 106* 108*   CALCIUM mg/dL 8.8 8.9 8.6     Results from last 7 days   Lab Units 08/30/20  0835 08/29/20  0624 08/28/20  0429  08/23/20  2252   CK TOTAL U/L 46 67 97   < > 317*   TROPONIN T ng/mL  --   --   --   --  <0.010    < > = values in this interval not displayed.     Results from last 7 days   Lab Units 08/30/20  0835 08/29/20  0624 08/28/20  0429   WBC 10*3/mm3 12.09* 10.51 10.87*   HEMOGLOBIN g/dL 13.1 13.2 12.9   HEMATOCRIT % 37.9 38.8 38.9   PLATELETS 10*3/mm3 466* 442 349             Results from last 7 days   Lab Units 08/26/20  0604   MAGNESIUM mg/dL 2.4         Results from last 7 days   Lab Units 08/25/20  1810   PH, ARTERIAL pH units 7.431   PO2 ART mm Hg 62.7*   PCO2, ARTERIAL mm Hg 29.4*   HCO3 ART mmol/L 19.6*       I reviewed the patient's new clinical results.  I personally viewed and interpreted the patient's CXR        Medication  Review:     budesonide-formoterol 2 puff Inhalation BID - RT   buPROPion  mg Oral QAM   dexamethasone 6 mg Oral Daily With Breakfast   DULoxetine 60 mg Oral Daily   enoxaparin 40 mg Subcutaneous Q12H   INV [id number] remdesivir in NS IVPB 100 mg Intravenous Q24H   loperamide 2 mg Oral QAM   lurasidone 40 mg Oral Daily   pantoprazole 40 mg Oral QAM   topiramate 50 mg Oral BID   wheat dextrin 1 each Oral Daily         dexmedetomidine 0.2-1.5 mcg/kg/hr Last Rate: Stopped (20 2960)       ASSESSMENT:   33. Bilateral COVID-19 pneumonia  34. Acute hypoxic respiratory failure, secondary #1  35. Cytokine storm  36. Elevated d-dimer  37. Elevated transaminases, likely secondary to viral illness  38. Leukocytosis, likely steroid-induced  39. Mild anemia, worse but no active bleeding  40. Diarrhea, resolved  41. Hypokalemia, corrected  42. Hyperchloremia  43. Anxiety/agitation     Pertinent medical problems:  · Depression  · GERD    PLAN:  Wean down FiO2 to keep sats above 90%.  She is more comfortable on OptiFlow  Dexamethasone 6 mg daily for 10 days  Antiviral therapy/Remdesivir and doxycycline per infectious diseases.  High-dose DVT prophylaxis 40 mg twice daily.  Repeat chest x-ray in the morning.  Noted rising inflammatory markers.  Monitoring inflammatory markers closely.  Improving leukocytosis  Continue supportive care  Discussed with nursing staff  Precedex drip as needed  Blood glucose monitoring per ICU protocol  ICU core measures      Maynor Caballero MD  20  10:53            Electronically signed by Maynor Caballero MD at 20 1056     Millie Arias MD at 20 2126            Infectious Diseases Progress Note    Millie Arias MD     [facility]  Los: 5 days  Patient Identification:  Name: Magdalena Dickey  Age: 65 y.o.  Sex: female  :  1955  MRN: [id number]         Primary Care Physician: Sun Guerrier MD            Subjective: Feeling better denies any fever and chills.   "Regaining her breathing much more quickly compared to previous activity related shortness of breath.  Currently on 15 L high flow oxygen.  Interval History:    · Started on Remdesivir on 8/24/2020  · Received convalescent plasma on 8/24/2020 started at 2251 finished at 0003 -  20  [id number]  [id number] ABO Rh A+  Objective:    Scheduled Meds:    budesonide-formoterol 2 puff Inhalation BID - RT   buPROPion  mg Oral QAM   dexamethasone 6 mg Oral Daily With Breakfast   doxycycline 100 mg Intravenous Q12H   DULoxetine 60 mg Oral Daily   enoxaparin 40 mg Subcutaneous Q12H   INV [id number] remdesivir in NS IVPB 100 mg Intravenous Q24H   loperamide 2 mg Oral QAM   lurasidone 40 mg Oral Daily   pantoprazole 40 mg Oral QAM   topiramate 50 mg Oral BID   wheat dextrin 1 each Oral Daily     Continuous Infusions:    dexmedetomidine 0.2-1.5 mcg/kg/hr Last Rate: Stopped (08/28/20 1714)       Vital signs in last 24 hours:  Temp:  [97.6 °F (36.4 °C)-98.4 °F (36.9 °C)] 97.6 °F (36.4 °C)  Heart Rate:  [] 75  Resp:  [21-22] 22  BP: (122-140)/(68-82) 130/77    Intake/Output:    Intake/Output Summary (Last 24 hours) at 8/29/2020 2126  Last data filed at 8/29/2020 1900  Gross per 24 hour   Intake 1292 ml   Output 900 ml   Net 392 ml       Exam:  /77   Pulse 75   Temp 97.6 °F (36.4 °C) (Oral)   Resp 22   Ht 167.6 cm (65.98\")   Wt 86.1 kg (189 lb 12.8 oz) Comment: not weighed by RD  SpO2 96%   BMI 30.65 kg/m²    Patient is examined using the personal protective equipment as per guidelines from infection control for this particular patient as enacted.  Hand washing was performed before and after patient interaction.  General Appearance:  Appears to be much more comfortable and does not appear to be in distress at rest and on 15 liter/min high flow O2                          Head:    Normocephalic, without obvious abnormality, atraumatic                           Eyes:    PERRL, conjunctivae/corneas clear, EOM's " intact, both eyes                         Throat:   Lips, tongue, gums normal; oral mucosa pink and moist                           Neck:   Supple, symmetrical, trachea midline, no JVD                         Lungs:    Bibasilar crackles                 Chest Wall:    No tenderness or deformity                          Heart:  S1-S2 regular                  Abdomen:   Soft nontender                 Extremities:   Extremities normal, atraumatic, no cyanosis or edema                        Pulses:   Pulses palpable in all extremities                            Skin:   Skin is warm and dry,  no rashes or palpable lesions                  Neurologic: Grossly nonfocal       Data Review:    I reviewed the patient's new clinical results.  Results from last 7 days   Lab Units 08/29/20  0624 08/28/20  0429 08/27/20  0556 08/26/20  0604 08/25/20  0644 08/23/20  2252   WBC 10*3/mm3 10.51 10.87* 11.71* 8.81 9.44 5.71   HEMOGLOBIN g/dL 13.2 12.9 13.2 12.5 12.5 12.9   PLATELETS 10*3/mm3 442 349 336 258 224 169     Results from last 7 days   Lab Units 08/29/20  0624 08/28/20  0429 08/27/20  1716 08/27/20  0556 08/26/20  0604 08/25/20  0644 08/23/20  2252   SODIUM mmol/L 141 140  --  141 143 144 137   POTASSIUM mmol/L 3.6 4.1 4.4 3.4* 3.0* 3.6 3.6   CHLORIDE mmol/L 111* 112*  --  113* 112* 112* 103   CO2 mmol/L 21.4* 17.8*  --  19.0* 21.8* 21.9* 20.1*   BUN mg/dL 20 21  --  19 21 15 12   CREATININE mg/dL 0.67 0.70  --  0.69 0.63 0.70 0.87   CALCIUM mg/dL 8.9 8.6  --  8.8 8.6 9.0 8.1*   GLUCOSE mg/dL 106* 108*  --  85 95 115* 123*     ECG 12 Lead   Final Result   HEART RATE= 95  bpm   RR Interval= 632  ms   KY Interval= 161  ms   P Horizontal Axis= -30  deg   P Front Axis= 55  deg   QRSD Interval= 97  ms   QT Interval= 361  ms   QRS Axis= 60  deg   T Wave Axis= 22  deg   - NORMAL ECG -   Sinus rhythm   NO SIGNIFICANT CHANGE FROM PREVIOUS ECG   Electronically Signed By: Evonne Lantigua ([facility]) 24-Aug-2020 12:33:04   Date and Time of  Study: 2020-08-23 23:18:19      SCANNED EKG   Final Result              Assessment:    Pneumonia due to COVID-19 virus    GERD (gastroesophageal reflux disease)    Depression with anxiety    Cervical spondylosis with radiculopathy    History of colon resection    Hypertriglyceridemia  Appears to have worsened in terms of oxygen requirement and sense of wellbeing -  Reason for this change in status could include:   -Progression of COVID-19 pneumonia with evolving acute lung injury   -Volume overload due to administration of convalescent plasma   -Concomitant superimposed bacterial pneumonia     Recommendations:   · Continue aggressive supportive care with steroids and ongoing use of oxygen supplementation to keep saturation above 90%.    · X-ray worse   · Empirically use 1 dose of Lasix as she has significant crackles on examination and keep an eye on her intake and output.    · Continue short course of empiric antibiotic treatment for bacterial pneumonia.  · Extend Remdesivir for 10 days.        Millie Arias MD  8/29/2020  21:26    Much of this encounter note is an electronic transcription/translation of spoken language to printed text. The electronic translation of spoken language may permit erroneous, or at times, nonsensical words or phrases to be inadvertently transcribed; Although I have reviewed the note for such errors, some may still exist      Electronically signed by Millie Arias MD at 08/29/20 3620     Maynor Caballero MD at 08/29/20 1113                [place] [facility]         Dr Mcguire Sayanderson   LOS: 5 days   Patient Care Team:  Sun Guerrier MD as PCP - General (Internal Medicine)  Boogie Parks Jr., MD as Consulting Physician (Psychiatry)  Ava Fonseca, JACOBO as Ambulatory  (Population Health)    Chief Complaint: Acute hypoxemic respiratory failure COVID-19 pneumonia/ARDS with cytokine storm leukocytosis anemia    Interval History: Events noted chart reviewed.  She is on  "nonrebreather with 15 L high flow oxygen.  Resting comfortably with no increase in work of breathing.    REVIEW OF SYSTEMS:   CARDIOVASCULAR: No chest pain, chest pressure or chest discomfort. No palpitations or edema.   RESPIRATORY: Shortness of breath at rest  GASTROINTESTINAL: No anorexia, nausea, vomiting or diarrhea. No abdominal pain or blood.   HEMATOLOGIC: No bleeding or bruising.           Vital Signs  Temp:  [97.8 °F (36.6 °C)-99.1 °F (37.3 °C)] 98.4 °F (36.9 °C)  Heart Rate:  [80-94] 91  Resp:  [22-26] 22  BP: (122-164)/(68-84) 122/68    Intake/Output Summary (Last 24 hours) at 8/29/2020 1113  Last data filed at 8/29/2020 0800  Gross per 24 hour   Intake 1273 ml   Output --   Net 1273 ml     Flowsheet Rows      First Filed Value   Admission Height  167.6 cm (66\") Documented at 08/23/2020 2243   Admission Weight  84.4 kg (186 lb) Documented at 08/23/2020 2243          Physical Exam:  Patient is examined using the personal protective equipment as per guidelines from infection control for this particular patient as enacted.  Hand hygiene was performed before and after patient interaction.   General Appearance:   On nonrebreather oxygen.  Awake following commands.  Desats with minimal exertion  Neck midline trachea no thyromegaly   Lungs:    Diminished breath sound crackles bilaterally    Heart:    Regular rhythm and normal rate, normal S1 and S2, no            murmur, no gallop, no rub, no click   Chest Wall:    No abnormalities observed   Abdomen:     Normal bowel sounds, no masses, no organomegaly, soft        non-tender, non-distended, no guarding, no rebound                tenderness   Extremities:   Moves all extremities well, no edema, no cyanosis, no             redness  CNS no focal neurological deficits normal sensory exam  Skin no rashes no nodules  Musculoskeletal no cyanosis no clubbing normal range of motion     Results Review:        Results from last 7 days   Lab Units 08/29/20  0624 " 08/28/20 0429 08/27/20 1716 08/27/20  0556   SODIUM mmol/L 141 140  --  141   POTASSIUM mmol/L 3.6 4.1 4.4 3.4*   CHLORIDE mmol/L 111* 112*  --  113*   CO2 mmol/L 21.4* 17.8*  --  19.0*   BUN mg/dL 20 21  --  19   CREATININE mg/dL 0.67 0.70  --  0.69   GLUCOSE mg/dL 106* 108*  --  85   CALCIUM mg/dL 8.9 8.6  --  8.8     Results from last 7 days   Lab Units 08/29/20  0624 08/28/20  0429 08/27/20  0556  08/23/20  2252   CK TOTAL U/L 67 97 117   < > 317*   TROPONIN T ng/mL  --   --   --   --  <0.010    < > = values in this interval not displayed.     Results from last 7 days   Lab Units 08/29/20  0624 08/28/20 0429 08/27/20  0556   WBC 10*3/mm3 10.51 10.87* 11.71*   HEMOGLOBIN g/dL 13.2 12.9 13.2   HEMATOCRIT % 38.8 38.9 38.7   PLATELETS 10*3/mm3 442 349 336             Results from last 7 days   Lab Units 08/26/20  0604   MAGNESIUM mg/dL 2.4         Results from last 7 days   Lab Units 08/25/20  1810   PH, ARTERIAL pH units 7.431   PO2 ART mm Hg 62.7*   PCO2, ARTERIAL mm Hg 29.4*   HCO3 ART mmol/L 19.6*       I reviewed the patient's new clinical results.  I personally viewed and interpreted the patient's CXR        Medication Review:     budesonide-formoterol 2 puff Inhalation BID - RT   buPROPion  mg Oral QAM   dexamethasone 6 mg Oral Daily With Breakfast   doxycycline 100 mg Intravenous Q12H   DULoxetine 60 mg Oral Daily   enoxaparin 40 mg Subcutaneous Q12H   INV GS-5734 remdesivir in NS IVPB 100 mg Intravenous Q24H   loperamide 2 mg Oral QAM   lurasidone 40 mg Oral Daily   pantoprazole 40 mg Oral QAM   topiramate 50 mg Oral BID   wheat dextrin 1 each Oral Daily         dexmedetomidine 0.2-1.5 mcg/kg/hr Last Rate: Stopped (08/28/20 1714)       ASSESSMENT:   49. Bilateral COVID-19 pneumonia  50. Acute hypoxic respiratory failure, secondary #1  51. Cytokine storm  52. Elevated d-dimer  53. Elevated transaminases, likely secondary to viral illness  54. Leukocytosis, likely steroid-induced  55. Mild anemia,  worse but no active bleeding  56. Diarrhea, resolved  57. Hypokalemia, corrected  58. Hyperchloremia  59. Anxiety/agitation     Pertinent medical problems:  · Depression  · GERD    PLAN:  Remains on high flow oxygen combination of nasal high flow and nonrebreather.  I will transition her to OptiFlow.  Wean oxygen down to keep sats above 90%.  Dexamethasone 6 mg daily for 10 days  Antiviral therapy/Remdesivir and doxycycline per infectious diseases.  High-dose DVT prophylaxis 40 mg twice daily.  Repeat chest x-ray in the morning.  Noted rising inflammatory markers.  Monitoring inflammatory markers closely.  Improving leukocytosis  Continue supportive care  Discussed with nursing staff  Precedex drip as needed  Blood glucose monitoring per ICU protocol  ICU core measures      Maynor Caballero MD  08/29/20  11:13            Electronically signed by Maynor Caballero MD at 08/29/20 1120     Peter Mueller MD at 08/28/20 7068                                                        LOS: 4 days   Patient Care Team:  Sun Guerrier MD as PCP - General (Internal Medicine)  Boogie Parks Jr., MD as Consulting Physician (Psychiatry)  Ava Fonseca RN as Ambulatory  ([facility] [place])    Chief Complaint:  F/up respiratory failure, COVID-19 pneumonia, critical care management and medical problems as below    Subjective   History  Onset of symptoms on 8/16/2020, started with high-grade fever..  Admitted on 8/24/2020.  Was 87% on 2 L on admission and her CXR showed bilateral pulmonary opacities.  Receiving dexamethasone and Remdesevir.  S/p 1 convalescent plasma.  Gradually got worse requiring significant oxygen support and transferred to CCU on 8/27/2020.    Interval History  Feels slightly better today.  Has mild dry cough.  Shortness of breath has improved but she remains on 15 L oxygen by nasal cannula.    REVIEW OF SYSTEMS:   CARDIOVASCULAR: No chest pain, chest pressure or chest discomfort. No palpitations or  "edema.   Neuro/psych: Very anxious and slightly agitated at times.  Denies depression.  GASTROINTESTINAL: No diarrhea today.  No nausea or vomiting.  Appetite improving and she is eating >1/2 her meals.  Constitutional: No fever or chills.  Last temperature was 100.7 on 8/25    Ventilator/Non-Invasive Ventilation Settings (From admission, onward)    None                Physical Exam:     Vital Signs  Temp:  [95.6 °F (35.3 °C)-99.1 °F (37.3 °C)] 99.1 °F (37.3 °C)  Heart Rate:  [67-93] 87  Resp:  [24-26] 26  BP: (120-164)/(70-98) 164/78    Intake/Output Summary (Last 24 hours) at 8/28/2020 1718  Last data filed at 8/28/2020 1714  Gross per 24 hour   Intake 1257 ml   Output --   Net 1257 ml     Flowsheet Rows      First Filed Value   Admission Height  167.6 cm (66\") Documented at 08/23/2020 2243   Admission Weight  84.4 kg (186 lb) Documented at 08/23/2020 2243          General Appearance:   Alert, cooperative, in minimal distress with tachypnea   ENMT:  External ears normal.  Mouth was not examined due to the presence of surgical mask.   Eyes:  Pupils equal and reactive to light. EOMI.  Injected conjunctival   Neck:   Trachea midline. No thyromegaly.   Lungs:    Bilateral crackles, coarse at the bases and extend to third of the chest at least.  No wheezing.  Slight tachypnea and labored breathing on activities and while talking but no use of accessory muscles at rest.    Heart:   Regular rhythm and rate.  No audible murmur   Skin:   No rash   Abdomen:    Obese. Soft. No tenderness. No HSM.  Positive bowel sounds   Neuro:  Conscious, alert, oriented x3. Strength 5/5 in upper and lower  ext   Extremities:  No cyanosis, clubbing or edema.  Warm extremities and well-perfused          Results Review:        Results from last 7 days   Lab Units 08/28/20  0429 08/27/20  1716 08/27/20  0556 08/26/20  0604   SODIUM mmol/L 140  --  141 143   POTASSIUM mmol/L 4.1 4.4 3.4* 3.0*   CHLORIDE mmol/L 112*  --  113* 112*   CO2 mmol/L " 17.8*  --  19.0* 21.8*   BUN mg/dL 21  --  19 21   CREATININE mg/dL 0.70  --  0.69 0.63   GLUCOSE mg/dL 108*  --  85 95   CALCIUM mg/dL 8.6  --  8.8 8.6     Results from last 7 days   Lab Units 08/28/20  0429 08/27/20  0556 08/26/20  0604  08/23/20  2252   CK TOTAL U/L 97 117 195*   < > 317*   TROPONIN T ng/mL  --   --   --   --  <0.010    < > = values in this interval not displayed.     Results from last 7 days   Lab Units 08/28/20  0429 08/27/20  0556 08/26/20  0604   WBC 10*3/mm3 10.87* 11.71* 8.81   HEMOGLOBIN g/dL 12.9 13.2 12.5   HEMATOCRIT % 38.9 38.7 37.6   PLATELETS 10*3/mm3 349 336 258         Results from last 7 days   Lab Units 08/23/20  2252   PROBNP pg/mL 23.9       I reviewed the patient's new clinical results.        Medication Review:     budesonide-formoterol 2 puff Inhalation BID - RT   buPROPion  mg Oral QAM   dexamethasone 6 mg Oral Daily With Breakfast   doxycycline 100 mg Intravenous Q12H   DULoxetine 60 mg Oral Daily   enoxaparin 40 mg Subcutaneous Q12H   INV [id number] remdesivir in NS IVPB 100 mg Intravenous Q24H   loperamide 2 mg Oral QAM   lurasidone 40 mg Oral Daily   pantoprazole 40 mg Oral QAM   topiramate 50 mg Oral BID   wheat dextrin 1 each Oral Daily         dexmedetomidine 0.2-1.5 mcg/kg/hr Last Rate: 0.1 mcg/kg/hr (08/28/20 1452)       Diagnostic imaging:  I personally and independently reviewed the following images:  CXR 8/27/2020 compared to 8/25/2020:  Improved bilateral pulmonary opacities.    Assessment   7. Bilateral COVID-19 pneumonia  8. Acute hypoxic respiratory failure, secondary #1  9. Cytokine storm  10. Elevated d-dimer  11. Elevated transaminases, likely secondary to viral illness  12. Leukocytosis, likely steroid-induced  13. Mild anemia, worse but no active bleeding  14. Diarrhea, resolved  15. Hypokalemia, corrected  16. Hyperchloremia  17. Anxiety/agitation    Pertinent medical problems:  · Depression  · GERD        Plan   · Oxygen by HF NC, currently at 15  L/min.  Titrate to keep SPO2 greater than 90%.  · Dexamethasone 6 mg daily for 10 days  · Remdesevir and doxycycline per ID-appreciate assistance  · High-dose DVT prophylaxis Lovenox 40 mg twice daily.  Repeat d-dimer in a.m.  If it continues to increase then consider CTA to rule out PE  · Encouraged for nutrition and I-S.    · Loperamide as needed for diarrhea  · Encouraged to drink boost  · Precedex for anxiety/agitation      Discussed with the CCU staff during the multidisciplinary round.    Time>35 min face to face and on the holliday >1/2 directed toward counseling and coordination of care    Peter Mueller MD  20  17:18            This note was dictated utilizing Dragon dictation    Electronically signed by Peter Mueller MD at 20 1721     Millie Arias MD at 20 0752            Infectious Diseases Progress Note    Millie Arias MD     [facility]  Los: 4 days  Patient Identification:  Name: Magdalena Dickey  Age: 65 y.o.  Sex: female  :  1955  MRN: [id number]         Primary Care Physician: Sun Guerrier MD            Subjective: Feeling slightly better than yesterday.  Little bit stronger than yesterday.  Interval History:    · Started on Remdesivir on 2020  · Received convalescent plasma on 2020 started at 2251 finished at 0003 -  20  [id number]  [id number] ABO Rh A+  Objective:    Scheduled Meds:    budesonide-formoterol 2 puff Inhalation BID - RT   buPROPion  mg Oral QAM   dexamethasone 6 mg Oral Daily With Breakfast   doxycycline 100 mg Intravenous Q12H   DULoxetine 60 mg Oral Daily   enoxaparin 40 mg Subcutaneous Q12H   INV [id number] remdesivir in NS IVPB 100 mg Intravenous Q24H   loperamide 2 mg Oral QAM   lurasidone 40 mg Oral Daily   pantoprazole 40 mg Oral QAM   topiramate 50 mg Oral BID   wheat dextrin 1 each Oral Daily     Continuous Infusions:    dexmedetomidine 0.2-1.5 mcg/kg/hr Last Rate: 0.6 mcg/kg/hr (20 0550)       Vital signs in last  "24 hours:  Temp:  [95.6 °F (35.3 °C)-98.7 °F (37.1 °C)] 98.7 °F (37.1 °C)  Heart Rate:  [67-93] 71  Resp:  [24-30] 24  BP: (120-158)/(67-98) 120/87    Intake/Output:    Intake/Output Summary (Last 24 hours) at 8/28/2020 0752  Last data filed at 8/28/2020 0500  Gross per 24 hour   Intake 1587 ml   Output --   Net 1587 ml       Exam:  /87   Pulse 71   Temp 98.7 °F (37.1 °C) (Oral)   Resp 24   Ht 167.6 cm (65.98\")   Wt 86.1 kg (189 lb 12.8 oz) Comment: not weighed by RD  SpO2 91%   BMI 30.65 kg/m²    Patient is examined using the personal protective equipment as per guidelines from infection control for this particular patient as enacted.  Hand washing was performed before and after patient interaction.  General Appearance:  Ill-appearing female who appears out of breath and 15 liter/min O2                          Head:    Normocephalic, without obvious abnormality, atraumatic                           Eyes:    PERRL, conjunctivae/corneas clear, EOM's intact, both eyes                         Throat:   Lips, tongue, gums normal; oral mucosa pink and moist                           Neck:   Supple, symmetrical, trachea midline, no JVD                         Lungs:    Bibasilar crackles                 Chest Wall:    No tenderness or deformity                          Heart:  S1-S2 regular                  Abdomen:   Soft nontender                 Extremities:   Extremities normal, atraumatic, no cyanosis or edema                        Pulses:   Pulses palpable in all extremities                            Skin:   Skin is warm and dry,  no rashes or palpable lesions                  Neurologic: Grossly nonfocal       Data Review:    I reviewed the patient's new clinical results.  Results from last 7 days   Lab Units 08/28/20  0429 08/27/20  0556 08/26/20  0604 08/25/20  0644 08/23/20  8302   WBC 10*3/mm3 10.87* 11.71* 8.81 9.44 5.71   HEMOGLOBIN g/dL 12.9 13.2 12.5 12.5 12.9   PLATELETS 10*3/mm3 349 336 " 258 224 169     Results from last 7 days   Lab Units 08/28/20  0429 08/27/20  1716 08/27/20  0556 08/26/20  0604 08/25/20  0644 08/23/20  2252   SODIUM mmol/L 140  --  141 143 144 137   POTASSIUM mmol/L 4.1 4.4 3.4* 3.0* 3.6 3.6   CHLORIDE mmol/L 112*  --  113* 112* 112* 103   CO2 mmol/L 17.8*  --  19.0* 21.8* 21.9* 20.1*   BUN mg/dL 21  --  19 21 15 12   CREATININE mg/dL 0.70  --  0.69 0.63 0.70 0.87   CALCIUM mg/dL 8.6  --  8.8 8.6 9.0 8.1*   GLUCOSE mg/dL 108*  --  85 95 115* 123*     ECG 12 Lead   Final Result   HEART RATE= 95  bpm   RR Interval= 632  ms   OR Interval= 161  ms   P Horizontal Axis= -30  deg   P Front Axis= 55  deg   QRSD Interval= 97  ms   QT Interval= 361  ms   QRS Axis= 60  deg   T Wave Axis= 22  deg   - NORMAL ECG -   Sinus rhythm   NO SIGNIFICANT CHANGE FROM PREVIOUS ECG   Electronically Signed By: Evonne Lantigua ([facility]) 24-Aug-2020 12:33:04   Date and Time of Study: 2020-08-23 23:18:19      SCANNED EKG   Final Result              Assessment:    Pneumonia due to COVID-19 virus    GERD (gastroesophageal reflux disease)    Depression with anxiety    Cervical spondylosis with radiculopathy    History of colon resection    Hypertriglyceridemia  Appears to have worsened in terms of oxygen requirement and sense of wellbeing -  Reason for this change in status could include:   -Progression of COVID-19 pneumonia with evolving acute lung injury   -Volume overload due to administration of convalescent plasma   -Concomitant superimposed bacterial pneumonia     Recommendations:   · Continue aggressive supportive care with steroids and ongoing use of oxygen supplementation to keep saturation above 90%.    · X-ray worse   · Empirically use 1 dose of Lasix as she has significant crackles on examination and keep an eye on her intake and output.    · Continue short course of empiric antibiotic treatment for bacterial pneumonia.  · Extend Remdesivir for 10 days.        Millie Arias MD  8/28/2020  07:52    Much  of this encounter note is an electronic transcription/translation of spoken language to printed text. The electronic translation of spoken language may permit erroneous, or at times, nonsensical words or phrases to be inadvertently transcribed; Although I have reviewed the note for such errors, some may still exist      Electronically signed by Millie Arias MD at 20 1425     Deep Mccauley MD at 20 1053        Chart reviewed    Appreciate ID/intensivist support and input especially while patient is in the ICU    LHA following a bit more peripherally and happy to assume care once this patient is ready to transition out of ICU    Electronically signed by Deep Mccauley MD at 20 1054     Millie Arias MD at 20 0854            Infectious Diseases Progress Note    Millie Arias MD     [facility]  Los: 3 days  Patient Identification:  Name: Magdalena Dickey  Age: 65 y.o.  Sex: female  :  1955  MRN: [id number]         Primary Care Physician: Sun Guerrier MD            Subjective:fever is better but still feels out of breath and tired. Feels anxious  Interval History:    · Started on Remdesivir on 2020  · Received convalescent plasma on 2020 started at 2251 finished at 0003 -  20  [id number]  [id number] ABO Rh A+  Objective:    Scheduled Meds:    budesonide-formoterol 2 puff Inhalation BID - RT   buPROPion  mg Oral QAM   dexamethasone 6 mg Oral Daily With Breakfast   doxycycline 100 mg Intravenous Q12H   DULoxetine 60 mg Oral Daily   enoxaparin 40 mg Subcutaneous Q12H   INV [id number] remdesivir in NS IVPB 100 mg Intravenous Q24H   loperamide 2 mg Oral QAM   lurasidone 40 mg Oral Daily   pantoprazole 40 mg Oral QAM   topiramate 50 mg Oral BID   wheat dextrin 1 each Oral Daily     Continuous Infusions:     Vital signs in last 24 hours:  Temp:  [96.2 °F (35.7 °C)-98.2 °F (36.8 °C)] 98.2 °F (36.8 °C)  Heart Rate:  [70-80] 77  Resp:  [18-32] 30  BP:  "(129154)/(73-90) 154/90    Intake/Output:    Intake/Output Summary (Last 24 hours) at 8/27/2020 0854  Last data filed at 8/26/2020 1838  Gross per 24 hour   Intake 470 ml   Output --   Net 470 ml       Exam:  /90 (BP Location: Right arm, Patient Position: Lying)   Pulse 77   Temp 98.2 °F (36.8 °C) (Oral)   Resp (!) 30   Ht 167.6 cm (66\")   Wt 86.1 kg (189 lb 12.8 oz)   SpO2 96%   BMI 30.63 kg/m²    Patient is examined using the personal protective equipment as per guidelines from infection control for this particular patient as enacted.  Hand washing was performed before and after patient interaction.  General Appearance:  Ill-appearing female who appears out of breath and 15 liter/min O2                          Head:    Normocephalic, without obvious abnormality, atraumatic                           Eyes:    PERRL, conjunctivae/corneas clear, EOM's intact, both eyes                         Throat:   Lips, tongue, gums normal; oral mucosa pink and moist                           Neck:   Supple, symmetrical, trachea midline, no JVD                         Lungs:    Bibasilar crackles                 Chest Wall:    No tenderness or deformity                          Heart:  S1-S2 regular                  Abdomen:   Soft nontender                 Extremities:   Extremities normal, atraumatic, no cyanosis or edema                        Pulses:   Pulses palpable in all extremities                            Skin:   Skin is warm and dry,  no rashes or palpable lesions                  Neurologic: Grossly nonfocal       Data Review:    I reviewed the patient's new clinical results.  Results from last 7 days   Lab Units 08/27/20  0556 08/26/20  0604 08/25/20  0644 08/23/20  2252   WBC 10*3/mm3 11.71* 8.81 9.44 5.71   HEMOGLOBIN g/dL 13.2 12.5 12.5 12.9   PLATELETS 10*3/mm3 336 258 224 169     Results from last 7 days   Lab Units 08/27/20  0556 08/26/20  0604 08/25/20  0644 08/23/20  2252   SODIUM mmol/L 141 " 143 144 137   POTASSIUM mmol/L 3.4* 3.0* 3.6 3.6   CHLORIDE mmol/L 113* 112* 112* 103   CO2 mmol/L 19.0* 21.8* 21.9* 20.1*   BUN mg/dL 19 21 15 12   CREATININE mg/dL 0.69 0.63 0.70 0.87   CALCIUM mg/dL 8.8 8.6 9.0 8.1*   GLUCOSE mg/dL 85 95 115* 123*     ECG 12 Lead   Final Result   HEART RATE= 95  bpm   RR Interval= 632  ms   MT Interval= 161  ms   P Horizontal Axis= -30  deg   P Front Axis= 55  deg   QRSD Interval= 97  ms   QT Interval= 361  ms   QRS Axis= 60  deg   T Wave Axis= 22  deg   - NORMAL ECG -   Sinus rhythm   NO SIGNIFICANT CHANGE FROM PREVIOUS ECG   Electronically Signed By: Evonne Lantigua ([facility]) 24-Aug-2020 12:33:04   Date and Time of Study: 2020-08-23 23:18:19      SCANNED EKG   Final Result              Assessment:    Pneumonia due to COVID-19 virus    GERD (gastroesophageal reflux disease)    Depression with anxiety    Cervical spondylosis with radiculopathy    History of colon resection    Hypertriglyceridemia  Appears to have worsened in terms of oxygen requirement and sense of wellbeing -  Reason for this change in status could include:   -Progression of COVID-19 pneumonia with evolving acute lung injury   -Volume overload due to administration of convalescent plasma   -Concomitant superimposed bacterial pneumonia     Recommendations:   · Continue aggressive supportive care with steroids and ongoing use of oxygen supplementation to keep saturation above 90%.    · X-ray worse   · Empirically use 1 dose of Lasix as she has significant crackles on examination and keep an eye on her intake and output.    · Continue short course of empiric antibiotic treatment for bacterial pneumonia.  · Extend Remdesivir for 10 days.        Millie Arias MD  8/27/2020  08:54    Much of this encounter note is an electronic transcription/translation of spoken language to printed text. The electronic translation of spoken language may permit erroneous, or at times, nonsensical words or phrases to be inadvertently  transcribed; Although I have reviewed the note for such errors, some may still exist      Electronically signed by Millie Arias MD at 08/27/20 1153     Peter Mueller MD at 08/27/20 0832                                                        LOS: 3 days   Patient Care Team:  Sun Guerrier MD as PCP - General (Internal Medicine)  Boogie Parks Jr., MD as Consulting Physician (Psychiatry)  Ava Fonseca, JACOBO as Ambulatory  ([facility])    Chief Complaint:  F/up respiratory failure, COVID-19 pneumonia, critical care management and medical problems as below    Subjective   History  Onset of symptoms on 8/16/2020, started with high-grade fever..  Admitted on 8/24/2020.  Was 87% on 2 L on admission and her CXR showed bilateral pulmonary opacities.  Receiving dexamethasone and Remdesevir.  S/p 1 convalescent plasma.  Gradually got worse requiring significant oxygen support and transferred to CCU on 8/27/2020.    Interval History  I reviewed the admission note, progress notes, PMH, PSH, Family hx, social history, imagings and prior records on this admission, summarized the finding in my note and formulated a transition of care plan.     Deteriorated today.  Requiring a lot of oxygen and she is more short of breath.  She was transferred to the intensive care unit for further management.    REVIEW OF SYSTEMS:   CARDIOVASCULAR: No chest pain, chest pressure or chest discomfort. No palpitations or edema.   Neuro/psych: Anxious.  Denies depression.  GASTROINTESTINAL: Has mild intermittent diarrhea.  No nausea or vomiting.  Appetite is reduced but not so bad.  Constitutional: No fever or chills.  Last temperature was 100.7 on 8/25        Physical Exam:     Vital Signs  Temp:  [96.2 °F (35.7 °C)-97.3 °F (36.3 °C)] 96.2 °F (35.7 °C)  Heart Rate:  [70-80] 70  Resp:  [18-20] 20  BP: (129-132)/(73-82) 131/73    Intake/Output Summary (Last 24 hours) at 8/27/2020 0832  Last data filed at 8/26/2020 9892  Gross per  "24 hour   Intake 470 ml   Output --   Net 470 ml     Flowsheet Rows      First Filed Value   Admission Height  167.6 cm (66\") Documented at 08/23/2020 2243   Admission Weight  84.4 kg (186 lb) Documented at 08/23/2020 2243          General Appearance:   Alert, cooperative, in minimal distress with tachypnea   ENMT:  External ears normal.  Mouth was not examined due to the presence of surgical mask.   Eyes:  Pupils equal and reactive to light. EOMI.  Injected conjunctival   Neck:   Trachea midline. No thyromegaly.   Lungs:    Bilateral crackles, coarse at the bases and extend to third of the chest at least.  No wheezing.  Slight tachypnea and labored breathing on activities and while talking but no use of accessory muscles at rest.    Heart:   Regular rhythm and rate.  No audible murmur   Skin:   No rash   Abdomen:    Obese. Soft. No tenderness. No HSM.  Positive bowel sounds   Neuro:  Conscious, alert, oriented x3. Strength 5/5 in upper and lower  ext   Extremities:  No cyanosis, clubbing or edema.  Warm extremities and well-perfused          Results Review:        Results from last 7 days   Lab Units 08/27/20  0556 08/26/20  0604 08/25/20  0644   SODIUM mmol/L 141 143 144   POTASSIUM mmol/L 3.4* 3.0* 3.6   CHLORIDE mmol/L 113* 112* 112*   CO2 mmol/L 19.0* 21.8* 21.9*   BUN mg/dL 19 21 15   CREATININE mg/dL 0.69 0.63 0.70   GLUCOSE mg/dL 85 95 115*   CALCIUM mg/dL 8.8 8.6 9.0     Results from last 7 days   Lab Units 08/27/20  0556 08/26/20  0604 08/25/20  0644 08/23/20  2252   CK TOTAL U/L 117 195* 307* 317*   TROPONIN T ng/mL  --   --   --  <0.010     Results from last 7 days   Lab Units 08/27/20  0556 08/26/20  0604 08/25/20  0644   WBC 10*3/mm3 11.71* 8.81 9.44   HEMOGLOBIN g/dL 13.2 12.5 12.5   HEMATOCRIT % 38.7 37.6 36.4   PLATELETS 10*3/mm3 336 258 224           I reviewed the patient's new clinical results.        Medication Review:     budesonide-formoterol 2 puff Inhalation BID - RT   buPROPion  mg " Oral QAM   dexamethasone 6 mg Oral Daily With Breakfast   doxycycline 100 mg Intravenous Q12H   DULoxetine 60 mg Oral Daily   enoxaparin 40 mg Subcutaneous Q12H   INV [id number]-5734 remdesivir in NS IVPB 100 mg Intravenous Q24H   loperamide 2 mg Oral QAM   lurasidone 40 mg Oral Daily   pantoprazole 40 mg Oral QAM   topiramate 50 mg Oral BID   wheat dextrin 1 each Oral Daily            Diagnostic imaging:  I personally and independently reviewed the following images:  CXR 2020 compared to 2020:  Improved bilateral pulmonary opacities.    Assessment   22. Bilateral COVID-19 pneumonia  23. Acute hypoxic respiratory failure, secondary #1  24. Cytokine storm  25. Elevated d-dimer, increasing  26. Elevated transaminases, likely secondary to viral illness  27. Leukocytosis, likely steroid-induced  28. Diarrhea  29. Hypokalemia    Pertinent medical problems:  · Depression  · GERD    All problems new to me      Plan   · Oxygen by [facility], currently at 15 L/min.  Titrate to keep SPO2 greater than 90%.  · Dexamethasone 6 mg daily for 10 days  · Remdesevir and doxycycline per ID-appreciate assistance  · High-dose DVT prophylaxis Lovenox 4 mg twice daily  · Replace potassium  · Encouraged for nutrition and I-S.  She is doing 500 mL only on I-S  · Loperamide for diarrhea        Critical but suspect that she would start improving in the next few days.  She is currently day 12 of her illness and we might be seeing that worse of her condition now.    Discussed with the CCU staff during the multidisciplinary round    Peter Mueller MD  20  08:32      Time: Critical care 35 min      This note was dictated utilizing Dragon dictation    Electronically signed by Peter Mueller MD at 20 4826     Juan Amaro MD at 20 1330              Name: Magdalena Dickey ADMIT: 2020   : 1955  PCP: Sun Guerrier MD    MRN: [id number] LOS: 2 days   AGE/SEX: 65 y.o. female  ROOM: [facility]   Subjective   Chief  Complaint   Patient presents with   • Shortness of Breath      Dyspnea worse today and worse with exertion. She appears more uncomfortable. No CP or palpitations. No NV. Had some diarrhea but she has not been taking her usual daily imodium. No abdominal pain.    Objective   Vital Signs  Temp:  [94.8 °F (34.9 °C)-98.4 °F (36.9 °C)] 97.9 °F (36.6 °C)  Heart Rate:  [67-82] 73  Resp:  [16-20] 20  BP: (119-151)/(72-89) 151/89  SpO2:  [85 %-97 %] 92 %  on  Flow (L/min):  [5-11] 11;   Device (Oxygen Therapy): humidified;high-flow nasal cannula  Body mass index is 30.63 kg/m².    Physical Exam   Constitutional: She is oriented to person, place, and time. She appears well-developed. She appears ill.   HENT:   Head: Atraumatic.   Nose: Nose normal.   Eyes: Conjunctivae and EOM are normal.   Neck: Neck supple. No tracheal deviation present.   Cardiovascular: Normal rate, regular rhythm and intact distal pulses.   Pulmonary/Chest: Effort normal. She has decreased breath sounds. She has no wheezes.   Abdominal: Soft. She exhibits no distension. There is no tenderness. There is no rebound and no guarding.   Musculoskeletal: Normal range of motion. She exhibits no edema.   Neurological: She is alert and oriented to person, place, and time.   Skin: Skin is warm and dry. She is not diaphoretic.   Psychiatric: She has a normal mood and affect. Her behavior is normal.   Nursing note and vitals reviewed.      Results Review:       I reviewed the patient's new clinical results.     I reviewed imaging, agree with interpretation.     I reviewed telemetry/EKG results, sinus       Results from last 7 days   Lab Units 08/26/20  0604 08/25/20  0644 08/23/20  2252   WBC 10*3/mm3 8.81 9.44 5.71   HEMOGLOBIN g/dL 12.5 12.5 12.9   PLATELETS 10*3/mm3 258 224 169     Results from last 7 days   Lab Units 08/26/20  0604 08/25/20  0644 08/23/20  2252   SODIUM mmol/L 143 144 137   POTASSIUM mmol/L 3.0* 3.6 3.6   CHLORIDE mmol/L 112* 112* 103   CO2  mmol/L 21.8* 21.9* 20.1*   BUN mg/dL 21 15 12   CREATININE mg/dL 0.63 0.70 0.87   GLUCOSE mg/dL 95 115* 123*   Estimated Creatinine Clearance: 77.5 mL/min (by C-G formula based on SCr of 0.63 mg/dL).  Results from last 7 days   Lab Units 08/26/20  0604 08/25/20  0644 08/23/20  2252   CALCIUM mg/dL 8.6 9.0 8.1*   ALBUMIN g/dL 3.20* 3.40* 3.60   MAGNESIUM mg/dL 2.4  --   --             buPROPion  mg Oral QAM   dexamethasone 6 mg Oral Daily With Breakfast   doxycycline 100 mg Intravenous Q12H   DULoxetine 60 mg Oral Daily   enoxaparin 40 mg Subcutaneous Q12H   INV [id number] remdesivir in NS IVPB 100 mg Intravenous Q24H   [START ON 8/27/2020] loperamide 2 mg Oral QAM   lurasidone 40 mg Oral Daily   pantoprazole 40 mg Oral QAM   topiramate 50 mg Oral BID   wheat dextrin 1 each Oral Daily      Diet Regular; Cardiac    Assessment/Plan     Active Hospital Problems    Diagnosis  POA   • **Pneumonia due to COVID-19 virus [U07.1, J12.89]  Yes   • Hypertriglyceridemia [E78.1]  Yes   • History of colon resection [Z90.49]  Not Applicable   • Cervical spondylosis with radiculopathy [M47.22]  Yes   • Depression with anxiety [F41.8]  Yes   • GERD (gastroesophageal reflux disease) [K21.9]  Yes      Resolved Hospital Problems   No resolved problems to display.       · COVID-19 pneumonia with resulting acute hypoxic respiratory failure: Treatment with dexamethasone, remdesivir, plasma.  Worsening infiltrate and overall respiratory status. Given doxycycline in the event of subsequent bacterial infiltrate. With hypercoagulable state will increase to BID lovenox. Repeat DDimer planned in morning and could consider prophylactic full anticoagulation if it continues to elevate. High risk for further decompensation unfortunately. Pulmonology and infectious disease following.  · GERD: PPI  · History of anxiety depression: Home regimen.  · History of colon resection/diarrhea: Continuing her home medicines as requested.  · Disposition:  "TBD    Discussed with Dr Arias.    Juan Amaro MD  [facility]  20  13:31    Dictated portions using Dragon dictation software.    During the entire encounter, I was wearing recommended PPE including face mask and eye protection. Hand sanitization was performed prior to entering room and upon exit.    Electronically signed by Juan Amaro MD at 20 1335     Millie Arias MD at 20 0908            Infectious Diseases Progress Note    Millie Arias MD     [facility]  Los: 2 days  Patient Identification:  Name: Magdalena Dickey  Age: 65 y.o.  Sex: female  :  1955  MRN: [id number]         Primary Care Physician: Sun Guerrier MD            Subjective:still having significant shortness of air  Interval History:    Started on Remdesivir on 2020  Received convalescent plasma on 2020 started at 2251 finished at 0003 -  20  [id number]  [id number] ABO Rh A+  Objective:    Scheduled Meds:    buPROPion  mg Oral QAM   dexamethasone 6 mg Oral Daily With Breakfast   doxycycline 100 mg Intravenous Q12H   DULoxetine 60 mg Oral Daily   enoxaparin 40 mg Subcutaneous Q24H   INV [id number] remdesivir in NS IVPB 100 mg Intravenous Q24H   lurasidone 40 mg Oral Daily   pantoprazole 40 mg Oral QAM   topiramate 50 mg Oral BID   wheat dextrin 1 each Oral Daily     Continuous Infusions:     Vital signs in last 24 hours:  Temp:  [94.8 °F (34.9 °C)-98.4 °F (36.9 °C)] 97.9 °F (36.6 °C)  Heart Rate:  [67-85] 73  Resp:  [16-20] 20  BP: (119-151)/(70-89) 151/89    Intake/Output:    Intake/Output Summary (Last 24 hours) at 2020 0908  Last data filed at 2020 1843  Gross per 24 hour   Intake 270 ml   Output --   Net 270 ml       Exam:  /89 (BP Location: Right arm, Patient Position: Lying)   Pulse 73   Temp 97.9 °F (36.6 °C) (Oral)   Resp 20   Ht 167.6 cm (66\")   Wt 86.1 kg (189 lb 12.8 oz)   SpO2 95%   BMI 30.63 kg/m²    Patient is examined " using the personal protective equipment as per guidelines from infection control for this particular patient as enacted.  Hand washing was performed before and after patient interaction.  General Appearance:  Ill-appearing female who appears out of breath and requiring more oxygen.                          Head:    Normocephalic, without obvious abnormality, atraumatic                           Eyes:    PERRL, conjunctivae/corneas clear, EOM's intact, both eyes                         Throat:   Lips, tongue, gums normal; oral mucosa pink and moist                           Neck:   Supple, symmetrical, trachea midline, no JVD                         Lungs:    Bibasilar crackles                 Chest Wall:    No tenderness or deformity                          Heart:  S1-S2 regular                  Abdomen:   Soft nontender                 Extremities:   Extremities normal, atraumatic, no cyanosis or edema                        Pulses:   Pulses palpable in all extremities                            Skin:   Skin is warm and dry,  no rashes or palpable lesions                  Neurologic: Grossly nonfocal       Data Review:    I reviewed the patient's new clinical results.  Results from last 7 days   Lab Units 08/26/20  0604 08/25/20  0644 08/23/20  2252   WBC 10*3/mm3 8.81 9.44 5.71   HEMOGLOBIN g/dL 12.5 12.5 12.9   PLATELETS 10*3/mm3 258 224 169     Results from last 7 days   Lab Units 08/26/20  0604 08/25/20  0644 08/23/20  2252   SODIUM mmol/L 143 144 137   POTASSIUM mmol/L 3.0* 3.6 3.6   CHLORIDE mmol/L 112* 112* 103   CO2 mmol/L 21.8* 21.9* 20.1*   BUN mg/dL 21 15 12   CREATININE mg/dL 0.63 0.70 0.87   CALCIUM mg/dL 8.6 9.0 8.1*   GLUCOSE mg/dL 95 115* 123*     ECG 12 Lead   Final Result   HEART RATE= 95  bpm   RR Interval= 632  ms   IA Interval= 161  ms   P Horizontal Axis= -30  deg   P Front Axis= 55  deg   QRSD Interval= 97  ms   QT Interval= 361  ms   QRS Axis= 60  deg   T Wave Axis= 22  deg   - NORMAL ECG -    Sinus rhythm   NO SIGNIFICANT CHANGE FROM PREVIOUS ECG   Electronically S[facility]: [facility] 24-Aug-2020 12:33:04   Date and Time of Study: 2020-08-23 23:18:19      SCANNED EKG   Final Result              Assessment:    Pneumonia due to COVID-19 virus    GERD (gastroesophageal reflux disease)    Depression with anxiety    Cervical spondylosis with radiculopathy    History of colon resection    Hypertriglyceridemia  Appears to have worsened in terms of oxygen requirement and sense of wellbeing -  Reason for this change in status could include:   -Progression of COVID-19 pneumonia with evolving acute lung injury   -Volume overload due to administration of convalescent plasma   -Concomitant superimposed bacterial pneumonia     Recommendations:   · Continue aggressive supportive care with steroids and ongoing use of oxygen supplementation to keep saturation above 90%.    · Xray worse   · Empirically use 1 dose of Lasix as she has significant crackles on examination and keep an eye on her intake and output.    · Continue short course of empiric antibiotic treatment for bacterial pneumonia.  · Discussed with Dr. Rodriguez and the nursing staff.  · May have to go to ICU - prognosis poor        Millie Arias MD  8/26/2020  09:08    Much of this encounter note is an electronic transcription/translation of spoken language to printed text. The electronic translation of spoken language may permit erroneous, or at times, nonsensical words or phrases to be inadvertently transcribed; Although I have reviewed the note for such errors, some may still exist      Electronically signed by Millie Arias MD at 08/27/20 0815     Luis Rodriguez MD at 08/26/20 0729          [facility]      Mar/chart reviewed  Follow up COVID19 pneumonia, AHRF  Still with shortness of breath and cough  Anxiety    Vital Sign Min/Max for last 24 hours  Temp  Min: 94.8 °F (34.9 °C)  Max: 100.7 °F (38.2 °C)   BP  Min: 119/72  Max:  151/89   Pulse  Min: 67  Max: 90   Resp  Min: 16  Max: 20   SpO2  Min: 85 %  Max: 97 %   Flow (L/min)  Min: 4  Max: 10   No data recorded     Appears ill axox3,   perrl, eomi, normal sclera, no palpable thyroid nodules  mmm, no jvd, trachea midline, neck supple,  chest decreased bilaterally,  +crackles, no wheezes,   rrr,   soft, nt, nd +bs,  no c/c/ e  Skin warm, dry no rashes    Labs: : reviewed:  Glucose 95  Bun 21  Cr 0.63  Na 143  k 3  Bicarb 21  : 7.43/29/62 on 10l    : cxr bilateral infiltrates more on the left, c/w covid19 doesn't appear to be a pattern typical of trali or taco.    A/P:  1. ACute hypoxemic respiratory failure -- oxygen supplementation  2. COVID19 -- dexamethasone, ID giving convalescent plasma and remdesivir  3. Hepatitis, mild  4. Obesity  5. Possible MICHELLE  6. History of tobacco abuse    Continue to monitor and give supportive care. Transfer to unit should oxygen requirements continue to escalate.   Discussed with rn    Electronically signed by Luis Rodriguez MD at 20 0821     Millie Arias MD at 20 1631            Infectious Diseases Progress Note    Millie Arias MD     [facility]  Los: 1 day  Patient Identification:  Name: Magdalena Dickey  Age: 65 y.o.  Sex: female  :  1955  MRN: [id number]         Primary Care Physician: Sun Guerrier MD            Subjective: Not feel very good more short of breath and claims that she had a fever last night.  Had a temperature earlier today of 100.8.  Interval History:    Started on Remdesivir on 2020  Received convalescent plasma on 2020 started at 2251 finished at 0003 -  20  [id number]  [id number] ABO Rh A+  Objective:    Scheduled Meds:  buPROPion  mg Oral QAM   dexamethasone 6 mg Oral Daily With Breakfast   DULoxetine 60 mg Oral Daily   enoxaparin 40 mg Subcutaneous Q24H   furosemide 40 mg Intravenous Once   INV [id number] remdesivir in NS IVPB 100 mg Intravenous Q24H   lurasidone 40  "mg Oral Daily   pantoprazole 40 mg Oral QAM   topiramate 50 mg Oral BID   wheat dextrin 1 each Oral Daily     Continuous Infusions:     Vital signs in last 24 hours:  Temp:  [97.1 °F (36.2 °C)-100.7 °F (38.2 °C)] 97.9 °F (36.6 °C)  Heart Rate:  [74-90] 82  Resp:  [16-18] 16  BP: (113-138)/(66-70) 127/70    Intake/Output:    Intake/Output Summary (Last 24 hours) at 8/25/2020 1631  Last data filed at 8/25/2020 1228  Gross per 24 hour   Intake 626 ml   Output --   Net 626 ml       Exam:  /70 (BP Location: Right arm, Patient Position: Lying)   Pulse 82   Temp 97.9 °F (36.6 °C) (Oral)   Resp 16   Ht 167.6 cm (66\")   Wt 86.1 kg (189 lb 12.8 oz)   SpO2 91%   BMI 30.63 kg/m²    Patient is examined using the personal protective equipment as per guidelines from infection control for this particular patient as enacted.  Hand washing was performed before and after patient interaction.  General Appearance:  Ill-appearing female who appears out of breath and requiring more oxygen.                          Head:    Normocephalic, without obvious abnormality, atraumatic                           Eyes:    PERRL, conjunctivae/corneas clear, EOM's intact, both eyes                         Throat:   Lips, tongue, gums normal; oral mucosa pink and moist                           Neck:   Supple, symmetrical, trachea midline, no JVD                         Lungs:    Bibasilar crackles                 Chest Wall:    No tenderness or deformity                          Heart:  S1-S2 regular                  Abdomen:   Soft nontender                 Extremities:   Extremities normal, atraumatic, no cyanosis or edema                        Pulses:   Pulses palpable in all extremities                            Skin:   Skin is warm and dry,  no rashes or palpable lesions                  Neurologic: Grossly nonfocal       Data Review:    I reviewed the patient's new clinical results.  Results from last 7 days   Lab Units " 08/25/20  0644 08/23/20  2252   WBC 10*3/mm3 9.44 5.71   HEMOGLOBIN g/dL 12.5 12.9   PLATELETS 10*3/mm3 224 169     Results from last 7 days   Lab Units 08/25/20  0644 08/23/20  2252   SODIUM mmol/L 144 137   POTASSIUM mmol/L 3.6 3.6   CHLORIDE mmol/L 112* 103   CO2 mmol/L 21.9* 20.1*   BUN mg/dL 15 12   CREATININE mg/dL 0.70 0.87   CALCIUM mg/dL 9.0 8.1*   GLUCOSE mg/dL 115* 123*     ECG 12 Lead   Final Result   HEART RATE= 95  bpm   RR Interval= 632  ms   WV Interval= 161  ms   P Horizontal Axis= -30  deg   P Front Axis= 55  deg   QRSD Interval= 97  ms   QT Interval= 361  ms   QRS Axis= 60  deg   T Wave Axis= 22  deg   - NORMAL ECG -   Sinus rhythm   NO SIGNIFICANT CHANGE FROM PREVIOUS ECG   Electronically Signed By: Evonne Lantigua ([facility]) 24-Aug-2020 12:33:04   Date and Time of Study: 2020-08-23 23:18:19      SCANNED EKG   Final Result              Assessment:    Pneumonia due to COVID-19 virus    GERD (gastroesophageal reflux disease)    Depression with anxiety    Cervical spondylosis with radiculopathy    History of colon resection    Hypertriglyceridemia  Appears to have worsened in terms of oxygen requirement and sense of wellbeing -  Reason for this change in status could include:   -Progression of COVID-19 pneumonia with evolving acute lung injury   -Volume overload due to administration of convalescent plasma   -Concomitant superimposed bacterial pneumonia     Recommendations:   · Continue aggressive supportive care with steroids and ongoing use of oxygen supplementation to keep saturation above 90%.    · Follow-up on the chest x-ray ordered earlier by Dr. Burch.    · Empirically use 1 dose of Lasix as she has significant crackles on examination and keep an eye on her intake and output.    · Check urine Legionella and pneumococcal antigen check blood cultures given the low-grade temperature she has to rule out possibility of secondary bacterial infection given the deterioration that she is exhibiting  despite the introduction of seemingly effective treatment consisting of Decadron plus remdesivir plus convalescent plasma and empirically provide her with short course of empiric antibiotic treatment for bacterial pneumonia.  · Discussed with Dr. Burch and the nursing staff.        Millie Arias MD  2020  16:31    Much of this encounter note is an electronic transcription/translation of spoken language to printed text. The electronic translation of spoken language may permit erroneous, or at times, nonsensical words or phrases to be inadvertently transcribed; Although I have reviewed the note for such errors, some may still exist      Electronically signed by Millie Arias MD at 20 1127     Juan Amaro MD at 20 1142              Name: Magdalena Dickey ADMIT: 2020   : 1955  PCP: Sun Guerrier MD    MRN: [id number] LOS: 1 days   AGE/SEX: 65 y.o. female  ROOM: [facility]   Subjective   Chief Complaint   Patient presents with   • Shortness of Breath      Still having shortness of breath and cough.  Reports no chest pain or palpitations.  She is not having any nausea vomiting diarrhea or abdominal pain at this point.    Objective   Vital Signs  Temp:  [97.1 °F (36.2 °C)-100.7 °F (38.2 °C)] 100.7 °F (38.2 °C)  Heart Rate:  [74-90] 90  Resp:  [16-18] 18  BP: (113-138)/(66-70) 138/69  SpO2:  [90 %-95 %] 95 %  on  Flow (L/min):  [4] 4;   Device (Oxygen Therapy): nasal cannula  Body mass index is 30.63 kg/m².    Physical Exam   Constitutional: She is oriented to person, place, and time. She appears well-developed. She appears ill.   HENT:   Head: Atraumatic.   Nose: Nose normal.   Eyes: Conjunctivae and EOM are normal.   Neck: Neck supple. No tracheal deviation present.   Cardiovascular: Normal rate, regular rhythm and intact distal pulses.   Pulmonary/Chest: Effort normal. She has decreased breath sounds. She has no wheezes.   Abdominal: Soft. She exhibits no distension. There is no  tenderness. There is no rebound and no guarding.   Musculoskeletal: Normal range of motion. She exhibits no edema.   Neurological: She is alert and oriented to person, place, and time.   Skin: Skin is warm and dry. She is not diaphoretic.   Psychiatric: She has a normal mood and affect. Her behavior is normal.   Nursing note and vitals reviewed.      Results Review:       I reviewed the patient's new clinical results.     I reviewed imaging, agree with interpretation.     I reviewed telemetry/EKG results, sinus       Results from last 7 days   Lab Units 08/25/20  0644 08/23/20  2252   WBC 10*3/mm3 9.44 5.71   HEMOGLOBIN g/dL 12.5 12.9   PLATELETS 10*3/mm3 224 169     Results from last 7 days   Lab Units 08/25/20  0644 08/23/20  2252   SODIUM mmol/L 144 137   POTASSIUM mmol/L 3.6 3.6   CHLORIDE mmol/L 112* 103   CO2 mmol/L 21.9* 20.1*   BUN mg/dL 15 12   CREATININE mg/dL 0.70 0.87   GLUCOSE mg/dL 115* 123*   Estimated Creatinine Clearance: 77.5 mL/min (by C-G formula based on SCr of 0.7 mg/dL).  Results from last 7 days   Lab Units 08/25/20  0644 08/23/20  2252   CALCIUM mg/dL 9.0 8.1*   ALBUMIN g/dL 3.40* 3.60            buPROPion  mg Oral QAM   dexamethasone 6 mg Oral Daily With Breakfast   DULoxetine 60 mg Oral Daily   enoxaparin 40 mg Subcutaneous Q24H   INV [id number] remdesivir in NS IVPB 100 mg Intravenous Q24H   lurasidone 40 mg Oral Daily   pantoprazole 40 mg Oral QAM   topiramate 50 mg Oral BID   wheat dextrin 1 each Oral Daily      Diet Regular; Cardiac    Assessment/Plan     Active Hospital Problems    Diagnosis  POA   • **Pneumonia due to COVID-19 virus [U07.1, J12.89]  Yes   • Hypertriglyceridemia [E78.1]  Yes   • History of colon resection [Z90.49]  Not Applicable   • Cervical spondylosis with radiculopathy [M47.22]  Yes   • Depression with anxiety [F41.8]  Yes   • GERD (gastroesophageal reflux disease) [K21.9]  Yes          · COVID-19 pneumonia with resulting acute hypoxic respiratory failure:  Treatment with dexamethasone, remdesivir, plasma.  Currently requiring 5 L.  Pulmonology and infectious disease following.  · GERD: PPI  · History of anxiety depression: Home regimen.  · History of colon resection/diarrhea: Continuing her home medicines as requested.  Not having any acute flare now.  · Disposition: TBD    Juan Amaro MD  [facility]  08/25/20  11:43    Dictated portions using Dragon dictation software.    During the entire encounter, I was wearing recommended PPE including face mask and eye protection. Hand sanitization was performed prior to entering room and upon exit.    Electronically signed by Juan Amrao MD at 08/25/20 1147     Luis Rodriguez MD at 08/25/20 0832          [facility]     Mar/chart reviewed  Follow up COVID19 pneumonia, AHRF  Still with shortness of breath and cough  Anxiety    Vital Sign Min/Max for last 24 hours  Temp  Min: 97.1 °F (36.2 °C)  Max: 100.7 °F (38.2 °C)   BP  Min: 113/67  Max: 138/69   Pulse  Min: 74  Max: 90   Resp  Min: 16  Max: 18   SpO2  Min: 90 %  Max: 95 %   Flow (L/min)  Min: 4  Max: 4   No data recorded     Appears ill axox3,   perrl, eomi, normal sclera, no palpable thyroid nodules  mmm, no jvd, trachea midline, neck supple,  chest decreased bilaterally, no crackles, no wheezes,   rrr,   soft, nt, nd +bs,  no c/c/ e  Skin warm, dry no rashes    Labs: 8/25: reviewed:  Glucose 155  Bun 15  Cr 0.7  Na 144  Bicarb 21.9  D dimer 0.63  Wbc 9.44  hgb 12.5  plts 224    A/P:  1. ACute hypoxemic respiratory failure -- oxygen supplementation  2. COVID19 -- dexamethasone, ID giving convalescent plasma and remdesivir  3. Hepatitis, mild  4. Obesity  5. Possible MICHELLE  6. History of tobacco abuse    Electronically signed by Luis Rodriguez MD at 08/25/20 0930     Millie Arias MD at 08/24/20 8075        Patient is enrolled in the  expanded access program for convalescent plasma for the treatment of patient with  COVID-19 study run by [facility].  Her assigned patient code is [id number]    Electronically signed by Millie Arias MD at 08/24/20 1929     Millie Arias MD at 08/24/20 1449        Discussion with the patient:   Based on patient's  documented hypoxia, lung infiltrate and positive COVID-19 assay she and principles qualifies for following experimental treatment as discussed below.  · There are experimental treatment that have been recommended and our institution is able to provide 2 of those experimental treatment if patient consents to those treatment.    · Obviously whether patient can get this treatment after consenting to it would depend upon availability and supply of these treatment.    · Patient is explained that he has option to not accept these treatment and continue with supportive care.  First treatment- Remdesivir -is an antiviral agent which has shown to have promise if provided to the patient.  It is experimental and requires consent from the patient.  Side effects of the medication include allergic reaction which can manifest itself as wheezing rash elevated heart rate or slow heart rate increased blood pressure or low blood pressure or rash.  It meant of this reaction is stopping the medication and provide appropriate intervention with H1, H2 blockers steroids and possibly leukotriene esterase inhibitors as well as may be epinephrine depending upon the severity.  The next side effects is possibility of worsening liver function which is managed with serial monitoring of liver function test on a daily basis.    After listening to this description and knowing that this is experimental treatment for which she has to provide consent patient- consented to proceed with the treatment.     Second treatment-administration of convalescent plasma for COVID-19 infection under the multicenter study performed by [facility].  I explained to the patient that this is experimental treatment and he has to consent to it if  he wants the treatment. her other choices not to accept the treatment.  I explained to him that the side effect of the treatment is reaction to blood products such as elevated temperature, difficulty breathing, wheezing, high blood pressure, low blood pressure or high heart rate versus low heart rate.  Treatment of this side effect is stopping the transfusion of convalescent plasma and instituting appropriate measures to alleviate the manifestation of reaction as detailed above.  The other side effect that could happen from administration or a transfusion of convalescent plasma is volume overload which can manifest itself as difficulty breathing and edema.    Patient after listening to these description of benefit risk and knowing that it is experimental treatment she consents for it and willing to sign the consent for administration of blood product specifically convalescent plasma.    Electronically signed by Millie Arias MD at 20 1626          Consult Notes      Millie Arias MD at 20 1448      Consult Orders    1. Inpatient Infectious Diseases Consult [[id number]] ordered by Juan Amaro MD at 20 0833                CONSULT NOTE    Infectious Diseases - Millie Arias. MD  [facility]       Patient Identification:  Name: Magdalena Dickey  Age: 65 y.o.  Sex: female  :  1955  MRN: [id number]             Date of Consultation: 2020      Primary Care Physician: Sun Guerrier MD                               Requesting Physician: Dr. 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  She was getting out of breath with activity and her symptom got worse 3 days prior to coming to the emergency room on 2020.  Patient was seen with her   at primary care physician's office 3 days prior to coming to the hospital and was noted to have negative COVID-19 assay.  Her  was positive at that time.  Because her respiratory status was deteriorating and minimal activity was making her short of breath she decided to come to the emergency room last night and was noted to have resting oxygen saturation of 87% on room air.  Further work-up revealed COVID-19 assay test positive on respiratory viral panel and chest x-ray showed bilateral pulmonary opacities concerning for COVID-19 pneumonia.  Patient has been seen by pulmonary service has been placed on oxygen supplementation and dexamethasone have been started.  Infectious disease service is consulted to further manage COVID-19 pneumonia in the setting.  This presentation is consistent with:  1-COVID-19 pneumonia with hypoxia  2-history of peptic ulcer disease  3-chronic pain and anxiety  4-other diagnosis per internal medicine service.      Recommendations/Discussions: She definitely has hypoxia which could be most likely due to pneumonia due to COVID-19.  She meets the criteria for experimental treatment with - Remdesivir as well as use of  -convalescent plasma for COVID-19 infection.  Explained to her about the experimental nature of these treatment and side effects of these treatment which is documented in a separate progress note.  Patient is willing to proceed with these treatment and has consented for it.        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  She was getting out of breath with activity and her symptom got worse 3 days prior to coming to the emergency room on 8/23/2020.  Patient was seen with her  at primary care physician's office 3 days prior to coming to the hospital and was noted to have  negative COVID-19 assay.  Her  was positive at that time.  Because her respiratory status was deteriorating and minimal activity was making her short of breath she decided to come to the emergency room last night and was noted to have resting oxygen saturation of 87% on room air.  Further work-up revealed COVID-19 assay test positive on respiratory viral panel and chest x-ray showed bilateral pulmonary opacities concerning for COVID-19 pneumonia.  Patient has been seen by pulmonary service has been placed on oxygen supplementation and dexamethasone have been started.  Infectious disease service is consulted to further manage COVID-19 pneumonia in the setting.        Current Meds:     Current Facility-Administered Medications:   •  acetaminophen (TYLENOL) tablet 650 mg, 650 mg, Oral, Q4H PRN **OR** acetaminophen (TYLENOL) 160 MG/5ML solution 650 mg, 650 mg, Oral, Q4H PRN **OR** acetaminophen (TYLENOL) suppository 650 mg, 650 mg, Rectal, Q4H PRN, Cora Feliciano APRN  •  ALPRAZolam (XANAX) tablet 0.5 mg, 0.5 mg, Oral, TID PRN, Juan Amaro MD  •  buPROPion XL (WELLBUTRIN XL) 24 hr tablet 150 mg, 150 mg, Oral, QAM, Juan Amaro MD  •  dexamethasone (DECADRON) tablet 6 mg, 6 mg, Oral, Daily With Breakfast, Juan Amaro MD, 6 mg at 08/24/20 1003  •  dicyclomine (BENTYL) capsule 10 mg, 10 mg, Oral, 4x Daily PRN, Juan Amaro MD  •  DULoxetine (CYMBALTA) DR capsule 60 mg, 60 mg, Oral, Daily, Juan Amaro MD  •  enoxaparin (LOVENOX) syringe 40 mg, 40 mg, Subcutaneous, Q24H, Juan Amaro MD, 40 mg at 08/24/20 1004  •  loperamide (IMODIUM) capsule 2 mg, 2 mg, Oral, 4x Daily PRN, Juan Amaro MD  •  lurasidone (LATUDA) tablet 40 mg, 40 mg, Oral, Daily, Juan Amaro MD, 40 mg at 08/24/20 1249  •  nitroglycerin (NITROSTAT) SL tablet 0.4 mg, 0.4 mg, Sublingual, Q5 Min PRN, Cora Feliciano APRN  •  ondansetron (ZOFRAN) injection 4 mg, 4 mg, Intravenous, Q6H  "EARLINE, Cora Feliciano, APRN  •  pantoprazole (PROTONIX) EC tablet 40 mg, 40 mg, Oral, QAM, Juan Amaro MD, 40 mg at 20 1003  •  topiramate (TOPAMAX) tablet 50 mg, 50 mg, Oral, BID, Juan Amaro MD, 50 mg at 20 1002  •  wheat dextrin (BENEFIBER ON THE GO) powder 1 each, 1 each, Oral, Daily, Juan Amaro MD  Allergies:  No Known Allergies  Social History:   Social History     Tobacco Use   • Smoking status: Former Smoker     Types: Cigarettes     Last attempt to quit: 3/10/2014     Years since quittin.4   • Smokeless tobacco: Never Used   Substance Use Topics   • Alcohol use: Yes     Comment: SELDOM      Review of Systems  See history of present illness and past medical history.   Constitutional: Positive for fatigue. Negative for appetite change and fever.   HENT: Negative for nosebleeds and trouble swallowing.    Eyes: Negative for photophobia, redness and visual disturbance.   Respiratory: Positive for cough and shortness of breath. Negative for chest tightness and wheezing.         Exposure to covid 19 at work   Cardiovascular: Negative for chest pain, palpitations and leg swelling.   Gastrointestinal: Negative for abdominal distention, abdominal pain, nausea and vomiting.   Endocrine: Negative.    Genitourinary: Negative.    Musculoskeletal: Negative for gait problem and joint swelling.   Skin: Negative.    Neurological: Negative for dizziness, seizures, speech difficulty, light-headedness and headaches.   Hematological: Negative.    Psychiatric/Behavioral: Negative for behavioral problems and confusion.     Vitals:   /69 (BP Location: Right arm, Patient Position: Sitting)   Pulse 83   Temp 98.7 °F (37.1 °C) (Oral)   Resp 18   Ht 167.6 cm (66\")   Wt 86.1 kg (189 lb 12.8 oz)   SpO2 94%   BMI 30.63 kg/m²    I/O:     Intake/Output Summary (Last 24 hours) at 2020 1448  Last data filed at 2020 1347  Gross per 24 hour   Intake 480 ml   Output --   Net 480 " ml     Exam:  Patient is examined using the personal protective equipment as per guidelines from infection control for this particular patient as enacted.  Hand washing was performed before and after patient interaction.  General Appearance:   Alert cooperative ill-appearing female   Head:    Normocephalic, without obvious abnormality, atraumatic   Eyes:    PERRL, conjunctivae/corneas clear, EOM's intact, both eyes   Ears:    Normal external ear canals, both ears   Nose:   Nares normal, septum midline, mucosa normal, no drainage    or sinus tenderness   Throat:   Lips, tongue, gums normal; oral mucosa pink and moist   Neck:   Supple, symmetrical, trachea midline, no adenopathy;     thyroid:  no enlargement/tenderness/nodules; no carotid    bruit or JVD   Back:     Symmetric, no curvature, ROM normal, no CVA tenderness   Lungs:    Bibasilar crackles   Chest Wall:    No tenderness or deformity    Heart:   S1-S2 tachycardia   Abdomen:    Soft nontender   Extremities:   Extremities normal, atraumatic, no cyanosis or edema   Pulses:   Pulses palpable in all extremities; symmetric all extremities   Skin:   Skin color normal, Skin is warm and dry,  no rashes or palpable lesions   Neurologic:  Grossly nonfocal       Data Review:    I reviewed the patient's new clinical results.  Results from last 7 days   Lab Units 08/23/20  2252   WBC 10*3/mm3 5.71   HEMOGLOBIN g/dL 12.9   PLATELETS 10*3/mm3 169     Results from last 7 days   Lab Units 08/23/20  2252   SODIUM mmol/L 137   POTASSIUM mmol/L 3.6   CHLORIDE mmol/L 103   CO2 mmol/L 20.1*   BUN mg/dL 12   CREATININE mg/dL 0.87   CALCIUM mg/dL 8.1*   GLUCOSE mg/dL 123*     Microbiology Results (last 10 days)     Procedure Component Value - Date/Time    Respiratory Panel PCR w/COVID-19(SARS-CoV-2) ASHKAN/SHRAVAN/SURAJ/PAD In-House, NP Swab in UTM/[facility], 3-4 HR TAT - Swab, Nasopharynx [[id number]]  (Abnormal) Collected:  08/23/20 2252    Lab Status:  Final result Specimen:  Swab from  Nasopharynx Updated:  08/24/20 0019     ADENOVIRUS, PCR Not Detected     Coronavirus 229E Not Detected     Coronavirus HKU1 Not Detected     Coronavirus NL63 Not Detected     Coronavirus OC43 Not Detected     COVID19 Detected     Human Metapneumovirus Not Detected     Human Rhinovirus/Enterovirus Not Detected     Influenza A PCR Not Detected     Influenza A H1 Not Detected     Influenza A H1 2009 PCR Not Detected     Influenza A H3 Not Detected     Influenza B PCR Not Detected     Parainfluenza Virus 1 Not Detected     Parainfluenza Virus 2 Not Detected     Parainfluenza Virus 3 Not Detected     Parainfluenza Virus 4 Not Detected     RSV, PCR Not Detected     Bordetella pertussis pcr Not Detected     Bordetella parapertussis PCR Not Detected     Chlamydophila pneumoniae PCR Not Detected     Mycoplasma pneumo by PCR Not Detected    Narrative:       Fact sheet for providers: https://docs.[employer]/wp-content/uploads/FTE2094-9194-QH6.1-EUA-Provider-Fact-Sheet-3.pdf    Fact sheet for patients: https://[employer]/wp-content/uploads/EXA2128-8258-KA9.1-EUA-Patient-Fact-Sheet-1.pdf    COVID-19,[facility],NP/OP Swab in Transport Media or ESwab 72 HR TAT - Swab, Nasopharynx [[id number]] Collected:  08/15/20 1234    Lab Status:  Final result Specimen:  Swab from Nasopharynx Updated:  08/17/20 0808    Narrative:       The following orders were created for panel order COVID-19,[facility],NP/OP Swab in Transport Media or ESwab 72 HR TAT - Swab, Nasopharynx.  Procedure                               Abnormality         Status                     ---------                               -----------         ------                     COVID-19,[facility] ROUTINE...[[id number]]                      Final result                 Please view results for these tests on the individual orders.    COVID-19,[facility] ROUTINE, NP/OP SWAB IN TRANSPORT MEDIA OR ESWAB 72 HR TAT - Swab, Nasopharynx [[id number]] Collected:  08/15/20 1234    Lab Status:   Final result Specimen:  Swab from Nasopharynx Updated:  08/17/20 0808     SARS-CoV-2, SHEELA Not Detected     Comment: This test was developed and its performance characteristics determined  by [employer]. This test has not been FDA cleared or  approved. This test has been authorized by FDA under an Emergency Use  Authorization (EUA). This test is only authorized for the duration of  time the declaration that circumstances exist justifying the  authorization of the emergency use of in vitro diagnostic tests for  detection of SARS-CoV-2 virus and/or diagnosis of COVID-19 infection  under section 564(b)(1) of the Act, 21 U.S.C. 360bbb-3(b)(1), unless  the authorization is terminated or revoked sooner.  When diagnostic testing is negative, the possibility of a false  negative result should be considered in the context of a patient's  recent exposures and the presence of clinical signs and symptoms  consistent with COVID-19. An individual without symptoms of COVID-19  and who is not shedding SARS-CoV-2 virus would expect to have a  negative (not detected) result in this assay.       Narrative:       Performed at:  [address] [place] [address] [facility] [place]  [id number]  : Jacob Alexander MD, Phone:  [phone]        Xr Chest 1 View    Result Date: 8/23/2020  Bilateral peripheral pulmonary opacities. Appearance is nonspecific, but is certainly suspicious for COVID pneumonia.  This report was finalized on 8/23/2020 11:57 PM by Dr. Danita Miller M.D.      Xr Spine Cervical Lateral    Result Date: 8/4/2020  Ordering physician's impression is located in the Encounter Note dated 08/04/20. X-ray performed in the DR room.         Assessment:  Active Hospital Problems    Diagnosis  POA   • **Pneumonia due to COVID-19 virus [U07.1, J12.89]  Yes   • Hypertriglyceridemia [E78.1]  Yes   • History of colon resection [Z90.49]  Not Applicable   • Cervical spondylosis with radiculopathy  [M47.22]  Yes   • Depression with anxiety [F41.8]  Yes   • GERD (gastroesophageal reflux disease) [K21.9]  Yes      Resolved Hospital Problems   No resolved problems to display.         Plan:  See above  Millie Arias MD   8/24/2020  14:48    Much of this encounter note is an electronic transcription/translation of spoken language to printed text. The electronic translation of spoken language may permit erroneous, or at times, nonsensical words or phrases to be inadvertently transcribed; Although I have reviewed the note for such errors, some may still exist      Electronically signed by Millie Arias MD at 08/24/20 6057     Luis Rodriguez MD at 08/24/20 1407      Consult Orders    1. Inpatient Pulmonology Consult [[id number]] ordered by Juan Amaro MD at 08/24/20 1105                [facility]    [facility]    HPI:  Mrs. Dickey is a 64yo WF with a history of tobacco abuse (quit 6 years ago).  She presented to the ER yesterday with gradual onset of shortness of breath, constant of moderate severity, made worse with exertion. It has been worsening.  She has had some associated cough, fatigue, chills and sore throat, headache.  She reports her  recently swabbed positive for COVID.  She is requiring a few liters of oxygen.    She works here at [employer]: reviewed as per chart  ALL: nKDA  ROS: 12 point negative except as in HPI    Vital Sign Min/Max for last 24 hours  Temp  Min: 97.9 °F (36.6 °C)  Max: 98.9 °F (37.2 °C)   BP  Min: 124/80  Max: 143/67   Pulse  Min: 80  Max: 97   Resp  Min: 18  Max: 20   SpO2  Min: 90 %  Max: 96 %   Flow (L/min)  Min: 2  Max: 4   Weight  Min: 84.4 kg (186 lb)  Max: 86.1 kg (189 lb 12.8 oz)     GEN:   appears ill, obese, AxOx3  HEENT: PERRL, EOMI, no icterus, mmm, no jvd, trachea midline, neck supple  CHEST: decreased bilat, no wheezes, no crackles, no use of accessory muscles  CV: RRR, no m/g/r  ABD: soft, nt, nd +bs, no  hepatosplenomegaly  EXT: no c/c/e  SKIN: no rashes, no xanthomas, nl turgor, warm, dry   LYMPH: no palpable cervical or supraclavicular lymphadenopathy  NEURO: CN 2-12 intact and symmetric bilaterally  PSYCH: nl affect, nl orientation, nl judgement, nl mood  MSK: no kyphoscoliosis, 5/5 strength ue and le     Labs: 8/24: reviewed:   d dimer 0.58  Glucose 123  Bun 12  Cr 0.87  Bicarb 20  Alt 45  ast 51  probnp 23  Trop 0.01  procal 0.09  RPP + COVID 19  Wbc 5.7  hgb 12.9  plts 169  crp 4.92  Ck 317  Ferritin 916    CXR: bilateral patchy infiltrates    A/P:  1. ACute hypoxemic respiratory failure -- oxygen supplementation  2. COVID19 -- dexamethasone, consider ID consult for remdesivir  3. Hepatitis, mild  4. Obesity  5. Possible MICHELLE  6. History of tobacco abuse                Electronically signed by Luis Rodriguez MD at 08/24/20 6669

## 2020-09-01 NOTE — PLAN OF CARE
Problem: Patient Care Overview  Goal: Plan of Care Review  Outcome: Ongoing (interventions implemented as appropriate)  Flowsheets (Taken 9/1/2020 1814)  Progress: improving  Plan of Care Reviewed With: patient  Outcome Summary: VSS. Denies pain 8L high flow humidified sats 94%  Goal: Individualization and Mutuality  Outcome: Ongoing (interventions implemented as appropriate)  Goal: Discharge Needs Assessment  Outcome: Ongoing (interventions implemented as appropriate)  Goal: Interprofessional Rounds/Family Conf  Outcome: Ongoing (interventions implemented as appropriate)

## 2020-09-01 NOTE — PROGRESS NOTES
"  Infectious Diseases Progress Note    Millie Arias MD     [facility]  [place]: 8 days  Patient Identification:  Name: Magdalena Dickey  Age: 65 y.o.  Sex: female  :  1955  MRN: [id number]         Primary Care Physician: Sun Guerrier MD            Subjective: Feeling stronger and better.  Oxygen requirement is decreasing.  Interval History:    · Started on Remdesivir on 2020  · Received convalescent plasma on 2020 started at 2251 finished at 0003 -  20  [id number]  [id number] ABO Rh A+  Objective:    Scheduled Meds:    budesonide-formoterol 2 puff Inhalation BID - RT   buPROPion  mg Oral QAM   dexamethasone 6 mg Oral Daily With Breakfast   DULoxetine 60 mg Oral Daily   enoxaparin 40 mg Subcutaneous Q12H   INV [id number] remdesivir in NS IVPB 100 mg Intravenous Q24H   loperamide 2 mg Oral QAM   lurasidone 40 mg Oral Daily   pantoprazole 40 mg Oral QAM   topiramate 50 mg Oral BID   wheat dextrin 1 each Oral Daily     Continuous Infusions:    dexmedetomidine 0.2-1.5 mcg/kg/hr Last Rate: Stopped (20 1714)       Vital signs in last 24 hours:  Temp:  [96.4 °F (35.8 °C)-97.7 °F (36.5 °C)] 96.5 °F (35.8 °C)  Heart Rate:  [68-95] 95  Resp:  [16-24] 20  BP: (119-138)/(65-76) 138/69    Intake/Output:    Intake/Output Summary (Last 24 hours) at 2020 1210  Last data filed at 2020 0500  Gross per 24 hour   Intake 360 ml   Output 1250 ml   Net -890 ml       Exam:  /69   Pulse 95   Temp 96.5 °F (35.8 °C) (Oral)   Resp 20   Ht 167.6 cm (65.98\")   Wt 87.1 kg (192 lb 0.3 oz)   SpO2 95%   BMI 31.01 kg/m²   Patient is examined using the personal protective equipment as per guidelines from infection control for this particular patient as enacted.  Hand washing was performed before and after patient interaction.  General Appearance: Sitting in the chair next to her bed in ICU.  Appears to have a better color and disposition.  FiO2 was decreased to high flow nasal cannula       "                    Head:    Normocephalic, without obvious abnormality, atraumatic                           Eyes:    PERRL, conjunctivae/corneas clear, EOM's intact, both eyes                         Throat:   Lips, tongue, gums normal; oral mucosa pink and moist                           Neck:   Supple, symmetrical, trachea midline, no JVD                         Lungs:    Bibasilar crackles                 Chest Wall:    No tenderness or deformity                          Heart:  S1-S2 regular                  Abdomen:   Soft nontender                 Extremities:   Extremities normal, atraumatic, no cyanosis or edema                        Pulses:   Pulses palpable in all extremities                            Skin:   Skin is warm and dry,  no rashes or palpable lesions                  Neurologic: Grossly nonfocal       Data Review:    I reviewed the patient's new clinical results.  Results from last 7 days   Lab Units 09/01/20  0601 08/31/20  0603 08/30/20  0835 08/29/20  0624 08/28/20  0429 08/27/20  0556 08/26/20  0604   WBC 10*3/mm3 10.25 11.23* 12.09* 10.51 10.87* 11.71* 8.81   HEMOGLOBIN g/dL 13.0 13.0 13.1 13.2 12.9 13.2 12.5   PLATELETS 10*3/mm3 462* 502* 466* 442 349 336 258     Results from last 7 days   Lab Units 09/01/20  0601 08/31/20  0603 08/30/20  0835 08/29/20  0624 08/28/20  0429 08/27/20  1716 08/27/20  0556 08/26/20  0604   SODIUM mmol/L 138 136 139 141 140  --  141 143   POTASSIUM mmol/L 3.8 3.8 3.8 3.6 4.1 4.4 3.4* 3.0*   CHLORIDE mmol/L 107 107 108* 111* 112*  --  113* 112*   CO2 mmol/L 23.3 19.3* 20.9* 21.4* 17.8*  --  19.0* 21.8*   BUN mg/dL 24* 25* 25* 20 21  --  19 21   CREATININE mg/dL 0.72 0.74 0.67 0.67 0.70  --  0.69 0.63   CALCIUM mg/dL 8.7 9.1 8.8 8.9 8.6  --  8.8 8.6   GLUCOSE mg/dL 92 89 95 106* 108*  --  85 95     ECG 12 Lead   Final Result   HEART RATE= 95  bpm   RR Interval= 632  ms   CO Interval= 161  ms   P Horizontal Axis= -30  deg   P Front Axis= 55  deg   QRSD  Interval= 97  ms   QT Interval= 361  ms   QRS Axis= 60  deg   T Wave Axis= 22  deg   - NORMAL ECG -   Sinus rhythm   NO SIGNIFICANT CHANGE FROM PREVIOUS ECG   Electronically Signed By: Evonne Lantigua ([facility]) 24-Aug-2020 12:33:04   Date and Time of Study: 2020-08-23 23:18:19      SCANNED EKG   Final Result              Assessment:    Pneumonia due to COVID-19 virus    GERD (gastroesophageal reflux disease)    Depression with anxiety    Cervical spondylosis with radiculopathy    History of colon resection    Hypertriglyceridemia  Appears to have worsened in terms of oxygen requirement and sense of wellbeing -  Reason for this change in status could include:   -Progression of COVID-19 pneumonia with evolving acute lung injury   -Volume overload due to administration of convalescent plasma   -Concomitant superimposed bacterial pneumonia     Recommendations:   · Continue aggressive supportive care with steroids and ongoing use of oxygen supplementation to keep saturation above 90%.    · X-ray worse   · Empirically use 1 dose of Lasix as she has significant crackles on examination and keep an eye on her intake and output.    · Completed short course of empiric antibiotic treatment for bacterial pneumonia.  · Extend Remdesivir for 10 days.        Millie Arias MD  9/1/2020  12:10    Much of this encounter note is an electronic transcription/translation of spoken language to printed text. The electronic translation of spoken language may permit erroneous, or at times, nonsensical words or phrases to be inadvertently transcribed; Although I have reviewed the note for such errors, some may still exist

## 2020-09-01 NOTE — PLAN OF CARE
Problem: Patient Care Overview  Goal: Plan of Care Review  Outcome: Ongoing (interventions implemented as appropriate)  Flowsheets (Taken 9/1/2020 0612)  Progress: no change  Plan of Care Reviewed With: patient  Note:   Pt remains in the CCU on high flow NC on 15L. VSS stable overnight. Pt dropped her sats and became SOA once when up to bedside commode. However; she recovered quickly. Lung sounds clear but diminished. No complaints of pain. Will continue to monitor

## 2020-09-01 NOTE — PROGRESS NOTES
Adult Nutrition  Assessment/PES    Patient Name:  Magdalena Dickey  YOB: 1955  MRN: [id number]  Admit Date:  8/23/2020    Assessment Date:  9/1/2020    Comments:  Follow up note. Pt on Soft Texture, whole foods, cardiac diet. Appetite improving. Visited pt this am and she states she is doing ok and had no request at this time. Will continue to discuss care in CCU rounds and honor food preference.    Reason for Assessment     Row Name 09/01/20 0957          Reason for Assessment    Reason For Assessment  follow-up protocol         Nutrition/Diet History     Row Name 09/01/20 0957          Nutrition/Diet History    Typical Food/Fluid Intake  appetite better, doink ok for breakfast this am no request         Anthropometrics     Row Name 09/01/20 0600          Anthropometrics    Weight  87.1 kg (192 lb 0.3 oz)         Labs/Tests/Procedures/Meds     Row Name 09/01/20 0958          Labs/Procedures/Meds    Lab Results Reviewed  reviewed     Lab Results Comments  bun, platelets, alb, crp        Diagnostic Tests/Procedures    Diagnostic Test/Procedure Reviewed  reviewed        Medications    Pertinent Medications Reviewed  reviewed     Pertinent Medications Comments  decadron, lovenox, remdesivir, imodium, protonix, benefiber,         Physical Findings     Row Name 09/01/20 0959          Physical Findings    Overall Physical Appearance  obese;on oxygen therapy     Skin  -- gayle 20           Nutrition Prescription Ordered     Row Name 09/01/20 0959          Nutrition Prescription PO    Current PO Diet  Soft Texture     Texture  Whole foods     Supplement  Boost Plus (Ensure Enlive, Ensure Plus)     Common Modifiers  Cardiac         Evaluation of Received Nutrient/Fluid Intake     Row Name 09/01/20 1000          EN Evaluation    TF Residual  75%               Problem/Interventions:          Intervention Goal     Row Name 09/01/20 1001          Intervention Goal    General  Maintain nutrition;Disease  management/therapy     PO  PO intake (%)     PO Intake %  75 %     Weight  No significant weight loss         Nutrition Intervention     Row Name 09/01/20 1001          Nutrition Intervention    RD/Tech Action  Follow Tx progress;Care plan reviewd           Education/Evaluation     Row Name 09/01/20 1001          Monitor/Evaluation    Monitor  Per protocol;PO intake;Pertinent labs;Weight;Skin status           Electronically signed by:  Lulu Belcher RD  09/01/20 10:02

## 2020-09-02 LAB
ALBUMIN SERPL-MCNC: 3 G/DL (ref 3.5–5.2)
ALBUMIN/GLOB SERPL: 1.2 G/DL
ALP SERPL-CCNC: 50 U/L (ref 39–117)
ALT SERPL W P-5'-P-CCNC: 21 U/L (ref 1–33)
ANION GAP SERPL CALCULATED.3IONS-SCNC: 7 MMOL/L (ref 5–15)
AST SERPL-CCNC: 15 U/L (ref 1–32)
BASOPHILS # BLD AUTO: 0.04 10*3/MM3 (ref 0–0.2)
BASOPHILS NFR BLD AUTO: 0.4 % (ref 0–1.5)
BILIRUB SERPL-MCNC: 0.5 MG/DL (ref 0–1.2)
BUN SERPL-MCNC: 25 MG/DL (ref 8–23)
BUN/CREAT SERPL: 33.8 (ref 7–25)
CALCIUM SPEC-SCNC: 8.3 MG/DL (ref 8.6–10.5)
CHLORIDE SERPL-SCNC: 109 MMOL/L (ref 98–107)
CK SERPL-CCNC: 36 U/L (ref 20–180)
CO2 SERPL-SCNC: 22 MMOL/L (ref 22–29)
CREAT SERPL-MCNC: 0.74 MG/DL (ref 0.57–1)
CRP SERPL-MCNC: 1.82 MG/DL (ref 0–0.5)
D DIMER PPP FEU-MCNC: 1.57 MCGFEU/ML (ref 0–0.49)
DEPRECATED RDW RBC AUTO: 40.4 FL (ref 37–54)
EOSINOPHIL # BLD AUTO: 0.14 10*3/MM3 (ref 0–0.4)
EOSINOPHIL NFR BLD AUTO: 1.4 % (ref 0.3–6.2)
ERYTHROCYTE [DISTWIDTH] IN BLOOD BY AUTOMATED COUNT: 12.7 % (ref 12.3–15.4)
FERRITIN SERPL-MCNC: 397 NG/ML (ref 13–150)
FIBRINOGEN PPP-MCNC: 453 MG/DL (ref 219–464)
GFR SERPL CREATININE-BSD FRML MDRD: 79 ML/MIN/1.73
GLOBULIN UR ELPH-MCNC: 2.6 GM/DL
GLUCOSE SERPL-MCNC: 84 MG/DL (ref 65–99)
HCT VFR BLD AUTO: 36 % (ref 34–46.6)
HGB BLD-MCNC: 12.5 G/DL (ref 12–15.9)
IMM GRANULOCYTES # BLD AUTO: 0.23 10*3/MM3 (ref 0–0.05)
IMM GRANULOCYTES NFR BLD AUTO: 2.3 % (ref 0–0.5)
LDH SERPL-CCNC: 243 U/L (ref 135–214)
LYMPHOCYTES # BLD AUTO: 1.82 10*3/MM3 (ref 0.7–3.1)
LYMPHOCYTES NFR BLD AUTO: 18.5 % (ref 19.6–45.3)
MCH RBC QN AUTO: 30.6 PG (ref 26.6–33)
MCHC RBC AUTO-ENTMCNC: 34.7 G/DL (ref 31.5–35.7)
MCV RBC AUTO: 88 FL (ref 79–97)
MONOCYTES # BLD AUTO: 0.96 10*3/MM3 (ref 0.1–0.9)
MONOCYTES NFR BLD AUTO: 9.7 % (ref 5–12)
NEUTROPHILS NFR BLD AUTO: 6.66 10*3/MM3 (ref 1.7–7)
NEUTROPHILS NFR BLD AUTO: 67.7 % (ref 42.7–76)
NRBC BLD AUTO-RTO: 0 /100 WBC (ref 0–0.2)
PLATELET # BLD AUTO: 446 10*3/MM3 (ref 140–450)
PMV BLD AUTO: 10 FL (ref 6–12)
POTASSIUM SERPL-SCNC: 3.5 MMOL/L (ref 3.5–5.2)
PROT SERPL-MCNC: 5.6 G/DL (ref 6–8.5)
RBC # BLD AUTO: 4.09 10*6/MM3 (ref 3.77–5.28)
SODIUM SERPL-SCNC: 138 MMOL/L (ref 136–145)
WBC # BLD AUTO: 9.85 10*3/MM3 (ref 3.4–10.8)

## 2020-09-02 PROCEDURE — 82550 ASSAY OF CK (CPK): CPT | Performed by: INTERNAL MEDICINE

## 2020-09-02 PROCEDURE — 82728 ASSAY OF FERRITIN: CPT | Performed by: INTERNAL MEDICINE

## 2020-09-02 PROCEDURE — 85025 COMPLETE CBC W/AUTO DIFF WBC: CPT | Performed by: INTERNAL MEDICINE

## 2020-09-02 PROCEDURE — 94760 N-INVAS EAR/PLS OXIMETRY 1: CPT

## 2020-09-02 PROCEDURE — 94799 UNLISTED PULMONARY SVC/PX: CPT

## 2020-09-02 PROCEDURE — 83615 LACTATE (LD) (LDH) ENZYME: CPT | Performed by: INTERNAL MEDICINE

## 2020-09-02 PROCEDURE — 25010000002 ENOXAPARIN PER 10 MG: Performed by: INTERNAL MEDICINE

## 2020-09-02 PROCEDURE — 85379 FIBRIN DEGRADATION QUANT: CPT | Performed by: INTERNAL MEDICINE

## 2020-09-02 PROCEDURE — 63710000001 DEXAMETHASONE PER 0.25 MG: Performed by: INTERNAL MEDICINE

## 2020-09-02 PROCEDURE — 86140 C-REACTIVE PROTEIN: CPT | Performed by: INTERNAL MEDICINE

## 2020-09-02 PROCEDURE — 80053 COMPREHEN METABOLIC PANEL: CPT | Performed by: INTERNAL MEDICINE

## 2020-09-02 PROCEDURE — 85384 FIBRINOGEN ACTIVITY: CPT | Performed by: INTERNAL MEDICINE

## 2020-09-02 RX ADMIN — BUDESONIDE AND FORMOTEROL FUMARATE DIHYDRATE 2 PUFF: 160; 4.5 AEROSOL RESPIRATORY (INHALATION) at 06:36

## 2020-09-02 RX ADMIN — BUPROPION HYDROCHLORIDE 150 MG: 150 TABLET, FILM COATED, EXTENDED RELEASE ORAL at 06:43

## 2020-09-02 RX ADMIN — DEXAMETHASONE 6 MG: 4 TABLET ORAL at 09:38

## 2020-09-02 RX ADMIN — METAXALONE 800 MG: 800 TABLET ORAL at 23:03

## 2020-09-02 RX ADMIN — METAXALONE 800 MG: 800 TABLET ORAL at 16:13

## 2020-09-02 RX ADMIN — ENOXAPARIN SODIUM 40 MG: 40 INJECTION SUBCUTANEOUS at 09:38

## 2020-09-02 RX ADMIN — Medication 1 EACH: at 09:38

## 2020-09-02 RX ADMIN — LOPERAMIDE HYDROCHLORIDE 2 MG: 2 CAPSULE ORAL at 06:43

## 2020-09-02 RX ADMIN — TOPIRAMATE 50 MG: 50 TABLET, FILM COATED ORAL at 23:03

## 2020-09-02 RX ADMIN — TOPIRAMATE 50 MG: 50 TABLET, FILM COATED ORAL at 09:38

## 2020-09-02 RX ADMIN — ENOXAPARIN SODIUM 40 MG: 40 INJECTION SUBCUTANEOUS at 23:03

## 2020-09-02 RX ADMIN — PANTOPRAZOLE SODIUM 40 MG: 40 TABLET, DELAYED RELEASE ORAL at 06:43

## 2020-09-02 RX ADMIN — DULOXETINE HYDROCHLORIDE 60 MG: 60 CAPSULE, DELAYED RELEASE ORAL at 09:38

## 2020-09-02 RX ADMIN — REMDESIVIR 100 MG: 100 INJECTION, POWDER, LYOPHILIZED, FOR SOLUTION INTRAVENOUS at 12:09

## 2020-09-02 RX ADMIN — LURASIDONE HYDROCHLORIDE 40 MG: 40 TABLET, FILM COATED ORAL at 09:38

## 2020-09-02 RX ADMIN — BUDESONIDE AND FORMOTEROL FUMARATE DIHYDRATE 2 PUFF: 160; 4.5 AEROSOL RESPIRATORY (INHALATION) at 17:04

## 2020-09-02 RX ADMIN — METAXALONE 800 MG: 800 TABLET ORAL at 09:38

## 2020-09-02 NOTE — PLAN OF CARE
Problem: Patient Care Overview  Goal: Plan of Care Review  Outcome: Ongoing (interventions implemented as appropriate)  Flowsheets (Taken 9/2/2020 1516)  Progress: improving  Plan of Care Reviewed With: patient  Outcome Summary: Last dose of remdesivir and dexamethasone given. Weaning O2.  Able to maintain sats >90% on 6L high flow O2.  Patient continues to use IS without assistance     Problem: Patient Care Overview  Goal: Individualization and Mutuality  Outcome: Ongoing (interventions implemented as appropriate)     Problem: Patient Care Overview  Goal: Discharge Needs Assessment  Outcome: Ongoing (interventions implemented as appropriate)  Flowsheets (Taken 9/2/2020 1517)  Concerns to be Addressed: no discharge needs identified  Readmission Within the Last 30 Days: no previous admission in last 30 days     Problem: Patient Care Overview  Goal: Interprofessional Rounds/Family Conf  Outcome: Ongoing (interventions implemented as appropriate)  Flowsheets (Taken 9/2/2020 1513)  Participants: ; nursing; patient     Problem: Pneumonia (Adult)  Goal: Signs and Symptoms of Listed Potential Problems Will be Absent, Minimized or Managed (Pneumonia)  Outcome: Ongoing (interventions implemented as appropriate)  Flowsheets (Taken 9/2/2020 1518)  Problems Assessed (Pneumonia): all  Problems Present (Pneumonia): none     Problem: Pain, Chronic (Adult)  Goal: Acceptable Pain/Comfort Level and Functional Ability  Outcome: Ongoing (interventions implemented as appropriate)  Flowsheets (Taken 9/2/2020 1512)  Acceptable Pain/Comfort Level and Functional Ability: making progress toward outcome     Problem: Fall Risk (Adult)  Goal: Absence of Fall  Outcome: Ongoing (interventions implemented as appropriate)  Flowsheets (Taken 9/2/2020 1515)  Absence of Fall: making progress toward outcome     Problem: Fall Risk (Adult)  Goal: Absence of Fall  Outcome: Ongoing (interventions implemented as appropriate)  Flowsheets (Taken  9/2/2020 1519)  Absence of Fall: making progress toward outcome     Problem: Skin Injury Risk (Adult)  Goal: Skin Health and Integrity  Outcome: Ongoing (interventions implemented as appropriate)  Flowsheets (Taken 9/2/2020 1519)  Skin Health and Integrity: making progress toward outcome

## 2020-09-02 NOTE — PROGRESS NOTES
"[facility] PULMONARY CARE         Dr Mcguire Sayied   LOS: 9 days   Patient Care Team:  Sun Guerrier MD as PCP - General (Internal Medicine)  Boogie Parks Jr., MD as Consulting Physician (Psychiatry)  Ava Fonseca, JACOBO as Ambulatory  ([facility])    Chief Complaint: Acute hypoxemic respiratory failure COVID-19 pneumonia/ARDS with cytokine storm leukocytosis anemia    Interval History:    REVIEW OF SYSTEMS:   CARDIOVASCULAR: No chest pain, chest pressure or chest discomfort. No palpitations or edema.   RESPIRATORY: Shortness of breath with exertion  GASTROINTESTINAL: No anorexia, nausea, vomiting or diarrhea. No abdominal pain or blood.   HEMATOLOGIC: No bleeding or bruising.     Ventilator/Non-Invasive Ventilation Settings (From admission, onward)    None            Vital Signs  Temp:  [97.4 °F (36.3 °C)-97.9 °F (36.6 °C)] 97.6 °F (36.4 °C)  Heart Rate:  [78-97] 88  Resp:  [18-22] 18  BP: (122-131)/(69-84) 131/79    Intake/Output Summary (Last 24 hours) at 9/2/2020 1432  Last data filed at 9/1/2020 1700  Gross per 24 hour   Intake 60 ml   Output --   Net 60 ml     Flowsheet Rows      First Filed Value   Admission Height  167.6 cm (66\") Documented at 08/23/2020 2243   Admission Weight  84.4 kg (186 lb) Documented at 08/23/2020 2243          Physical Exam:  In order to preserve PPE chart was reviewed.  Discussed with patient oxygen requirement improving down to 6 L high flow oxygen.  No distress no labored breathing reported by patient      Results Review:        Results from last 7 days   Lab Units 09/02/20  0600 09/01/20  0601 08/31/20  0603   SODIUM mmol/L 138 138 136   POTASSIUM mmol/L 3.5 3.8 3.8   CHLORIDE mmol/L 109* 107 107   CO2 mmol/L 22.0 23.3 19.3*   BUN mg/dL 25* 24* 25*   CREATININE mg/dL 0.74 0.72 0.74   GLUCOSE mg/dL 84 92 89   CALCIUM mg/dL 8.3* 8.7 9.1     Results from last 7 days   Lab Units 09/02/20  0600 09/01/20  0601 08/31/20  0603   CK TOTAL U/L 36 42 42     Results " from last 7 days   Lab Units 09/02/20  0600 09/01/20  0601 08/31/20  0603   WBC 10*3/mm3 9.85 10.25 11.23*   HEMOGLOBIN g/dL 12.5 13.0 13.0   HEMATOCRIT % 36.0 38.1 37.8   PLATELETS 10*3/mm3 446 462* 502*                           I reviewed the patient's new clinical results.  I personally viewed and interpreted the patient's CXR        Medication Review:     budesonide-formoterol 2 puff Inhalation BID - RT   buPROPion  mg Oral QAM   DULoxetine 60 mg Oral Daily   enoxaparin 40 mg Subcutaneous Q12H   loperamide 2 mg Oral QAM   lurasidone 40 mg Oral Daily   metaxalone 800 mg Oral TID   pantoprazole 40 mg Oral QAM   topiramate 50 mg Oral BID   wheat dextrin 1 each Oral Daily         dexmedetomidine 0.2-1.5 mcg/kg/hr Last Rate: Stopped (08/28/20 2970)       ASSESSMENT:   1. Bilateral COVID-19 pneumonia  2. Acute hypoxic respiratory failure, secondary #1  3. Cytokine storm  4. Elevated d-dimer  5. Elevated transaminases, likely secondary to viral illness  6. Leukocytosis, likely steroid-induced  7. Mild anemia, worse but no active bleeding  8. Diarrhea, resolved  9. Hypokalemia, corrected  10. Hyperchloremia  11. Anxiety/agitation     Pertinent medical problems:  · Depression  · GERD    PLAN:  Oxygen requirement continues to improve down to 6 L high flow.  Continue oxygen keep sats above 90%  Dexamethasone 6 mg daily for 10 days  Antiviral therapy/Remdesivir and doxycycline per infectious diseases.  High-dose DVT prophylaxis 40 mg twice daily.  Improving and stable leukocytosis  Stable medical condition and ID also currently following.  Nothing more to add.  Hospitalist to take over care on the floor  We will sign off        Maynor Caballero MD  09/02/20  14:32

## 2020-09-02 NOTE — PROGRESS NOTES
[facility]     LOS: 9 days     Name: Magdalena Dickey  Age: 65 y.o.  Sex: female  :  1955  MRN: [id number]         Primary Care Physician: Sun Guerrier MD    Diet Soft Texture; Whole Foods; Cardiac; Safe Tray    Subjective    Patient is seen at bedside.    Objective   Temp:  [97.4 °F (36.3 °C)-97.9 °F (36.6 °C)] 97.6 °F (36.4 °C)  Heart Rate:  [78-97] 88  Resp:  [18-20] 18  BP: (122-131)/(69-79) 131/79  SpO2:  [81 %-99 %] 95 %  on  Flow (L/min):  [6-9] 6;   Device (Oxygen Therapy): high-flow nasal cannula;humidified  Body mass index is 31.01 kg/m².    Physical Exam    General: patient is awake and alert.  Head and ENT: normocephalic and atraumatic.  Heart: regular rate, rhythm, no murmurs.  Abdomen: soft, nontender, bowel sounds present.  Extremities:  No clubbing, cyanosis or edema.  Neurologic:  No focal neurological deficits present.  Cranial nerves intact.  Psychiatry:  Normal mood and affect.  Skin:  Warm and no rash.    Reviewed medications and new clinical results        Results from last 7 days   Lab Units 20  0600 20  0601 20  0603 20  0835 20  0624 20  0429 20  0556   WBC 10*3/mm3 9.85 10.25 11.23* 12.09* 10.51 10.87* 11.71*   HEMOGLOBIN g/dL 12.5 13.0 13.0 13.1 13.2 12.9 13.2   PLATELETS 10*3/mm3 446 462* 502* 466* 442 349 336     Results from last 7 days   Lab Units 20  0600 20  0601 20  0603 20  0835 20  0624 20  0429 20  1716 20  0556   SODIUM mmol/L 138 138 136 139 141 140  --  141   POTASSIUM mmol/L 3.5 3.8 3.8 3.8 3.6 4.1 4.4 3.4*   CHLORIDE mmol/L 109* 107 107 108* 111* 112*  --  113*   CO2 mmol/L 22.0 23.3 19.3* 20.9* 21.4* 17.8*  --  19.0*   BUN mg/dL 25* 24* 25* 25* 20 21  --  19   CREATININE mg/dL 0.74 0.72 0.74 0.67 0.67 0.70  --  0.69   CALCIUM mg/dL 8.3* 8.7 9.1 8.8 8.9 8.6  --  8.8   GLUCOSE mg/dL 84 92 89 95 106* 108*  --  85     Lab  Results   Component Value Date    ANIONGAP 7.0 09/02/2020     No results found for: POCGLU  No results found for: HGBA1C  Estimated Creatinine Clearance: 77.9 mL/min (by C-G formula based on SCr of 0.74 mg/dL).    Lab Results   Component Value Date    COVID19 Detected (C) 08/23/2020     Assessment/Plan   Active Hospital Problems    Diagnosis  POA   • **Pneumonia due to COVID-19 virus [U07.1, J12.89]  Yes   • Hypertriglyceridemia [E78.1]  Yes   • History of colon resection [Z90.49]  Not Applicable   • Cervical spondylosis with radiculopathy [M47.22]  Yes   • Depression with anxiety [F41.8]  Yes   • GERD (gastroesophageal reflux disease) [K21.9]  Yes      Resolved Hospital Problems   No resolved problems to display.     65 y.o. female       Assessment and plan:  1. Acute respiratory failure with hypoxia, patient had underlying COVID 19 pneumonia.  Patient did receive IV Remdesivir and doxycycline therapy.  Continue dexamethasone.  Infectious Diseases on board.  2.  Cytokine storm and elevated D-dimer, patient also had underlying transaminitis due to acute viral illness.  3.  Volume overload, it was thought due to underlying convalescent plasma administration, continue to reassess volume status changes.  4. Gastroesophageal reflux disease, continue Protonix.  5.  Depression with anxiety, Continue Wellbutrin.  Patient is also on Latuda.  6.  Further plans based on hospital course.    Ambrocio Ramirez MD   09/02/20  16:08

## 2020-09-02 NOTE — PLAN OF CARE
Problem: Patient Care Overview  Goal: Plan of Care Review  Outcome: Ongoing (interventions implemented as appropriate)  Flowsheets  Taken 9/1/2020 2258 by Ghazal Richards, RN  Progress: improving  Outcome Summary: pt denies soa/ pain. ad raymond to bsc. continues 8L high flow NC. VSS  Taken 9/1/2020 1814 by Sofie Gilbert, RN  Plan of Care Reviewed With: patient  Goal: Individualization and Mutuality  Outcome: Ongoing (interventions implemented as appropriate)  Goal: Discharge Needs Assessment  Outcome: Ongoing (interventions implemented as appropriate)  Goal: Interprofessional Rounds/Family Conf  Outcome: Ongoing (interventions implemented as appropriate)  Flowsheets (Taken 9/1/2020 2258)  Participants: nursing; patient     Problem: Pneumonia (Adult)  Goal: Signs and Symptoms of Listed Potential Problems Will be Absent, Minimized or Managed (Pneumonia)  Outcome: Ongoing (interventions implemented as appropriate)  Flowsheets (Taken 8/30/2020 1834 by Kathe Bhat RN)  Problems Assessed (Pneumonia): all  Problems Present (Pneumonia): respiratory compromise     Problem: Pain, Chronic (Adult)  Goal: Acceptable Pain/Comfort Level and Functional Ability  Outcome: Ongoing (interventions implemented as appropriate)  Flowsheets (Taken 9/1/2020 2258)  Acceptable Pain/Comfort Level and Functional Ability: making progress toward outcome     Problem: Fall Risk (Adult)  Goal: Absence of Fall  Outcome: Ongoing (interventions implemented as appropriate)  Flowsheets (Taken 9/1/2020 2258)  Absence of Fall: making progress toward outcome  Goal: Absence of Fall  Outcome: Ongoing (interventions implemented as appropriate)  Flowsheets (Taken 9/1/2020 2258)  Absence of Fall: making progress toward outcome     Problem: Skin Injury Risk (Adult)  Goal: Skin Health and Integrity  Outcome: Ongoing (interventions implemented as appropriate)  Flowsheets (Taken 9/1/2020 2258)  Skin Health and Integrity: making progress toward  outcome

## 2020-09-02 NOTE — PROGRESS NOTES
"  Infectious Diseases Progress Note    Millie Arias MD     [facility]  [place]: 9 days  Patient Identification:  Name: Magdalena Dickey  Age: 65 y.o.  Sex: female  :  1955  MRN: [id number]         Primary Care Physician: Sun Guerrier MD            Subjective: Feeling better and stronger requiring less oxygen at rest but still gets out of breath with activity.  Overall feels that she is improving in terms of his strength appetite and sense of wellbeing.  Interval History:    · Started on Remdesivir on 2020  · Received convalescent plasma on 2020 started at 2251 finished at 0003 -  20  [id number]  [id number] ABO Rh A+ -however [facility] assigned code was 413218  · Transferred out of the ICU in the evening of 2020.  Objective:    Scheduled Meds:    budesonide-formoterol 2 puff Inhalation BID - RT   buPROPion  mg Oral QAM   DULoxetine 60 mg Oral Daily   enoxaparin 40 mg Subcutaneous Q12H   INV [id number] remdesivir in NS IVPB 100 mg Intravenous Q24H   loperamide 2 mg Oral QAM   lurasidone 40 mg Oral Daily   metaxalone 800 mg Oral TID   pantoprazole 40 mg Oral QAM   topiramate 50 mg Oral BID   wheat dextrin 1 each Oral Daily     Continuous Infusions:    dexmedetomidine 0.2-1.5 mcg/kg/hr Last Rate: Stopped (20 1714)       Vital signs in last 24 hours:  Temp:  [97.4 °F (36.3 °C)-97.9 °F (36.6 °C)] 97.4 °F (36.3 °C)  Heart Rate:  [78-97] 97  Resp:  [18-22] 18  BP: (122-130)/(69-84) 128/76    Intake/Output:    Intake/Output Summary (Last 24 hours) at 2020 1039  Last data filed at 2020 1700  Gross per 24 hour   Intake 300 ml   Output 500 ml   Net -200 ml       Exam:  /76 (BP Location: Right arm, Patient Position: Sitting)   Pulse 97   Temp 97.4 °F (36.3 °C) (Oral)   Resp 18   Ht 167.6 cm (65.98\")   Wt 87.1 kg (192 lb 0.3 oz)   SpO2 93%   BMI 31.01 kg/m²   Patient is examined using the personal protective equipment as per guidelines from infection control for " this particular patient as enacted.  Hand washing was performed before and after patient interaction.  General Appearance: Sitting in the chair next to her bed in ICU.  Appears to have a better color and disposition.  FiO2 was decreased to high flow nasal cannula                          Head:    Normocephalic, without obvious abnormality, atraumatic                           Eyes:    PERRL, conjunctivae/corneas clear, EOM's intact, both eyes                         Throat:   Lips, tongue, gums normal; oral mucosa pink and moist                           Neck:   Supple, symmetrical, trachea midline, no JVD                         Lungs:    Bibasilar crackles                 Chest Wall:    No tenderness or deformity                          Heart:  S1-S2 regular                  Abdomen:   Soft nontender                 Extremities:   Extremities normal, atraumatic, no cyanosis or edema                        Pulses:   Pulses palpable in all extremities                            Skin:   Skin is warm and dry,  no rashes or palpable lesions                  Neurologic: Grossly nonfocal       Data Review:    I reviewed the patient's new clinical results.  Results from last 7 days   Lab Units 09/02/20  0600 09/01/20  0601 08/31/20  0603 08/30/20  0835 08/29/20  0624 08/28/20  0429 08/27/20  0556   WBC 10*3/mm3 9.85 10.25 11.23* 12.09* 10.51 10.87* 11.71*   HEMOGLOBIN g/dL 12.5 13.0 13.0 13.1 13.2 12.9 13.2   PLATELETS 10*3/mm3 446 462* 502* 466* 442 349 336     Results from last 7 days   Lab Units 09/02/20  0600 09/01/20  0601 08/31/20  0603 08/30/20  0835 08/29/20  0624 08/28/20  0429 08/27/20  1716 08/27/20  0556   SODIUM mmol/L 138 138 136 139 141 140  --  141   POTASSIUM mmol/L 3.5 3.8 3.8 3.8 3.6 4.1 4.4 3.4*   CHLORIDE mmol/L 109* 107 107 108* 111* 112*  --  113*   CO2 mmol/L 22.0 23.3 19.3* 20.9* 21.4* 17.8*  --  19.0*   BUN mg/dL 25* 24* 25* 25* 20 21  --  19   CREATININE mg/dL 0.74 0.72 0.74 0.67 0.67 0.70  --   0.69   CALCIUM mg/dL 8.3* 8.7 9.1 8.8 8.9 8.6  --  8.8   GLUCOSE mg/dL 84 92 89 95 106* 108*  --  85     ECG 12 Lead   Final Result   HEART RATE= 95  bpm   RR Interval= 632  ms   NC Interval= 161  ms   P Horizontal Axis= -30  deg   P Front Axis= 55  deg   QRSD Interval= 97  ms   QT Interval= 361  ms   QRS Axis= 60  deg   T Wave Axis= 22  deg   - NORMAL ECG -   Sinus rhythm   NO SIGNIFICANT CHANGE FROM PREVIOUS ECG   Electronically Signed By: Evonne Lantigua ([facility]) 24-Aug-2020 12:33:04   Date and Time of Study: 2020-08-23 23:18:19      SCANNED EKG   Final Result              Assessment:    Pneumonia due to COVID-19 virus    GERD (gastroesophageal reflux disease)    Depression with anxiety    Cervical spondylosis with radiculopathy    History of colon resection    Hypertriglyceridemia  Appears to have worsened in terms of oxygen requirement and sense of wellbeing -  Reason for this change in status could include:   -Progression of COVID-19 pneumonia with evolving acute lung injury   -Volume overload due to administration of convalescent plasma   -Concomitant superimposed bacterial pneumonia     Recommendations:   · Continue aggressive supportive care with steroids and ongoing use of oxygen supplementation to keep saturation above 90%.    · X-ray worse   · Empirically use 1 dose of Lasix as she has significant crackles on examination and keep an eye on her intake and output.    · Completed short course of empiric antibiotic treatment for bacterial pneumonia.  · Continue Remdesivir for 10 days.        Millie Arias MD  9/2/2020  10:39    Much of this encounter note is an electronic transcription/translation of spoken language to printed text. The electronic translation of spoken language may permit erroneous, or at times, nonsensical words or phrases to be inadvertently transcribed; Although I have reviewed the note for such errors, some may still exist

## 2020-09-03 LAB
ALBUMIN SERPL-MCNC: 3 G/DL (ref 3.5–5.2)
ALBUMIN/GLOB SERPL: 1.2 G/DL
ALP SERPL-CCNC: 59 U/L (ref 39–117)
ALT SERPL W P-5'-P-CCNC: 23 U/L (ref 1–33)
ANION GAP SERPL CALCULATED.3IONS-SCNC: 9.6 MMOL/L (ref 5–15)
AST SERPL-CCNC: 15 U/L (ref 1–32)
BASOPHILS # BLD AUTO: 0.04 10*3/MM3 (ref 0–0.2)
BASOPHILS NFR BLD AUTO: 0.4 % (ref 0–1.5)
BILIRUB SERPL-MCNC: 0.4 MG/DL (ref 0–1.2)
BUN SERPL-MCNC: 24 MG/DL (ref 8–23)
BUN/CREAT SERPL: 28.2 (ref 7–25)
CALCIUM SPEC-SCNC: 8.6 MG/DL (ref 8.6–10.5)
CHLORIDE SERPL-SCNC: 109 MMOL/L (ref 98–107)
CK SERPL-CCNC: 47 U/L (ref 20–180)
CO2 SERPL-SCNC: 22.4 MMOL/L (ref 22–29)
CREAT SERPL-MCNC: 0.85 MG/DL (ref 0.57–1)
CRP SERPL-MCNC: 1.64 MG/DL (ref 0–0.5)
DEPRECATED RDW RBC AUTO: 41.7 FL (ref 37–54)
EOSINOPHIL # BLD AUTO: 0.1 10*3/MM3 (ref 0–0.4)
EOSINOPHIL NFR BLD AUTO: 1 % (ref 0.3–6.2)
ERYTHROCYTE [DISTWIDTH] IN BLOOD BY AUTOMATED COUNT: 12.7 % (ref 12.3–15.4)
FERRITIN SERPL-MCNC: 393 NG/ML (ref 13–150)
GFR SERPL CREATININE-BSD FRML MDRD: 67 ML/MIN/1.73
GLOBULIN UR ELPH-MCNC: 2.6 GM/DL
GLUCOSE SERPL-MCNC: 77 MG/DL (ref 65–99)
HCT VFR BLD AUTO: 36.5 % (ref 34–46.6)
HGB BLD-MCNC: 12.5 G/DL (ref 12–15.9)
IMM GRANULOCYTES # BLD AUTO: 0.21 10*3/MM3 (ref 0–0.05)
IMM GRANULOCYTES NFR BLD AUTO: 2.2 % (ref 0–0.5)
LYMPHOCYTES # BLD AUTO: 2.2 10*3/MM3 (ref 0.7–3.1)
LYMPHOCYTES NFR BLD AUTO: 22.8 % (ref 19.6–45.3)
MCH RBC QN AUTO: 30.5 PG (ref 26.6–33)
MCHC RBC AUTO-ENTMCNC: 34.2 G/DL (ref 31.5–35.7)
MCV RBC AUTO: 89 FL (ref 79–97)
MONOCYTES # BLD AUTO: 0.83 10*3/MM3 (ref 0.1–0.9)
MONOCYTES NFR BLD AUTO: 8.6 % (ref 5–12)
NEUTROPHILS NFR BLD AUTO: 6.25 10*3/MM3 (ref 1.7–7)
NEUTROPHILS NFR BLD AUTO: 65 % (ref 42.7–76)
NRBC BLD AUTO-RTO: 0 /100 WBC (ref 0–0.2)
PLATELET # BLD AUTO: 409 10*3/MM3 (ref 140–450)
PMV BLD AUTO: 10.1 FL (ref 6–12)
POTASSIUM SERPL-SCNC: 3.6 MMOL/L (ref 3.5–5.2)
PROT SERPL-MCNC: 5.6 G/DL (ref 6–8.5)
RBC # BLD AUTO: 4.1 10*6/MM3 (ref 3.77–5.28)
SODIUM SERPL-SCNC: 141 MMOL/L (ref 136–145)
WBC # BLD AUTO: 9.63 10*3/MM3 (ref 3.4–10.8)

## 2020-09-03 PROCEDURE — 80053 COMPREHEN METABOLIC PANEL: CPT | Performed by: INTERNAL MEDICINE

## 2020-09-03 PROCEDURE — 82728 ASSAY OF FERRITIN: CPT | Performed by: INTERNAL MEDICINE

## 2020-09-03 PROCEDURE — 82550 ASSAY OF CK (CPK): CPT | Performed by: INTERNAL MEDICINE

## 2020-09-03 PROCEDURE — 85025 COMPLETE CBC W/AUTO DIFF WBC: CPT | Performed by: INTERNAL MEDICINE

## 2020-09-03 PROCEDURE — 25010000002 ENOXAPARIN PER 10 MG: Performed by: INTERNAL MEDICINE

## 2020-09-03 PROCEDURE — 94799 UNLISTED PULMONARY SVC/PX: CPT

## 2020-09-03 PROCEDURE — 36415 COLL VENOUS BLD VENIPUNCTURE: CPT | Performed by: INTERNAL MEDICINE

## 2020-09-03 PROCEDURE — 86140 C-REACTIVE PROTEIN: CPT | Performed by: INTERNAL MEDICINE

## 2020-09-03 RX ORDER — ECHINACEA PURPUREA EXTRACT 125 MG
1 TABLET ORAL AS NEEDED
Status: DISCONTINUED | OUTPATIENT
Start: 2020-09-03 | End: 2020-09-04 | Stop reason: HOSPADM

## 2020-09-03 RX ADMIN — BUDESONIDE AND FORMOTEROL FUMARATE DIHYDRATE 2 PUFF: 160; 4.5 AEROSOL RESPIRATORY (INHALATION) at 19:28

## 2020-09-03 RX ADMIN — PANTOPRAZOLE SODIUM 40 MG: 40 TABLET, DELAYED RELEASE ORAL at 05:55

## 2020-09-03 RX ADMIN — LURASIDONE HYDROCHLORIDE 40 MG: 40 TABLET, FILM COATED ORAL at 08:25

## 2020-09-03 RX ADMIN — BUPROPION HYDROCHLORIDE 150 MG: 150 TABLET, FILM COATED, EXTENDED RELEASE ORAL at 05:55

## 2020-09-03 RX ADMIN — METAXALONE 800 MG: 800 TABLET ORAL at 17:40

## 2020-09-03 RX ADMIN — METAXALONE 800 MG: 800 TABLET ORAL at 20:19

## 2020-09-03 RX ADMIN — ENOXAPARIN SODIUM 40 MG: 40 INJECTION SUBCUTANEOUS at 08:25

## 2020-09-03 RX ADMIN — POTASSIUM CHLORIDE 40 MEQ: 10 CAPSULE, COATED, EXTENDED RELEASE ORAL at 08:25

## 2020-09-03 RX ADMIN — METAXALONE 800 MG: 800 TABLET ORAL at 08:25

## 2020-09-03 RX ADMIN — TOPIRAMATE 50 MG: 50 TABLET, FILM COATED ORAL at 08:25

## 2020-09-03 RX ADMIN — BUDESONIDE AND FORMOTEROL FUMARATE DIHYDRATE 2 PUFF: 160; 4.5 AEROSOL RESPIRATORY (INHALATION) at 08:13

## 2020-09-03 RX ADMIN — ENOXAPARIN SODIUM 40 MG: 40 INJECTION SUBCUTANEOUS at 20:19

## 2020-09-03 RX ADMIN — DULOXETINE HYDROCHLORIDE 60 MG: 60 CAPSULE, DELAYED RELEASE ORAL at 08:25

## 2020-09-03 RX ADMIN — TOPIRAMATE 50 MG: 50 TABLET, FILM COATED ORAL at 20:19

## 2020-09-03 RX ADMIN — LOPERAMIDE HYDROCHLORIDE 2 MG: 2 CAPSULE ORAL at 05:55

## 2020-09-03 RX ADMIN — Medication 1 EACH: at 08:25

## 2020-09-03 NOTE — PROGRESS NOTES
"  Infectious Diseases Progress Note    Millie Arias MD     [facility]  Los: 10 days  Patient Identification:  Name: Magdalena Dickey  Age: 65 y.o.  Sex: female  :  1955  MRN: [id number]         Primary Care Physician: Sun Guerrier MD            Subjective: Feeling better every day.  Appetite is improving denies any fever and chills.  Interval History:    · Started on Remdesivir on 2020  · Received convalescent plasma on 2020 started at 2251 finished at 0003 -  20  [id number]  [id number] ABO Rh A+ -however [facility] assigned code was [id number]  · Transferred out of the ICU in the evening of 2020.  Objective:    Scheduled Meds:    budesonide-formoterol 2 puff Inhalation BID - RT   buPROPion  mg Oral QAM   DULoxetine 60 mg Oral Daily   enoxaparin 40 mg Subcutaneous Q12H   loperamide 2 mg Oral QAM   lurasidone 40 mg Oral Daily   metaxalone 800 mg Oral TID   pantoprazole 40 mg Oral QAM   topiramate 50 mg Oral BID   wheat dextrin 1 each Oral Daily     Continuous Infusions:       Vital signs in last 24 hours:  Temp:  [97.4 °F (36.3 °C)-97.6 °F (36.4 °C)] 97.6 °F (36.4 °C)  Heart Rate:  [87-97] 87  Resp:  [18] 18  BP: (128-131)/(76-79) 131/79    Intake/Output:  No intake or output data in the 24 hours ending 20 0659    Exam:  /79 (BP Location: Right arm, Patient Position: Lying)   Pulse 87   Temp 97.6 °F (36.4 °C) (Oral)   Resp 18   Ht 167.6 cm (65.98\")   Wt 87.1 kg (192 lb 0.3 oz)   SpO2 92%   BMI 31.01 kg/m²   Patient is examined using the personal protective equipment as per guidelines from infection control for this particular patient as enacted.  Hand washing was performed before and after patient interaction.  General Appearance: Sitting in the chair next to her bed in ICU.  Appears to have a better color and disposition.  FiO2 was decreased to high flow nasal cannula                          Head:    Normocephalic, without obvious abnormality, " atraumatic                           Eyes:    PERRL, conjunctivae/corneas clear, EOM's intact, both eyes                         Throat:   Lips, tongue, gums normal; oral mucosa pink and moist                           Neck:   Supple, symmetrical, trachea midline, no JVD                         Lungs:    Bibasilar crackles                 Chest Wall:    No tenderness or deformity                          Heart:  S1-S2 regular                  Abdomen:   Soft nontender                 Extremities:   Extremities normal, atraumatic, no cyanosis or edema                        Pulses:   Pulses palpable in all extremities                            Skin:   Skin is warm and dry,  no rashes or palpable lesions                  Neurologic: Grossly nonfocal       Data Review:    I reviewed the patient's new clinical results.  Results from last 7 days   Lab Units 09/02/20  0600 09/01/20  0601 08/31/20  0603 08/30/20  0835 08/29/20  0624 08/28/20  0429   WBC 10*3/mm3 9.85 10.25 11.23* 12.09* 10.51 10.87*   HEMOGLOBIN g/dL 12.5 13.0 13.0 13.1 13.2 12.9   PLATELETS 10*3/mm3 446 462* 502* 466* 442 349     Results from last 7 days   Lab Units 09/02/20  0600 09/01/20  0601 08/31/20  0603 08/30/20  0835 08/29/20  0624 08/28/20  0429 08/27/20  1716   SODIUM mmol/L 138 138 136 139 141 140  --    POTASSIUM mmol/L 3.5 3.8 3.8 3.8 3.6 4.1 4.4   CHLORIDE mmol/L 109* 107 107 108* 111* 112*  --    CO2 mmol/L 22.0 23.3 19.3* 20.9* 21.4* 17.8*  --    BUN mg/dL 25* 24* 25* 25* 20 21  --    CREATININE mg/dL 0.74 0.72 0.74 0.67 0.67 0.70  --    CALCIUM mg/dL 8.3* 8.7 9.1 8.8 8.9 8.6  --    GLUCOSE mg/dL 84 92 89 95 106* 108*  --      ECG 12 Lead   Final Result   HEART RATE= 95  bpm   RR Interval= 632  ms   MA Interval= 161  ms   P Horizontal Axis= -30  deg   P Front Axis= 55  deg   QRSD Interval= 97  ms   QT Interval= 361  ms   QRS Axis= 60  deg   T Wave Axis= 22  deg   - NORMAL ECG -   Sinus rhythm   NO SIGNIFICANT CHANGE FROM PREVIOUS ECG    Electronically Signed By: Evonne Lantigua ([facility]) 24-Aug-2020 12:33:04   Date and Time of Study: 2020-08-23 23:18:19      SCANNED EKG   Final Result              Assessment:    Pneumonia due to COVID-19 virus    GERD (gastroesophageal reflux disease)    Depression with anxiety    Cervical spondylosis with radiculopathy    History of colon resection    Hypertriglyceridemia  Appears to have worsened in terms of oxygen requirement and sense of wellbeing -  Reason for this change in status could include:   -Progression of COVID-19 pneumonia with evolving acute lung injury   -Volume overload due to administration of convalescent plasma   -Concomitant superimposed bacterial pneumonia     Recommendations:   · Continue present supportive care.  · Once her Remdesivir is completed then from infectious disease standpoint she could be discharged to either rehab or home with appropriate latest guidelines for COVID-19 infection in the community setting with home oxygen and physical therapy based on her physiological deficits after this acute illness.        Millie Arias MD  9/3/2020  06:59    Much of this encounter note is an electronic transcription/translation of spoken language to printed text. The electronic translation of spoken language may permit erroneous, or at times, nonsensical words or phrases to be inadvertently transcribed; Although I have reviewed the note for such errors, some may still exist

## 2020-09-03 NOTE — PLAN OF CARE
Problem: Patient Care Overview  Goal: Plan of Care Review  Outcome: Ongoing (interventions implemented as appropriate)  Flowsheets (Taken 9/3/2020 8509)  Progress: improving  Plan of Care Reviewed With: patient  Outcome Summary: Pt weaned to 3L NC. VSS. Pt up ad raymond. No c/o pain, N/V/D. will continue to monitor

## 2020-09-03 NOTE — PROGRESS NOTES
Continued Stay Note  [facility]     Patient Name: Magdalena Dickey  MRN: [id number]  Today's Date: 9/3/2020    Admit Date: 8/23/2020    Discharge Plan     Row Name 09/03/20 1711       Plan    Plan  Home with [employer] following for DME, using Meds to beds, family transport    Plan Comments  Spoke with patient via phone call.  She states she prefers [employer] for home oxygen.  Referral was called to [facility].  Patient is on 3L NC at this time.  She declines home health services.  She states a family member will transport.  She would like to use [facility] outpatient pharmacy meds to beds at [place].  She denies other needs at this time........................Shelli Casas RN        Discharge Codes    No documentation.             Shelli Casas RN

## 2020-09-04 ENCOUNTER — READMISSION MANAGEMENT (OUTPATIENT)
Dept: CALL CENTER | Facility: HOSPITAL | Age: 65
End: 2020-09-04

## 2020-09-04 VITALS
DIASTOLIC BLOOD PRESSURE: 80 MMHG | BODY MASS INDEX: 30.86 KG/M2 | RESPIRATION RATE: 18 BRPM | WEIGHT: 192.02 LBS | TEMPERATURE: 97.8 F | OXYGEN SATURATION: 93 % | SYSTOLIC BLOOD PRESSURE: 123 MMHG | HEIGHT: 66 IN | HEART RATE: 98 BPM

## 2020-09-04 DIAGNOSIS — G43.009 MIGRAINE WITHOUT AURA AND WITHOUT STATUS MIGRAINOSUS, NOT INTRACTABLE: ICD-10-CM

## 2020-09-04 LAB
ALBUMIN SERPL-MCNC: 3.1 G/DL (ref 3.5–5.2)
ALBUMIN/GLOB SERPL: 1.2 G/DL
ALP SERPL-CCNC: 61 U/L (ref 39–117)
ALT SERPL W P-5'-P-CCNC: 25 U/L (ref 1–33)
ANION GAP SERPL CALCULATED.3IONS-SCNC: 10.1 MMOL/L (ref 5–15)
AST SERPL-CCNC: 16 U/L (ref 1–32)
BASOPHILS # BLD AUTO: 0.05 10*3/MM3 (ref 0–0.2)
BASOPHILS NFR BLD AUTO: 0.5 % (ref 0–1.5)
BILIRUB SERPL-MCNC: 0.4 MG/DL (ref 0–1.2)
BUN SERPL-MCNC: 19 MG/DL (ref 8–23)
BUN/CREAT SERPL: 23.8 (ref 7–25)
CALCIUM SPEC-SCNC: 8.5 MG/DL (ref 8.6–10.5)
CHLORIDE SERPL-SCNC: 106 MMOL/L (ref 98–107)
CK SERPL-CCNC: 42 U/L (ref 20–180)
CO2 SERPL-SCNC: 21.9 MMOL/L (ref 22–29)
CREAT SERPL-MCNC: 0.8 MG/DL (ref 0.57–1)
CRP SERPL-MCNC: 1.59 MG/DL (ref 0–0.5)
D DIMER PPP FEU-MCNC: 1.17 MCGFEU/ML (ref 0–0.49)
DEPRECATED RDW RBC AUTO: 43.8 FL (ref 37–54)
EOSINOPHIL # BLD AUTO: 0.16 10*3/MM3 (ref 0–0.4)
EOSINOPHIL NFR BLD AUTO: 1.5 % (ref 0.3–6.2)
ERYTHROCYTE [DISTWIDTH] IN BLOOD BY AUTOMATED COUNT: 13.1 % (ref 12.3–15.4)
FERRITIN SERPL-MCNC: 460 NG/ML (ref 13–150)
FIBRINOGEN PPP-MCNC: 445 MG/DL (ref 219–464)
GFR SERPL CREATININE-BSD FRML MDRD: 72 ML/MIN/1.73
GLOBULIN UR ELPH-MCNC: 2.6 GM/DL
GLUCOSE SERPL-MCNC: 78 MG/DL (ref 65–99)
HCT VFR BLD AUTO: 39 % (ref 34–46.6)
HGB BLD-MCNC: 13 G/DL (ref 12–15.9)
IMM GRANULOCYTES # BLD AUTO: 0.13 10*3/MM3 (ref 0–0.05)
IMM GRANULOCYTES NFR BLD AUTO: 1.2 % (ref 0–0.5)
LDH SERPL-CCNC: 247 U/L (ref 135–214)
LYMPHOCYTES # BLD AUTO: 1.56 10*3/MM3 (ref 0.7–3.1)
LYMPHOCYTES NFR BLD AUTO: 14.6 % (ref 19.6–45.3)
MCH RBC QN AUTO: 30.4 PG (ref 26.6–33)
MCHC RBC AUTO-ENTMCNC: 33.3 G/DL (ref 31.5–35.7)
MCV RBC AUTO: 91.3 FL (ref 79–97)
MONOCYTES # BLD AUTO: 0.84 10*3/MM3 (ref 0.1–0.9)
MONOCYTES NFR BLD AUTO: 7.8 % (ref 5–12)
NEUTROPHILS NFR BLD AUTO: 7.98 10*3/MM3 (ref 1.7–7)
NEUTROPHILS NFR BLD AUTO: 74.4 % (ref 42.7–76)
NRBC BLD AUTO-RTO: 0 /100 WBC (ref 0–0.2)
PLATELET # BLD AUTO: 383 10*3/MM3 (ref 140–450)
PMV BLD AUTO: 10.4 FL (ref 6–12)
POTASSIUM SERPL-SCNC: 3.8 MMOL/L (ref 3.5–5.2)
PROT SERPL-MCNC: 5.7 G/DL (ref 6–8.5)
RBC # BLD AUTO: 4.27 10*6/MM3 (ref 3.77–5.28)
SODIUM SERPL-SCNC: 138 MMOL/L (ref 136–145)
WBC # BLD AUTO: 10.72 10*3/MM3 (ref 3.4–10.8)

## 2020-09-04 PROCEDURE — 82728 ASSAY OF FERRITIN: CPT | Performed by: INTERNAL MEDICINE

## 2020-09-04 PROCEDURE — 86140 C-REACTIVE PROTEIN: CPT | Performed by: INTERNAL MEDICINE

## 2020-09-04 PROCEDURE — 94799 UNLISTED PULMONARY SVC/PX: CPT

## 2020-09-04 PROCEDURE — 85379 FIBRIN DEGRADATION QUANT: CPT | Performed by: INTERNAL MEDICINE

## 2020-09-04 PROCEDURE — 82550 ASSAY OF CK (CPK): CPT | Performed by: INTERNAL MEDICINE

## 2020-09-04 PROCEDURE — 85025 COMPLETE CBC W/AUTO DIFF WBC: CPT | Performed by: INTERNAL MEDICINE

## 2020-09-04 PROCEDURE — 83615 LACTATE (LD) (LDH) ENZYME: CPT | Performed by: INTERNAL MEDICINE

## 2020-09-04 PROCEDURE — 85384 FIBRINOGEN ACTIVITY: CPT | Performed by: INTERNAL MEDICINE

## 2020-09-04 PROCEDURE — 36415 COLL VENOUS BLD VENIPUNCTURE: CPT | Performed by: INTERNAL MEDICINE

## 2020-09-04 PROCEDURE — 80053 COMPREHEN METABOLIC PANEL: CPT | Performed by: INTERNAL MEDICINE

## 2020-09-04 PROCEDURE — 25010000002 ENOXAPARIN PER 10 MG: Performed by: INTERNAL MEDICINE

## 2020-09-04 RX ORDER — METAXALONE 800 MG/1
TABLET ORAL
Qty: 90 TABLET | Refills: 1 | OUTPATIENT
Start: 2020-09-04

## 2020-09-04 RX ADMIN — BUDESONIDE AND FORMOTEROL FUMARATE DIHYDRATE 2 PUFF: 160; 4.5 AEROSOL RESPIRATORY (INHALATION) at 08:59

## 2020-09-04 RX ADMIN — PANTOPRAZOLE SODIUM 40 MG: 40 TABLET, DELAYED RELEASE ORAL at 06:07

## 2020-09-04 RX ADMIN — LOPERAMIDE HYDROCHLORIDE 2 MG: 2 CAPSULE ORAL at 06:07

## 2020-09-04 RX ADMIN — METAXALONE 800 MG: 800 TABLET ORAL at 08:16

## 2020-09-04 RX ADMIN — LURASIDONE HYDROCHLORIDE 40 MG: 40 TABLET, FILM COATED ORAL at 08:16

## 2020-09-04 RX ADMIN — TOPIRAMATE 50 MG: 50 TABLET, FILM COATED ORAL at 08:16

## 2020-09-04 RX ADMIN — DULOXETINE HYDROCHLORIDE 60 MG: 60 CAPSULE, DELAYED RELEASE ORAL at 08:16

## 2020-09-04 RX ADMIN — BUPROPION HYDROCHLORIDE 150 MG: 150 TABLET, FILM COATED, EXTENDED RELEASE ORAL at 06:07

## 2020-09-04 RX ADMIN — ENOXAPARIN SODIUM 40 MG: 40 INJECTION SUBCUTANEOUS at 08:16

## 2020-09-04 RX ADMIN — Medication 1 EACH: at 08:16

## 2020-09-04 NOTE — PROGRESS NOTES
"  Infectious Diseases Progress Note    Millie Arias MD     [facility]  Los: 11 days  Patient Identification:  Name: Magdalena Dickey  Age: 65 y.o.  Sex: female  :  1955  MRN: [id number]         Primary Care Physician: Sun Guerrier MD            Subjective: Feeling much better still have weakness but feels that she is improving and excited about going home later today.  Interval History:    · Started on Remdesivir on 2020  · Received convalescent plasma on 2020 started at 2251 finished at 0003 -  20  [id number]  [id number] ABO Rh A+ -however [facility] assigned code was [id number]  · Transferred out of the ICU in the evening of 2020.  Objective:    Scheduled Meds:    budesonide-formoterol 2 puff Inhalation BID - RT   buPROPion  mg Oral QAM   DULoxetine 60 mg Oral Daily   enoxaparin 40 mg Subcutaneous Q12H   loperamide 2 mg Oral QAM   lurasidone 40 mg Oral Daily   metaxalone 800 mg Oral TID   pantoprazole 40 mg Oral QAM   topiramate 50 mg Oral BID   wheat dextrin 1 each Oral Daily     Continuous Infusions:       Vital signs in last 24 hours:  Temp:  [96.4 °F (35.8 °C)-97.9 °F (36.6 °C)] 97.8 °F (36.6 °C)  Heart Rate:  [80-98] 98  Resp:  [16-20] 18  BP: (104-123)/(63-80) 123/80    Intake/Output:    Intake/Output Summary (Last 24 hours) at 2020 1131  Last data filed at 9/3/2020 1415  Gross per 24 hour   Intake 360 ml   Output --   Net 360 ml       Exam:  /80 (BP Location: Right arm, Patient Position: Sitting)   Pulse 98   Temp 97.8 °F (36.6 °C) (Oral)   Resp 18   Ht 167.6 cm (65.98\")   Wt 87.1 kg (192 lb 0.3 oz)   SpO2 93%   BMI 31.01 kg/m²   Patient is examined using the personal protective equipment as per guidelines from infection control for this particular patient as enacted.  Hand washing was performed before and after patient interaction.  General Appearance: Sitting in the chair next to her bed in ICU.  Appears to have a better color and disposition.  " FiO2 was decreased to high flow nasal cannula                          Head:    Normocephalic, without obvious abnormality, atraumatic                           Eyes:    PERRL, conjunctivae/corneas clear, EOM's intact, both eyes                         Throat:   Lips, tongue, gums normal; oral mucosa pink and moist                           Neck:   Supple, symmetrical, trachea midline, no JVD                         Lungs:    Bibasilar crackles                 Chest Wall:    No tenderness or deformity                          Heart:  S1-S2 regular                  Abdomen:   Soft nontender                 Extremities:   Extremities normal, atraumatic, no cyanosis or edema                        Pulses:   Pulses palpable in all extremities                            Skin:   Skin is warm and dry,  no rashes or palpable lesions                  Neurologic: Grossly nonfocal       Data Review:    I reviewed the patient's new clinical results.  Results from last 7 days   Lab Units 09/04/20  0548 09/03/20  0527 09/02/20  0600 09/01/20  0601 08/31/20  0603 08/30/20  0835 08/29/20  0624   WBC 10*3/mm3 10.72 9.63 9.85 10.25 11.23* 12.09* 10.51   HEMOGLOBIN g/dL 13.0 12.5 12.5 13.0 13.0 13.1 13.2   PLATELETS 10*3/mm3 383 409 446 462* 502* 466* 442     Results from last 7 days   Lab Units 09/04/20  0548 09/03/20  0527 09/02/20  0600 09/01/20  0601 08/31/20  0603 08/30/20  0835 08/29/20  0624   SODIUM mmol/L 138 141 138 138 136 139 141   POTASSIUM mmol/L 3.8 3.6 3.5 3.8 3.8 3.8 3.6   CHLORIDE mmol/L 106 109* 109* 107 107 108* 111*   CO2 mmol/L 21.9* 22.4 22.0 23.3 19.3* 20.9* 21.4*   BUN mg/dL 19 24* 25* 24* 25* 25* 20   CREATININE mg/dL 0.80 0.85 0.74 0.72 0.74 0.67 0.67   CALCIUM mg/dL 8.5* 8.6 8.3* 8.7 9.1 8.8 8.9   GLUCOSE mg/dL 78 77 84 92 89 95 106*     ECG 12 Lead   Final Result   HEART RATE= 95  bpm   RR Interval= 632  ms   LA Interval= 161  ms   P Horizontal Axis= -30  deg   P Front Axis= 55  deg   QRSD Interval= 97  ms    QT Interval= 361  ms   QRS Axis= 60  deg   T Wave Axis= 22  deg   - NORMAL ECG -   Sinus rhythm   NO SIGNIFICANT CHANGE FROM PREVIOUS ECG   Electronically Signed By: Evonne Lantigua ([facility]) 24-Aug-2020 12:33:04   Date and Time of Study: 2020-08-23 23:18:19      SCANNED EKG   Final Result              Assessment:    Pneumonia due to COVID-19 virus    GERD (gastroesophageal reflux disease)    Depression with anxiety    Cervical spondylosis with radiculopathy    History of colon resection    Hypertriglyceridemia  Appears to have worsened in terms of oxygen requirement and sense of wellbeing -  Reason for this change in status could include:   -Progression of COVID-19 pneumonia with evolving acute lung injury   -Volume overload due to administration of convalescent plasma   -Concomitant superimposed bacterial pneumonia     Recommendations:   · Continue present supportive care.  · Once her Remdesivir is completed then from infectious disease standpoint she could be discharged to either rehab or home with appropriate latest guidelines for COVID-19 infection in the community setting with home oxygen and physical therapy based on her physiological deficits after this acute illness.        Millie Arias MD  9/4/2020  11:31    Much of this encounter note is an electronic transcription/translation of spoken language to printed text. The electronic translation of spoken language may permit erroneous, or at times, nonsensical words or phrases to be inadvertently transcribed; Although I have reviewed the note for such errors, some may still exist

## 2020-09-04 NOTE — DISCHARGE SUMMARY
[facility]    Date of Discharge:  9/4/2020    PCP: Sun Guerrier MD    Discharge Diagnosis:   Active Hospital Problems    Diagnosis  POA   • **Pneumonia due to COVID-19 virus [U07.1, J12.89]  Yes   • Hypertriglyceridemia [E78.1]  Yes   • History of colon resection [Z90.49]  Not Applicable   • Cervical spondylosis with radiculopathy [M47.22]  Yes   • Depression with anxiety [F41.8]  Yes   • GERD (gastroesophageal reflux disease) [K21.9]  Yes      Resolved Hospital Problems   No resolved problems to display.     Procedures Performed       Consults     Date and Time Order Name Status Description    8/24/2020 1105 Inpatient Pulmonology Consult Completed     8/24/2020 0833 Inpatient Infectious Diseases Consult Completed     8/24/2020 0047 [facility] (on-call MD unless specified) Details Completed         Hospital Course  65-year-old female with history of gastroesophageal reflux disease, presented to the hospital and she was diagnosed to have acute respiratory failure with hypoxia due to underlying COVID-19 pneumonia.  Patient was started on IV antiviral and doxycycline therapy.  Patient was also kept on dexamethasone.  Patient had cytokine storm, elevated dimer, transaminitis due to acute viral illness.  Patient was in ICU, she has done well, she has been transferred to medical floor, since patient is doing really well clinically, she is on 2 L supplemental oxygen, patient is deemed stable to be discharged home today, I have seen and examined patient at bedside.  Total time spent on discharge day management is more than 30 minutes.  Plan of care was discussed with the patient and she has verbalized understanding.  Patient was instructed to self isolate for 2 more weeks.    Condition on Discharge: Improved.     Temp:  [96.4 °F (35.8 °C)-97.9 °F (36.6 °C)] 97.8 °F (36.6 °C)  Heart Rate:  [80-98] 98  Resp:  [16-20] 18  BP: (104-123)/(63-80) 123/80  Body mass index is  31.01 kg/m².    Physical Exam    General: patient is awake and alert.  Head and ENT: normocephalic and atraumatic.  Lungs: symmetric expansion and equal air entry bilaterally.  Heart: regular rate, rhythm, no murmurs.  Abdomen: soft, nontender, bowel sounds present.  Extremities:  No clubbing, cyanosis or edema.  Neurologic:  No focal neurological deficits present.  Cranial nerves intact.  Psychiatry:  Normal mood and affect.  Skin:  Warm and no rash.       Discharge Medications      Continue These Medications      Instructions Start Date   ALPRAZolam 0.5 MG tablet  Commonly known as:  XANAX   0.5 mg, Oral, 2 Times Daily PRN      buPROPion  MG 24 hr tablet  Commonly known as:  WELLBUTRIN XL   150 mg, Oral, Every Morning      calcium carbonate 600 MG tablet  Commonly known as:  OS-MARY   600 mg, Oral, 2 Times Daily      CITRUCEL PO   Oral, Daily      dicyclomine 10 MG capsule  Commonly known as:  BENTYL   10 mg, Oral, 4 Times Daily PRN      DULoxetine 60 MG capsule  Commonly known as:  CYMBALTA   60 mg, Oral, Daily      IMODIUM A-D PO   Oral, Daily      Latuda 40 MG tablet tablet  Generic drug:  lurasidone   No dose, route, or frequency recorded.      metaxalone 800 MG tablet  Commonly known as:  SKELAXIN   TAKE 1 TABLET BY MOUTH THREE TIMES A DAY      MULTI-VITAMINS PO   1 tablet, Oral, Daily, HOLD FOR SURGERY      ondansetron 4 MG tablet  Commonly known as:  ZOFRAN   4 mg, Oral, Every 8 Hours PRN      pantoprazole 40 MG EC tablet  Commonly known as:  PROTONIX   40 mg, Oral, Every Morning      SUMAtriptan 100 MG tablet  Commonly known as:  IMITREX   TAKE 1 TABLET BY MOUTH AT ONSET OF HEADACHE, MAY REPEAT EVERY 2 HOURS AS NEEDED (MAX 200MG/DAY)      topiramate 50 MG tablet  Commonly known as:  TOPAMAX   50 mg, Oral, 2 Times Daily      traZODone 50 MG tablet  Commonly known as:  DESYREL   TAKE 1 TO 2 TABLETS BY MOUTH AT BEDTIME FOR INSOMNIA              Additional Instructions for the Follow-ups that You Need to  Schedule     Discharge Follow-up with PCP   As directed       Currently Documented PCP:    Sun Guerrier MD    PCP Phone Number:    [phone]     Follow Up Details:  Follow-up with PCP and pulmonary in 7 days.            Contact information for follow-up providers     Sun Guerrier MD .    Specialty:  Internal Medicine  Why:  Follow-up with PCP and pulmonary in 7 days.  Contact information:  [address] [facility] [address]  [place] [address]  [phone]                   Contact information for after-discharge care     Durable Medical Equipment     [facility] .    Service:  Durable Medical Equipment  Contact information:  [address]  [facility] [address]  [phone]                           Test Results Pending at Discharge     Ambrocio Ramirez MD  09/04/20  12:28    Discharge time spent greater than 30 minutes.

## 2020-09-04 NOTE — PROGRESS NOTES
Case Management Discharge Note      Final Note: Home with [facility] for home oxygen    Provided Post Acute Provider List?: N/A    Destination      No service has been selected for the patient.      Durable Medical Equipment - Selection Complete      Service Provider Request Status Selected Services Address Phone Number Fax Number    [facility] Selected Durable Medical Equipment [address], [facility] [address] [phone]       Shelli Casas RN 9/3/2020 3783    Spoke with Letitia and she is following                 Dialysis/Infusion      No service has been selected for the patient.      Home Medical Care      No service has been selected for the patient.      Therapy      No service has been selected for the patient.      Community Resources      No service has been selected for the patient.        Transportation Services  Private: Car    Final Discharge Disposition Code: 01 - home or self-care

## 2020-09-04 NOTE — OUTREACH NOTE
Prep Survey      Responses   [facility] facility patient discharged from?  [place]   Is LACE score < 7 ?  No   Eligibility  [facility]   Date of Admission  08/23/20   Date of Discharge  09/04/20   Discharge Disposition  Home or Self Care   Discharge diagnosis  hypoxia, PNA d/t COVID 19,  cytokine storm   COVID-19 Test Status  Confirmed   Does the patient have one of the following disease processes/diagnoses(primary or secondary)?  COPD/Pneumonia   Does the patient have Home health ordered?  No   Is there a DME ordered?  Yes   What DME was ordered?  O2 from [place]   Is this a private patient?  Yes   Prep survey completed?  Yes          Cherry Otero RN

## 2020-09-04 NOTE — PLAN OF CARE
Problem: Patient Care Overview  Goal: Plan of Care Review  Outcome: Ongoing (interventions implemented as appropriate)  Flowsheets (Taken 9/4/2020 1314)  Progress: improving  Plan of Care Reviewed With: patient  Outcome Summary: plan to discharge this afternoon     Problem: Patient Care Overview  Goal: Individualization and Mutuality  Outcome: Ongoing (interventions implemented as appropriate)     Problem: Patient Care Overview  Goal: Discharge Needs Assessment  Outcome: Ongoing (interventions implemented as appropriate)     Problem: Patient Care Overview  Goal: Interprofessional Rounds/Family Conf  Outcome: Ongoing (interventions implemented as appropriate)     Problem: Pneumonia (Adult)  Goal: Signs and Symptoms of Listed Potential Problems Will be Absent, Minimized or Managed (Pneumonia)  Outcome: Ongoing (interventions implemented as appropriate)     Problem: Pain, Chronic (Adult)  Goal: Acceptable Pain/Comfort Level and Functional Ability  Outcome: Ongoing (interventions implemented as appropriate)     Problem: Fall Risk (Adult)  Goal: Absence of Fall  Outcome: Ongoing (interventions implemented as appropriate)     Problem: Fall Risk (Adult)  Goal: Absence of Fall  Outcome: Ongoing (interventions implemented as appropriate)     Problem: Skin Injury Risk (Adult)  Goal: Skin Health and Integrity  Outcome: Ongoing (interventions implemented as appropriate)

## 2020-09-05 ENCOUNTER — READMISSION MANAGEMENT (OUTPATIENT)
Dept: CALL CENTER | Facility: HOSPITAL | Age: 65
End: 2020-09-05

## 2020-09-05 NOTE — OUTREACH NOTE
COPD/PN Week 1 Survey      Responses   [facility] patient discharged from?  [place]   COVID-19 Test Status  Confirmed   Does the patient have one of the following disease processes/diagnoses(primary or secondary)?  COPD/Pneumonia   Is there a successful TCM telephone encounter documented?  No   Was the primary reason for admission:  Pneumonia   Week 1 attempt successful?  No          Evelio Washington RN

## 2020-09-06 ENCOUNTER — READMISSION MANAGEMENT (OUTPATIENT)
Dept: CALL CENTER | Facility: HOSPITAL | Age: 65
End: 2020-09-06

## 2020-09-06 NOTE — OUTREACH NOTE
COPD/PN Week 1 Survey      Responses   [facility] patient discharged from?  [place]   COVID-19 Test Status  Confirmed   Does the patient have one of the following disease processes/diagnoses(primary or secondary)?  COPD/Pneumonia   Is there a successful TCM telephone encounter documented?  No   Was the primary reason for admission:  Pneumonia   Week 1 attempt successful?  No          Evelio Washington RN

## 2020-09-07 ENCOUNTER — TRANSITIONAL CARE MANAGEMENT TELEPHONE ENCOUNTER (OUTPATIENT)
Dept: CALL CENTER | Facility: HOSPITAL | Age: 65
End: 2020-09-07

## 2020-09-07 NOTE — OUTREACH NOTE
"Call Center TCM Note      Responses   [facility]   COVID-19 Test Status  Confirmed   Does the patient have one of the following disease processes/diagnoses(primary or secondary)?  COPD/Pneumonia   Was the primary reason for admission:  Pneumonia   TCM attempt successful?  Yes   Call start time  1001   Call end time  1010   Meds reviewed with patient/caregiver?  Yes   Does the patient have all medications ordered at discharge?  N/A   Does the patient have a primary care provider?   Yes   Does the patient have an appointment with their PCP or pulmonologist within 7 days of discharge?  No   Comments regarding PCP  PATIENT STATES SHE WILL CALL OFFICE TOMORROW TO MAKE HER FOLLOW UP APPOINTMENT. PATIENT DECLINED MAKING IT DURING THIS CALL.    What is preventing the patient from scheduling follow up appointments within 7 days of discharge?  Haven't had time   Nursing Interventions  Educated patient on importance of making appointment, Advised patient to make appointment   Has the patient kept scheduled appointments due by today?  N/A   Has home health visited the patient within 72 hours of discharge?  N/A   What DME was ordered?  O2 from [place]   Has all DME been delivered?  Yes   Pulse Ox monitoring  Intermittent   O2 Sat comments  PATIENT STATES HER O2 SATS HAVE BEEN \"OKAY,\" BUT SHE HAS NOT CHECKED IT YET THIS MORNING.   O2 Sat: education provided  Sat levels, Monitoring frequency, When to seek care   Comments  PATIENT HAD AUDIBLE SOA DURING THIS CALL, BUT STATES THAT SHE HAD JUST WENT TO THE BATHROOM AND GETS SOA WITH ACTIVITY.   Did the patient receive a copy of their discharge instructions?  Yes   Nursing interventions  Reviewed instructions with patient   What is the patient's perception of their health status since discharge?  Improving   Is the patient/caregiver able to teach back the hierarchy of who to call/visit for symptoms/problems? PCP, Specialist, Home health nurse, " Urgent Care, ED, 911  Yes   Is the patient/caregiver able to teach back signs and symptoms of worsening condition:  Fever/chills, Shortness of breath, Chest pain   Is the patient/caregiver able to teach back importance of completing antibiotic course of treatment?  Yes   TCM call completed?  Yes          Ariella Brown LPN    9/7/2020, 10:17

## 2020-09-08 ENCOUNTER — TELEPHONE (OUTPATIENT)
Dept: INTERNAL MEDICINE | Facility: CLINIC | Age: 65
End: 2020-09-08

## 2020-09-08 ENCOUNTER — READMISSION MANAGEMENT (OUTPATIENT)
Dept: CALL CENTER | Facility: HOSPITAL | Age: 65
End: 2020-09-08

## 2020-09-08 NOTE — OUTREACH NOTE
COPD/PN Week 1 Survey      Responses   [facility], [place], operated by [facility] patient discharged from?  [place]   COVID-19 Test Status  Confirmed   Does the patient have one of the following disease processes/diagnoses(primary or secondary)?  COPD/Pneumonia   Is there a successful TCM telephone encounter documented?  No   Was the primary reason for admission:  Pneumonia   Week 1 attempt successful?  Yes   Call start time  1328   Unsuccessful attempts  Attempt 1   Call end time  1335   Discharge diagnosis  hypoxia, PNA d/t COVID 19,  cytokine storm   Is patient permission given to speak with other caregiver?  Yes   List who call center can speak with   , Vikramchristian Dickey   Person spoke with today (if not patient) and relationship  patient   Meds reviewed with patient/caregiver?  Yes   Is the patient having any side effects they believe may be caused by any medication additions or changes?  No   Does the patient have all medications ordered at discharge?  N/A [No changes to home medications. ]   Is the patient taking all medications as directed (includes completed medication regime)?  Yes   Does the patient have a primary care provider?   Yes   Does the patient have an appointment with their PCP or pulmonologist within 7 days of discharge?  No   Comments regarding PCP  Patient reports that she is waiting to here from PCP to schedule needed f/u appt.    What is preventing the patient from scheduling follow up appointments within 7 days of discharge?  Waiting on return call   Nursing Interventions  Educated patient on importance of making appointment, Advised patient to make appointment [Advised for patient to schedule telehealth appt. ]   Has the patient kept scheduled appointments due by today?  N/A   Has home health visited the patient within 72 hours of discharge?  N/A   What DME was ordered?  O2 from [employer]   Has all DME been delivered?  Yes   Pulse Ox monitoring  Intermittent   Pulse Ox device source  Patient   O2 Sat comments   Patient reports sats 90-92% on 2L. Encouraged deep breath exercises.    O2 Sat: education provided  Sat levels, Monitoring frequency, When to seek care   Psychosocial comments  Patient reports that  also COVID+,  did not require hospitalization.    Did the patient receive a copy of their discharge instructions?  Yes   Nursing interventions  Reviewed instructions with patient   What is the patient's perception of their health status since discharge?  Improving   Is the patient/caregiver able to teach back the hierarchy of who to call/visit for symptoms/problems? PCP, Specialist, Home health nurse, Urgent Care, ED, 911  Yes   Is the patient/caregiver able to teach back signs and symptoms of worsening condition:  Fever/chills, Shortness of breath, Chest pain [Patient denies fever. ]   Is the patient/caregiver able to teach back importance of completing antibiotic course of treatment?  -- [Patient did not go home on antibiotics. ]   Week 1 call completed?  Yes   Wrap up additional comments  Patient on home isolation.           Letitia Tavarez RN

## 2020-09-10 ENCOUNTER — OFFICE VISIT (OUTPATIENT)
Dept: INTERNAL MEDICINE | Facility: CLINIC | Age: 65
End: 2020-09-10

## 2020-09-10 VITALS — TEMPERATURE: 97.3 F | BODY MASS INDEX: 30.53 KG/M2 | HEIGHT: 66 IN | WEIGHT: 190 LBS

## 2020-09-10 DIAGNOSIS — B37.0 THRUSH: Primary | ICD-10-CM

## 2020-09-10 DIAGNOSIS — U07.1 PNEUMONIA DUE TO COVID-19 VIRUS: ICD-10-CM

## 2020-09-10 DIAGNOSIS — J12.82 PNEUMONIA DUE TO COVID-19 VIRUS: ICD-10-CM

## 2020-09-10 PROCEDURE — 99214 OFFICE O/P EST MOD 30 MIN: CPT | Performed by: INTERNAL MEDICINE

## 2020-09-10 NOTE — PROGRESS NOTES
Magdalena Dickey is a 65 y.o. female admitted to [facility] and  is seen for follow up of Covid pneumonia.  The discharge summary has been reviewed- she has been home since 9/4.  She is getting better every day since home.  Here with - he says she is looking better. Still SOB and trying to move a bit more.   She is eating better- weight is down 20 lbs.  She has problems with irritation in her nose from the oxygen.  She sees pulm 9/25.  Her work questions how much longer she will be off.     The following portions of the patient's history were reviewed and updated as appropriate:PMHroutine: Social history , Allergies, Current Medications, Active Problem List and Health Maintenance     Review of Systems   Constitutional: Positive for fatigue and fever. Negative for unexpected weight change.   HENT: Negative.         Tongue pain, food doesn't taste good.  Sores in nose since being on oxygen.   Respiratory: Positive for shortness of breath. Negative for cough.    Cardiovascular: Negative.    Gastrointestinal: Negative.    Genitourinary: Negative.    Musculoskeletal: Positive for arthralgias.   Neurological: Negative.    Psychiatric/Behavioral: Negative.    I have reviewed the ROS as documented by the MA. Sun Guerrier MD      Admission date 8/23/2020 D/C date 9/4/2020  Discharge summary is available.  Risk for Readmission (LACE) Score: 8 (9/4/2020  6:00 AM)         Current outpatient and discharge medications have been reconciled for the patient.  Reviewed by: Sun Guerrier MD      She was admitted for the following reason(s):  Primary admitting diagnosis at discharge was Covid pneumonia.  Pertinent secondary diagnoses include hypoxemia.  Course During Hospital Stay:  reviewed  Procedures Performed in Hospital: please see EPIC record for further details of results and finding  Pending tests: none  Pain Control:  no pain  Diet: regular  Activity after Discharge: activity as tolerated    Items to  "be addressed at first follow up visit include:  1. Medication changes: none  2.     She reports her overall condition are improving since discharge.  Specific complaints: nose, tongue pain, profound fatige.  Social support is said to be adequate to meet her needs.  is helpful..     Objective   Vitals:    09/10/20 1402   Temp: 97.3 °F (36.3 °C)   Weight: 86.2 kg (190 lb)   Height: 167.6 cm (66\")     Body mass index is 30.67 kg/m².    Physical Exam   Constitutional: She appears ill.   Eyes: Conjunctivae are normal.   Cardiovascular: Tachycardia present.   Pulmonary/Chest: Effort normal. She has decreased breath sounds.   Musculoskeletal: No swelling.   Psychiatric: Mood and thought content normal.        Assessment/Plan    Problem List Items Addressed This Visit        Respiratory    Pneumonia due to COVID-19 virus      Other Visit Diagnoses     Thrush    -  Primary    start Nystatin swish and swallow-    Relevant Medications    nystatin (MYCOSTATIN) [id number] UNIT/ML suspension               This post hospital patient care was coordinated using transitional care management strategy:  I certify that the following are true:  1. Communication was made within two business days of discharge.  2. Complexity of MDM is HIGH  3. A Face to Face visit occurred within within 7 days      "

## 2020-09-10 NOTE — PROGRESS NOTES
"  Assessment and Plan  {[facility]}  F/U and Patient Instructions    No follow-ups on file.  There are no Patient Instructions on file for this visit.    Subjective    Magdalena Dickey is a 65 y.o. female being seen in our office today for Hospital Follow Up Visit (Covid)     History of the Present Illness  HPI    Patient History        Significant Past History  The following portions of the patient's history were reviewed and updated as appropriate:{[id number]::\"Social history \",\"Allergies\",\"Current Medications\",\"Active Problem List\",\"Health Maintenance\"}              Social History  She  reports that she quit smoking about 6 years ago. Her smoking use included cigarettes. She has never used smokeless tobacco. She reports that she drinks alcohol. She reports that she does not use drugs.                         Review of Symptoms  ROS  Objective  Vital Signs         BP Readings from Last 1 Encounters:   09/04/20 123/80     Wt Readings from Last 3 Encounters:   09/10/20 86.2 kg (190 lb)   09/01/20 87.1 kg (192 lb 0.3 oz)   08/15/20 90.7 kg (200 lb)   Body mass index is 30.67 kg/m².          Physical Exam   Physical Exam  Data Reviewed    Recent Results (from the past 2016 hour(s))   COVID-19,[facility] ROUTINE, NP/OP SWAB IN TRANSPORT MEDIA OR ESWAB 72 HR TAT - Swab, Nasopharynx    Collection Time: 08/15/20 12:34 PM   Result Value Ref Range    SARS-CoV-2, SHEELA Not Detected Not Detected   Comprehensive Metabolic Panel    Collection Time: 08/23/20 10:52 PM   Result Value Ref Range    Glucose 123 (H) 65 - 99 mg/dL    BUN 12 8 - 23 mg/dL    Creatinine 0.87 0.57 - 1.00 mg/dL    Sodium 137 136 - 145 mmol/L    Potassium 3.6 3.5 - 5.2 mmol/L    Chloride 103 98 - 107 mmol/L    CO2 20.1 (L) 22.0 - 29.0 mmol/L    Calcium 8.1 (L) 8.6 - 10.5 mg/dL    Total Protein 6.0 6.0 - 8.5 g/dL    Albumin 3.60 3.50 - 5.20 g/dL    ALT (SGPT) 45 (H) 1 - 33 U/L    AST (SGOT) 51 (H) 1 - 32 U/L    Alkaline Phosphatase 71 39 - " 117 U/L    Total Bilirubin 0.6 0.0 - 1.2 mg/dL    eGFR Non African Amer 65 >60 mL/min/1.73    Globulin 2.4 gm/dL    A/G Ratio 1.5 g/dL    BUN/Creatinine Ratio 13.8 7.0 - 25.0    Anion Gap 13.9 5.0 - 15.0 mmol/L   BNP    Collection Time: 08/23/20 10:52 PM   Result Value Ref Range    proBNP 23.9 0.0 - 900.0 pg/mL   Troponin    Collection Time: 08/23/20 10:52 PM   Result Value Ref Range    Troponin T <0.010 0.000 - 0.030 ng/mL   Procalcitonin    Collection Time: 08/23/20 10:52 PM   Result Value Ref Range    Procalcitonin 0.09 0.00 - 0.25 ng/mL   Respiratory Panel PCR w/COVID-19(SARS-CoV-2) [facility]SHK[facility]/SHRAVAN/SURAJ/PAD In-House, NP Swab in UTM/VTM, 3-4 HR TAT - Swab, Nasopharynx    Collection Time: 08/23/20 10:52 PM   Result Value Ref Range    ADENOVIRUS, PCR Not Detected Not Detected    Coronavirus 229E Not Detected Not Detected    Coronavirus HKU1 Not Detected Not Detected    Coronavirus NL63 Not Detected Not Detected    Coronavirus OC43 Not Detected Not Detected    COVID19 Detected (C) Not Detected - Ref. Range    Human Metapneumovirus Not Detected Not Detected    Human Rhinovirus/Enterovirus Not Detected Not Detected    Influenza A PCR Not Detected Not Detected    Influenza A H1 Not Detected Not Detected    Influenza A H1 2009 PCR Not Detected Not Detected    Influenza A H3 Not Detected Not Detected    Influenza B PCR Not Detected Not Detected    Parainfluenza Virus 1 Not Detected Not Detected    Parainfluenza Virus 2 Not Detected Not Detected    Parainfluenza Virus 3 Not Detected Not Detected    Parainfluenza Virus 4 Not Detected Not Detected    RSV, PCR Not Detected Not Detected    Bordetella pertussis pcr Not Detected Not Detected    Bordetella parapertussis PCR Not Detected Not Detected    Chlamydophila pneumoniae PCR Not Detected Not Detected    Mycoplasma pneumo by PCR Not Detected Not Detected   CBC Auto Differential    Collection Time: 08/23/20 10:52 PM   Result Value Ref Range    WBC 5.71 3.40 - 10.80 10*3/mm3    RBC  4.14 3.77 - 5.28 10*6/mm3    Hemoglobin 12.9 12.0 - 15.9 g/dL    Hematocrit 38.2 34.0 - 46.6 %    MCV 92.3 79.0 - 97.0 fL    MCH 31.2 26.6 - 33.0 pg    MCHC 33.8 31.5 - 35.7 g/dL    RDW 13.4 12.3 - 15.4 %    RDW-SD 46.2 37.0 - 54.0 fl    MPV 10.0 6.0 - 12.0 fL    Platelets 169 140 - 450 10*3/mm3    Neutrophil % 77.6 (H) 42.7 - 76.0 %    Lymphocyte % 13.8 (L) 19.6 - 45.3 %    Monocyte % 7.9 5.0 - 12.0 %    Eosinophil % 0.0 (L) 0.3 - 6.2 %    Basophil % 0.2 0.0 - 1.5 %    Immature Grans % 0.5 0.0 - 0.5 %    Neutrophils, Absolute 4.43 1.70 - 7.00 10*3/mm3    Lymphocytes, Absolute 0.79 0.70 - 3.10 10*3/mm3    Monocytes, Absolute 0.45 0.10 - 0.90 10*3/mm3    Eosinophils, Absolute 0.00 0.00 - 0.40 10*3/mm3    Basophils, Absolute 0.01 0.00 - 0.20 10*3/mm3    Immature Grans, Absolute 0.03 0.00 - 0.05 10*3/mm3    nRBC 0.0 0.0 - 0.2 /100 WBC   C-reactive Protein    Collection Time: 08/23/20 10:52 PM   Result Value Ref Range    C-Reactive Protein 4.92 (H) 0.00 - 0.50 mg/dL   CK    Collection Time: 08/23/20 10:52 PM   Result Value Ref Range    Creatine Kinase 317 (H) 20 - 180 U/L   Ferritin    Collection Time: 08/23/20 10:52 PM   Result Value Ref Range    Ferritin 916.00 (H) 13.00 - 150.00 ng/mL   D-dimer, Quantitative    Collection Time: 08/24/20  9:16 AM   Result Value Ref Range    D-Dimer, Quantitative 0.58 (H) 0.00 - 0.49 MCGFEU/mL   Type & Screen    Collection Time: 08/24/20  4:51 PM   Result Value Ref Range    ABO Type A     RH type Positive     Antibody Screen Negative     T&S Expiration Date 8/27/2020 11:59:59 PM    Comprehensive Metabolic Panel    Collection Time: 08/25/20  6:44 AM   Result Value Ref Range    Glucose 115 (H) 65 - 99 mg/dL    BUN 15 8 - 23 mg/dL    Creatinine 0.70 0.57 - 1.00 mg/dL    Sodium 144 136 - 145 mmol/L    Potassium 3.6 3.5 - 5.2 mmol/L    Chloride 112 (H) 98 - 107 mmol/L    CO2 21.9 (L) 22.0 - 29.0 mmol/L    Calcium 9.0 8.6 - 10.5 mg/dL    Total Protein 6.3 6.0 - 8.5 g/dL    Albumin 3.40 (L)  3.50 - 5.20 g/dL    ALT (SGPT) 51 (H) 1 - 33 U/L    AST (SGOT) 53 (H) 1 - 32 U/L    Alkaline Phosphatase 67 39 - 117 U/L    Total Bilirubin 0.4 0.0 - 1.2 mg/dL    eGFR Non African Amer 84 >60 mL/min/1.73    Globulin 2.9 gm/dL    A/G Ratio 1.2 g/dL    BUN/Creatinine Ratio 21.4 7.0 - 25.0    Anion Gap 10.1 5.0 - 15.0 mmol/L   Lactate Dehydrogenase    Collection Time: 08/25/20  6:44 AM   Result Value Ref Range     (H) 135 - 214 U/L   D-dimer, Quantitative    Collection Time: 08/25/20  6:44 AM   Result Value Ref Range    D-Dimer, Quantitative 0.63 (H) 0.00 - 0.49 MCGFEU/mL   Fibrinogen    Collection Time: 08/25/20  6:44 AM   Result Value Ref Range    Fibrinogen 501 (H) 219 - 464 mg/dL   C-reactive Protein    Collection Time: 08/25/20  6:44 AM   Result Value Ref Range    C-Reactive Protein 3.79 (H) 0.00 - 0.50 mg/dL   CK    Collection Time: 08/25/20  6:44 AM   Result Value Ref Range    Creatine Kinase 307 (H) 20 - 180 U/L   Ferritin    Collection Time: 08/25/20  6:44 AM   Result Value Ref Range    Ferritin 876.00 (H) 13.00 - 150.00 ng/mL   CBC Auto Differential    Collection Time: 08/25/20  6:44 AM   Result Value Ref Range    WBC 9.44 3.40 - 10.80 10*3/mm3    RBC 4.14 3.77 - 5.28 10*6/mm3    Hemoglobin 12.5 12.0 - 15.9 g/dL    Hematocrit 36.4 34.0 - 46.6 %    MCV 87.9 79.0 - 97.0 fL    MCH 30.2 26.6 - 33.0 pg    MCHC 34.3 31.5 - 35.7 g/dL    RDW 13.0 12.3 - 15.4 %    RDW-SD 41.4 37.0 - 54.0 fl    MPV 10.2 6.0 - 12.0 fL    Platelets 224 140 - 450 10*3/mm3    Neutrophil % 81.2 (H) 42.7 - 76.0 %    Lymphocyte % 11.5 (L) 19.6 - 45.3 %    Monocyte % 6.6 5.0 - 12.0 %    Eosinophil % 0.0 (L) 0.3 - 6.2 %    Basophil % 0.1 0.0 - 1.5 %    Immature Grans % 0.6 (H) 0.0 - 0.5 %    Neutrophils, Absolute 7.66 (H) 1.70 - 7.00 10*3/mm3    Lymphocytes, Absolute 1.09 0.70 - 3.10 10*3/mm3    Monocytes, Absolute 0.62 0.10 - 0.90 10*3/mm3    Eosinophils, Absolute 0.00 0.00 - 0.40 10*3/mm3    Basophils, Absolute 0.01 0.00 - 0.20  10*3/mm3    Immature Grans, Absolute 0.06 (H) 0.00 - 0.05 10*3/mm3    nRBC 0.0 0.0 - 0.2 /100 WBC   Blood Culture - Blood, Arm, Left    Collection Time: 08/25/20  5:58 PM   Result Value Ref Range    Blood Culture No growth at 5 days    Blood Culture - Blood, Hand, Left    Collection Time: 08/25/20  5:58 PM   Result Value Ref Range    Blood Culture No growth at 5 days    Prepare Convalescent Plasma for COVID-19, 1 Units    Collection Time: 08/25/20  6:00 PM   Result Value Ref Range    Product Code [id number]     Unit Number [id number]     UNIT  ABO A     UNIT  RH POS     Dispense Status PT     Blood Expiration Date 303589429554     Blood Type Barcode 6200    Blood Gas, Arterial    Collection Time: 08/25/20  6:10 PM   Result Value Ref Range    Site Arterial: right radial     Davin's Test Positive     pH, Arterial 7.431 7.350 - 7.450 pH units    pCO2, Arterial 29.4 (L) 35.0 - 45.0 mm Hg    pO2, Arterial 62.7 (L) 80.0 - 100.0 mm Hg    HCO3, Arterial 19.6 (L) 22.0 - 28.0 mmol/L    Base Excess, Arterial -3.6 (L) 0.0 - 2.0 mmol/L    O2 Saturation Calculated 92.7 92.0 - 99.0 %    Barometric Pressure for Blood Gas 750.3 mmHg    Modality HFNC     Rate 28 Breaths/minute   S. Pneumo Ag Urine or CSF - Urine, Urine, Clean Catch    Collection Time: 08/25/20  6:43 PM   Result Value Ref Range    Strep Pneumo Ag Negative Negative   Legionella Antigen, Urine - Urine, Urine, Clean Catch    Collection Time: 08/25/20  6:43 PM   Result Value Ref Range    LEGIONELLA ANTIGEN, URINE Negative Negative   Comprehensive Metabolic Panel    Collection Time: 08/26/20  6:04 AM   Result Value Ref Range    Glucose 95 65 - 99 mg/dL    BUN 21 8 - 23 mg/dL    Creatinine 0.63 0.57 - 1.00 mg/dL    Sodium 143 136 - 145 mmol/L    Potassium 3.0 (L) 3.5 - 5.2 mmol/L    Chloride 112 (H) 98 - 107 mmol/L    CO2 21.8 (L) 22.0 - 29.0 mmol/L    Calcium 8.6 8.6 - 10.5 mg/dL    Total Protein 6.0 6.0 - 8.5 g/dL    Albumin 3.20 (L) 3.50 - 5.20 g/dL    ALT (SGPT) 58  (H) 1 - 33 U/L    AST (SGOT) 46 (H) 1 - 32 U/L    Alkaline Phosphatase 65 39 - 117 U/L    Total Bilirubin 0.4 0.0 - 1.2 mg/dL    eGFR Non African Amer 95 >60 mL/min/1.73    Globulin 2.8 gm/dL    A/G Ratio 1.1 g/dL    BUN/Creatinine Ratio 33.3 (H) 7.0 - 25.0    Anion Gap 9.2 5.0 - 15.0 mmol/L   C-reactive Protein    Collection Time: 08/26/20  6:04 AM   Result Value Ref Range    C-Reactive Protein 6.60 (H) 0.00 - 0.50 mg/dL   CK    Collection Time: 08/26/20  6:04 AM   Result Value Ref Range    Creatine Kinase 195 (H) 20 - 180 U/L   Ferritin    Collection Time: 08/26/20  6:04 AM   Result Value Ref Range    Ferritin 750.00 (H) 13.00 - 150.00 ng/mL   CBC Auto Differential    Collection Time: 08/26/20  6:04 AM   Result Value Ref Range    WBC 8.81 3.40 - 10.80 10*3/mm3    RBC 4.14 3.77 - 5.28 10*6/mm3    Hemoglobin 12.5 12.0 - 15.9 g/dL    Hematocrit 37.6 34.0 - 46.6 %    MCV 90.8 79.0 - 97.0 fL    MCH 30.2 26.6 - 33.0 pg    MCHC 33.2 31.5 - 35.7 g/dL    RDW 13.2 12.3 - 15.4 %    RDW-SD 43.5 37.0 - 54.0 fl    MPV 10.2 6.0 - 12.0 fL    Platelets 258 140 - 450 10*3/mm3   Manual Differential    Collection Time: 08/26/20  6:04 AM   Result Value Ref Range    Neutrophil % 90.0 (H) 42.7 - 76.0 %    Lymphocyte % 5.0 (L) 19.6 - 45.3 %    Monocyte % 5.0 5.0 - 12.0 %    Neutrophils Absolute 7.93 (H) 1.70 - 7.00 10*3/mm3    Lymphocytes Absolute 0.44 (L) 0.70 - 3.10 10*3/mm3    Monocytes Absolute 0.44 0.10 - 0.90 10*3/mm3    RBC Morphology Normal Normal    WBC Morphology Normal Normal    Platelet Morphology Normal Normal   Magnesium    Collection Time: 08/26/20  6:04 AM   Result Value Ref Range    Magnesium 2.4 1.6 - 2.4 mg/dL   Comprehensive Metabolic Panel    Collection Time: 08/27/20  5:56 AM   Result Value Ref Range    Glucose 85 65 - 99 mg/dL    BUN 19 8 - 23 mg/dL    Creatinine 0.69 0.57 - 1.00 mg/dL    Sodium 141 136 - 145 mmol/L    Potassium 3.4 (L) 3.5 - 5.2 mmol/L    Chloride 113 (H) 98 - 107 mmol/L    CO2 19.0 (L) 22.0 -  29.0 mmol/L    Calcium 8.8 8.6 - 10.5 mg/dL    Total Protein 6.1 6.0 - 8.5 g/dL    Albumin 3.20 (L) 3.50 - 5.20 g/dL    ALT (SGPT) 50 (H) 1 - 33 U/L    AST (SGOT) 31 1 - 32 U/L    Alkaline Phosphatase 69 39 - 117 U/L    Total Bilirubin 0.4 0.0 - 1.2 mg/dL    eGFR Non African Amer 85 >60 mL/min/1.73    Globulin 2.9 gm/dL    A/G Ratio 1.1 g/dL    BUN/Creatinine Ratio 27.5 (H) 7.0 - 25.0    Anion Gap 9.0 5.0 - 15.0 mmol/L   Lactate Dehydrogenase    Collection Time: 08/27/20  5:56 AM   Result Value Ref Range     (H) 135 - 214 U/L   D-dimer, Quantitative    Collection Time: 08/27/20  5:56 AM   Result Value Ref Range    D-Dimer, Quantitative 1.00 (H) 0.00 - 0.49 MCGFEU/mL   Fibrinogen    Collection Time: 08/27/20  5:56 AM   Result Value Ref Range    Fibrinogen 536 (H) 219 - 464 mg/dL   C-reactive Protein    Collection Time: 08/27/20  5:56 AM   Result Value Ref Range    C-Reactive Protein 5.01 (H) 0.00 - 0.50 mg/dL   CK    Collection Time: 08/27/20  5:56 AM   Result Value Ref Range    Creatine Kinase 117 20 - 180 U/L   Ferritin    Collection Time: 08/27/20  5:56 AM   Result Value Ref Range    Ferritin 655.00 (H) 13.00 - 150.00 ng/mL   CBC Auto Differential    Collection Time: 08/27/20  5:56 AM   Result Value Ref Range    WBC 11.71 (H) 3.40 - 10.80 10*3/mm3    RBC 4.35 3.77 - 5.28 10*6/mm3    Hemoglobin 13.2 12.0 - 15.9 g/dL    Hematocrit 38.7 34.0 - 46.6 %    MCV 89.0 79.0 - 97.0 fL    MCH 30.3 26.6 - 33.0 pg    MCHC 34.1 31.5 - 35.7 g/dL    RDW 12.9 12.3 - 15.4 %    RDW-SD 41.8 37.0 - 54.0 fl    MPV 10.1 6.0 - 12.0 fL    Platelets 336 140 - 450 10*3/mm3   Manual Differential    Collection Time: 08/27/20  5:56 AM   Result Value Ref Range    Neutrophil % 83.0 (H) 42.7 - 76.0 %    Lymphocyte % 9.0 (L) 19.6 - 45.3 %    Monocyte % 8.0 5.0 - 12.0 %    Neutrophils Absolute 9.72 (H) 1.70 - 7.00 10*3/mm3    Lymphocytes Absolute 1.05 0.70 - 3.10 10*3/mm3    Monocytes Absolute 0.94 (H) 0.10 - 0.90 10*3/mm3    RBC Morphology  Normal Normal    WBC Morphology Normal Normal    Platelet Morphology Normal Normal   POC Glucose Once    Collection Time: 08/27/20  7:57 AM   Result Value Ref Range    Glucose 95 70 - 130 mg/dL   POC Glucose Once    Collection Time: 08/27/20 12:02 PM   Result Value Ref Range    Glucose 131 (H) 70 - 130 mg/dL   POC Glucose Once    Collection Time: 08/27/20  4:42 PM   Result Value Ref Range    Glucose 126 70 - 130 mg/dL   Potassium    Collection Time: 08/27/20  5:16 PM   Result Value Ref Range    Potassium 4.4 3.5 - 5.2 mmol/L   POC Glucose Once    Collection Time: 08/27/20  8:03 PM   Result Value Ref Range    Glucose 117 70 - 130 mg/dL   Comprehensive Metabolic Panel    Collection Time: 08/28/20  4:29 AM   Result Value Ref Range    Glucose 108 (H) 65 - 99 mg/dL    BUN 21 8 - 23 mg/dL    Creatinine 0.70 0.57 - 1.00 mg/dL    Sodium 140 136 - 145 mmol/L    Potassium 4.1 3.5 - 5.2 mmol/L    Chloride 112 (H) 98 - 107 mmol/L    CO2 17.8 (L) 22.0 - 29.0 mmol/L    Calcium 8.6 8.6 - 10.5 mg/dL    Total Protein 6.1 6.0 - 8.5 g/dL    Albumin 3.10 (L) 3.50 - 5.20 g/dL    ALT (SGPT) 43 (H) 1 - 33 U/L    AST (SGOT) 33 (H) 1 - 32 U/L    Alkaline Phosphatase 66 39 - 117 U/L    Total Bilirubin 0.6 0.0 - 1.2 mg/dL    eGFR Non African Amer 84 >60 mL/min/1.73    Globulin 3.0 gm/dL    A/G Ratio 1.0 g/dL    BUN/Creatinine Ratio 30.0 (H) 7.0 - 25.0    Anion Gap 10.2 5.0 - 15.0 mmol/L   C-reactive Protein    Collection Time: 08/28/20  4:29 AM   Result Value Ref Range    C-Reactive Protein 5.75 (H) 0.00 - 0.50 mg/dL   CK    Collection Time: 08/28/20  4:29 AM   Result Value Ref Range    Creatine Kinase 97 20 - 180 U/L   Ferritin    Collection Time: 08/28/20  4:29 AM   Result Value Ref Range    Ferritin 553.00 (H) 13.00 - 150.00 ng/mL   CBC Auto Differential    Collection Time: 08/28/20  4:29 AM   Result Value Ref Range    WBC 10.87 (H) 3.40 - 10.80 10*3/mm3    RBC 4.18 3.77 - 5.28 10*6/mm3    Hemoglobin 12.9 12.0 - 15.9 g/dL    Hematocrit  38.9 34.0 - 46.6 %    MCV 93.1 79.0 - 97.0 fL    MCH 30.9 26.6 - 33.0 pg    MCHC 33.2 31.5 - 35.7 g/dL    RDW 13.3 12.3 - 15.4 %    RDW-SD 45.5 37.0 - 54.0 fl    MPV 10.2 6.0 - 12.0 fL    Platelets 349 140 - 450 10*3/mm3    Neutrophil % 82.7 (H) 42.7 - 76.0 %    Lymphocyte % 10.6 (L) 19.6 - 45.3 %    Monocyte % 5.2 5.0 - 12.0 %    Eosinophil % 0.2 (L) 0.3 - 6.2 %    Basophil % 0.2 0.0 - 1.5 %    Immature Grans % 1.1 (H) 0.0 - 0.5 %    Neutrophils, Absolute 9.00 (H) 1.70 - 7.00 10*3/mm3    Lymphocytes, Absolute 1.15 0.70 - 3.10 10*3/mm3    Monocytes, Absolute 0.56 0.10 - 0.90 10*3/mm3    Eosinophils, Absolute 0.02 0.00 - 0.40 10*3/mm3    Basophils, Absolute 0.02 0.00 - 0.20 10*3/mm3    Immature Grans, Absolute 0.12 (H) 0.00 - 0.05 10*3/mm3    nRBC 0.0 0.0 - 0.2 /100 WBC   Comprehensive Metabolic Panel    Collection Time: 08/29/20  6:24 AM   Result Value Ref Range    Glucose 106 (H) 65 - 99 mg/dL    BUN 20 8 - 23 mg/dL    Creatinine 0.67 0.57 - 1.00 mg/dL    Sodium 141 136 - 145 mmol/L    Potassium 3.6 3.5 - 5.2 mmol/L    Chloride 111 (H) 98 - 107 mmol/L    CO2 21.4 (L) 22.0 - 29.0 mmol/L    Calcium 8.9 8.6 - 10.5 mg/dL    Total Protein 6.4 6.0 - 8.5 g/dL    Albumin 3.10 (L) 3.50 - 5.20 g/dL    ALT (SGPT) 34 (H) 1 - 33 U/L    AST (SGOT) 22 1 - 32 U/L    Alkaline Phosphatase 68 39 - 117 U/L    Total Bilirubin 0.6 0.0 - 1.2 mg/dL    eGFR Non African Amer 88 >60 mL/min/1.73    Globulin 3.3 gm/dL    A/G Ratio 0.9 g/dL    BUN/Creatinine Ratio 29.9 (H) 7.0 - 25.0    Anion Gap 8.6 5.0 - 15.0 mmol/L   Lactate Dehydrogenase    Collection Time: 08/29/20  6:24 AM   Result Value Ref Range     (H) 135 - 214 U/L   D-dimer, Quantitative    Collection Time: 08/29/20  6:24 AM   Result Value Ref Range    D-Dimer, Quantitative 1.97 (H) 0.00 - 0.49 MCGFEU/mL   Fibrinogen    Collection Time: 08/29/20  6:24 AM   Result Value Ref Range    Fibrinogen 565 (H) 219 - 464 mg/dL   C-reactive Protein    Collection Time: 08/29/20  6:24 AM    Result Value Ref Range    C-Reactive Protein 13.61 (H) 0.00 - 0.50 mg/dL   CK    Collection Time: 08/29/20  6:24 AM   Result Value Ref Range    Creatine Kinase 67 20 - 180 U/L   Ferritin    Collection Time: 08/29/20  6:24 AM   Result Value Ref Range    Ferritin 535.00 (H) 13.00 - 150.00 ng/mL   CBC Auto Differential    Collection Time: 08/29/20  6:24 AM   Result Value Ref Range    WBC 10.51 3.40 - 10.80 10*3/mm3    RBC 4.37 3.77 - 5.28 10*6/mm3    Hemoglobin 13.2 12.0 - 15.9 g/dL    Hematocrit 38.8 34.0 - 46.6 %    MCV 88.8 79.0 - 97.0 fL    MCH 30.2 26.6 - 33.0 pg    MCHC 34.0 31.5 - 35.7 g/dL    RDW 12.5 12.3 - 15.4 %    RDW-SD 40.8 37.0 - 54.0 fl    MPV 10.1 6.0 - 12.0 fL    Platelets 442 140 - 450 10*3/mm3    Neutrophil % 78.1 (H) 42.7 - 76.0 %    Lymphocyte % 12.0 (L) 19.6 - 45.3 %    Monocyte % 7.4 5.0 - 12.0 %    Eosinophil % 1.0 0.3 - 6.2 %    Basophil % 0.2 0.0 - 1.5 %    Immature Grans % 1.3 (H) 0.0 - 0.5 %    Neutrophils, Absolute 8.20 (H) 1.70 - 7.00 10*3/mm3    Lymphocytes, Absolute 1.26 0.70 - 3.10 10*3/mm3    Monocytes, Absolute 0.78 0.10 - 0.90 10*3/mm3    Eosinophils, Absolute 0.11 0.00 - 0.40 10*3/mm3    Basophils, Absolute 0.02 0.00 - 0.20 10*3/mm3    Immature Grans, Absolute 0.14 (H) 0.00 - 0.05 10*3/mm3    nRBC 0.0 0.0 - 0.2 /100 WBC   Comprehensive Metabolic Panel    Collection Time: 08/30/20  8:35 AM   Result Value Ref Range    Glucose 95 65 - 99 mg/dL    BUN 25 (H) 8 - 23 mg/dL    Creatinine 0.67 0.57 - 1.00 mg/dL    Sodium 139 136 - 145 mmol/L    Potassium 3.8 3.5 - 5.2 mmol/L    Chloride 108 (H) 98 - 107 mmol/L    CO2 20.9 (L) 22.0 - 29.0 mmol/L    Calcium 8.8 8.6 - 10.5 mg/dL    Total Protein 6.2 6.0 - 8.5 g/dL    Albumin 3.10 (L) 3.50 - 5.20 g/dL    ALT (SGPT) 26 1 - 33 U/L    AST (SGOT) 18 1 - 32 U/L    Alkaline Phosphatase 62 39 - 117 U/L    Total Bilirubin 0.6 0.0 - 1.2 mg/dL    eGFR Non African Amer 88 >60 mL/min/1.73    Globulin 3.1 gm/dL    A/G Ratio 1.0 g/dL    BUN/Creatinine  Ratio 37.3 (H) 7.0 - 25.0    Anion Gap 10.1 5.0 - 15.0 mmol/L   C-reactive Protein    Collection Time: 08/30/20  8:35 AM   Result Value Ref Range    C-Reactive Protein 5.91 (H) 0.00 - 0.50 mg/dL   CK    Collection Time: 08/30/20  8:35 AM   Result Value Ref Range    Creatine Kinase 46 20 - 180 U/L   Ferritin    Collection Time: 08/30/20  8:35 AM   Result Value Ref Range    Ferritin 489.00 (H) 13.00 - 150.00 ng/mL   D-dimer, Quantitative    Collection Time: 08/30/20  8:35 AM   Result Value Ref Range    D-Dimer, Quantitative 2.24 (H) 0.00 - 0.49 MCGFEU/mL   CBC Auto Differential    Collection Time: 08/30/20  8:35 AM   Result Value Ref Range    WBC 12.09 (H) 3.40 - 10.80 10*3/mm3    RBC 4.32 3.77 - 5.28 10*6/mm3    Hemoglobin 13.1 12.0 - 15.9 g/dL    Hematocrit 37.9 34.0 - 46.6 %    MCV 87.7 79.0 - 97.0 fL    MCH 30.3 26.6 - 33.0 pg    MCHC 34.6 31.5 - 35.7 g/dL    RDW 12.6 12.3 - 15.4 %    RDW-SD 39.7 37.0 - 54.0 fl    MPV 10.1 6.0 - 12.0 fL    Platelets 466 (H) 140 - 450 10*3/mm3    Neutrophil % 76.1 (H) 42.7 - 76.0 %    Lymphocyte % 10.3 (L) 19.6 - 45.3 %    Monocyte % 8.2 5.0 - 12.0 %    Eosinophil % 3.4 0.3 - 6.2 %    Basophil % 0.2 0.0 - 1.5 %    Immature Grans % 1.8 (H) 0.0 - 0.5 %    Neutrophils, Absolute 9.19 (H) 1.70 - 7.00 10*3/mm3    Lymphocytes, Absolute 1.25 0.70 - 3.10 10*3/mm3    Monocytes, Absolute 0.99 (H) 0.10 - 0.90 10*3/mm3    Eosinophils, Absolute 0.41 (H) 0.00 - 0.40 10*3/mm3    Basophils, Absolute 0.03 0.00 - 0.20 10*3/mm3    Immature Grans, Absolute 0.22 (H) 0.00 - 0.05 10*3/mm3    nRBC 0.0 0.0 - 0.2 /100 WBC   Comprehensive Metabolic Panel    Collection Time: 08/31/20  6:03 AM   Result Value Ref Range    Glucose 89 65 - 99 mg/dL    BUN 25 (H) 8 - 23 mg/dL    Creatinine 0.74 0.57 - 1.00 mg/dL    Sodium 136 136 - 145 mmol/L    Potassium 3.8 3.5 - 5.2 mmol/L    Chloride 107 98 - 107 mmol/L    CO2 19.3 (L) 22.0 - 29.0 mmol/L    Calcium 9.1 8.6 - 10.5 mg/dL    Total Protein 6.0 6.0 - 8.5 g/dL     Albumin 2.90 (L) 3.50 - 5.20 g/dL    ALT (SGPT) 24 1 - 33 U/L    AST (SGOT) 16 1 - 32 U/L    Alkaline Phosphatase 58 39 - 117 U/L    Total Bilirubin 0.5 0.0 - 1.2 mg/dL    eGFR Non African Amer 79 >60 mL/min/1.73    Globulin 3.1 gm/dL    A/G Ratio 0.9 g/dL    BUN/Creatinine Ratio 33.8 (H) 7.0 - 25.0    Anion Gap 9.7 5.0 - 15.0 mmol/L   Lactate Dehydrogenase    Collection Time: 08/31/20  6:03 AM   Result Value Ref Range     (H) 135 - 214 U/L   D-dimer, Quantitative    Collection Time: 08/31/20  6:03 AM   Result Value Ref Range    D-Dimer, Quantitative 1.93 (H) 0.00 - 0.49 MCGFEU/mL   Fibrinogen    Collection Time: 08/31/20  6:03 AM   Result Value Ref Range    Fibrinogen 515 (H) 219 - 464 mg/dL   C-reactive Protein    Collection Time: 08/31/20  6:03 AM   Result Value Ref Range    C-Reactive Protein 3.99 (H) 0.00 - 0.50 mg/dL   CK    Collection Time: 08/31/20  6:03 AM   Result Value Ref Range    Creatine Kinase 42 20 - 180 U/L   Ferritin    Collection Time: 08/31/20  6:03 AM   Result Value Ref Range    Ferritin 457.00 (H) 13.00 - 150.00 ng/mL   CBC Auto Differential    Collection Time: 08/31/20  6:03 AM   Result Value Ref Range    WBC 11.23 (H) 3.40 - 10.80 10*3/mm3    RBC 4.25 3.77 - 5.28 10*6/mm3    Hemoglobin 13.0 12.0 - 15.9 g/dL    Hematocrit 37.8 34.0 - 46.6 %    MCV 88.9 79.0 - 97.0 fL    MCH 30.6 26.6 - 33.0 pg    MCHC 34.4 31.5 - 35.7 g/dL    RDW 13.1 12.3 - 15.4 %    RDW-SD 41.8 37.0 - 54.0 fl    MPV 9.9 6.0 - 12.0 fL    Platelets 502 (H) 140 - 450 10*3/mm3    Neutrophil % 74.7 42.7 - 76.0 %    Lymphocyte % 11.7 (L) 19.6 - 45.3 %    Monocyte % 9.2 5.0 - 12.0 %    Eosinophil % 2.0 0.3 - 6.2 %    Basophil % 0.4 0.0 - 1.5 %    Immature Grans % 2.0 (H) 0.0 - 0.5 %    Neutrophils, Absolute 8.39 (H) 1.70 - 7.00 10*3/mm3    Lymphocytes, Absolute 1.31 0.70 - 3.10 10*3/mm3    Monocytes, Absolute 1.03 (H) 0.10 - 0.90 10*3/mm3    Eosinophils, Absolute 0.23 0.00 - 0.40 10*3/mm3    Basophils, Absolute 0.04 0.00 -  0.20 10*3/mm3    Immature Grans, Absolute 0.23 (H) 0.00 - 0.05 10*3/mm3    nRBC 0.0 0.0 - 0.2 /100 WBC   Comprehensive Metabolic Panel    Collection Time: 09/01/20  6:01 AM   Result Value Ref Range    Glucose 92 65 - 99 mg/dL    BUN 24 (H) 8 - 23 mg/dL    Creatinine 0.72 0.57 - 1.00 mg/dL    Sodium 138 136 - 145 mmol/L    Potassium 3.8 3.5 - 5.2 mmol/L    Chloride 107 98 - 107 mmol/L    CO2 23.3 22.0 - 29.0 mmol/L    Calcium 8.7 8.6 - 10.5 mg/dL    Total Protein 5.9 (L) 6.0 - 8.5 g/dL    Albumin 3.00 (L) 3.50 - 5.20 g/dL    ALT (SGPT) 23 1 - 33 U/L    AST (SGOT) 15 1 - 32 U/L    Alkaline Phosphatase 56 39 - 117 U/L    Total Bilirubin 0.5 0.0 - 1.2 mg/dL    eGFR Non African Amer 81 >60 mL/min/1.73    Globulin 2.9 gm/dL    A/G Ratio 1.0 g/dL    BUN/Creatinine Ratio 33.3 (H) 7.0 - 25.0    Anion Gap 7.7 5.0 - 15.0 mmol/L   C-reactive Protein    Collection Time: 09/01/20  6:01 AM   Result Value Ref Range    C-Reactive Protein 2.75 (H) 0.00 - 0.50 mg/dL   CK    Collection Time: 09/01/20  6:01 AM   Result Value Ref Range    Creatine Kinase 42 20 - 180 U/L   Ferritin    Collection Time: 09/01/20  6:01 AM   Result Value Ref Range    Ferritin 514.00 (H) 13.00 - 150.00 ng/mL   CBC Auto Differential    Collection Time: 09/01/20  6:01 AM   Result Value Ref Range    WBC 10.25 3.40 - 10.80 10*3/mm3    RBC 4.23 3.77 - 5.28 10*6/mm3    Hemoglobin 13.0 12.0 - 15.9 g/dL    Hematocrit 38.1 34.0 - 46.6 %    MCV 90.1 79.0 - 97.0 fL    MCH 30.7 26.6 - 33.0 pg    MCHC 34.1 31.5 - 35.7 g/dL    RDW 13.0 12.3 - 15.4 %    RDW-SD 42.6 37.0 - 54.0 fl    MPV 9.9 6.0 - 12.0 fL    Platelets 462 (H) 140 - 450 10*3/mm3    Neutrophil % 71.1 42.7 - 76.0 %    Lymphocyte % 14.1 (L) 19.6 - 45.3 %    Monocyte % 10.2 5.0 - 12.0 %    Eosinophil % 1.6 0.3 - 6.2 %    Basophil % 0.5 0.0 - 1.5 %    Immature Grans % 2.5 (H) 0.0 - 0.5 %    Neutrophils, Absolute 7.28 (H) 1.70 - 7.00 10*3/mm3    Lymphocytes, Absolute 1.45 0.70 - 3.10 10*3/mm3    Monocytes, Absolute  1.05 (H) 0.10 - 0.90 10*3/mm3    Eosinophils, Absolute 0.16 0.00 - 0.40 10*3/mm3    Basophils, Absolute 0.05 0.00 - 0.20 10*3/mm3    Immature Grans, Absolute 0.26 (H) 0.00 - 0.05 10*3/mm3    nRBC 0.0 0.0 - 0.2 /100 WBC   Comprehensive Metabolic Panel    Collection Time: 09/02/20  6:00 AM   Result Value Ref Range    Glucose 84 65 - 99 mg/dL    BUN 25 (H) 8 - 23 mg/dL    Creatinine 0.74 0.57 - 1.00 mg/dL    Sodium 138 136 - 145 mmol/L    Potassium 3.5 3.5 - 5.2 mmol/L    Chloride 109 (H) 98 - 107 mmol/L    CO2 22.0 22.0 - 29.0 mmol/L    Calcium 8.3 (L) 8.6 - 10.5 mg/dL    Total Protein 5.6 (L) 6.0 - 8.5 g/dL    Albumin 3.00 (L) 3.50 - 5.20 g/dL    ALT (SGPT) 21 1 - 33 U/L    AST (SGOT) 15 1 - 32 U/L    Alkaline Phosphatase 50 39 - 117 U/L    Total Bilirubin 0.5 0.0 - 1.2 mg/dL    eGFR Non African Amer 79 >60 mL/min/1.73    Globulin 2.6 gm/dL    A/G Ratio 1.2 g/dL    BUN/Creatinine Ratio 33.8 (H) 7.0 - 25.0    Anion Gap 7.0 5.0 - 15.0 mmol/L   Lactate Dehydrogenase    Collection Time: 09/02/20  6:00 AM   Result Value Ref Range     (H) 135 - 214 U/L   D-dimer, Quantitative    Collection Time: 09/02/20  6:00 AM   Result Value Ref Range    D-Dimer, Quantitative 1.57 (H) 0.00 - 0.49 MCGFEU/mL   Fibrinogen    Collection Time: 09/02/20  6:00 AM   Result Value Ref Range    Fibrinogen 453 219 - 464 mg/dL   C-reactive Protein    Collection Time: 09/02/20  6:00 AM   Result Value Ref Range    C-Reactive Protein 1.82 (H) 0.00 - 0.50 mg/dL   CK    Collection Time: 09/02/20  6:00 AM   Result Value Ref Range    Creatine Kinase 36 20 - 180 U/L   Ferritin    Collection Time: 09/02/20  6:00 AM   Result Value Ref Range    Ferritin 397.00 (H) 13.00 - 150.00 ng/mL   CBC Auto Differential    Collection Time: 09/02/20  6:00 AM   Result Value Ref Range    WBC 9.85 3.40 - 10.80 10*3/mm3    RBC 4.09 3.77 - 5.28 10*6/mm3    Hemoglobin 12.5 12.0 - 15.9 g/dL    Hematocrit 36.0 34.0 - 46.6 %    MCV 88.0 79.0 - 97.0 fL    MCH 30.6 26.6 - 33.0  pg    MCHC 34.7 31.5 - 35.7 g/dL    RDW 12.7 12.3 - 15.4 %    RDW-SD 40.4 37.0 - 54.0 fl    MPV 10.0 6.0 - 12.0 fL    Platelets 446 140 - 450 10*3/mm3    Neutrophil % 67.7 42.7 - 76.0 %    Lymphocyte % 18.5 (L) 19.6 - 45.3 %    Monocyte % 9.7 5.0 - 12.0 %    Eosinophil % 1.4 0.3 - 6.2 %    Basophil % 0.4 0.0 - 1.5 %    Immature Grans % 2.3 (H) 0.0 - 0.5 %    Neutrophils, Absolute 6.66 1.70 - 7.00 10*3/mm3    Lymphocytes, Absolute 1.82 0.70 - 3.10 10*3/mm3    Monocytes, Absolute 0.96 (H) 0.10 - 0.90 10*3/mm3    Eosinophils, Absolute 0.14 0.00 - 0.40 10*3/mm3    Basophils, Absolute 0.04 0.00 - 0.20 10*3/mm3    Immature Grans, Absolute 0.23 (H) 0.00 - 0.05 10*3/mm3    nRBC 0.0 0.0 - 0.2 /100 WBC   Comprehensive Metabolic Panel    Collection Time: 09/03/20  5:27 AM   Result Value Ref Range    Glucose 77 65 - 99 mg/dL    BUN 24 (H) 8 - 23 mg/dL    Creatinine 0.85 0.57 - 1.00 mg/dL    Sodium 141 136 - 145 mmol/L    Potassium 3.6 3.5 - 5.2 mmol/L    Chloride 109 (H) 98 - 107 mmol/L    CO2 22.4 22.0 - 29.0 mmol/L    Calcium 8.6 8.6 - 10.5 mg/dL    Total Protein 5.6 (L) 6.0 - 8.5 g/dL    Albumin 3.00 (L) 3.50 - 5.20 g/dL    ALT (SGPT) 23 1 - 33 U/L    AST (SGOT) 15 1 - 32 U/L    Alkaline Phosphatase 59 39 - 117 U/L    Total Bilirubin 0.4 0.0 - 1.2 mg/dL    eGFR Non African Amer 67 >60 mL/min/1.73    Globulin 2.6 gm/dL    A/G Ratio 1.2 g/dL    BUN/Creatinine Ratio 28.2 (H) 7.0 - 25.0    Anion Gap 9.6 5.0 - 15.0 mmol/L   C-reactive Protein    Collection Time: 09/03/20  5:27 AM   Result Value Ref Range    C-Reactive Protein 1.64 (H) 0.00 - 0.50 mg/dL   CK    Collection Time: 09/03/20  5:27 AM   Result Value Ref Range    Creatine Kinase 47 20 - 180 U/L   Ferritin    Collection Time: 09/03/20  5:27 AM   Result Value Ref Range    Ferritin 393.00 (H) 13.00 - 150.00 ng/mL   CBC Auto Differential    Collection Time: 09/03/20  5:27 AM   Result Value Ref Range    WBC 9.63 3.40 - 10.80 10*3/mm3    RBC 4.10 3.77 - 5.28 10*6/mm3     Hemoglobin 12.5 12.0 - 15.9 g/dL    Hematocrit 36.5 34.0 - 46.6 %    MCV 89.0 79.0 - 97.0 fL    MCH 30.5 26.6 - 33.0 pg    MCHC 34.2 31.5 - 35.7 g/dL    RDW 12.7 12.3 - 15.4 %    RDW-SD 41.7 37.0 - 54.0 fl    MPV 10.1 6.0 - 12.0 fL    Platelets 409 140 - 450 10*3/mm3    Neutrophil % 65.0 42.7 - 76.0 %    Lymphocyte % 22.8 19.6 - 45.3 %    Monocyte % 8.6 5.0 - 12.0 %    Eosinophil % 1.0 0.3 - 6.2 %    Basophil % 0.4 0.0 - 1.5 %    Immature Grans % 2.2 (H) 0.0 - 0.5 %    Neutrophils, Absolute 6.25 1.70 - 7.00 10*3/mm3    Lymphocytes, Absolute 2.20 0.70 - 3.10 10*3/mm3    Monocytes, Absolute 0.83 0.10 - 0.90 10*3/mm3    Eosinophils, Absolute 0.10 0.00 - 0.40 10*3/mm3    Basophils, Absolute 0.04 0.00 - 0.20 10*3/mm3    Immature Grans, Absolute 0.21 (H) 0.00 - 0.05 10*3/mm3    nRBC 0.0 0.0 - 0.2 /100 WBC   Comprehensive Metabolic Panel    Collection Time: 09/04/20  5:48 AM   Result Value Ref Range    Glucose 78 65 - 99 mg/dL    BUN 19 8 - 23 mg/dL    Creatinine 0.80 0.57 - 1.00 mg/dL    Sodium 138 136 - 145 mmol/L    Potassium 3.8 3.5 - 5.2 mmol/L    Chloride 106 98 - 107 mmol/L    CO2 21.9 (L) 22.0 - 29.0 mmol/L    Calcium 8.5 (L) 8.6 - 10.5 mg/dL    Total Protein 5.7 (L) 6.0 - 8.5 g/dL    Albumin 3.10 (L) 3.50 - 5.20 g/dL    ALT (SGPT) 25 1 - 33 U/L    AST (SGOT) 16 1 - 32 U/L    Alkaline Phosphatase 61 39 - 117 U/L    Total Bilirubin 0.4 0.0 - 1.2 mg/dL    eGFR Non African Amer 72 >60 mL/min/1.73    Globulin 2.6 gm/dL    A/G Ratio 1.2 g/dL    BUN/Creatinine Ratio 23.8 7.0 - 25.0    Anion Gap 10.1 5.0 - 15.0 mmol/L   Lactate Dehydrogenase    Collection Time: 09/04/20  5:48 AM   Result Value Ref Range     (H) 135 - 214 U/L   D-dimer, Quantitative    Collection Time: 09/04/20  5:48 AM   Result Value Ref Range    D-Dimer, Quantitative 1.17 (H) 0.00 - 0.49 MCGFEU/mL   Fibrinogen    Collection Time: 09/04/20  5:48 AM   Result Value Ref Range    Fibrinogen 445 219 - 464 mg/dL   C-reactive Protein    Collection Time:  09/04/20  5:48 AM   Result Value Ref Range    C-Reactive Protein 1.59 (H) 0.00 - 0.50 mg/dL   CK    Collection Time: 09/04/20  5:48 AM   Result Value Ref Range    Creatine Kinase 42 20 - 180 U/L   Ferritin    Collection Time: 09/04/20  5:48 AM   Result Value Ref Range    Ferritin 460.00 (H) 13.00 - 150.00 ng/mL   CBC Auto Differential    Collection Time: 09/04/20  5:48 AM   Result Value Ref Range    WBC 10.72 3.40 - 10.80 10*3/mm3    RBC 4.27 3.77 - 5.28 10*6/mm3    Hemoglobin 13.0 12.0 - 15.9 g/dL    Hematocrit 39.0 34.0 - 46.6 %    MCV 91.3 79.0 - 97.0 fL    MCH 30.4 26.6 - 33.0 pg    MCHC 33.3 31.5 - 35.7 g/dL    RDW 13.1 12.3 - 15.4 %    RDW-SD 43.8 37.0 - 54.0 fl    MPV 10.4 6.0 - 12.0 fL    Platelets 383 140 - 450 10*3/mm3    Neutrophil % 74.4 42.7 - 76.0 %    Lymphocyte % 14.6 (L) 19.6 - 45.3 %    Monocyte % 7.8 5.0 - 12.0 %    Eosinophil % 1.5 0.3 - 6.2 %    Basophil % 0.5 0.0 - 1.5 %    Immature Grans % 1.2 (H) 0.0 - 0.5 %    Neutrophils, Absolute 7.98 (H) 1.70 - 7.00 10*3/mm3    Lymphocytes, Absolute 1.56 0.70 - 3.10 10*3/mm3    Monocytes, Absolute 0.84 0.10 - 0.90 10*3/mm3    Eosinophils, Absolute 0.16 0.00 - 0.40 10*3/mm3    Basophils, Absolute 0.05 0.00 - 0.20 10*3/mm3    Immature Grans, Absolute 0.13 (H) 0.00 - 0.05 10*3/mm3    nRBC 0.0 0.0 - 0.2 /100 WBC

## 2020-09-11 ENCOUNTER — TELEPHONE (OUTPATIENT)
Dept: INTERNAL MEDICINE | Facility: CLINIC | Age: 65
End: 2020-09-11

## 2020-09-11 NOTE — TELEPHONE ENCOUNTER
PATIENT PROVIDED FAX NUMBER FOR HER EMPLOYMENT LEAVE    FAX #: [phone]   ([email or web address])     PATIENT CALL BACK: [phone]

## 2020-09-13 ENCOUNTER — READMISSION MANAGEMENT (OUTPATIENT)
Dept: CALL CENTER | Facility: HOSPITAL | Age: 65
End: 2020-09-13

## 2020-09-13 NOTE — OUTREACH NOTE
COPD/PN Week 2 Survey      Responses   [facility] patient discharged from?  [place]   COVID-19 Test Status  Confirmed   Does the patient have one of the following disease processes/diagnoses(primary or secondary)?  COPD/Pneumonia   Was the primary reason for admission:  Pneumonia   Week 2 attempt successful?  Yes   Call start time  1404   Call end time  1407   Discharge diagnosis  hypoxia, PNA d/t COVID 19,  cytokine storm   Meds reviewed with patient/caregiver?  Yes   Is the patient having any side effects they believe may be caused by any medication additions or changes?  No   Does the patient have all medications ordered at discharge?  N/A   Is the patient taking all medications as directed (includes completed medication regime)?  Yes   Does the patient have a primary care provider?   Yes   Does the patient have an appointment with their PCP or pulmonologist within 7 days of discharge?  Yes   Comments regarding PCP  Reports seeing Dr. Guerrier on 9/10/2020   Has the patient kept scheduled appointments due by today?  Yes   Has home health visited the patient within 72 hours of discharge?  N/A   What DME was ordered?  O2 from [place]   Has all DME been delivered?  Yes   Pulse Ox monitoring  Intermittent   Pulse Ox device source  Patient   O2 Sat comments  reports her oxygen levels running 92-94%   O2 Sat: education provided  Sat levels, Monitoring frequency, When to seek care   Psychosocial comments  Patient reports that  also COVID+,  did not require hospitalization.    Did the patient receive a copy of their discharge instructions?  Yes   Nursing interventions  Reviewed instructions with patient   What is the patient's perception of their health status since discharge?  Improving   Is the patient/caregiver able to teach back the hierarchy of who to call/visit for symptoms/problems? PCP, Specialist, Home health nurse, Urgent Care, ED, 911  Yes   Is the patient/caregiver able to teach back signs and  symptoms of worsening condition:  Fever/chills, Shortness of breath, Chest pain   Week 2 call completed?  Yes   Wrap up additional comments  pt reports her strength is gradually improving but her breathing is about the same. Reports discussing breathing with PCP and has an appt with Pulmonary on 9/28/2020          Evelio Washington RN

## 2020-09-16 ENCOUNTER — READMISSION MANAGEMENT (OUTPATIENT)
Dept: CALL CENTER | Facility: HOSPITAL | Age: 65
End: 2020-09-16

## 2020-09-16 NOTE — OUTREACH NOTE
"COPD/PN Week 2 Survey      Responses   [facility] [place] patient discharged from?  [place]   COVID-19 Test Status  Confirmed   Does the patient have one of the following disease processes/diagnoses(primary or secondary)?  COPD/Pneumonia   Was the primary reason for admission:  Pneumonia   Week 2 attempt successful?  Yes   Call start time  1108   Call end time  1111   Discharge diagnosis  hypoxia, PNA d/t COVID 19,  cytokine storm   Meds reviewed with patient/caregiver?  Yes   Is the patient having any side effects they believe may be caused by any medication additions or changes?  No   Does the patient have all medications ordered at discharge?  Yes   Is the patient taking all medications as directed (includes completed medication regime)?  Yes   Does the patient have a primary care provider?   Yes   Does the patient have an appointment with their PCP or pulmonologist within 7 days of discharge?  Yes   Has the patient kept scheduled appointments due by today?  Yes   Has home health visited the patient within 72 hours of discharge?  N/A   Pulse Ox monitoring  Intermittent   Pulse Ox device source  Patient   O2 Sat comments  States they have been running around 94%   O2 Sat: education provided  Sat levels, Monitoring frequency, When to seek care   What is the patient's perception of their health status since discharge?  Improving   Is the patient/caregiver able to teach back the hierarchy of who to call/visit for symptoms/problems? PCP, Specialist, Home health nurse, Urgent Care, ED, 911  Yes   Additional teach back comments  State she is doing \"ok\".  has a dry cough and some fatigue.  Has followed up with her PCP.   Is the patient/caregiver able to teach back signs and symptoms of worsening condition:  Fever/chills, Shortness of breath, Chest pain   Week 2 call completed?  Yes   Wrap up additional comments  Denies needs or questions at this time          Adele Orr LPN  "

## 2020-09-18 ENCOUNTER — EPISODE CHANGES (OUTPATIENT)
Dept: CASE MANAGEMENT | Facility: OTHER | Age: 65
End: 2020-09-18

## 2020-09-23 ENCOUNTER — TRANSCRIBE ORDERS (OUTPATIENT)
Dept: ADMINISTRATIVE | Facility: HOSPITAL | Age: 65
End: 2020-09-23

## 2020-09-23 ENCOUNTER — READMISSION MANAGEMENT (OUTPATIENT)
Dept: CALL CENTER | Facility: HOSPITAL | Age: 65
End: 2020-09-23

## 2020-09-23 DIAGNOSIS — J12.82 PNEUMONIA DUE TO COVID-19 VIRUS: Primary | ICD-10-CM

## 2020-09-23 DIAGNOSIS — U07.1 PNEUMONIA DUE TO COVID-19 VIRUS: Primary | ICD-10-CM

## 2020-09-23 NOTE — OUTREACH NOTE
COPD/PN Week 3 Survey      Responses   [facility] patient discharged from?  [place]   COVID-19 Test Status  Confirmed   Does the patient have one of the following disease processes/diagnoses(primary or secondary)?  COPD/Pneumonia   Was the primary reason for admission:  Pneumonia   Week 3 attempt successful?  No          Estefany Maguire RN

## 2020-09-30 ENCOUNTER — READMISSION MANAGEMENT (OUTPATIENT)
Dept: CALL CENTER | Facility: HOSPITAL | Age: 65
End: 2020-09-30

## 2020-09-30 NOTE — OUTREACH NOTE
COPD/PN Week 4 Survey      Responses   [facility] patient discharged from?  [place]   COVID-19 Test Status  Confirmed   Does the patient have one of the following disease processes/diagnoses(primary or secondary)?  COPD/Pneumonia   Was the primary reason for admission:  Pneumonia   Week 4 attempt successful?  No          Maria Antonia Rolon RN

## 2020-10-06 RX ORDER — PANTOPRAZOLE SODIUM 40 MG/1
TABLET, DELAYED RELEASE ORAL
Qty: 90 TABLET | Refills: 0 | Status: SHIPPED | OUTPATIENT
Start: 2020-10-06 | End: 2021-01-09

## 2020-10-06 NOTE — TELEPHONE ENCOUNTER
Message to Dr Guzman re: pantoprazole refill request (see note of 6/23/20 - f/u appt on 12/15/20.

## 2020-11-09 ENCOUNTER — HOSPITAL ENCOUNTER (OUTPATIENT)
Dept: CT IMAGING | Facility: HOSPITAL | Age: 65
Discharge: HOME | End: 2020-11-09
Admitting: INTERNAL MEDICINE

## 2020-11-09 DIAGNOSIS — J12.82 PNEUMONIA DUE TO COVID-19 VIRUS: ICD-10-CM

## 2020-11-09 DIAGNOSIS — U07.1 PNEUMONIA DUE TO COVID-19 VIRUS: ICD-10-CM

## 2020-11-09 PROCEDURE — 71250 CT THORAX DX C-: CPT

## 2020-12-15 ENCOUNTER — TRANSCRIBE ORDERS (OUTPATIENT)
Dept: CARDIAC REHAB | Facility: HOSPITAL | Age: 65
End: 2020-12-15

## 2020-12-15 ENCOUNTER — OFFICE VISIT (OUTPATIENT)
Dept: GASTROENTEROLOGY | Facility: CLINIC | Age: 65
End: 2020-12-15

## 2020-12-15 VITALS
SYSTOLIC BLOOD PRESSURE: 138 MMHG | BODY MASS INDEX: 30.7 KG/M2 | WEIGHT: 191 LBS | HEIGHT: 66 IN | DIASTOLIC BLOOD PRESSURE: 70 MMHG

## 2020-12-15 DIAGNOSIS — K21.9 GASTROESOPHAGEAL REFLUX DISEASE WITHOUT ESOPHAGITIS: ICD-10-CM

## 2020-12-15 DIAGNOSIS — R11.0 NAUSEA: ICD-10-CM

## 2020-12-15 DIAGNOSIS — K58.0 IRRITABLE BOWEL SYNDROME WITH DIARRHEA: Primary | ICD-10-CM

## 2020-12-15 DIAGNOSIS — R15.9 FULL INCONTINENCE OF FECES: ICD-10-CM

## 2020-12-15 DIAGNOSIS — J98.4 RESTRICTIVE LUNG DISEASE: Primary | ICD-10-CM

## 2020-12-15 PROCEDURE — 99214 OFFICE O/P EST MOD 30 MIN: CPT | Performed by: INTERNAL MEDICINE

## 2020-12-15 RX ORDER — ONDANSETRON 4 MG/1
4 TABLET, FILM COATED ORAL EVERY 8 HOURS PRN
Qty: 30 TABLET | Refills: 2 | Status: SHIPPED | OUTPATIENT
Start: 2020-12-15 | End: 2021-03-08 | Stop reason: SDUPTHER

## 2020-12-15 RX ORDER — ALBUTEROL SULFATE 90 UG/1
AEROSOL, METERED RESPIRATORY (INHALATION)
COMMUNITY
Start: 2020-11-24

## 2020-12-15 NOTE — PROGRESS NOTES
Chief Complaint   Patient presents with   • Irritable Bowel Syndrome       Subjective     HPI    Magdalena Dickey is a 65 y.o. female with a past medical history noted below who presents for intermittent diarrhea episodes, fecal incontinence, history of colon resection for diverticulitis    Bowel issue stable with imodium, fiber supplementation.  Diarrhea episodes only 1-2 times per month    She did have a prolonged course of COVID end of August into Sept.  Has been recovering.    Heartburn ok on protonix.  However, having nausea after eating.  Feels that this is related to her weight.  Takes zofran about 5 times per week, requesting refill    Last colonoscopy 2017 with polyps, EGD around that same time.    Today's visit was in the office.  Both the patient and I were wearing face masks and proper hand hygiene was performed before and after the physical exam.       Past Medical History:   Diagnosis Date   • Anxiety    • Colon polyp 2017   • COVID-19 09/2020   • Delayed emergence from anesthesia 2018   • Depression 5/25/2016   • Diverticulitis of colon    • Diverticulosis of large intestine without hemorrhage 10/5/2016   • GERD (gastroesophageal reflux disease)    • Hiatal hernia    • History of gastric ulcer    • Low back pain    • Lumbosacral disc disease    • Migraine without aura and without status migrainosus, not intractable 5/25/2016         Current Outpatient Medications:   •  albuterol sulfate  (90 Base) MCG/ACT inhaler, INHALE TWO PUFFS INTO THE LUNGS EVERY FOUR HOURS AS NEEDED, Disp: , Rfl:   •  ALPRAZolam (XANAX) 0.5 MG tablet, Take 1 tablet by mouth 2 (Two) Times a Day As Needed for Anxiety., Disp: 60 tablet, Rfl: 1  •  Breo Ellipta 200-25 MCG/INH inhaler, USE 1 (ONE) INHALATION DAILY   REPLACES SYMBICORT, Disp: , Rfl:   •  buPROPion XL (WELLBUTRIN XL) 150 MG 24 hr tablet, Take 150 mg by mouth Every Morning., Disp: , Rfl:   •  calcium carbonate (OS-MARY) 600 MG tablet, Take 600 mg by mouth 2 (Two)  Times a Day., Disp: , Rfl:   •  dicyclomine (BENTYL) 10 MG capsule, Take 1 capsule by mouth 4 (Four) Times a Day As Needed (abdominal pain)., Disp: 60 capsule, Rfl: 2  •  DULoxetine (CYMBALTA) 60 MG capsule, Take 1 capsule by mouth Daily., Disp: 90 capsule, Rfl: 0  •  LATUDA 40 MG tablet tablet, , Disp: , Rfl:   •  Loperamide HCl (IMODIUM A-D PO), Take  by mouth Daily., Disp: , Rfl:   •  metaxalone (SKELAXIN) 800 MG tablet, TAKE 1 TABLET BY MOUTH THREE TIMES A DAY, Disp: 90 tablet, Rfl: 1  •  Methylcellulose, Laxative, (CITRUCEL PO), Take  by mouth Daily., Disp: , Rfl:   •  Multiple Vitamin (MULTI-VITAMINS PO), Take 1 tablet by mouth Daily. HOLD FOR SURGERY, Disp: , Rfl:   •  ondansetron (ZOFRAN) 4 MG tablet, Take 1 tablet by mouth Every 8 (Eight) Hours As Needed for Nausea or Vomiting., Disp: 30 tablet, Rfl: 2  •  pantoprazole (PROTONIX) 40 MG EC tablet, TAKE 1 TABLET BY MOUTH EVERY DAY IN THE MORNING, Disp: 90 tablet, Rfl: 0  •  SUMAtriptan (IMITREX) 100 MG tablet, TAKE 1 TABLET BY MOUTH AT ONSET OF HEADACHE, MAY REPEAT EVERY 2 HOURS AS NEEDED (MAX 200MG/DAY), Disp: 9 tablet, Rfl: 2  •  topiramate (TOPAMAX) 50 MG tablet, Take 1 tablet by mouth 2 (Two) Times a Day., Disp: 180 tablet, Rfl: 1  •  traZODone (DESYREL) 50 MG tablet, TAKE 1 TO 2 TABLETS BY MOUTH AT BEDTIME FOR INSOMNIA, Disp: 180 tablet, Rfl: 1    No Known Allergies    Social History     Socioeconomic History   • Marital status:      Spouse name: Not on file   • Number of children: Not on file   • Years of education: Not on file   • Highest education level: Not on file   Tobacco Use   • Smoking status: Former Smoker     Types: Cigarettes     Quit date: 3/10/2014     Years since quittin.7   • Smokeless tobacco: Never Used   Substance and Sexual Activity   • Alcohol use: Yes     Comment: SELDOM   • Drug use: No   • Sexual activity: Yes     Partners: Male     Birth control/protection: Post-menopausal       Family History   Problem Relation Age of  Onset   • Alzheimer's disease Mother         SDAT   • Hypertension Mother    • Diabetes type II Mother    • Lung cancer Mother         POSSIBLE   • Heart attack Mother    • Cancer Mother         lung   • COPD Mother    • Depression Mother    • Diabetes Mother    • Heart disease Mother    • Miscarriages / Stillbirths Mother    • Alcohol abuse Father    • Prostate cancer Father    • Heart disease Father         THIRD DEGREE HEART BLOCK   • Arthritis Father    • Cancer Father         prostate   • Drug abuse Father    • Learning disabilities Father    • Ovarian cancer Sister    • No Known Problems Brother    • Ovarian cancer Sister    • Arthritis Maternal Grandmother         rheumatoid   • Arthritis Sister    • Cancer Sister         ovarian   • Depression Sister    • Hypertension Sister    • Cancer Sister         ovarian   • Depression Sister    • Hypertension Sister    • Malig Hyperthermia Neg Hx        Review of Systems   Constitutional: Negative for activity change, appetite change and fatigue.   HENT: Negative for sore throat and trouble swallowing.    Respiratory: Negative.    Cardiovascular: Negative.    Gastrointestinal: Positive for diarrhea and nausea. Negative for abdominal distention, abdominal pain and blood in stool.        Heartburn   Endocrine: Negative for cold intolerance and heat intolerance.   Genitourinary: Negative for difficulty urinating, dysuria and frequency.   Musculoskeletal: Negative for arthralgias, back pain and myalgias.   Skin: Negative.    Hematological: Negative for adenopathy. Does not bruise/bleed easily.   All other systems reviewed and are negative.      Objective     Vitals:    12/15/20 1524   BP: 138/70         12/15/20  1524   Weight: 86.6 kg (191 lb)     Body mass index is 30.83 kg/m².    Physical Exam  Vitals signs reviewed.   Constitutional:       General: She is not in acute distress.     Appearance: Normal appearance. She is well-developed. She is not diaphoretic.   HENT:       Head: Normocephalic and atraumatic.      Right Ear: External ear normal.      Left Ear: External ear normal.      Nose: Nose normal.   Eyes:      General: No scleral icterus.        Right eye: No discharge.         Left eye: No discharge.      Conjunctiva/sclera: Conjunctivae normal.   Neck:      Musculoskeletal: Normal range of motion and neck supple.      Thyroid: No thyromegaly.      Comments: No supraclavicular adenopathy  Cardiovascular:      Rate and Rhythm: Normal rate and regular rhythm.      Heart sounds: Normal heart sounds. No murmur. No gallop.       Comments: No lower extremity edema  Pulmonary:      Effort: Pulmonary effort is normal. No respiratory distress.      Breath sounds: Normal breath sounds. No wheezing.   Abdominal:      General: Bowel sounds are normal. There is no distension.      Palpations: Abdomen is soft. Abdomen is not rigid. There is no mass.      Tenderness: There is no abdominal tenderness. There is no guarding or rebound.      Hernia: No hernia is present.   Genitourinary:     Comments: Rectal exam deferred  Musculoskeletal: Normal range of motion.         General: No tenderness.      Comments: No atrophy of upper or lower extremities.  Normal digits and nails of both hands.   Lymphadenopathy:      Cervical: No cervical adenopathy.   Skin:     General: Skin is warm and dry.      Findings: No erythema or rash.   Neurological:      Mental Status: She is alert and oriented to person, place, and time.      Motor: No atrophy.      Coordination: Coordination normal.   Psychiatric:         Behavior: Behavior normal.         Thought Content: Thought content normal.         Judgment: Judgment normal.         WBC   Date Value Ref Range Status   09/04/2020 10.72 3.40 - 10.80 10*3/mm3 Final     RBC   Date Value Ref Range Status   09/04/2020 4.27 3.77 - 5.28 10*6/mm3 Final     Hemoglobin   Date Value Ref Range Status   09/04/2020 13.0 12.0 - 15.9 g/dL Final     Hematocrit   Date Value Ref  Range Status   09/04/2020 39.0 34.0 - 46.6 % Final     MCV   Date Value Ref Range Status   09/04/2020 91.3 79.0 - 97.0 fL Final     MCH   Date Value Ref Range Status   09/04/2020 30.4 26.6 - 33.0 pg Final     MCHC   Date Value Ref Range Status   09/04/2020 33.3 31.5 - 35.7 g/dL Final     RDW   Date Value Ref Range Status   09/04/2020 13.1 12.3 - 15.4 % Final     RDW-SD   Date Value Ref Range Status   09/04/2020 43.8 37.0 - 54.0 fl Final     MPV   Date Value Ref Range Status   09/04/2020 10.4 6.0 - 12.0 fL Final     Platelets   Date Value Ref Range Status   09/04/2020 383 140 - 450 10*3/mm3 Final     Neutrophil %   Date Value Ref Range Status   09/04/2020 74.4 42.7 - 76.0 % Final     Lymphocyte %   Date Value Ref Range Status   09/04/2020 14.6 (L) 19.6 - 45.3 % Final     Monocyte %   Date Value Ref Range Status   09/04/2020 7.8 5.0 - 12.0 % Final     Eosinophil %   Date Value Ref Range Status   09/04/2020 1.5 0.3 - 6.2 % Final     Basophil %   Date Value Ref Range Status   09/04/2020 0.5 0.0 - 1.5 % Final     Immature Grans %   Date Value Ref Range Status   09/04/2020 1.2 (H) 0.0 - 0.5 % Final     Neutrophils, Absolute   Date Value Ref Range Status   09/04/2020 7.98 (H) 1.70 - 7.00 10*3/mm3 Final     Lymphocytes, Absolute   Date Value Ref Range Status   09/04/2020 1.56 0.70 - 3.10 10*3/mm3 Final     Monocytes, Absolute   Date Value Ref Range Status   09/04/2020 0.84 0.10 - 0.90 10*3/mm3 Final     Eosinophils, Absolute   Date Value Ref Range Status   09/04/2020 0.16 0.00 - 0.40 10*3/mm3 Final     Basophils, Absolute   Date Value Ref Range Status   09/04/2020 0.05 0.00 - 0.20 10*3/mm3 Final     Immature Grans, Absolute   Date Value Ref Range Status   09/04/2020 0.13 (H) 0.00 - 0.05 10*3/mm3 Final     nRBC   Date Value Ref Range Status   09/04/2020 0.0 0.0 - 0.2 /100 WBC Final       Glucose   Date Value Ref Range Status   09/04/2020 78 65 - 99 mg/dL Final     Sodium   Date Value Ref Range Status   09/04/2020 138 136 -  145 mmol/L Final     Potassium   Date Value Ref Range Status   09/04/2020 3.8 3.5 - 5.2 mmol/L Final     CO2   Date Value Ref Range Status   09/04/2020 21.9 (L) 22.0 - 29.0 mmol/L Final     Chloride   Date Value Ref Range Status   09/04/2020 106 98 - 107 mmol/L Final     Anion Gap   Date Value Ref Range Status   09/04/2020 10.1 5.0 - 15.0 mmol/L Final     Creatinine   Date Value Ref Range Status   09/04/2020 0.80 0.57 - 1.00 mg/dL Final   11/01/2018 0.90 0.60 - 1.30 mg/dL Final     Comment:     Serial Number: [id number]:  [id number]     BUN   Date Value Ref Range Status   09/04/2020 19 8 - 23 mg/dL Final     BUN/Creatinine Ratio   Date Value Ref Range Status   09/04/2020 23.8 7.0 - 25.0 Final     Calcium   Date Value Ref Range Status   09/04/2020 8.5 (L) 8.6 - 10.5 mg/dL Final     eGFR Non  Amer   Date Value Ref Range Status   09/04/2020 72 >60 mL/min/1.73 Final     Alkaline Phosphatase   Date Value Ref Range Status   09/04/2020 61 39 - 117 U/L Final     Total Protein   Date Value Ref Range Status   09/04/2020 5.7 (L) 6.0 - 8.5 g/dL Final     ALT (SGPT)   Date Value Ref Range Status   09/04/2020 25 1 - 33 U/L Final     AST (SGOT)   Date Value Ref Range Status   09/04/2020 16 1 - 32 U/L Final     Total Bilirubin   Date Value Ref Range Status   09/04/2020 0.4 0.0 - 1.2 mg/dL Final     Albumin   Date Value Ref Range Status   09/04/2020 3.10 (L) 3.50 - 5.20 g/dL Final     Globulin   Date Value Ref Range Status   09/04/2020 2.6 gm/dL Final         Imaging Results (Last 7 Days)     ** No results found for the last 168 hours. **            No notes on file    Assessment/Plan    1. IBS-D, fecal incontinence: better on imodium, fiber    2. GERD: stable on ppi    3. Nausea: persistent issue after meals, ? Etiology    4. Hx of polyps: last scope 2017 with Dr Tan    Plan  Continue immodium, citrucel  Continue zofran  Continue ppi  EGD if heartburn, nausea not improving  Repeat colonoscopy 2022, sooner if  needed    Diagnoses and all orders for this visit:    1. Irritable bowel syndrome with diarrhea (Primary)    2. Full incontinence of feces    3. Gastroesophageal reflux disease without esophagitis    4. Nausea    Other orders  -     ondansetron (ZOFRAN) 4 MG tablet; Take 1 tablet by mouth Every 8 (Eight) Hours As Needed for Nausea or Vomiting.  Dispense: 30 tablet; Refill: 2        I have discussed the above plan with the patient.  They verbalize understanding and are in agreement with the plan.  They have been advised to contact the office for any questions, concerns, or changes related to their health.    Dictated utilizing Dragon dictation

## 2020-12-16 ENCOUNTER — OFFICE VISIT (OUTPATIENT)
Dept: CARDIAC REHAB | Facility: HOSPITAL | Age: 65
End: 2020-12-16

## 2020-12-16 VITALS
HEIGHT: 66 IN | BODY MASS INDEX: 31.13 KG/M2 | HEART RATE: 80 BPM | WEIGHT: 193.7 LBS | DIASTOLIC BLOOD PRESSURE: 76 MMHG | SYSTOLIC BLOOD PRESSURE: 124 MMHG | OXYGEN SATURATION: 98 %

## 2020-12-16 DIAGNOSIS — J98.4 RESTRICTIVE LUNG DISEASE: Primary | ICD-10-CM

## 2020-12-16 PROCEDURE — G0238 OTH RESP PROC, INDIV: HCPCS

## 2020-12-16 NOTE — PROGRESS NOTES
Pulmonary Rehab Initial Assessment      Name: Magdalena Dickey  :1955 Allergies:Patient has no known allergies.   MRN: [id number] 65 y.o. Physician: Sun Guerrier MD   Primary Diagnosis:    Diagnosis Plan   1. Restrictive lung disease      Event Date: 12- Specialist: Mehul Burch MD   Secondary Diagnosis: Post Covid-19  Note Author: Violetta Mcguire RN     Cardiovascular History: NA     EXERCISE AT HOME  yes  20min  7 days per week          Ambulatory Status:Independent  Ambulatory Fall Risk Assessed on Initial Visit: yes 6 Minute Walk Pre- Pulmonary Rehab:  Distance:1319ft      RPE:3        RPD: 4              MPH: 2.5  Max. HR: 110        SPO2:91    MET: 2.9  Resting BP: 124/76     Peak BP: 130/86  Recovery BP: 124/82  Comments: Pt tolerated exercise. Sp02 was 91-93% with exercise. No complaints noted.      NUTRITION  Lipids:no If yes, labs as follows;  Total: No components found for: CHOLESTEROL  HDL:   HDL Cholesterol   Date Value Ref Range Status   2020 48 40 - 60 mg/dL Final    Lipids continued:  LDL:  LDL Cholesterol    Date Value Ref Range Status   2020 87 0 - 100 mg/dL Final     Triglyceride: No components found for: TRIGLYCERIDE   Weight Management:                 Weight: 193.7  Height: 66                                   BMI: Body mass index is 31.26 kg/m².  Waist Circumference: n/a inches   Alcohol Use: social drinker seldom Diabetes:No    Last HGBA1C with date if applicable:No components found for: A1C         SOCIAL HISTORY  Social History     Socioeconomic History   • Marital status:      Spouse name: Lonnie   • Number of children: 3   • Years of education: Not on file   • Highest education level: Not on file   Tobacco Use   • Smoking status: Former Smoker     Packs/day: 1.00     Years: 40.00     Pack years: 40.00     Types: Cigarettes     Quit date: 3/10/2014     Years since quittin.7   • Smokeless tobacco: Never Used   Substance and Sexual Activity   •  Alcohol use: Yes     Comment: SELDOM   • Drug use: No   • Sexual activity: Yes     Partners: Male     Birth control/protection: Post-menopausal    Learning Barriers:Ready to Learn  Family Support:yes  Living Arrangement: lives with their family Tobacco Adjunct:no  Do you live with a smoker: yes     PSYCHOSOCIAL  Clinical Depression: yes    Stress: no     Assess presence or absence of depression using a valid screening tool: no      Assessment: Left lower lung sounds decreased. Regular heart rate and rhythm noted.           Are you being hurt, hit, or freightened by anyone at home or in your life? no    Are you being neglected by a caregiver? No Shoulder flexibility/Range of motion: Above average     Recommended arm activity: Hand weights    Chair sit and reach within: 0 inches   Leg flexibility: Excellent    Leg Strength/Balance/Five times sit to stand: 12 seconds.   Pt did not use upper body strength.    Recommended stretching: Standing   Balance: Average Assessment: Pt would like to start weights at home.    Family attends [place]: no      COMORBIDITIES  Sleep Apnea: no If yes, Choose: N/A    Cancer: no    Stroke: no   Pneumonia: yes If yes, how many times 1    Osteoporosis: no    GI Problems: yes colon resection, IBS, GERD, diverticulitis  Frequent colds/allergies: yes    Other illnesses, surgeries, or comments cervical spondylosis with radiculopathy, migraine, hiatal hernia, gastric ulcer   PAIN:  Are you having pain? yes  If yes where is pain? generalized pain If yes, pain scale: 5      PULMONARY:  Do you use a nebulizer?: no   If yes, n/a    Do you use oxygen at home?: no  If yes, amount n/a    With rest: no  With activity: no Do you have a daily cough?: no  If yes, choose: n/a    Do you every notice yourself wheezing?: yes  If yes, when: overexertion    Other pulmonary/breathing problems?: no   OTHER:  Do you have physical limitations?: no  If yes, n/a    Do you need assistance with ADLs?: no  If yes, n/a Do you  climb stairs at home?:yes  If yes, how many times 2    Have you ever attended a pulmonary rehab?: no  If yes, n/a [facility] Dyspnea Scale: 0 - 4:  2 = walks slower than people of the same age on the level because of breathlessness, or has to stop for breath when walking at own pace on the level     Patient Goals: Pt would like to be back to her baseline. She would like to have less fatigue. Pt would like to be able to walk from the parking garage at work into the building.     DISCHARGE PLANNING:  Do you have any home exercise equipment?: yes    What are you plans for continuing exercise after completion of pulmonary rehab? Pt will walk outside and ride her recumbent bike.      EDUCATION:  Pursed - lip breathing and Program information folder       PRE-PROGRAM ASSESSMENT:  PFT Date: 11-    FEV1/FVC: 87 FEV1: 92    FVC: 81 DLCO: 48     Time of arrival: 9:30    Time of departure: 10:30           12/16/2020  09:49 JESUS MANUEL Mcguire RN

## 2020-12-17 ENCOUNTER — TREATMENT (OUTPATIENT)
Dept: CARDIAC REHAB | Facility: HOSPITAL | Age: 65
End: 2020-12-17

## 2020-12-17 DIAGNOSIS — J98.4 RESTRICTIVE LUNG DISEASE: Primary | ICD-10-CM

## 2020-12-17 PROCEDURE — G0238 OTH RESP PROC, INDIV: HCPCS

## 2020-12-22 ENCOUNTER — TREATMENT (OUTPATIENT)
Dept: CARDIAC REHAB | Facility: HOSPITAL | Age: 65
End: 2020-12-22

## 2020-12-22 ENCOUNTER — OFFICE VISIT (OUTPATIENT)
Dept: INTERNAL MEDICINE | Facility: CLINIC | Age: 65
End: 2020-12-22

## 2020-12-22 VITALS
TEMPERATURE: 97.3 F | HEIGHT: 66 IN | HEART RATE: 78 BPM | DIASTOLIC BLOOD PRESSURE: 70 MMHG | SYSTOLIC BLOOD PRESSURE: 136 MMHG | BODY MASS INDEX: 31.34 KG/M2 | WEIGHT: 195 LBS

## 2020-12-22 DIAGNOSIS — L65.9 HAIR LOSS: Primary | ICD-10-CM

## 2020-12-22 DIAGNOSIS — R03.0 ELEVATED BP WITHOUT DIAGNOSIS OF HYPERTENSION: ICD-10-CM

## 2020-12-22 DIAGNOSIS — J12.82 PNEUMONIA DUE TO COVID-19 VIRUS: ICD-10-CM

## 2020-12-22 DIAGNOSIS — U07.1 PNEUMONIA DUE TO COVID-19 VIRUS: ICD-10-CM

## 2020-12-22 DIAGNOSIS — J98.4 RESTRICTIVE LUNG DISEASE: Primary | ICD-10-CM

## 2020-12-22 PROCEDURE — G0238 OTH RESP PROC, INDIV: HCPCS

## 2020-12-22 PROCEDURE — 99213 OFFICE O/P EST LOW 20 MIN: CPT | Performed by: INTERNAL MEDICINE

## 2020-12-22 NOTE — PROGRESS NOTES
Assessment and Plan  Diagnoses and all orders for this visit:    1. Hair loss (Primary)  Comments:  check labs although I think this is likely related to long  Orders:  -     CBC & Differential  -     Comprehensive Metabolic Panel  -     TSH  -     Ferritin    2. Elevated BP without diagnosis of hypertension  Comments:  better- cont to monitor.    3. Pneumonia due to COVID-19 virus  Comments:  Continues to improve- agree with pulm rehab.      F/U and Patient Instructions    Return for Keep previously scheduled appointment.  There are no Patient Instructions on file for this visit.    Subjective    Magdalena Dickey is a 65 y.o. female being seen in our office today for Alopecia and Dizziness     History of the Present Illness  HPI  Here 4 months after hospitalization for Covid pneumonia.  She is doing much better, still some SEGURA- she is doing pulmonary rehab.  Noticing hair falling out- since she's been home but hasn't gotten any better.  Started prenatal vitamins a couple of weeks ago.  Eating and drinking normally.  She gets some fleeting lightheadedness when she changes position or rolls over too quickly.  Has had on and off over the years but this seems persistent.    Patient History        Significant Past History  The following portions of the patient's history were reviewed and updated as appropriate:PMHroutine: Social history , Allergies, Current Medications, Active Problem List and Health Maintenance              Social History  She  reports that she quit smoking about 6 years ago. Her smoking use included cigarettes. She has a 40.00 pack-year smoking history. She has never used smokeless tobacco. She reports current alcohol use. She reports that she does not use drugs.                         Review of Symptoms  Review of Systems   Constitution: Negative for weight gain and weight loss. Malaise/fatigue: improving slowly since Covid.   HENT: Negative for congestion.    Cardiovascular: Positive for dyspnea  on exertion. Negative for chest pain and leg swelling.   Respiratory: Negative for cough.    Musculoskeletal: Negative for arthritis.   Gastrointestinal: Negative for change in bowel habit.   Genitourinary: Negative for bladder incontinence.     Objective  Vital Signs         BP Readings from Last 1 Encounters:   12/22/20 136/70     Wt Readings from Last 3 Encounters:   12/22/20 88.5 kg (195 lb)   12/16/20 87.9 kg (193 lb 11.2 oz)   12/15/20 86.6 kg (191 lb)   Body mass index is 31.47 kg/m².          Physical Exam   Physical Exam  Constitutional:       Appearance: Normal appearance. She is not ill-appearing.   Eyes:      Comments: pale   Cardiovascular:      Rate and Rhythm: Normal rate and regular rhythm.   Pulmonary:      Effort: Pulmonary effort is normal.   Skin:     Comments: Easy pluckability of hair  Very little eyebrows distally       Data Reviewed    No results found for this or any previous visit (from the past 2016 hour(s)).

## 2020-12-23 ENCOUNTER — RESULTS ENCOUNTER (OUTPATIENT)
Dept: INTERNAL MEDICINE | Facility: CLINIC | Age: 65
End: 2020-12-23

## 2020-12-23 DIAGNOSIS — Z11.59 ENCOUNTER FOR HEPATITIS C SCREENING TEST FOR LOW RISK PATIENT: ICD-10-CM

## 2020-12-23 DIAGNOSIS — E78.1 HYPERTRIGLYCERIDEMIA: ICD-10-CM

## 2020-12-23 LAB
ALBUMIN SERPL-MCNC: 4.1 G/DL (ref 3.8–4.8)
ALBUMIN/GLOB SERPL: 1.7 {RATIO} (ref 1.2–2.2)
ALP SERPL-CCNC: 101 IU/L (ref 39–117)
ALT SERPL-CCNC: 13 IU/L (ref 0–32)
AST SERPL-CCNC: 14 IU/L (ref 0–40)
BASOPHILS # BLD AUTO: 0.1 X10E3/UL (ref 0–0.2)
BASOPHILS NFR BLD AUTO: 1 %
BILIRUB SERPL-MCNC: <0.2 MG/DL (ref 0–1.2)
BUN SERPL-MCNC: 13 MG/DL (ref 8–27)
BUN/CREAT SERPL: 13 (ref 12–28)
CALCIUM SERPL-MCNC: 8.9 MG/DL (ref 8.7–10.3)
CHLORIDE SERPL-SCNC: 110 MMOL/L (ref 96–106)
CO2 SERPL-SCNC: 21 MMOL/L (ref 20–29)
CREAT SERPL-MCNC: 1.04 MG/DL (ref 0.57–1)
EOSINOPHIL # BLD AUTO: 0.3 X10E3/UL (ref 0–0.4)
EOSINOPHIL NFR BLD AUTO: 3 %
ERYTHROCYTE [DISTWIDTH] IN BLOOD BY AUTOMATED COUNT: 12.2 % (ref 11.7–15.4)
FERRITIN SERPL-MCNC: 73 NG/ML (ref 15–150)
GLOBULIN SER CALC-MCNC: 2.4 G/DL (ref 1.5–4.5)
GLUCOSE SERPL-MCNC: 107 MG/DL (ref 65–99)
HCT VFR BLD AUTO: 37.8 % (ref 34–46.6)
HGB BLD-MCNC: 12.9 G/DL (ref 11.1–15.9)
IMM GRANULOCYTES # BLD AUTO: 0.1 X10E3/UL (ref 0–0.1)
IMM GRANULOCYTES NFR BLD AUTO: 1 %
LYMPHOCYTES # BLD AUTO: 2.9 X10E3/UL (ref 0.7–3.1)
LYMPHOCYTES NFR BLD AUTO: 23 %
MCH RBC QN AUTO: 30.6 PG (ref 26.6–33)
MCHC RBC AUTO-ENTMCNC: 34.1 G/DL (ref 31.5–35.7)
MCV RBC AUTO: 90 FL (ref 79–97)
MONOCYTES # BLD AUTO: 0.9 X10E3/UL (ref 0.1–0.9)
MONOCYTES NFR BLD AUTO: 7 %
NEUTROPHILS # BLD AUTO: 8.3 X10E3/UL (ref 1.4–7)
NEUTROPHILS NFR BLD AUTO: 65 %
PLATELET # BLD AUTO: 328 X10E3/UL (ref 150–450)
POTASSIUM SERPL-SCNC: 3.9 MMOL/L (ref 3.5–5.2)
PROT SERPL-MCNC: 6.5 G/DL (ref 6–8.5)
RBC # BLD AUTO: 4.22 X10E6/UL (ref 3.77–5.28)
SODIUM SERPL-SCNC: 143 MMOL/L (ref 134–144)
TSH SERPL DL<=0.005 MIU/L-ACNC: 0.62 UIU/ML (ref 0.45–4.5)
WBC # BLD AUTO: 12.7 X10E3/UL (ref 3.4–10.8)

## 2020-12-29 ENCOUNTER — TREATMENT (OUTPATIENT)
Dept: CARDIAC REHAB | Facility: HOSPITAL | Age: 65
End: 2020-12-29

## 2020-12-29 DIAGNOSIS — J98.4 RESTRICTIVE LUNG DISEASE: Primary | ICD-10-CM

## 2020-12-29 PROCEDURE — G0238 OTH RESP PROC, INDIV: HCPCS

## 2020-12-31 ENCOUNTER — TREATMENT (OUTPATIENT)
Dept: CARDIAC REHAB | Facility: HOSPITAL | Age: 65
End: 2020-12-31

## 2020-12-31 DIAGNOSIS — J98.4 RESTRICTIVE LUNG DISEASE: Primary | ICD-10-CM

## 2020-12-31 PROCEDURE — G0238 OTH RESP PROC, INDIV: HCPCS

## 2021-01-03 DIAGNOSIS — R51.9 HEADACHE, UNSPECIFIED HEADACHE TYPE: ICD-10-CM

## 2021-01-04 RX ORDER — SUMATRIPTAN 100 MG/1
TABLET, FILM COATED ORAL
Qty: 9 TABLET | Refills: 2 | OUTPATIENT
Start: 2021-01-04

## 2021-01-05 ENCOUNTER — TREATMENT (OUTPATIENT)
Dept: CARDIAC REHAB | Facility: HOSPITAL | Age: 66
End: 2021-01-05

## 2021-01-05 ENCOUNTER — IMMUNIZATION (OUTPATIENT)
Dept: VACCINE CLINIC | Facility: HOSPITAL | Age: 66
End: 2021-01-05

## 2021-01-05 DIAGNOSIS — J98.4 RESTRICTIVE LUNG DISEASE: Primary | ICD-10-CM

## 2021-01-05 PROCEDURE — 91300 HC SARSCOV02 VAC 30MCG/0.3ML IM: CPT | Performed by: INTERNAL MEDICINE

## 2021-01-05 PROCEDURE — 0001A: CPT | Performed by: INTERNAL MEDICINE

## 2021-01-05 PROCEDURE — G0238 OTH RESP PROC, INDIV: HCPCS

## 2021-01-07 ENCOUNTER — TREATMENT (OUTPATIENT)
Dept: CARDIAC REHAB | Facility: HOSPITAL | Age: 66
End: 2021-01-07

## 2021-01-07 DIAGNOSIS — J98.4 RESTRICTIVE LUNG DISEASE: Primary | ICD-10-CM

## 2021-01-07 PROCEDURE — G0238 OTH RESP PROC, INDIV: HCPCS

## 2021-01-09 RX ORDER — PANTOPRAZOLE SODIUM 40 MG/1
TABLET, DELAYED RELEASE ORAL
Qty: 90 TABLET | Refills: 1 | Status: SHIPPED | OUTPATIENT
Start: 2021-01-09 | End: 2021-04-30 | Stop reason: SDUPTHER

## 2021-01-12 ENCOUNTER — TREATMENT (OUTPATIENT)
Dept: CARDIAC REHAB | Facility: HOSPITAL | Age: 66
End: 2021-01-12

## 2021-01-12 DIAGNOSIS — J98.4 RESTRICTIVE LUNG DISEASE: Primary | ICD-10-CM

## 2021-01-12 PROCEDURE — G0238 OTH RESP PROC, INDIV: HCPCS

## 2021-01-14 ENCOUNTER — TREATMENT (OUTPATIENT)
Dept: CARDIAC REHAB | Facility: HOSPITAL | Age: 66
End: 2021-01-14

## 2021-01-14 ENCOUNTER — LAB (OUTPATIENT)
Dept: LAB | Facility: HOSPITAL | Age: 66
End: 2021-01-14

## 2021-01-14 DIAGNOSIS — J98.4 RESTRICTIVE LUNG DISEASE: Primary | ICD-10-CM

## 2021-01-14 LAB
ALBUMIN SERPL-MCNC: 4.4 G/DL (ref 3.5–5.2)
ALBUMIN/GLOB SERPL: 1.8 G/DL
ALP SERPL-CCNC: 101 U/L (ref 39–117)
ALT SERPL W P-5'-P-CCNC: 13 U/L (ref 1–33)
ANION GAP SERPL CALCULATED.3IONS-SCNC: 10.8 MMOL/L (ref 5–15)
AST SERPL-CCNC: 19 U/L (ref 1–32)
BILIRUB SERPL-MCNC: 0.2 MG/DL (ref 0–1.2)
BUN SERPL-MCNC: 15 MG/DL (ref 8–23)
BUN/CREAT SERPL: 15.8 (ref 7–25)
CALCIUM SPEC-SCNC: 9.2 MG/DL (ref 8.6–10.5)
CHLORIDE SERPL-SCNC: 109 MMOL/L (ref 98–107)
CHOLEST SERPL-MCNC: 172 MG/DL (ref 0–200)
CO2 SERPL-SCNC: 18.2 MMOL/L (ref 22–29)
CREAT SERPL-MCNC: 0.95 MG/DL (ref 0.57–1)
GFR SERPL CREATININE-BSD FRML MDRD: 59 ML/MIN/1.73
GLOBULIN UR ELPH-MCNC: 2.5 GM/DL
GLUCOSE SERPL-MCNC: 73 MG/DL (ref 65–99)
HCV AB SER DONR QL: NORMAL
HDLC SERPL QL: 3.13
HDLC SERPL-MCNC: 55 MG/DL (ref 40–60)
LDLC SERPL CALC-MCNC: 87 MG/DL (ref 0–100)
POTASSIUM SERPL-SCNC: 4.1 MMOL/L (ref 3.5–5.2)
PROT SERPL-MCNC: 6.9 G/DL (ref 6–8.5)
SODIUM SERPL-SCNC: 138 MMOL/L (ref 136–145)
TRIGL SERPL-MCNC: 178 MG/DL (ref 0–150)
VLDLC SERPL-MCNC: 30 MG/DL (ref 5–40)

## 2021-01-14 PROCEDURE — 36415 COLL VENOUS BLD VENIPUNCTURE: CPT | Performed by: INTERNAL MEDICINE

## 2021-01-14 PROCEDURE — G0238 OTH RESP PROC, INDIV: HCPCS

## 2021-01-14 PROCEDURE — 86803 HEPATITIS C AB TEST: CPT | Performed by: INTERNAL MEDICINE

## 2021-01-14 PROCEDURE — 80053 COMPREHEN METABOLIC PANEL: CPT | Performed by: INTERNAL MEDICINE

## 2021-01-14 PROCEDURE — 80061 LIPID PANEL: CPT | Performed by: INTERNAL MEDICINE

## 2021-01-19 ENCOUNTER — TREATMENT (OUTPATIENT)
Dept: CARDIAC REHAB | Facility: HOSPITAL | Age: 66
End: 2021-01-19

## 2021-01-19 DIAGNOSIS — J98.4 RESTRICTIVE LUNG DISEASE: Primary | ICD-10-CM

## 2021-01-19 PROCEDURE — G0238 OTH RESP PROC, INDIV: HCPCS

## 2021-01-21 ENCOUNTER — TREATMENT (OUTPATIENT)
Dept: CARDIAC REHAB | Facility: HOSPITAL | Age: 66
End: 2021-01-21

## 2021-01-21 DIAGNOSIS — J98.4 RESTRICTIVE LUNG DISEASE: Primary | ICD-10-CM

## 2021-01-21 PROCEDURE — G0238 OTH RESP PROC, INDIV: HCPCS

## 2021-01-26 ENCOUNTER — OFFICE VISIT (OUTPATIENT)
Dept: INTERNAL MEDICINE | Facility: CLINIC | Age: 66
End: 2021-01-26

## 2021-01-26 ENCOUNTER — TRANSCRIBE ORDERS (OUTPATIENT)
Dept: ADMINISTRATIVE | Facility: HOSPITAL | Age: 66
End: 2021-01-26

## 2021-01-26 ENCOUNTER — IMMUNIZATION (OUTPATIENT)
Dept: VACCINE CLINIC | Facility: HOSPITAL | Age: 66
End: 2021-01-26

## 2021-01-26 VITALS
WEIGHT: 190 LBS | HEIGHT: 66 IN | HEART RATE: 74 BPM | DIASTOLIC BLOOD PRESSURE: 78 MMHG | TEMPERATURE: 97.3 F | SYSTOLIC BLOOD PRESSURE: 120 MMHG | BODY MASS INDEX: 30.53 KG/M2

## 2021-01-26 DIAGNOSIS — R25.2 MUSCLE CRAMPS: ICD-10-CM

## 2021-01-26 DIAGNOSIS — Z12.31 SCREENING MAMMOGRAM FOR HIGH-RISK PATIENT: Primary | ICD-10-CM

## 2021-01-26 DIAGNOSIS — Z12.31 VISIT FOR SCREENING MAMMOGRAM: ICD-10-CM

## 2021-01-26 DIAGNOSIS — J12.82 PNEUMONIA DUE TO COVID-19 VIRUS: Primary | ICD-10-CM

## 2021-01-26 DIAGNOSIS — U07.1 PNEUMONIA DUE TO COVID-19 VIRUS: Primary | ICD-10-CM

## 2021-01-26 DIAGNOSIS — Z00.00 PHYSICAL EXAM, ANNUAL: ICD-10-CM

## 2021-01-26 PROCEDURE — 0002A: CPT | Performed by: INTERNAL MEDICINE

## 2021-01-26 PROCEDURE — 99397 PER PM REEVAL EST PAT 65+ YR: CPT | Performed by: INTERNAL MEDICINE

## 2021-01-26 PROCEDURE — 91300 HC SARSCOV02 VAC 30MCG/0.3ML IM: CPT | Performed by: INTERNAL MEDICINE

## 2021-01-26 NOTE — PROGRESS NOTES
Subjective   Magdalena Dickey is a 65 y.o. female and is here for a comprehensive physical exam. The patient reports problems - post-Covid issues.  She still has fatigue from Covid and some SOB- going to pulmonary rehab  Pt is UTD with annual gyn exam and mammo   Covid vaccine given - second today.  Having muscle cramps- R foot/toe most night, R hand often, occ back.  She eats and drinks well, lots of water.            Social History     Socioeconomic History   • Marital status:      Spouse name: Lonnie   • Number of children: 3   • Years of education: Not on file   • Highest education level: Not on file   Tobacco Use   • Smoking status: Former Smoker     Packs/day: 1.00     Years: 40.00     Pack years: 40.00     Types: Cigarettes     Quit date: 3/10/2014     Years since quittin.8   • Smokeless tobacco: Never Used   Substance and Sexual Activity   • Alcohol use: Yes     Comment: SELDOM   • Drug use: No   • Sexual activity: Yes     Partners: Male     Birth control/protection: Post-menopausal       Family History   Problem Relation Age of Onset   • Alzheimer's disease Mother         SDAT   • Hypertension Mother    • Diabetes type II Mother    • Lung cancer Mother         POSSIBLE   • Heart attack Mother    • Cancer Mother         lung   • COPD Mother    • Depression Mother    • Diabetes Mother    • Heart disease Mother    • Miscarriages / Stillbirths Mother    • Alcohol abuse Father    • Prostate cancer Father    • Heart disease Father         THIRD DEGREE HEART BLOCK   • Arthritis Father    • Cancer Father         prostate   • Drug abuse Father    • Learning disabilities Father    • Ovarian cancer Sister    • No Known Problems Brother    • Ovarian cancer Sister    • Arthritis Maternal Grandmother         rheumatoid   • Arthritis Sister    • Cancer Sister         ovarian   • Depression Sister    • Hypertension Sister    • Cancer Sister         ovarian   • Depression Sister    • Hypertension Sister    • Kimberlee  Hyperthermia Neg Hx           Past Medical History:   Diagnosis Date   • Anxiety    • Colon polyp 2017   • COVID-19 09/2020   • Delayed emergence from anesthesia 2018   • Depression 5/25/2016   • Diverticulitis of colon    • Diverticulosis of large intestine without hemorrhage 10/5/2016   • GERD (gastroesophageal reflux disease)    • Hiatal hernia    • History of gastric ulcer    • Low back pain    • Lumbosacral disc disease    • Migraine without aura and without status migrainosus, not intractable 5/25/2016          Current Outpatient Medications:   •  albuterol sulfate  (90 Base) MCG/ACT inhaler, INHALE TWO PUFFS INTO THE LUNGS EVERY FOUR HOURS AS NEEDED, Disp: , Rfl:   •  ALPRAZolam (XANAX) 0.5 MG tablet, Take 1 tablet by mouth 2 (Two) Times a Day As Needed for Anxiety., Disp: 60 tablet, Rfl: 1  •  Breo Ellipta 200-25 MCG/INH inhaler, USE 1 (ONE) INHALATION DAILY   REPLACES SYMBICORT, Disp: , Rfl:   •  buPROPion XL (WELLBUTRIN XL) 150 MG 24 hr tablet, Take 150 mg by mouth Every Morning., Disp: , Rfl:   •  calcium carbonate (OS-MARY) 600 MG tablet, Take 600 mg by mouth 2 (Two) Times a Day., Disp: , Rfl:   •  dicyclomine (BENTYL) 10 MG capsule, Take 1 capsule by mouth 4 (Four) Times a Day As Needed (abdominal pain)., Disp: 60 capsule, Rfl: 2  •  DULoxetine (CYMBALTA) 60 MG capsule, Take 1 capsule by mouth Daily., Disp: 90 capsule, Rfl: 0  •  LATUDA 40 MG tablet tablet, , Disp: , Rfl:   •  Loperamide HCl (IMODIUM A-D PO), Take  by mouth Daily., Disp: , Rfl:   •  metaxalone (SKELAXIN) 800 MG tablet, TAKE 1 TABLET BY MOUTH THREE TIMES A DAY, Disp: 90 tablet, Rfl: 1  •  Methylcellulose, Laxative, (CITRUCEL PO), Take  by mouth Daily., Disp: , Rfl:   •  Multiple Vitamin (MULTI-VITAMINS PO), Take 1 tablet by mouth Daily. HOLD FOR SURGERY, Disp: , Rfl:   •  ondansetron (ZOFRAN) 4 MG tablet, Take 1 tablet by mouth Every 8 (Eight) Hours As Needed for Nausea or Vomiting., Disp: 30 tablet, Rfl: 2  •  pantoprazole  "(PROTONIX) 40 MG EC tablet, TAKE 1 TABLET BY MOUTH EVERY DAY IN THE MORNING, Disp: 90 tablet, Rfl: 1  •  SUMAtriptan (IMITREX) 100 MG tablet, TAKE 1 TABLET BY MOUTH AT ONSET OF HEADACHE, MAY REPEAT EVERY 2 HOURS AS NEEDED (MAX 200MG/DAY), Disp: 9 tablet, Rfl: 2  •  topiramate (TOPAMAX) 50 MG tablet, Take 1 tablet by mouth 2 (Two) Times a Day., Disp: 180 tablet, Rfl: 1  •  traZODone (DESYREL) 50 MG tablet, TAKE 1 TO 2 TABLETS BY MOUTH AT BEDTIME FOR INSOMNIA, Disp: 180 tablet, Rfl: 1    Review of Systems    Review of Systems   Constitutional: Positive for fatigue. Negative for unexpected weight loss.   HENT: Negative for congestion and trouble swallowing.    Eyes: Negative for visual disturbance.   Respiratory: Positive for shortness of breath. Negative for wheezing.    Cardiovascular: Negative for chest pain and palpitations.   Gastrointestinal: Negative for abdominal pain and blood in stool.   Endocrine: Negative for cold intolerance.   Genitourinary: Negative for breast lump and decreased libido.   Musculoskeletal: Negative for arthralgias and gait problem.   Neurological: Negative for memory problem.   Psychiatric/Behavioral: Negative for depressed mood (under good control currently with meds).        There were no vitals filed for this visit.    Vitals:    01/26/21 0800   BP: 120/78   Pulse: 74   Temp: 97.3 °F (36.3 °C)   Weight: 86.2 kg (190 lb)   Height: 167.6 cm (66\")     Body mass index is 30.67 kg/m².  Wt Readings from Last 3 Encounters:   01/26/21 86.2 kg (190 lb)   12/22/20 88.5 kg (195 lb)   12/16/20 87.9 kg (193 lb 11.2 oz)      Objective     Physical Exam    Procedures       Assessment/Plan         Diagnoses and all orders for this visit:    1. Pneumonia due to COVID-19 virus (Primary)  Comments:  slowing improving- encouraged her to increase activity as tolerated, continue to watch weight, etc.     2. Visit for screening mammogram  -     Mammo Screening Bilateral With CAD    3. Muscle " cramps  Comments:  add magnesium supplement    4. Physical exam, annual  Comments:  Reviewed recommendations- overallshe's better- labs are favorable.  Continue with pulm rehab!  Recheck in 6 months.         Return in about 6 months (around 7/26/2021) for Recheck.

## 2021-01-28 ENCOUNTER — TREATMENT (OUTPATIENT)
Dept: CARDIAC REHAB | Facility: HOSPITAL | Age: 66
End: 2021-01-28

## 2021-01-28 DIAGNOSIS — J98.4 RESTRICTIVE LUNG DISEASE: Primary | ICD-10-CM

## 2021-01-28 PROCEDURE — G0238 OTH RESP PROC, INDIV: HCPCS

## 2021-01-29 ENCOUNTER — TELEPHONE (OUTPATIENT)
Dept: INTERNAL MEDICINE | Facility: CLINIC | Age: 66
End: 2021-01-29

## 2021-01-29 ENCOUNTER — HOSPITAL ENCOUNTER (OUTPATIENT)
Dept: MAMMOGRAPHY | Facility: HOSPITAL | Age: 66
Discharge: HOME | End: 2021-01-29
Admitting: INTERNAL MEDICINE

## 2021-01-29 DIAGNOSIS — Z12.31 SCREENING MAMMOGRAM FOR HIGH-RISK PATIENT: ICD-10-CM

## 2021-01-29 PROCEDURE — 77063 BREAST TOMOSYNTHESIS BI: CPT

## 2021-01-29 PROCEDURE — 77067 SCR MAMMO BI INCL CAD: CPT

## 2021-01-29 NOTE — TELEPHONE ENCOUNTER
Caller: Magdalena Dickey    Relationship: Self    Best call back number: [phone]    What form or medical record are you requesting: WORKER'S COMP-LETTER STATING YOU CAN CONTINUE WORK WITH NO RESTRICTIONS.     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

## 2021-02-02 ENCOUNTER — TREATMENT (OUTPATIENT)
Dept: CARDIAC REHAB | Facility: HOSPITAL | Age: 66
End: 2021-02-02

## 2021-02-02 DIAGNOSIS — J98.4 RESTRICTIVE LUNG DISEASE: Primary | ICD-10-CM

## 2021-02-02 PROCEDURE — G0238 OTH RESP PROC, INDIV: HCPCS

## 2021-02-04 ENCOUNTER — TREATMENT (OUTPATIENT)
Dept: CARDIAC REHAB | Facility: HOSPITAL | Age: 66
End: 2021-02-04

## 2021-02-04 DIAGNOSIS — J98.4 RESTRICTIVE LUNG DISEASE: Primary | ICD-10-CM

## 2021-02-04 PROCEDURE — G0238 OTH RESP PROC, INDIV: HCPCS

## 2021-02-09 ENCOUNTER — TREATMENT (OUTPATIENT)
Dept: CARDIAC REHAB | Facility: HOSPITAL | Age: 66
End: 2021-02-09

## 2021-02-09 DIAGNOSIS — J98.4 RESTRICTIVE LUNG DISEASE: Primary | ICD-10-CM

## 2021-02-09 PROCEDURE — G0238 OTH RESP PROC, INDIV: HCPCS

## 2021-02-23 ENCOUNTER — TREATMENT (OUTPATIENT)
Dept: CARDIAC REHAB | Facility: HOSPITAL | Age: 66
End: 2021-02-23

## 2021-02-23 DIAGNOSIS — J98.4 RESTRICTIVE LUNG DISEASE: Primary | ICD-10-CM

## 2021-02-23 PROCEDURE — G0238 OTH RESP PROC, INDIV: HCPCS

## 2021-02-25 ENCOUNTER — TREATMENT (OUTPATIENT)
Dept: CARDIAC REHAB | Facility: HOSPITAL | Age: 66
End: 2021-02-25

## 2021-02-25 DIAGNOSIS — J98.4 RESTRICTIVE LUNG DISEASE: Primary | ICD-10-CM

## 2021-02-25 PROCEDURE — G0238 OTH RESP PROC, INDIV: HCPCS

## 2021-03-02 ENCOUNTER — TREATMENT (OUTPATIENT)
Dept: CARDIAC REHAB | Facility: HOSPITAL | Age: 66
End: 2021-03-02

## 2021-03-02 DIAGNOSIS — J98.4 RESTRICTIVE LUNG DISEASE: Primary | ICD-10-CM

## 2021-03-02 DIAGNOSIS — G43.009 MIGRAINE WITHOUT AURA AND WITHOUT STATUS MIGRAINOSUS, NOT INTRACTABLE: ICD-10-CM

## 2021-03-02 PROCEDURE — G0238 OTH RESP PROC, INDIV: HCPCS

## 2021-03-02 RX ORDER — TOPIRAMATE 50 MG/1
50 TABLET, FILM COATED ORAL 2 TIMES DAILY
Qty: 180 TABLET | Refills: 1 | Status: SHIPPED | OUTPATIENT
Start: 2021-03-02 | End: 2021-09-06 | Stop reason: SDUPTHER

## 2021-03-02 RX ORDER — METAXALONE 800 MG/1
800 TABLET ORAL 3 TIMES DAILY PRN
Qty: 90 TABLET | Refills: 1 | Status: SHIPPED | OUTPATIENT
Start: 2021-03-02 | End: 2021-07-08 | Stop reason: SDUPTHER

## 2021-03-02 NOTE — TELEPHONE ENCOUNTER
Caller: Magdalena Dickey    Relationship: Self    Best call back number: [phone]    Medication needed:   Requested Prescriptions     Pending Prescriptions Disp Refills   • metaxalone (SKELAXIN) 800 MG tablet 90 tablet 1     Sig: Take 1 tablet by mouth 3 (Three) Times a Day.   • topiramate (TOPAMAX) 50 MG tablet 180 tablet 1     Sig: Take 1 tablet by mouth 2 (Two) Times a Day.       When do you need the refill by: ASAP    What details did the patient provide when requesting the medication: NA    Does the patient have less than a 3 day supply:  [x] Yes  [x] No    What is the patient's preferred pharmacy: [facility]

## 2021-03-04 ENCOUNTER — TREATMENT (OUTPATIENT)
Dept: CARDIAC REHAB | Facility: HOSPITAL | Age: 66
End: 2021-03-04

## 2021-03-04 DIAGNOSIS — J98.4 RESTRICTIVE LUNG DISEASE: Primary | ICD-10-CM

## 2021-03-04 PROCEDURE — G0238 OTH RESP PROC, INDIV: HCPCS

## 2021-03-08 RX ORDER — ONDANSETRON 4 MG/1
4 TABLET, FILM COATED ORAL EVERY 8 HOURS PRN
Qty: 30 TABLET | Refills: 2 | Status: SHIPPED | OUTPATIENT
Start: 2021-03-08 | End: 2021-12-13 | Stop reason: SDUPTHER

## 2021-03-09 ENCOUNTER — TREATMENT (OUTPATIENT)
Dept: CARDIAC REHAB | Facility: HOSPITAL | Age: 66
End: 2021-03-09

## 2021-03-09 DIAGNOSIS — J98.4 RESTRICTIVE LUNG DISEASE: Primary | ICD-10-CM

## 2021-03-09 PROCEDURE — G0238 OTH RESP PROC, INDIV: HCPCS

## 2021-03-11 ENCOUNTER — TREATMENT (OUTPATIENT)
Dept: CARDIAC REHAB | Facility: HOSPITAL | Age: 66
End: 2021-03-11

## 2021-03-11 DIAGNOSIS — J98.4 RESTRICTIVE LUNG DISEASE: Primary | ICD-10-CM

## 2021-03-11 PROCEDURE — G0238 OTH RESP PROC, INDIV: HCPCS

## 2021-03-16 ENCOUNTER — TREATMENT (OUTPATIENT)
Dept: CARDIAC REHAB | Facility: HOSPITAL | Age: 66
End: 2021-03-16

## 2021-03-16 DIAGNOSIS — J98.4 RESTRICTIVE LUNG DISEASE: Primary | ICD-10-CM

## 2021-03-16 PROCEDURE — G0238 OTH RESP PROC, INDIV: HCPCS

## 2021-03-18 ENCOUNTER — TREATMENT (OUTPATIENT)
Dept: CARDIAC REHAB | Facility: HOSPITAL | Age: 66
End: 2021-03-18

## 2021-03-18 DIAGNOSIS — J98.4 RESTRICTIVE LUNG DISEASE: Primary | ICD-10-CM

## 2021-03-18 PROCEDURE — G0238 OTH RESP PROC, INDIV: HCPCS

## 2021-03-23 ENCOUNTER — TREATMENT (OUTPATIENT)
Dept: CARDIAC REHAB | Facility: HOSPITAL | Age: 66
End: 2021-03-23

## 2021-03-23 DIAGNOSIS — J98.4 RESTRICTIVE LUNG DISEASE: Primary | ICD-10-CM

## 2021-03-23 PROCEDURE — G0238 OTH RESP PROC, INDIV: HCPCS

## 2021-03-25 ENCOUNTER — TREATMENT (OUTPATIENT)
Dept: CARDIAC REHAB | Facility: HOSPITAL | Age: 66
End: 2021-03-25

## 2021-03-25 DIAGNOSIS — J98.4 RESTRICTIVE LUNG DISEASE: Primary | ICD-10-CM

## 2021-03-25 PROCEDURE — G0238 OTH RESP PROC, INDIV: HCPCS

## 2021-03-30 ENCOUNTER — TREATMENT (OUTPATIENT)
Dept: CARDIAC REHAB | Facility: HOSPITAL | Age: 66
End: 2021-03-30

## 2021-03-30 DIAGNOSIS — J98.4 RESTRICTIVE LUNG DISEASE: Primary | ICD-10-CM

## 2021-03-30 PROCEDURE — G0238 OTH RESP PROC, INDIV: HCPCS

## 2021-04-01 ENCOUNTER — TREATMENT (OUTPATIENT)
Dept: CARDIAC REHAB | Facility: HOSPITAL | Age: 66
End: 2021-04-01

## 2021-04-01 DIAGNOSIS — J98.4 RESTRICTIVE LUNG DISEASE: Primary | ICD-10-CM

## 2021-04-01 PROCEDURE — G0238 OTH RESP PROC, INDIV: HCPCS

## 2021-04-06 ENCOUNTER — TREATMENT (OUTPATIENT)
Dept: CARDIAC REHAB | Facility: HOSPITAL | Age: 66
End: 2021-04-06

## 2021-04-06 DIAGNOSIS — J98.4 RESTRICTIVE LUNG DISEASE: Primary | ICD-10-CM

## 2021-04-06 PROCEDURE — G0238 OTH RESP PROC, INDIV: HCPCS

## 2021-04-08 ENCOUNTER — TREATMENT (OUTPATIENT)
Dept: CARDIAC REHAB | Facility: HOSPITAL | Age: 66
End: 2021-04-08

## 2021-04-08 DIAGNOSIS — J98.4 RESTRICTIVE LUNG DISEASE: Primary | ICD-10-CM

## 2021-04-08 PROCEDURE — G0238 OTH RESP PROC, INDIV: HCPCS

## 2021-04-13 ENCOUNTER — APPOINTMENT (OUTPATIENT)
Dept: CARDIAC REHAB | Facility: HOSPITAL | Age: 66
End: 2021-04-13

## 2021-04-15 ENCOUNTER — APPOINTMENT (OUTPATIENT)
Dept: CARDIAC REHAB | Facility: HOSPITAL | Age: 66
End: 2021-04-15

## 2021-04-15 ENCOUNTER — TRANSCRIBE ORDERS (OUTPATIENT)
Dept: ADMINISTRATIVE | Facility: HOSPITAL | Age: 66
End: 2021-04-15

## 2021-04-15 DIAGNOSIS — J84.9 ILD (INTERSTITIAL LUNG DISEASE) (HCC): Primary | ICD-10-CM

## 2021-04-20 ENCOUNTER — APPOINTMENT (OUTPATIENT)
Dept: CARDIAC REHAB | Facility: HOSPITAL | Age: 66
End: 2021-04-20

## 2021-04-27 RX ORDER — PANTOPRAZOLE SODIUM 40 MG/1
40 TABLET, DELAYED RELEASE ORAL EVERY MORNING
Qty: 90 TABLET | Refills: 1 | OUTPATIENT
Start: 2021-04-27

## 2021-04-30 ENCOUNTER — TELEPHONE (OUTPATIENT)
Dept: GASTROENTEROLOGY | Facility: CLINIC | Age: 66
End: 2021-04-30

## 2021-04-30 RX ORDER — PANTOPRAZOLE SODIUM 40 MG/1
TABLET, DELAYED RELEASE ORAL
Qty: 90 TABLET | Refills: 1 | Status: SHIPPED | OUTPATIENT
Start: 2021-04-30 | End: 2022-06-09 | Stop reason: SDUPTHER

## 2021-06-17 ENCOUNTER — OFFICE VISIT (OUTPATIENT)
Dept: GASTROENTEROLOGY | Facility: CLINIC | Age: 66
End: 2021-06-17

## 2021-06-17 VITALS
HEIGHT: 66 IN | BODY MASS INDEX: 31.82 KG/M2 | SYSTOLIC BLOOD PRESSURE: 130 MMHG | WEIGHT: 198 LBS | DIASTOLIC BLOOD PRESSURE: 78 MMHG

## 2021-06-17 DIAGNOSIS — K58.0 IRRITABLE BOWEL SYNDROME WITH DIARRHEA: Primary | Chronic | ICD-10-CM

## 2021-06-17 DIAGNOSIS — R15.1 FECAL SMEARING: Chronic | ICD-10-CM

## 2021-06-17 DIAGNOSIS — K21.9 GASTROESOPHAGEAL REFLUX DISEASE WITHOUT ESOPHAGITIS: Chronic | ICD-10-CM

## 2021-06-17 PROCEDURE — 99214 OFFICE O/P EST MOD 30 MIN: CPT | Performed by: INTERNAL MEDICINE

## 2021-06-17 NOTE — PROGRESS NOTES
Chief Complaint   Patient presents with   • Irritable Bowel Syndrome   • Heartburn   • Nausea   • Diarrhea       Subjective     HPI    Magdalena Dickey is a 65 y.o. female with a past medical history noted below who presents for follow-up.  She has been seen previously for intermittent diarrhea episodes, fecal incontinence, history of colon resection for diverticulitis    Diarrhea seems to have worsened over the past few months.  However able to control BMs without any accidents/incontinence.  Timing of worsening symptoms came along with starting adderal with her psychiatrist.  She is not feeling that the adderal is helping too much.  Taking 1 imodium, fiber daily.  She sees her psychiatrist in a few weeks and will discuss whether or not to stay on this medication.    Did have a few weeks of worsened heartburn despite ppi use.  Now doing better.      Still having some fatigue following COVID last year.  Follows with Dr. Keys.    Weight is stable.      Last scopes 2/2017 with Dr Tan,  minimal esophagitis.          Today's visit was in the office.  Both the patient and I were wearing face masks and proper hand hygiene was performed before and after the physical exam.           Current Outpatient Medications:   •  albuterol sulfate  (90 Base) MCG/ACT inhaler, INHALE TWO PUFFS INTO THE LUNGS EVERY FOUR HOURS AS NEEDED, Disp: , Rfl:   •  albuterol sulfate  (90 Base) MCG/ACT inhaler, Inhale 2 puffs Every 4 (Four) Hours As Needed., Disp: 8.5 g, Rfl: 3  •  ALPRAZolam (XANAX) 0.5 MG tablet, Take 1 tablet by mouth 2 (Two) Times a Day As Needed for Anxiety., Disp: 60 tablet, Rfl: 1  •  ALPRAZolam (XANAX) 0.5 MG tablet, Take 0.5-1 tablets by mouth Daily As Needed for anxiety, Disp: 30 tablet, Rfl: 1  •  amphetamine-dextroamphetamine (ADDERALL) 20 MG tablet, Take 1 tablet by mouth 2 (Two) Times a Day., Disp: 60 tablet, Rfl: 0  •  Breo Ellipta 200-25 MCG/INH inhaler, USE 1 (ONE) INHALATION DAILY   REPLACES SYMBICORT,  Disp: , Rfl:   •  buPROPion XL (WELLBUTRIN XL) 150 MG 24 hr tablet, Take 150 mg by mouth Every Morning., Disp: , Rfl:   •  buPROPion XL (WELLBUTRIN XL) 150 MG 24 hr tablet, Take 1 tablet by mouth Every Morning., Disp: 30 tablet, Rfl: 5  •  calcium carbonate (OS-MARY) 600 MG tablet, Take 600 mg by mouth 2 (Two) Times a Day., Disp: , Rfl:   •  DULoxetine (CYMBALTA) 60 MG capsule, Take 1 capsule by mouth Daily., Disp: 90 capsule, Rfl: 0  •  DULoxetine (CYMBALTA) 60 MG capsule, Take 1 capsule by mouth Daily., Disp: 90 capsule, Rfl: 5  •  Fluticasone Furoate-Vilanterol (Breo Ellipta) 200-25 MCG/INH inhaler, Inhale 1 puff Daily., Disp: 60 each, Rfl: 3  •  Fluticasone Furoate-Vilanterol (Breo Ellipta) 200-25 MCG/INH inhaler, inhale 1 puff  daily, Disp: 60 each, Rfl: 6  •  ipratropium-albuterol (DUO-NEB) 0.5-2.5 mg/3 ml nebulizer, Take 3 mL by nebulization Every 4 (Four) Hours As Needed., Disp: 360 mL, Rfl: 3  •  Loperamide HCl (IMODIUM A-D PO), Take  by mouth Daily., Disp: , Rfl:   •  lurasidone (Latuda) 40 MG tablet tablet, Take 1 tablet by mouth Daily with 300 calorie meal, Disp: 30 tablet, Rfl: 5  •  metaxalone (SKELAXIN) 800 MG tablet, Take 1 tablet by mouth 3 (Three) Times a Day As Needed for Muscle Spasms., Disp: 90 tablet, Rfl: 1  •  Methylcellulose, Laxative, (CITRUCEL PO), Take  by mouth Daily., Disp: , Rfl:   •  Multiple Vitamin (MULTI-VITAMINS PO), Take 1 tablet by mouth Daily. HOLD FOR SURGERY, Disp: , Rfl:   •  ondansetron (ZOFRAN) 4 MG tablet, Take 1 tablet by mouth Every 8 (Eight) Hours As Needed for Nausea or Vomiting., Disp: 30 tablet, Rfl: 2  •  pantoprazole (PROTONIX) 40 MG EC tablet, TAKE 1 TABLET BY MOUTH EVERY DAY IN THE MORNING, Disp: 90 tablet, Rfl: 1  •  SUMAtriptan (IMITREX) 100 MG tablet, TAKE 1 TABLET BY MOUTH AT ONSET OF HEADACHE, MAY REPEAT EVERY 2 HOURS AS NEEDED (MAX 200MG/DAY), Disp: 9 tablet, Rfl: 2  •  topiramate (TOPAMAX) 50 MG tablet, Take 1 tablet by mouth 2 (Two) Times a Day., Disp:  180 tablet, Rfl: 1  •  traZODone (DESYREL) 50 MG tablet, TAKE 1 TO 2 TABLETS BY MOUTH AT BEDTIME FOR INSOMNIA, Disp: 180 tablet, Rfl: 1  •  buPROPion (WELLBUTRIN) 75 MG tablet, Take 1 tablet by mouth Daily AT NOON, Disp: 30 tablet, Rfl: 2  •  dicyclomine (BENTYL) 10 MG capsule, Take 1 capsule by mouth 4 (Four) Times a Day As Needed (abdominal pain)., Disp: 60 capsule, Rfl: 2  •  LATUDA 40 MG tablet tablet, , Disp: , Rfl:       Objective     Vitals:    06/17/21 0757   BP: 130/78         06/17/21 0757   Weight: 89.8 kg (198 lb)     Body mass index is 31.96 kg/m².    Physical Exam  Constitutional:       Appearance: Normal appearance. She is obese.   Pulmonary:      Effort: Pulmonary effort is normal.      Breath sounds: Normal breath sounds.   Neurological:      Mental Status: She is alert and oriented to person, place, and time.   Psychiatric:         Mood and Affect: Mood normal.         Behavior: Behavior normal.         Thought Content: Thought content normal.         Judgment: Judgment normal.             WBC   Date Value Ref Range Status   12/22/2020 12.7 (H) 3.4 - 10.8 x10E3/uL Final     RBC   Date Value Ref Range Status   12/22/2020 4.22 3.77 - 5.28 x10E6/uL Final     Hemoglobin   Date Value Ref Range Status   12/22/2020 12.9 11.1 - 15.9 g/dL Final   09/04/2020 13.0 12.0 - 15.9 g/dL Final     Hematocrit   Date Value Ref Range Status   12/22/2020 37.8 34.0 - 46.6 % Final   09/04/2020 39.0 34.0 - 46.6 % Final     MCV   Date Value Ref Range Status   12/22/2020 90 79 - 97 fL Final   09/04/2020 91.3 79.0 - 97.0 fL Final     MCH   Date Value Ref Range Status   12/22/2020 30.6 26.6 - 33.0 pg Final   09/04/2020 30.4 26.6 - 33.0 pg Final     MCHC   Date Value Ref Range Status   12/22/2020 34.1 31.5 - 35.7 g/dL Final   09/04/2020 33.3 31.5 - 35.7 g/dL Final     RDW   Date Value Ref Range Status   12/22/2020 12.2 11.7 - 15.4 % Final   09/04/2020 13.1 12.3 - 15.4 % Final     RDW-SD   Date Value Ref Range Status    09/04/2020 43.8 37.0 - 54.0 fl Final     MPV   Date Value Ref Range Status   09/04/2020 10.4 6.0 - 12.0 fL Final     Platelets   Date Value Ref Range Status   12/22/2020 328 150 - 450 x10E3/uL Final   09/04/2020 383 140 - 450 10*3/mm3 Final     Neutrophil Rel %   Date Value Ref Range Status   12/22/2020 65 Not Estab. % Final     Neutrophil %   Date Value Ref Range Status   09/04/2020 74.4 42.7 - 76.0 % Final     Lymphocyte Rel %   Date Value Ref Range Status   12/22/2020 23 Not Estab. % Final     Lymphocyte %   Date Value Ref Range Status   09/04/2020 14.6 (L) 19.6 - 45.3 % Final     Monocyte Rel %   Date Value Ref Range Status   12/22/2020 7 Not Estab. % Final     Monocyte %   Date Value Ref Range Status   09/04/2020 7.8 5.0 - 12.0 % Final     Eosinophil Rel %   Date Value Ref Range Status   12/22/2020 3 Not Estab. % Final     Eosinophil %   Date Value Ref Range Status   09/04/2020 1.5 0.3 - 6.2 % Final     Basophil Rel %   Date Value Ref Range Status   12/22/2020 1 Not Estab. % Final     Basophil %   Date Value Ref Range Status   09/04/2020 0.5 0.0 - 1.5 % Final     Immature Grans %   Date Value Ref Range Status   09/04/2020 1.2 (H) 0.0 - 0.5 % Final     Neutrophils Absolute   Date Value Ref Range Status   12/22/2020 8.3 (H) 1.4 - 7.0 x10E3/uL Final     Neutrophils, Absolute   Date Value Ref Range Status   09/04/2020 7.98 (H) 1.70 - 7.00 10*3/mm3 Final     Lymphocytes Absolute   Date Value Ref Range Status   12/22/2020 2.9 0.7 - 3.1 x10E3/uL Final     Lymphocytes, Absolute   Date Value Ref Range Status   09/04/2020 1.56 0.70 - 3.10 10*3/mm3 Final     Monocytes Absolute   Date Value Ref Range Status   12/22/2020 0.9 0.1 - 0.9 x10E3/uL Final     Monocytes, Absolute   Date Value Ref Range Status   09/04/2020 0.84 0.10 - 0.90 10*3/mm3 Final     Eosinophils Absolute   Date Value Ref Range Status   12/22/2020 0.3 0.0 - 0.4 x10E3/uL Final     Eosinophils, Absolute   Date Value Ref Range Status   09/04/2020 0.16 0.00 -  0.40 10*3/mm3 Final     Basophils Absolute   Date Value Ref Range Status   12/22/2020 0.1 0.0 - 0.2 x10E3/uL Final     Basophils, Absolute   Date Value Ref Range Status   09/04/2020 0.05 0.00 - 0.20 10*3/mm3 Final     Immature Grans, Absolute   Date Value Ref Range Status   09/04/2020 0.13 (H) 0.00 - 0.05 10*3/mm3 Final     nRBC   Date Value Ref Range Status   09/04/2020 0.0 0.0 - 0.2 /100 WBC Final       Lab Results   Component Value Date    GLUCOSE 73 01/14/2021    BUN 15 01/14/2021    CREATININE 0.95 01/14/2021    EGFRIFNONA 59 (L) 01/14/2021    EGFRIFAFRI 65 12/22/2020    BCR 15.8 01/14/2021    CO2 18.2 (L) 01/14/2021    CALCIUM 9.2 01/14/2021    PROTENTOTREF 6.5 12/22/2020    ALBUMIN 4.40 01/14/2021    LABIL2 1.7 12/22/2020    AST 19 01/14/2021    ALT 13 01/14/2021         Imaging Results (Last 7 Days)     ** No results found for the last 168 hours. **          I personally reviewed data as detailed below:     The labs listed above.    Office notes from: 1/26/21 PCP note    No notes on file    Assessment/Plan    1.  IBS-D: She had been doing pretty well but acute worsening here lately likely related to psych medication changes    2.  GERD: She has been on PPI, intermittent flares    3.  Fecal smearing/incontinence: Stable    Plan  Recommended adjusting Imodium as needed to control diarrhea.  Recommended that she follow-up with her psychiatrist to see if Adderall is beneficial to her or not.  We discussed that if it is helpful to her, then she will just need to adjust the Imodium dosing.  If not, likely best to come off of that medication.    Okay to use as needed antacids for any reflux flares.  However if she continues to have significant flares, she will call the office because we will need to update her EGD as it has been over 4 years since she was last scoped    Continue pantoprazole    Diagnoses and all orders for this visit:    1. Irritable bowel syndrome with diarrhea (Primary)    2. Gastroesophageal  reflux disease without esophagitis    3. Fecal smearing        I have discussed the above plan with the patient.  They verbalize understanding and are in agreement with the plan.  They have been advised to contact the office for any questions, concerns, or changes related to their health.    Dictated utilizing Dragon dictation

## 2021-07-08 DIAGNOSIS — G43.009 MIGRAINE WITHOUT AURA AND WITHOUT STATUS MIGRAINOSUS, NOT INTRACTABLE: ICD-10-CM

## 2021-07-09 RX ORDER — METAXALONE 800 MG/1
800 TABLET ORAL 3 TIMES DAILY PRN
Qty: 90 TABLET | Refills: 1 | Status: SHIPPED | OUTPATIENT
Start: 2021-07-09 | End: 2021-11-02 | Stop reason: SDUPTHER

## 2021-08-09 ENCOUNTER — OFFICE VISIT (OUTPATIENT)
Dept: INTERNAL MEDICINE | Facility: CLINIC | Age: 66
End: 2021-08-09

## 2021-08-09 ENCOUNTER — LAB (OUTPATIENT)
Dept: LAB | Facility: HOSPITAL | Age: 66
End: 2021-08-09

## 2021-08-09 VITALS
SYSTOLIC BLOOD PRESSURE: 134 MMHG | WEIGHT: 188 LBS | HEIGHT: 66 IN | HEART RATE: 78 BPM | TEMPERATURE: 97.3 F | DIASTOLIC BLOOD PRESSURE: 86 MMHG | BODY MASS INDEX: 30.22 KG/M2

## 2021-08-09 DIAGNOSIS — G62.9 PERIPHERAL POLYNEUROPATHY: Primary | ICD-10-CM

## 2021-08-09 DIAGNOSIS — M85.88 OTHER SPECIFIED DISORDERS OF BONE DENSITY AND STRUCTURE, OTHER SITE: ICD-10-CM

## 2021-08-09 DIAGNOSIS — J12.82 PNEUMONIA DUE TO COVID-19 VIRUS: ICD-10-CM

## 2021-08-09 DIAGNOSIS — R03.0 ELEVATED BP WITHOUT DIAGNOSIS OF HYPERTENSION: ICD-10-CM

## 2021-08-09 DIAGNOSIS — U07.1 PNEUMONIA DUE TO COVID-19 VIRUS: ICD-10-CM

## 2021-08-09 PROBLEM — K57.32 DIVERTICULITIS OF LARGE INTESTINE WITHOUT PERFORATION OR ABSCESS WITHOUT BLEEDING: Status: RESOLVED | Noted: 2018-01-04 | Resolved: 2021-08-09

## 2021-08-09 LAB
ALBUMIN SERPL-MCNC: 4.4 G/DL (ref 3.5–5.2)
ALBUMIN/GLOB SERPL: 1.7 G/DL
ALP SERPL-CCNC: 101 U/L (ref 39–117)
ALT SERPL W P-5'-P-CCNC: 14 U/L (ref 1–33)
ANION GAP SERPL CALCULATED.3IONS-SCNC: 11.7 MMOL/L (ref 5–15)
AST SERPL-CCNC: 16 U/L (ref 1–32)
BASOPHILS # BLD AUTO: 0.1 10*3/MM3 (ref 0–0.2)
BASOPHILS NFR BLD AUTO: 0.9 % (ref 0–1.5)
BILIRUB SERPL-MCNC: 0.3 MG/DL (ref 0–1.2)
BUN SERPL-MCNC: 11 MG/DL (ref 8–23)
BUN/CREAT SERPL: 10.1 (ref 7–25)
CALCIUM SPEC-SCNC: 9.6 MG/DL (ref 8.6–10.5)
CHLORIDE SERPL-SCNC: 110 MMOL/L (ref 98–107)
CO2 SERPL-SCNC: 20.3 MMOL/L (ref 22–29)
CREAT SERPL-MCNC: 1.09 MG/DL (ref 0.57–1)
DEPRECATED RDW RBC AUTO: 43.9 FL (ref 37–54)
EOSINOPHIL # BLD AUTO: 0.08 10*3/MM3 (ref 0–0.4)
EOSINOPHIL NFR BLD AUTO: 0.7 % (ref 0.3–6.2)
ERYTHROCYTE [DISTWIDTH] IN BLOOD BY AUTOMATED COUNT: 13.4 % (ref 12.3–15.4)
GFR SERPL CREATININE-BSD FRML MDRD: 50 ML/MIN/1.73
GLOBULIN UR ELPH-MCNC: 2.6 GM/DL
GLUCOSE SERPL-MCNC: 109 MG/DL (ref 65–99)
HCT VFR BLD AUTO: 41 % (ref 34–46.6)
HGB BLD-MCNC: 14.1 G/DL (ref 12–15.9)
IMM GRANULOCYTES # BLD AUTO: 0.06 10*3/MM3 (ref 0–0.05)
IMM GRANULOCYTES NFR BLD AUTO: 0.5 % (ref 0–0.5)
LYMPHOCYTES # BLD AUTO: 2.55 10*3/MM3 (ref 0.7–3.1)
LYMPHOCYTES NFR BLD AUTO: 22.3 % (ref 19.6–45.3)
MCH RBC QN AUTO: 30.8 PG (ref 26.6–33)
MCHC RBC AUTO-ENTMCNC: 34.4 G/DL (ref 31.5–35.7)
MCV RBC AUTO: 89.5 FL (ref 79–97)
MONOCYTES # BLD AUTO: 0.77 10*3/MM3 (ref 0.1–0.9)
MONOCYTES NFR BLD AUTO: 6.7 % (ref 5–12)
NEUTROPHILS NFR BLD AUTO: 68.9 % (ref 42.7–76)
NEUTROPHILS NFR BLD AUTO: 7.86 10*3/MM3 (ref 1.7–7)
NRBC BLD AUTO-RTO: 0 /100 WBC (ref 0–0.2)
PLATELET # BLD AUTO: 320 10*3/MM3 (ref 140–450)
PMV BLD AUTO: 9.7 FL (ref 6–12)
POTASSIUM SERPL-SCNC: 4.1 MMOL/L (ref 3.5–5.2)
PROT SERPL-MCNC: 7 G/DL (ref 6–8.5)
RBC # BLD AUTO: 4.58 10*6/MM3 (ref 3.77–5.28)
SODIUM SERPL-SCNC: 142 MMOL/L (ref 136–145)
TSH SERPL DL<=0.05 MIU/L-ACNC: 1.66 UIU/ML (ref 0.27–4.2)
VIT B12 BLD-MCNC: 654 PG/ML (ref 211–946)
WBC # BLD AUTO: 11.42 10*3/MM3 (ref 3.4–10.8)

## 2021-08-09 PROCEDURE — 36415 COLL VENOUS BLD VENIPUNCTURE: CPT | Performed by: INTERNAL MEDICINE

## 2021-08-09 PROCEDURE — 85025 COMPLETE CBC W/AUTO DIFF WBC: CPT | Performed by: INTERNAL MEDICINE

## 2021-08-09 PROCEDURE — 99214 OFFICE O/P EST MOD 30 MIN: CPT | Performed by: INTERNAL MEDICINE

## 2021-08-09 PROCEDURE — 80053 COMPREHEN METABOLIC PANEL: CPT | Performed by: INTERNAL MEDICINE

## 2021-08-09 PROCEDURE — 84443 ASSAY THYROID STIM HORMONE: CPT | Performed by: INTERNAL MEDICINE

## 2021-08-09 PROCEDURE — 82607 VITAMIN B-12: CPT | Performed by: INTERNAL MEDICINE

## 2021-08-09 NOTE — PROGRESS NOTES
"Chief Complaint  Pain (joint)    Subjective          Magdalena Dickey presents to [facility]  History of Present Illness  Feels about the same as far as Covid issues- her PFTs have improved but she still gets SOB. She sees Dr. COATS at [facility]- he told her to keep going, pushing.  Has lost weight, doesn't feel hungry like she used to.   Having some joint issues- hips are sore but she thinks OA.  Having toe pain, cold- all started when she was in the hospital with Covid.  Don't feel cold to touch and unable to warm them up.  It was worse in the winter. Does not change with position or activity.        Objective   Vital Signs:   /86   Pulse 78   Temp 97.3 °F (36.3 °C)   Ht 167.6 cm (66\")   Wt 85.3 kg (188 lb)   BMI 30.34 kg/m²     Physical Exam  Constitutional:       Appearance: Normal appearance.   Cardiovascular:      Rate and Rhythm: Normal rate and regular rhythm.   Musculoskeletal:         General: No swelling.      Right lower leg: No edema.      Left lower leg: No edema.   Skin:     General: Skin is warm.   Neurological:      Gait: Gait normal.      Comments: Tingling of toe to upper 1/3 of foot        Result Review :                 Assessment and Plan    Diagnoses and all orders for this visit:    1. Peripheral polyneuropathy (Primary)  Comments:  new problem - uncertain prognosis-  ? Covid related.  Will check labs to look for other causes.    Orders:  -     Vitamin B12  -     TSH  -     CBC & Differential  -     Comprehensive Metabolic Panel  -     Protein Electrophoresis, 24 Hr Urine - Urine, Clean Catch    2. Other specified disorders of bone density and structure, other site  Comments:  check dexa  Orders:  -     DEXA Bone Density Axial    3. Elevated BP without diagnosis of hypertension  Comments:  has been checking at home and it's been better- meily continue to monitor.  Call if > 130/80    4. Pneumonia due to COVID-19 virus  Comments:  encouraged to continue to push " activity, exercise, etc.         Follow Up   Return in about 3 months (around 11/9/2021).  Patient was given instructions and counseling regarding her condition or for health maintenance advice. Please see specific information pulled into the AVS if appropriate.

## 2021-08-10 ENCOUNTER — TELEPHONE (OUTPATIENT)
Dept: INTERNAL MEDICINE | Facility: CLINIC | Age: 66
End: 2021-08-10

## 2021-08-10 DIAGNOSIS — R03.0 ELEVATED BP WITHOUT DIAGNOSIS OF HYPERTENSION: Primary | ICD-10-CM

## 2021-08-16 ENCOUNTER — APPOINTMENT (OUTPATIENT)
Dept: BONE DENSITY | Facility: HOSPITAL | Age: 66
End: 2021-08-16

## 2021-08-16 PROCEDURE — 77080 DXA BONE DENSITY AXIAL: CPT

## 2021-08-16 PROCEDURE — 84156 ASSAY OF PROTEIN URINE: CPT | Performed by: INTERNAL MEDICINE

## 2021-08-16 PROCEDURE — 84166 PROTEIN E-PHORESIS/URINE/CSF: CPT | Performed by: INTERNAL MEDICINE

## 2021-08-18 LAB
ALBUMIN MFR UR ELPH: 46.8 %
ALPHA1 GLOB MFR UR ELPH: 2.5 %
ALPHA2 GLOB MFR UR ELPH: 22.9 %
B-GLOBULIN MFR UR ELPH: 20.8 %
GAMMA GLOB MFR UR ELPH: 7 %
LABORATORY COMMENT REPORT: NORMAL
M PROTEIN MFR UR ELPH: NORMAL %
PROT UR-MCNC: 7.5 MG/DL

## 2021-08-26 ENCOUNTER — CLINICAL SUPPORT (OUTPATIENT)
Dept: INTERNAL MEDICINE | Facility: CLINIC | Age: 66
End: 2021-08-26

## 2021-08-26 DIAGNOSIS — R05.9 COUGH: Primary | ICD-10-CM

## 2021-08-27 LAB
LABCORP SARS-COV-2, NAA 2 DAY TAT: NORMAL
SARS-COV-2 RNA RESP QL NAA+PROBE: NOT DETECTED

## 2021-09-06 DIAGNOSIS — G43.009 MIGRAINE WITHOUT AURA AND WITHOUT STATUS MIGRAINOSUS, NOT INTRACTABLE: ICD-10-CM

## 2021-09-07 RX ORDER — TOPIRAMATE 50 MG/1
50 TABLET, FILM COATED ORAL 2 TIMES DAILY
Qty: 180 TABLET | Refills: 1 | Status: SHIPPED | OUTPATIENT
Start: 2021-09-07 | End: 2022-05-04 | Stop reason: SDUPTHER

## 2021-10-04 ENCOUNTER — HOSPITAL ENCOUNTER (OUTPATIENT)
Dept: CT IMAGING | Facility: HOSPITAL | Age: 66
Discharge: HOME | End: 2021-10-04
Admitting: INTERNAL MEDICINE

## 2021-10-04 DIAGNOSIS — J84.9 ILD (INTERSTITIAL LUNG DISEASE) (HCC): ICD-10-CM

## 2021-10-04 PROCEDURE — 71250 CT THORAX DX C-: CPT

## 2021-10-06 ENCOUNTER — IMMUNIZATION (OUTPATIENT)
Dept: VACCINE CLINIC | Facility: HOSPITAL | Age: 66
End: 2021-10-06

## 2021-10-06 PROCEDURE — 91300 HC SARSCOV02 VAC 30MCG/0.3ML IM: CPT | Performed by: INTERNAL MEDICINE

## 2021-10-06 PROCEDURE — 0003A: CPT | Performed by: INTERNAL MEDICINE

## 2021-10-06 PROCEDURE — 0004A ADM SARSCOV2 30MCG/0.3ML BOOSTER: CPT | Performed by: INTERNAL MEDICINE

## 2021-11-02 DIAGNOSIS — G43.009 MIGRAINE WITHOUT AURA AND WITHOUT STATUS MIGRAINOSUS, NOT INTRACTABLE: ICD-10-CM

## 2021-11-02 RX ORDER — METAXALONE 800 MG/1
800 TABLET ORAL 3 TIMES DAILY PRN
Qty: 90 TABLET | Refills: 1 | Status: CANCELLED | OUTPATIENT
Start: 2021-11-02

## 2021-11-05 DIAGNOSIS — G43.009 MIGRAINE WITHOUT AURA AND WITHOUT STATUS MIGRAINOSUS, NOT INTRACTABLE: ICD-10-CM

## 2021-11-05 RX ORDER — METAXALONE 800 MG/1
800 TABLET ORAL 3 TIMES DAILY PRN
Qty: 90 TABLET | Refills: 1 | Status: SHIPPED | OUTPATIENT
Start: 2021-11-05 | End: 2022-02-24 | Stop reason: SDUPTHER

## 2021-11-10 ENCOUNTER — OFFICE VISIT (OUTPATIENT)
Dept: INTERNAL MEDICINE | Facility: CLINIC | Age: 66
End: 2021-11-10

## 2021-11-10 ENCOUNTER — TELEPHONE (OUTPATIENT)
Dept: INTERNAL MEDICINE | Facility: CLINIC | Age: 66
End: 2021-11-10

## 2021-11-10 VITALS
TEMPERATURE: 97.2 F | HEIGHT: 66 IN | HEART RATE: 74 BPM | BODY MASS INDEX: 30.05 KG/M2 | SYSTOLIC BLOOD PRESSURE: 136 MMHG | DIASTOLIC BLOOD PRESSURE: 78 MMHG | WEIGHT: 187 LBS

## 2021-11-10 DIAGNOSIS — R06.09 POST-COVID CHRONIC DYSPNEA: ICD-10-CM

## 2021-11-10 DIAGNOSIS — F41.8 DEPRESSION WITH ANXIETY: ICD-10-CM

## 2021-11-10 DIAGNOSIS — G62.9 PERIPHERAL POLYNEUROPATHY: Primary | ICD-10-CM

## 2021-11-10 DIAGNOSIS — U09.9 POST-COVID CHRONIC DYSPNEA: ICD-10-CM

## 2021-11-10 DIAGNOSIS — Z12.31 SCREENING MAMMOGRAM, ENCOUNTER FOR: Primary | ICD-10-CM

## 2021-11-10 DIAGNOSIS — R03.0 ELEVATED BP WITHOUT DIAGNOSIS OF HYPERTENSION: ICD-10-CM

## 2021-11-10 PROBLEM — G59 MONONEUROPATHY DUE TO UNDERLYING DISEASE: Status: RESOLVED | Noted: 2021-11-10 | Resolved: 2021-11-10

## 2021-11-10 PROBLEM — G59 MONONEUROPATHY DUE TO UNDERLYING DISEASE: Status: ACTIVE | Noted: 2021-11-10

## 2021-11-10 PROCEDURE — 99214 OFFICE O/P EST MOD 30 MIN: CPT | Performed by: INTERNAL MEDICINE

## 2021-11-10 RX ORDER — MELATONIN
1000 DAILY
COMMUNITY

## 2021-11-10 NOTE — TELEPHONE ENCOUNTER
Caller: Magdalena Dickey    Relationship: Self    Best call back number:[phone]    What orders are you requesting (i.e. lab or imaging): MAMMOGRAM     Where will you receive your lab/imaging services: [facility] OR [place]

## 2021-11-10 NOTE — PROGRESS NOTES
"Chief Complaint  No chief complaint on file.    Subjective          Magdalena Dickey presents to [facility]  History of Present Illness  Here for reg f/u- plans to retire in Feb- wants to spend time at [facility] after the murder of her granddaughter.    Has been checking her BP at home 110-120s/70-80s.  Feels fine.  Reviewed her CT- question UIP.  Prior to Covid she had very little respiratory complaints although she smoked for many years.  She is having continued pain in her toes- comes and goes, can be a burning, sensitivity.  Sometimes can't stand people touching them, heat bothers them a lot.  This also all started since she had Covid.        Objective   Vital Signs:   /78   Pulse 74   Temp 97.2 °F (36.2 °C)   Ht 167.6 cm (66\")   Wt 84.8 kg (187 lb)   BMI 30.18 kg/m²     Physical Exam  Constitutional:       Appearance: Normal appearance.   Cardiovascular:      Rate and Rhythm: Normal rate and regular rhythm.   Pulmonary:      Effort: Pulmonary effort is normal.      Comments: Bibasilar crackles  Musculoskeletal:      Right lower leg: No edema.      Left lower leg: No edema.        Result Review :                 Assessment and Plan    Diagnoses and all orders for this visit:    1. Peripheral polyneuropathy (Primary)  Comments:  suspect related to Covid- will ask neurology to see her and give input.   Orders:  -     Ambulatory Referral to Neurology    2. Depression with anxiety  Comments:  Stable, I think long-term and volunteering as she has planned is going to be great!    3. Elevated BP without diagnosis of hypertension  Comments:  Doing well- would like her to continue to monitor.     4. Post-COVID chronic dyspnea  Comments:  reviewed CT with her, some concerning changes in the bases- encouraged to keep moving as much as able and keep pulm informed.        Follow Up   Return in about 6 months (around 5/10/2022) for Recheck.  Patient was given " instructions and counseling regarding her condition or for health maintenance advice. Please see specific information pulled into the AVS if appropriate.

## 2021-11-15 ENCOUNTER — TELEPHONE (OUTPATIENT)
Dept: INTERNAL MEDICINE | Facility: CLINIC | Age: 66
End: 2021-11-15

## 2021-11-15 NOTE — TELEPHONE ENCOUNTER
Called spoke with PT she did not need a call back just wanted us to know she is having issue with feet that she will discuss with neurologist

## 2021-11-15 NOTE — TELEPHONE ENCOUNTER
Patient would like to speak with someone in relation to the symptoms she is now experiencing with her feet, please call [phone]

## 2021-11-22 ENCOUNTER — TRANSCRIBE ORDERS (OUTPATIENT)
Dept: ADMINISTRATIVE | Facility: HOSPITAL | Age: 66
End: 2021-11-22

## 2021-11-22 DIAGNOSIS — J84.9 ILD (INTERSTITIAL LUNG DISEASE) (HCC): Primary | ICD-10-CM

## 2021-12-13 ENCOUNTER — TELEPHONE (OUTPATIENT)
Dept: GASTROENTEROLOGY | Facility: CLINIC | Age: 66
End: 2021-12-13

## 2021-12-13 ENCOUNTER — OFFICE VISIT (OUTPATIENT)
Dept: GASTROENTEROLOGY | Facility: CLINIC | Age: 66
End: 2021-12-13

## 2021-12-13 VITALS
WEIGHT: 185 LBS | HEIGHT: 66 IN | BODY MASS INDEX: 29.73 KG/M2 | DIASTOLIC BLOOD PRESSURE: 82 MMHG | SYSTOLIC BLOOD PRESSURE: 134 MMHG

## 2021-12-13 DIAGNOSIS — K58.0 IRRITABLE BOWEL SYNDROME WITH DIARRHEA: Primary | Chronic | ICD-10-CM

## 2021-12-13 DIAGNOSIS — R11.0 NAUSEA: Chronic | ICD-10-CM

## 2021-12-13 DIAGNOSIS — Z86.010 PERSONAL HISTORY OF COLONIC POLYPS: ICD-10-CM

## 2021-12-13 DIAGNOSIS — K21.9 GASTROESOPHAGEAL REFLUX DISEASE WITHOUT ESOPHAGITIS: Chronic | ICD-10-CM

## 2021-12-13 PROCEDURE — 99214 OFFICE O/P EST MOD 30 MIN: CPT | Performed by: INTERNAL MEDICINE

## 2021-12-13 RX ORDER — SODIUM CHLORIDE, SODIUM LACTATE, POTASSIUM CHLORIDE, CALCIUM CHLORIDE 600; 310; 30; 20 MG/100ML; MG/100ML; MG/100ML; MG/100ML
30 INJECTION, SOLUTION INTRAVENOUS CONTINUOUS
Status: CANCELLED | OUTPATIENT
Start: 2022-02-08

## 2021-12-13 RX ORDER — ONDANSETRON 4 MG/1
4 TABLET, FILM COATED ORAL EVERY 8 HOURS PRN
Qty: 30 TABLET | Refills: 2 | Status: SHIPPED | OUTPATIENT
Start: 2021-12-13 | End: 2022-07-08

## 2021-12-13 NOTE — TELEPHONE ENCOUNTER
YOLANDA patient in office for EGD/CS.  Scheduled 02/08/2022 with arrival time of 8:00am . Prep packet given to patient in office. Patient advised arrival time may vary. Spoke with Gricelda --Trae

## 2021-12-13 NOTE — PROGRESS NOTES
Chief Complaint   Patient presents with   • Heartburn   • Nausea       Subjective     HPI    Magdalena Dickey is a 66 y.o. female with a past medical history noted below who presents for follow up of intermittent diarrhea episodes, fecal incontinence, history of colon resection for diverticulitis    She has been having nausea.  Says it hits her as soon as she eats.  No vomiting.  Nausea lasts for hours.  Taking zofran on a daily basis.  Usually eats one meal daily. Weight is stable.  Says she had mild gastroparesis diagnosed about 10 years ago.      Taking pantoprazole.  Feeling more reflux at night.  Does try to prop her bed up.    BMs regular, taking 1 imodium daily and this keeps stools formed and controllable.      Otherwise doing well.    Today's visit was in the office.  Both the patient and I were wearing face masks and proper hand hygiene was performed before and after the physical exam.           Current Outpatient Medications:   •  albuterol sulfate  (90 Base) MCG/ACT inhaler, INHALE TWO PUFFS INTO THE LUNGS EVERY FOUR HOURS AS NEEDED, Disp: , Rfl:   •  albuterol sulfate  (90 Base) MCG/ACT inhaler, Inhale 2 puffs Every 4 (Four) Hours As Needed., Disp: 8.5 g, Rfl: 3  •  ALPRAZolam (XANAX) 0.5 MG tablet, Take 1 tablet by mouth 2 (Two) Times a Day As Needed for Anxiety., Disp: 60 tablet, Rfl: 1  •  amphetamine-dextroamphetamine (ADDERALL) 10 MG tablet, take one oral tablet each morning, Disp: 30 tablet, Rfl: 0  •  Breo Ellipta 200-25 MCG/INH inhaler, USE 1 (ONE) INHALATION DAILY   REPLACES SYMBICORT, Disp: , Rfl:   •  buPROPion XL (WELLBUTRIN XL) 150 MG 24 hr tablet, Take 1 tablet by mouth Every Morning., Disp: 30 tablet, Rfl: 5  •  calcium carbonate (OS-MARY) 600 MG tablet, Take 600 mg by mouth 2 (Two) Times a Day., Disp: , Rfl:   •  cholecalciferol (VITAMIN D3) 25 MCG (1000 UT) tablet, Take 1,000 Units by mouth Daily., Disp: , Rfl:   •  DULoxetine (CYMBALTA) 60 MG capsule, Take 1 capsule by  mouth Daily., Disp: 90 capsule, Rfl: 0  •  ipratropium-albuterol (DUO-NEB) 0.5-2.5 mg/3 ml nebulizer, Take 3 mL by nebulization Every 4 (Four) Hours As Needed., Disp: 360 mL, Rfl: 3  •  LATUDA 40 MG tablet tablet, , Disp: , Rfl:   •  Loperamide HCl (IMODIUM A-D PO), Take  by mouth Daily., Disp: , Rfl:   •  lurasidone (Latuda) 40 MG tablet tablet, take 1 tablet by mouth nightly with at least 400 calorie meal., Disp: 30 tablet, Rfl: 5  •  metaxalone (SKELAXIN) 800 MG tablet, Take 1 tablet by mouth 3 (Three) Times a Day As Needed for Muscle Spasms., Disp: 90 tablet, Rfl: 1  •  Methylcellulose, Laxative, (CITRUCEL PO), Take  by mouth Daily., Disp: , Rfl:   •  Multiple Vitamin (MULTI-VITAMINS PO), Take 1 tablet by mouth Daily. HOLD FOR SURGERY, Disp: , Rfl:   •  ondansetron (ZOFRAN) 4 MG tablet, Take 1 tablet by mouth Every 8 (Eight) Hours As Needed for Nausea or Vomiting., Disp: 30 tablet, Rfl: 2  •  pantoprazole (PROTONIX) 40 MG EC tablet, TAKE 1 TABLET BY MOUTH EVERY DAY IN THE MORNING, Disp: 90 tablet, Rfl: 1  •  SUMAtriptan (IMITREX) 100 MG tablet, TAKE 1 TABLET BY MOUTH AT ONSET OF HEADACHE, MAY REPEAT EVERY 2 HOURS AS NEEDED (MAX 200MG/DAY), Disp: 9 tablet, Rfl: 2  •  Tdap (Boostrix) 5-2.5-18.5 LF-MCG/0.5 injection, Inject 0.5 mL into the appropriate muscle as directed by prescriber., Disp: 0.5 mL, Rfl: 0  •  topiramate (TOPAMAX) 50 MG tablet, Take 1 tablet by mouth 2 (Two) Times a Day., Disp: 180 tablet, Rfl: 1  •  traZODone (DESYREL) 50 MG tablet, TAKE 1 TO 2 TABLETS BY MOUTH AT BEDTIME FOR INSOMNIA, Disp: 180 tablet, Rfl: 1      Objective     Vitals:    12/13/21 0819   BP: 134/82         12/13/21 0819   Weight: 83.9 kg (185 lb)     Body mass index is 29.86 kg/m².    Physical Exam  Constitutional:       General: She is not in acute distress.  Pulmonary:      Effort: Pulmonary effort is normal.   Neurological:      Mental Status: She is alert and oriented to person, place, and time.   Psychiatric:         Mood  and Affect: Mood normal.         Behavior: Behavior normal.         Thought Content: Thought content normal.         Judgment: Judgment normal.             WBC   Date Value Ref Range Status   08/09/2021 11.42 (H) 3.40 - 10.80 10*3/mm3 Final   12/22/2020 12.7 (H) 3.4 - 10.8 x10E3/uL Final     RBC   Date Value Ref Range Status   08/09/2021 4.58 3.77 - 5.28 10*6/mm3 Final   12/22/2020 4.22 3.77 - 5.28 x10E6/uL Final     Hemoglobin   Date Value Ref Range Status   08/09/2021 14.1 12.0 - 15.9 g/dL Final     Hematocrit   Date Value Ref Range Status   08/09/2021 41.0 34.0 - 46.6 % Final     MCV   Date Value Ref Range Status   08/09/2021 89.5 79.0 - 97.0 fL Final     MCH   Date Value Ref Range Status   08/09/2021 30.8 26.6 - 33.0 pg Final     MCHC   Date Value Ref Range Status   08/09/2021 34.4 31.5 - 35.7 g/dL Final     RDW   Date Value Ref Range Status   08/09/2021 13.4 12.3 - 15.4 % Final     RDW-SD   Date Value Ref Range Status   08/09/2021 43.9 37.0 - 54.0 fl Final     MPV   Date Value Ref Range Status   08/09/2021 9.7 6.0 - 12.0 fL Final     Platelets   Date Value Ref Range Status   08/09/2021 320 140 - 450 10*3/mm3 Final     Neutrophil %   Date Value Ref Range Status   08/09/2021 68.9 42.7 - 76.0 % Final     Lymphocyte %   Date Value Ref Range Status   08/09/2021 22.3 19.6 - 45.3 % Final     Monocyte %   Date Value Ref Range Status   08/09/2021 6.7 5.0 - 12.0 % Final     Eosinophil %   Date Value Ref Range Status   08/09/2021 0.7 0.3 - 6.2 % Final     Basophil %   Date Value Ref Range Status   08/09/2021 0.9 0.0 - 1.5 % Final     Immature Grans %   Date Value Ref Range Status   08/09/2021 0.5 0.0 - 0.5 % Final     Neutrophils, Absolute   Date Value Ref Range Status   08/09/2021 7.86 (H) 1.70 - 7.00 10*3/mm3 Final     Lymphocytes, Absolute   Date Value Ref Range Status   08/09/2021 2.55 0.70 - 3.10 10*3/mm3 Final     Monocytes, Absolute   Date Value Ref Range Status   08/09/2021 0.77 0.10 - 0.90 10*3/mm3 Final      Eosinophils, Absolute   Date Value Ref Range Status   08/09/2021 0.08 0.00 - 0.40 10*3/mm3 Final     Basophils, Absolute   Date Value Ref Range Status   08/09/2021 0.10 0.00 - 0.20 10*3/mm3 Final     Immature Grans, Absolute   Date Value Ref Range Status   08/09/2021 0.06 (H) 0.00 - 0.05 10*3/mm3 Final     nRBC   Date Value Ref Range Status   08/09/2021 0.0 0.0 - 0.2 /100 WBC Final       Lab Results   Component Value Date    GLUCOSE 109 (H) 08/09/2021    BUN 11 08/09/2021    CREATININE 1.09 (H) 08/09/2021    EGFRIFNONA 50 (L) 08/09/2021    EGFRIFAFRI 65 12/22/2020    BCR 10.1 08/09/2021    CO2 20.3 (L) 08/09/2021    CALCIUM 9.6 08/09/2021    PROTENTOTREF 6.5 12/22/2020    ALBUMIN 4.40 08/09/2021    LABIL2 1.7 12/22/2020    AST 16 08/09/2021    ALT 14 08/09/2021       Hi Res chest Ct    IMPRESSION:  1.  Findings meet American thoracic Society HRCT criteria for possible  UIP within the bilateral lower lobes, left greater than right.  Correlation with patient history as well as continued attention on  follow-up is recommended to determine the most appropriate etiology.  2.  Mild to moderate emphysema.  3.  Mild to moderate hepatic steatosis.  4.  Scattered tiny pulmonary nodules are present bilaterally, as before.  A few sub-4 mm were not seen on 11/09/2020. Findings are indeterminate.  In the absence of known malignancy, follow-up with chest CT in 12 months  is recommended to ensure stability. If this patient has a history of  malignancy, follow-up with chest CT in 3 months is recommended.  5.  Other findings as above.     This report was finalized on 10/6/2021 2:15 PM by Dr. Ilya Becerril M.D.       I personally reviewed data as detailed below:     The labs listed above.    The imaging listed above    Office notes from: 11/10/21 pcp note        No notes on file    Assessment/Plan    1.  IBS with diarrhea: Chronic, she is stable on 1 tablet of Imodium daily    2.  GERD: On PPI.  Having some nocturnal  reflux    3.  Nausea: Chronic issue.  Possibly related to mild gastroparesis, medications    4.  Personal history of colon polyps: Due for repeat endoscopy February    Plan  Continue Imodium  Continue PPI  Continue Zofran as needed  Encouraged avoiding eating within 4 hours of bedtime  We will update colonoscopy for personal history of colon polyps, EGD at same time for ongoing nausea and GERD symptoms  If her EGD is pretty bland, will need to look towards repeating her gastric emptying study as she may need motility medication    Diagnoses and all orders for this visit:    1. Irritable bowel syndrome with diarrhea (Primary)    2. Gastroesophageal reflux disease without esophagitis  -     Case Request; Standing  -     COVID PRE-OP / PRE-PROCEDURE SCREENING ORDER (NO ISOLATION) - Swab, Nasopharynx; Future  -     Case Request    3. Nausea  -     Case Request; Standing  -     COVID PRE-OP / PRE-PROCEDURE SCREENING ORDER (NO ISOLATION) - Swab, Nasopharynx; Future  -     Case Request    4. Personal history of colonic polyps  -     Case Request; Standing  -     COVID PRE-OP / PRE-PROCEDURE SCREENING ORDER (NO ISOLATION) - Swab, Nasopharynx; Future  -     Case Request    Other orders  -     Follow Anesthesia Guidelines / Standing Orders; Future  -     Obtain Informed Consent; Future  -     ondansetron (ZOFRAN) 4 MG tablet; Take 1 tablet by mouth Every 8 (Eight) Hours As Needed for Nausea or Vomiting.  Dispense: 30 tablet; Refill: 2        I have discussed the above plan with the patient.  They verbalize understanding and are in agreement with the plan.  They have been advised to contact the office for any questions, concerns, or changes related to their health.    Dictated utilizing Dragon dictation

## 2022-01-12 ENCOUNTER — TELEPHONE (OUTPATIENT)
Dept: NEUROLOGY | Facility: CLINIC | Age: 67
End: 2022-01-12

## 2022-01-12 NOTE — TELEPHONE ENCOUNTER
BEING REFERRED FOR BILAT FOOT N/T BY PCP-   SAW NEUROLOGIST AT [facility] IN 2015 FOR MIGRAINE- OK TO SEE OR NEED TO GO BACK TO [place]

## 2022-01-17 ENCOUNTER — OFFICE VISIT (OUTPATIENT)
Dept: NEUROLOGY | Facility: CLINIC | Age: 67
End: 2022-01-17

## 2022-01-17 ENCOUNTER — LAB (OUTPATIENT)
Dept: LAB | Facility: HOSPITAL | Age: 67
End: 2022-01-17

## 2022-01-17 VITALS
SYSTOLIC BLOOD PRESSURE: 130 MMHG | HEIGHT: 66 IN | WEIGHT: 186.4 LBS | BODY MASS INDEX: 29.96 KG/M2 | OXYGEN SATURATION: 99 % | HEART RATE: 78 BPM | DIASTOLIC BLOOD PRESSURE: 74 MMHG

## 2022-01-17 DIAGNOSIS — G62.9 POLYNEUROPATHY: Primary | ICD-10-CM

## 2022-01-17 DIAGNOSIS — G62.9 POLYNEUROPATHY: ICD-10-CM

## 2022-01-17 LAB
CHROMATIN AB SERPL-ACNC: <10 IU/ML (ref 0–14)
HBA1C MFR BLD: 5.3 % (ref 4.8–5.6)

## 2022-01-17 PROCEDURE — 83655 ASSAY OF LEAD: CPT | Performed by: PSYCHIATRY & NEUROLOGY

## 2022-01-17 PROCEDURE — 86431 RHEUMATOID FACTOR QUANT: CPT | Performed by: PSYCHIATRY & NEUROLOGY

## 2022-01-17 PROCEDURE — 82784 ASSAY IGA/IGD/IGG/IGM EACH: CPT | Performed by: PSYCHIATRY & NEUROLOGY

## 2022-01-17 PROCEDURE — 36415 COLL VENOUS BLD VENIPUNCTURE: CPT | Performed by: PSYCHIATRY & NEUROLOGY

## 2022-01-17 PROCEDURE — 99204 OFFICE O/P NEW MOD 45 MIN: CPT | Performed by: PSYCHIATRY & NEUROLOGY

## 2022-01-17 PROCEDURE — 84155 ASSAY OF PROTEIN SERUM: CPT | Performed by: PSYCHIATRY & NEUROLOGY

## 2022-01-17 PROCEDURE — 86334 IMMUNOFIX E-PHORESIS SERUM: CPT | Performed by: PSYCHIATRY & NEUROLOGY

## 2022-01-17 PROCEDURE — 86235 NUCLEAR ANTIGEN ANTIBODY: CPT | Performed by: PSYCHIATRY & NEUROLOGY

## 2022-01-17 PROCEDURE — 84165 PROTEIN E-PHORESIS SERUM: CPT | Performed by: PSYCHIATRY & NEUROLOGY

## 2022-01-17 PROCEDURE — 83825 ASSAY OF MERCURY: CPT | Performed by: PSYCHIATRY & NEUROLOGY

## 2022-01-17 PROCEDURE — 84207 ASSAY OF VITAMIN B-6: CPT | Performed by: PSYCHIATRY & NEUROLOGY

## 2022-01-17 PROCEDURE — 82595 ASSAY OF CRYOGLOBULIN: CPT | Performed by: PSYCHIATRY & NEUROLOGY

## 2022-01-17 PROCEDURE — 83036 HEMOGLOBIN GLYCOSYLATED A1C: CPT

## 2022-01-17 PROCEDURE — 82175 ASSAY OF ARSENIC: CPT | Performed by: PSYCHIATRY & NEUROLOGY

## 2022-01-17 NOTE — PATIENT INSTRUCTIONS
Minimize risk for infection:  1.  Daily inspection of soles of feet.  2.  Avoidance of walking on bare feet.  3.  Nightlights.  4.  Nonskid surfaces and grab bars in bathrooms.

## 2022-01-17 NOTE — PROGRESS NOTES
Subjective:     Patient ID: Magdalena Dickey is a 66 y.o. female.    Ms. Dickey is a 66-year-old right-handed female with a history of anxiety, diverticulitis, COPD, depression, gallstones, headaches, and stomach ulcers who is seen in consultation at the request of Dr. Guerrier for the evaluation of neuropathy.  I reviewed the patient's records.  Reviewed the referring physician's note from November 10, 2021.  He reports that she has continued pain in her toes that come and go.  It is burning and they are sensitive.  Sometimes she cannot stand people touching them and heat will bother them.  I reviewed the patient's labs.  On August 16, 2021 her UPEP was normal.  On August 9, 2021 her TSH and B12 levels were normal.    Toes started hurting in 2020.  All does in all feet.  Was in the hospital for COVID for 2 weeks.  Was not on a ventilator.  Got 10 days of antivirals.  No progression of symptoms.  No numbness or tingling.  Every now and then, may have a sharp pain.  Has a constant ache.  Symptoms are symmetric.  Feels like the pads of her feet are thick and feel cold to her, but not to the touch.  No weakness in her legs.  Occasional balance problems, but no falls.      PMH? DM, EtOH, renal failure, polyarthritis nodosa, lupus, churg-lizzette, hepatitis, HIV, thyroid disease, vegetarian - no  Recent travel? (leprosy from [place], [place], and [place]) - no  Secondary to drugs? (INH, vincristine, hydralazine, disulfiram, cisplatin, metronidazole) - Has been on flagyl several times for diverticulitis.    Family history? Sister from chemo  Dry eyes, dry mouth? Dry mouth  High arches/hammer toes? Sister does  Autonomic (early satiety, bloating, constipation, diarrhea, ED, urinary incontinence, sweating abnormalities, orthostasis?) - has some early satiety, bloating, diarrhea (takes imodium every day), doesn't sweat a lot, but still has hot flashes  Occupational hazards? no  High risk sexual history? IV drug use? no    The  following portions of the patient's history were reviewed and updated as appropriate: allergies, current medications, past family history, past medical history, past social history, past surgical history and problem list.    Review of Systems     Objective:    Neurologic Exam    Physical Exam   **The patient is wearing a mask**  Constitutional:  Vital signs reviewed.  No apparent distress.  Well groomed.  Eyes:  No injection, no icterus.    Respiratory:  Normal effort.  Clear to auscultation bilaterally.  Cardiovascular:  Regular rate and rhythm.  No murmurs.  No carotid bruits. Symmetric radial pulses.  Musculoskeletal: Normal station.  Gait steady.  Normal arm swing.  Patient able to walk on heels and toes.  Trouble with tandem gait.  Romberg negative.  Muscle tone and bulk normal in the bilateral upper and lower extremities.  Strength is 5/5 in the bilateral upper and lower extremities proximally and distally unless otherwise specified in the neurological exam.  Skin:  No rashes.  Warm, dry, and intact.  Psychiatric:  Good mood.  Normal affect.    Neurologic:  Mental status-  The patient is alert and oriented to person, place and time. Attention/concentration is within normal limits.  Speech is fluent without dysarthria.  The patient is able to name, repeat and follow complex commands without difficulty.  Immediate memory and delayed recall intact (3/3 words immediate and after 4 minutes).  Fund of knowledge normal.  Cranial nerves- Pupils equally round and reactive to light with intact accomodation.  Visual fields intact.  Extraocular movements intact.  Facial sensation intact.   Hearing intact to finger-rub bilaterally.  SCM and trapezius are 5/5 bilaterally.    Motor-  See musculoskeletal above.  No tremor.  Reflexes- 2+ in the bilateral biceps, brachioradialis, patellar and absent achilles.  Toes down-going bilaterally.  Sensation-decreased to vibration the left lower extremity compared to the  right.  Coordination- Intact to finger tapping and heel knee shin bilaterally.   Gait- See musculoskeletal exam above.       Assessment/Plan:    Ms. Dickey is a 66-year-old right-handed female with history of anxiety, diverticulitis, COPD, depression, gallstones, headaches, and stomach ulcers who presents to the neurology clinic today for the evaluation of neuropathy.    1.  Neuropathy-the patient's clinical presentation seems most consistent with that diagnosis given her decrease in her achilles reflexes.  For further evaluation I recommend an EMG as well as some blood work to look for possible treatable causes of neuropathy.    I recommend follow up with one of our APRNs or our PA, Marivel, after testing is completed.         Problems Addressed this Visit     None      Visit Diagnoses     Polyneuropathy    -  Primary    Relevant Orders    Vitamin B6    Sjogren's Antibody, Anti-SS-A / -SS-B    Rheumatoid Factor    Protein Elec + Interp, Serum    Immunofixation, Serum    Hemoglobin A1c    Heavy Metals, Blood    Cryoglobulin    EMG & Nerve Conduction Test      Diagnoses       Codes Comments    Polyneuropathy    -  Primary ICD-10-CM: G62.9  ICD-9-CM: 356.9

## 2022-01-18 LAB
ALBUMIN SERPL ELPH-MCNC: 4 G/DL (ref 2.9–4.4)
ALBUMIN/GLOB SERPL: 1.3 {RATIO} (ref 0.7–1.7)
ALPHA1 GLOB SERPL ELPH-MCNC: 0.3 G/DL (ref 0–0.4)
ALPHA2 GLOB SERPL ELPH-MCNC: 0.8 G/DL (ref 0.4–1)
B-GLOBULIN SERPL ELPH-MCNC: 1.1 G/DL (ref 0.7–1.3)
ENA SS-A AB SER-ACNC: <0.2 AI (ref 0–0.9)
ENA SS-B AB SER-ACNC: <0.2 AI (ref 0–0.9)
GAMMA GLOB SERPL ELPH-MCNC: 0.9 G/DL (ref 0.4–1.8)
GLOBULIN SER CALC-MCNC: 3.2 G/DL (ref 2.2–3.9)
IGA SERPL-MCNC: 268 MG/DL (ref 87–352)
IGG SERPL-MCNC: 919 MG/DL (ref 586–1602)
IGM SERPL-MCNC: 101 MG/DL (ref 26–217)
LABORATORY COMMENT REPORT: NORMAL
M PROTEIN SERPL ELPH-MCNC: NORMAL G/DL
PROT PATTERN SERPL ELPH-IMP: NORMAL
PROT PATTERN SERPL IFE-IMP: NORMAL
PROT SERPL-MCNC: 7.2 G/DL (ref 6–8.5)

## 2022-01-21 LAB — CRYOGLOB SER QL 1D COLD INC: NORMAL

## 2022-01-24 LAB
ARSENIC BLD-MCNC: 1 UG/L (ref 2–23)
LEAD BLDV-MCNC: <1 UG/DL (ref 0–4)
MERCURY BLD-MCNC: <1 UG/L (ref 0–14.9)

## 2022-01-27 ENCOUNTER — PATIENT ROUNDING (BHMG ONLY) (OUTPATIENT)
Dept: NEUROLOGY | Facility: CLINIC | Age: 67
End: 2022-01-27

## 2022-01-31 ENCOUNTER — HOSPITAL ENCOUNTER (OUTPATIENT)
Dept: MAMMOGRAPHY | Facility: HOSPITAL | Age: 67
Discharge: HOME | End: 2022-01-31
Admitting: INTERNAL MEDICINE

## 2022-01-31 DIAGNOSIS — Z12.31 SCREENING MAMMOGRAM, ENCOUNTER FOR: ICD-10-CM

## 2022-01-31 DIAGNOSIS — R92.8 ABNORMAL MAMMOGRAM: Primary | ICD-10-CM

## 2022-01-31 PROCEDURE — 77063 BREAST TOMOSYNTHESIS BI: CPT

## 2022-01-31 PROCEDURE — 77067 SCR MAMMO BI INCL CAD: CPT

## 2022-02-05 ENCOUNTER — LAB (OUTPATIENT)
Dept: LAB | Facility: HOSPITAL | Age: 67
End: 2022-02-05

## 2022-02-05 DIAGNOSIS — Z86.010 PERSONAL HISTORY OF COLONIC POLYPS: ICD-10-CM

## 2022-02-05 DIAGNOSIS — K21.9 GASTROESOPHAGEAL REFLUX DISEASE WITHOUT ESOPHAGITIS: ICD-10-CM

## 2022-02-05 DIAGNOSIS — R11.0 NAUSEA: ICD-10-CM

## 2022-02-05 LAB
SARS-COV-2 ORF1AB RESP QL NAA+PROBE: NOT DETECTED
VIT B6 SERPL-MCNC: 5.3 UG/L (ref 2–32.8)

## 2022-02-05 PROCEDURE — U0004 COV-19 TEST NON-CDC HGH THRU: HCPCS

## 2022-02-05 PROCEDURE — C9803 HOPD COVID-19 SPEC COLLECT: HCPCS

## 2022-02-08 ENCOUNTER — ANESTHESIA EVENT (OUTPATIENT)
Dept: GASTROENTEROLOGY | Facility: HOSPITAL | Age: 67
End: 2022-02-08

## 2022-02-08 ENCOUNTER — ANESTHESIA (OUTPATIENT)
Dept: GASTROENTEROLOGY | Facility: HOSPITAL | Age: 67
End: 2022-02-08

## 2022-02-08 ENCOUNTER — HOSPITAL ENCOUNTER (OUTPATIENT)
Facility: HOSPITAL | Age: 67
Setting detail: HOSPITAL OUTPATIENT SURGERY
Discharge: HOME | End: 2022-02-08
Attending: INTERNAL MEDICINE | Admitting: INTERNAL MEDICINE

## 2022-02-08 VITALS
WEIGHT: 185.8 LBS | BODY MASS INDEX: 29.86 KG/M2 | TEMPERATURE: 97.4 F | HEIGHT: 66 IN | DIASTOLIC BLOOD PRESSURE: 80 MMHG | RESPIRATION RATE: 16 BRPM | HEART RATE: 75 BPM | SYSTOLIC BLOOD PRESSURE: 150 MMHG | OXYGEN SATURATION: 99 %

## 2022-02-08 DIAGNOSIS — K21.9 GASTROESOPHAGEAL REFLUX DISEASE WITHOUT ESOPHAGITIS: ICD-10-CM

## 2022-02-08 DIAGNOSIS — Z86.010 PERSONAL HISTORY OF COLONIC POLYPS: ICD-10-CM

## 2022-02-08 DIAGNOSIS — R11.0 NAUSEA: ICD-10-CM

## 2022-02-08 PROCEDURE — S0260 H&P FOR SURGERY: HCPCS | Performed by: INTERNAL MEDICINE

## 2022-02-08 PROCEDURE — 25010000002 PROPOFOL 10 MG/ML EMULSION: Performed by: ANESTHESIOLOGY

## 2022-02-08 PROCEDURE — 45380 COLONOSCOPY AND BIOPSY: CPT | Performed by: INTERNAL MEDICINE

## 2022-02-08 PROCEDURE — 43239 EGD BIOPSY SINGLE/MULTIPLE: CPT | Performed by: INTERNAL MEDICINE

## 2022-02-08 PROCEDURE — 88305 TISSUE EXAM BY PATHOLOGIST: CPT | Performed by: INTERNAL MEDICINE

## 2022-02-08 RX ORDER — PROPOFOL 10 MG/ML
VIAL (ML) INTRAVENOUS AS NEEDED
Status: DISCONTINUED | OUTPATIENT
Start: 2022-02-08 | End: 2022-02-08 | Stop reason: SURG

## 2022-02-08 RX ORDER — LIDOCAINE HYDROCHLORIDE 20 MG/ML
INJECTION, SOLUTION INFILTRATION; PERINEURAL AS NEEDED
Status: DISCONTINUED | OUTPATIENT
Start: 2022-02-08 | End: 2022-02-08 | Stop reason: SURG

## 2022-02-08 RX ORDER — SODIUM CHLORIDE 0.9 % (FLUSH) 0.9 %
10 SYRINGE (ML) INJECTION AS NEEDED
Status: DISCONTINUED | OUTPATIENT
Start: 2022-02-08 | End: 2022-02-08 | Stop reason: HOSPADM

## 2022-02-08 RX ORDER — PROPOFOL 10 MG/ML
VIAL (ML) INTRAVENOUS CONTINUOUS PRN
Status: DISCONTINUED | OUTPATIENT
Start: 2022-02-08 | End: 2022-02-08 | Stop reason: SURG

## 2022-02-08 RX ORDER — SODIUM CHLORIDE, SODIUM LACTATE, POTASSIUM CHLORIDE, CALCIUM CHLORIDE 600; 310; 30; 20 MG/100ML; MG/100ML; MG/100ML; MG/100ML
30 INJECTION, SOLUTION INTRAVENOUS CONTINUOUS
Status: DISCONTINUED | OUTPATIENT
Start: 2022-02-08 | End: 2022-02-08 | Stop reason: HOSPADM

## 2022-02-08 RX ADMIN — SODIUM CHLORIDE, POTASSIUM CHLORIDE, SODIUM LACTATE AND CALCIUM CHLORIDE 30 ML/HR: 600; 310; 30; 20 INJECTION, SOLUTION INTRAVENOUS at 09:00

## 2022-02-08 RX ADMIN — PROPOFOL 150 MG: 10 INJECTION, EMULSION INTRAVENOUS at 09:09

## 2022-02-08 RX ADMIN — Medication 300 MCG/KG/MIN: at 09:10

## 2022-02-08 RX ADMIN — LIDOCAINE HYDROCHLORIDE 60 MG: 20 INJECTION, SOLUTION INFILTRATION; PERINEURAL at 09:07

## 2022-02-08 NOTE — ANESTHESIA PREPROCEDURE EVALUATION
Anesthesia Evaluation     Patient summary reviewed and Nursing notes reviewed   history of anesthetic complications: prolonged sedation  NPO Solid Status: > 8 hours  NPO Liquid Status: > 4 hours           Airway   Mallampati: II  Neck ROM: full  No difficulty expected  Comment: S/p ACDF  Dental    (+) lower dentures, upper dentures and partials    Pulmonary     breath sounds clear to auscultation  (+) pneumonia , a smoker Former,   Cardiovascular     Rhythm: regular    (+) hyperlipidemia,       Neuro/Psych  (+) headaches, numbness, psychiatric history Anxiety and Depression,      ROS Comment: peripherial poly neuropathy  GI/Hepatic/Renal/Endo    (+)  hiatal hernia, GERD,      Musculoskeletal     Abdominal    Substance History      OB/GYN          Other   arthritis,                    Anesthesia Plan    ASA 3     MAC     intravenous induction     Anesthetic plan, all risks, benefits, and alternatives have been provided, discussed and informed consent has been obtained with: patient.        CODE STATUS:

## 2022-02-08 NOTE — DISCHARGE INSTR - DIET
Regular Diet   Manual Repair Warning Statement: We plan on removing the manually selected variable below in favor of our much easier automatic structured text blocks found in the previous tab. We decided to do this to help make the flow better and give you the full power of structured data. Manual selection is never going to be ideal in our platform and I would encourage you to avoid using manual selection from this point on, especially since I will be sunsetting this feature. It is important that you do one of two things with the customized text below. First, you can save all of the text in a word file so you can have it for future reference. Second, transfer the text to the appropriate area in the Library tab. Lastly, if there is a flap or graft type which we do not have you need to let us know right away so I can add it in before the variable is hidden. No need to panic, we plan to give you roughly 6 months to make the change.

## 2022-02-08 NOTE — DISCHARGE INSTRUCTIONS
For the next 24 hours patient needs to be with a responsible adult.    For 24 hours DO NOT drive, operate machinery, appliances, drink alcohol, make important decisions or sign legal documents.    Start with a light or bland diet if you are feeling sick to your stomach otherwise advance to regular diet as tolerated.    Follow recommendations on procedure report if provided by your doctor.    Call Dr CROCKER for problems [phone]    Problems may include but not limited to: large amounts of bleeding, trouble breathing, repeated vomiting, severe unrelieved pain, fever or chills.

## 2022-02-08 NOTE — ANESTHESIA POSTPROCEDURE EVALUATION
"Patient: Magdalena Dickey    Procedure Summary     Date: 02/08/22 Room / Location: [facility] ENDOSCOPY 8 /  [facility]    Anesthesia Start: 0852 Anesthesia Stop: 0948    Procedures:       COLONOSCOPY INTO CECUM WITH COLD BIOPSIES AND COLD BIOPSY POLYPECTOMY (N/A )      ESOPHAGOGASTRODUODENOSCOPY WITH COLD BIOPSIES (N/A Esophagus) Diagnosis:       Gastroesophageal reflux disease without esophagitis      Nausea      Personal history of colonic polyps      (Gastroesophageal reflux disease without esophagitis [K21.9])      (Nausea [R11.0])      (Personal history of colonic polyps [Z86.010])    Surgeons: Cande Guzman MD Provider: Stanislaw Orlando MD    Anesthesia Type: MAC ASA Status: 3          Anesthesia Type: MAC    Vitals  Vitals Value Taken Time   /80 02/08/22 1011   Temp 36.3 °C (97.4 °F) 02/08/22 1003   Pulse 75 02/08/22 1011   Resp 16 02/08/22 1011   SpO2 99 % 02/08/22 1011           Post Anesthesia Care and Evaluation    Patient location during evaluation: bedside  Patient participation: complete - patient participated  Level of consciousness: sleepy but conscious  Pain score: 0  Pain management: adequate  Airway patency: patent  Anesthetic complications: No anesthetic complications    Cardiovascular status: acceptable  Respiratory status: acceptable  Hydration status: acceptable    Comments: /80 (BP Location: Left arm, Patient Position: Lying)   Pulse 75   Temp 36.3 °C (97.4 °F) (Oral)   Resp 16   Ht 167.6 cm (66\")   Wt 84.3 kg (185 lb 12.8 oz)   SpO2 99%   BMI 29.99 kg/m²         "

## 2022-02-08 NOTE — H&P
[facility]  Pre Procedure History & Physical    Chief Complaint: GERD, nausea, personal history of colon polyps      HPI: 66-year old woman with past medical history as below here for EGD for symptoms of GERD and nausea and colonoscopy for personal history of colon polyps.  She otherwise feels well    Past Medical History:   Past Medical History:   Diagnosis Date   • Anxiety    • Colon polyp 2017   • COVID-19 09/2020   • Delayed emergence from anesthesia 2018   • Depression 5/25/2016   • Diverticulitis of colon    • Diverticulosis of large intestine without hemorrhage 10/5/2016   • GERD (gastroesophageal reflux disease)    • Hiatal hernia    • History of gastric ulcer    • Low back pain    • Lumbosacral disc disease    • Migraine without aura and without status migrainosus, not intractable 5/25/2016       Family History:  Family History   Problem Relation Age of Onset   • Alzheimer's disease Mother         SDAT   • Hypertension Mother    • Diabetes type II Mother    • Lung cancer Mother         POSSIBLE   • Heart attack Mother    • Cancer Mother         lung   • COPD Mother    • Depression Mother    • Diabetes Mother    • Heart disease Mother    • Miscarriages / Stillbirths Mother    • Alcohol abuse Father    • Prostate cancer Father    • Heart disease Father         THIRD DEGREE HEART BLOCK   • Arthritis Father    • Cancer Father         prostate   • Drug abuse Father    • Learning disabilities Father    • Ovarian cancer Sister    • No Known Problems Brother    • Ovarian cancer Sister    • Arthritis Maternal Grandmother         rheumatoid   • Arthritis Sister    • Cancer Sister         ovarian   • Depression Sister    • Hypertension Sister    • Cancer Sister         ovarian   • Depression Sister    • Hypertension Sister    • Breast cancer Other    • Malig Hyperthermia Neg Hx        Social History:   reports that she quit smoking about 7 years ago. Her smoking use included cigarettes. She  has a 40.00 pack-year smoking history. She has never used smokeless tobacco. She reports current alcohol use. She reports that she does not use drugs.    Medications:   No medications prior to admission.       Allergies:  Patient has no known allergies.    ROS:    Pertinent items are noted in HPI     Objective     not currently breastfeeding.    Physical Exam   Constitutional: Pt is oriented to person, place, and time and well-developed, well-nourished, and in no distress.   HENT:   Mouth/Throat: Oropharynx is clear and moist.   Neck: Normal range of motion. Neck supple.   Cardiovascular: Normal rate, regular rhythm and normal heart sounds.    Pulmonary/Chest: Effort normal and breath sounds normal. No respiratory distress. No  wheezes.   Abdominal: Soft. Bowel sounds are normal.   Skin: Skin is warm and dry.   Psychiatric: Mood, memory, affect and judgment normal.     Assessment/Plan     Diagnosis: GERD, nausea, personal history of colon polyps      Anticipated Surgical Procedure:  EGD  Colonoscopy    The risks, benefits, and alternatives of this procedure have been discussed with the patient or the responsible party- the patient understands and agrees to proceed.

## 2022-02-09 ENCOUNTER — TELEPHONE (OUTPATIENT)
Dept: GASTROENTEROLOGY | Facility: CLINIC | Age: 67
End: 2022-02-09

## 2022-02-09 LAB
LAB AP CASE REPORT: NORMAL
PATH REPORT.FINAL DX SPEC: NORMAL
PATH REPORT.GROSS SPEC: NORMAL

## 2022-02-09 NOTE — TELEPHONE ENCOUNTER
----- Message from Cande Guzman MD sent at 2/9/2022 11:59 AM EST -----  Biopsies from her EGD are consistent with mild reflux, otherwise benign.    Her colon polyp was a tubular adenomaRandom colon biopsies without evidence of colitisPlease place in 5-year colonoscopy recall

## 2022-02-09 NOTE — PROGRESS NOTES
Biopsies from her EGD are consistent with mild reflux, otherwise benign.    Her colon polyp was a tubular adenomaRandom colon biopsies without evidence of colitisPlease place in 5-year colonoscopy recall

## 2022-02-09 NOTE — TELEPHONE ENCOUNTER
Called pt and left  for pt to call back.     C/s placed in [facility] and  for 02/08/2027.

## 2022-02-24 DIAGNOSIS — G43.009 MIGRAINE WITHOUT AURA AND WITHOUT STATUS MIGRAINOSUS, NOT INTRACTABLE: ICD-10-CM

## 2022-02-25 RX ORDER — METAXALONE 800 MG/1
800 TABLET ORAL 3 TIMES DAILY PRN
Qty: 90 TABLET | Refills: 1 | Status: SHIPPED | OUTPATIENT
Start: 2022-02-25 | End: 2022-04-25 | Stop reason: CLARIF

## 2022-03-01 ENCOUNTER — HOSPITAL ENCOUNTER (OUTPATIENT)
Dept: MAMMOGRAPHY | Facility: HOSPITAL | Age: 67
Discharge: HOME | End: 2022-03-01

## 2022-03-01 ENCOUNTER — HOSPITAL ENCOUNTER (OUTPATIENT)
Dept: ULTRASOUND IMAGING | Facility: HOSPITAL | Age: 67
Discharge: HOME | End: 2022-03-01

## 2022-03-01 DIAGNOSIS — R92.8 ABNORMAL MAMMOGRAM: ICD-10-CM

## 2022-03-01 PROCEDURE — 77066 DX MAMMO INCL CAD BI: CPT

## 2022-03-01 PROCEDURE — G0279 TOMOSYNTHESIS, MAMMO: HCPCS

## 2022-03-01 PROCEDURE — 76642 ULTRASOUND BREAST LIMITED: CPT

## 2022-04-12 ENCOUNTER — PROCEDURE VISIT (OUTPATIENT)
Dept: NEUROLOGY | Facility: CLINIC | Age: 67
End: 2022-04-12

## 2022-04-12 VITALS — BODY MASS INDEX: 29.73 KG/M2 | HEIGHT: 66 IN | WEIGHT: 185 LBS

## 2022-04-12 DIAGNOSIS — G62.9 POLYNEUROPATHY: ICD-10-CM

## 2022-04-12 PROCEDURE — 95909 NRV CNDJ TST 5-6 STUDIES: CPT | Performed by: PSYCHIATRY & NEUROLOGY

## 2022-04-12 PROCEDURE — 95886 MUSC TEST DONE W/N TEST COMP: CPT | Performed by: PSYCHIATRY & NEUROLOGY

## 2022-04-12 NOTE — PROGRESS NOTES
EMG and Nerve Conduction Studies    I.      Instrument used: Neuromax 1002  II.     Please see data sheets for tabular summary of NCS and details on methods, temperatures and lab standards.   III.    EMG muscles tested for upper extremity studies include the deltoid, biceps, triceps, pronator teres, extensor digitorum communis, first dorsal interosseous and abductor pollicis brevis.    IV.   EMG muscles tested for lower extremity studies include the vastus lateralis, tibialis anterior, peroneus longus, medial gastrocnemius and extensor digitorum brevis.    V.    Additional muscles tested as needed.  Paraspinal muscles tested as needed.   VI.   Please see data sheets for tabular summary of EMG findings.   VII. The complete report includes the data sheets.      Indication: Stabbing pains in the feet  History: 66-year-old white female with stabbing pains in the toes primarily which ascended up in the right leg more so than the left into the lower portion of the lower leg.  These occur randomly whether standing or reclined.  No history of diabetes.    Dr. Duval requests a bilateral lower extremity study looking for peripheral neuropathy.    Ht: 167.6 cm  Wt: 83.9 kg; BMI 29.86  HbA1C:   Lab Results   Component Value Date    HGBA1C 5.30 01/17/2022     TSH:   Lab Results   Component Value Date    TSH 1.660 08/09/2021       Technical summary:  Nerve conduction studies were obtained in the right leg with 1 comparison on the left.  Skin temperatures were at least 32 °C after warming the feet.  Needle examination was obtained on selected muscles in both legs.    Results:  1.  Normal right sural sensory distal latency and amplitude.  2.  Normal superficial peroneal sensory distal latencies and amplitudes bilaterally.  3.  Normal right peroneal motor conduction velocities, distal latency and amplitudes.  4.  Normal right tibial motor conduction velocity, distal latency and amplitudes.  5.  Needle examination of selected  muscles of both legs showed normal insertional activities throughout.  There were normal motor units and recruitment patterns throughout.    Impression:  Normal study.  No evidence of peripheral neuropathy or a lumbosacral radiculopathy by this study.  Study results were discussed with the patient.    Jalen Otero M.D.              Dictated utilizing Dragon dictation.

## 2022-04-18 ENCOUNTER — OFFICE VISIT (OUTPATIENT)
Dept: NEUROLOGY | Facility: CLINIC | Age: 67
End: 2022-04-18

## 2022-04-18 VITALS
SYSTOLIC BLOOD PRESSURE: 132 MMHG | HEIGHT: 66 IN | BODY MASS INDEX: 29.09 KG/M2 | DIASTOLIC BLOOD PRESSURE: 90 MMHG | HEART RATE: 93 BPM | WEIGHT: 181 LBS | OXYGEN SATURATION: 95 %

## 2022-04-18 DIAGNOSIS — R20.8 DYSESTHESIA: ICD-10-CM

## 2022-04-18 DIAGNOSIS — R20.2 PARESTHESIAS: Primary | ICD-10-CM

## 2022-04-18 DIAGNOSIS — G43.009 MIGRAINE WITHOUT AURA AND WITHOUT STATUS MIGRAINOSUS, NOT INTRACTABLE: ICD-10-CM

## 2022-04-18 PROCEDURE — 99215 OFFICE O/P EST HI 40 MIN: CPT | Performed by: PHYSICIAN ASSISTANT

## 2022-04-18 RX ORDER — GABAPENTIN 300 MG/1
300 CAPSULE ORAL 2 TIMES DAILY
Qty: 60 CAPSULE | Refills: 2 | Status: SHIPPED | OUTPATIENT
Start: 2022-04-18 | End: 2022-06-21

## 2022-04-18 RX ORDER — TRAZODONE HYDROCHLORIDE 50 MG/1
75 TABLET ORAL NIGHTLY
Qty: 180 TABLET | Refills: 1 | Status: SHIPPED | OUTPATIENT
Start: 2022-04-18 | End: 2022-07-26 | Stop reason: SDUPTHER

## 2022-04-18 NOTE — PROGRESS NOTES
CC: Follow-up: Paresthesias in bilateral feet, migraines    HPI:  Magdalena Dickey is a  66 y.o.  right-handed female presents today for follow-up mainly concerning paresthesias/diastases in her bilateral feet additionally she has longstanding history of migraines.  Other past medical history is notable for GERD/peptic ulcer disease, cervical spondylosis and radiculopathy status post C6-7 anterior cervical discectomy and fusion with cage and plate (2018), as well as major depression and anxiety.  Additionally patient contracted COVID in August 2020 and was hospitalized for about 2 weeks, she was initially in the ICU but ultimately did not require ventilation; she has had some sort of reactive airway condition since this.  Patient was last seen by Dr. Duval on 1/10/2022, a summary of her condition is taken from previous note with additions and amendments as needed:    Patient was initially referred for neurological consult regarding evaluation for neuropathy.  Patient reports symptoms developed probably 2 or 3 years ago, it was before she was diagnosed with COVID but she reports that since she had COVID they do seem worse.  She mostly describes constant stabbing pains in the toes primarily which ascended up in the right leg more so than the left into the lower portion of the lower leg.  These occur randomly whether standing or reclined.    Previously she has had symptoms of carpal tunnel, this was years ago, she did utilize night splints and also had injections which helped tremendously.  She really does not notice much numbness and tingling in her hands of late.  She does report she has noticed some trouble with her balance, no significant changes to her gait however.  There are been no recent falls.    In terms of her migraines patient reports she has had issues with this since young adulthood.  Previously she has taken propanolol and also gabapentin.  She states these seem to work well transiently but ultimately  she was changed to Topamax.  She is taking this 50 mg twice a day and reports it does seem to be suboptimal in terms of efficacy.  She additionally takes Imitrex for abortive treatment, states she has to use this at least once a week but reports breakthrough headaches have been probably 3 times a week.  As mentioned she has history of some chronic neck pain associated with spondylosis she is status post an ACDF about 3 years ago.    Work-up to date has included an EMG which was performed by Dr. Otero last week on 4/12/2022, the study was essentially normal, showing no evidence of peripheral neuropathy or a lumbosacral radiculopathy.  Laboratory for treatable causes of neuropathy were also ordered and showed the following results:    Hemoglobin A1c normal at 5.3  RPR was nonreactive  Cryoglobulins negative  Heavy metals including lead arsenic and mercury were all within normal limits  Thyroid panel normal  B12 level normal  B6 level normal  Sjogren's antibodies negative  Rheumatoid factor negative  IEP/SPEP both showed no evidence of monoclonal gammopathy    Patient denies any known history of seizure, stroke or major head trauma.  Her family history is remarkable for some small strokes in her father.  She also reports her maternal grandfather had stroke, mom had migraine as well as diabetic neuropathy and she thinks some sort of vascular type of dementia.  Her sister had some chemo induced neuropathy from treatment for her ovarian cancer.  Patient is a former smoker (quit in 2014) and denies any significant alcohol use.        Past Medical History:   Diagnosis Date   • Anxiety    • Colon polyp 2017   • COVID-19 09/2020   • Delayed emergence from anesthesia 2018   • Depression 5/25/2016   • Diverticulitis of colon    • Diverticulosis of large intestine without hemorrhage 10/5/2016   • GERD (gastroesophageal reflux disease)    • Hiatal hernia    • History of gastric ulcer    • Low back pain    • Lumbosacral disc  disease    • Migraine without aura and without status migrainosus, not intractable 5/25/2016         Past Surgical History:   Procedure Laterality Date   • ANTERIOR CERVICAL DISCECTOMY W/ FUSION N/A 6/21/2018    Procedure: C6 7 anterior cervical discectomy and fusion with allograft and plate;  Surgeon: Jacobo De Dios MD;  Location: [facility] MAIN OR;  Service: Neurosurgery   • CHOLECYSTECTOMY N/A 1980   • COLON RESECTION N/A 1/16/2018    Procedure: LAPAROSCOPIC SIGMOID COLON RESECTION WITH MOBILIZATION OF SPLENIC FLEXURE;  Surgeon: Eric Davidson MD;  Location: [facility] MAIN OR;  Service:    • COLONOSCOPY N/A 2/15/2017    Procedure: COLONOSCOPY to cecum with biospsies with cold polypectomy;  Surgeon: Gerardo Tan MD;  [facility];  Service:    • COLONOSCOPY N/A 03/01/2003    Internal hemorrhoids-Dr. Gerardo Tan   • COLONOSCOPY N/A 2/8/2022    Procedure: COLONOSCOPY INTO CECUM WITH COLD BIOPSIES AND COLD BIOPSY POLYPECTOMY;  Surgeon: Cande Guzman MD;  Location: [facility] ENDOSCOPY;  Service: Gastroenterology;  Laterality: N/A;  PRE- HISTORY OF COLON POLYPS  POST- HEMORRHOIDS, DIVERTICULOSIS, POLYP   • DIAGNOSTIC LAPAROSCOPY N/A 06/06/2001    Cul-de-sac endometriosis and adhesions-Dr. Aamir Christine   • ENDOSCOPY N/A 2/15/2017    Procedure: ESOPHAGOGASTRODUODENOSCOPY with biopsies;  Surgeon: Gerardo Tan MD;  Location: [facility] ENDOSCOPY;  Service:    • ENDOSCOPY N/A 02/04/2009    Small Hiatal hernia-Dr. Gerardo Tan   • ENDOSCOPY N/A 2/8/2022    Procedure: ESOPHAGOGASTRODUODENOSCOPY WITH COLD BIOPSIES;  Surgeon: Cande Guzman MD;  Location: [facility] ENDOSCOPY;  Service: Gastroenterology;  Laterality: N/A;  PRE- NAUSEA, GERD  POST- IRREGULAR Z LINE, GASTRITIS     • TOTAL ABDOMINAL HYSTERECTOMY WITH SALPINGO OOPHORECTOMY Bilateral 07/31/2001    Dr. Aamir Christine         Current Outpatient Medications:   •  albuterol sulfate  (90 Base) MCG/ACT inhaler, INHALE TWO PUFFS INTO THE LUNGS EVERY FOUR  HOURS AS NEEDED, Disp: , Rfl:   •  ALPRAZolam (XANAX) 0.5 MG tablet, Take 1 tablet by mouth 2 (Two) Times a Day As Needed for anxiety, Disp: 60 tablet, Rfl: 2  •  amphetamine-dextroamphetamine (ADDERALL) 10 MG tablet, Take 1 tablet by mouth Every Morning., Disp: 30 tablet, Rfl: 0  •  buPROPion XL (WELLBUTRIN XL) 150 MG 24 hr tablet, Take 1 tablet by mouth Every Morning., Disp: 90 tablet, Rfl: 2  •  calcium carbonate (OS-MARY) 600 MG tablet, Take 600 mg by mouth 2 (Two) Times a Day., Disp: , Rfl:   •  cholecalciferol (VITAMIN D3) 25 MCG (1000 UT) tablet, Take 1,000 Units by mouth Daily., Disp: , Rfl:   •  DULoxetine (CYMBALTA) 60 MG capsule, Take 1 capsule by mouth Daily., Disp: 90 capsule, Rfl: 0  •  ipratropium-albuterol (DUO-NEB) 0.5-2.5 mg/3 ml nebulizer, Take 3 mL by nebulization Every 4 (Four) Hours As Needed., Disp: 360 mL, Rfl: 3  •  Loperamide HCl (IMODIUM A-D PO), Take  by mouth Daily., Disp: , Rfl:   •  lurasidone (Latuda) 40 MG tablet tablet, Take 1 tablet by mouth Every Evening with 400 calorie meal, Disp: 30 tablet, Rfl: 5  •  metaxalone (SKELAXIN) 800 MG tablet, Take 1 tablet by mouth 3 (Three) Times a Day As Needed for Muscle Spasms., Disp: 90 tablet, Rfl: 1  •  Methylcellulose, Laxative, (CITRUCEL PO), Take  by mouth Daily., Disp: , Rfl:   •  Multiple Vitamin (MULTI-VITAMINS PO), Take 1 tablet by mouth Daily. HOLD FOR SURGERY, Disp: , Rfl:   •  ondansetron (ZOFRAN) 4 MG tablet, Take 1 tablet by mouth Every 8 (Eight) Hours As Needed for Nausea or Vomiting., Disp: 30 tablet, Rfl: 2  •  pantoprazole (PROTONIX) 40 MG EC tablet, TAKE 1 TABLET BY MOUTH EVERY DAY IN THE MORNING, Disp: 90 tablet, Rfl: 1  •  SUMAtriptan (IMITREX) 100 MG tablet, TAKE 1 TABLET BY MOUTH AT ONSET OF HEADACHE, MAY REPEAT EVERY 2 HOURS AS NEEDED (MAX 200MG/DAY), Disp: 9 tablet, Rfl: 2  •  topiramate (TOPAMAX) 50 MG tablet, Take 1 tablet by mouth 2 (Two) Times a Day., Disp: 180 tablet, Rfl: 1  •  traZODone (DESYREL) 50 MG tablet,  Take 1.5 tablets by mouth Every Night for 90 days., Disp: 180 tablet, Rfl: 1  •  gabapentin (NEURONTIN) 300 MG capsule, Take 1 capsule by mouth 2 (Two) Times a Day for 30 days., Disp: 60 capsule, Rfl: 2  •  Tdap (Boostrix) 5-2.5-18.5 LF-MCG/0.5 injection, Inject 0.5 mL into the appropriate muscle as directed by prescriber., Disp: 0.5 mL, Rfl: 0      Family History   Problem Relation Age of Onset   • Alzheimer's disease Mother         SDAT   • Hypertension Mother    • Diabetes type II Mother    • Lung cancer Mother         POSSIBLE   • Heart attack Mother    • Cancer Mother         lung   • COPD Mother    • Depression Mother    • Diabetes Mother    • Heart disease Mother    • Miscarriages / Stillbirths Mother    • Alcohol abuse Father    • Prostate cancer Father    • Heart disease Father         THIRD DEGREE HEART BLOCK   • Arthritis Father    • Cancer Father         prostate   • Drug abuse Father    • Learning disabilities Father    • Ovarian cancer Sister    • No Known Problems Brother    • Ovarian cancer Sister    • Arthritis Maternal Grandmother         rheumatoid   • Arthritis Sister    • Cancer Sister         ovarian   • Depression Sister    • Hypertension Sister    • Cancer Sister         ovarian   • Depression Sister    • Hypertension Sister    • Breast cancer Other    • Malig Hyperthermia Neg Hx          Social History     Socioeconomic History   • Marital status:      Spouse name: Lonnie   • Number of children: 3   Tobacco Use   • Smoking status: Former Smoker     Packs/day: 1.00     Years: 40.00     Pack years: 40.00     Types: Cigarettes     Quit date: 3/10/2014     Years since quittin.1   • Smokeless tobacco: Never Used   Vaping Use   • Vaping Use: Former   Substance and Sexual Activity   • Alcohol use: Yes     Comment: SELDOM   • Drug use: No   • Sexual activity: Yes     Partners: Male     Birth control/protection: Post-menopausal         No Known Allergies      ROS:  Review of Systems  "  Constitutional: Positive for fatigue. Negative for activity change and appetite change.   Eyes: Negative for pain, redness and itching.   Respiratory: Positive for choking. Negative for cough and shortness of breath.    Allergic/Immunologic: Negative for environmental allergies and food allergies.   Neurological: Positive for tremors, speech difficulty and numbness. Negative for dizziness, seizures, syncope, facial asymmetry, weakness, light-headedness and headaches.   Psychiatric/Behavioral: Positive for sleep disturbance. Negative for agitation, behavioral problems, confusion, decreased concentration, dysphoric mood, hallucinations, self-injury and suicidal ideas. The patient is nervous/anxious. The patient is not hyperactive.      I have reviewed the above ROS put in by the medical assistant and am in agreement.     Physical Exam:  Vitals:    04/18/22 1054   BP: 132/90   Pulse: 93   SpO2: 95%   Weight: 82.1 kg (181 lb)   Height: 167.6 cm (66\")       Body mass index is 29.21 kg/m².    Physical Exam  General: Well-developed white female, slightly overweight, no acute distress  HEENT: Head is normocephalic, atraumatic.  Oropharynx is clear  Neck: Neck is supple with no masses.  No cervical bruits  Heart: Regular rate and rhythm today.  No murmur  Extremities: Distal pulses are palpable and equal.  There is mild nonpitting edema in her lower extremities    Neurological Exam:   Mental Status: Awake, alert, oriented to person, place and time.  Conversant without evidence of an affective disorder, thought disorder, delusions or hallucinations.  Attention span and concentration are normal.  HCF: No aphasia, apraxia or dysarthria.  Recent and remote memory intact.  Knowledge of recent events intact.  CN: I:   II: Visual fields full without left inattention   III, IV, VI: Eye movements intact without nystagmus or ptosis.  Pupils equal round and reactive to light.   V,VII: Light touch and pinprick intact all 3 divisions " of V.  Facial muscles symmetrical.   VIII: Hearing intact to finger rub   IX,X: Soft palate elevates symmetrically   XI: Sternomastoid and trapezius are strong.   XII: Tongue midline without atrophy or fasciculations  Motor: Normal tone and bulk in the upper and lower extremities   Power testing: There is very mild weakness detected bilaterally in APBs otherwise strength is very well intact in her upper and lower extremities.  Reflexes: Upper extremities: +1 diffusely in upper extremities        Lower extremities: +1 at knees, trace to absent at ankles        Toe signs: Downgoing bilaterally  Sensory: Light touch: Somewhat diminished at bilateral thumbs otherwise intact in upper and lower extremities        Pinprick: Slightly diminished/patchy at fingertips bilaterally.  Patchy lower extremities        Vibration: Reduced at ankles bilaterally        Position: Intact great toes  Cerebellar: Finger-to-nose: Intact           Rapid movement: Intact           Heel-to-shin: Intact  Gait and Station: Patient comes to stand without difficulty.  Station is very slightly broad-based but casual walk is otherwise normal.  Patient can walk effectively on her toes and her heels.  Tandem walk is minimally impaired.  Negative Romberg, negative direct    Results:      Lab Results   Component Value Date    GLUCOSE 109 (H) 08/09/2021    BUN 11 08/09/2021    CREATININE 1.09 (H) 08/09/2021    EGFRIFNONA 50 (L) 08/09/2021    EGFRIFAFRI 65 12/22/2020    BCR 10.1 08/09/2021    CO2 20.3 (L) 08/09/2021    CALCIUM 9.6 08/09/2021    PROTENTOTREF 7.2 01/17/2022    ALBUMIN 4.0 01/17/2022    LABIL2 1.3 01/17/2022    AST 16 08/09/2021    ALT 14 08/09/2021       Lab Results   Component Value Date    WBC 11.42 (H) 08/09/2021    HGB 14.1 08/09/2021    HCT 41.0 08/09/2021    MCV 89.5 08/09/2021     08/09/2021         .No results found for: RPR      Lab Results   Component Value Date    TSH 1.660 08/09/2021         Lab Results   Component Value  Date    [id number] 654 08/09/2021         No results found for: FOLATE      Lab Results   Component Value Date    HGBA1C 5.30 01/17/2022         Lab Results   Component Value Date    GLUCOSE 109 (H) 08/09/2021    BUN 11 08/09/2021    CREATININE 1.09 (H) 08/09/2021    EGFRIFNONA 50 (L) 08/09/2021    EGFRIFAFRI 65 12/22/2020    BCR 10.1 08/09/2021    K 4.1 08/09/2021    CO2 20.3 (L) 08/09/2021    CALCIUM 9.6 08/09/2021    PROTENTOTREF 7.2 01/17/2022    ALBUMIN 4.0 01/17/2022    LABIL2 1.3 01/17/2022    AST 16 08/09/2021    ALT 14 08/09/2021         Lab Results   Component Value Date    WBC 11.42 (H) 08/09/2021    HGB 14.1 08/09/2021    HCT 41.0 08/09/2021    MCV 89.5 08/09/2021     08/09/2021         Assessment:   1.  Paresthesias/dysesthesia to lower extremities, suspicious for small fiber neuropathy  2.  Migraine headaches  3.  History of carpal tunnel syndrome, status post steroid injections minimal findings on exam today  4.  History of cervical radiculopathy, status post ACDF in 2018; no recurrent symptoms    Plan:  1.  Discussed work-up to date, EMG was normal and laboratory studies did not reveal any sort of treatable etiology.  Patient with some subtle sensory deficits on her exam today, some of which are consistent with a small fiber type of neuropathy.  Additionally I wonder if the Topamax she is taking could be contributing.  As such I will like to try to taper her off of the Topamax - which patient indicates is suboptimally controlling her migraines at present anyway.  We will decrease by 25 mg daily per week until drug has been discontinued completely.  - For her painful symptoms I would like to try her on some gabapentin, she has taken this previously for migraines in the past reports she tolerated it well.  Advised to start with 300 mg once daily and may uptitrate to twice daily if needed and tolerated  -If symptoms persist further work-up could include skin biopsy, will wait and see how she does  without the Topamax on board.    2.  In terms of the migraines, hopefully with the gabapentin on board we will not need to add an additional preventative therapy.  Noted she is taking Cymbalta 60 mg as well.  I think for abortive therapy there are safer options for her than Imitrex.  Instead we will try her on Ubrelvy, risk and benefits of medicine discussed.  - Advised that migraines continue to be worse would consider repeating brain imaging with another scan this has not been done since 2015    Plan on following up in about 2 months or sooner should need arise    I spent 40 minutes face-to-face with patient/family today, 20 minutes of that time was counseling patient on disease course and plan of care and answering any questions that they had.           Dictated utilizing Dragon dictation.

## 2022-04-22 ENCOUNTER — TELEPHONE (OUTPATIENT)
Dept: INTERNAL MEDICINE | Facility: CLINIC | Age: 67
End: 2022-04-22

## 2022-04-22 NOTE — TELEPHONE ENCOUNTER
Caller: Noble Magdalena H    Relationship: Self    Best call back number: [phone]       What medications are you currently taking:   Current Outpatient Medications on File Prior to Visit   Medication Sig Dispense Refill   • albuterol sulfate  (90 Base) MCG/ACT inhaler INHALE TWO PUFFS INTO THE LUNGS EVERY FOUR HOURS AS NEEDED     • ALPRAZolam (XANAX) 0.5 MG tablet Take 1 tablet by mouth 2 (Two) Times a Day As Needed for anxiety 60 tablet 2   • amphetamine-dextroamphetamine (ADDERALL) 10 MG tablet Take 1 tablet by mouth Every Morning. 30 tablet 0   • buPROPion XL (WELLBUTRIN XL) 150 MG 24 hr tablet Take 1 tablet by mouth Every Morning. 90 tablet 2   • calcium carbonate (OS-MARY) 600 MG tablet Take 600 mg by mouth 2 (Two) Times a Day.     • cholecalciferol (VITAMIN D3) 25 MCG (1000 UT) tablet Take 1,000 Units by mouth Daily.     • DULoxetine (CYMBALTA) 60 MG capsule Take 1 capsule by mouth Daily. 90 capsule 0   • gabapentin (NEURONTIN) 300 MG capsule Take 1 capsule by mouth 2 (Two) Times a Day for 30 days. 60 capsule 2   • ipratropium-albuterol (DUO-NEB) 0.5-2.5 mg/3 ml nebulizer Take 3 mL by nebulization Every 4 (Four) Hours As Needed. 360 mL 3   • Loperamide HCl (IMODIUM A-D PO) Take  by mouth Daily.     • lurasidone (Latuda) 40 MG tablet tablet Take 1 tablet by mouth Every Evening with 400 calorie meal 30 tablet 5   • metaxalone (SKELAXIN) 800 MG tablet Take 1 tablet by mouth 3 (Three) Times a Day As Needed for Muscle Spasms. 90 tablet 1   • Methylcellulose, Laxative, (CITRUCEL PO) Take  by mouth Daily.     • Multiple Vitamin (MULTI-VITAMINS PO) Take 1 tablet by mouth Daily. HOLD FOR SURGERY     • ondansetron (ZOFRAN) 4 MG tablet Take 1 tablet by mouth Every 8 (Eight) Hours As Needed for Nausea or Vomiting. 30 tablet 2   • pantoprazole (PROTONIX) 40 MG EC tablet TAKE 1 TABLET BY MOUTH EVERY DAY IN THE MORNING 90 tablet 1   • Tdap (Boostrix) 5-2.5-18.5 LF-MCG/0.5 injection Inject 0.5 mL into the  appropriate muscle as directed by prescriber. 0.5 mL 0   • topiramate (TOPAMAX) 50 MG tablet Take 1 tablet by mouth 2 (Two) Times a Day. 180 tablet 1   • traZODone (DESYREL) 50 MG tablet Take 1.5 tablets by mouth Every Night for 90 days. 180 tablet 1   • ubrogepant (ubrogepant) 100 MG tablet Take 1 tablet by mouth 1 (One) Time As Needed (migraine) for up to 6 doses. 6 tablet 0     No current facility-administered medications on file prior to visit.          ADDITIONAL NOTES:    metaxalone (SKELAXIN) 800 MG tablet      PATIENT HAS A NEW INSURANCE THAT WILL NOT PAY FOR THE ABOVE DRUG.  IT WILL COST HER OVER $150.00 OUT OF POCKET.  SO THE PATIENT IS REQUESTING TO CHANGE THIS MEDICATION TO ONE OF THE FOLLOWING THAT THE INSURANCE WILL COVER.    TITIZANIDINE, CYCLODENZABRINE, BACLOFEN    PLEASE CALL PATIENT AND ADVISE.  SHE USES THE BELOW PHARMACY:    [facility] [address] [address] [phone] [facility] [phone] FX

## 2022-04-25 RX ORDER — BACLOFEN 10 MG/1
10 TABLET ORAL 2 TIMES DAILY
Qty: 60 TABLET | Refills: 0 | Status: SHIPPED | OUTPATIENT
Start: 2022-04-25 | End: 2022-05-18

## 2022-05-04 DIAGNOSIS — G43.009 MIGRAINE WITHOUT AURA AND WITHOUT STATUS MIGRAINOSUS, NOT INTRACTABLE: ICD-10-CM

## 2022-05-04 RX ORDER — TOPIRAMATE 50 MG/1
50 TABLET, FILM COATED ORAL 2 TIMES DAILY
Qty: 180 TABLET | Refills: 1 | Status: SHIPPED | OUTPATIENT
Start: 2022-05-04 | End: 2022-05-19 | Stop reason: ALTCHOICE

## 2022-05-18 RX ORDER — BACLOFEN 10 MG/1
TABLET ORAL
Qty: 60 TABLET | Refills: 0 | Status: SHIPPED | OUTPATIENT
Start: 2022-05-18 | End: 2022-05-19 | Stop reason: DRUGHIGH

## 2022-05-19 ENCOUNTER — OFFICE VISIT (OUTPATIENT)
Dept: INTERNAL MEDICINE | Facility: CLINIC | Age: 67
End: 2022-05-19

## 2022-05-19 VITALS
DIASTOLIC BLOOD PRESSURE: 84 MMHG | WEIGHT: 185 LBS | SYSTOLIC BLOOD PRESSURE: 150 MMHG | HEART RATE: 78 BPM | TEMPERATURE: 97.3 F | BODY MASS INDEX: 29.73 KG/M2 | HEIGHT: 66 IN

## 2022-05-19 DIAGNOSIS — R25.2 MUSCLE CRAMPS: Primary | ICD-10-CM

## 2022-05-19 DIAGNOSIS — I10 ESSENTIAL HYPERTENSION: ICD-10-CM

## 2022-05-19 DIAGNOSIS — N95.1 VASOMOTOR SYMPTOMS DUE TO MENOPAUSE: ICD-10-CM

## 2022-05-19 PROBLEM — J12.82 PNEUMONIA DUE TO COVID-19 VIRUS: Status: RESOLVED | Noted: 2020-08-24 | Resolved: 2022-05-19

## 2022-05-19 PROBLEM — R11.0 NAUSEA: Status: RESOLVED | Noted: 2021-12-13 | Resolved: 2022-05-19

## 2022-05-19 PROBLEM — U07.1 PNEUMONIA DUE TO COVID-19 VIRUS: Status: RESOLVED | Noted: 2020-08-24 | Resolved: 2022-05-19

## 2022-05-19 PROCEDURE — 99214 OFFICE O/P EST MOD 30 MIN: CPT | Performed by: INTERNAL MEDICINE

## 2022-05-19 RX ORDER — LOSARTAN POTASSIUM 50 MG/1
50 TABLET ORAL DAILY
Qty: 90 TABLET | Refills: 0 | Status: SHIPPED | OUTPATIENT
Start: 2022-05-19 | End: 2022-06-28

## 2022-05-19 RX ORDER — BACLOFEN 20 MG/1
20 TABLET ORAL 3 TIMES DAILY
Qty: 60 TABLET | Refills: 3 | Status: SHIPPED | OUTPATIENT
Start: 2022-05-19 | End: 2022-08-29

## 2022-05-19 NOTE — PROGRESS NOTES
"Chief Complaint  Hot Flashes    Subjective          Magdalena Dickey presents to [facility]  History of Present Illness  Does not feel like the baclofen is working as well as the skelaxin.  She has been at least 2 per day.    Having hot flashes again, once a day, similar to how she felt when she had her hysterectomy. Total at age 46, took HRT for 1 year but stopped because she still smoked.    Dr. Duval is looking for causes of her neuropathy, so far negative.  She is off topiramate and switched to ubrelvy. No other medication changes.   BP readings have been elevated lately- 150/100 at psych last week.   Frustrated about her weight- eats just one meal a day, denies snacking- does not exercise.     Objective   Vital Signs:  /84   Pulse 78   Temp 97.3 °F (36.3 °C)   Ht 167.6 cm (66\")   Wt 83.9 kg (185 lb)   BMI 29.86 kg/m²         Physical Exam  Constitutional:       Appearance: Normal appearance.   Cardiovascular:      Rate and Rhythm: Normal rate and regular rhythm.   Pulmonary:      Effort: Pulmonary effort is normal.   Musculoskeletal:      Right lower leg: No edema.      Left lower leg: No edema.   Lymphadenopathy:      Cervical: No cervical adenopathy.        Result Review :   The following data was reviewed by: Sun Guerrier MD on 05/19/2022:    Data reviewed: Consultant notes Dr. Duval          Assessment and Plan    Diagnoses and all orders for this visit:    1. Muscle cramps (Primary)  Comments:  will try to increase dose of baclofen - encouraged continued physical activity, stretching to help as well.   Orders:  -     baclofen (LIORESAL) 20 MG tablet; Take 1 tablet by mouth 3 (Three) Times a Day.  Dispense: 60 tablet; Refill: 3    2. Essential hypertension  Comments:  needs to be treated at this point- losartan 50 mg added- may also help with migraine headaches.   Orders:  -     losartan (Cozaar) 50 MG tablet; Take 1 tablet by mouth Daily.  Dispense: 90 " tablet; Refill: 0    3. Vasomotor symptoms due to menopause  Comments:  will see if any improvement with the above- if not could consider low dose HRT now that she is no longer smoking.              Follow Up   Return in about 4 weeks (around 6/16/2022) for Recheck.  Patient was given instructions and counseling regarding her condition or for health maintenance advice. Please see specific information pulled into the AVS if appropriate.

## 2022-06-09 ENCOUNTER — TELEPHONE (OUTPATIENT)
Dept: GASTROENTEROLOGY | Facility: CLINIC | Age: 67
End: 2022-06-09

## 2022-06-09 RX ORDER — PANTOPRAZOLE SODIUM 40 MG/1
TABLET, DELAYED RELEASE ORAL
Qty: 90 TABLET | Refills: 1 | Status: SHIPPED | OUTPATIENT
Start: 2022-06-09 | End: 2022-12-05

## 2022-06-09 NOTE — TELEPHONE ENCOUNTER
----- Message from Magdalena Dickey sent at 6/9/2022 12:49 PM EDT -----  Regarding: Protonix  Dr. Guzman,   My pharmacy has sent you a request for a refill on my Protonix twice and have not received a reply from you.   If you did not receive that request, please call them in to [facility] [phone].  Thank you so much and I will see you Monday.    Magdalena Dickey  1955  [phone]

## 2022-06-13 ENCOUNTER — OFFICE VISIT (OUTPATIENT)
Dept: GASTROENTEROLOGY | Facility: CLINIC | Age: 67
End: 2022-06-13

## 2022-06-13 VITALS
HEIGHT: 66 IN | HEART RATE: 73 BPM | WEIGHT: 185 LBS | BODY MASS INDEX: 29.73 KG/M2 | DIASTOLIC BLOOD PRESSURE: 82 MMHG | SYSTOLIC BLOOD PRESSURE: 129 MMHG | TEMPERATURE: 96.8 F

## 2022-06-13 DIAGNOSIS — K58.0 IRRITABLE BOWEL SYNDROME WITH DIARRHEA: Primary | Chronic | ICD-10-CM

## 2022-06-13 DIAGNOSIS — R15.1 FECAL SMEARING: Chronic | ICD-10-CM

## 2022-06-13 DIAGNOSIS — R10.9 ABDOMINAL CRAMPING: ICD-10-CM

## 2022-06-13 DIAGNOSIS — R11.0 NAUSEA: ICD-10-CM

## 2022-06-13 PROCEDURE — 99214 OFFICE O/P EST MOD 30 MIN: CPT | Performed by: INTERNAL MEDICINE

## 2022-06-13 NOTE — PROGRESS NOTES
Chief Complaint   Patient presents with   • Irritable Bowel Syndrome   • Diarrhea   • Abdominal Pain   • Nausea       Subjective     HPI    Magdalena Dickey is a 66 y.o. female with a past medical history noted below who presents for follow up of intermittent diarrhea episodes, fecal incontinence, history of colon resection for diverticulitis, and nausea.       2/8/2022 EGD and colonoscopy with evidence of mild GERD, 1 tubular adenoma colon polyp, normal random colon biopsies.    Having some lower abdominal cramping.  Can last 5-6 hours at a time.  Worse with having a BM but also ocurring without needing to have a BM.  Present for about 1 month.  Only change has been addition of gabapentin, ubrelvy, and losartan.  Review of side effects of all these medications indicated that losartan can cause issues with abdominal pain.    Taking Imodium, 1 tablet every morning.  Taking Citrucel but down to 2 capsules daily because if she takes more she gets constipated.  Having rare incontinence episodes    Adherent to Protonix for reflux.  Nausea symptoms are improved.    Follows up with her pcp next week    Today's visit was in the office.  Both the patient and I were wearing face masks and proper hand hygiene was performed before and after the physical exam.           Current Outpatient Medications:   •  albuterol sulfate  (90 Base) MCG/ACT inhaler, INHALE TWO PUFFS INTO THE LUNGS EVERY FOUR HOURS AS NEEDED, Disp: , Rfl:   •  ALPRAZolam (XANAX) 0.5 MG tablet, Take 1 tablet by mouth 2 (Two) Times a Day As Needed for anxiety, Disp: 60 tablet, Rfl: 2  •  amphetamine-dextroamphetamine (ADDERALL) 10 MG tablet, Take 1 tablet by mouth Every Morning., Disp: 30 tablet, Rfl: 0  •  baclofen (LIORESAL) 20 MG tablet, Take 1 tablet by mouth 3 (Three) Times a Day., Disp: 60 tablet, Rfl: 3  •  buPROPion XL (WELLBUTRIN XL) 150 MG 24 hr tablet, Take 1 tablet by mouth Every Morning., Disp: 90 tablet, Rfl: 2  •  calcium carbonate (OS-MARY)  600 MG tablet, Take 600 mg by mouth 2 (Two) Times a Day., Disp: , Rfl:   •  cholecalciferol (VITAMIN D3) 25 MCG (1000 UT) tablet, Take 1,000 Units by mouth Daily., Disp: , Rfl:   •  DULoxetine (CYMBALTA) 60 MG capsule, Take 1 capsule by mouth Daily., Disp: 90 capsule, Rfl: 0  •  ipratropium-albuterol (DUO-NEB) 0.5-2.5 mg/3 ml nebulizer, Take 3 mL by nebulization Every 4 (Four) Hours As Needed., Disp: 360 mL, Rfl: 3  •  Loperamide HCl (IMODIUM A-D PO), Take  by mouth Daily., Disp: , Rfl:   •  losartan (Cozaar) 50 MG tablet, Take 1 tablet by mouth Daily., Disp: 90 tablet, Rfl: 0  •  lurasidone (Latuda) 40 MG tablet tablet, Take 1 tablet by mouth Every Evening with 400 calorie meal, Disp: 30 tablet, Rfl: 5  •  Methylcellulose, Laxative, (CITRUCEL PO), Take  by mouth Daily., Disp: , Rfl:   •  Multiple Vitamin (MULTI-VITAMINS PO), Take 1 tablet by mouth Daily. HOLD FOR SURGERY, Disp: , Rfl:   •  ondansetron (ZOFRAN) 4 MG tablet, Take 1 tablet by mouth Every 8 (Eight) Hours As Needed for Nausea or Vomiting., Disp: 30 tablet, Rfl: 2  •  pantoprazole (PROTONIX) 40 MG EC tablet, TAKE 1 TABLET BY MOUTH EVERY DAY IN THE MORNING, Disp: 90 tablet, Rfl: 1  •  traZODone (DESYREL) 50 MG tablet, Take 1.5 tablets by mouth Every Night for 90 days., Disp: 180 tablet, Rfl: 1  •  ubrogepant (ubrogepant) 100 MG tablet, Take 1 tablet by mouth 1 (One) Time As Needed (migraine) for up to 6 doses., Disp: 6 tablet, Rfl: 0  •  gabapentin (NEURONTIN) 300 MG capsule, Take 1 capsule by mouth 2 (Two) Times a Day for 30 days., Disp: 60 capsule, Rfl: 2      Objective     Vitals:    06/13/22 0753   BP: 129/82   Pulse: 73   Temp: 96.8 °F (36 °C)         06/13/22  0753   Weight: 83.9 kg (185 lb)     Body mass index is 29.86 kg/m².    Physical Exam  Constitutional:       General: She is not in acute distress.  Pulmonary:      Effort: Pulmonary effort is normal.   Neurological:      Mental Status: She is alert and oriented to person, place, and time.    Psychiatric:         Mood and Affect: Mood normal.         Behavior: Behavior normal.         Thought Content: Thought content normal.         Judgment: Judgment normal.             WBC   Date Value Ref Range Status   08/09/2021 11.42 (H) 3.40 - 10.80 10*3/mm3 Final   12/22/2020 12.7 (H) 3.4 - 10.8 x10E3/uL Final     RBC   Date Value Ref Range Status   08/09/2021 4.58 3.77 - 5.28 10*6/mm3 Final   12/22/2020 4.22 3.77 - 5.28 x10E6/uL Final     Hemoglobin   Date Value Ref Range Status   08/09/2021 14.1 12.0 - 15.9 g/dL Final     Hematocrit   Date Value Ref Range Status   08/09/2021 41.0 34.0 - 46.6 % Final     MCV   Date Value Ref Range Status   08/09/2021 89.5 79.0 - 97.0 fL Final     MCH   Date Value Ref Range Status   08/09/2021 30.8 26.6 - 33.0 pg Final     MCHC   Date Value Ref Range Status   08/09/2021 34.4 31.5 - 35.7 g/dL Final     RDW   Date Value Ref Range Status   08/09/2021 13.4 12.3 - 15.4 % Final     RDW-SD   Date Value Ref Range Status   08/09/2021 43.9 37.0 - 54.0 fl Final     MPV   Date Value Ref Range Status   08/09/2021 9.7 6.0 - 12.0 fL Final     Platelets   Date Value Ref Range Status   08/09/2021 320 140 - 450 10*3/mm3 Final     Neutrophil %   Date Value Ref Range Status   08/09/2021 68.9 42.7 - 76.0 % Final     Lymphocyte %   Date Value Ref Range Status   08/09/2021 22.3 19.6 - 45.3 % Final     Monocyte %   Date Value Ref Range Status   08/09/2021 6.7 5.0 - 12.0 % Final     Eosinophil %   Date Value Ref Range Status   08/09/2021 0.7 0.3 - 6.2 % Final     Basophil %   Date Value Ref Range Status   08/09/2021 0.9 0.0 - 1.5 % Final     Immature Grans %   Date Value Ref Range Status   08/09/2021 0.5 0.0 - 0.5 % Final     Neutrophils, Absolute   Date Value Ref Range Status   08/09/2021 7.86 (H) 1.70 - 7.00 10*3/mm3 Final     Lymphocytes, Absolute   Date Value Ref Range Status   08/09/2021 2.55 0.70 - 3.10 10*3/mm3 Final     Monocytes, Absolute   Date Value Ref Range Status   08/09/2021 0.77 0.10 -  0.90 10*3/mm3 Final     Eosinophils, Absolute   Date Value Ref Range Status   08/09/2021 0.08 0.00 - 0.40 10*3/mm3 Final     Basophils, Absolute   Date Value Ref Range Status   08/09/2021 0.10 0.00 - 0.20 10*3/mm3 Final     Immature Grans, Absolute   Date Value Ref Range Status   08/09/2021 0.06 (H) 0.00 - 0.05 10*3/mm3 Final     nRBC   Date Value Ref Range Status   08/09/2021 0.0 0.0 - 0.2 /100 WBC Final       Lab Results   Component Value Date    GLUCOSE 109 (H) 08/09/2021    BUN 11 08/09/2021    CREATININE 1.09 (H) 08/09/2021    EGFRIFNONA 50 (L) 08/09/2021    EGFRIFAFRI 65 12/22/2020    BCR 10.1 08/09/2021    CO2 20.3 (L) 08/09/2021    CALCIUM 9.6 08/09/2021    PROTENTOTREF 7.2 01/17/2022    ALBUMIN 4.0 01/17/2022    LABIL2 1.3 01/17/2022    AST 16 08/09/2021    ALT 14 08/09/2021         Imaging Results (Last 7 Days)     ** No results found for the last 168 hours. **          I personally reviewed data as detailed below:       Office notes from: 5/19/22 pcp    Endoscopy procedures and pathology from: 2/8/2022 EGD and colonoscopy    Assessment and Plan  1.  IBS with diarrhea: For the most part well controlled with 2 mg Imodium daily and Citrucel    2.  Fecal smearing: With above    3.  Nausea: Improved    4.  Abdominal cramping: New issue.  It is been around the time that she is started losartan and this can be a side effect of this medication    Plan  Trial of OTC IBgard for the abdominal cramping issues.  If this does not help her out, she is going to talk to Dr. Guerrier about coming off the losartan and onto a different blood pressure medication as this can be a side effect  Continue Imodium  Continue fiber supplementation  Continue PPI         Diagnoses and all orders for this visit:    1. Irritable bowel syndrome with diarrhea (Primary)    2. Fecal smearing    3. Nausea    4. Abdominal cramping          I have discussed the above plan with the patient.  They verbalize understanding and are in agreement with  the plan.  They have been advised to contact the office for any questions, concerns, or changes related to their health.    Dictated utilizing Dragon dictation

## 2022-06-21 ENCOUNTER — OFFICE VISIT (OUTPATIENT)
Dept: INTERNAL MEDICINE | Facility: CLINIC | Age: 67
End: 2022-06-21

## 2022-06-21 ENCOUNTER — OFFICE VISIT (OUTPATIENT)
Dept: NEUROLOGY | Facility: CLINIC | Age: 67
End: 2022-06-21

## 2022-06-21 VITALS
DIASTOLIC BLOOD PRESSURE: 76 MMHG | OXYGEN SATURATION: 95 % | HEART RATE: 82 BPM | WEIGHT: 184 LBS | SYSTOLIC BLOOD PRESSURE: 114 MMHG | BODY MASS INDEX: 29.7 KG/M2

## 2022-06-21 VITALS
BODY MASS INDEX: 29.57 KG/M2 | TEMPERATURE: 97.3 F | SYSTOLIC BLOOD PRESSURE: 122 MMHG | WEIGHT: 184 LBS | DIASTOLIC BLOOD PRESSURE: 78 MMHG | HEIGHT: 66 IN | HEART RATE: 80 BPM

## 2022-06-21 DIAGNOSIS — G43.009 MIGRAINE WITHOUT AURA AND WITHOUT STATUS MIGRAINOSUS, NOT INTRACTABLE: ICD-10-CM

## 2022-06-21 DIAGNOSIS — I10 ESSENTIAL HYPERTENSION: Primary | ICD-10-CM

## 2022-06-21 DIAGNOSIS — R20.2 PARESTHESIAS: Primary | ICD-10-CM

## 2022-06-21 PROBLEM — Z86.010 PERSONAL HISTORY OF COLONIC POLYPS: Status: RESOLVED | Noted: 2020-02-19 | Resolved: 2022-06-21

## 2022-06-21 PROBLEM — R10.9 ABDOMINAL CRAMPING: Status: RESOLVED | Noted: 2022-06-13 | Resolved: 2022-06-21

## 2022-06-21 PROBLEM — Z86.0100 PERSONAL HISTORY OF COLONIC POLYPS: Status: RESOLVED | Noted: 2020-02-19 | Resolved: 2022-06-21

## 2022-06-21 PROBLEM — R11.0 NAUSEA: Status: RESOLVED | Noted: 2021-12-13 | Resolved: 2022-06-21

## 2022-06-21 PROCEDURE — 99213 OFFICE O/P EST LOW 20 MIN: CPT | Performed by: INTERNAL MEDICINE

## 2022-06-21 PROCEDURE — 99214 OFFICE O/P EST MOD 30 MIN: CPT | Performed by: PHYSICIAN ASSISTANT

## 2022-06-21 RX ORDER — GABAPENTIN 300 MG/1
300 CAPSULE ORAL 2 TIMES DAILY
Qty: 180 CAPSULE | Refills: 0 | Status: SHIPPED | OUTPATIENT
Start: 2022-06-21 | End: 2022-07-08

## 2022-06-21 RX ORDER — ATOGEPANT 60 MG/1
60 TABLET ORAL DAILY
Qty: 30 TABLET | Refills: 2 | Status: SHIPPED | OUTPATIENT
Start: 2022-06-21 | End: 2022-07-21

## 2022-06-21 NOTE — PROGRESS NOTES
"Chief Complaint  Hypertension    Subjective        Magdalena Dickey presents to [facility]  History of Present Illness Here to f/u the addition of losartan.  It was started the same day as the neurologist started her on gabapentin.  Her head feels \"funny\" since- not her normal headache but also doesn't feel the gabapentin has helped her headaches. It does help the neuropathy in her feet. She is going to have skin bx to look for small vessel neuropathy.  Neurology also d/c'd Topamax due to the ? Of association with neuropathy.  BP readings at home have been < 130 with only one reading over 140 since last ov.     Objective   Vital Signs:  /78   Pulse 80   Temp 97.3 °F (36.3 °C)   Ht 167.6 cm (66\")   Wt 83.5 kg (184 lb)   BMI 29.70 kg/m²   Estimated body mass index is 29.7 kg/m² as calculated from the following:    Height as of this encounter: 167.6 cm (66\").    Weight as of this encounter: 83.5 kg (184 lb).          Physical Exam  Constitutional:       Appearance: Normal appearance.   Cardiovascular:      Rate and Rhythm: Normal rate.   Neurological:      General: No focal deficit present.        Result Review :  The following data was reviewed by: Sun Guerrier MD on 06/21/2022:  Common labs    Common Labsle 8/9/21 8/9/21 1/17/22 1/17/22    0834 0834 1041 1041   Glucose  109 (A)     BUN  11     Creatinine  1.09 (A)     eGFR Non African Am  50 (A)     Sodium  142     Potassium  4.1     Chloride  110 (A)     Calcium  9.6     Total Protein    7.2   Albumin  4.40  4.0   Total Bilirubin  0.3     Alkaline Phosphatase  101     AST (SGOT)  16     ALT (SGPT)  14     WBC 11.42 (A)      Hemoglobin 14.1      Hematocrit 41.0      Platelets 320      Hemoglobin A1C   5.30    (A) Abnormal value            Data reviewed: Consultant notes neurology          Assessment and Plan   Diagnoses and all orders for this visit:    1. Essential hypertension (Primary)  Comments:  much improved with the " addition of losartan 50 mg- continue to monitor at home.     2. Migraine without aura and without status migrainosus, not intractable  Comments:  work-up underway with neurology- encouraged her to share side effects of the gabapentin [facility] neurology.             Follow Up   Return in about 6 months (around 12/21/2022) for Medicare Wellness, Lab Before FUP.  Patient was given instructions and counseling regarding her condition or for health maintenance advice. Please see specific information pulled into the AVS if appropriate.

## 2022-06-21 NOTE — PROGRESS NOTES
CC: Follow-up paresthesias of bilateral feet, migraine    HPI:  Magdalena Dickey is a  66 y.o.  right-handed female who I am seeing in follow-up mainly regarding paresthesias/dysesthias in her bilateral feet as well as migraines/chronic headaches.  Other past medical history is notable for GERD/peptic ulcer disease, cervical spondylosis and radiculopathy status post C6-7 anterior cervical discectomy and fusion with cage and plate (2018), as well as major depression and anxiety.  Additionally patient contracted COVID in August 2020 and was hospitalized for about 2 weeks, she was initially in the ICU but ultimately did not require ventilation; she has had some sort of reactive airway condition since this.  Patient was last seen on 4/12/2022, a summary of her condition is taken from previous note with additions and amendments as needed:     Patient was initially referred for neurological consult regarding evaluation for neuropathy.  Patient reports symptoms developed probably 2 or 3 years ago, it was before she was diagnosed with COVID but she reports that since she had COVID they do seem worse.  She mostly describes constant stabbing pains in the toes primarily which ascended up in the right leg more so than the left into the lower portion of the lower leg.  These occur randomly whether standing or reclined.    Previously she has had symptoms of carpal tunnel, this was years ago, she did utilize night splints and also had injections which helped tremendously.  She really does not notice much numbness and tingling in her hands of late.  She does report she has noticed some trouble with her balance, no significant changes to her gait however.  There are been no recent falls.     In terms of her migraines patient reports she has had issues with this since young adulthood.  Previously she has taken propanolol and also gabapentin.  She states these seem to work well transiently but ultimately she was changed to Topamax.   She is taking this 50 mg twice a day and reports it does seem to be suboptimal in terms of efficacy.  She additionally takes Imitrex for abortive treatment, states she has to use this at least once a week but reports breakthrough headaches have been probably 3 times a week.  As mentioned she has history of some chronic neck pain associated with spondylosis she is status post an ACDF about 3 years ago.     Work-up to date has included an EMG which was performed by Dr. Otero on 4/12/2022, the study was essentially normal, showing no evidence of peripheral neuropathy or a lumbosacral radiculopathy.  Laboratory for treatable causes of neuropathy were also ordered and showed the following results:     Hemoglobin A1c normal at 5.3  RPR was nonreactive  Cryoglobulins negative  Heavy metals including lead arsenic and mercury were all within normal limits  Thyroid panel normal  B12 level normal  B6 level normal  Sjogren's antibodies negative  Rheumatoid factor negative  IEP/SPEP both showed no evidence of monoclonal gammopathy     Interim history   At her last appointment I suspected that potentially some of her paresthesias could be related to Topamax.  As such we de-escalated Topamax and discontinued it.  Additionally we added gabapentin which I thought could be helpful for both migraine prevention as well as treating her paresthesias/dysesthias.  She definitely has seen improvement with these changes regarding her paresthesias though not complete resolution.    Additionally she reports that since discontinuing the Topamax, her headaches and migraines do seem worse.  She states she basically has a headache every day and they will escalate into a migraine maybe once a week.  She has been using Ubrelvy for abortive treatment which was effective for the full-blown migraine however she is bothered by this now daily headache that she is having.    She continues to take Cymbalta 60 mg as well as Wellbutrin 150 mg and Adderall 10  mg daily.  She also takes trazodone 50 mg nightly.  She has a history of some pretty refractory depression and states that the regimen she is currently on keeps mood quite stable.    Past Medical History:   Diagnosis Date   • Anxiety    • Colon polyp 2017   • COVID-19 09/2020   • Delayed emergence from anesthesia 2018   • Depression 5/25/2016   • Diverticulitis of colon    • Diverticulosis of large intestine without hemorrhage 10/5/2016   • GERD (gastroesophageal reflux disease)    • Hiatal hernia    • History of gastric ulcer    • Low back pain    • Lumbosacral disc disease    • Migraine without aura and without status migrainosus, not intractable 5/25/2016         Past Surgical History:   Procedure Laterality Date   • ANTERIOR CERVICAL DISCECTOMY W/ FUSION N/A 6/21/2018    Procedure: C6 7 anterior cervical discectomy and fusion with allograft and plate;  Surgeon: Jacobo De Dios MD;  Location: [facility] OR;  Service: Neurosurgery   • CHOLECYSTECTOMY N/A 1980   • COLON RESECTION N/A 1/16/2018    Procedure: LAPAROSCOPIC SIGMOID COLON RESECTION WITH MOBILIZATION OF SPLENIC FLEXURE;  Surgeon: Eric Davidson MD;  Location: [facility] MAIN OR;  Service:    • COLONOSCOPY N/A 2/15/2017    Procedure: COLONOSCOPY to cecum with biospsies with cold polypectomy;  Surgeon: Gerardo Tan MD;  Location: [facility] ENDOSCOPY;  Service:    • COLONOSCOPY N/A 03/01/2003    Internal hemorrhoids-Dr. Gerardo Tan   • COLONOSCOPY N/A 2/8/2022    Procedure: COLONOSCOPY INTO CECUM WITH COLD BIOPSIES AND COLD BIOPSY POLYPECTOMY;  Surgeon: Cande Guzman MD;  Location: [facility] ENDOSCOPY;  Service: Gastroenterology;  Laterality: N/A;  PRE- HISTORY OF COLON POLYPS  POST- HEMORRHOIDS, DIVERTICULOSIS, POLYP   • DIAGNOSTIC LAPAROSCOPY N/A 06/06/2001    Cul-de-sac endometriosis and adhesions-Dr. Aamir Christine   • ENDOSCOPY N/A 2/15/2017    Procedure: ESOPHAGOGASTRODUODENOSCOPY with biopsies;  Surgeon: Gerardo Tan MD;  Location: [facility]  ENDOSCOPY;  Service:    • ENDOSCOPY N/A 02/04/2009    Small Hiatal hernia-Dr. Gerardo Tan   • ENDOSCOPY N/A 2/8/2022    Procedure: ESOPHAGOGASTRODUODENOSCOPY WITH COLD BIOPSIES;  Surgeon: Cande Guzman MD;  Location: [facility] ENDOSCOPY;  Service: Gastroenterology;  Laterality: N/A;  PRE- NAUSEA, GERD  POST- IRREGULAR Z LINE, GASTRITIS     • TOTAL ABDOMINAL HYSTERECTOMY WITH SALPINGO OOPHORECTOMY Bilateral 07/31/2001    Dr. Aamir Christine           Current Outpatient Medications:   •  albuterol sulfate  (90 Base) MCG/ACT inhaler, INHALE TWO PUFFS INTO THE LUNGS EVERY FOUR HOURS AS NEEDED, Disp: , Rfl:   •  ALPRAZolam (XANAX) 0.5 MG tablet, Take 1 tablet by mouth 2 (Two) Times a Day As Needed for anxiety, Disp: 60 tablet, Rfl: 2  •  amphetamine-dextroamphetamine (ADDERALL) 10 MG tablet, Take 1 tablet by mouth Every Morning., Disp: 30 tablet, Rfl: 0  •  baclofen (LIORESAL) 20 MG tablet, Take 1 tablet by mouth 3 (Three) Times a Day., Disp: 60 tablet, Rfl: 3  •  buPROPion XL (WELLBUTRIN XL) 150 MG 24 hr tablet, Take 1 tablet by mouth Every Morning., Disp: 90 tablet, Rfl: 2  •  calcium carbonate (OS-MARY) 600 MG tablet, Take 600 mg by mouth 2 (Two) Times a Day., Disp: , Rfl:   •  cholecalciferol (VITAMIN D3) 25 MCG (1000 UT) tablet, Take 1,000 Units by mouth Daily., Disp: , Rfl:   •  DULoxetine (CYMBALTA) 60 MG capsule, Take 1 capsule by mouth Daily., Disp: 90 capsule, Rfl: 0  •  gabapentin (NEURONTIN) 300 MG capsule, Take 1 capsule by mouth 2 (Two) Times a Day for 90 days., Disp: 180 capsule, Rfl: 0  •  ipratropium-albuterol (DUO-NEB) 0.5-2.5 mg/3 ml nebulizer, Take 3 mL by nebulization Every 4 (Four) Hours As Needed., Disp: 360 mL, Rfl: 3  •  Loperamide HCl (IMODIUM A-D PO), Take  by mouth Daily., Disp: , Rfl:   •  losartan (Cozaar) 50 MG tablet, Take 1 tablet by mouth Daily., Disp: 90 tablet, Rfl: 0  •  lurasidone (Latuda) 40 MG tablet tablet, Take 1 tablet by mouth Every Evening with 400 calorie meal, Disp: 30  tablet, Rfl: 5  •  Methylcellulose, Laxative, (CITRUCEL PO), Take  by mouth Daily., Disp: , Rfl:   •  Multiple Vitamin (MULTI-VITAMINS PO), Take 1 tablet by mouth Daily. HOLD FOR SURGERY, Disp: , Rfl:   •  ondansetron (ZOFRAN) 4 MG tablet, Take 1 tablet by mouth Every 8 (Eight) Hours As Needed for Nausea or Vomiting., Disp: 30 tablet, Rfl: 2  •  pantoprazole (PROTONIX) 40 MG EC tablet, TAKE 1 TABLET BY MOUTH EVERY DAY IN THE MORNING, Disp: 90 tablet, Rfl: 1  •  traZODone (DESYREL) 50 MG tablet, Take 1.5 tablets by mouth Every Night for 90 days., Disp: 180 tablet, Rfl: 1  •  ubrogepant (ubrogepant) 100 MG tablet, Take 1 tablet by mouth 1 (One) Time As Needed (migraine) for up to 6 doses., Disp: 6 tablet, Rfl: 0  •  Atogepant (Qulipta) 60 MG tablet, Take 1 tablet by mouth Daily for 30 days., Disp: 30 tablet, Rfl: 2      Family History   Problem Relation Age of Onset   • Alzheimer's disease Mother         SDAT   • Hypertension Mother    • Diabetes type II Mother    • Lung cancer Mother         POSSIBLE   • Heart attack Mother    • Cancer Mother         lung   • COPD Mother    • Depression Mother    • Diabetes Mother    • Heart disease Mother    • Miscarriages / Stillbirths Mother    • Alcohol abuse Father    • Prostate cancer Father    • Heart disease Father         THIRD DEGREE HEART BLOCK   • Arthritis Father    • Cancer Father         prostate   • Drug abuse Father    • Learning disabilities Father    • Ovarian cancer Sister    • No Known Problems Brother    • Ovarian cancer Sister    • Arthritis Maternal Grandmother         rheumatoid   • Arthritis Sister    • Cancer Sister         ovarian   • Depression Sister    • Hypertension Sister    • Cancer Sister         ovarian   • Depression Sister    • Hypertension Sister    • Breast cancer Other    • Malig Hyperthermia Neg Hx          Social History     Socioeconomic History   • Marital status:      Spouse name: Lonnie   • Number of children: 3   Tobacco Use   •  Smoking status: Former Smoker     Packs/day: 1.00     Years: 40.00     Pack years: 40.00     Types: Cigarettes     Quit date: 3/10/2014     Years since quittin.2   • Smokeless tobacco: Never Used   Vaping Use   • Vaping Use: Former   Substance and Sexual Activity   • Alcohol use: Yes     Comment: SELDOM   • Drug use: No   • Sexual activity: Yes     Partners: Male     Birth control/protection: Post-menopausal         No Known Allergies        ROS:  Review of Systems   Musculoskeletal: Positive for gait problem.   Neurological: Positive for headaches (increased frequency). Negative for dizziness, tremors, seizures, syncope, facial asymmetry, speech difficulty, weakness, light-headedness and numbness.   Psychiatric/Behavioral: Negative for agitation, behavioral problems, confusion, decreased concentration, dysphoric mood, hallucinations, self-injury, sleep disturbance and suicidal ideas. The patient is not nervous/anxious and is not hyperactive.      I have reviewed the above ROS put in by the medical assistant and am in agreement.     Physical Exam:  Vitals:    22 1113   BP: 114/76   BP Location: Left arm   Patient Position: Sitting   Cuff Size: Adult   Pulse: 82   SpO2: 95%   Weight: 83.5 kg (184 lb)     Body mass index is 29.7 kg/m².    Physical Exam  GGeneral: Well-developed white female, slightly overweight, no acute distress  HEENT: Head is normocephalic, atraumatic.  Oropharynx is clear  Neck: Neck is supple with no masses.     Heart: Regular rate and rhythm today.  No murmur  Extremities: Distal pulses are palpable and equal.  There is mild nonpitting edema in her lower extremities     Neurological Exam:   Mental Status: Awake, alert, oriented to person, place and time.  Conversant without evidence of an affective disorder, thought disorder, delusions or hallucinations.  Attention span and concentration are normal.  HCF: No aphasia, apraxia or dysarthria.  Recent and remote memory intact.  Knowledge  of recent events intact.  CN:      I:              II: Visual fields full without left inattention              III, IV, VI: Eye movements intact without nystagmus or ptosis.  Pupils equal round and reactive to light.              V,VII: Light touch and pinprick intact all 3 divisions of V.  Facial muscles symmetrical.              VIII: Hearing intact to finger rub              IX,X: Soft palate elevates symmetrically              XI: Sternomastoid and trapezius are strong.              XII: Tongue midline without atrophy or fasciculations  Motor: Normal tone and bulk in the upper and lower extremities              Power testing:  Normal strength in upper and lower extremities today.  Reflexes: Upper extremities:  +1 diffusely                   Lower extremities:  +1 at knees, absent ankles  Sensory: Light touch                   Pinprick:   Cerebellar: Finger-to-nose: Intact                      Rapid movement: Intact                      Heel-to-shin: Intact  Gait and Station: Patient comes to stand without difficulty.  Station is very slightly broad-based but casual walk is otherwise normal.        Results:      Lab Results   Component Value Date    GLUCOSE 109 (H) 08/09/2021    BUN 11 08/09/2021    CREATININE 1.09 (H) 08/09/2021    EGFRIFNONA 50 (L) 08/09/2021    EGFRIFAFRI 65 12/22/2020    BCR 10.1 08/09/2021    CO2 20.3 (L) 08/09/2021    CALCIUM 9.6 08/09/2021    PROTENTOTREF 7.2 01/17/2022    ALBUMIN 4.0 01/17/2022    LABIL2 1.3 01/17/2022    AST 16 08/09/2021    ALT 14 08/09/2021       Lab Results   Component Value Date    WBC 11.42 (H) 08/09/2021    HGB 14.1 08/09/2021    HCT 41.0 08/09/2021    MCV 89.5 08/09/2021     08/09/2021         .No results found for: RPR      Lab Results   Component Value Date    TSH 1.660 08/09/2021         Lab Results   Component Value Date    RHVKDYSI61 654 08/09/2021         No results found for: FOLATE      Lab Results   Component Value Date    HGBA1C 5.30 01/17/2022          Lab Results   Component Value Date    GLUCOSE 109 (H) 08/09/2021    BUN 11 08/09/2021    CREATININE 1.09 (H) 08/09/2021    EGFRIFNONA 50 (L) 08/09/2021    EGFRIFAFRI 65 12/22/2020    BCR 10.1 08/09/2021    K 4.1 08/09/2021    CO2 20.3 (L) 08/09/2021    CALCIUM 9.6 08/09/2021    PROTENTOTREF 7.2 01/17/2022    ALBUMIN 4.0 01/17/2022    LABIL2 1.3 01/17/2022    AST 16 08/09/2021    ALT 14 08/09/2021         Lab Results   Component Value Date    WBC 11.42 (H) 08/09/2021    HGB 14.1 08/09/2021    HCT 41.0 08/09/2021    MCV 89.5 08/09/2021     08/09/2021             Assessment:   1.  Paresthesias/dysesthesia to lower extremities, suspicious for small fiber neuropathy; improved though not completely resolved off of Topamax  2.  New daily headache with episodic migraine headache      Plan:  1.  Patient does report symptom improvement being off of her Topamax and having started the gabapentin.  She is currently taking 300 mg twice daily and appears to be tolerating it well.  She still has some symptoms however and so I would like to go ahead and proceed with a skin biopsy looking for small fiber neuropathy.  We will get this scheduled in the next few weeks    2.  Patient reports that since I have seen her 2 months ago her migraines have gotten significantly worse.  When I question her however it sounds like she mostly is just having some tension type headaches which very occasionally evolve into a full on migraine.  I had hoped the gabapentin would be enough to mitigate both her headaches and paresthesias however this was not the case.  She reports the Ubrelvy works for abortive treatment and as such I would like to try her on daily Qulipta.  Samples given in office today.  Risk and benefits of medicine discussed.  -Patient may still use Ubrelvy as needed in addition.    Will follow-up in about 3 weeks at the time of her skin biopsy    I spent 30 minutes face-to-face with patient/family today, 20 minutes of  that time was counseling patient on disease course and plan of care and answering any questions that they had.       Dictated utilizing Dragon dictation.

## 2022-06-23 ENCOUNTER — TELEPHONE (OUTPATIENT)
Dept: NEUROLOGY | Facility: CLINIC | Age: 67
End: 2022-06-23

## 2022-06-23 NOTE — TELEPHONE ENCOUNTER
Evelia, Pharmacist with [facility] called regarding the script for the Qulipta. Insurance states the medication is non formulary and will need a PA. However if you prefer to call the insurance you may reach them at [phone]. Evelia may be reached at [phone]

## 2022-06-28 DIAGNOSIS — I10 ESSENTIAL HYPERTENSION: ICD-10-CM

## 2022-06-28 RX ORDER — LOSARTAN POTASSIUM 50 MG/1
TABLET ORAL
Qty: 90 TABLET | Refills: 0 | Status: SHIPPED | OUTPATIENT
Start: 2022-06-28 | End: 2022-07-25

## 2022-07-08 ENCOUNTER — PROCEDURE VISIT (OUTPATIENT)
Dept: NEUROLOGY | Facility: CLINIC | Age: 67
End: 2022-07-08

## 2022-07-08 DIAGNOSIS — R20.2 PARESTHESIAS: ICD-10-CM

## 2022-07-08 DIAGNOSIS — G43.009 MIGRAINE WITHOUT AURA AND WITHOUT STATUS MIGRAINOSUS, NOT INTRACTABLE: ICD-10-CM

## 2022-07-08 PROCEDURE — 11104 PUNCH BX SKIN SINGLE LESION: CPT | Performed by: PHYSICIAN ASSISTANT

## 2022-07-08 PROCEDURE — 11105 PUNCH BX SKIN EA SEP/ADDL: CPT | Performed by: PHYSICIAN ASSISTANT

## 2022-07-08 RX ORDER — ONDANSETRON 4 MG/1
4 TABLET, FILM COATED ORAL EVERY 8 HOURS PRN
Qty: 30 TABLET | Refills: 2 | Status: SHIPPED | OUTPATIENT
Start: 2022-07-08

## 2022-07-08 RX ORDER — GABAPENTIN 400 MG/1
400 CAPSULE ORAL 2 TIMES DAILY
Qty: 60 CAPSULE | Refills: 2 | Status: SHIPPED | OUTPATIENT
Start: 2022-07-08 | End: 2022-10-31 | Stop reason: SDUPTHER

## 2022-07-08 NOTE — PROGRESS NOTES
Skin Biopsy Procedure Note    PRE-OP DIAGNOSIS: Paresthesias/dysesthesia to lower extremities, suspicious for small fiber neuropathy  POST-OP DIAGNOSIS: Same     PROCEDURE: punch skin biopsy    Performing Provider: Marianna Garcia PA-C  Supervising Physician: Jalen Otero MD    Reason for Biopsy/Brief Clinical History: 67-year-old white female with stabbing pains in the toes primarily which ascended up in the right leg more so than the left into the lower portion of the lower leg.  These occur randomly whether standing or reclined.  EMG was normal and laboratory studies did not reveal any sort of treatable etiology.  On exam she has subtle sensory deficits, some of which are consistent with a small fiber type of neuropathy.     Biopsy site:    Thigh (Left)  Lateral thigh 20 cm below the iliac spine    Distal Leg (Left)  10 cm above the lateral malleolus       After obtaining informed consent, the area was prepped and draped in the usual fashion.     Anesthesia was obtained with 2% lidocaine with epinephrine.     A full thickness punch biopsy was obtained at each site with a 3mm punch. Gauze and bandage were applied and hemostasis was assured. The patient tolerated the procedure well.    Wound care discussed.     2 Specimen(s) were placed in each vial of fixative and packaged appropriately to be sent for processing at [facility]

## 2022-07-08 NOTE — PATIENT INSTRUCTIONS
Skin biopsy wound care:    - Leave the original dressing in place for 24 hours  - Clean the wound once a day Allow water, shampoo and soap from the shower to wash over the wound.  - Cover the wound with Aquaphor or Vaseline and then apply a bandage for 1-2 weeks. Change the bandage daily.  - We recommend that you apply Aquaphor or Vaseline to a Band-Aid or Telfa pad (non-stick gauze) with tape. If you prefer not to use a band-aid, apply a thick coat of Vaseline or Aquaphor twice daily. Keep the wound covered with Aquaphor or Vaseline at all times for 2 weeks.    ADDITIONAL INFORMATION:  - Your biopsy site should not be particularly painful. You can apply ice to the area or take Tylenol for discomfort. Don’t take Motrin, Ibuprofen, Aspirin or any other blood thinning medications.  - A significant increase in pain may indicate a problem. Call the office if this occurs.    REPORT ANY OF THESE SYMPTOMS TO YOUR PHYSICIAN:  - Increase in redness more than ¼ inch on each side of the biopsy site.  - Swelling  - Increasing or severe pain  - Drainage of pus  - Fever over 100.5 degrees  - If bleeding occurs which you cannot stop with firm pressure for 20 minutes    WHO TO CALL  - [facility] Neurology Office at [phone] for problems, Monday through Friday from 8am to 4pm. Ask for your physician and inform the staff if you have a problem with your biopsy site.  - If you have not received a report of your skin biopsy either by mail or by phone within 2 weeks after your biopsy, please call our office.

## 2022-07-24 DIAGNOSIS — I10 ESSENTIAL HYPERTENSION: ICD-10-CM

## 2022-07-25 RX ORDER — LOSARTAN POTASSIUM 50 MG/1
TABLET ORAL
Qty: 90 TABLET | Refills: 0 | Status: SHIPPED | OUTPATIENT
Start: 2022-07-25 | End: 2022-12-02

## 2022-07-25 RX ORDER — TRAZODONE HYDROCHLORIDE 50 MG/1
75 TABLET ORAL NIGHTLY
Qty: 135 TABLET | Refills: 2 | OUTPATIENT
Start: 2022-07-25 | End: 2022-10-23

## 2022-07-26 RX ORDER — TRAZODONE HYDROCHLORIDE 50 MG/1
75 TABLET ORAL NIGHTLY
Qty: 145 TABLET | Refills: 1 | Status: SHIPPED | OUTPATIENT
Start: 2022-07-26 | End: 2023-01-04

## 2022-08-19 ENCOUNTER — NURSE TRIAGE (OUTPATIENT)
Dept: CALL CENTER | Facility: HOSPITAL | Age: 67
End: 2022-08-19

## 2022-08-19 ENCOUNTER — APPOINTMENT (OUTPATIENT)
Dept: GENERAL RADIOLOGY | Facility: HOSPITAL | Age: 67
End: 2022-08-19

## 2022-08-19 ENCOUNTER — HOSPITAL ENCOUNTER (OUTPATIENT)
Facility: HOSPITAL | Age: 67
Setting detail: OBSERVATION
Discharge: HOME | End: 2022-08-20
Attending: EMERGENCY MEDICINE | Admitting: EMERGENCY MEDICINE

## 2022-08-19 DIAGNOSIS — R07.9 CHEST PAIN, UNSPECIFIED TYPE: Primary | ICD-10-CM

## 2022-08-19 DIAGNOSIS — I10 PRIMARY HYPERTENSION: ICD-10-CM

## 2022-08-19 LAB
ALBUMIN SERPL-MCNC: 3.9 G/DL (ref 3.5–5.2)
ALBUMIN/GLOB SERPL: 1.6 G/DL
ALP SERPL-CCNC: 108 U/L (ref 39–117)
ALT SERPL W P-5'-P-CCNC: 15 U/L (ref 1–33)
ANION GAP SERPL CALCULATED.3IONS-SCNC: 11 MMOL/L (ref 5–15)
AST SERPL-CCNC: 16 U/L (ref 1–32)
BASOPHILS # BLD AUTO: 0.09 10*3/MM3 (ref 0–0.2)
BASOPHILS NFR BLD AUTO: 0.8 % (ref 0–1.5)
BILIRUB SERPL-MCNC: 0.5 MG/DL (ref 0–1.2)
BUN SERPL-MCNC: 9 MG/DL (ref 8–23)
BUN/CREAT SERPL: 8.7 (ref 7–25)
CALCIUM SPEC-SCNC: 9.4 MG/DL (ref 8.6–10.5)
CHLORIDE SERPL-SCNC: 108 MMOL/L (ref 98–107)
CO2 SERPL-SCNC: 23 MMOL/L (ref 22–29)
CREAT SERPL-MCNC: 1.04 MG/DL (ref 0.57–1)
DEPRECATED RDW RBC AUTO: 44.7 FL (ref 37–54)
EGFRCR SERPLBLD CKD-EPI 2021: 59 ML/MIN/1.73
EOSINOPHIL # BLD AUTO: 0.19 10*3/MM3 (ref 0–0.4)
EOSINOPHIL NFR BLD AUTO: 1.6 % (ref 0.3–6.2)
ERYTHROCYTE [DISTWIDTH] IN BLOOD BY AUTOMATED COUNT: 13.4 % (ref 12.3–15.4)
GLOBULIN UR ELPH-MCNC: 2.5 GM/DL
GLUCOSE SERPL-MCNC: 86 MG/DL (ref 65–99)
HCT VFR BLD AUTO: 40 % (ref 34–46.6)
HGB BLD-MCNC: 13.4 G/DL (ref 12–15.9)
IMM GRANULOCYTES # BLD AUTO: 0.04 10*3/MM3 (ref 0–0.05)
IMM GRANULOCYTES NFR BLD AUTO: 0.3 % (ref 0–0.5)
LIPASE SERPL-CCNC: 24 U/L (ref 13–60)
LYMPHOCYTES # BLD AUTO: 3.63 10*3/MM3 (ref 0.7–3.1)
LYMPHOCYTES NFR BLD AUTO: 31.3 % (ref 19.6–45.3)
MCH RBC QN AUTO: 30.6 PG (ref 26.6–33)
MCHC RBC AUTO-ENTMCNC: 33.5 G/DL (ref 31.5–35.7)
MCV RBC AUTO: 91.3 FL (ref 79–97)
MONOCYTES # BLD AUTO: 0.62 10*3/MM3 (ref 0.1–0.9)
MONOCYTES NFR BLD AUTO: 5.4 % (ref 5–12)
NEUTROPHILS NFR BLD AUTO: 60.6 % (ref 42.7–76)
NEUTROPHILS NFR BLD AUTO: 7.01 10*3/MM3 (ref 1.7–7)
NRBC BLD AUTO-RTO: 0 /100 WBC (ref 0–0.2)
PLATELET # BLD AUTO: 266 10*3/MM3 (ref 140–450)
PMV BLD AUTO: 9.7 FL (ref 6–12)
POTASSIUM SERPL-SCNC: 4.1 MMOL/L (ref 3.5–5.2)
PROT SERPL-MCNC: 6.4 G/DL (ref 6–8.5)
QT INTERVAL: 422 MS
RBC # BLD AUTO: 4.38 10*6/MM3 (ref 3.77–5.28)
SARS-COV-2 RNA RESP QL NAA+PROBE: NOT DETECTED
SODIUM SERPL-SCNC: 142 MMOL/L (ref 136–145)
TROPONIN T SERPL-MCNC: <0.01 NG/ML (ref 0–0.03)
TROPONIN T SERPL-MCNC: <0.01 NG/ML (ref 0–0.03)
WBC NRBC COR # BLD: 11.58 10*3/MM3 (ref 3.4–10.8)

## 2022-08-19 PROCEDURE — G0378 HOSPITAL OBSERVATION PER HR: HCPCS

## 2022-08-19 PROCEDURE — 84484 ASSAY OF TROPONIN QUANT: CPT | Performed by: EMERGENCY MEDICINE

## 2022-08-19 PROCEDURE — C9803 HOPD COVID-19 SPEC COLLECT: HCPCS

## 2022-08-19 PROCEDURE — U0003 INFECTIOUS AGENT DETECTION BY NUCLEIC ACID (DNA OR RNA); SEVERE ACUTE RESPIRATORY SYNDROME CORONAVIRUS 2 (SARS-COV-2) (CORONAVIRUS DISEASE [COVID-19]), AMPLIFIED PROBE TECHNIQUE, MAKING USE OF HIGH THROUGHPUT TECHNOLOGIES AS DESCRIBED BY CMS-2020-01-R: HCPCS | Performed by: PHYSICIAN ASSISTANT

## 2022-08-19 PROCEDURE — 80053 COMPREHEN METABOLIC PANEL: CPT | Performed by: EMERGENCY MEDICINE

## 2022-08-19 PROCEDURE — 93010 ELECTROCARDIOGRAM REPORT: CPT | Performed by: INTERNAL MEDICINE

## 2022-08-19 PROCEDURE — 71045 X-RAY EXAM CHEST 1 VIEW: CPT

## 2022-08-19 PROCEDURE — 85025 COMPLETE CBC W/AUTO DIFF WBC: CPT | Performed by: EMERGENCY MEDICINE

## 2022-08-19 PROCEDURE — U0005 INFEC AGEN DETEC AMPLI PROBE: HCPCS | Performed by: PHYSICIAN ASSISTANT

## 2022-08-19 PROCEDURE — 84484 ASSAY OF TROPONIN QUANT: CPT | Performed by: PHYSICIAN ASSISTANT

## 2022-08-19 PROCEDURE — 96374 THER/PROPH/DIAG INJ IV PUSH: CPT

## 2022-08-19 PROCEDURE — 83690 ASSAY OF LIPASE: CPT | Performed by: EMERGENCY MEDICINE

## 2022-08-19 PROCEDURE — 99284 EMERGENCY DEPT VISIT MOD MDM: CPT

## 2022-08-19 PROCEDURE — 25010000002 MORPHINE PER 10 MG: Performed by: NURSE PRACTITIONER

## 2022-08-19 PROCEDURE — 93005 ELECTROCARDIOGRAM TRACING: CPT | Performed by: EMERGENCY MEDICINE

## 2022-08-19 RX ORDER — DULOXETIN HYDROCHLORIDE 60 MG/1
60 CAPSULE, DELAYED RELEASE ORAL DAILY
Status: DISCONTINUED | OUTPATIENT
Start: 2022-08-20 | End: 2022-08-20 | Stop reason: HOSPADM

## 2022-08-19 RX ORDER — NITROGLYCERIN 0.4 MG/1
0.4 TABLET SUBLINGUAL
Status: DISCONTINUED | OUTPATIENT
Start: 2022-08-19 | End: 2022-08-20 | Stop reason: HOSPADM

## 2022-08-19 RX ORDER — SODIUM CHLORIDE 0.9 % (FLUSH) 0.9 %
10 SYRINGE (ML) INJECTION EVERY 12 HOURS SCHEDULED
Status: DISCONTINUED | OUTPATIENT
Start: 2022-08-19 | End: 2022-08-20 | Stop reason: HOSPADM

## 2022-08-19 RX ORDER — ALUMINA, MAGNESIA, AND SIMETHICONE 2400; 2400; 240 MG/30ML; MG/30ML; MG/30ML
15 SUSPENSION ORAL ONCE
Status: COMPLETED | OUTPATIENT
Start: 2022-08-19 | End: 2022-08-19

## 2022-08-19 RX ORDER — PANTOPRAZOLE SODIUM 40 MG/1
40 TABLET, DELAYED RELEASE ORAL
Status: DISCONTINUED | OUTPATIENT
Start: 2022-08-20 | End: 2022-08-20 | Stop reason: HOSPADM

## 2022-08-19 RX ORDER — ACETAMINOPHEN 160 MG/5ML
650 SOLUTION ORAL EVERY 4 HOURS PRN
Status: DISCONTINUED | OUTPATIENT
Start: 2022-08-19 | End: 2022-08-20 | Stop reason: HOSPADM

## 2022-08-19 RX ORDER — BUPROPION HYDROCHLORIDE 150 MG/1
150 TABLET ORAL EVERY MORNING
Status: DISCONTINUED | OUTPATIENT
Start: 2022-08-20 | End: 2022-08-20 | Stop reason: HOSPADM

## 2022-08-19 RX ORDER — ALPRAZOLAM 0.5 MG/1
0.5 TABLET ORAL 2 TIMES DAILY PRN
Status: DISCONTINUED | OUTPATIENT
Start: 2022-08-19 | End: 2022-08-20 | Stop reason: HOSPADM

## 2022-08-19 RX ORDER — LOSARTAN POTASSIUM 50 MG/1
50 TABLET ORAL DAILY
Status: DISCONTINUED | OUTPATIENT
Start: 2022-08-20 | End: 2022-08-20 | Stop reason: HOSPADM

## 2022-08-19 RX ORDER — TRAZODONE HYDROCHLORIDE 50 MG/1
75 TABLET ORAL NIGHTLY
Status: DISCONTINUED | OUTPATIENT
Start: 2022-08-20 | End: 2022-08-20 | Stop reason: HOSPADM

## 2022-08-19 RX ORDER — LURASIDONE HYDROCHLORIDE 40 MG/1
40 TABLET, FILM COATED ORAL EVERY EVENING
Status: DISCONTINUED | OUTPATIENT
Start: 2022-08-20 | End: 2022-08-20 | Stop reason: HOSPADM

## 2022-08-19 RX ORDER — BACLOFEN 10 MG/1
20 TABLET ORAL 3 TIMES DAILY
Status: DISCONTINUED | OUTPATIENT
Start: 2022-08-19 | End: 2022-08-20 | Stop reason: HOSPADM

## 2022-08-19 RX ORDER — ACETAMINOPHEN 325 MG/1
650 TABLET ORAL EVERY 4 HOURS PRN
Status: DISCONTINUED | OUTPATIENT
Start: 2022-08-19 | End: 2022-08-20 | Stop reason: HOSPADM

## 2022-08-19 RX ORDER — ONDANSETRON 2 MG/ML
4 INJECTION INTRAMUSCULAR; INTRAVENOUS EVERY 6 HOURS PRN
Status: DISCONTINUED | OUTPATIENT
Start: 2022-08-19 | End: 2022-08-20 | Stop reason: HOSPADM

## 2022-08-19 RX ORDER — SODIUM CHLORIDE 0.9 % (FLUSH) 0.9 %
10 SYRINGE (ML) INJECTION AS NEEDED
Status: DISCONTINUED | OUTPATIENT
Start: 2022-08-19 | End: 2022-08-20 | Stop reason: HOSPADM

## 2022-08-19 RX ORDER — MORPHINE SULFATE 2 MG/ML
2 INJECTION, SOLUTION INTRAMUSCULAR; INTRAVENOUS ONCE
Status: COMPLETED | OUTPATIENT
Start: 2022-08-19 | End: 2022-08-19

## 2022-08-19 RX ORDER — LIDOCAINE HYDROCHLORIDE 20 MG/ML
15 SOLUTION OROPHARYNGEAL ONCE
Status: COMPLETED | OUTPATIENT
Start: 2022-08-19 | End: 2022-08-19

## 2022-08-19 RX ORDER — GABAPENTIN 400 MG/1
400 CAPSULE ORAL 2 TIMES DAILY
Status: DISCONTINUED | OUTPATIENT
Start: 2022-08-19 | End: 2022-08-20 | Stop reason: HOSPADM

## 2022-08-19 RX ORDER — ACETAMINOPHEN 650 MG/1
650 SUPPOSITORY RECTAL EVERY 4 HOURS PRN
Status: DISCONTINUED | OUTPATIENT
Start: 2022-08-19 | End: 2022-08-20 | Stop reason: HOSPADM

## 2022-08-19 RX ADMIN — ALUMINUM HYDROXIDE, MAGNESIUM HYDROXIDE, AND DIMETHICONE 15 ML: 400; 400; 40 SUSPENSION ORAL at 13:49

## 2022-08-19 RX ADMIN — LIDOCAINE HYDROCHLORIDE 15 ML: 20 SOLUTION ORAL; TOPICAL at 13:49

## 2022-08-19 RX ADMIN — TRAZODONE HYDROCHLORIDE 75 MG: 50 TABLET ORAL at 22:55

## 2022-08-19 RX ADMIN — BACLOFEN 20 MG: 10 TABLET ORAL at 22:47

## 2022-08-19 RX ADMIN — NITROGLYCERIN 0.4 MG: 0.4 TABLET SUBLINGUAL at 21:25

## 2022-08-19 RX ADMIN — MORPHINE SULFATE 2 MG: 2 INJECTION, SOLUTION INTRAMUSCULAR; INTRAVENOUS at 17:30

## 2022-08-19 RX ADMIN — GABAPENTIN 400 MG: 400 CAPSULE ORAL at 22:47

## 2022-08-19 NOTE — PLAN OF CARE
Goal Outcome Evaluation:           Progress: no change  Outcome Evaluation: dolly is a 67 year old female admitted to observation unit for continuation of care r/t chest pain x 2 days, alert and oriented and able to verbalize needs, VSS, cardiology consulted and patient is to be NPO at midnight c/o pain to chest and PRN medication provided and effective, resting in room at this time with no questions.

## 2022-08-19 NOTE — ED PROVIDER NOTES
EMERGENCY DEPARTMENT ENCOUNTER    Room Number:  42/42  Date of encounter:  8/19/2022  PCP: Sun Guerrier MD  Historian: Patient, spouse      I used full protective equipment while examining this patient.  This includes face mask, gloves and protective eyewear.  I washed my hands before entering the room and immediately upon leaving the room      HPI:  Chief Complaint: Pain  A complete HPI/ROS/PMH/PSH/SH/FH are unobtainable due to: Nothing    Context: Magdalena Dickey is a 67 y.o. female who presents to the ED c/o 2-day history of fairly constant chest wall pain.  Patient states the pain started yesterday and she had it the entire day.  She describes it as a heavy sensation over her anterior chest wall.  She denies any significant radiation.  Patient does have chronic shortness of breath from pulmonary fibrosis.  She denies any associated nausea, vomiting, diaphoresis.  She denies any precipitating or alleviating factors.  She certainly denies any exertional or pleuritic component.  She states the pain eventually went away yesterday however she woke up with the pain again today.  She denies any underlying heart issues.  She does positive family history with early heart disease in her parents.    Review of Medical Records  Reviewed stress test with echo from 7/2/19.  This showed an EF of 65%.  She also had a negative stress test as well.    PAST MEDICAL HISTORY  Active Ambulatory Problems     Diagnosis Date Noted   • Migraine without aura and without status migrainosus, not intractable 05/25/2016   • GERD (gastroesophageal reflux disease) 05/25/2016   • Irritable bowel syndrome with diarrhea 10/05/2016   • Depression with anxiety 10/12/2017   • Cervical spondylosis with radiculopathy 06/19/2018   • History of colon resection 09/18/2019   • Essential hypertension 09/24/2019   • Hypertriglyceridemia 03/24/2020   • Peripheral polyneuropathy 11/10/2021   • Fecal smearing 06/13/2022     Resolved Ambulatory Problems      Diagnosis Date Noted   • Diverticulitis of large intestine without perforation or abscess without bleeding 01/04/2018   • Personal history of colonic polyps 02/19/2020   • Pneumonia due to COVID-19 virus 08/24/2020   • Mononeuropathy due to underlying disease 11/10/2021   • Nausea 12/13/2021   • Abdominal cramping 06/13/2022     Past Medical History:   Diagnosis Date   • Anxiety    • Colon polyp 2017   • COVID-19 09/2020   • Delayed emergence from anesthesia 2018   • Depression 5/25/2016   • Diverticulitis of colon    • Diverticulosis of large intestine without hemorrhage 10/5/2016   • Hiatal hernia    • History of gastric ulcer    • Low back pain    • Lumbosacral disc disease          PAST SURGICAL HISTORY  Past Surgical History:   Procedure Laterality Date   • ANTERIOR CERVICAL DISCECTOMY W/ FUSION N/A 6/21/2018    Procedure: C6 7 anterior cervical discectomy and fusion with allograft and plate;  Surgeon: Jacobo De Dios MD;  Location: [facility] OR;  Service: Neurosurgery   • CHOLECYSTECTOMY N/A 1980   • COLON RESECTION N/A 1/16/2018    Procedure: LAPAROSCOPIC SIGMOID COLON RESECTION WITH MOBILIZATION OF SPLENIC FLEXURE;  Surgeon: Eric Davidson MD;  Location: [facility] MAIN OR;  Service:    • COLONOSCOPY N/A 2/15/2017    Procedure: COLONOSCOPY to cecum with biospsies with cold polypectomy;  Surgeon: Gerardo Tan MD;  Location: [facility] ENDOSCOPY;  Service:    • COLONOSCOPY N/A 03/01/2003    Internal hemorrhoids-Dr. Gerardo Tan   • COLONOSCOPY N/A 2/8/2022    Procedure: COLONOSCOPY INTO CECUM WITH COLD BIOPSIES AND COLD BIOPSY POLYPECTOMY;  Surgeon: Cande Guzman MD;  Location: [facility] ENDOSCOPY;  Service: Gastroenterology;  Laterality: N/A;  PRE- HISTORY OF COLON POLYPS  POST- HEMORRHOIDS, DIVERTICULOSIS, POLYP   • DIAGNOSTIC LAPAROSCOPY N/A 06/06/2001    Cul-de-sac endometriosis and adhesions-Dr. Aamir Christine   • ENDOSCOPY N/A 2/15/2017    Procedure: ESOPHAGOGASTRODUODENOSCOPY with biopsies;  Surgeon:  Gerardo Tan MD;  Location: [facility] ENDOSCOPY;  Service:    • ENDOSCOPY N/A 2009    Small Hiatal hernia-Dr. Gerardo Tan   • ENDOSCOPY N/A 2022    Procedure: ESOPHAGOGASTRODUODENOSCOPY WITH COLD BIOPSIES;  Surgeon: Cande Guzman MD;  Location: [facility] ENDOSCOPY;  Service: Gastroenterology;  Laterality: N/A;  PRE- NAUSEA, GERD  POST- IRREGULAR Z LINE, GASTRITIS     • TOTAL ABDOMINAL HYSTERECTOMY WITH SALPINGO OOPHORECTOMY Bilateral 2001    Dr. Aamir Christine         FAMILY HISTORY  Family History   Problem Relation Age of Onset   • Alzheimer's disease Mother         SDAT   • Hypertension Mother    • Diabetes type II Mother    • Lung cancer Mother         POSSIBLE   • Heart attack Mother    • Cancer Mother         lung   • COPD Mother    • Depression Mother    • Diabetes Mother    • Heart disease Mother    • Miscarriages / Stillbirths Mother    • Alcohol abuse Father    • Prostate cancer Father    • Heart disease Father         THIRD DEGREE HEART BLOCK   • Arthritis Father    • Cancer Father         prostate   • Drug abuse Father    • Learning disabilities Father    • Ovarian cancer Sister    • No Known Problems Brother    • Ovarian cancer Sister    • Arthritis Maternal Grandmother         rheumatoid   • Arthritis Sister    • Cancer Sister         ovarian   • Depression Sister    • Hypertension Sister    • Cancer Sister         ovarian   • Depression Sister    • Hypertension Sister    • Breast cancer Other    • Malig Hyperthermia Neg Hx          SOCIAL HISTORY  Social History     Socioeconomic History   • Marital status:      Spouse name: Lonnie   • Number of children: 3   Tobacco Use   • Smoking status: Former Smoker     Packs/day: 1.00     Years: 40.00     Pack years: 40.00     Types: Cigarettes     Quit date: 3/10/2014     Years since quittin.4   • Smokeless tobacco: Never Used   Vaping Use   • Vaping Use: Former   Substance and Sexual Activity   • Alcohol use: Yes     Comment: SELDOM    • Drug use: No   • Sexual activity: Yes     Partners: Male     Birth control/protection: Post-menopausal         ALLERGIES  Patient has no known allergies.        REVIEW OF SYSTEMS  All systems reviewed and negative except for those discussed in HPI.       PHYSICAL EXAM    I have reviewed the triage vital signs and nursing notes.    ED Triage Vitals   Temp Heart Rate Resp BP SpO2   08/19/22 1318 08/19/22 1318 08/19/22 1318 08/19/22 1331 08/19/22 1318   97.7 °F (36.5 °C) 85 16 (!) 182/99 97 %      Temp src Heart Rate Source Patient Position BP Location FiO2 (%)   -- -- 08/19/22 1331 08/19/22 1331 --     Lying Right arm        Physical Exam  GENERAL: Alert, oriented, not distressed  HENT: head atraumatic, no nuchal rigidity  EYES: no scleral icterus, EOMI  CV: regular rhythm, regular rate, no murmur  RESPIRATORY: normal effort, CTA  ABDOMEN: soft, nontender  MUSCULOSKELETAL: no deformity, FROM, no calf swelling or tenderness  NEURO: alert, moves all extremities, follows commands  SKIN: warm, dry        LAB RESULTS  Recent Results (from the past 24 hour(s))   ECG 12 Lead    Collection Time: 08/19/22  1:22 PM   Result Value Ref Range    QT Interval 422 ms   Comprehensive Metabolic Panel    Collection Time: 08/19/22  1:39 PM    Specimen: Blood   Result Value Ref Range    Glucose 86 65 - 99 mg/dL    BUN 9 8 - 23 mg/dL    Creatinine 1.04 (H) 0.57 - 1.00 mg/dL    Sodium 142 136 - 145 mmol/L    Potassium 4.1 3.5 - 5.2 mmol/L    Chloride 108 (H) 98 - 107 mmol/L    CO2 23.0 22.0 - 29.0 mmol/L    Calcium 9.4 8.6 - 10.5 mg/dL    Total Protein 6.4 6.0 - 8.5 g/dL    Albumin 3.90 3.50 - 5.20 g/dL    ALT (SGPT) 15 1 - 33 U/L    AST (SGOT) 16 1 - 32 U/L    Alkaline Phosphatase 108 39 - 117 U/L    Total Bilirubin 0.5 0.0 - 1.2 mg/dL    Globulin 2.5 gm/dL    A/G Ratio 1.6 g/dL    BUN/Creatinine Ratio 8.7 7.0 - 25.0    Anion Gap 11.0 5.0 - 15.0 mmol/L    eGFR 59.0 (L) >60.0 mL/min/1.73   Lipase    Collection Time: 08/19/22  1:39 PM     Specimen: Blood   Result Value Ref Range    Lipase 24 13 - 60 U/L   Troponin    Collection Time: 08/19/22  1:39 PM    Specimen: Blood   Result Value Ref Range    Troponin T <0.010 0.000 - 0.030 ng/mL   CBC Auto Differential    Collection Time: 08/19/22  1:39 PM    Specimen: Blood   Result Value Ref Range    WBC 11.58 (H) 3.40 - 10.80 10*3/mm3    RBC 4.38 3.77 - 5.28 10*6/mm3    Hemoglobin 13.4 12.0 - 15.9 g/dL    Hematocrit 40.0 34.0 - 46.6 %    MCV 91.3 79.0 - 97.0 fL    MCH 30.6 26.6 - 33.0 pg    MCHC 33.5 31.5 - 35.7 g/dL    RDW 13.4 12.3 - 15.4 %    RDW-SD 44.7 37.0 - 54.0 fl    MPV 9.7 6.0 - 12.0 fL    Platelets 266 140 - 450 10*3/mm3    Neutrophil % 60.6 42.7 - 76.0 %    Lymphocyte % 31.3 19.6 - 45.3 %    Monocyte % 5.4 5.0 - 12.0 %    Eosinophil % 1.6 0.3 - 6.2 %    Basophil % 0.8 0.0 - 1.5 %    Immature Grans % 0.3 0.0 - 0.5 %    Neutrophils, Absolute 7.01 (H) 1.70 - 7.00 10*3/mm3    Lymphocytes, Absolute 3.63 (H) 0.70 - 3.10 10*3/mm3    Monocytes, Absolute 0.62 0.10 - 0.90 10*3/mm3    Eosinophils, Absolute 0.19 0.00 - 0.40 10*3/mm3    Basophils, Absolute 0.09 0.00 - 0.20 10*3/mm3    Immature Grans, Absolute 0.04 0.00 - 0.05 10*3/mm3    nRBC 0.0 0.0 - 0.2 /100 WBC       Ordered the above labs and independently reviewed the results.        RADIOLOGY  XR Chest 1 View    Result Date: 8/19/2022  XR CHEST 1 VW-  08/19/2022  HISTORY: Chest pain.  Heart size is within normal limits. Lungs appear free of acute infiltrates. Lower cervical fusion plate is seen. Bones and soft tissues are otherwise unremarkable.      1. No acute process.  This report was finalized on 8/19/2022 1:50 PM by Dr. Js Hammond M.D.        I ordered the above noted radiological studies. Reviewed by me and discussed with radiologist.  See dictation for official radiology interpretation.      MEDICATIONS GIVEN IN ER    Medications   sodium chloride 0.9 % flush 10 mL (has no administration in time range)   aluminum-magnesium  hydroxide-simethicone (MAALOX MAX) 400-400-40 MG/5ML suspension 15 mL (15 mL Oral Given 8/19/22 1349)   Lidocaine Viscous HCl (XYLOCAINE) 2 % solution 15 mL (15 mL Mouth/Throat Given 8/19/22 1349)         PROGRESS, DATA ANALYSIS, CONSULTS, AND MEDICAL DECISION MAKING    All labs have been independently reviewed by me.  All radiology studies have been reviewed by me and discussed with radiologist dictating the report.   EKG's independently viewed and interpreted by me.  Discussion below represents my analysis of pertinent findings related to patient's condition, differential diagnosis, treatment plan and final disposition.    I have discussed case with Dr. Thomas, emergency room physician.  He has performed his own bedside examination and agrees with treatment plan.    ED Course as of 08/19/22 1601   Fri Aug 19, 2022   1330 Patient presents with 2-day history of substernal chest pain.  Differential diagnoses include but not limited to ACS, aortic dissection, pulmonary embolism, esophagitis, chest wall pain. [EE]   1341 EKG interpreted myself.  Time 1322.  Sinus rhythm, 63 bpm.  Normal P/ARLETH.  QRS normal with normal axis.  No significant ST abnormalities.  EKG similar comparison to prior EKG from 8/23/2020. [EE]   1403 WBC(!): 11.58 [EE]   1403 Hemoglobin: 13.4 [EE]   1403 Chest x-ray interpreted myself shows no acute infiltrate. [EE]   1534 HEART Score 4:  +2 age, +2 RFS    Shared decision making with patient.  Offered second troponin and follow-up with cardiology versus observation and stress test.  The patient would rather stay for stress test. [EE]   1547 Troponin T: <0.010 [EE]   1601 I discussed the case with Ursula Saab, nurse practitioner in the observation unit.  She agrees to admit the patient. [EE]      ED Course User Index  [EE] Finn Layne PA       AS OF 16:01 EDT VITALS:    BP - 163/90  HR - 61  TEMP - 97.7 °F (36.5 °C)  O2 SATS - 97%        DIAGNOSIS  Final diagnoses:   Chest pain, unspecified type    Primary hypertension         DISPOSITION  Admitted      Dictated utilizing Vito dictation     Finn Layne PA  08/19/22 2021

## 2022-08-19 NOTE — ED PROVIDER NOTES
MD ATTESTATION NOTE    The GUY and I have discussed this patient's history, physical exam, and treatment plan.  I have reviewed the documentation and personally had a face to face interaction with the patient. I affirm the documentation and agree with the treatment and plan.  The attached note describes my personal findings.      I provided a substantive portion of the care of the patient.  I personally performed the physical exam in its entirety, and below are my findings.  For this patient encounter, the patient wore surgical mask, I wore full protective PPE including N95 and eye protection.      Brief HPI: 67-year-old female who presents with 2 days of constant pain in the substernal area.  She has no history of coronary artery disease but the normal reflux remedies have not helped.    PHYSICAL EXAM  ED Triage Vitals   Temp Heart Rate Resp BP SpO2   08/19/22 1318 08/19/22 1318 08/19/22 1318 08/19/22 1331 08/19/22 1318   97.7 °F (36.5 °C) 85 16 (!) 182/99 97 %      Temp src Heart Rate Source Patient Position BP Location FiO2 (%)   -- -- 08/19/22 1331 08/19/22 1331 --     Lying Right arm          GENERAL: no acute distress  HENT: nares patent  EYES: no scleral icterus  CV: regular rhythm, normal rate  RESPIRATORY: normal effort  ABDOMEN: soft  MUSCULOSKELETAL: no deformity  NEURO: alert, moves all extremities, follows commands  PSYCH:  calm, cooperative  SKIN: warm, dry    Vital signs and nursing notes reviewed.        Plan: EKG is normal and troponin is negative.  She is not particularly high risk for coronary disease, but I cannot explain the pain.  I think she is a good candidate for admission to the observation unit for further ischemic evaluation and cardiology consult.  Patient is agreeable with the plan     Bart Thomas MD  08/19/22 6077

## 2022-08-19 NOTE — TELEPHONE ENCOUNTER
"Midsternal chest pressure which has started this morning. Similar episode last week. No nausea or diaphoresis. Describes the discomfort as pressure. Rated 3-4/10  Nothing improves the symptoms. Will be going to the ED    Reason for Disposition  • Patient sounds very sick or weak to the triager    Additional Information  • Negative: SEVERE difficulty breathing (e.g., struggling for each breath, speaks in single words)  • Negative: Difficult to awaken or acting confused (e.g., disoriented, slurred speech)  • Negative: Shock suspected (e.g., cold/pale/clammy skin, too weak to stand, low BP, rapid pulse)  • Negative: Passed out (i.e., fainted, collapsed and was not responding)  • Negative: [1] Chest pain lasts > 5 minutes AND [2] age > 44  • Negative: [1] Chest pain lasts > 5 minutes AND [2] age > 30 AND [3] one or more cardiac risk factors (e.g., diabetes, high blood pressure, high cholesterol, smoker, or strong family history of heart disease)  • Negative: [1] Chest pain lasts > 5 minutes AND [2] history of heart disease (i.e., angina, heart attack, heart failure, bypass surgery, takes nitroglycerin)  • Negative: [1] Chest pain lasts > 5 minutes AND [2] described as crushing, pressure-like, or heavy  • Negative: Heart beating < 50 beats per minute OR > 140 beats per minute  • Negative: Visible sweat on face or sweat dripping down face  • Negative: Sounds like a life-threatening emergency to the triager  • Negative: Followed a chest injury  • Negative: SEVERE chest pain  • Negative: [1] Chest pain (or \"angina\") comes and goes AND [2] is happening more often (increasing in frequency) or getting worse (increasing in severity) (Exception: chest pains that last only a few seconds)  • Negative: Pain also in shoulder(s) or arm(s) or jaw (Exception: pain is clearly made worse by movement)  • Negative: Difficulty breathing  • Negative: Dizziness or lightheadedness  • Negative: Coughing up blood  • Negative: Cocaine use within " "last 3 days  • Negative: Major surgery in past month  • Negative: Hip or leg fracture (broken bone) in past month (or had cast on leg or ankle in past month)  • Negative: Illness requiring prolonged bedrest in past month (e.g., immobilization, long hospital stay)  • Negative: Long-distance travel in past month (e.g., car, bus, train, plane; with trip lasting 6 or more hours)  • Negative: History of prior \"blood clot\" in leg or lungs (i.e., deep vein thrombosis, pulmonary embolism)  • Negative: History of inherited increased risk of blood clots (e.g., Factor 5 Leiden, Anti-thrombin 3, Protein C or Protein S deficiency, Prothrombin mutation)  • Negative: Cancer treatment in past six months (or has cancer now)  • Negative: [1] Chest pain lasts > 5 minutes AND [2] occurred in past 3 days (72 hours) (Exception: feels exactly the same as previously diagnosed heartburn and has accompanying sour taste in mouth)  • Negative: Taking a deep breath makes pain worse    Answer Assessment - Initial Assessment Questions  1. LOCATION: \"Where does it hurt?\"        midsternal  2. RADIATION: \"Does the pain go anywhere else?\" (e.g., into neck, jaw, arms, back)      no  3. ONSET: \"When did the chest pain begin?\" (Minutes, hours or days)       Today and has had all morning  4. PATTERN \"Does the pain come and go, or has it been constant since it started?\"  \"Does it get worse with exertion?\"       Pressure and constant  5. DURATION: \"How long does it last\" (e.g., seconds, minutes, hours)      All mornng  6. SEVERITY: \"How bad is the pain?\"  (e.g., Scale 1-10; mild, moderate, or severe)     - MILD (1-3): doesn't interfere with normal activities      - MODERATE (4-7): interferes with normal activities or awakens from sleep     - SEVERE (8-10): excruciating pain, unable to do any normal activities      3-4/10  7. CARDIAC RISK FACTORS: \"Do you have any history of heart problems or risk factors for heart disease?\" (e.g., angina, prior heart " "attack; diabetes, high blood pressure, high cholesterol, smoker, or strong family history of heart disease)    na  8. PULMONARY RISK FACTORS: \"Do you have any history of lung disease?\"  (e.g., blood clots in lung, asthma, emphysema, birth control pills)     no  9. CAUSE: \"What do you think is causing the chest pain?\"  unsure  10. OTHER SYMPTOMS: \"Do you have any other symptoms?\" (e.g., dizziness, nausea, vomiting, sweating, fever, difficulty breathing, cough)  None  11. PREGNANCY: \"Is there any chance you are pregnant?\" \"When was your last menstrual period?\"    na    Protocols used: CHEST PAIN-ADULT-AH      "

## 2022-08-19 NOTE — ED NOTES
Pt c/o sternal chest pain that has been intermittent since yesterday. Denies soa, diaphoresis, n/v., reports cough x1wk. No aggravating or relieving factors.    This RN wore mask and goggles during time of contact

## 2022-08-19 NOTE — CASE MANAGEMENT/SOCIAL WORK
Discharge Planning Assessment  [facility]     Patient Name: Magdalena Dickey  MRN: [id number]  Today's Date: 8/19/2022    Admit Date: 8/19/2022     Discharge Needs Assessment     Row Name 08/19/22 1632       Living Environment    People in Home spouse    Name(s) of People in Home Vikram Dickey [phone]    Current Living Arrangements home    Primary Care Provided by self    Provides Primary Care For no one    Family Caregiver if Needed spouse    Family Caregiver Names Vikram Dickey    Quality of Family Relationships helpful;involved    Able to Return to Prior Arrangements yes       Resource/Environmental Concerns    Resource/Environmental Concerns none    Transportation Concerns none       Transition Planning    Patient/Family Anticipates Transition to home;home with family    Patient/Family Anticipated Services at Transition none    Transportation Anticipated car, drives self;family or friend will provide       Discharge Needs Assessment    Readmission Within the Last 30 Days no previous admission in last 30 days    Equipment Currently Used at Home none    Concerns to be Addressed no discharge needs identified;denies needs/concerns at this time    Equipment Needed After Discharge none    Provided Post Acute Provider List? N/A    Provided Post Acute Provider Quality & Resource List? N/A               Discharge Plan     Row Name 08/19/22 4692       Plan    Plan Pt intends to return home upon discharge    Patient/Family in Agreement with Plan yes    Provided Post Acute Provider List? N/A    Provided Post Acute Provider Quality & Resource List? N/A    Plan Comments Face sheet verified, role of CCP explained. Pt is independent in ADL's and does not require any assistive devices. Pt states has difficulty paying for current medications, but does not wish to be enrolled in meds to bed. Advised pt that if any further needs arise, to contact case management              Continued Care and Services - Admitted Since  8/19/2022    Coordination has not been started for this encounter.          Demographic Summary    No documentation.                Functional Status    No documentation.                Psychosocial    No documentation.                Abuse/Neglect    No documentation.                Legal    No documentation.                Substance Abuse    No documentation.                Patient Forms    No documentation.                   Annamarie Pandya RN

## 2022-08-19 NOTE — ED NOTES
Patient from home via [facility]; c/o mid sternal chest pain that started two days ago that has been intermittent, lasting longer today.

## 2022-08-20 ENCOUNTER — APPOINTMENT (OUTPATIENT)
Dept: NUCLEAR MEDICINE | Facility: HOSPITAL | Age: 67
End: 2022-08-20

## 2022-08-20 ENCOUNTER — READMISSION MANAGEMENT (OUTPATIENT)
Dept: CALL CENTER | Facility: HOSPITAL | Age: 67
End: 2022-08-20

## 2022-08-20 VITALS
TEMPERATURE: 98.2 F | HEART RATE: 65 BPM | HEIGHT: 66 IN | BODY MASS INDEX: 29.57 KG/M2 | DIASTOLIC BLOOD PRESSURE: 78 MMHG | WEIGHT: 184 LBS | SYSTOLIC BLOOD PRESSURE: 149 MMHG | RESPIRATION RATE: 16 BRPM | OXYGEN SATURATION: 97 %

## 2022-08-20 LAB
ANION GAP SERPL CALCULATED.3IONS-SCNC: 8.3 MMOL/L (ref 5–15)
BH CV NUCLEAR PRIOR STUDY: 2
BH CV REST NUCLEAR ISOTOPE DOSE: 11 MCI
BH CV STRESS COMMENTS STAGE 1: NORMAL
BH CV STRESS DOSE REGADENOSON STAGE 1: 0.4
BH CV STRESS DURATION MIN STAGE 1: 0
BH CV STRESS DURATION SEC STAGE 1: 10
BH CV STRESS NUCLEAR ISOTOPE DOSE: 30 MCI
BH CV STRESS PROTOCOL 1: NORMAL
BH CV STRESS RECOVERY BP: NORMAL MMHG
BH CV STRESS RECOVERY HR: 75 BPM
BH CV STRESS STAGE 1: 1
BUN SERPL-MCNC: 14 MG/DL (ref 8–23)
BUN/CREAT SERPL: 12.4 (ref 7–25)
CALCIUM SPEC-SCNC: 8.9 MG/DL (ref 8.6–10.5)
CHLORIDE SERPL-SCNC: 107 MMOL/L (ref 98–107)
CO2 SERPL-SCNC: 24.7 MMOL/L (ref 22–29)
CREAT SERPL-MCNC: 1.13 MG/DL (ref 0.57–1)
DEPRECATED RDW RBC AUTO: 42.7 FL (ref 37–54)
EGFRCR SERPLBLD CKD-EPI 2021: 53.4 ML/MIN/1.73
ERYTHROCYTE [DISTWIDTH] IN BLOOD BY AUTOMATED COUNT: 13.2 % (ref 12.3–15.4)
GLUCOSE SERPL-MCNC: 92 MG/DL (ref 65–99)
HCT VFR BLD AUTO: 38.6 % (ref 34–46.6)
HGB BLD-MCNC: 13.1 G/DL (ref 12–15.9)
LV EF NUC BP: 64 %
MAXIMAL PREDICTED HEART RATE: 153 BPM
MCH RBC QN AUTO: 30 PG (ref 26.6–33)
MCHC RBC AUTO-ENTMCNC: 33.9 G/DL (ref 31.5–35.7)
MCV RBC AUTO: 88.5 FL (ref 79–97)
PERCENT MAX PREDICTED HR: 58.82 %
PLATELET # BLD AUTO: 235 10*3/MM3 (ref 140–450)
PMV BLD AUTO: 9.8 FL (ref 6–12)
POTASSIUM SERPL-SCNC: 3.9 MMOL/L (ref 3.5–5.2)
QT INTERVAL: 438 MS
RBC # BLD AUTO: 4.36 10*6/MM3 (ref 3.77–5.28)
SODIUM SERPL-SCNC: 140 MMOL/L (ref 136–145)
STRESS BASELINE BP: NORMAL MMHG
STRESS BASELINE HR: 64 BPM
STRESS PERCENT HR: 69 %
STRESS POST ESTIMATED WORKLOAD: 1 METS
STRESS POST EXERCISE DUR MIN: 4 MIN
STRESS POST PEAK BP: NORMAL MMHG
STRESS POST PEAK HR: 90 BPM
STRESS TARGET HR: 130 BPM
WBC NRBC COR # BLD: 9.69 10*3/MM3 (ref 3.4–10.8)

## 2022-08-20 PROCEDURE — 93018 CV STRESS TEST I&R ONLY: CPT | Performed by: INTERNAL MEDICINE

## 2022-08-20 PROCEDURE — G0378 HOSPITAL OBSERVATION PER HR: HCPCS

## 2022-08-20 PROCEDURE — 78452 HT MUSCLE IMAGE SPECT MULT: CPT | Performed by: INTERNAL MEDICINE

## 2022-08-20 PROCEDURE — 93010 ELECTROCARDIOGRAM REPORT: CPT | Performed by: INTERNAL MEDICINE

## 2022-08-20 PROCEDURE — 0 TECHNETIUM SESTAMIBI: Performed by: EMERGENCY MEDICINE

## 2022-08-20 PROCEDURE — 99204 OFFICE O/P NEW MOD 45 MIN: CPT | Performed by: INTERNAL MEDICINE

## 2022-08-20 PROCEDURE — 85027 COMPLETE CBC AUTOMATED: CPT | Performed by: NURSE PRACTITIONER

## 2022-08-20 PROCEDURE — 80048 BASIC METABOLIC PNL TOTAL CA: CPT | Performed by: NURSE PRACTITIONER

## 2022-08-20 PROCEDURE — 25010000002 REGADENOSON 0.4 MG/5ML SOLUTION: Performed by: EMERGENCY MEDICINE

## 2022-08-20 PROCEDURE — 78452 HT MUSCLE IMAGE SPECT MULT: CPT

## 2022-08-20 PROCEDURE — 93017 CV STRESS TEST TRACING ONLY: CPT

## 2022-08-20 PROCEDURE — A9500 TC99M SESTAMIBI: HCPCS | Performed by: EMERGENCY MEDICINE

## 2022-08-20 PROCEDURE — 93005 ELECTROCARDIOGRAM TRACING: CPT | Performed by: EMERGENCY MEDICINE

## 2022-08-20 PROCEDURE — 93016 CV STRESS TEST SUPVJ ONLY: CPT | Performed by: INTERNAL MEDICINE

## 2022-08-20 RX ADMIN — PANTOPRAZOLE SODIUM 40 MG: 40 TABLET, DELAYED RELEASE ORAL at 08:25

## 2022-08-20 RX ADMIN — REGADENOSON 0.4 MG: 0.08 INJECTION, SOLUTION INTRAVENOUS at 11:40

## 2022-08-20 RX ADMIN — NITROGLYCERIN 0.4 MG: 0.4 TABLET SUBLINGUAL at 03:07

## 2022-08-20 RX ADMIN — TECHNETIUM TC 99M SESTAMIBI 1 DOSE: 1 INJECTION INTRAVENOUS at 08:49

## 2022-08-20 RX ADMIN — DULOXETINE HYDROCHLORIDE 60 MG: 60 CAPSULE, DELAYED RELEASE ORAL at 08:25

## 2022-08-20 RX ADMIN — NITROGLYCERIN 0.4 MG: 0.4 TABLET SUBLINGUAL at 03:00

## 2022-08-20 RX ADMIN — BACLOFEN 20 MG: 10 TABLET ORAL at 08:25

## 2022-08-20 RX ADMIN — LOSARTAN POTASSIUM 50 MG: 50 TABLET, FILM COATED ORAL at 08:25

## 2022-08-20 RX ADMIN — TECHNETIUM TC 99M SESTAMIBI 1 DOSE: 1 INJECTION INTRAVENOUS at 11:40

## 2022-08-20 RX ADMIN — BUPROPION HYDROCHLORIDE 150 MG: 150 TABLET, EXTENDED RELEASE ORAL at 08:24

## 2022-08-20 RX ADMIN — Medication 10 ML: at 08:25

## 2022-08-20 RX ADMIN — GABAPENTIN 400 MG: 400 CAPSULE ORAL at 08:25

## 2022-08-20 NOTE — CONSULTS
[facility] Consult Note       Encounter Date:22  Patient:Magdalena Dickey  :1955  MRN:[id number]    Date of Admission: 2022  Date of Encounter Visit: 22  Encounter Provider: Troy Campoverde MD  Referring Provider: Jigar Cedeño MD  Place of Service: [facility]  Patient Care Team:  Sun Guerrier MD as PCP - General (Internal Medicine)  Boogie Parks Jr., MD as Consulting Physician (Psychiatry)  Mehul Burch MD as Consulting Physician (Pulmonary Disease)  Cande Guzman MD as Consulting Physician (Gastroenterology)      Consulted for: chest pain    Chief Complaint: chest pain      History of Presenting Illness:       Ms. Dickey is a 67 y.o. woman with past medical history notable for pulmonary fibrosis, gastric reflux disease, depression, hypertension, and hypertriglyceridemia who presents to the hospital with symptoms of chest pain.  She describes a chest heaviness.  This is been on and off for the last couple of days.  She had it a few days ago and it resolved on its own and actually the day before last was doing well.  Unfortunately the chest pain returned yesterday.  Pain was ongoing.  Pain was nonradiating.  No associated symptoms such as diaphoresis but did have some associated indigestion.  Since no over-the-counter medications were helping she came to the emergency room where she was noted to be hypertensive.  She was given nitroglycerin which improved her blood pressure and also her chest heaviness.  Her EKG was without any acute changes and she is ruled out for an acute myocardial infarction she was transferred over to the observation unit for further monitoring.  Her troponins have remained negative and this morning is actually feeling better.  She also received Maalox and viscous lidocaine which also improved her discomfort.  We are asked to see her for her chest pain and to further stratify her.    Does have a prior stress  test from 2019 which was without any overt ischemia.      Review of Systems:  Review of Systems   Constitutional: Positive for malaise/fatigue.   HENT: Negative.    Eyes: Negative.    Cardiovascular: Positive for chest pain and dyspnea on exertion.   Respiratory: Positive for shortness of breath.    Endocrine: Negative.    Hematologic/Lymphatic: Negative.    Skin: Negative.    Musculoskeletal: Negative.    Gastrointestinal: Positive for heartburn.   Genitourinary: Negative.    Neurological: Negative.    Psychiatric/Behavioral: Negative.    Allergic/Immunologic: Negative.        Medications:    Current Facility-Administered Medications:   •  acetaminophen (TYLENOL) tablet 650 mg, 650 mg, Oral, Q4H PRN **OR** acetaminophen (TYLENOL) 160 MG/5ML solution 650 mg, 650 mg, Oral, Q4H PRN **OR** acetaminophen (TYLENOL) suppository 650 mg, 650 mg, Rectal, Q4H PRN, Ursula Saab, APRPAULY  •  ALPRAZolam (XANAX) tablet 0.5 mg, 0.5 mg, Oral, BID PRN, Jigar Cedeño MD  •  baclofen (LIORESAL) tablet 20 mg, 20 mg, Oral, TID, Jigar Cedeño MD, 20 mg at 08/19/22 2247  •  buPROPion XL (WELLBUTRIN XL) 24 hr tablet 150 mg, 150 mg, Oral, QAM, Jigar Cedeño MD  •  DULoxetine (CYMBALTA) DR capsule 60 mg, 60 mg, Oral, Daily, Jigar Cedeño MD  •  gabapentin (NEURONTIN) capsule 400 mg, 400 mg, Oral, BID, Jigar Cedeño MD, 400 mg at 08/19/22 2247  •  losartan (COZAAR) tablet 50 mg, 50 mg, Oral, Daily, Jigar Cedeño MD  •  lurasidone (LATUDA) tablet 40 mg, 40 mg, Oral, Q PM, Jigar Cedeño MD  •  nitroglycerin (NITROSTAT) SL tablet 0.4 mg, 0.4 mg, Sublingual, Q5 Min PRN, Ursula Saab APRN, 0.4 mg at 08/20/22 0307  •  ondansetron (ZOFRAN) injection 4 mg, 4 mg, Intravenous, Q6H PRN, Ursula Saab APRPAULY  •  pantoprazole (PROTONIX) EC tablet 40 mg, 40 mg, Oral, QAM Davidson GARCIA William, MD  •  [COMPLETED] Insert peripheral IV, , , Once **AND** sodium chloride 0.9 % flush 10 mL, 10 mL, Intravenous, PRN, Bart Thomas  MD ROSA  •  sodium chloride 0.9 % flush 10 mL, 10 mL, Intravenous, Q12H, Ursula Saab APRN  •  sodium chloride 0.9 % flush 10 mL, 10 mL, Intravenous, PRN, Ursula Saab APRN  •  traZODone (DESYREL) tablet 75 mg, 75 mg, Oral, Nightly, Jigar Cedeño MD, 75 mg at 08/19/22 9749    No Known Allergies    Past Medical History:   Diagnosis Date   • Anxiety    • Colon polyp 2017   • COVID-19 09/2020   • Delayed emergence from anesthesia 2018   • Depression 5/25/2016   • Diverticulitis of colon    • Diverticulosis of large intestine without hemorrhage 10/5/2016   • GERD (gastroesophageal reflux disease)    • Hiatal hernia    • History of gastric ulcer    • Low back pain    • Lumbosacral disc disease    • Migraine without aura and without status migrainosus, not intractable 5/25/2016       Past Surgical History:   Procedure Laterality Date   • ANTERIOR CERVICAL DISCECTOMY W/ FUSION N/A 6/21/2018    Procedure: C6 7 anterior cervical discectomy and fusion with allograft and plate;  Surgeon: Jacobo De Dios MD;  [facility];  Service: Neurosurgery   • CHOLECYSTECTOMY N/A 1980   • COLON RESECTION N/A 1/16/2018    Procedure: LAPAROSCOPIC SIGMOID COLON RESECTION WITH MOBILIZATION OF SPLENIC FLEXURE;  Surgeon: Eric Davidson MD;  Location: [facility] MAIN OR;  Service:    • COLONOSCOPY N/A 2/15/2017    Procedure: COLONOSCOPY to cecum with biospsies with cold polypectomy;  Surgeon: Gerardo Tan MD;  Location: [facility] ENDOSCOPY;  Service:    • COLONOSCOPY N/A 03/01/2003    Internal hemorrhoids-Dr. Gerardo Tan   • COLONOSCOPY N/A 2/8/2022    Procedure: COLONOSCOPY INTO CECUM WITH COLD BIOPSIES AND COLD BIOPSY POLYPECTOMY;  Surgeon: Cande Guzman MD;  Location: [facility] ENDOSCOPY;  Service: Gastroenterology;  Laterality: N/A;  PRE- HISTORY OF COLON POLYPS  POST- HEMORRHOIDS, DIVERTICULOSIS, POLYP   • DIAGNOSTIC LAPAROSCOPY N/A 06/06/2001    Cul-de-sac endometriosis and adhesions-Dr. Aamir Christine   • ENDOSCOPY N/A  2/15/2017    Procedure: ESOPHAGOGASTRODUODENOSCOPY with biopsies;  Surgeon: Gerardo Tan MD;  Location:  [facility];  Service:    • ENDOSCOPY N/A 2009    Small Hiatal hernia-Dr. Gerardo Tan   • ENDOSCOPY N/A 2022    Procedure: ESOPHAGOGASTRODUODENOSCOPY WITH COLD BIOPSIES;  Surgeon: Cande Guzman MD;  [facility];  Service: Gastroenterology;  Laterality: N/A;  PRE- NAUSEA, GERD  POST- IRREGULAR Z LINE, GASTRITIS     • TOTAL ABDOMINAL HYSTERECTOMY WITH SALPINGO OOPHORECTOMY Bilateral 2001    Dr. Aamir Christine       Social History     Socioeconomic History   • Marital status:      Spouse name: Lonnie   • Number of children: 3   Tobacco Use   • Smoking status: Former Smoker     Packs/day: 1.00     Years: 40.00     Pack years: 40.00     Types: Cigarettes     Quit date: 3/10/2014     Years since quittin.4   • Smokeless tobacco: Never Used   Vaping Use   • Vaping Use: Former   Substance and Sexual Activity   • Alcohol use: Yes     Comment: SELDOM   • Drug use: No   • Sexual activity: Defer     Partners: Male     Birth control/protection: Post-menopausal       Family History   Problem Relation Age of Onset   • Alzheimer's disease Mother         SDAT   • Hypertension Mother    • Diabetes type II Mother    • Lung cancer Mother         POSSIBLE   • Heart attack Mother    • Cancer Mother         lung   • COPD Mother    • Depression Mother    • Diabetes Mother    • Heart disease Mother    • Miscarriages / Stillbirths Mother    • Alcohol abuse Father    • Prostate cancer Father    • Heart disease Father         THIRD DEGREE HEART BLOCK   • Arthritis Father    • Cancer Father         prostate   • Drug abuse Father    • Learning disabilities Father    • Ovarian cancer Sister    • No Known Problems Brother    • Ovarian cancer Sister    • Arthritis Maternal Grandmother         rheumatoid   • Arthritis Sister    • Cancer Sister         ovarian   • Depression Sister    • Hypertension  Sister    • Cancer Sister         ovarian   • Depression Sister    • Hypertension Sister    • Breast cancer Other    • Malig Hyperthermia Neg Hx        The following portions of the patient's history were reviewed and updated as appropriate: allergies, current medications, past family history, past medical history, past social history, past surgical history and problem list.         Objective:      Temp:  [97.6 °F (36.4 °C)-98.1 °F (36.7 °C)] 97.6 °F (36.4 °C)  Heart Rate:  [58-98] 61  Resp:  [16-18] 16  BP: (115-182)/(65-99) 115/65   No intake or output data in the 24 hours ending 08/20/22 0716  Body mass index is 29.7 kg/m².      08/19/22  1329 08/19/22  1657   Weight: 83.5 kg (184 lb) 83.5 kg (184 lb)           Physical Exam:  Constitutional: Well appearing, well developed, no acute distress   HENT: Oropharynx clear and membrane moist  Eyes: Normal conjunctiva, no sclera icterus.  Neck: Supple, no carotid bruit bilaterally.  Cardiovascular: Regular rate and rhythm, No Murmur, No bilateral lower extremity edema.  Pulmonary: Normal respiratory effort, normal lung sounds, no wheezing.  Abdominal: Soft, nontender, no hepatosplenomegaly, liver is non-pulsatile.  Neurological: Alert and orient x 3.   Skin: Warm, dry, no ecchymosis, no rash.  Psych: Appropriate mood and affect. Normal judgment and insight.         Lab Review:   Results from last 7 days   Lab Units 08/20/22  0528 08/19/22  1339   SODIUM mmol/L 140 142   POTASSIUM mmol/L 3.9 4.1   CHLORIDE mmol/L 107 108*   CO2 mmol/L 24.7 23.0   BUN mg/dL 14 9   CREATININE mg/dL 1.13* 1.04*   GLUCOSE mg/dL 92 86   CALCIUM mg/dL 8.9 9.4   AST (SGOT) U/L  --  16   ALT (SGPT) U/L  --  15     Results from last 7 days   Lab Units 08/19/22  1549 08/19/22  1339   TROPONIN T ng/mL <0.010 <0.010     Results from last 7 days   Lab Units 08/20/22  0528 08/19/22  1339   WBC 10*3/mm3 9.69 11.58*   HEMOGLOBIN g/dL 13.1 13.4   HEMATOCRIT % 38.6 40.0   PLATELETS 10*3/mm3 235 266                    Invalid input(s): LDLCALC  The 10-year ASCVD risk score (She SHELL Jr., et al., 2013) is: 7%    Values used to calculate the score:      Age: 67 years      Sex: Female      Is Non- : No      Diabetic: No      Tobacco smoker: No      Systolic Blood Pressure: 115 mmHg      Is BP treated: Yes      HDL Cholesterol: 55 mg/dL      Total Cholesterol: 172 mg/dL             EKG      Baseline EKG      I reviewed her EKG above which does not show any acute ST changes and no significant change from prior.    Prior cardiac testing:    Stress Echocardiogram 7/2/19:  · Estimated EF = 65%.  · Left ventricular systolic function is normal.  · Left ventricular diastolic dysfunction (grade I) consistent with impaired relaxation.  · Normal stress echo with no significant echocardiographic evidence for myocardial ischemia.       Assessment:           Chest pain         Plan:       Ms. Dickey is a 67 y.o. woman with past medical history notable for pulmonary fibrosis, gastric reflux disease, depression, hypertension, and hypertriglyceridemia who presents to the hospital with symptoms of chest pain.  Her chest pain is somewhat hard to pin down.  Does have some typical features given some exertional component but also has some atypical features given its response to viscous lidocaine and also ongoing and constant nature of it.  Would like to further stratify her given she has not any recent ischemic evaluation with a Lexiscan stress MPI.  Patient states that she could not do another treadmill stress test like she did in 2019 due to progression of her underlying lung disease.      Chest pain:  · We will follow-up on stress MPI if normal would look for other etiologies for patient's symptoms  · LEXI score 2  · ACC risk or 7% does not need primary prevention statin therapy    Essential hypertension:  · Blood pressure was elevated on arrival however patient is also on Adderall may benefit from de-escalating  this therapy would defer to her prescriber for this but given that she is already on blood pressure medications and having issues with hypertension this might be another avenue to look at and rule out as a possible source for her symptoms          Thank you for allowing me to participate in the care of Magdalena Dickey. Feel free to contact me directly with any further questions or concerns.    Troy Campoverde MD  [facility]  08/20/22  07:16 EDT

## 2022-08-20 NOTE — PROGRESS NOTES
[facility]     Progress Note    Name: Magdalena Dickey ADMIT: 2022   : 1955  PCP: Sun Guerrier MD    MRN: [id number] LOS: 0 days   AGE/SEX: 67 y.o. female  ROOM: [facility]/     Subjective   Subjective     Subjective   Patient reports chest pressure that began yesterday while laying down. She reports no aggravating or alleviating factors but states that it has been intermittent over the past 24 hours. She reports current chest pressure but denies shortness of breath or nausea.     Review of Systems   Cardiovascular: Positive for chest pain.   All other systems reviewed and are negative.         Objective   Objective     Objective   Vital Signs  Temp:  [97.6 °F (36.4 °C)-98.1 °F (36.7 °C)] 98 °F (36.7 °C)  Heart Rate:  [58-98] 62  Resp:  [16-18] 16  BP: (115-182)/(65-99) 135/81  SpO2:  [94 %-99 %] 99 %  on   ;   Device (Oxygen Therapy): room air  Body mass index is 29.7 kg/m².  Physical Exam  Vitals and nursing note reviewed.   Constitutional:       General: She is not in acute distress.     Appearance: Normal appearance. She is well-developed and normal weight. She is not diaphoretic.   HENT:      Head: Normocephalic and atraumatic.      Nose: Nose normal.      Mouth/Throat:      Mouth: Mucous membranes are moist.   Eyes:      Extraocular Movements: Extraocular movements intact.   Cardiovascular:      Rate and Rhythm: Normal rate and regular rhythm.      Pulses: Normal pulses.      Heart sounds: Normal heart sounds. No murmur heard.  Pulmonary:      Effort: Pulmonary effort is normal. No respiratory distress.      Breath sounds: Normal breath sounds.   Abdominal:      General: Abdomen is flat. Bowel sounds are normal. There is no distension.      Palpations: Abdomen is soft.   Musculoskeletal:         General: No swelling. Normal range of motion.      Cervical back: Normal range of motion and neck supple. No rigidity.   Skin:     General: Skin is warm and dry.      Findings: No erythema.    Neurological:      General: No focal deficit present.      Mental Status: She is alert and oriented to person, place, and time. Mental status is at baseline.   Psychiatric:         Mood and Affect: Mood normal.         Behavior: Behavior normal.         Thought Content: Thought content normal.         Judgment: Judgment normal.         Results Review     I reviewed the patient's new clinical results.  Results from last 7 days   Lab Units 08/20/22  0528 08/19/22  1339   WBC 10*3/mm3 9.69 11.58*   HEMOGLOBIN g/dL 13.1 13.4   PLATELETS 10*3/mm3 235 266     Results from last 7 days   Lab Units 08/20/22  0528 08/19/22  1339   SODIUM mmol/L 140 142   POTASSIUM mmol/L 3.9 4.1   CHLORIDE mmol/L 107 108*   CO2 mmol/L 24.7 23.0   BUN mg/dL 14 9   CREATININE mg/dL 1.13* 1.04*   GLUCOSE mg/dL 92 86   Estimated Creatinine Clearance: 52.6 mL/min (A) (by C-G formula based on SCr of 1.13 mg/dL (H)).  Results from last 7 days   Lab Units 08/19/22  1339   ALBUMIN g/dL 3.90   BILIRUBIN mg/dL 0.5   ALK PHOS U/L 108   AST (SGOT) U/L 16   ALT (SGPT) U/L 15     Results from last 7 days   Lab Units 08/20/22  0528 08/19/22  1339   CALCIUM mg/dL 8.9 9.4   ALBUMIN g/dL  --  3.90       COVID19   Date Value Ref Range Status   08/19/2022 Not Detected Not Detected - Ref. Range Final   02/05/2022 Not Detected Not Detected - Ref. Range Final     No results found for: HGBA1C, POCGLU    XR Chest 1 View  Narrative: XR CHEST 1 VW-  08/19/2022     HISTORY: Chest pain.     Heart size is within normal limits. Lungs appear free of acute  infiltrates. Lower cervical fusion plate is seen. Bones and soft tissues  are otherwise unremarkable.     Impression: 1. No acute process.     This report was finalized on 8/19/2022 1:50 PM by Dr. Js Hammond M.D.       Scheduled Medications  baclofen, 20 mg, Oral, TID  buPROPion XL, 150 mg, Oral, QAM  DULoxetine, 60 mg, Oral, Daily  gabapentin, 400 mg, Oral, BID  losartan, 50 mg, Oral, Daily  lurasidone, 40 mg,  Oral, Q PM  pantoprazole, 40 mg, Oral, QAM AC  sodium chloride, 10 mL, Intravenous, Q12H  traZODone, 75 mg, Oral, Nightly    Infusions   Diet  NPO Diet NPO Type: Strict NPO         Assessment/Plan   Assessment / Plan       Active Hospital Problems    Diagnosis  POA   • Chest pain [R07.9]  Yes      Resolved Hospital Problems   No resolved problems to display.       67 y.o. female admitted with chest pain.     Chest pain  -Consult cardiology  -Troponin less than 0.010 x2, trend  -ECG reviewed, nonischemic, repeat in a.m.  -N.p.o. after midnight  -Nitroglycerin as needed chest pain  -Monitor     Hypertension  -Elevated blood pressure when she arrived to ED  -Resume home medications  -Continue to monitor     GERD/IBS  -Chest pain etiology may be GI origin  -Continue to monitor     Chronic migraine/anxiety and depression  -Chronic conditions, no new issues  -Continue home medications as prescribed      · SCDs for DVT prophylaxis.  · Full code.  · Discussed with patient.  · Anticipate discharge today.    This progress note will additionally serve as discharge summary.     ORIANA Bowles  [facility]  08/20/22  08:48 EDT

## 2022-08-20 NOTE — H&P
[facility]   HISTORY AND PHYSICAL    Patient Name: Magdalena Dickey  : 1955  MRN: [id number]  Primary Care Physician:  Sun Guerrier MD  Date of admission: 2022    Subjective   Subjective     Chief Complaint:   Chief Complaint   Patient presents with   • Chest Pain         HPI:    Magdalena Dickey is a 67 y.o. female with history of chronic migraines, hypertension, GERD, IBS, anxiety depression, who presented to the emergency department today complaining of chest pain.  Patient states approximately 2 days ago she started experiencing heavy sensation in her chest.  Patient states heaviness has been fairly constant over the past 2 days and is not worse with exertion.  She denies radiation of pain.  She has not had any associated nausea or diaphoresis.  Patient states she has chronic shortness of breath and pulmonary fibrosis.  Patient denies personal history of heart disease but states both of her parents had early heart disease.  Patient had stress echo in 2019, EF 65%, low risk study.  Patient states she thought might be acid reflux but she was taking normal remedies at home without relief.    ED evaluation reviewed, initial troponin negative, EKG normal, chest x-ray negative acute.    Review of Systems   All systems were reviewed and negative except for: What is discussed in the HPI    Personal History     Past Medical History:   Diagnosis Date   • Anxiety    • Colon polyp 2017   • COVID-19 2020   • Delayed emergence from anesthesia 2018   • Depression 2016   • Diverticulitis of colon    • Diverticulosis of large intestine without hemorrhage 10/5/2016   • GERD (gastroesophageal reflux disease)    • Hiatal hernia    • History of gastric ulcer    • Low back pain    • Lumbosacral disc disease    • Migraine without aura and without status migrainosus, not intractable 2016       Past Surgical History:   Procedure Laterality Date   • ANTERIOR CERVICAL DISCECTOMY W/ FUSION N/A 2018     Procedure: C6 7 anterior cervical discectomy and fusion with allograft and plate;  Surgeon: Jacobo De Dios MD;  Location: [facility] MAIN OR;  Service: Neurosurgery   • CHOLECYSTECTOMY N/A 1980   • COLON RESECTION N/A 1/16/2018    Procedure: LAPAROSCOPIC SIGMOID COLON RESECTION WITH MOBILIZATION OF SPLENIC FLEXURE;  Surgeon: Eric Davidson MD;  Location: [facility] MAIN OR;  Service:    • COLONOSCOPY N/A 2/15/2017    Procedure: COLONOSCOPY to cecum with biospsies with cold polypectomy;  Surgeon: Gerarod Tan MD;  [facility] END[facility];  Service:    • COLONOSCOPY N/A 03/01/2003    Internal hemorrhoids-Dr. Gerardo Tan   • COLONOSCOPY N/A 2/8/2022    Procedure: COLONOSCOPY INTO CECUM WITH COLD BIOPSIES AND COLD BIOPSY POLYPECTOMY;  Surgeon: Cande Guzman MD;  [facility];  Service: Gastroenterology;  Laterality: N/A;  PRE- HISTORY OF COLON POLYPS  POST- HEMORRHOIDS, DIVERTICULOSIS, POLYP   • DIAGNOSTIC LAPAROSCOPY N/A 06/06/2001    Cul-de-sac endometriosis and adhesions-Dr. Aamir Christine   • ENDOSCOPY N/A 2/15/2017    Procedure: ESOPHAGOGASTRODUODENOSCOPY with biopsies;  Surgeon: Gerardo Tan MD;  Location: [facility] ENDOSCOPY;  Service:    • ENDOSCOPY N/A 02/04/2009    Small Hiatal hernia-Dr. Gerardo Tan   • ENDOSCOPY N/A 2/8/2022    Procedure: ESOPHAGOGASTRODUODENOSCOPY WITH COLD BIOPSIES;  Surgeon: Cande Guzman MD;  Location: [facility] ENDOSCOPY;  Service: Gastroenterology;  Laterality: N/A;  PRE- NAUSEA, GERD  POST- IRREGULAR Z LINE, GASTRITIS     • TOTAL ABDOMINAL HYSTERECTOMY WITH SALPINGO OOPHORECTOMY Bilateral 07/31/2001    Dr. Aamri Christine       Family History: family history includes Alcohol abuse in her father; Alzheimer's disease in her mother; Arthritis in her father, maternal grandmother, and sister; Breast cancer in an other family member; COPD in her mother; Cancer in her father, mother, sister, and sister; Depression in her mother, sister, and sister; Diabetes in her mother;  Diabetes type II in her mother; Drug abuse in her father; Heart attack in her mother; Heart disease in her father and mother; Hypertension in her mother, sister, and sister; Learning disabilities in her father; Lung cancer in her mother; Miscarriages / Stillbirths in her mother; No Known Problems in her brother; Ovarian cancer in her sister and sister; Prostate cancer in her father. Otherwise pertinent FHx was reviewed and not pertinent to current issue.    Social History:  reports that she quit smoking about 8 years ago. Her smoking use included cigarettes. She has a 40.00 pack-year smoking history. She has never used smokeless tobacco. She reports current alcohol use. She reports that she does not use drugs.    Home Medications:  ALPRAZolam, DULoxetine, Loperamide HCl, Methylcellulose (Laxative), albuterol sulfate HFA, amphetamine-dextroamphetamine, baclofen, buPROPion XL, calcium carbonate, cholecalciferol, gabapentin, ipratropium-albuterol, losartan, lurasidone, multivitamin, ondansetron, pantoprazole, traZODone, and ubrogepant    Allergies:  No Known Allergies    Objective   Objective     Vitals:   Temp:  [97.7 °F (36.5 °C)-98.1 °F (36.7 °C)] 98.1 °F (36.7 °C)  Heart Rate:  [58-98] 76  Resp:  [16-18] 16  BP: (134-182)/(78-99) 134/78  Physical Exam     GENERAL: 67 y.o. YO female a/o x 4, NAD  SKIN: Warm pink and dry   HEENT:  PERRL, EOM intact, conjunctiva normal, sclera clear  NECK: supple, no JVD  LUNGS: Clear to auscultation bilaterally without wheezes, rales or rhonchi.  No accessory muscle use and no nasal flaring.  CARDIAC:  Regular rate and rhythm, S1-S2.  No murmurs, rubs or gallops.  No peripheral edema.  Equal pulses bilaterally.  ABDOMEN: Soft, nontender, nondistended.  No guarding or rebound tenderness.  Normal bowel sounds.  MUSCULOSKELETAL: Moves all extremities well.  No deformity.  NEURO: Cranial nerves II through XII grossly intact.  No gross focal deficits.  Alert.  Normal speech and  motor.  PSYCH: Normal mood and affect      Result Review    Result Review:  I have personally reviewed the results from the time of this admission to 8/19/2022 22:22 EDT and agree with these findings:  [x]  Laboratory list / accordion  []  Microbiology  [x]  Radiology  [x]  EKG/Telemetry   []  Cardiology/Vascular   []  Pathology  []  Old records  []  Other:  Most notable findings include: Troponin less than 0.010x2        XR Chest 1 View    Result Date: 8/19/2022  1. No acute process.  This report was finalized on 8/19/2022 1:50 PM by Dr. Js Hammond M.D.      Stress Echo 7/2/2019  Interpretation Summary    · Estimated EF = 65%.  · Left ventricular systolic function is normal.  · Left ventricular diastolic dysfunction (grade I) consistent with impaired relaxation.  · Normal stress echo with no significant echocardiographic evidence for myocardial ischemia.      Stress Echo Findings    Ventricle Size / Description Segment augmentation had a normal response to stress. Cavity size behaved normally in response to stress. Left ventricular function is normal. Septal wall motion is normal.   Stress Echo - Peak Stress Findings Post stress images with adequate visualization of myocardium to assess for ischemia. Normal stress echo with no significant echocardiographic evidence for myocardial ischemia.       Assessment & Plan   Assessment / Plan     Brief Patient Summary:  Magdalena Dickey is a 67 y.o. female who is being admitted to observation unit for further evaluation of chest pain.    Active Hospital Problems:  Active Hospital Problems    Diagnosis    • Chest pain      Plan:     Chest pain  -Consult cardiology  -Troponin less than 0.010 x2, trend  -ECG reviewed, nonischemic, repeat in a.m.  -N.p.o. after midnight  -Nitroglycerin as needed chest pain  -Monitor    Hypertension  -Elevated blood pressure when she arrived to ED  -Resume home medications  -Continue to monitor    GERD/IBS  -Chest pain etiology may be GI  origin  -Continue to monitor    Chronic migraine/anxiety and depression  -Chronic conditions, no new issues  -Continue home medications as prescribed    DVT prophylaxis:  Mechanical DVT prophylaxis orders are present.    CODE STATUS:    Code Status (Patient has no pulse and is not breathing): CPR (Attempt to Resuscitate)  Medical Interventions (Patient has pulse or is breathing): Full Support    Admission Status:  I believe this patient meets observation status.    I wore an N95 mask, face shield, and gloves during this patient encounter. Patient also wearing a surgical mask throughout encounter. Hand hygeine performed before and after seeing the patient.    Electronically signed by PARTH Grimaldo, 08/19/22, 10:22 PM EDT.

## 2022-08-20 NOTE — PLAN OF CARE
Goal Outcome Evaluation:   Pt d/c via private vehicle. VSS IV removed. Belongings returned. Follow up instructions given. No further questions/complaints at this time.

## 2022-08-20 NOTE — OUTREACH NOTE
Prep Survey    Flowsheet Row Responses   [facility] patient discharged from? [place]   Is LACE score < 7 ? Yes   Emergency Room discharge w/ pulse ox? No   Eligibility [facility]   Date of Admission 08/19/22   Date of Discharge 08/20/22   Discharge Disposition Home or Self Care   Discharge diagnosis Chest pain, hypertension   Does the patient have one of the following disease processes/diagnoses(primary or secondary)? Other   Does the patient have Home health ordered? No   Is there a DME ordered? No   Prep survey completed? Yes          ANTONIO LLANOS - Registered Nurse

## 2022-08-20 NOTE — PLAN OF CARE
Goal Outcome Evaluation:               Pt here with chest pain midsternal that occasionally radiates into bilateral neck. Multiple EKGs show NSR. Pt has no cardiac medical history. Here for Obs due to significant family cardiac history. Pain moderately controlled with prn nitro throughout shift. APRN/PA aware. Mild relief from nitro. Trops neg, pt npo since midnight and pnd'ing cards to see in am.

## 2022-08-20 NOTE — PLAN OF CARE
Goal Outcome Evaluation:      Pt admitted for chest pain. No complaints of pain today. Cards consulted. Stress test pending.

## 2022-08-22 ENCOUNTER — TRANSITIONAL CARE MANAGEMENT TELEPHONE ENCOUNTER (OUTPATIENT)
Dept: CALL CENTER | Facility: HOSPITAL | Age: 67
End: 2022-08-22

## 2022-08-22 NOTE — OUTREACH NOTE
Call Center TCM Note    Flowsheet Row Responses   [facility] patient discharged from? [place]   Does the patient have one of the following disease processes/diagnoses(primary or secondary)? Other   TCM attempt successful? Yes   Discharge diagnosis Chest pain, hypertension   Meds reviewed with patient/caregiver? Yes   Does the patient have all medications ordered at discharge? N/A   Does the patient have a primary care provider?  Yes   Does the patient have an appointment with their PCP within 7 days of discharge? No   Comments regarding PCP Pt declines to sched TCM APPT with PCP Dr Sun Guerrier at this time.    Has the patient kept scheduled appointments due by today? N/A   Has home health visited the patient within 72 hours of discharge? N/A   Psychosocial issues? No   Did the patient receive a copy of their discharge instructions? Yes   Nursing interventions Reviewed instructions with patient   What is the patient's perception of their health status since discharge? Improving   Is the patient/caregiver able to teach back signs and symptoms related to disease process for when to call PCP? Yes   Is the patient/caregiver able to teach back signs and symptoms related to disease process for when to call 911? Yes   Is the patient/caregiver able to teach back the hierarchy of who to call/visit for symptoms/problems? PCP, Specialist, Home health nurse, Urgent Care, ED, 911 Yes   If the patient is a current smoker, are they able to teach back resources for cessation? Not a smoker   TCM call completed? Yes   Wrap up additional comments D/C DX: Chest pain, hypertension**  Very brief call, pt feels nothing done for her during admit. Pt states chest pain is better, she has not checked BP. No changes made to current medicaitons at d/c. Pt declines to sched TCM APPT with PCP Dr Sun Guerrier at this time.           Letitia Yun MA    8/22/2022, 14:55 EDT

## 2022-08-27 DIAGNOSIS — R25.2 MUSCLE CRAMPS: ICD-10-CM

## 2022-08-29 RX ORDER — BACLOFEN 20 MG/1
TABLET ORAL
Qty: 60 TABLET | Refills: 3 | Status: SHIPPED | OUTPATIENT
Start: 2022-08-29 | End: 2022-11-14

## 2022-08-30 ENCOUNTER — TELEPHONE (OUTPATIENT)
Dept: NEUROLOGY | Facility: CLINIC | Age: 67
End: 2022-08-30

## 2022-08-30 NOTE — TELEPHONE ENCOUNTER
Caller: MILDRED    Murali call back number: [phone]      What test was performed: PUNCH SKIN BIOPSY    When was the test performed: 07.08.22    Where was the test performed: IN OFFICE     Additional notes: PT IS CALLING TO GET RESULTS FROM TEST (BIOPSY) DONE BACK IN July     PLEASE ADVISE AS PT HAS BEEN WAITING A WHILE .

## 2022-08-31 NOTE — TELEPHONE ENCOUNTER
Skin bx C/W small fiber neuropathy. She has F/U with [facility]. Please let the pt know.    reina

## 2022-09-02 NOTE — TELEPHONE ENCOUNTER
Called and spoke to pt. Results given. Gabapentin is helping pt with her symptoms. Pt is okay with follow up with marilee in October.

## 2022-09-26 ENCOUNTER — OFFICE VISIT (OUTPATIENT)
Dept: GASTROENTEROLOGY | Facility: CLINIC | Age: 67
End: 2022-09-26

## 2022-09-26 VITALS
HEIGHT: 66 IN | SYSTOLIC BLOOD PRESSURE: 133 MMHG | DIASTOLIC BLOOD PRESSURE: 84 MMHG | HEART RATE: 84 BPM | WEIGHT: 187.7 LBS | TEMPERATURE: 97.1 F | BODY MASS INDEX: 30.17 KG/M2

## 2022-09-26 DIAGNOSIS — K21.9 GASTROESOPHAGEAL REFLUX DISEASE WITHOUT ESOPHAGITIS: Chronic | ICD-10-CM

## 2022-09-26 DIAGNOSIS — R15.9 FULL INCONTINENCE OF FECES: Chronic | ICD-10-CM

## 2022-09-26 DIAGNOSIS — R11.0 NAUSEA: ICD-10-CM

## 2022-09-26 DIAGNOSIS — K58.2 IRRITABLE BOWEL SYNDROME WITH BOTH CONSTIPATION AND DIARRHEA: Primary | Chronic | ICD-10-CM

## 2022-09-26 PROCEDURE — 99214 OFFICE O/P EST MOD 30 MIN: CPT | Performed by: INTERNAL MEDICINE

## 2022-09-26 NOTE — PROGRESS NOTES
Chief Complaint   Patient presents with   • IBS with Diarrhea   • Abdominal Pain   • Nausea       Subjective     HPI    Magdalena Dickey is a 67 y.o. female with a past medical history noted below who presents for follow up of intermittent diarrhea episodes, fecal incontinence, history of colon resection for diverticulitis, and nausea.       2/8/2022 EGD and colonoscopy with evidence of mild GERD, 1 tubular adenoma colon polyp, normal random colon biopsies.    Has been having chest pressure on and off, feels like someone is pushing down on her.  Stress test 8/20/22 after ER visit was negative.  She has been taking pantoprazole.  She says it does not feel like heartburn.    2 episodes of incontinence in the past few months.  Now feeling constipated. Holding the imodium but also the fiber.  She is passing small stools daily so is not ready to get on MiraLAX.    Hx of kee 1980.      Lots of flatulence.      Today's visit was in the office.  Both the patient and I were wearing face masks and proper hand hygiene was performed before and after the physical exam.           Current Outpatient Medications:   •  albuterol sulfate  (90 Base) MCG/ACT inhaler, INHALE TWO PUFFS INTO THE LUNGS EVERY FOUR HOURS AS NEEDED, Disp: , Rfl:   •  ALPRAZolam (XANAX) 0.5 MG tablet, Take 1 tablet by mouth 2 (Two) Times a Day As Needed for anxiety, Disp: 60 tablet, Rfl: 2  •  amphetamine-dextroamphetamine (ADDERALL) 10 MG tablet, Take 1 tablet by mouth Every Morning., Disp: 30 tablet, Rfl: 0  •  baclofen (LIORESAL) 20 MG tablet, TAKE 1 TABLET BY MOUTH THREE TIMES A DAY, Disp: 60 tablet, Rfl: 3  •  buPROPion XL (WELLBUTRIN XL) 150 MG 24 hr tablet, Take 1 tablet by mouth Every Morning., Disp: 90 tablet, Rfl: 2  •  calcium carbonate (OS-MARY) 600 MG tablet, Take 600 mg by mouth 2 (Two) Times a Day., Disp: , Rfl:   •  cholecalciferol (VITAMIN D3) 25 MCG (1000 UT) tablet, Take 1,000 Units by mouth Daily., Disp: , Rfl:   •  DULoxetine  (CYMBALTA) 60 MG capsule, Take 1 capsule by mouth Daily., Disp: 90 capsule, Rfl: 0  •  ipratropium-albuterol (DUO-NEB) 0.5-2.5 mg/3 ml nebulizer, Take 3 mL by nebulization Every 4 (Four) Hours As Needed., Disp: 360 mL, Rfl: 3  •  Loperamide HCl (IMODIUM A-D PO), Take  by mouth Daily., Disp: , Rfl:   •  losartan (COZAAR) 50 MG tablet, TAKE 1 TABLET BY MOUTH EVERY DAY, Disp: 90 tablet, Rfl: 0  •  Methylcellulose, Laxative, (CITRUCEL PO), Take  by mouth Daily., Disp: , Rfl:   •  Multiple Vitamin (MULTI-VITAMINS PO), Take 1 tablet by mouth Daily. HOLD FOR SURGERY, Disp: , Rfl:   •  ondansetron (ZOFRAN) 4 MG tablet, TAKE 1 TABLET BY MOUTH EVERY 8 (EIGHT) HOURS AS NEEDED FOR NAUSEA OR VOMITING., Disp: 30 tablet, Rfl: 2  •  pantoprazole (PROTONIX) 40 MG EC tablet, TAKE 1 TABLET BY MOUTH EVERY DAY IN THE MORNING, Disp: 90 tablet, Rfl: 1  •  traZODone (DESYREL) 50 MG tablet, Take 1.5 tablets by mouth Every Night for 90 days., Disp: 145 tablet, Rfl: 1  •  ubrogepant (ubrogepant) 100 MG tablet, Take 1 tablet by mouth 1 (One) Time As Needed (migraine) for up to 6 doses., Disp: 6 tablet, Rfl: 0  •  gabapentin (NEURONTIN) 400 MG capsule, Take 1 capsule by mouth 2 (Two) Times a Day for 30 days., Disp: 60 capsule, Rfl: 2  •  lurasidone (Latuda) 40 MG tablet tablet, Take 1 tablet by mouth Every Evening with 400 calorie meal, Disp: 30 tablet, Rfl: 5      Objective     Vitals:    09/26/22 0847   BP: 133/84   Pulse: 84   Temp: 97.1 °F (36.2 °C)         09/26/22  0847   Weight: 85.1 kg (187 lb 11.2 oz)     Body mass index is 30.3 kg/m².    Physical Exam        WBC   Date Value Ref Range Status   08/20/2022 9.69 3.40 - 10.80 10*3/mm3 Final   12/22/2020 12.7 (H) 3.4 - 10.8 x10E3/uL Final     RBC   Date Value Ref Range Status   08/20/2022 4.36 3.77 - 5.28 10*6/mm3 Final   12/22/2020 4.22 3.77 - 5.28 x10E6/uL Final     Hemoglobin   Date Value Ref Range Status   08/20/2022 13.1 12.0 - 15.9 g/dL Final     Hematocrit   Date Value Ref Range  Status   08/20/2022 38.6 34.0 - 46.6 % Final     MCV   Date Value Ref Range Status   08/20/2022 88.5 79.0 - 97.0 fL Final     MCH   Date Value Ref Range Status   08/20/2022 30.0 26.6 - 33.0 pg Final     MCHC   Date Value Ref Range Status   08/20/2022 33.9 31.5 - 35.7 g/dL Final     RDW   Date Value Ref Range Status   08/20/2022 13.2 12.3 - 15.4 % Final     RDW-SD   Date Value Ref Range Status   08/20/2022 42.7 37.0 - 54.0 fl Final     MPV   Date Value Ref Range Status   08/20/2022 9.8 6.0 - 12.0 fL Final     Platelets   Date Value Ref Range Status   08/20/2022 235 140 - 450 10*3/mm3 Final     Neutrophil %   Date Value Ref Range Status   08/19/2022 60.6 42.7 - 76.0 % Final     Lymphocyte %   Date Value Ref Range Status   08/19/2022 31.3 19.6 - 45.3 % Final     Monocyte %   Date Value Ref Range Status   08/19/2022 5.4 5.0 - 12.0 % Final     Eosinophil %   Date Value Ref Range Status   08/19/2022 1.6 0.3 - 6.2 % Final     Basophil %   Date Value Ref Range Status   08/19/2022 0.8 0.0 - 1.5 % Final     Immature Grans %   Date Value Ref Range Status   08/19/2022 0.3 0.0 - 0.5 % Final     Neutrophils, Absolute   Date Value Ref Range Status   08/19/2022 7.01 (H) 1.70 - 7.00 10*3/mm3 Final     Lymphocytes, Absolute   Date Value Ref Range Status   08/19/2022 3.63 (H) 0.70 - 3.10 10*3/mm3 Final     Monocytes, Absolute   Date Value Ref Range Status   08/19/2022 0.62 0.10 - 0.90 10*3/mm3 Final     Eosinophils, Absolute   Date Value Ref Range Status   08/19/2022 0.19 0.00 - 0.40 10*3/mm3 Final     Basophils, Absolute   Date Value Ref Range Status   08/19/2022 0.09 0.00 - 0.20 10*3/mm3 Final     Immature Grans, Absolute   Date Value Ref Range Status   08/19/2022 0.04 0.00 - 0.05 10*3/mm3 Final     nRBC   Date Value Ref Range Status   08/19/2022 0.0 0.0 - 0.2 /100 WBC Final       Lab Results   Component Value Date    GLUCOSE 92 08/20/2022    BUN 14 08/20/2022    CREATININE 1.13 (H) 08/20/2022    EGFRIFNONA 50 (L) 08/09/2021     EGFRIFAFRI 65 12/22/2020    BCR 12.4 08/20/2022    CO2 24.7 08/20/2022    CALCIUM 8.9 08/20/2022    PROTENTOTREF 7.2 01/17/2022    ALBUMIN 3.90 08/19/2022    LABIL2 1.3 01/17/2022    AST 16 08/19/2022    ALT 15 08/19/2022         Imaging Results (Last 7 Days)     ** No results found for the last 168 hours. **          I personally reviewed data as detailed below:     The labs listed above.      Office notes from: 8/19/22 ER visit      Assessment and Plan    1. Irritable bowel syndrome with both constipation and diarrhea (Primary)    2. Nausea    3. Gastroesophageal reflux disease without esophagitis    4. Full incontinence of feces    Plan  Continue daily fiber supplementation with plenty of water, regardless of what bowel movement she is having  MiraLAX as needed, Imodium as needed  Referral to Dr. Jin for episodes of fecal incontinence  Leigh Ann PPI  Trial of peppermint altoids for the chest pressure episodes.  If these episodes persist despite trying the peppermint, we will proceed with an esophagram to rule out esophageal spasm       Diagnoses and all orders for this visit:    1. Irritable bowel syndrome with both constipation and diarrhea (Primary)    2. Nausea    3. Gastroesophageal reflux disease without esophagitis    4. Full incontinence of feces  -     Ambulatory Referral to Colorectal Surgery          I have discussed the above plan with the patient.  They verbalize understanding and are in agreement with the plan.  They have been advised to contact the office for any questions, concerns, or changes related to their health.    Dictated utilizing Dragon dictation

## 2022-10-28 ENCOUNTER — OFFICE VISIT (OUTPATIENT)
Dept: SURGERY | Facility: CLINIC | Age: 67
End: 2022-10-28

## 2022-10-28 VITALS
DIASTOLIC BLOOD PRESSURE: 82 MMHG | HEIGHT: 66 IN | WEIGHT: 187.1 LBS | BODY MASS INDEX: 30.07 KG/M2 | OXYGEN SATURATION: 97 % | HEART RATE: 89 BPM | SYSTOLIC BLOOD PRESSURE: 132 MMHG

## 2022-10-28 DIAGNOSIS — R15.9 INCONTINENCE OF FECES, UNSPECIFIED FECAL INCONTINENCE TYPE: Primary | ICD-10-CM

## 2022-10-28 PROCEDURE — 99204 OFFICE O/P NEW MOD 45 MIN: CPT | Performed by: COLON & RECTAL SURGERY

## 2022-10-28 RX ORDER — ARIPIPRAZOLE 10 MG/1
TABLET ORAL
COMMUNITY
Start: 2022-09-28 | End: 2023-01-04 | Stop reason: ALTCHOICE

## 2022-10-28 NOTE — PROGRESS NOTES
Magdalena Dickey is a 67 y.o. female who is seen as a consult at the request of Cande Guzman MD for fecal incontinence.      HPI:  The patient reports that she has been having full-on fecal incontinence, and she is unable to control her bowels. This has been happening approximately once a month. She denies any pain. She is on Imodium and Citrucel 1 tablet daily. She also takes 2 tablets of fiber a day. Her bowel movements are regular and formed most of the time when she takes the medications. When she these incontinence episodes happen, she has approximately 3 bowel movements a day. She denies any food association. The episodes can come on any time. She does report an associated foul smelling. The patient has only one child that she delivered vaginally. The patient denies having any rectal or hemorrhoid surgery. She has thought about wearing a diaper at all time.     Past Medical History:   Diagnosis Date   • Anxiety    • Colon polyp 2017   • COVID-19 09/2020   • Delayed emergence from anesthesia 2018   • Depression 5/25/2016   • Diverticulitis of colon    • Diverticulosis of large intestine without hemorrhage 10/5/2016   • GERD (gastroesophageal reflux disease)    • Hiatal hernia    • History of gastric ulcer    • Low back pain    • Lumbosacral disc disease    • Migraine without aura and without status migrainosus, not intractable 5/25/2016       Past Surgical History:   Procedure Laterality Date   • ANTERIOR CERVICAL DISCECTOMY W/ FUSION N/A 6/21/2018    Procedure: C6 7 anterior cervical discectomy and fusion with allograft and plate;  Surgeon: Jacobo De Dios MD;  Location: [facility] OR;  Service: Neurosurgery   • CHOLECYSTECTOMY N/A 1980   • COLON RESECTION N/A 1/16/2018    Procedure: LAPAROSCOPIC SIGMOID COLON RESECTION WITH MOBILIZATION OF SPLENIC FLEXURE;  Surgeon: Eric Davidson MD;  Location: [facility] OR;  Service:    • COLONOSCOPY N/A 2/15/2017    Procedure: COLONOSCOPY to cecum with biospsies  with cold polypectomy;  Surgeon: Gerardo Tan MD;  Location: [facility] ENDOSCOPY;  Service:    • COLONOSCOPY N/A 03/01/2003    Internal hemorrhoids-Dr. Gerardo Tan   • COLONOSCOPY N/A 2/8/2022    Procedure: COLONOSCOPY INTO CECUM WITH COLD BIOPSIES AND COLD BIOPSY POLYPECTOMY;  Surgeon: Cande Guzman MD;  Location: [facility] ENDOSCOPY;  Service: Gastroenterology;  Laterality: N/A;  PRE- HISTORY OF COLON POLYPS  POST- HEMORRHOIDS, DIVERTICULOSIS, POLYP   • DIAGNOSTIC LAPAROSCOPY N/A 06/06/2001    Cul-de-sac endometriosis and adhesions-Dr. Aamir Christine   • ENDOSCOPY N/A 2/15/2017    Procedure: ESOPHAGOGASTRODUODENOSCOPY with biopsies;  Surgeon: Gerardo Tan MD;  Location: [facility] ENDOSCOPY;  Service:    • ENDOSCOPY N/A 02/04/2009    Small Hiatal hernia-Dr. Gerardo Tan   • ENDOSCOPY N/A 2/8/2022    Procedure: ESOPHAGOGASTRODUODENOSCOPY WITH COLD BIOPSIES;  Surgeon: Cande Guzman MD;  Location: [facility] ENDOSCOPY;  Service: Gastroenterology;  Laterality: N/A;  PRE- NAUSEA, GERD  POST- IRREGULAR Z LINE, GASTRITIS     • TOTAL ABDOMINAL HYSTERECTOMY WITH SALPINGO OOPHORECTOMY Bilateral 07/31/2001    Dr. Aamir Christine       Social History:   reports that she quit smoking about 8 years ago. Her smoking use included cigarettes. She has a 40.00 pack-year smoking history. She has been exposed to tobacco smoke. She has never used smokeless tobacco. She reports current alcohol use. She reports that she does not use drugs.      Marriage status:     Family History   Problem Relation Age of Onset   • Alzheimer's disease Mother         SDAT   • Hypertension Mother    • Diabetes type II Mother    • Lung cancer Mother         POSSIBLE   • Heart attack Mother    • Cancer Mother         lung   • COPD Mother    • Depression Mother    • Diabetes Mother    • Heart disease Mother    • Miscarriages / Stillbirths Mother    • Alcohol abuse Father    • Prostate cancer Father    • Heart disease Father         THIRD DEGREE HEART BLOCK    • Arthritis Father    • Cancer Father         prostate   • Drug abuse Father    • Learning disabilities Father    • Ovarian cancer Sister    • Ovarian cancer Sister    • Arthritis Sister    • Cancer Sister         ovarian   • Depression Sister    • Hypertension Sister    • Cancer Sister         ovarian   • Depression Sister    • Hypertension Sister    • No Known Problems Brother    • Arthritis Maternal Grandmother         rheumatoid   • Breast cancer Other    • Malig Hyperthermia Neg Hx          Current Outpatient Medications:   •  albuterol sulfate  (90 Base) MCG/ACT inhaler, INHALE TWO PUFFS INTO THE LUNGS EVERY FOUR HOURS AS NEEDED, Disp: , Rfl:   •  ALPRAZolam (XANAX) 0.5 MG tablet, Take 1 tablet by mouth 2 (Two) Times a Day As Needed for anxiety, Disp: 60 tablet, Rfl: 2  •  amphetamine-dextroamphetamine (ADDERALL) 10 MG tablet, Take 1 tablet by mouth Every Morning., Disp: 30 tablet, Rfl: 0  •  ARIPiprazole (ABILIFY) 10 MG tablet, TAKE 1/2 (ONE-HALF) TABLET BY MOUTH ONCE DAILY FOR 4 DAYS THEN 1 ONCE DAILY, Disp: , Rfl:   •  baclofen (LIORESAL) 20 MG tablet, TAKE 1 TABLET BY MOUTH THREE TIMES A DAY, Disp: 60 tablet, Rfl: 3  •  buPROPion XL (WELLBUTRIN XL) 150 MG 24 hr tablet, Take 1 tablet by mouth Every Morning., Disp: 90 tablet, Rfl: 2  •  calcium carbonate (OS-MARY) 600 MG tablet, Take 600 mg by mouth 2 (Two) Times a Day., Disp: , Rfl:   •  cholecalciferol (VITAMIN D3) 25 MCG (1000 UT) tablet, Take 1,000 Units by mouth Daily., Disp: , Rfl:   •  DULoxetine (CYMBALTA) 60 MG capsule, Take 1 capsule by mouth Daily., Disp: 90 capsule, Rfl: 0  •  gabapentin (NEURONTIN) 400 MG capsule, Take 1 capsule by mouth 2 (Two) Times a Day for 30 days., Disp: 60 capsule, Rfl: 2  •  ipratropium-albuterol (DUO-NEB) 0.5-2.5 mg/3 ml nebulizer, Take 3 mL by nebulization Every 4 (Four) Hours As Needed., Disp: 360 mL, Rfl: 3  •  Loperamide HCl (IMODIUM A-D PO), Take  by mouth Daily., Disp: , Rfl:   •  losartan (COZAAR) 50 MG  tablet, TAKE 1 TABLET BY MOUTH EVERY DAY, Disp: 90 tablet, Rfl: 0  •  Methylcellulose, Laxative, (CITRUCEL PO), Take  by mouth Daily., Disp: , Rfl:   •  Multiple Vitamin (MULTI-VITAMINS PO), Take 1 tablet by mouth Daily. HOLD FOR SURGERY, Disp: , Rfl:   •  ondansetron (ZOFRAN) 4 MG tablet, TAKE 1 TABLET BY MOUTH EVERY 8 (EIGHT) HOURS AS NEEDED FOR NAUSEA OR VOMITING., Disp: 30 tablet, Rfl: 2  •  pantoprazole (PROTONIX) 40 MG EC tablet, TAKE 1 TABLET BY MOUTH EVERY DAY IN THE MORNING, Disp: 90 tablet, Rfl: 1  •  traZODone (DESYREL) 50 MG tablet, Take 1.5 tablets by mouth Every Night for 90 days., Disp: 145 tablet, Rfl: 1  •  ubrogepant (ubrogepant) 100 MG tablet, Take 1 tablet by mouth 1 (One) Time As Needed (migraine) for up to 6 doses., Disp: 6 tablet, Rfl: 0    Allergy  Patient has no known allergies.    Review of Systems   Constitutional: Positive for diaphoresis and malaise/fatigue. Negative for decreased appetite and weight gain.   HENT: Negative for congestion, hearing loss and hoarse voice.    Eyes: Negative for blurred vision, discharge and visual disturbance.   Cardiovascular: Negative for chest pain, cyanosis and leg swelling.   Respiratory: Positive for shortness of breath. Negative for cough, sleep disturbances due to breathing and snoring.    Endocrine: Negative for cold intolerance and heat intolerance.   Hematologic/Lymphatic: Does not bruise/bleed easily.   Skin: Negative for itching, poor wound healing and skin cancer.   Musculoskeletal: Positive for myalgias, neck pain and stiffness. Negative for arthritis, back pain, joint pain and joint swelling.   Gastrointestinal: Positive for bloating, constipation, diarrhea and nausea. Negative for abdominal pain, change in bowel habit and bowel incontinence.   Genitourinary: Positive for frequency and urgency. Negative for bladder incontinence, dysuria and hematuria.   Neurological: Positive for difficulty with concentration, dizziness, headaches and loss of  balance. Negative for brief paralysis, excessive daytime sleepiness, focal weakness, light-headedness and weakness.   Psychiatric/Behavioral: Positive for depression. Negative for altered mental status and hallucinations. The patient is nervous/anxious. The patient does not have insomnia.    Allergic/Immunologic: Positive for environmental allergies. Negative for HIV exposure and persistent infections.   All other systems reviewed and are negative.      Vitals:    10/28/22 1402   BP: 132/82   Pulse: 89   SpO2: 97%     Body mass index is 30.2 kg/m².    Physical Exam  Exam conducted with a chaperone present.   Constitutional:       General: She is not in acute distress.     Appearance: She is well-developed.   HENT:      Head: Normocephalic and atraumatic.      Nose: Nose normal.   Eyes:      Conjunctiva/sclera: Conjunctivae normal.      Pupils: Pupils are equal, round, and reactive to light.   Neck:      Trachea: No tracheal deviation.   Pulmonary:      Effort: Pulmonary effort is normal. No respiratory distress.      Breath sounds: Normal breath sounds.   Abdominal:      General: Bowel sounds are normal. There is no distension.      Palpations: Abdomen is soft.   Genitourinary:     Comments: Perianal exam: Patient has a scar from posterior vagina to anal canal, just to the right of midline that have appears to be a well-healed episiotomy scar.     On rectal exam, no masses are felt, but with her squeeze, she has deficiency anteriorly and overall some weakness.  Musculoskeletal:         General: No deformity. Normal range of motion.      Cervical back: Normal range of motion.   Skin:     General: Skin is warm and dry.   Neurological:      Mental Status: She is alert and oriented to person, place, and time.      Cranial Nerves: No cranial nerve deficit.      Coordination: Coordination normal.      Gait: Gait normal.   Psychiatric:         Behavior: Behavior normal.         Judgment: Judgment normal.         Review of  Medical Record:  The patient has a history of sigmoid resection for diverticulitis, intermittent constipation, and diarrhea with an associated fecal incontinence. She had a colonoscopy done on 02/08/2022 with a tubular adenoma.    Assessment:  No diagnosis found.    Plan:  We discussed taking extra Imodium and physical thepray. The patient decided to try physical therapy. She is expected to return in 3 months. If she is doing great, she can call and cancel. If things still are not going well, then we can potentially talk about proceeding with the nerve stimulator.      Transcribed from ambient dictation for Maren Jin MD by Judy Walker.  10/28/22   20:36 EDT    Patient or patient representative verbalized consent to the visit recording.  I have personally performed the services described in this document as transcribed by the above individual, and it is both accurate and complete.

## 2022-10-31 DIAGNOSIS — G43.009 MIGRAINE WITHOUT AURA AND WITHOUT STATUS MIGRAINOSUS, NOT INTRACTABLE: ICD-10-CM

## 2022-10-31 DIAGNOSIS — R20.2 PARESTHESIAS: ICD-10-CM

## 2022-11-03 RX ORDER — GABAPENTIN 400 MG/1
400 CAPSULE ORAL 2 TIMES DAILY
Qty: 60 CAPSULE | Refills: 2 | Status: SHIPPED | OUTPATIENT
Start: 2022-11-03 | End: 2023-02-06

## 2022-11-03 NOTE — TELEPHONE ENCOUNTER
Provider: CASH  Caller: PATIENT  Relationship to Patient: SELF  Pharmacy: [id number]  Phone Number: [phone]  Reason for Call:  PATIENT TELEPHONED RE: REFILL FOR HER GABAPENTIN (NEURONTIN) 400 MG CAPSULE.    PHARMACY HAS NOT RECEIVED; CAN YOU RE-SEND?    CALL WITH ANY QUESTIONS.    THANK YOU.

## 2022-11-14 DIAGNOSIS — R25.2 MUSCLE CRAMPS: ICD-10-CM

## 2022-11-14 RX ORDER — BACLOFEN 20 MG/1
TABLET ORAL
Qty: 60 TABLET | Refills: 3 | Status: SHIPPED | OUTPATIENT
Start: 2022-11-14 | End: 2023-02-06

## 2022-11-14 NOTE — PROGRESS NOTES
CC:    HPI:  Magdalena Dickey is a  67 y.o.  right-handed female      Social history:    Family history:      Pain Scale:        ROS:  Review of Systems   Constitutional: Negative for appetite change, fatigue and unexpected weight change.   HENT: Negative for ear pain, hearing loss and tinnitus.    Eyes: Negative for photophobia, pain and visual disturbance.   Respiratory: Negative for choking, chest tightness, shortness of breath and wheezing.    Cardiovascular: Negative for chest pain and palpitations.   Gastrointestinal: Negative for constipation, diarrhea, nausea and vomiting.   Endocrine: Negative for cold intolerance and heat intolerance.   Genitourinary: Negative for difficulty urinating.   Skin: Negative for color change, rash and wound.   Allergic/Immunologic: Negative for food allergies.   Neurological: Negative for dizziness, tremors, weakness, light-headedness, numbness and headaches.   Hematological: Does not bruise/bleed easily.   Psychiatric/Behavioral: Negative for agitation, behavioral problems, confusion, decreased concentration and sleep disturbance. The patient is not nervous/anxious.          Reviewed ROS conducted by MA and agree    ***    Physical Exam:  There were no vitals filed for this visit.  Orthostatic BP:    There is no height or weight on file to calculate BMI.        General: pt well appearing, no distress  HEENT: Normocephalic, conjunctiva normal, external canals normal, no nasal discharge, moist mucous membranes  Neck: No lymphadenopathy, thyroid not enlarged, no JVD  CV: Regular rate and rhythm, no murmurs negative no bruits auscultated at neck, equal pulses  Pulmonary: Normal respiratory effort, clear to auscultation bilaterally  Extremities: no edema, bruising, or skin lesions  Pysch: good eye contact, cooperative, full affect, euthymic mood, good attention, good insight  Mental: alert, conversant, AOx3, provides history, 3/3 recall.  Language fluent, names, repeats.  CN: CN  II-XII intact, PERRL, EOMi, no gaze palsy or nystagmus, intact facial sensation, no facial assymetry, intact hearing, symmetric palate elevation, tongue midline, good SCM strength  Motor: no abnormal movements, no pronator drift, normal  bulk and tone, 5/5 strength b/l UE & LE  Sensory: normal sensation to crude touch, temperature, and vibration throughout  Reflexes: 2+ throughout, neg babinski  Coordination: no ataxia on finger-nose, heel-shin  Gait: normal gait, no ataxia, intact heel and toe walking        Results:      Lab Results   Component Value Date    GLUCOSE 92 08/20/2022    BUN 14 08/20/2022    CREATININE 1.13 (H) 08/20/2022    EGFRIFNONA 50 (L) 08/09/2021    EGFRIFAFRI 65 12/22/2020    BCR 12.4 08/20/2022    CO2 24.7 08/20/2022    CALCIUM 8.9 08/20/2022    PROTENTOTREF 7.2 01/17/2022    ALBUMIN 3.90 08/19/2022    LABIL2 1.3 01/17/2022    AST 16 08/19/2022    ALT 15 08/19/2022       Lab Results   Component Value Date    WBC 9.69 08/20/2022    HGB 13.1 08/20/2022    HCT 38.6 08/20/2022    MCV 88.5 08/20/2022     08/20/2022         .No results found for: RPR      Lab Results   Component Value Date    TSH 1.660 08/09/2021         Lab Results   Component Value Date    [id number] 654 08/09/2021         No results found for: FOLATE      Lab Results   Component Value Date    HGBA1C 5.30 01/17/2022         Lab Results   Component Value Date    GLUCOSE 92 08/20/2022    BUN 14 08/20/2022    CREATININE 1.13 (H) 08/20/2022    EGFRIFNONA 50 (L) 08/09/2021    EGFRIFAFRI 65 12/22/2020    BCR 12.4 08/20/2022    K 3.9 08/20/2022    CO2 24.7 08/20/2022    CALCIUM 8.9 08/20/2022    PROTENTOTREF 7.2 01/17/2022    ALBUMIN 3.90 08/19/2022    LABIL2 1.3 01/17/2022    AST 16 08/19/2022    ALT 15 08/19/2022         Diagnosis:    Impression:    Plan:

## 2022-11-16 ENCOUNTER — OFFICE VISIT (OUTPATIENT)
Dept: NEUROLOGY | Facility: CLINIC | Age: 67
End: 2022-11-16

## 2022-11-16 VITALS
DIASTOLIC BLOOD PRESSURE: 80 MMHG | OXYGEN SATURATION: 97 % | BODY MASS INDEX: 30.49 KG/M2 | HEART RATE: 88 BPM | SYSTOLIC BLOOD PRESSURE: 140 MMHG | WEIGHT: 189.7 LBS | HEIGHT: 66 IN

## 2022-11-16 DIAGNOSIS — G44.89 OTHER HEADACHE SYNDROME: Primary | ICD-10-CM

## 2022-11-16 PROCEDURE — 99214 OFFICE O/P EST MOD 30 MIN: CPT | Performed by: PHYSICIAN ASSISTANT

## 2022-11-16 NOTE — PROGRESS NOTES
CC: Follow-up    HPI:  Magdalena Dickey is a  67 y.o.  right-handed female with history of depression/anxiety, colon polyps, diverticulitis, GERD, cervical stenosis status post discectomy, migraine headaches who is here for follow-up regarding neuropathy and headaches.  She last saw us in clinic in June.  She described paresthesias in the bilateral hands and feet but mostly of the feet.  This is described as painful and hurting all the time, with some intermittent and brief shooting stabbing pains.  She was previously on Topamax which titrated down and discontinued in case that was contributing to paresthesias.  Gabapentin was started.  She reports gabapentin is helping.  She takes 400 mg twice a day.  She describes a long neurologic history to me.  She has been seeing neurologists for more than 10 years.  In the past she was on triptans.  Her previous neurologist have retired.  I can see a prescription for Zomig from 7 years ago through [facility].  It appears she was on Topamax and also propranolol in the past.  She sees a psychiatrist and she is on trazodone, Adderall, Wellbutrin, and Cymbalta.  She reports that she thinks she is doing well from a depression standpoint.  When asked if she feels hopeful she says she does not think there is much hope at this age but just reality-on paraphrasing.  She describes to me the murder of her stepson and granddaughter in the past couple years, and his sister with terminal illness.    I reviewed prior documentation and made edits as appropriate:    She presented to our clinic in January 2022 referred by her PCP for neuropathy.  She reports toe started hurting in 2020.  Back then she was hospitalized for COVID-pneumonia and reports being in the CCU but not on ventilator.  Since that time she has developed interstitial lung disease.  Work-up includes     EMG in April 2022 which was normal,  Hemoglobin A1c normal at 5.3  RPR was nonreactive  Cryoglobulins negative  Heavy metals  including lead arsenic and mercury were all within normal limits  Thyroid panel normal  B12 level normal  B6 level normal  Sjogren's antibodies negative  Rheumatoid factor negative  IEP/SPEP both showed no evidence of monoclonal gammopathy  Skin biopsy , no evidence of vasculitis or histologic abnormalities, no amyloid, skin with significantly reduced epidermal nerve fiber density consistent with small fiber neuropathy of 1 out of 2 sites    Interval history: Her neuropathy is no doubt better since starting the gabapentin.  She says though that feet constantly hurt.  In the middle of the day it is perhaps worse.  Headaches sound as if they are improved too based on prior documentation.  She says that she has not had a bad migraine for 6 months.  She is taking Ubrelvy and q. Ellipta but says this is $600.  Though she has a baseline daily headache that she describes as waking her from sleep.  She believes she is snores as well.  She had a sleep study that sounds to have been equivocal perhaps 5 years ago.  Nocturnal headache have been ongoing for several months.  When she awakens she watches TV, does not take any medication, it dissipates on its own, and she goes back to sleep.  She has had a brief bout of vertigo when she was on her bed playing with the dogs.  Sounded consistent with BPPV.  She has had some issues with balance especially on turning, but no falls.  She describes some problems with memory and word finding that is not new.    Social history: Ex-smoker, 40-year pack a day, , 1 biological child, 2 stepchildren 1 is .  Occasional EtOH.  Works in case management    Family history: Mother with dementia in her 70s when passed, sister with ovarian cancer, no report of neuropathies    Pain Scale:        ROS:  Review of Systems   Constitutional: Positive for fatigue. Negative for unexpected weight change.   HENT: Negative for ear pain, hearing loss, nosebleeds and tinnitus.    Eyes: Negative  "for photophobia, pain and visual disturbance.   Respiratory: Positive for chest tightness and shortness of breath. Negative for wheezing.    Cardiovascular: Negative for chest pain and palpitations.   Musculoskeletal: Positive for gait problem (balance). Negative for back pain, neck pain and neck stiffness.   Neurological: Positive for dizziness and headaches. Negative for tremors, speech difficulty, weakness and numbness.   Psychiatric/Behavioral: Negative for confusion, decreased concentration and sleep disturbance. The patient is not nervous/anxious.          Reviewed ROS conducted by MA and rina      Physical Exam:  Vitals:    11/16/22 0901   BP: 140/80   Pulse: 88   SpO2: 97%   Weight: 86 kg (189 lb 11.2 oz)   Height: 167.6 cm (66\")     Orthostatic BP:    Body mass index is 30.62 kg/m².        General: pt well appearing, no distress  HEENT: Normocephalic, conjunctiva normal, external canals normal, no nasal discharge, moist mucous membranes  Neck: No lymphadenopathy, thyroid not enlarged, no JVD  CV: Regular rate and rhythm, no murmurs negative no bruits auscultated at neck, equal pulses  Pulmonary: Normal respiratory effort, clear to auscultation bilaterally  Extremities: no edema, bruising, or skin lesions  Pysch: good eye contact, cooperative, full affect, euthymic mood, good attention, good insight  Mental: alert, conversant, AOx3, provides history, 3/3 recall.  Language fluent, names, repeats.  CN: CN II-XII intact, PERRL, EOMi, no gaze palsy or nystagmus, intact facial sensation, no facial assymetry, intact hearing, symmetric palate elevation, tongue midline, good SCM strength  Motor: no abnormal movements, no pronator drift, normal  bulk and tone, 5/5 strength b/l UE & LE  Sensory: normal sensation to crude touch, temperature, and vibration throughout  Reflexes: 2+ throughout, neg babinski  Coordination: no ataxia on finger-nose, heel-shin  Gait: normal gait, no ataxia, intact heel and toe " walking        Results:      Lab Results   Component Value Date    GLUCOSE 92 08/20/2022    BUN 14 08/20/2022    CREATININE 1.13 (H) 08/20/2022    EGFRIFNONA 50 (L) 08/09/2021    EGFRIFAFRI 65 12/22/2020    BCR 12.4 08/20/2022    CO2 24.7 08/20/2022    CALCIUM 8.9 08/20/2022    PROTENTOTREF 7.2 01/17/2022    ALBUMIN 3.90 08/19/2022    LABIL2 1.3 01/17/2022    AST 16 08/19/2022    ALT 15 08/19/2022       Lab Results   Component Value Date    WBC 9.69 08/20/2022    HGB 13.1 08/20/2022    HCT 38.6 08/20/2022    MCV 88.5 08/20/2022     08/20/2022         .No results found for: RPR      Lab Results   Component Value Date    TSH 1.660 08/09/2021         Lab Results   Component Value Date    [id number] 654 08/09/2021         No results found for: FOLATE      Lab Results   Component Value Date    HGBA1C 5.30 01/17/2022         Lab Results   Component Value Date    GLUCOSE 92 08/20/2022    BUN 14 08/20/2022    CREATININE 1.13 (H) 08/20/2022    EGFRIFNONA 50 (L) 08/09/2021    EGFRIFAFRI 65 12/22/2020    BCR 12.4 08/20/2022    K 3.9 08/20/2022    CO2 24.7 08/20/2022    CALCIUM 8.9 08/20/2022    PROTENTOTREF 7.2 01/17/2022    ALBUMIN 3.90 08/19/2022    LABIL2 1.3 01/17/2022    AST 16 08/19/2022    ALT 15 08/19/2022         Diagnosis:  1.  Polyneuropathy  2.  Migraine headaches  3.  Depression/anxiety    Impression: 67-year-old female with past medical history as above stated and hypertension, hyperlipidemia, GERD, diverticulosis, cervical spondylosis status post fusion and discectomy who is here for follow-up regarding neuropathy and headaches.  Skin biopsy performed was consistent with small fiber and certainly her description is classic of this.  Gabapentin seems to be helping but there is time during the day that she is uncontrolled.  Her migraine headaches also seem to be better controlled but she is describing to me nocturnal headaches.  She has no increased ICP findings or description.  She describes snoring waking  her from sleep as well.  I am concerned about MICHELLE despite that she has had nondiagnostic sleep study in the past per her account it was borderline and she has now developed ILD.  We will first repeat this.  Her neuro exam apart from her sensory loss is normal.  But consider MRI brain.  Of note she has not had for many years.  The cognitive symptoms she describe think are secondary to fatigue and/or undertreated depression.  We will do official Antigo at her next visit for baseline.    Plan:  1.  Continue gabapentin 400 mgs twice daily with half a tablet in the afternoon.  She will need updated prescription  2.  Samples of Nurtec given.  I would like her to take this to see if we can help with her baseline headache as she tells me she has not taken an abortive agent in 6 months.  I will refill a triptan to start since CGRPs are cost prohibitive on her insurance.  Of note she had a recent stress test only showing normal variant of apical thinning, no ischemia  3.  Sleep study    Follow-up in 2 months    I spent at least 30 minutes interviewing, examining, and counseling patient.  I independently reviewed documentation, laboratory and diagnostic findings, external documentation where applicable, and formulated treatment plan which was discussed with the patient.

## 2022-11-21 ENCOUNTER — HOSPITAL ENCOUNTER (OUTPATIENT)
Dept: CT IMAGING | Facility: HOSPITAL | Age: 67
Discharge: HOME | End: 2022-11-21
Admitting: INTERNAL MEDICINE

## 2022-11-21 DIAGNOSIS — J84.9 ILD (INTERSTITIAL LUNG DISEASE): ICD-10-CM

## 2022-11-21 PROCEDURE — 71250 CT THORAX DX C-: CPT

## 2022-12-02 DIAGNOSIS — I10 ESSENTIAL HYPERTENSION: ICD-10-CM

## 2022-12-02 RX ORDER — LOSARTAN POTASSIUM 50 MG/1
TABLET ORAL
Qty: 90 TABLET | Refills: 3 | Status: SHIPPED | OUTPATIENT
Start: 2022-12-02

## 2022-12-05 RX ORDER — PANTOPRAZOLE SODIUM 40 MG/1
TABLET, DELAYED RELEASE ORAL
Qty: 90 TABLET | Refills: 1 | Status: SHIPPED | OUTPATIENT
Start: 2022-12-05

## 2022-12-21 DIAGNOSIS — E78.1 HYPERTRIGLYCERIDEMIA: ICD-10-CM

## 2022-12-21 DIAGNOSIS — I10 ESSENTIAL HYPERTENSION: Primary | ICD-10-CM

## 2022-12-21 LAB
ALBUMIN SERPL-MCNC: 4.2 G/DL (ref 3.5–5.2)
ALBUMIN/GLOB SERPL: 1.9 G/DL
ALP SERPL-CCNC: 130 U/L (ref 39–117)
ALT SERPL-CCNC: 17 U/L (ref 1–33)
AST SERPL-CCNC: 15 U/L (ref 1–32)
BASOPHILS # BLD AUTO: 0.12 10*3/MM3 (ref 0–0.2)
BASOPHILS NFR BLD AUTO: 1.3 % (ref 0–1.5)
BILIRUB SERPL-MCNC: 0.4 MG/DL (ref 0–1.2)
BUN SERPL-MCNC: 15 MG/DL (ref 8–23)
BUN/CREAT SERPL: 14.4 (ref 7–25)
CALCIUM SERPL-MCNC: 9.3 MG/DL (ref 8.6–10.5)
CHLORIDE SERPL-SCNC: 109 MMOL/L (ref 98–107)
CHOLEST SERPL-MCNC: 174 MG/DL (ref 0–200)
CO2 SERPL-SCNC: 23.3 MMOL/L (ref 22–29)
CREAT SERPL-MCNC: 1.04 MG/DL (ref 0.57–1)
EGFRCR SERPLBLD CKD-EPI 2021: 59 ML/MIN/1.73
EOSINOPHIL # BLD AUTO: 0.34 10*3/MM3 (ref 0–0.4)
EOSINOPHIL NFR BLD AUTO: 3.8 % (ref 0.3–6.2)
ERYTHROCYTE [DISTWIDTH] IN BLOOD BY AUTOMATED COUNT: 12.7 % (ref 12.3–15.4)
GLOBULIN SER CALC-MCNC: 2.2 GM/DL
GLUCOSE SERPL-MCNC: 94 MG/DL (ref 65–99)
HCT VFR BLD AUTO: 38.8 % (ref 34–46.6)
HDLC SERPL-MCNC: 46 MG/DL (ref 40–60)
HGB BLD-MCNC: 13.5 G/DL (ref 12–15.9)
IMM GRANULOCYTES # BLD AUTO: 0.04 10*3/MM3 (ref 0–0.05)
IMM GRANULOCYTES NFR BLD AUTO: 0.4 % (ref 0–0.5)
LDLC SERPL CALC-MCNC: 96 MG/DL (ref 0–100)
LYMPHOCYTES # BLD AUTO: 2.68 10*3/MM3 (ref 0.7–3.1)
LYMPHOCYTES NFR BLD AUTO: 29.7 % (ref 19.6–45.3)
MCH RBC QN AUTO: 30.4 PG (ref 26.6–33)
MCHC RBC AUTO-ENTMCNC: 34.8 G/DL (ref 31.5–35.7)
MCV RBC AUTO: 87.4 FL (ref 79–97)
MONOCYTES # BLD AUTO: 0.65 10*3/MM3 (ref 0.1–0.9)
MONOCYTES NFR BLD AUTO: 7.2 % (ref 5–12)
NEUTROPHILS # BLD AUTO: 5.2 10*3/MM3 (ref 1.7–7)
NEUTROPHILS NFR BLD AUTO: 57.6 % (ref 42.7–76)
NRBC BLD AUTO-RTO: 0 /100 WBC (ref 0–0.2)
PLATELET # BLD AUTO: 296 10*3/MM3 (ref 140–450)
POTASSIUM SERPL-SCNC: 4.4 MMOL/L (ref 3.5–5.2)
PROT SERPL-MCNC: 6.4 G/DL (ref 6–8.5)
RBC # BLD AUTO: 4.44 10*6/MM3 (ref 3.77–5.28)
SODIUM SERPL-SCNC: 139 MMOL/L (ref 136–145)
TRIGL SERPL-MCNC: 184 MG/DL (ref 0–150)
VLDLC SERPL CALC-MCNC: 32 MG/DL (ref 5–40)
WBC # BLD AUTO: 9.03 10*3/MM3 (ref 3.4–10.8)

## 2023-01-04 ENCOUNTER — OFFICE VISIT (OUTPATIENT)
Dept: INTERNAL MEDICINE | Facility: CLINIC | Age: 68
End: 2023-01-04
Payer: MEDICARE

## 2023-01-04 VITALS
WEIGHT: 190 LBS | SYSTOLIC BLOOD PRESSURE: 118 MMHG | DIASTOLIC BLOOD PRESSURE: 64 MMHG | TEMPERATURE: 97.3 F | HEART RATE: 68 BPM | HEIGHT: 66 IN | BODY MASS INDEX: 30.53 KG/M2

## 2023-01-04 DIAGNOSIS — G62.9 PERIPHERAL POLYNEUROPATHY: ICD-10-CM

## 2023-01-04 DIAGNOSIS — Z00.00 MEDICARE ANNUAL WELLNESS VISIT, SUBSEQUENT: ICD-10-CM

## 2023-01-04 DIAGNOSIS — E78.1 HYPERTRIGLYCERIDEMIA: Primary | Chronic | ICD-10-CM

## 2023-01-04 DIAGNOSIS — I10 ESSENTIAL HYPERTENSION: Chronic | ICD-10-CM

## 2023-01-04 PROBLEM — R11.0 NAUSEA: Status: RESOLVED | Noted: 2021-12-13 | Resolved: 2023-01-04

## 2023-01-04 PROCEDURE — 1160F RVW MEDS BY RX/DR IN RCRD: CPT | Performed by: INTERNAL MEDICINE

## 2023-01-04 PROCEDURE — 1159F MED LIST DOCD IN RCRD: CPT | Performed by: INTERNAL MEDICINE

## 2023-01-04 PROCEDURE — 99213 OFFICE O/P EST LOW 20 MIN: CPT | Performed by: INTERNAL MEDICINE

## 2023-01-04 PROCEDURE — 1170F FXNL STATUS ASSESSED: CPT | Performed by: INTERNAL MEDICINE

## 2023-01-04 NOTE — PROGRESS NOTES
The ABCs of the Annual Wellness Visit  Subsequent Medicare Wellness Visit    Chief Complaint   Patient presents with   • Medicare Wellness-subsequent      Subjective    History of Present Illness:  Magdalena Dickey is a 67 y.o. female who presents for a Subsequent Medicare Wellness Visit.    The following portions of the patient's history were reviewed and   updated as appropriate: {history reviewed:20406::\"allergies\",\"current medications\",\"past family history\",\"past medical history\",\"past social history\",\"past surgical history\",\"problem list\"}.    Compared to one year ago, the patient feels her physical   health is {better worse same:57890}.    Compared to one year ago, the patient feels her mental   health is {better worse same:81113}.    Recent Hospitalizations:  This patient has had a [facility] admission record on file within the last 365 days.    Current Medical Providers:  Patient Care Team:  Sun Guerrier MD as PCP - General (Internal Medicine)  Boogie Parks Jr., MD as Consulting Physician (Psychiatry)  Mehul Burch MD as Consulting Physician (Pulmonary Disease)  Cande Guzman MD as Consulting Physician (Gastroenterology)  Maren Jin MD as Consulting Physician (Colon and Rectal Surgery)    Outpatient Medications Prior to Visit   Medication Sig Dispense Refill   • albuterol sulfate  (90 Base) MCG/ACT inhaler INHALE TWO PUFFS INTO THE LUNGS EVERY FOUR HOURS AS NEEDED     • ALPRAZolam (XANAX) 0.5 MG tablet Take 1 tablet by mouth 2 (Two) Times a Day As Needed for anxiety 60 tablet 2   • amphetamine-dextroamphetamine (ADDERALL) 10 MG tablet Take 1 tablet by mouth Every Morning. 30 tablet 0   • ARIPiprazole (ABILIFY) 10 MG tablet TAKE 1/2 (ONE-HALF) TABLET BY MOUTH ONCE DAILY FOR 4 DAYS THEN 1 ONCE DAILY     • baclofen (LIORESAL) 20 MG tablet TAKE 1 TABLET BY MOUTH THREE TIMES A DAY 60 tablet 3   • buPROPion XL (WELLBUTRIN XL) 150 MG 24 hr tablet Take 1 tablet by mouth Every  Morning. 90 tablet 2   • calcium carbonate (OS-MARY) 600 MG tablet Take 600 mg by mouth 2 (Two) Times a Day.     • cholecalciferol (VITAMIN D3) 25 MCG (1000 UT) tablet Take 1,000 Units by mouth Daily.     • DULoxetine (CYMBALTA) 60 MG capsule Take 1 capsule by mouth Daily. 90 capsule 0   • ipratropium-albuterol (DUO-NEB) 0.5-2.5 mg/3 ml nebulizer Take 3 mL by nebulization Every 4 (Four) Hours As Needed. 360 mL 3   • Loperamide HCl (IMODIUM A-D PO) Take  by mouth Daily.     • losartan (COZAAR) 50 MG tablet TAKE 1 TABLET BY MOUTH EVERY DAY 90 tablet 3   • Methylcellulose, Laxative, (CITRUCEL PO) Take  by mouth Daily.     • Multiple Vitamin (MULTI-VITAMINS PO) Take 1 tablet by mouth Daily. HOLD FOR SURGERY     • ondansetron (ZOFRAN) 4 MG tablet TAKE 1 TABLET BY MOUTH EVERY 8 (EIGHT) HOURS AS NEEDED FOR NAUSEA OR VOMITING. 30 tablet 2   • pantoprazole (PROTONIX) 40 MG EC tablet TAKE 1 TABLET BY MOUTH EVERY DAY IN THE MORNING 90 tablet 1   • gabapentin (NEURONTIN) 400 MG capsule Take 1 capsule by mouth 2 (Two) Times a Day for 30 days. 60 capsule 2   • traZODone (DESYREL) 50 MG tablet Take 1.5 tablets by mouth Every Night for 90 days. 145 tablet 1     No facility-administered medications prior to visit.       No opioid medication identified on active medication list. I have reviewed chart for other potential  high risk medication/s and harmful drug interactions in the elderly.          Aspirin is not on active medication list.  {ASPIRIN NOT ON MEDICATION LIST INDICATED/NOT INDICATED:58113}.    Patient Active Problem List   Diagnosis   • Migraine without aura and without status migrainosus, not intractable   • GERD (gastroesophageal reflux disease)   • Irritable bowel syndrome with diarrhea   • Depression with anxiety   • Cervical spondylosis with radiculopathy   • History of colon resection   • Essential hypertension   • Hypertriglyceridemia   • Peripheral polyneuropathy   • Nausea   • Fecal smearing   • Chest pain   •  Full incontinence of feces     Advance Care Planning  Advance Directive is not on file.  {ACP Discussion, Advance Directive not in EMR:22141}          Objective    Vitals:    23 1300   Temp: 97.3 °F (36.3 °C)   Weight: 86.2 kg (190 lb)   Height: 167.6 cm (65.98\")     Estimated body mass index is 30.68 kg/m² as calculated from the following:    Height as of this encounter: 167.6 cm (65.98\").    Weight as of this encounter: 86.2 kg (190 lb).    {BMI is >= 30 and <35. (Class 1 Obesity). The following options were offered after discussion;:[id number]}      Does the patient have evidence of cognitive impairment? {Yes/No:33970}    Physical Exam  Lab Results   Component Value Date    CHLPL 174 2022    TRIG 184 (H) 2022    HDL 46 2022    LDL 96 2022    VLDL 32 2022            HEALTH RISK ASSESSMENT    Smoking Status:  Social History     Tobacco Use   Smoking Status Former   • Packs/day: 1.00   • Years: 40.00   • Pack years: 40.00   • Types: Cigarettes   • Quit date: 3/10/2014   • Years since quittin.8   • Passive exposure: Past   Smokeless Tobacco Never     Alcohol Consumption:  Social History     Substance and Sexual Activity   Alcohol Use Yes    Comment: SELDOM     Fall Risk Screen:    STEADI Fall Risk Assessment was completed, and patient is at LOW risk for falls.Assessment completed on:2023    Depression Screening:  PHQ-2/PHQ-9 Depression Screening 2023   Retired Total Score -   Little Interest or Pleasure in Doing Things 0-->not at all   Feeling Down, Depressed or Hopeless 0-->not at all   PHQ-9: Brief Depression Severity Measure Score 0       Health Habits and Functional and Cognitive Screening:  Functional & Cognitive Status 2023   Do you have difficulty preparing food and eating? No   Do you have difficulty bathing yourself, getting dressed or grooming yourself? No   Do you have difficulty using the toilet? No   Do you have difficulty moving around from place to  place? No   Do you have trouble with steps or getting out of a bed or a chair? No   Current Diet Well Balanced Diet   Dental Exam Up to date   Eye Exam Up to date   Exercise (times per week) 0 times per week   Current Exercises Include No Regular Exercise   Do you need help using the phone?  No   Are you deaf or do you have serious difficulty hearing?  No   Do you need help with transportation? No   Do you need help shopping? No   Do you need help preparing meals?  No   Do you need help with housework?  No   Do you need help with laundry? No   Do you need help taking your medications? No   Do you need help managing money? No   Do you ever drive or ride in a car without wearing a seat belt? No       Age-appropriate Screening Schedule:  Refer to the list below for future screening recommendations based on patient's age, sex and/or medical conditions. Orders for these recommended tests are listed in the plan section. The patient has been provided with a written plan.    Health Maintenance   Topic Date Due   • DXA SCAN  08/16/2023   • LIPID PANEL  12/21/2023   • MAMMOGRAM  03/01/2024   • TDAP/TD VACCINES (2 - Td or Tdap) 11/10/2031   • INFLUENZA VACCINE  Completed   • ZOSTER VACCINE  Completed   • PAP SMEAR  Discontinued              Assessment & Plan   CMS Preventative Services Quick Reference  Risk Factors Identified During Encounter  {Medicare Wellness Risk Factors:29592}  The above risks/problems have been discussed with the patient.  Follow up actions/plans if indicated are seen below in the Assessment/Plan Section.  Pertinent information has been shared with the patient in the After Visit Summary.    There are no diagnoses linked to this encounter.    Follow Up:   No follow-ups on file.     An After Visit Summary and PPPS were made available to the patient.    {Optional Chart Navigation Links Wrapup  Review (Popup)  Advance Care Planning  Labs  CC  Problem List  Visit Diagnosis  Medications  Result Review   Imaging  Health Maintenance  Quality  BestPractice  SmartSets  SnapShot  Encounters  Notes  Media  Procedures :23}      {Time Spent-Use for E/M Coding (Optional):28171}

## 2023-01-04 NOTE — PROGRESS NOTES
The ABCs of the Annual Wellness Visit  Subsequent Medicare Wellness Visit    Subjective    Magdalena Dickey is a 67 y.o. female who presents for a Subsequent Medicare Wellness Visit.    The following portions of the patient's history were reviewed and   updated as appropriate: allergies, current medications, past family history, past medical history, past social history, past surgical history and problem list.    Compared to one year ago, the patient feels her physical   health is the same.    Compared to one year ago, the patient feels her mental   health is the same.    Recent Hospitalizations:  This patient has had a [facility] admission record on file within the last 365 days.    Current Medical Providers:  Patient Care Team:  Sun Guerrier MD as PCP - General (Internal Medicine)  Boogie Parks Jr., MD as Consulting Physician (Psychiatry)  Mehul Burch MD as Consulting Physician (Pulmonary Disease)  Cande Guzman MD as Consulting Physician (Gastroenterology)  Maren Jin MD as Consulting Physician (Colon and Rectal Surgery)    Outpatient Medications Prior to Visit   Medication Sig Dispense Refill   • albuterol sulfate  (90 Base) MCG/ACT inhaler INHALE TWO PUFFS INTO THE LUNGS EVERY FOUR HOURS AS NEEDED     • ALPRAZolam (XANAX) 0.5 MG tablet Take 1 tablet by mouth 2 (Two) Times a Day As Needed for anxiety 60 tablet 2   • amphetamine-dextroamphetamine (ADDERALL) 10 MG tablet Take 1 tablet by mouth Every Morning. 30 tablet 0   • baclofen (LIORESAL) 20 MG tablet TAKE 1 TABLET BY MOUTH THREE TIMES A DAY 60 tablet 3   • buPROPion XL (WELLBUTRIN XL) 150 MG 24 hr tablet Take 1 tablet by mouth Every Morning. 90 tablet 2   • calcium carbonate (OS-MARY) 600 MG tablet Take 600 mg by mouth 2 (Two) Times a Day.     • cholecalciferol (VITAMIN D3) 25 MCG (1000 UT) tablet Take 1,000 Units by mouth Daily.     • DULoxetine (CYMBALTA) 60 MG capsule Take 1 capsule by mouth Daily. 90 capsule 0   •  ipratropium-albuterol (DUO-NEB) 0.5-2.5 mg/3 ml nebulizer Take 3 mL by nebulization Every 4 (Four) Hours As Needed. 360 mL 3   • Loperamide HCl (IMODIUM A-D PO) Take  by mouth Daily.     • losartan (COZAAR) 50 MG tablet TAKE 1 TABLET BY MOUTH EVERY DAY 90 tablet 3   • Methylcellulose, Laxative, (CITRUCEL PO) Take  by mouth Daily.     • Multiple Vitamin (MULTI-VITAMINS PO) Take 1 tablet by mouth Daily. HOLD FOR SURGERY     • ondansetron (ZOFRAN) 4 MG tablet TAKE 1 TABLET BY MOUTH EVERY 8 (EIGHT) HOURS AS NEEDED FOR NAUSEA OR VOMITING. 30 tablet 2   • pantoprazole (PROTONIX) 40 MG EC tablet TAKE 1 TABLET BY MOUTH EVERY DAY IN THE MORNING 90 tablet 1   • traZODone (DESYREL) 50 MG tablet Take 1.5 tablets by mouth Every Night for 90 days. 145 tablet 1   • ARIPiprazole (ABILIFY) 10 MG tablet TAKE 1/2 (ONE-HALF) TABLET BY MOUTH ONCE DAILY FOR 4 DAYS THEN 1 ONCE DAILY     • gabapentin (NEURONTIN) 400 MG capsule Take 1 capsule by mouth 2 (Two) Times a Day for 30 days. 60 capsule 2     No facility-administered medications prior to visit.       No opioid medication identified on active medication list. I have reviewed chart for other potential  high risk medication/s and harmful drug interactions in the elderly.          Aspirin is not on active medication list.  Aspirin use is not indicated based on review of current medical condition/s. Risk of harm outweighs potential benefits.  .    Patient Active Problem List   Diagnosis   • Migraine without aura and without status migrainosus, not intractable   • GERD (gastroesophageal reflux disease)   • Irritable bowel syndrome with diarrhea   • Depression with anxiety   • Cervical spondylosis with radiculopathy   • History of colon resection   • Essential hypertension   • Hypertriglyceridemia   • Peripheral polyneuropathy   • Fecal smearing   • Chest pain   • Full incontinence of feces     Advance Care Planning  Advance Directive is not on file.  ACP discussion was held with the  patient during this visit. Patient has an advance directive (not in EMR), copy requested.     Objective    Vitals:    23 1300   BP: 118/64   Pulse: 68   Temp: 97.3 °F (36.3 °C)   Weight: 86.2 kg (190 lb)   Height: 167.6 cm (65.98\")     Estimated body mass index is 30.68 kg/m² as calculated from the following:    Height as of this encounter: 167.6 cm (65.98\").    Weight as of this encounter: 86.2 kg (190 lb).    BMI is >= 30 and <35. (Class 1 Obesity). The following options were offered after discussion;: exercise counseling/recommendations and nutrition counseling/recommendations      Does the patient have evidence of cognitive impairment? No    Lab Results   Component Value Date    CHLPL 174 2022    TRIG 184 (H) 2022    HDL 46 2022    LDL 96 2022    VLDL 32 2022        HEALTH RISK ASSESSMENT    Smoking Status:  Social History     Tobacco Use   Smoking Status Former   • Packs/day: 1.00   • Years: 40.00   • Pack years: 40.00   • Types: Cigarettes   • Quit date: 3/10/2014   • Years since quittin.8   • Passive exposure: Past   Smokeless Tobacco Never     Alcohol Consumption:  Social History     Substance and Sexual Activity   Alcohol Use Yes    Comment: SELDOM     Fall Risk Screen:    OSBALDOADI Fall Risk Assessment was completed, and patient is at LOW risk for falls.Assessment completed on:2023    Depression Screening:  PHQ-2/PHQ-9 Depression Screening 2023   Little Interest or Pleasure in Doing Things 0-->not at all   Feeling Down, Depressed or Hopeless 0-->not at all   PHQ-9: Brief Depression Severity Measure Score 0       Health Habits and Functional and Cognitive Screening:  Functional & Cognitive Status 2023   Do you have difficulty preparing food and eating? No   Do you have difficulty bathing yourself, getting dressed or grooming yourself? No   Do you have difficulty using the toilet? No   Do you have difficulty moving around from place to place? No   Do you have  trouble with steps or getting out of a bed or a chair? No   Current Diet Well Balanced Diet   Dental Exam Up to date   Eye Exam Up to date   Exercise (times per week) 0 times per week   Current Exercises Include No Regular Exercise   Do you need help using the phone?  No   Are you deaf or do you have serious difficulty hearing?  No   Do you need help with transportation? No   Do you need help shopping? No   Do you need help preparing meals?  No   Do you need help with housework?  No   Do you need help with laundry? No   Do you need help taking your medications? No   Do you need help managing money? No   Do you ever drive or ride in a car without wearing a seat belt? No       Age-appropriate Screening Schedule:  Refer to the list below for future screening recommendations based on patient's age, sex and/or medical conditions. Orders for these recommended tests are listed in the plan section. The patient has been provided with a written plan.    Health Maintenance   Topic Date Due   • DXA SCAN  08/16/2023   • LIPID PANEL  12/21/2023   • MAMMOGRAM  03/01/2024   • TDAP/TD VACCINES (2 - Td or Tdap) 11/10/2031   • INFLUENZA VACCINE  Completed   • ZOSTER VACCINE  Completed   • PAP SMEAR  Discontinued                CMS Preventative Services Quick Reference  Risk Factors Identified During Encounter  Immunizations Discussed/Encouraged: COVID19  The above risks/problems have been discussed with the patient.  Pertinent information has been shared with the patient in the After Visit Summary.  An After Visit Summary and PPPS were made available to the patient.    Follow Up:   Next Medicare Wellness visit to be scheduled in 1 year.       Additional E&M Note during same encounter follows:  Patient has multiple medical problems which are significant and separately identifiable that require additional work above and beyond the Medicare Wellness Visit.      Chief Complaint  Medicare Wellness-subsequent and Headache    Subjective         HPI  Magdalena Dickey is also being seen today for headaches and peripheral neuropathy.  She is waking with a headache in the am- has appt for sleep eval in March.    Neurology has added gabapentin which helps a lot ofher foot pain but she does not want to continue to go see them - would like me to do her prescriptions.  Feels that her headaches are worse after stopping the Topamax. Does not feel these are migraine rather just daily headaches.   She is seeing Dr. Jin about fecal incontinence- currently doing PT.  Pulmonology does not think she has ILD just slow to recover Covid issues.   Not eating much, hasn't been exercising.  Review of Systems   HENT: Negative for hearing loss.    Eyes: Negative for visual disturbance.   Respiratory: Negative for cough and shortness of breath.    Gastrointestinal: Negative for abdominal pain.   Genitourinary: Negative for difficulty urinating.   Musculoskeletal: Negative for arthralgias.   Psychiatric/Behavioral: Negative for dysphoric mood (controlled).       Objective   Vital Signs:  /64   Pulse 68   Temp 97.3 °F (36.3 °C)   Ht 167.6 cm (65.98\")   Wt 86.2 kg (190 lb)   BMI 30.68 kg/m²     Physical Exam  Constitutional:       Appearance: Normal appearance.   Eyes:      Conjunctiva/sclera: Conjunctivae normal.   Cardiovascular:      Rate and Rhythm: Normal rate and regular rhythm.   Pulmonary:      Effort: Pulmonary effort is normal.      Breath sounds: Normal breath sounds.   Musculoskeletal:      Right lower leg: No edema.      Left lower leg: No edema.   Neurological:      General: No focal deficit present.   Psychiatric:         Mood and Affect: Mood normal.         Thought Content: Thought content normal.          The following data was reviewed by: Sun Guerrier MD on 01/04/2023:  Common labs    Common Labs 8/19/22 8/19/22 8/20/22 8/20/22 12/21/22 12/21/22 12/21/22    1339 1339 0528 0528 0952 0952 0952   Glucose  86  92   94   BUN  9  14   15    Creatinine  1.04 (A)  1.13 (A)   1.04 (A)   Sodium  142  140   139   Potassium  4.1  3.9   4.4   Chloride  108 (A)  107   109 (A)   Calcium  9.4  8.9   9.3   Total Protein       6.4   Albumin  3.90     4.20   Total Bilirubin  0.5     0.4   Alkaline Phosphatase  108     130 (A)   AST (SGOT)  16     15   ALT (SGPT)  15     17   WBC 11.58 (A)  9.69  9.03     Hemoglobin 13.4  13.1  13.5     Hematocrit 40.0  38.6  38.8     Platelets 266  235  296     Total Cholesterol      174    Triglycerides      184 (A)    HDL Cholesterol      46    LDL Cholesterol       96    (A) Abnormal value       Comments are available for some flowsheets but are not being displayed.           Data reviewed: Consultant notes neurology           Assessment and Plan   Diagnoses and all orders for this visit:    1. Hypertriglyceridemia (Primary)  Comments:  improved with some dietary changes    2. Essential hypertension  Comments:  excellent control    3. Peripheral polyneuropathy  Comments:  will resume the gagapentin rx- Contract signed, Raciel reviewed.     4. Medicare annual wellness visit, subsequent  Comments:  recommend Covid bivalent booster- add exercise daily to regimen- even just walking. Keep psych appts- recheck 6m.prn.              Follow Up   Return in about 6 months (around 7/4/2023) for Recheck.  Patient was given instructions and counseling regarding her condition or for health maintenance advice. Please see specific information pulled into the AVS if appropriate.

## 2023-01-20 ENCOUNTER — TELEPHONE (OUTPATIENT)
Dept: NEUROLOGY | Facility: CLINIC | Age: 68
End: 2023-01-20

## 2023-01-20 NOTE — TELEPHONE ENCOUNTER
Caller: Magdalena Dickey    Relationship: Self    Best call back number: [phone]    What was the call regarding: PT CALLED TO CANCEL HER F/U APPT W/ [facility] ON 2/2/23. PT STATES SHE DOES NOT FEEL THE APPT IS NECESSARY AND HER PCP HAS TAKEN OVER WRITING HER PRESCRIPTIONS.     [facility] UNABLE TO MAKE CHANGES TO [facility] APPTS. PLEASE CANCEL APPT.

## 2023-02-04 DIAGNOSIS — R25.2 MUSCLE CRAMPS: ICD-10-CM

## 2023-02-06 DIAGNOSIS — G43.009 MIGRAINE WITHOUT AURA AND WITHOUT STATUS MIGRAINOSUS, NOT INTRACTABLE: ICD-10-CM

## 2023-02-06 DIAGNOSIS — R20.2 PARESTHESIAS: ICD-10-CM

## 2023-02-06 RX ORDER — BACLOFEN 20 MG/1
TABLET ORAL
Qty: 60 TABLET | Refills: 3 | Status: SHIPPED | OUTPATIENT
Start: 2023-02-06

## 2023-02-06 RX ORDER — GABAPENTIN 400 MG/1
400 CAPSULE ORAL 3 TIMES DAILY
Qty: 90 CAPSULE | Refills: 0 | Status: SHIPPED | OUTPATIENT
Start: 2023-02-06

## 2023-02-10 ENCOUNTER — OFFICE VISIT (OUTPATIENT)
Dept: SURGERY | Facility: CLINIC | Age: 68
End: 2023-02-10
Payer: MEDICARE

## 2023-02-10 VITALS
HEART RATE: 81 BPM | TEMPERATURE: 98.7 F | SYSTOLIC BLOOD PRESSURE: 108 MMHG | BODY MASS INDEX: 30.67 KG/M2 | OXYGEN SATURATION: 98 % | DIASTOLIC BLOOD PRESSURE: 88 MMHG | WEIGHT: 190.8 LBS | HEIGHT: 66 IN

## 2023-02-10 DIAGNOSIS — R19.7 DIARRHEA, UNSPECIFIED TYPE: ICD-10-CM

## 2023-02-10 DIAGNOSIS — R15.9 INCONTINENCE OF FECES, UNSPECIFIED FECAL INCONTINENCE TYPE: Primary | ICD-10-CM

## 2023-02-10 PROCEDURE — 99213 OFFICE O/P EST LOW 20 MIN: CPT | Performed by: COLON & RECTAL SURGERY

## 2023-02-10 NOTE — PROGRESS NOTES
"Magdalena Dickey is a 67 y.o. female in for follow up of Incontinence of feces, unspecified fecal incontinence type    Diarrhea, unspecified type    The patient comes in for fecal incontinence. She was seen on 10/28/2022 and we talked about physical therapy and extra Imodium.     The patient does not feel like she has made any progress with physical therapy. She only experiences leakage when she has diarrhea and states the leakage is \"everywhere.\" The last episode was a couple of weeks ago or last week. She takes 1 Imodium tablet daily. The patient thinks Imodium helps a lot with preventing diarrhea. The Imodium causes her to experience constipation if it is increased. She has taken up to 4 to 8 pills of Imodium daily. The patient does not know when the leakage will occur and it seems to be related to food. She has experienced diarrhea since her cholecystectomy many years ago.    /88 (BP Location: Left arm, Patient Position: Sitting, Cuff Size: Large Adult)   Pulse 81   Temp 98.7 °F (37.1 °C) (Temporal)   Ht 167.6 cm (66\")   Wt 86.5 kg (190 lb 12.8 oz)   LMP  (LMP Unknown)   SpO2 98%   Breastfeeding No   BMI 30.80 kg/m²   Body mass index is 30.8 kg/m².      PE:  Physical Exam  Constitutional:       General: She is not in acute distress.     Appearance: She is well-developed.   HENT:      Head: Normocephalic and atraumatic.   Abdominal:      General: There is no distension.      Palpations: Abdomen is soft.   Musculoskeletal:         General: Normal range of motion.   Neurological:      Mental Status: She is alert.   Psychiatric:         Thought Content: Thought content normal.           Assessment:   1. Incontinence of feces, unspecified fecal incontinence type    2. Diarrhea, unspecified type           Plan:  -I discussed with the patient that the nerve stimulator will not help with the diarrhea. It will only help if the stool is formed, so she would still have leakage even if we did the nerve " stimulator. If the Imodium is working and she is starting to have leakage beyond that, then we can see if the nerve stimulator would be worthwhile. She will follow-up in 6 months.      Transcribed from ambient dictation for Maren Jin MD by Geena Alarcon.  02/10/23   12:08 EST    Patient or patient representative verbalized consent to the visit recording.  I have personally performed the services described in this document as transcribed by the above individual, and it is both accurate and complete.    I spent 20 minutes caring for Magdalena Dickey on this date of service. This time includes time spent by me in the following activities:preparing for the visit, reviewing tests, obtaining and/or reviewing a separately obtained history, performing a medically appropriate examination and/or evaluation , counseling and educating the patient/family/caregiver, ordering medications, tests, or procedures, referring and communicating with other health care professionals  and independently interpreting results and communicating that information with the patient/family/caregiver

## 2023-02-14 RX ORDER — SUMATRIPTAN 100 MG/1
TABLET, FILM COATED ORAL
Qty: 9 TABLET | Refills: 2 | Status: SHIPPED | OUTPATIENT
Start: 2023-02-14

## 2023-03-22 ENCOUNTER — OFFICE VISIT (OUTPATIENT)
Dept: GASTROENTEROLOGY | Facility: CLINIC | Age: 68
End: 2023-03-22
Payer: MEDICARE

## 2023-03-22 VITALS
WEIGHT: 189.1 LBS | SYSTOLIC BLOOD PRESSURE: 145 MMHG | TEMPERATURE: 96.9 F | HEIGHT: 66 IN | HEART RATE: 92 BPM | BODY MASS INDEX: 30.39 KG/M2 | DIASTOLIC BLOOD PRESSURE: 93 MMHG

## 2023-03-22 DIAGNOSIS — K21.9 GASTROESOPHAGEAL REFLUX DISEASE WITHOUT ESOPHAGITIS: Primary | Chronic | ICD-10-CM

## 2023-03-22 DIAGNOSIS — K30 INDIGESTION: ICD-10-CM

## 2023-03-22 DIAGNOSIS — R15.1 FECAL SMEARING: Chronic | ICD-10-CM

## 2023-03-22 PROCEDURE — 1160F RVW MEDS BY RX/DR IN RCRD: CPT | Performed by: INTERNAL MEDICINE

## 2023-03-22 PROCEDURE — 99214 OFFICE O/P EST MOD 30 MIN: CPT | Performed by: INTERNAL MEDICINE

## 2023-03-22 PROCEDURE — 3080F DIAST BP >= 90 MM HG: CPT | Performed by: INTERNAL MEDICINE

## 2023-03-22 PROCEDURE — 1159F MED LIST DOCD IN RCRD: CPT | Performed by: INTERNAL MEDICINE

## 2023-03-22 PROCEDURE — 3077F SYST BP >= 140 MM HG: CPT | Performed by: INTERNAL MEDICINE

## 2023-03-22 RX ORDER — PANTOPRAZOLE SODIUM 40 MG/1
40 TABLET, DELAYED RELEASE ORAL
Qty: 28 TABLET | Refills: 0 | Status: SHIPPED | OUTPATIENT
Start: 2023-03-22

## 2023-03-22 NOTE — PROGRESS NOTES
Chief Complaint   Patient presents with   • Irritable Bowel Syndrome   • Diarrhea   • Constipation   • Abdominal Pain   • Heartburn       Subjective     HPI    Magdalena Dickey is a 67 y.o. female with a past medical history noted below who presents for follow up of intermittent diarrhea episodes, fecal incontinence, history of colon resection for diverticulitis, and nausea.      Complaining of 2 weeks of worsened indigestion.  Occurring after eating.  Fees generalized stomach discomfort.  Taking pantoprazole, as needed H2 blocker, and zofran.  Trying to avoid eating to get things to settle. Denies diet or medication changes.  However, she is having increased sinus drainage which may be contributing to the symptoms.    BMs and leakage doing ok with fiber and imodium.  She just saw Dr. Jin last month, sacral stimulator is not recommended.     2/8/2022 EGD and colonoscopy with evidence of mild GERD, 1 tubular adenoma colon polyp, normal random colon biopsies.    Today's visit was in the office.  Both the patient and I were wearing face masks and proper hand hygiene was performed before and after the physical exam.           Current Outpatient Medications:   •  albuterol sulfate  (90 Base) MCG/ACT inhaler, INHALE TWO PUFFS INTO THE LUNGS EVERY FOUR HOURS AS NEEDED, Disp: , Rfl:   •  ALPRAZolam (XANAX) 0.5 MG tablet, Take 1 tablet by mouth 2 (Two) Times a Day As Needed for anxiety, Disp: 60 tablet, Rfl: 2  •  amphetamine-dextroamphetamine (ADDERALL) 10 MG tablet, Take 1 tablet by mouth Every Morning., Disp: 30 tablet, Rfl: 0  •  baclofen (LIORESAL) 20 MG tablet, TAKE 1 TABLET BY MOUTH THREE TIMES A DAY, Disp: 60 tablet, Rfl: 3  •  buPROPion XL (WELLBUTRIN XL) 150 MG 24 hr tablet, Take 1 tablet by mouth Every Morning., Disp: 90 tablet, Rfl: 2  •  calcium carbonate (OS-MARY) 600 MG tablet, Take 1 tablet by mouth 2 (Two) Times a Day., Disp: , Rfl:   •  cholecalciferol (VITAMIN D3) 25 MCG (1000 UT) tablet, Take 1  tablet by mouth Daily., Disp: , Rfl:   •  DULoxetine (CYMBALTA) 60 MG capsule, Take 1 capsule by mouth Daily., Disp: 90 capsule, Rfl: 0  •  gabapentin (NEURONTIN) 400 MG capsule, Take 1 capsule by mouth 3 (Three) Times a Day., Disp: 90 capsule, Rfl: 0  •  ipratropium-albuterol (DUO-NEB) 0.5-2.5 mg/3 ml nebulizer, Take 3 mL by nebulization Every 4 (Four) Hours As Needed., Disp: 360 mL, Rfl: 3  •  Loperamide HCl (IMODIUM A-D PO), Take  by mouth Daily., Disp: , Rfl:   •  losartan (COZAAR) 50 MG tablet, TAKE 1 TABLET BY MOUTH EVERY DAY, Disp: 90 tablet, Rfl: 3  •  Methylcellulose, Laxative, (CITRUCEL PO), Take  by mouth Daily., Disp: , Rfl:   •  Multiple Vitamin (MULTI-VITAMINS PO), Take 1 tablet by mouth Daily. HOLD FOR SURGERY, Disp: , Rfl:   •  ondansetron (ZOFRAN) 4 MG tablet, TAKE 1 TABLET BY MOUTH EVERY 8 (EIGHT) HOURS AS NEEDED FOR NAUSEA OR VOMITING., Disp: 30 tablet, Rfl: 2  •  pantoprazole (PROTONIX) 40 MG EC tablet, TAKE 1 TABLET BY MOUTH EVERY DAY IN THE MORNING, Disp: 90 tablet, Rfl: 1  •  SUMAtriptan (IMITREX) 100 MG tablet, TAKE 1 TABLET BY MOUTH AT ONSET OF HEADACHE, MAY REPEAT EVERY 2 HOURS AS NEEDED (MAX 200MG/DAY), Disp: 9 tablet, Rfl: 2  •  pantoprazole (PROTONIX) 40 MG EC tablet, Take 1 tablet by mouth 2 (Two) Times a Day Before Meals., Disp: 28 tablet, Rfl: 0  •  traZODone (DESYREL) 50 MG tablet, Take 1.5 tablets by mouth Every Night for 90 days., Disp: 145 tablet, Rfl: 1      Objective     Vitals:    03/22/23 1256   BP: 145/93   Pulse: 92   Temp: 96.9 °F (36.1 °C)         03/22/23  1256   Weight: 85.8 kg (189 lb 1.6 oz)     Body mass index is 30.52 kg/m².    Physical Exam        WBC   Date Value Ref Range Status   12/21/2022 9.03 3.40 - 10.80 10*3/mm3 Final     RBC   Date Value Ref Range Status   12/21/2022 4.44 3.77 - 5.28 10*6/mm3 Final     Hemoglobin   Date Value Ref Range Status   12/21/2022 13.5 12.0 - 15.9 g/dL Final   08/20/2022 13.1 12.0 - 15.9 g/dL Final     Hematocrit   Date Value Ref  Range Status   12/21/2022 38.8 34.0 - 46.6 % Final   08/20/2022 38.6 34.0 - 46.6 % Final     MCV   Date Value Ref Range Status   12/21/2022 87.4 79.0 - 97.0 fL Final   08/20/2022 88.5 79.0 - 97.0 fL Final     MCH   Date Value Ref Range Status   12/21/2022 30.4 26.6 - 33.0 pg Final   08/20/2022 30.0 26.6 - 33.0 pg Final     MCHC   Date Value Ref Range Status   12/21/2022 34.8 31.5 - 35.7 g/dL Final   08/20/2022 33.9 31.5 - 35.7 g/dL Final     RDW   Date Value Ref Range Status   12/21/2022 12.7 12.3 - 15.4 % Final   08/20/2022 13.2 12.3 - 15.4 % Final     RDW-SD   Date Value Ref Range Status   08/20/2022 42.7 37.0 - 54.0 fl Final     MPV   Date Value Ref Range Status   08/20/2022 9.8 6.0 - 12.0 fL Final     Platelets   Date Value Ref Range Status   12/21/2022 296 140 - 450 10*3/mm3 Final   08/20/2022 235 140 - 450 10*3/mm3 Final     Neutrophil Rel %   Date Value Ref Range Status   12/21/2022 57.6 42.7 - 76.0 % Final     Neutrophil %   Date Value Ref Range Status   08/19/2022 60.6 42.7 - 76.0 % Final     Lymphocyte Rel %   Date Value Ref Range Status   12/21/2022 29.7 19.6 - 45.3 % Final     Lymphocyte %   Date Value Ref Range Status   08/19/2022 31.3 19.6 - 45.3 % Final     Monocyte Rel %   Date Value Ref Range Status   12/21/2022 7.2 5.0 - 12.0 % Final     Monocyte %   Date Value Ref Range Status   08/19/2022 5.4 5.0 - 12.0 % Final     Eosinophil Rel %   Date Value Ref Range Status   12/21/2022 3.8 0.3 - 6.2 % Final     Eosinophil %   Date Value Ref Range Status   08/19/2022 1.6 0.3 - 6.2 % Final     Basophil Rel %   Date Value Ref Range Status   12/21/2022 1.3 0.0 - 1.5 % Final     Basophil %   Date Value Ref Range Status   08/19/2022 0.8 0.0 - 1.5 % Final     Immature Grans %   Date Value Ref Range Status   08/19/2022 0.3 0.0 - 0.5 % Final     Neutrophils Absolute   Date Value Ref Range Status   12/21/2022 5.20 1.70 - 7.00 10*3/mm3 Final     Neutrophils, Absolute   Date Value Ref Range Status   08/19/2022 7.01 (H)  1.70 - 7.00 10*3/mm3 Final     Lymphocytes Absolute   Date Value Ref Range Status   12/21/2022 2.68 0.70 - 3.10 10*3/mm3 Final     Lymphocytes, Absolute   Date Value Ref Range Status   08/19/2022 3.63 (H) 0.70 - 3.10 10*3/mm3 Final     Monocytes Absolute   Date Value Ref Range Status   12/21/2022 0.65 0.10 - 0.90 10*3/mm3 Final     Monocytes, Absolute   Date Value Ref Range Status   08/19/2022 0.62 0.10 - 0.90 10*3/mm3 Final     Eosinophils Absolute   Date Value Ref Range Status   12/21/2022 0.34 0.00 - 0.40 10*3/mm3 Final     Eosinophils, Absolute   Date Value Ref Range Status   08/19/2022 0.19 0.00 - 0.40 10*3/mm3 Final     Basophils Absolute   Date Value Ref Range Status   12/21/2022 0.12 0.00 - 0.20 10*3/mm3 Final     Basophils, Absolute   Date Value Ref Range Status   08/19/2022 0.09 0.00 - 0.20 10*3/mm3 Final     Immature Grans, Absolute   Date Value Ref Range Status   08/19/2022 0.04 0.00 - 0.05 10*3/mm3 Final     nRBC   Date Value Ref Range Status   12/21/2022 0.0 0.0 - 0.2 /100 WBC Final   08/19/2022 0.0 0.0 - 0.2 /100 WBC Final       Lab Results   Component Value Date    GLUCOSE 94 12/21/2022    BUN 15 12/21/2022    CREATININE 1.04 (H) 12/21/2022    EGFRIFNONA 50 (L) 08/09/2021    EGFRIFAFRI 65 12/22/2020    BCR 14.4 12/21/2022    CO2 23.3 12/21/2022    CALCIUM 9.3 12/21/2022    PROTENTOTREF 6.4 12/21/2022    ALBUMIN 4.20 12/21/2022    LABIL2 1.9 12/21/2022    AST 15 12/21/2022    ALT 17 12/21/2022         Imaging Results (Last 7 Days)     ** No results found for the last 168 hours. **          I personally reviewed data as detailed below:     The labs listed above.    Office notes from: 2/2023 colorectal surgery note    Assessment and Plan    1. Gastroesophageal reflux disease without esophagitis    2. Indigestion: Recent worsening, possibly related to increased sinusitis/drainage    3. Fecal smearing: Stable/improved on Imodium and fiber    Plan  2-week increase of pantoprazole to 40 mg twice daily  before meals then back down to once daily after that  Continue nonsedating antihistamine  Continue fiber  Continue Imodium  Will need to consider esophagram or repeat EGD if her heartburn/indigestion symptoms persist           Diagnoses and all orders for this visit:    1. Gastroesophageal reflux disease without esophagitis (Primary)    2. Indigestion    3. Fecal smearing    Other orders  -     pantoprazole (PROTONIX) 40 MG EC tablet; Take 1 tablet by mouth 2 (Two) Times a Day Before Meals.  Dispense: 28 tablet; Refill: 0          I have discussed the above plan with the patient.  They verbalize understanding and are in agreement with the plan.  They have been advised to contact the office for any questions, concerns, or changes related to their health.    Dictated utilizing Dragon dictation

## 2023-03-30 ENCOUNTER — OFFICE VISIT (OUTPATIENT)
Dept: SLEEP MEDICINE | Facility: HOSPITAL | Age: 68
End: 2023-03-30
Payer: MEDICARE

## 2023-03-30 VITALS
HEIGHT: 66 IN | BODY MASS INDEX: 30.53 KG/M2 | OXYGEN SATURATION: 93 % | DIASTOLIC BLOOD PRESSURE: 73 MMHG | SYSTOLIC BLOOD PRESSURE: 134 MMHG | WEIGHT: 190 LBS | HEART RATE: 99 BPM

## 2023-03-30 DIAGNOSIS — G43.009 MIGRAINE WITHOUT AURA AND WITHOUT STATUS MIGRAINOSUS, NOT INTRACTABLE: ICD-10-CM

## 2023-03-30 DIAGNOSIS — R51.9 MORNING HEADACHE: ICD-10-CM

## 2023-03-30 DIAGNOSIS — R06.83 SNORING: ICD-10-CM

## 2023-03-30 DIAGNOSIS — G47.30 OBSERVED SLEEP APNEA: Primary | ICD-10-CM

## 2023-03-30 DIAGNOSIS — G47.10 HYPERSOMNIA: ICD-10-CM

## 2023-03-30 DIAGNOSIS — G47.8 NON-RESTORATIVE SLEEP: ICD-10-CM

## 2023-03-30 PROCEDURE — 3075F SYST BP GE 130 - 139MM HG: CPT | Performed by: INTERNAL MEDICINE

## 2023-03-30 PROCEDURE — 1160F RVW MEDS BY RX/DR IN RCRD: CPT | Performed by: INTERNAL MEDICINE

## 2023-03-30 PROCEDURE — 1159F MED LIST DOCD IN RCRD: CPT | Performed by: INTERNAL MEDICINE

## 2023-03-30 PROCEDURE — 3078F DIAST BP <80 MM HG: CPT | Performed by: INTERNAL MEDICINE

## 2023-03-30 PROCEDURE — 99204 OFFICE O/P NEW MOD 45 MIN: CPT | Performed by: INTERNAL MEDICINE

## 2023-03-30 PROCEDURE — G0463 HOSPITAL OUTPT CLINIC VISIT: HCPCS

## 2023-03-30 NOTE — PROGRESS NOTES
[facility]  [address]  [place] [address]  Phone   Fax       Magdalena Dickey  [phone]   1955  67 y.o.  female      PCP:Sun Guerrier MD    Type of service: Initial New Patient Office Visit  Date of service: 3/30/2023    Chief Complaint   Patient presents with   • Witnessed Apnea   • Snoring   • Non-restorative Sleep   • Fatigue   • Daytime Sleepiness   • Morning Headaches   • Obesity       History of present illness;  Magdalena Dickey 67 y.o.  is a new patient for me and was seen today for sleep related problems of snoring, non-restorative sleep and witnessed apneas. The symptoms are present for 4 to 5 years and they are persistent in nature.  The snoring is present in all positions and it is loud.  Patient has no prior surgery namely tonsillectomy, nasal surgery and UPPP.     She used to work in [employer] but is retired now but she has migraine which is a daily occurrence.  Her neurologist told her to get sleep evaluation.    Patient gives the following sleep history.  Sleep schedule:  Bedtime: 10:30 PM  Wake time: 7:30 AM  Normally takes about 5-10 minutes to fall asleep  Average hours of sleep 9  Number of naps per day 1  Symptoms  In addition to snoring, nonrestorative sleep and witnessed apneas patient gives the following associated symptoms.  Have you ever awakened gasping for breath, coughing, choking: Yes   Change in weight,  No   Morning headaches  Yes   Awaken with a sore throat or dry mouth  Yes   Leg jerking at night:  No   Crawly feeling/urge sensation to move in the legs: No   Teeth grinding:No   Have you ever awakened at night with a sour taste or burning sensation in your chest:  Yes   Do you have muscle weakness with laughing or anger or sleep paralysis:  No   Have you ever felt paralyzed while going to sleep or waking up:  No   Sleepwalking, nightmares, No   Nocturia (urination at night): 3 times per night  Memory Problem:No  "    Past medical history: (Relevant to sleep medicine)  1. Hypertension  2. Migraine  3. Depression  4. COPD    • Medications are reviewed by me and documented in the encounter  • Allergies reviewed and documented in encounter    Social history:  Do you drive a commercial vehicle:  No   Shift work:  No   Tobacco use:  No   Alcohol use:  0 per week  Caffeinated drinks: 2    FAMILY HISTORY (Your mother, father, brothers and sisters) (relevant to sleep medicine)  1. Diabetes mellitus  2. Hypertension  3. COPD    REVIEW OF SYSTEMS.  Full review of systems available on the intake form which is scanned in the media tab.  The relevant positive are noted below  1. Daytime excessive sleepiness with [place] Sleepiness Scale :Total score: 9   2. Snoring  3. Headache  4. Frequent urination  5. Heartburn  6. Nasal congestion      Physical exam:  Vitals:    03/30/23 1417   BP: 134/73   Pulse: 99   SpO2: 93%   Weight: 86.2 kg (190 lb)   Height: 167.6 cm (66\")    Body mass index is 30.67 kg/m². Neck Circumference: 15 inches  Nose: no nasal septal defects or deviation and the nasal passages are clear, no nasal polyps,  Throat: tonsils are at enlarged, tongue normal, oral airway Mallampati class 3  NECK:Neck Circumference: 15 inches, trachea is in the midline, thyroid not enlarged  RESPIRATORY SYSTEM: Breath sounds are equal on both sides, there are no wheezes   CARDIOVASULAR SYSTEM: Heart sounds are regular rhythm and skip rate, no edema  EXTREMITES: No cyanosis, clubbing  NEUROLOGICAL SYSTEM: Oriented x 3, no gross motor defects, gait normal    Office notes from care team reviewed. Office note dated November 16, 2022,reviewed    Assessment and plan:  · Witnessed apnea (R06.81) patient's symptoms and examination is consistent with sleep apnea (G47.30)  I have talked to the patient about the signs and symptoms of sleep apnea. In addition, I have also discussed pathophysiology of sleep apnea.  I also discussed the complications of " untreated sleep apnea including effects on hypertension, diabetes mellitus and nonrestorative sleep with hypersomnia which can increase risk for motor vehicle accidents.  Untreated sleep apnea is also a risk factor for development of atrial fibrillation, pulmonary hypertension, insulin resistance and stroke.  Discussed in detail of various testing methods including home-based and lab based sleep studies.  Based on history and physical examination the most appropriate study is home sleep test.  The order for the sleep study is placed in [facility].  The test will be scheduled after approval from insurance. Treatment and management will be discussed after the test is completed.  Patient was given opportunity to ask questions and all the questions were answered.   · Snoring (R06.83) snoring is the sound created by turbulent airflow vibrating upper airway soft tissue.  I have also discussed factors affecting snoring including sleep deprivation, sleeping on the back and alcohol ingestion. To minimize snoring, patient is advised to have adequate sleep, sleep on the side and avoid alcohol and sedative medications before bedtime  · Daytime excessive sleepiness .  It was assessed with [place] Sleepiness Scale of Total score: 9.  There are many causes for daytime excessive sleepiness including sleep depression, shiftwork syndrome, depression and other medical disorders including heart, kidney and liver failure.  The most serious cause of excessive sleepiness is due to neurological conditions like narcolepsy/cataplexy.  But the most common cause of excessive sleepiness is due to sleep apnea with frequent awakenings during sleep time.  I have discussed safety of driving and to remain vigilant while driving.  · Obesity 1, with BMI Body mass index is 30.67 kg/m².. I have discussed the relationship between weight and sleep apnea.There is direct correlation between weight and severity of sleep apnea.  Weight reduction is encouraged, as  it is going to reduce the severity of sleep apnea. I have also discussed with the patient diet and exercise to achieve ideal body weight  · Migraine headache,     I have also discussed with the patient the following  • Sleep hygiene: Maintaining a regular bedtime and wake time, not to watch television or work in bed, limit caffeine-containing beverages before bed time and avoid naps during the day  • Adequate amount of sleep.  Generally most people needs about 7 to 8 hours of sleep.  • Return for 31 to 90 days after PAP setup with down load..  Patient's questions were answered.      3/30/2023  Jocelyn Ahumada MD  Sleep Medicine  Medical Director  [facility]: [place] [facility] [place] [facility]

## 2023-04-06 DIAGNOSIS — R20.2 PARESTHESIAS: ICD-10-CM

## 2023-04-06 DIAGNOSIS — G43.009 MIGRAINE WITHOUT AURA AND WITHOUT STATUS MIGRAINOSUS, NOT INTRACTABLE: ICD-10-CM

## 2023-04-10 DIAGNOSIS — R20.2 PARESTHESIAS: ICD-10-CM

## 2023-04-10 DIAGNOSIS — G43.009 MIGRAINE WITHOUT AURA AND WITHOUT STATUS MIGRAINOSUS, NOT INTRACTABLE: ICD-10-CM

## 2023-04-10 DIAGNOSIS — Z12.31 VISIT FOR SCREENING MAMMOGRAM: Primary | ICD-10-CM

## 2023-04-10 RX ORDER — GABAPENTIN 400 MG/1
400 CAPSULE ORAL 3 TIMES DAILY
Qty: 90 CAPSULE | Refills: 0 | Status: SHIPPED | OUTPATIENT
Start: 2023-04-10

## 2023-04-11 RX ORDER — GABAPENTIN 400 MG/1
CAPSULE ORAL
Qty: 90 CAPSULE | Refills: 0 | Status: SHIPPED | OUTPATIENT
Start: 2023-04-11

## 2023-04-13 ENCOUNTER — TELEPHONE (OUTPATIENT)
Dept: SLEEP MEDICINE | Facility: HOSPITAL | Age: 68
End: 2023-04-13
Payer: MEDICARE

## 2023-04-15 DIAGNOSIS — R25.2 MUSCLE CRAMPS: ICD-10-CM

## 2023-04-17 RX ORDER — BACLOFEN 20 MG/1
TABLET ORAL
Qty: 60 TABLET | Refills: 3 | Status: SHIPPED | OUTPATIENT
Start: 2023-04-17

## 2023-04-18 ENCOUNTER — HOSPITAL ENCOUNTER (OUTPATIENT)
Dept: SLEEP MEDICINE | Facility: HOSPITAL | Age: 68
Discharge: HOME | End: 2023-04-18
Admitting: NURSE PRACTITIONER
Payer: MEDICARE

## 2023-04-18 PROCEDURE — 95806 SLEEP STUDY UNATT&RESP EFFT: CPT

## 2023-04-18 RX ORDER — PANTOPRAZOLE SODIUM 40 MG/1
40 TABLET, DELAYED RELEASE ORAL
Qty: 90 TABLET | Refills: 1 | Status: SHIPPED | OUTPATIENT
Start: 2023-04-18

## 2023-04-21 ENCOUNTER — HOSPITAL ENCOUNTER (OUTPATIENT)
Dept: MAMMOGRAPHY | Facility: HOSPITAL | Age: 68
Discharge: HOME | End: 2023-04-21
Admitting: INTERNAL MEDICINE
Payer: MEDICARE

## 2023-04-21 PROCEDURE — 77063 BREAST TOMOSYNTHESIS BI: CPT

## 2023-04-21 PROCEDURE — 77067 SCR MAMMO BI INCL CAD: CPT

## 2023-05-02 ENCOUNTER — OFFICE VISIT (OUTPATIENT)
Dept: INTERNAL MEDICINE | Facility: CLINIC | Age: 68
End: 2023-05-02
Payer: MEDICARE

## 2023-05-02 VITALS
HEIGHT: 66 IN | HEART RATE: 78 BPM | BODY MASS INDEX: 30.7 KG/M2 | DIASTOLIC BLOOD PRESSURE: 74 MMHG | SYSTOLIC BLOOD PRESSURE: 128 MMHG | WEIGHT: 191 LBS

## 2023-05-02 DIAGNOSIS — M54.16 LUMBAR RADICULOPATHY: Primary | ICD-10-CM

## 2023-05-02 DIAGNOSIS — M54.2 NECK PAIN: ICD-10-CM

## 2023-05-02 PROCEDURE — 3078F DIAST BP <80 MM HG: CPT | Performed by: INTERNAL MEDICINE

## 2023-05-02 PROCEDURE — 3074F SYST BP LT 130 MM HG: CPT | Performed by: INTERNAL MEDICINE

## 2023-05-02 PROCEDURE — 99214 OFFICE O/P EST MOD 30 MIN: CPT | Performed by: INTERNAL MEDICINE

## 2023-05-02 RX ORDER — METHYLPREDNISOLONE 4 MG/1
TABLET ORAL
Qty: 1 EACH | Refills: 0 | Status: SHIPPED | OUTPATIENT
Start: 2023-05-02

## 2023-05-02 NOTE — PROGRESS NOTES
"Chief Complaint  Hip Pain and Headache    Subjective        Magdalena Dickey presents to [facility]  History of Present Illness Has recurrence of her low back pain- radiates to hips- usually she gets better after a few days of OTC meds and rest but hasn't responded well this time.  Muscle relaxers aren't helping.   She has had injections in the past. PT didn't help in the past.   Headaches daily- most days, different than her migraines. Sleep study negative but recommended she sleep on her side and lose weight. Comes and goes during the days.  Gabapentin doesn't help.   She has a lot of neck pain as well. Has seen Dr. De Dios about this in the past who thought there something that should be repaired- she does have some arm pain but not constant like they were when she was dx years ago.   She does not do anything on a regular basis to keep it under control.  Objective   Vital Signs:  /74   Pulse 78   Ht 167.6 cm (66\")   Wt 86.6 kg (191 lb)   BMI 30.83 kg/m²   Estimated body mass index is 30.83 kg/m² as calculated from the following:    Height as of this encounter: 167.6 cm (66\").    Weight as of this encounter: 86.6 kg (191 lb).             Physical Exam  Constitutional:       General: She is not in acute distress.     Appearance: Normal appearance.   Neck:      Comments: Mildly diminished range of motion side to side  Cardiovascular:      Rate and Rhythm: Normal rate and regular rhythm.   Pulmonary:      Effort: Pulmonary effort is normal.   Musculoskeletal:         General: No swelling. Tenderness: B lateral hips. Normal range of motion.      Cervical back: Tenderness present. No rigidity.      Right lower leg: No edema.      Left lower leg: No edema.   Lymphadenopathy:      Cervical: No cervical adenopathy.   Neurological:      General: No focal deficit present.        Result Review :  The following data was reviewed by: Sun Guerrier MD on 05/02/2023:    Data reviewed: " previous OV and imaging pertaining to neck and back             Assessment and Plan   Diagnoses and all orders for this visit:    1. Lumbar radiculopathy (Primary)  Comments:  Will treat acutely with steroids (no nsaids at same time) stressed need to work daily on strengthening, etc to keep this from happening.   Orders:  -     methylPREDNISolone (MEDROL) 4 MG dose pack; Take as directed on package instructions.  Dispense: 1 each; Refill: 0  -     Ambulatory Referral to Physical Therapy Evaluate and treat    2. Neck pain  Comments:  needs PT for this and low back- work aggressively at keeping at bay I also think this is tied to her headaches.   Orders:  -     Ambulatory Referral to Physical Therapy Evaluate and treat             Follow Up   No follow-ups on file.  Patient was given instructions and counseling regarding her condition or for health maintenance advice. Please see specific information pulled into the AVS if appropriate.

## 2023-05-04 ENCOUNTER — OFFICE VISIT (OUTPATIENT)
Dept: GASTROENTEROLOGY | Facility: CLINIC | Age: 68
End: 2023-05-04
Payer: MEDICARE

## 2023-05-04 VITALS
HEART RATE: 82 BPM | WEIGHT: 190.8 LBS | TEMPERATURE: 96.3 F | OXYGEN SATURATION: 95 % | BODY MASS INDEX: 30.67 KG/M2 | SYSTOLIC BLOOD PRESSURE: 148 MMHG | HEIGHT: 66 IN | DIASTOLIC BLOOD PRESSURE: 89 MMHG

## 2023-05-04 DIAGNOSIS — K58.0 IRRITABLE BOWEL SYNDROME WITH DIARRHEA: ICD-10-CM

## 2023-05-04 DIAGNOSIS — K21.9 GASTROESOPHAGEAL REFLUX DISEASE, UNSPECIFIED WHETHER ESOPHAGITIS PRESENT: Primary | ICD-10-CM

## 2023-05-04 RX ORDER — ONDANSETRON 4 MG/1
4 TABLET, FILM COATED ORAL EVERY 8 HOURS PRN
Qty: 30 TABLET | Refills: 2 | Status: SHIPPED | OUTPATIENT
Start: 2023-05-04

## 2023-05-04 NOTE — PROGRESS NOTES
"Chief Complaint  Heartburn, Irritable Bowel Syndrome, and Abdominal Pain    Subjective          History of Present Illness    Magdalena Dickey is a  67 y.o. female presents for GERD and IBS.  She has a history of colon resection for diverticulitis.  She is a patient Dr. Guzman last seen on 3/22/2023.  He is new to me.    At last visit she had a GERD flare and Protonix was increased to twice daily.  She took this for a couple weeks which resolved her symptoms.  She is now back to Protonix 40 mg once daily.  This is working well for her.  She is on Protonix 40 mg twice daily.  Takes zofran as needed for nausea, intermittently.  Denies nausea, vomiting, melena hematochezia, abnormal weight loss.    Imodium and fiber for diarrhea and bile leakage status post colon resection.  This is well controlled currently.    2/8/2022 EGD and colonoscopy with evidence of mild GERD, 1 tubular adenoma colon polyp, normal random colon biopsies.    Objective   Vital Signs:   /89 (BP Location: Right arm, Patient Position: Sitting, Cuff Size: Large Adult)   Pulse 82   Temp 96.3 °F (35.7 °C)   Ht 167.6 cm (65.98\")   Wt 86.5 kg (190 lb 12.8 oz)   SpO2 95%   BMI 30.81 kg/m²       Physical Exam  Vitals reviewed.   Constitutional:       General: She is awake. She is not in acute distress.     Appearance: Normal appearance. She is well-developed and well-groomed.   HENT:      Head: Normocephalic.   Pulmonary:      Effort: Pulmonary effort is normal. No respiratory distress.   Skin:     Coloration: Skin is not pale.   Neurological:      Mental Status: She is alert and oriented to person, place, and time.      Gait: Gait is intact.   Psychiatric:         Mood and Affect: Mood and affect normal.         Speech: Speech normal.         Behavior: Behavior is cooperative.         Judgment: Judgment normal.          Result Review :             Assessment and Plan    Diagnoses and all orders for this visit:    1. Gastroesophageal reflux " disease, unspecified whether esophagitis present (Primary)    2. Irritable bowel syndrome with diarrhea    Other orders  -     ondansetron (ZOFRAN) 4 MG tablet; Take 1 tablet by mouth Every 8 (Eight) Hours As Needed for Nausea or Vomiting.  Dispense: 30 tablet; Refill: 2    Continue PPI once daily.  Zofran as needed.  Continue daily fiber and Imodium for diarrhea.  Follow-up yearly or sooner if needed.      Follow Up   Return in about 1 year (around 5/4/2024).    Dragon dictation used throughout this note.     Cora Harvey PA-C

## 2023-05-10 ENCOUNTER — OFFICE VISIT (OUTPATIENT)
Dept: SLEEP MEDICINE | Facility: HOSPITAL | Age: 68
End: 2023-05-10
Payer: MEDICARE

## 2023-05-10 VITALS
DIASTOLIC BLOOD PRESSURE: 79 MMHG | BODY MASS INDEX: 30.5 KG/M2 | OXYGEN SATURATION: 96 % | SYSTOLIC BLOOD PRESSURE: 139 MMHG | WEIGHT: 189.8 LBS | HEIGHT: 66 IN | HEART RATE: 98 BPM

## 2023-05-10 DIAGNOSIS — Z71.2 ENCOUNTER TO DISCUSS TEST RESULTS: ICD-10-CM

## 2023-05-10 DIAGNOSIS — R06.83 SNORING: Primary | ICD-10-CM

## 2023-05-10 PROBLEM — G47.10 HYPERSOMNIA: Status: RESOLVED | Noted: 2023-03-30 | Resolved: 2023-05-10

## 2023-05-10 PROBLEM — G47.8 NON-RESTORATIVE SLEEP: Status: RESOLVED | Noted: 2023-03-30 | Resolved: 2023-05-10

## 2023-05-10 PROBLEM — G47.30 OBSERVED SLEEP APNEA: Status: RESOLVED | Noted: 2023-03-30 | Resolved: 2023-05-10

## 2023-05-10 PROCEDURE — G0463 HOSPITAL OUTPT CLINIC VISIT: HCPCS

## 2023-05-10 NOTE — PROGRESS NOTES
"  [facility]  [address]  [address]  [place] [address]  Phone   Fax         SLEEP CLINIC FOLLOW-UP PROGRESS NOTE    Magdalena Dickey  [id number]   1955  67 y.o.  female      PCP: Sun Guerrier MD    DATE OF VISIT: 5/10/2023        CHIEF COMPLAINT: Snoring    HPI:  This is a 67 y.o. year old patient who presents to the clinic today for the management of obstructive sleep apnea.  Patient had a home sleep study 4/2023 showing no evidence of obstructive sleep apnea with AHI of 4.8/hr  (<5 = normal).  Moderate snoring was noted.  It was recommended that patient avoid alcohol and sedative medications and use body pillow to help sleep on side.  Weight loss was also recommended and consideration for oral appliance for snoring.  Patient confirms that she did sleep well the night of the sleep study, like normal.  She is not having significant daytime fatigue.  Did discuss the above recommendations and signs/symptoms that would warrant follow-up or call the office.      MEDICATIONS: reviewed     ALLERGIES:  Patient has no known allergies.    SOCIAL HISTORY (habits pertaining to sleep medicine):  • History tobacco use: No   • History of alcohol use: 0 per week  • Caffeine use: 1     REVIEW OF SYSTEMS:   Pertinent positive symptoms are:  • [place] Sleepiness Scale :Total score: 6   • Chronic dyspnea        PHYSICAL EXAMINATION:  CONSTITUTIONAL:  Vitals:    05/10/23 0901   BP: 139/79   Pulse: 98   SpO2: 96%   Weight: 86.1 kg (189 lb 12.8 oz)   Height: 167.6 cm (65.98\")    Body mass index is 30.65 kg/m².   HEAD: atraumatic, normocephalic  RESP SYSTEM: not in respiratory distress, breathing unlabored  CARDIOVASULAR: normal rate, no edema noted   NEURO: Alert and oriented x 3, mood and affect appeared appropriate        ASSESSMENT AND PLAN:  · Snoring: No evidence of obstructive sleep apnea on sleep study, AHI was less than 5.  Did recommend avoiding alcohol and sedative " medications in the evening.  We discussed healthy weight loss.  We discussed body pillow to aid with sleeping on side.  Discussed in detail signs/symptoms that would warrant sooner call or follow-up to the office or possible additional testing.  At this point patient will follow-up as needed.  · Obesity: Body mass index is 30.65 kg/m².. Patients who are overweight or obese are at increased risk of sleep apnea/ sleep disordered breathing. Weight reduction and healthy lifestyle are encouraged in overweight/ obese patients as part of a comprehensive approach to sleep apnea treatment.    · Migraine headache      Patient will follow-up as needed.  Patient's questions were answered.          Thank you for allowing me to participate in the care of this patient.     Amalia Diaz DNP, APRN  [facility]

## 2023-05-18 ENCOUNTER — TREATMENT (OUTPATIENT)
Dept: PHYSICAL THERAPY | Facility: CLINIC | Age: 68
End: 2023-05-18
Payer: MEDICARE

## 2023-05-18 DIAGNOSIS — M54.2 CERVICAL PAIN: ICD-10-CM

## 2023-05-18 DIAGNOSIS — M54.16 RADICULOPATHY, LUMBAR REGION: Primary | ICD-10-CM

## 2023-05-18 PROCEDURE — 97530 THERAPEUTIC ACTIVITIES: CPT | Performed by: PHYSICAL THERAPIST

## 2023-05-18 PROCEDURE — 97161 PT EVAL LOW COMPLEX 20 MIN: CPT | Performed by: PHYSICAL THERAPIST

## 2023-05-18 PROCEDURE — 97110 THERAPEUTIC EXERCISES: CPT | Performed by: PHYSICAL THERAPIST

## 2023-05-18 NOTE — PROGRESS NOTES
Physical Therapy Initial Evaluation and Plan of Care  [address] [facility], [address]  [place] [address]    Patient: Magdalena Dickey   : 1955  Diagnosis/ICD-10 Code:  Radiculopathy, lumbar region [M54.16]  Referring practitioner: Sun Guerrier MD  Date of Initial Visit: 2023  Today's Date: 2023  Patient seen for 1 session         Visit Diagnoses:    ICD-10-CM ICD-9-CM   1. Radiculopathy, lumbar region  M54.16 724.4   2. Cervical pain  M54.2 723.1         Subjective Questionnaire: NDI:18  Oswestry:       Subjective Evaluation    History of Present Illness  Mechanism of injury: Patient reports that her neck has bothered for years.  Reports having neck surgery 3-4 years ago.  Reports that the surgery helped, but still has pain.  Reports having a headache every morning when she wakes up and will get better but come back as the day goes on.  Patient denies an injury to the neck.  Before surgery, patient had symptoms down the right UE., which resolved after the surgery.  Patient reports that her back has bothered her for years also.  Reports that it flares up every now and then, then gets better, but this time it hasn't gotten better.  Denies an recent injuries to the back.      Patient Occupation: Retired Pain  Current pain ratin  At worst pain ratin  Location: base of the cervical spine and shoulders, lumbar spine and bilateral hips  Quality: throbbing  Relieving factors: medications  Aggravating factors: standing  Progression: worsening             Objective          Postural Observations    Additional Postural Observation Details  Patient ambulates with a minimal antalgic gait pattern.    Palpation     Additional Palpation Details  No TTP present.  TTP to bilateral PSIS.    Active Range of Motion   Cervical/Thoracic Spine   Cervical    Flexion: Neck active flexion: WNL.   Extension: Neck active extension: about 45.   Left lateral flexion: Neck active lateral bend left: about  20.   Right lateral flexion: Neck active lateral bend right: about 20. with pain  Left rotation: Neck active rotation left: about 60.   Right rotation: Neck active rotation right: about 45.   Left Shoulder   Flexion: Left shoulder active forward flexion: about 150.   Abduction: Left shoulder active abduction: WNL.   External rotation 0°: Left shoulder active external rotation at 0 degrees: WNL.     Right Shoulder   Flexion: Right shoulder active forward flexion: about 150.   Abduction: Right shoulder active abduction: WNL.   External rotation 0°: Right shoulder active external rotation at 0 degrees: WNL.     Lumbar   Flexion: Active lumbar flexion: about 85.   Extension: Active lumbar extension: WNL.   Left lateral flexion: Active left lumbar lateral flexion: about 25. with pain  Right lateral flexion: Active right lumbar lateral flexion: about 25. with pain    Strength/Myotome Testing     Left Shoulder     Planes of Motion   Flexion: 4   Abduction: 4   External rotation at 0°: 4   Internal rotation at 0°: 4     Isolated Muscles   Biceps: 4   Triceps: 4     Right Shoulder     Planes of Motion   Flexion: 4   Abduction: 4   External rotation at 0°: 4   Internal rotation at 0°: 4     Isolated Muscles   Biceps: 4   Triceps: 4     Left Hip   Planes of Motion   Flexion: 4+  Abduction: 4  Adduction: 4    Right Hip   Planes of Motion   Flexion: 4+  Abduction: 4  Adduction: 4    Left Ankle/Foot   Dorsiflexion: 5    Right Ankle/Foot   Dorsiflexion: 5          Assessment & Plan     Assessment  Impairments: abnormal gait, abnormal or restricted ROM, activity intolerance, impaired physical strength, lacks appropriate home exercise program and pain with function    Assessment details: Patient presents with c/o pain, TTP, limited AROM, decreased strength and a minimal antalgic gait which is limiting her ability to perform activities.    Barriers to therapy: none  Prognosis: good  Prognosis details: STG's to be met by 2 weeks  1)   Independent with HEP  2)  Decrease pain by 50% or more  3)  AROM WNL for the cervical spine  4)  AROM WNL for the lumbar spine  5)  Decrease headaches by 50% or more  6)  Patient to ambulate with a normal gait    LTG's to be met by 4 weeks  1)  Independent with HEP progression  2)  Decrease pain by 75% or more  3)  Increase strength for the core to 4/5 or more  4)  Decrease headaches by 75% or more  5)  No TTP present  6)  Patient to perform ADL'S without limitations      Plan  Therapy options: will be seen for skilled therapy services  Planned therapy interventions: strengthening, stretching, therapeutic activities, home exercise program, gait training, neuromuscular re-education and manual therapy  Frequency: 2x week  Duration in weeks: 4  Treatment plan discussed with: patient            Timed:         Manual Therapy:    0     mins  26610;     Therapeutic Exercise:    22     mins  49646;     Neuromuscular Brenda:    0    mins  36466;    Therapeutic Activity:     8     mins  92389;     Gait Trainin     mins  51275;     Ultrasound:     0     mins  50379;          Un-Timed:  Electrical Stimulation:    0     mins  90919 ( );    Low Eval     15     Mins  00646  Mod Eval     0     Mins  70883  High Eval                       0     Mins  77203        Timed Treatment:  30    mins   Total Treatment:     45   mins          PT: Gilberto Ferro PT     [place] License [id number]  Electronically signed by Gilberto Ferro PT, 23, 10:17 AM EDT    Certification Period: 2023 thru 8/15/2023  I certify that the therapy services are furnished while this patient is under my care.  The services outlined above are required by this patient, and will be reviewed every 90 days.    Sun Guerrier Md  [address] [place] [address]   NPI: [id number]      Gilberto Ferro PT   License number: [id number]        Physician Signature:__________________________________________________    PHYSICIAN: Fareed  Sun LLANOS MD      DATE:     Please sign and return via fax to [email or web address] . Thank you, [facility].

## 2023-05-24 ENCOUNTER — TREATMENT (OUTPATIENT)
Dept: PHYSICAL THERAPY | Facility: CLINIC | Age: 68
End: 2023-05-24
Payer: MEDICARE

## 2023-05-24 DIAGNOSIS — M54.2 CERVICAL PAIN: ICD-10-CM

## 2023-05-24 DIAGNOSIS — M54.16 RADICULOPATHY, LUMBAR REGION: Primary | ICD-10-CM

## 2023-05-24 PROCEDURE — 97530 THERAPEUTIC ACTIVITIES: CPT | Performed by: PHYSICAL THERAPIST

## 2023-05-24 PROCEDURE — 97110 THERAPEUTIC EXERCISES: CPT | Performed by: PHYSICAL THERAPIST

## 2023-05-24 PROCEDURE — 97112 NEUROMUSCULAR REEDUCATION: CPT | Performed by: PHYSICAL THERAPIST

## 2023-05-24 NOTE — PROGRESS NOTES
Physical Therapy Daily Treatment Note  [address] [facility], [address]  [place] [address]      Patient: Magdalena Dickey   : 1955  Referring practitioner: Sun Guerrier MD  Date of Initial Visit: Type: THERAPY  Noted: 2023  Today's Date: 2023  Patient seen for 2 sessions       Visit Diagnoses:    ICD-10-CM ICD-9-CM   1. Radiculopathy, lumbar region  M54.16 724.4   2. Cervical pain  M54.2 723.1           Subjective   Patient reports that the back has been killing her, currently rates the pain at 3/10.  Currently rates the pain at 1/10 in the neck.    Objective   See Exercise, Manual, and Modality Logs for complete treatment.       Assessment/Plan  Subjective reports are low, especially with regard to the cervical spine.  Added PPT, glut sets and chin tucks to the routine.  Added KT to the lumbar spine to help with the S/S's.  Patient performed the routine without visual or verbal signs of pain in the cervical or lumbar spine.  Plan to continue to gradually progress the strengthening exercises.      Timed:         Manual Therapy:    0     mins  71110;     Therapeutic Exercise:    23     mins  89447;     Neuromuscular Brenda:    8    mins  72951;    Therapeutic Activity:     8     mins  69878;     Gait Training      0    mins  42844;  Work Conditioning     0   mins  26001       Untimed:  Electrical Stimulation:    0     mins  30755 ( );      Timed Treatment:   39   mins   Total Treatment:     39   mins    Gilberto Ferro, PT  [place] License: [id number]

## 2023-05-27 DIAGNOSIS — R20.2 PARESTHESIAS: ICD-10-CM

## 2023-05-27 DIAGNOSIS — G43.009 MIGRAINE WITHOUT AURA AND WITHOUT STATUS MIGRAINOSUS, NOT INTRACTABLE: ICD-10-CM

## 2023-05-30 RX ORDER — GABAPENTIN 400 MG/1
CAPSULE ORAL
Qty: 90 CAPSULE | Refills: 5 | Status: SHIPPED | OUTPATIENT
Start: 2023-05-30

## 2023-05-31 ENCOUNTER — TREATMENT (OUTPATIENT)
Dept: PHYSICAL THERAPY | Facility: CLINIC | Age: 68
End: 2023-05-31

## 2023-05-31 DIAGNOSIS — M54.2 CERVICAL PAIN: ICD-10-CM

## 2023-05-31 DIAGNOSIS — M54.16 RADICULOPATHY, LUMBAR REGION: Primary | ICD-10-CM

## 2023-05-31 PROCEDURE — 97110 THERAPEUTIC EXERCISES: CPT | Performed by: PHYSICAL THERAPIST

## 2023-05-31 PROCEDURE — 97530 THERAPEUTIC ACTIVITIES: CPT | Performed by: PHYSICAL THERAPIST

## 2023-05-31 NOTE — PROGRESS NOTES
"Physical Therapy Daily Treatment Note  [address] [facility], [address]  [place] [address]  Visit # 3      Subjective Evaluation    History of Present Illness    Subjective comment: Pt reports that her lumbar pain is currently 2/10 \"it hurts\".  \"The exercises are making my back hurt, but I'm doing them\".       Objective   See Exercise, Manual, and Modality Logs for complete treatment.   Added s/l hip abd, clams , and bent row    Assessment & Plan     Assessment    Assessment details: Pt completed treatment with minimal c/o pain in lumbar/glute.  She did c/o pain in (L) hip with s/l hip abd.  Given vc to limit hip ROM to pain free.  She completed the exercise with no more c/o pain.  Pt's HEP was updated and given to her.                       Manual Therapy:    0     mins  34680;  Therapeutic Exercise:    32     mins  02712;     Neuromuscular Brenda:    0    mins  57643;    Therapeutic Activity:     10     mins  27936;     Gait Trainin     mins  13038;     Ultrasound:     0     mins  96797;    Work Hardening           0      mins 97746  Iontophoresis               0   mins 09210  E-Stim                          _0_ mins 23439 ( )    Timed Treatment:   42   mins   Total Treatment:     42   mins    Drew Pringle PTA  Physical Therapist Assistant  "

## 2023-06-07 ENCOUNTER — TREATMENT (OUTPATIENT)
Dept: PHYSICAL THERAPY | Facility: CLINIC | Age: 68
End: 2023-06-07
Payer: MEDICARE

## 2023-06-07 DIAGNOSIS — M54.2 CERVICAL PAIN: ICD-10-CM

## 2023-06-07 DIAGNOSIS — M54.16 RADICULOPATHY, LUMBAR REGION: Primary | ICD-10-CM

## 2023-06-07 PROCEDURE — 97110 THERAPEUTIC EXERCISES: CPT | Performed by: PHYSICAL THERAPIST

## 2023-06-07 PROCEDURE — 97530 THERAPEUTIC ACTIVITIES: CPT | Performed by: PHYSICAL THERAPIST

## 2023-06-07 NOTE — PROGRESS NOTES
"Physical Therapy Daily Treatment Note                 [address] [facility] [address]                                                          [place]  [address]    Patient: Magdalena Dickey   : 1955  Referring practitioner: Sun Guerrier MD  Date of Initial Visit: Type: THERAPY  Noted: 2023  Today's Date: 2023  Patient seen for 4 sessions       Visit Diagnoses:    ICD-10-CM ICD-9-CM   1. Radiculopathy, lumbar region  M54.16 724.4   2. Cervical pain  M54.2 723.1       Subjective Evaluation    History of Present Illness    Subjective comment: Pt reports that she is \"hurting\" in (B) lumbar ,hips and shoulders today.Pain  Current pain ratin         Objective   See Exercise, Manual, and Modality Logs for complete treatment.   Added resisted jarrettlder Ext    Assessment & Plan     Assessment    Assessment details: Pt completed treatment with no c/o increased pain.  She tolerated progressions and addition to program with no issues.  Pt's HEP was updated and given to her.  Pt has seen improvements in neck and hip pain, but not in (B) lumbar.        Timed:         Manual Therapy:    0     mins  84540;     Therapeutic Exercise:    40     mins  26881;     Neuromuscular Brenda:    0    mins  50170;    Therapeutic Activity:     10     mins  08816;     Gait Trainin     mins  41473;     Ultrasound:     0     mins  69527;    Ionto                               0    mins   24778        Timed Treatment:   50   mins   Total Treatment:     50   mins    Drew Pringle PTA  [place] License: [id number]  "

## 2023-06-14 ENCOUNTER — TREATMENT (OUTPATIENT)
Dept: PHYSICAL THERAPY | Facility: CLINIC | Age: 68
End: 2023-06-14
Payer: MEDICARE

## 2023-06-14 DIAGNOSIS — M54.16 RADICULOPATHY, LUMBAR REGION: Primary | ICD-10-CM

## 2023-06-14 DIAGNOSIS — M54.2 CERVICAL PAIN: ICD-10-CM

## 2023-06-14 PROCEDURE — 97530 THERAPEUTIC ACTIVITIES: CPT | Performed by: PHYSICAL THERAPIST

## 2023-06-14 PROCEDURE — 97110 THERAPEUTIC EXERCISES: CPT | Performed by: PHYSICAL THERAPIST

## 2023-06-14 NOTE — PROGRESS NOTES
Physical Therapy Daily Treatment Note               [address] [facility] [address]                                                                                                                                             [place] [address]    Patient: Magdalena Dickey   : 1955  Referring practitioner: Sun Guerrier MD  Date of Initial Visit: Type: THERAPY  Noted: 2023  Today's Date: 2023  Patient seen for 5 sessions       Visit Diagnoses:    ICD-10-CM ICD-9-CM   1. Radiculopathy, lumbar region  M54.16 724.4   2. Cervical pain  M54.2 723.1       Subjective Evaluation    History of Present Illness    Subjective comment: Pt reports she has seen improvements in lumbar,and shoulders.  Pt still having pain cervical spine and headache.     Objective   See Exercise, Manual, and Modality Logs for complete treatment.       Assessment & Plan     Assessment    Assessment details: Pt completed treatment with mild c/o pain in (B) lumbar.  Pt was able to complete her prescribed exercises despite increased pain.  Pt tolerated increased scapular strengthening exercises with no issues.  Pt is confident she will be able to continue her HEP on her own.        Timed:         Manual Therapy:    0     mins  76999;     Therapeutic Exercise:    35     mins  85744;     Neuromuscular Brenda:    0    mins  07123;    Therapeutic Activity:     10     mins  55199;     Gait Trainin     mins  76337;     Ultrasound:     0     mins  98951;    Ionto                               0    mins   62108        Timed Treatment:   45   mins   Total Treatment:     45   mins    Drew Pringle PTA  [place] License: [id number]

## 2023-07-12 PROBLEM — R15.1 FECAL SMEARING: Chronic | Status: RESOLVED | Noted: 2022-06-13 | Resolved: 2023-07-12

## 2023-07-12 PROBLEM — R07.9 CHEST PAIN: Status: RESOLVED | Noted: 2022-08-19 | Resolved: 2023-07-12

## 2023-07-12 PROBLEM — Z71.2 ENCOUNTER TO DISCUSS TEST RESULTS: Status: RESOLVED | Noted: 2023-05-10 | Resolved: 2023-07-12

## 2023-07-12 PROBLEM — R15.9 FULL INCONTINENCE OF FECES: Status: RESOLVED | Noted: 2022-09-26 | Resolved: 2023-07-12

## 2023-07-24 ENCOUNTER — DOCUMENTATION (OUTPATIENT)
Dept: PHYSICAL THERAPY | Facility: CLINIC | Age: 68
End: 2023-07-24
Payer: MEDICARE

## 2023-08-03 DIAGNOSIS — R25.2 MUSCLE CRAMPS: ICD-10-CM

## 2023-08-03 RX ORDER — BACLOFEN 20 MG/1
TABLET ORAL
Qty: 60 TABLET | Refills: 0 | Status: SHIPPED | OUTPATIENT
Start: 2023-08-03

## 2023-08-08 ENCOUNTER — HOSPITAL ENCOUNTER (OUTPATIENT)
Dept: MRI IMAGING | Facility: HOSPITAL | Age: 68
Discharge: HOME | End: 2023-08-08
Admitting: INTERNAL MEDICINE
Payer: MEDICARE

## 2023-08-08 DIAGNOSIS — M54.16 LUMBAR RADICULOPATHY: Chronic | ICD-10-CM

## 2023-08-08 PROCEDURE — 72148 MRI LUMBAR SPINE W/O DYE: CPT

## 2023-08-17 DIAGNOSIS — M54.16 LUMBAR RADICULOPATHY: Primary | ICD-10-CM

## 2023-08-25 DIAGNOSIS — R25.2 MUSCLE CRAMPS: ICD-10-CM

## 2023-08-28 ENCOUNTER — TELEPHONE (OUTPATIENT)
Dept: PAIN MEDICINE | Facility: CLINIC | Age: 68
End: 2023-08-28
Payer: MEDICARE

## 2023-08-28 RX ORDER — BACLOFEN 20 MG/1
TABLET ORAL
Qty: 60 TABLET | Refills: 0 | Status: SHIPPED | OUTPATIENT
Start: 2023-08-28 | End: 2023-08-31

## 2023-08-28 NOTE — PROGRESS NOTES
The patient has a pain history of the following:  Lumbar radiculopathy     Previous interventions that the patient has received include:   Epidural injections helped in the past (2016)    Pain medications include:  Baclofen TID   Cymbalta (depression)  Gabapentin BID (neuropathy)  Ibuprofen takes the edge off (not supposed to be taking)    Previously: Medrol dose pack, Aleve (doesn't help), Biofreeze, Lidocaine patches    Other conservative modalities which the patient reports using include:  Physical Therapy: yes  Chiropractor: no  Massage Therapy: no  TENS: yes, didn't help   Neck or back surgery: yes  Past pain management: yes    Past Significant Surgical History:  [facility] - Dr. De Dios     HPI:       CHIEF COMPLAINT: Back Pain  Back pain    Magdalena Dickey is a 68 y.o. female referred here by Sun Guerrier MD. Magdalena Dickey presents to the office for evaluation and treatment of back pain.      History of Present Illness  Onset:  Years ago   Inciting Event:  Worked as a nurse, lifting patients over time  Location:  Bilateral hips   Pain: Pain described as ache. Located in the low back and does radiate into the bilateral buttocks and hips.  Severity:  Pain rated as a 5 /10.  Apportions pain as 25%  back pain and 75% extremity pain.  Symptoms have been constant.  Exacerbation:  Standing.   Alleviation:  Sitting in a recliner (straight back chairs do not help).  Associated Symptoms:   She denies any new onset of bowel or bladder weakness, significant leg weakness or saddle anesthesia.  Denies balance problems or lower extremity incoordination.  She has noticed some weakness in her legs.   Ambulates: Without assistive device.   Limitations: This pain limits the patient from shopping, cooking.   Goals: Functional goals include ability to do the above.      [place] 50    She also complains of pain in her neck that radiates down bilateral shoulders.      PEG Assessment   What number best describes your pain on average in  the past week?8  What number best describes how, during the past week, pain has interfered with your enjoyment of life?6  What number best describes how, during the past week, pain has interfered with your general activity?  6        Current Outpatient Medications:     albuterol sulfate  (90 Base) MCG/ACT inhaler, INHALE TWO PUFFS INTO THE LUNGS EVERY FOUR HOURS AS NEEDED, Disp: , Rfl:     ALPRAZolam (XANAX) 0.5 MG tablet, Take 1 tablet by mouth 2 (Two) Times a Day As Needed for anxiety, Disp: 60 tablet, Rfl: 2    amphetamine-dextroamphetamine (ADDERALL) 10 MG tablet, Take 1 tablet by mouth Every Morning., Disp: 30 tablet, Rfl: 0    baclofen (LIORESAL) 20 MG tablet, TAKE 1 TABLET BY MOUTH THREE TIMES A DAY, Disp: 60 tablet, Rfl: 0    buPROPion XL (WELLBUTRIN XL) 150 MG 24 hr tablet, Take 1 tablet by mouth Every Morning., Disp: 90 tablet, Rfl: 2    calcium carbonate (OS-MARY) 600 MG tablet, Take 1 tablet by mouth 2 (Two) Times a Day., Disp: , Rfl:     cholecalciferol (VITAMIN D3) 25 MCG (1000 UT) tablet, Take 1 tablet by mouth Daily., Disp: , Rfl:     DULoxetine (CYMBALTA) 60 MG capsule, Take 1 capsule by mouth Daily., Disp: 90 capsule, Rfl: 0    gabapentin (NEURONTIN) 400 MG capsule, TAKE 1 CAPSULE BY MOUTH THREE TIMES A DAY, Disp: 90 capsule, Rfl: 5    ipratropium-albuterol (DUO-NEB) 0.5-2.5 mg/3 ml nebulizer, Take 3 mL by nebulization Every 4 (Four) Hours As Needed., Disp: 360 mL, Rfl: 3    Loperamide HCl (IMODIUM A-D PO), Take  by mouth Daily., Disp: , Rfl:     losartan (COZAAR) 50 MG tablet, TAKE 1 TABLET BY MOUTH EVERY DAY, Disp: 90 tablet, Rfl: 3    Methylcellulose, Laxative, (CITRUCEL PO), Take  by mouth Daily., Disp: , Rfl:     methylPREDNISolone (MEDROL) 4 MG dose pack, Take as directed on package instructions., Disp: 1 each, Rfl: 0    Multiple Vitamin (MULTI-VITAMINS PO), Take 1 tablet by mouth Daily. HOLD FOR SURGERY, Disp: , Rfl:     ondansetron (ZOFRAN) 4 MG tablet, Take 1 tablet by mouth Every 8  (Eight) Hours As Needed for Nausea or Vomiting., Disp: 30 tablet, Rfl: 2    pantoprazole (PROTONIX) 40 MG EC tablet, Take 1 tablet by mouth Every Morning Before Breakfast., Disp: 90 tablet, Rfl: 1    SUMAtriptan (IMITREX) 100 MG tablet, TAKE 1 TABLET BY MOUTH AT ONSET OF HEADACHE, MAY REPEAT EVERY 2 HOURS AS NEEDED (MAX 200MG/DAY), Disp: 9 tablet, Rfl: 2    traZODone (DESYREL) 50 MG tablet, Take 1.5 tablets by mouth Every Night for 90 days., Disp: 145 tablet, Rfl: 1    The following portions of the patient's history were reviewed and updated as appropriate: allergies, current medications, past family history, past medical history, past social history, past surgical history, and problem list.      REVIEW OF PERTINENT MEDICAL DATA    8/8/23 Lumbar MRI without contrast     HISTORY: Intermittent lumbar pain for 15 years with worsening symptoms  over the past 6 months. The pain radiates to both hips.     TECHNIQUE: Multiplanar imaging of the lumbar spine was performed with  short and long TR. Comparison study from 10/22/2017     FINDINGS: Again noted is mild upper endplate compression at L1 mainly in  the anterior column, unchanged.     At L1-2 the disc is mildly desiccated without bulging or herniation. The  canal and foramina are patent.     At L2-3 the disc is mildly desiccated with mild concentric disc bulging,  slightly more prominent toward the left. Facet arthropathy is mild to  moderate bilaterally. Central stenosis and foraminal narrowing are mild.  Disc bulging has progressed since the previous scan.     At L3-4 the disc is desiccated. There is mild concentric disc bulging  slightly greater laterally to the left. Facet arthropathy is moderate  bilaterally. Central stenosis is moderate. Foraminal narrowing is mild  bilaterally, greater on the left. Disc and facet disease has progressed  since 2017.     At L4-5 the disc is degenerated. There is slight disc space narrowing  with concentric posterior disc bulging  "and osteophyte formation. Facet  arthropathy is moderate bilaterally. Central stenosis is moderate.  Foraminal narrowing is mild on the left and moderate on the right. Disc  and facet disease shows mild progression since the previous scan.     At L5-S1 the disc is unremarkable. The canal and foramina are patent.     The conus is normal. There is no evidence of marrow edema or paraspinous  mass.     IMPRESSION:  Degenerative disc and facet disease as described above level  by level. There is modest progression of disease since 2017 but no acute  superimposed findings are noted. Chronic upper endplate deformity at L1  is unchanged.     This report was finalized on 8/9/2023 3:59 PM by Dr. Zack Egan MD.       12/21/22 Creatinine 1.04, Platelets 296 (10*3)    Review of Systems   Constitutional:  Positive for activity change and fatigue.   Gastrointestinal:  Negative for abdominal pain, constipation and diarrhea.   Genitourinary:  Negative for difficulty urinating and dysuria.   Musculoskeletal:  Positive for back pain.   Neurological:  Positive for dizziness, weakness, light-headedness (sometimes) and numbness (legs). Negative for headaches.   Psychiatric/Behavioral:  Positive for sleep disturbance (sometimes). Negative for agitation and suicidal ideas. The patient is nervous/anxious.    I have reviewed and confirmed the accuracy of the ROS as documented by the MA/LPN/RN Sarah Bernal MD      Vitals:    08/29/23 1326   BP: 138/80   BP Location: Right arm   Patient Position: Sitting   Cuff Size: Adult   Pulse: 76   Resp: 18   Temp: 97.5 øF (36.4 øC)   SpO2: 98%   Weight: 85.2 kg (187 lb 12.8 oz)   Height: 167.6 cm (65.98\")   PainSc:   5   PainLoc: Back         Objective   Physical Exam  Constitutional:       General: She is not in acute distress.  Pulmonary:      Effort: Pulmonary effort is normal. No respiratory distress.   Musculoskeletal:      Comments: Ambulation: Without assistive device   Lumbar " Exam:  Appearance: Scoliotic curve absent and scarring absent  Range of Motion: diminished range with pain  Palpated over lumbosacral paravertebral regions and transverse processes with positive tenderness appreciated, Bilateral.   Sacroiliac joints are tender, Right.  Trochanteric bursa are tender, Bilateral.  Straight leg raise is negative radiculopathy, Bilateral.  Slump test is negative  radiculopathy, Bilateral.  Facet loading is positive for pain, Bilateral.  Paraspinal/adjacent lumbar musculature are not tender to palpation, Bilateral.  Juan Contreras's test is negative sacroiliac pain, Bilateral.   Skin:     General: Skin is warm and dry.   Neurological:      Mental Status: She is alert.      Deep Tendon Reflexes:      Reflex Scores:       Bicep reflexes are 2+ on the right side and 3+ on the left side.       Brachioradialis reflexes are 2+ on the right side and 2+ on the left side.       Patellar reflexes are 2+ on the right side and 2+ on the left side.       Achilles reflexes are 0 on the right side and 0 on the left side.     Comments: Negative clonus bilaterally.  Negative Longoria's bilaterally.   Psychiatric:         Mood and Affect: Mood normal.         Thought Content: Thought content normal.       Assessment & Plan   Diagnoses and all orders for this visit:    1. Lumbar radiculitis (Primary)  -     Case Request    2. Lumbar stenosis with neurogenic claudication  -     Case Request    3. Lumbar facet arthropathy        - Pertinent labs reviewed.   - Pertinent imaging reviewed.   - Will schedule for L4-5 Epidural steroid injection.  Risks discussed including but not limited to bleeding, bruising, infection, damage to surrounding structures, headache, and rare things such as being paralyzed, seizure, stroke, heart attack and death.  The risk of steroid medications include but are not limited to immunosuppression, which can increase the risk of guerrero an infectious disease as well as decrease the  immune response to a vaccine.    - I recommend avoiding ibuprofen due to creatinine on the higher side.  She understands.  Could consider Voltaren gel if needed.   - Explained likely some of her back pain is 2/2 to facet arthropathy.  May consider lumbar Medial branch blocks and Radiofrequency ablation (thermal, non-pulsed) if she continues to have pain after the Epidural steroid injection.    - Magdalena Dickey reports a pain score of 5.  Given her pain assessment as noted, treatment options were discussed and the following options were decided upon as a follow-up plan to address the patient's pain: steroid injections.    --- Follow-up for L4-5 Epidural steroid injection and office visit 4 weeks following.           Sarah Bernal MD  Pain Management    EMR Dragon/Transcription disclaimer:   Much of this encounter note is an electronic transcription/translation of spoken language to printed text. The electronic translation of spoken language may permit erroneous, or at times, nonsensical words or phrases to be inadvertently transcribed; Although I have reviewed the note for such errors, some may still exist.

## 2023-08-28 NOTE — H&P (VIEW-ONLY)
The patient has a pain history of the following:  Lumbar radiculopathy     Previous interventions that the patient has received include:   Epidural injections helped in the past (2016)    Pain medications include:  Baclofen TID   Cymbalta (depression)  Gabapentin BID (neuropathy)  Ibuprofen takes the edge off (not supposed to be taking)    Previously: Medrol dose pack, Aleve (doesn't help), Biofreeze, Lidocaine patches    Other conservative modalities which the patient reports using include:  Physical Therapy: yes  Chiropractor: no  Massage Therapy: no  TENS: yes, didn't help   Neck or back surgery: yes  Past pain management: yes    Past Significant Surgical History:  [facility] - Dr. De Dios     HPI:       CHIEF COMPLAINT: Back Pain  Back pain    Magdalena Dickey is a 68 y.o. female referred here by Sun Guerrier MD. Magdalena Dickey presents to the office for evaluation and treatment of back pain.      History of Present Illness  Onset:  Years ago   Inciting Event:  Worked as a nurse, lifting patients over time  Location:  Bilateral hips   Pain: Pain described as ache. Located in the low back and does radiate into the bilateral buttocks and hips.  Severity:  Pain rated as a 5 /10.  Apportions pain as 25%  back pain and 75% extremity pain.  Symptoms have been constant.  Exacerbation:  Standing.   Alleviation:  Sitting in a recliner (straight back chairs do not help).  Associated Symptoms:   She denies any new onset of bowel or bladder weakness, significant leg weakness or saddle anesthesia.  Denies balance problems or lower extremity incoordination.  She has noticed some weakness in her legs.   Ambulates: Without assistive device.   Limitations: This pain limits the patient from shopping, cooking.   Goals: Functional goals include ability to do the above.      [place] 50    She also complains of pain in her neck that radiates down bilateral shoulders.      PEG Assessment   What number best describes your pain on average in  the past week?8  What number best describes how, during the past week, pain has interfered with your enjoyment of life?6  What number best describes how, during the past week, pain has interfered with your general activity?  6        Current Outpatient Medications:     albuterol sulfate  (90 Base) MCG/ACT inhaler, INHALE TWO PUFFS INTO THE LUNGS EVERY FOUR HOURS AS NEEDED, Disp: , Rfl:     ALPRAZolam (XANAX) 0.5 MG tablet, Take 1 tablet by mouth 2 (Two) Times a Day As Needed for anxiety, Disp: 60 tablet, Rfl: 2    amphetamine-dextroamphetamine (ADDERALL) 10 MG tablet, Take 1 tablet by mouth Every Morning., Disp: 30 tablet, Rfl: 0    baclofen (LIORESAL) 20 MG tablet, TAKE 1 TABLET BY MOUTH THREE TIMES A DAY, Disp: 60 tablet, Rfl: 0    buPROPion XL (WELLBUTRIN XL) 150 MG 24 hr tablet, Take 1 tablet by mouth Every Morning., Disp: 90 tablet, Rfl: 2    calcium carbonate (OS-MARY) 600 MG tablet, Take 1 tablet by mouth 2 (Two) Times a Day., Disp: , Rfl:     cholecalciferol (VITAMIN D3) 25 MCG (1000 UT) tablet, Take 1 tablet by mouth Daily., Disp: , Rfl:     DULoxetine (CYMBALTA) 60 MG capsule, Take 1 capsule by mouth Daily., Disp: 90 capsule, Rfl: 0    gabapentin (NEURONTIN) 400 MG capsule, TAKE 1 CAPSULE BY MOUTH THREE TIMES A DAY, Disp: 90 capsule, Rfl: 5    ipratropium-albuterol (DUO-NEB) 0.5-2.5 mg/3 ml nebulizer, Take 3 mL by nebulization Every 4 (Four) Hours As Needed., Disp: 360 mL, Rfl: 3    Loperamide HCl (IMODIUM A-D PO), Take  by mouth Daily., Disp: , Rfl:     losartan (COZAAR) 50 MG tablet, TAKE 1 TABLET BY MOUTH EVERY DAY, Disp: 90 tablet, Rfl: 3    Methylcellulose, Laxative, (CITRUCEL PO), Take  by mouth Daily., Disp: , Rfl:     methylPREDNISolone (MEDROL) 4 MG dose pack, Take as directed on package instructions., Disp: 1 each, Rfl: 0    Multiple Vitamin (MULTI-VITAMINS PO), Take 1 tablet by mouth Daily. HOLD FOR SURGERY, Disp: , Rfl:     ondansetron (ZOFRAN) 4 MG tablet, Take 1 tablet by mouth Every 8  (Eight) Hours As Needed for Nausea or Vomiting., Disp: 30 tablet, Rfl: 2    pantoprazole (PROTONIX) 40 MG EC tablet, Take 1 tablet by mouth Every Morning Before Breakfast., Disp: 90 tablet, Rfl: 1    SUMAtriptan (IMITREX) 100 MG tablet, TAKE 1 TABLET BY MOUTH AT ONSET OF HEADACHE, MAY REPEAT EVERY 2 HOURS AS NEEDED (MAX 200MG/DAY), Disp: 9 tablet, Rfl: 2    traZODone (DESYREL) 50 MG tablet, Take 1.5 tablets by mouth Every Night for 90 days., Disp: 145 tablet, Rfl: 1    The following portions of the patient's history were reviewed and updated as appropriate: allergies, current medications, past family history, past medical history, past social history, past surgical history, and problem list.      REVIEW OF PERTINENT MEDICAL DATA    8/8/23 Lumbar MRI without contrast     HISTORY: Intermittent lumbar pain for 15 years with worsening symptoms  over the past 6 months. The pain radiates to both hips.     TECHNIQUE: Multiplanar imaging of the lumbar spine was performed with  short and long TR. Comparison study from 10/22/2017     FINDINGS: Again noted is mild upper endplate compression at L1 mainly in  the anterior column, unchanged.     At L1-2 the disc is mildly desiccated without bulging or herniation. The  canal and foramina are patent.     At L2-3 the disc is mildly desiccated with mild concentric disc bulging,  slightly more prominent toward the left. Facet arthropathy is mild to  moderate bilaterally. Central stenosis and foraminal narrowing are mild.  Disc bulging has progressed since the previous scan.     At L3-4 the disc is desiccated. There is mild concentric disc bulging  slightly greater laterally to the left. Facet arthropathy is moderate  bilaterally. Central stenosis is moderate. Foraminal narrowing is mild  bilaterally, greater on the left. Disc and facet disease has progressed  since 2017.     At L4-5 the disc is degenerated. There is slight disc space narrowing  with concentric posterior disc bulging  "and osteophyte formation. Facet  arthropathy is moderate bilaterally. Central stenosis is moderate.  Foraminal narrowing is mild on the left and moderate on the right. Disc  and facet disease shows mild progression since the previous scan.     At L5-S1 the disc is unremarkable. The canal and foramina are patent.     The conus is normal. There is no evidence of marrow edema or paraspinous  mass.     IMPRESSION:  Degenerative disc and facet disease as described above level  by level. There is modest progression of disease since 2017 but no acute  superimposed findings are noted. Chronic upper endplate deformity at L1  is unchanged.     This report was finalized on 8/9/2023 3:59 PM by Dr. Zack Egan MD.       12/21/22 Creatinine 1.04, Platelets 296 (10*3)    Review of Systems   Constitutional:  Positive for activity change and fatigue.   Gastrointestinal:  Negative for abdominal pain, constipation and diarrhea.   Genitourinary:  Negative for difficulty urinating and dysuria.   Musculoskeletal:  Positive for back pain.   Neurological:  Positive for dizziness, weakness, light-headedness (sometimes) and numbness (legs). Negative for headaches.   Psychiatric/Behavioral:  Positive for sleep disturbance (sometimes). Negative for agitation and suicidal ideas. The patient is nervous/anxious.    I have reviewed and confirmed the accuracy of the ROS as documented by the MA/LPN/RN Sarah Bernal MD      Vitals:    08/29/23 1326   BP: 138/80   BP Location: Right arm   Patient Position: Sitting   Cuff Size: Adult   Pulse: 76   Resp: 18   Temp: 97.5 °F (36.4 °C)   SpO2: 98%   Weight: 85.2 kg (187 lb 12.8 oz)   Height: 167.6 cm (65.98\")   PainSc:   5   PainLoc: Back         Objective   Physical Exam  Constitutional:       General: She is not in acute distress.  Pulmonary:      Effort: Pulmonary effort is normal. No respiratory distress.   Musculoskeletal:      Comments: Ambulation: Without assistive device   Lumbar " Exam:  Appearance: Scoliotic curve absent and scarring absent  Range of Motion: diminished range with pain  Palpated over lumbosacral paravertebral regions and transverse processes with positive tenderness appreciated, Bilateral.   Sacroiliac joints are tender, Right.  Trochanteric bursa are tender, Bilateral.  Straight leg raise is negative radiculopathy, Bilateral.  Slump test is negative  radiculopathy, Bilateral.  Facet loading is positive for pain, Bilateral.  Paraspinal/adjacent lumbar musculature are not tender to palpation, Bilateral.  Juan Contreras's test is negative sacroiliac pain, Bilateral.   Skin:     General: Skin is warm and dry.   Neurological:      Mental Status: She is alert.      Deep Tendon Reflexes:      Reflex Scores:       Bicep reflexes are 2+ on the right side and 3+ on the left side.       Brachioradialis reflexes are 2+ on the right side and 2+ on the left side.       Patellar reflexes are 2+ on the right side and 2+ on the left side.       Achilles reflexes are 0 on the right side and 0 on the left side.     Comments: Negative clonus bilaterally.  Negative Longoria's bilaterally.   Psychiatric:         Mood and Affect: Mood normal.         Thought Content: Thought content normal.       Assessment & Plan   Diagnoses and all orders for this visit:    1. Lumbar radiculitis (Primary)  -     Case Request    2. Lumbar stenosis with neurogenic claudication  -     Case Request    3. Lumbar facet arthropathy        - Pertinent labs reviewed.   - Pertinent imaging reviewed.   - Will schedule for L4-5 Epidural steroid injection.  Risks discussed including but not limited to bleeding, bruising, infection, damage to surrounding structures, headache, and rare things such as being paralyzed, seizure, stroke, heart attack and death.  The risk of steroid medications include but are not limited to immunosuppression, which can increase the risk of guerrero an infectious disease as well as decrease the  immune response to a vaccine.    - I recommend avoiding ibuprofen due to creatinine on the higher side.  She understands.  Could consider Voltaren gel if needed.   - Explained likely some of her back pain is 2/2 to facet arthropathy.  May consider lumbar Medial branch blocks and Radiofrequency ablation (thermal, non-pulsed) if she continues to have pain after the Epidural steroid injection.    - Magdalena Dickey reports a pain score of 5.  Given her pain assessment as noted, treatment options were discussed and the following options were decided upon as a follow-up plan to address the patient's pain: steroid injections.    --- Follow-up for L4-5 Epidural steroid injection and office visit 4 weeks following.           Sarah Bernal MD  Pain Management    EMR Dragon/Transcription disclaimer:   Much of this encounter note is an electronic transcription/translation of spoken language to printed text. The electronic translation of spoken language may permit erroneous, or at times, nonsensical words or phrases to be inadvertently transcribed; Although I have reviewed the note for such errors, some may still exist.

## 2023-08-29 ENCOUNTER — OFFICE VISIT (OUTPATIENT)
Dept: PAIN MEDICINE | Facility: CLINIC | Age: 68
End: 2023-08-29
Payer: MEDICARE

## 2023-08-29 ENCOUNTER — PREP FOR SURGERY (OUTPATIENT)
Dept: SURGERY | Facility: SURGERY CENTER | Age: 68
End: 2023-08-29
Payer: MEDICARE

## 2023-08-29 VITALS
WEIGHT: 187.8 LBS | BODY MASS INDEX: 30.18 KG/M2 | HEART RATE: 76 BPM | HEIGHT: 66 IN | SYSTOLIC BLOOD PRESSURE: 138 MMHG | TEMPERATURE: 97.5 F | DIASTOLIC BLOOD PRESSURE: 80 MMHG | RESPIRATION RATE: 18 BRPM | OXYGEN SATURATION: 98 %

## 2023-08-29 DIAGNOSIS — M54.16 LUMBAR RADICULITIS: Primary | ICD-10-CM

## 2023-08-29 DIAGNOSIS — M47.816 LUMBAR FACET ARTHROPATHY: ICD-10-CM

## 2023-08-29 DIAGNOSIS — M48.062 LUMBAR STENOSIS WITH NEUROGENIC CLAUDICATION: ICD-10-CM

## 2023-08-31 DIAGNOSIS — I10 ESSENTIAL HYPERTENSION: ICD-10-CM

## 2023-08-31 DIAGNOSIS — R25.2 MUSCLE CRAMPS: ICD-10-CM

## 2023-08-31 RX ORDER — BACLOFEN 20 MG/1
TABLET ORAL
Qty: 60 TABLET | Refills: 0 | Status: SHIPPED | OUTPATIENT
Start: 2023-08-31

## 2023-08-31 RX ORDER — LOSARTAN POTASSIUM 50 MG/1
TABLET ORAL
Qty: 90 TABLET | Refills: 3 | Status: SHIPPED | OUTPATIENT
Start: 2023-08-31

## 2023-09-05 ENCOUNTER — TRANSCRIBE ORDERS (OUTPATIENT)
Dept: SURGERY | Facility: SURGERY CENTER | Age: 68
End: 2023-09-05
Payer: MEDICARE

## 2023-09-05 DIAGNOSIS — Z41.9 SURGERY, ELECTIVE: Primary | ICD-10-CM

## 2023-09-05 PROBLEM — M48.062 LUMBAR STENOSIS WITH NEUROGENIC CLAUDICATION: Status: ACTIVE | Noted: 2023-09-05

## 2023-09-05 PROBLEM — M54.16 LUMBAR RADICULITIS: Status: ACTIVE | Noted: 2023-09-05

## 2023-09-12 ENCOUNTER — OFFICE (OUTPATIENT)
Dept: URBAN - METROPOLITAN AREA CLINIC 66 | Facility: CLINIC | Age: 68
End: 2023-09-12

## 2023-09-12 VITALS
HEIGHT: 66 IN | SYSTOLIC BLOOD PRESSURE: 108 MMHG | HEART RATE: 99 BPM | WEIGHT: 191 LBS | DIASTOLIC BLOOD PRESSURE: 74 MMHG

## 2023-09-12 DIAGNOSIS — K21.9 GASTRO-ESOPHAGEAL REFLUX DISEASE WITHOUT ESOPHAGITIS: ICD-10-CM

## 2023-09-12 DIAGNOSIS — R11.0 NAUSEA: ICD-10-CM

## 2023-09-12 PROCEDURE — 99203 OFFICE O/P NEW LOW 30 MIN: CPT | Performed by: INTERNAL MEDICINE

## 2023-09-12 RX ORDER — LEVOCARNITINE 330 MG
1320 TABLET ORAL
Qty: 60 | Refills: 12 | Status: ACTIVE
Start: 2023-09-12

## 2023-09-12 RX ORDER — SUCRALFATE 1 G/1
TABLET ORAL
Qty: 90 | Refills: 4 | Status: ACTIVE

## 2023-09-12 RX ORDER — UBIDECARENONE 200 MG
400 CAPSULE ORAL
Qty: 60 | Refills: 12 | Status: ACTIVE
Start: 2023-09-12

## 2023-09-15 NOTE — DISCHARGE INSTRUCTIONS
[facility] - Post-procedure Instructions          --  While there are no absolute restrictions, it is recommended that you do not perform strenuous activity today. In the morning, you may resume your level of activity as before your block.    --  If you have a band-aid at your injection site, please remove it later today. Observe the area for any redness, swelling, pus-like drainage, or a temperature over 101°. If any of these symptoms occur, please call your doctor at [phone]. If after office hours, leave a message and the on-call provider will return your call.    --  Ice may be applied to your injection site. It is recommended you avoid direct heat (heating pad; hot tub) for 1-2 days.    --  Call [facility] at [phone] if you experience persistent headache, persistent bleeding from the injection site, or severe pain not relieved by heat or oral medication.    --  Do not make important decisions today.    --  Due to the effects of the block and/or the I.V. Sedation, DO NOT drive or operate hazardous machinery for 12 hours.  Local anesthetics may cause numbness after procedure and precautions must be taken with regards to operating equipment as well as with walking, even if ambulating with assistance of another person or with an assistive device.    --  Do not drink alcohol for 12 hours.    -- You may return to work tomorrow, or as directed by your referring doctor.    --  Occasionally you may notice a slight increase in your pain after the procedure. This should start to improve within the next 24-48 hours. Radiofrequency ablation procedure pain may last 3-4 weeks.    --  It may take as long as 3-4 days before you notice a gradual improvement in your pain and/or other symptoms.    -- You may continue to take your prescribed pain medication as needed.    --  Some normal possible side effects of steroid use could include fluid retention, increased blood sugar, dull headache,  increased sweating, increased appetite, mood swings and flushing.    --  Diabetics are recommended to watch their blood glucose level closely for 24-48 hours after the injection.    --  Must stay in PACU for 20 min upon arrival and prove no leg weakness before being discharged.    --  IN THE EVENT OF A LIFE THREATENING EMERGENCY, (CHEST PAIN, BREATHING DIFFICULTIES, PARALYSIS…) YOU SHOULD GO TO YOUR NEAREST EMERGENCY ROOM.    --  You should be contacted by our office within 2-3 days to schedule follow up or next appointment date.  If not contacted within 7 days, please call the office at [phone]

## 2023-09-20 ENCOUNTER — HOSPITAL ENCOUNTER (OUTPATIENT)
Facility: SURGERY CENTER | Age: 68
Setting detail: HOSPITAL OUTPATIENT SURGERY
Discharge: HOME | End: 2023-09-20
Attending: ANESTHESIOLOGY | Admitting: ANESTHESIOLOGY
Payer: MEDICARE

## 2023-09-20 ENCOUNTER — HOSPITAL ENCOUNTER (OUTPATIENT)
Dept: GENERAL RADIOLOGY | Facility: SURGERY CENTER | Age: 68
Setting detail: HOSPITAL OUTPATIENT SURGERY
End: 2023-09-20
Payer: MEDICARE

## 2023-09-20 VITALS
HEART RATE: 66 BPM | SYSTOLIC BLOOD PRESSURE: 130 MMHG | BODY MASS INDEX: 30.53 KG/M2 | RESPIRATION RATE: 16 BRPM | HEIGHT: 66 IN | TEMPERATURE: 97.7 F | WEIGHT: 190 LBS | OXYGEN SATURATION: 97 % | DIASTOLIC BLOOD PRESSURE: 90 MMHG

## 2023-09-20 DIAGNOSIS — Z41.9 SURGERY, ELECTIVE: ICD-10-CM

## 2023-09-20 PROCEDURE — 25510000001 IOPAMIDOL 61 % SOLUTION 30 ML VIAL: Performed by: ANESTHESIOLOGY

## 2023-09-20 PROCEDURE — 25010000002 BUPIVACAINE (PF) 0.25 % SOLUTION 10 ML VIAL: Performed by: ANESTHESIOLOGY

## 2023-09-20 PROCEDURE — 25010000002 DEXAMETHASONE SODIUM PHOSPHATE 100 MG/10ML SOLUTION 10 ML VIAL: Performed by: ANESTHESIOLOGY

## 2023-09-20 PROCEDURE — 62323 NJX INTERLAMINAR LMBR/SAC: CPT | Performed by: ANESTHESIOLOGY

## 2023-09-20 PROCEDURE — 76000 FLUOROSCOPY <1 HR PHYS/QHP: CPT

## 2023-09-20 PROCEDURE — 77002 NEEDLE LOCALIZATION BY XRAY: CPT

## 2023-09-20 NOTE — OP NOTE
L4/L5 Interlaminar Lumbar Epidural Steroid Injection   [facility]    PREOPERATIVE DIAGNOSIS:   Lumbar Spinal Stenosis WITH Neurogenic Claudication and Lumbar radiculitis  POSTOPERATIVE DIAGNOSIS:  Same as preop diagnosis    PROCEDURE:   Lumbar Epidural Steroid Injection, Therapeutic Interlaminar Injection, with epidurogram, at  L4/L5 level    PRE-PROCEDURE DISCUSSION WITH PATIENT:    Risks and complications were discussed with the patient prior to starting the procedure and informed consent was obtained.  We discussed various topics including but not limited to bleeding, infection, injury, paralysis, nerve injury, dural puncture, coma, death, worsening of clinical picture, lack of pain relief, and postprocedural soreness.    SURGEON:  Sarah Bernal MD    REASON FOR PROCEDURE:    Diagnostic injection at this level is needed    SEDATION:  No sedation was used for this procedure  ANESTHETIC:  Marcaine 0.25%  STEROID:   10mg dexamethasone    DESCRIPTON OF PROCEDURE:    After obtaining informed consent, I.V. was not started in the preop area.   The patient was taken to the operating room and placed in the prone position.  EKG, blood pressure, and pulse oximeter were monitored throughout, and sedation was provided as needed by the RN under my guidance. All pressure points were well padded.  The lumbar spine area was prepped with Chloraprep and draped in a sterile fashion.      AP fluoroscopic image was used to visualize the L4/L5 interspace.  The skin and subcutaneous tissue over the area was anesthetized with 1% Lidocaine.  An 18-Gauge Tuohy needle was then advanced through the anesthetized skin tract under fluoroscopic guidance in a coaxial view using a loss of resistance technique.  Lateral fluoroscopy was used to verify appropriate needle depth.  Once the needle tip was felt to be in the posterior epidural space, aspiration was noted to be negative for blood or CSF.  A volume of 1mL of Isovue was  then injected under live fluoroscopy in an AP view which produced good epidural spread with no evidence of loculation, vascular run-off or intrathecal spread.  Subsequently, a total volume of 5mL consisting of 10mg of dexamethasone, 0.5mL of 0.25% bupivcacaine and normal saline was injected without resistance.  The needle was removed intact.     ESTIMATED BLOOD LOSS:  <5 mL  SPECIMENS:  None    COMPLICATIONS:     No complications were noted., There was no indication of vascular uptake on live injection of contrast dye., and There was no indication of intrathecal uptake on live injection of contrast dye.    TOLERANCE & DISCHARGE CONDITION:    The patient tolerated the procedure well.  The patient was transported to the recovery area without difficulties.  The patient was discharged to home under the care of family in stable and satisfactory condition.    PLAN OF CARE:  The patient was given our standard instruction sheet.  The patient will Return to clinic 4-6 wks  The patient will resume all medications as per the medication reconciliation sheet.

## 2023-10-02 ENCOUNTER — OFFICE VISIT (OUTPATIENT)
Dept: INTERNAL MEDICINE | Facility: CLINIC | Age: 68
End: 2023-10-02
Payer: MEDICARE

## 2023-10-02 VITALS
DIASTOLIC BLOOD PRESSURE: 80 MMHG | OXYGEN SATURATION: 96 % | SYSTOLIC BLOOD PRESSURE: 130 MMHG | HEART RATE: 92 BPM | TEMPERATURE: 98.7 F

## 2023-10-02 DIAGNOSIS — B37.0 ORAL CANDIDIASIS: Primary | ICD-10-CM

## 2023-10-02 PROCEDURE — 99213 OFFICE O/P EST LOW 20 MIN: CPT | Performed by: PHYSICIAN ASSISTANT

## 2023-10-02 PROCEDURE — 3079F DIAST BP 80-89 MM HG: CPT | Performed by: PHYSICIAN ASSISTANT

## 2023-10-02 PROCEDURE — 3075F SYST BP GE 130 - 139MM HG: CPT | Performed by: PHYSICIAN ASSISTANT

## 2023-10-02 RX ORDER — FLUCONAZOLE 100 MG/1
100 TABLET ORAL DAILY
Qty: 7 TABLET | Refills: 0 | Status: SHIPPED | OUTPATIENT
Start: 2023-10-02 | End: 2023-10-09

## 2023-10-02 RX ORDER — LEVOCARNITINE 330 MG/1
2 TABLET ORAL EVERY 12 HOURS SCHEDULED
COMMUNITY
Start: 2023-09-18

## 2023-10-02 RX ORDER — FLUOROMETHOLONE ACETATE 1 MG/ML
SUSPENSION/ DROPS OPHTHALMIC
COMMUNITY
Start: 2023-09-13

## 2023-10-02 RX ORDER — DICLOFENAC SODIUM 1 MG/ML
SOLUTION/ DROPS OPHTHALMIC
COMMUNITY
Start: 2023-08-03

## 2023-10-02 RX ORDER — UBIDECARENONE 200 MG
1 CAPSULE ORAL EVERY 12 HOURS SCHEDULED
COMMUNITY
Start: 2023-09-13

## 2023-10-02 NOTE — PROGRESS NOTES
Subjective   Chief Complaint   Patient presents with    Sore Throat     Mouth hurts    Earache     Mainly right       History of Present Illness     Started with sore throat and mouth pain about a month ago. Has used hot water rinses, chloriseptic as well. Tongue feels like it is burning with mouth wash. Has pain has pain with eating and drinking. Has some pain in her right ear. Some post nasal drip but improved with an oral antihistamine. Denies steroids, antibiotics, or dental work recently.      Patient Active Problem List   Diagnosis    Migraine without aura and without status migrainosus, not intractable    GERD (gastroesophageal reflux disease)    Irritable bowel syndrome with diarrhea    Depression with anxiety    Cervical spondylosis with radiculopathy    History of colon resection    Essential hypertension    Hypertriglyceridemia    Peripheral polyneuropathy    Snoring    Morning headache    Lumbar stenosis with neurogenic claudication    Lumbar radiculitis       No Known Allergies    Current Outpatient Medications on File Prior to Visit   Medication Sig Dispense Refill    albuterol sulfate  (90 Base) MCG/ACT inhaler INHALE TWO PUFFS INTO THE LUNGS EVERY FOUR HOURS AS NEEDED      ALPRAZolam (XANAX) 0.5 MG tablet Take 1 tablet by mouth 2 (Two) Times a Day As Needed for anxiety 60 tablet 2    amphetamine-dextroamphetamine (ADDERALL) 10 MG tablet Take 1 tablet by mouth Every Morning. 30 tablet 0    baclofen (LIORESAL) 20 MG tablet TAKE 1 TABLET BY MOUTH THREE TIMES A DAY 60 tablet 0    buPROPion XL (WELLBUTRIN XL) 150 MG 24 hr tablet Take 1 tablet by mouth Every Morning. 90 tablet 2    calcium carbonate (OS-MARY) 600 MG tablet Take 1 tablet by mouth 2 (Two) Times a Day.      cholecalciferol (VITAMIN D3) 25 MCG (1000 UT) tablet Take 1 tablet by mouth Daily.      Co-Enzyme Q10 200 MG capsule Take 1 capsule by mouth Every 12 (Twelve) Hours.      diclofenac (VOLTAREN) 0.1 % ophthalmic solution INSTILL 1 DROP  IN RIGHT EYE 3 TIMES A DAY UNTIL FOLLOW UP      DULoxetine (CYMBALTA) 60 MG capsule Take 1 capsule by mouth Daily. 90 capsule 0    Flarex 0.1 % ophthalmic suspension PLACE 1 DROP IN EACH EYE TWICE A DAY      gabapentin (NEURONTIN) 400 MG capsule TAKE 1 CAPSULE BY MOUTH THREE TIMES A DAY 90 capsule 5    ipratropium-albuterol (DUO-NEB) 0.5-2.5 mg/3 ml nebulizer Take 3 mL by nebulization Every 4 (Four) Hours As Needed. 360 mL 3    levOCARNitine (CARNITOR) 330 MG tablet Take 2 tablets by mouth Every 12 (Twelve) Hours.      Loperamide HCl (IMODIUM A-D PO) Take  by mouth Daily.      losartan (COZAAR) 50 MG tablet TAKE 1 TABLET BY MOUTH EVERY DAY 90 tablet 3    Methylcellulose, Laxative, (CITRUCEL PO) Take  by mouth Daily.      Multiple Vitamin (MULTI-VITAMINS PO) Take 1 tablet by mouth Daily. HOLD FOR SURGERY      ondansetron (ZOFRAN) 4 MG tablet Take 1 tablet by mouth Every 8 (Eight) Hours As Needed for Nausea or Vomiting. 30 tablet 2    pantoprazole (PROTONIX) 40 MG EC tablet Take 1 tablet by mouth Every Morning Before Breakfast. 90 tablet 1    SUMAtriptan (IMITREX) 100 MG tablet TAKE 1 TABLET BY MOUTH AT ONSET OF HEADACHE, MAY REPEAT EVERY 2 HOURS AS NEEDED (MAX 200MG/DAY) 9 tablet 2    traZODone (DESYREL) 50 MG tablet Take 1.5 tablets by mouth Every Night for 90 days. 145 tablet 1     No current facility-administered medications on file prior to visit.       Past Medical History:   Diagnosis Date    Abnormal mammogram 03/2022    Anxiety     Cervical radiculopathy     Cervical spondylolysis     Chest pain 08/19/2022    ADMITTED TO [facility]    Chronic bilateral low back pain without sciatica     Colon polyps     FOLLOWED BY DR. ADRIA NYE    COVID-19 08/15/2020    SEEN AT     Delayed emergence from anesthesia 2018    Depression 05/25/2016    Diverticulitis 01/16/2018    ADMITTED TO [facility]    Diverticulitis 11/24/2017    SEEN AT [facility] ER    Diverticulitis of colon 06/2020    Dysesthesia     Dyspepsia     Fecal incontinence  09/2022    GERD (gastroesophageal reflux disease)     Hair loss 12/2020    Hiatal hernia     History of measles, mumps, or rubella     MEASLES AND MUMPS AS A CHILD    Hypertension     IBS (irritable bowel syndrome)     ILD (interstitial lung disease)     Insomnia     Lumbosacral disc disease     Lung nodule 11/2020    Migraine without aura and without status migrainosus, not intractable 05/25/2016    NAFLD (nonalcoholic fatty liver disease)     Palpitations 04/09/2019    SEEN AT [facility] ER    Paresthesias     PNA (pneumonia) 08/23/2020    D/T COVID, ADMITTED TO [facility]    Polyneuropathy     Post-COVID chronic dyspnea     PUD (peptic ulcer disease)     Rotator cuff tendinitis, right 05/08/2018    Thrush 09/2020    Vestibular neuritis 09/2014       Family History   Problem Relation Age of Onset    Alzheimer's disease Mother         SDAT    Hypertension Mother     Diabetes type II Mother     Lung cancer Mother         POSSIBLE    Heart attack Mother     Cancer Mother         lung    COPD Mother     Depression Mother     Diabetes Mother     Heart disease Mother     Miscarriages / Stillbirths Mother     Alcohol abuse Father     Prostate cancer Father     Heart disease Father         THIRD DEGREE HEART BLOCK    Arthritis Father     Cancer Father         prostate    Drug abuse Father     Learning disabilities Father     Cancer Sister     Ovarian cancer Sister     Cancer Sister     Ovarian cancer Sister     Arthritis Sister     Cancer Sister         ovarian    Depression Sister     Hypertension Sister     Cancer Sister         ovarian    Depression Sister     Hypertension Sister     No Known Problems Brother     Arthritis Maternal Grandmother         rheumatoid    Breast cancer Other     Malig Hyperthermia Neg Hx        Social History     Socioeconomic History    Marital status:      Spouse name: Lonnie    Number of children: 3   Tobacco Use    Smoking status: Former     Packs/day: 1.00     Years: 40.00     Pack years: 40.00      Types: Cigarettes     Quit date: 3/10/2014     Years since quittin.5     Passive exposure: Past    Smokeless tobacco: Never   Vaping Use    Vaping Use: Former   Substance and Sexual Activity    Alcohol use: Yes     Comment: SELDOM    Drug use: No    Sexual activity: Defer     Partners: Male     Birth control/protection: Post-menopausal, Hysterectomy     Comment: .       Past Surgical History:   Procedure Laterality Date    ANTERIOR CERVICAL DISCECTOMY W/ FUSION N/A 2018    Procedure: C6 7 anterior cervical discectomy and fusion with allograft and plate;  Surgeon: Jacobo De Dios MD;  Location: [facility];  Service: Neurosurgery    CHOLECYSTECTOMY N/A     AT [place]    COLON RESECTION N/A 2018    Procedure: LAPAROSCOPIC SIGMOID COLON RESECTION WITH MOBILIZATION OF SPLENIC FLEXURE;  Surgeon: Eric Davidson MD;  Location: [facility];  Service:     COLONOSCOPY N/A 02/15/2017    6 MM TUBULAR ADENOMA POLYP IN HEPATIC FLEXURE, 5 MM TUBULAR ADENOAM POLYP IN RECTUM, DR. GERARDO NYE AT [facility]    COLONOSCOPY N/A 2003    Internal hemorrhoids-Dr. Gerardo Nye    COLONOSCOPY N/A 2022    4 MM TUBULAR ADENOMA POLYP IN ASCENDING, HEMORRHOIDS, DR. GERARDO NYE AT [facility]    DIAGNOSTIC LAPAROSCOPY N/A 2001    Cul-de-sac endometriosis and adhesions-Dr. Aamir Christine    ENDOSCOPY N/A 02/15/2017    SMALL HIATAL HERNIA, GASTRITIS, DR. GERARDO NYE AT [facility]    ENDOSCOPY N/A 2022    GASTRITIS, MILD ESOPHAGITIS, Z LINE IRREGULAR, DR. GERARDO NYE AT [facility]    ENDOSCOPY N/A 2014    GASTRITIS, DR. GERARDO NYE AT [facility]    ENDOSCOPY AND COLONOSCOPY N/A 2009    SMALL HIATAL HERNIA, ESOPHAGITIS, HEMORRHOIDS, DIVERTICULOSIS, TORTUOUS COLON, DR. GERARDO NYE AT [facility]    LUMBAR EPIDURAL INJECTION N/A 2023    Procedure: LUMBAR EPIDURAL 1ST VISIT L4-5 13066;  Surgeon: Sarah Bernal MD;  Location: [facility] OR;  Service: Pain Management;  Laterality: N/A;    SKIN BIOPSY Left 2022     LEFT CALF, PATH: SMALL FIBER NEUROPATHY    TOTAL ABDOMINAL HYSTERECTOMY WITH SALPINGO OOPHORECTOMY Bilateral 07/31/2001    Dr. Aamir Christine AT [facility]         The following portions of the patient's history were reviewed and updated as appropriate: problem list, allergies, current medications, past medical history, past family history, past social history, and past surgical history.    ROS    See HPI    Immunization History   Administered Date(s) Administered    COVID-19 (PFIZER) Purple Cap Monovalent 01/05/2021, 01/26/2021, 10/06/2021    Covid-19 (Pfizer) Gray Cap Monovalent 07/08/2022    DTaP 09/09/2013    FluMist 2-49yrs 10/05/2017    Fluzone High Dose =>65 Years (Vaxcare ONLY) 09/29/2020    Influenza, Unspecified 11/10/2018, 11/05/2019    Pneumococcal Conjugate 13-Valent (PCV13) 07/23/2020    Pneumococcal Polysaccharide (PPSV23) 08/31/2021    Shingrix 05/09/2018, 07/27/2021    Tdap 11/10/2021       Objective   Vitals:    10/02/23 1401   Pulse: 92   Temp: 98.7 °F (37.1 °C)   SpO2: 96%     There is no height or weight on file to calculate BMI.  Physical Exam  Vitals reviewed.   Constitutional:       Appearance: Normal appearance.   HENT:      Head: Normocephalic and atraumatic.      Right Ear: Ear canal normal.      Mouth/Throat:      Comments: White coating over 60% of posterior tongue  Neurological:      Mental Status: She is alert.         Assessment & Plan   Diagnoses and all orders for this visit:    1. Oral candidiasis (Primary)  -     fluconazole (Diflucan) 100 MG tablet; Take 1 tablet by mouth Daily for 7 days.  Dispense: 7 tablet; Refill: 0     Start with daily fluconazole, will try to call in may's magic mouthwash. Txt for 1 week, if not resolved will extend for 7 more days or until resolved.     No follow-ups on file.

## 2023-10-03 ENCOUNTER — TELEPHONE (OUTPATIENT)
Dept: INTERNAL MEDICINE | Facility: CLINIC | Age: 68
End: 2023-10-03

## 2023-10-03 NOTE — TELEPHONE ENCOUNTER
"Caller: Magdalena Dickey \"Madelaine\"    Relationship: Self    Best call back number: [phone]     What medication are you requesting: MEDICATED MOUTHWASH    What are your current symptoms: THRUSH    Have you had these symptoms before:    [x] Yes  [] No    Have you been treated for these symptoms before:   [x] Yes  [] No    If a prescription is needed, what is your preferred pharmacy and phone number: [facility] [address] [phone] [facility] [phone]      Additional notes: PATIENT STATES THAT PRESCRIPTION WAS SUPPOSED TO BE SENT IN AFTER APPOINTMENT YESTERDAY, BUT PHARMACY HAD NOT RECEIVED ANYTHING.        "

## 2023-10-04 ENCOUNTER — TELEPHONE (OUTPATIENT)
Dept: INTERNAL MEDICINE | Facility: CLINIC | Age: 68
End: 2023-10-04
Payer: MEDICARE

## 2023-10-04 NOTE — TELEPHONE ENCOUNTER
[facility] called stating they do not have instructions or an actual prescription for the magic mouthwash. Please advise

## 2023-10-10 ENCOUNTER — OFFICE VISIT (OUTPATIENT)
Dept: INTERNAL MEDICINE | Facility: CLINIC | Age: 68
End: 2023-10-10
Payer: MEDICARE

## 2023-10-10 VITALS — HEIGHT: 66 IN | WEIGHT: 187 LBS | TEMPERATURE: 98.4 F | BODY MASS INDEX: 30.05 KG/M2

## 2023-10-10 DIAGNOSIS — J02.9 SORE THROAT: Primary | ICD-10-CM

## 2023-10-10 DIAGNOSIS — R25.2 MUSCLE CRAMPS: ICD-10-CM

## 2023-10-10 LAB
EXPIRATION DATE: NORMAL
INTERNAL CONTROL: NORMAL
Lab: NORMAL
S PYO AG THROAT QL: NEGATIVE

## 2023-10-10 PROCEDURE — 87880 STREP A ASSAY W/OPTIC: CPT | Performed by: PHYSICIAN ASSISTANT

## 2023-10-10 PROCEDURE — 99213 OFFICE O/P EST LOW 20 MIN: CPT | Performed by: PHYSICIAN ASSISTANT

## 2023-10-10 RX ORDER — BACLOFEN 20 MG/1
TABLET ORAL
Qty: 60 TABLET | Refills: 0 | Status: SHIPPED | OUTPATIENT
Start: 2023-10-10

## 2023-10-10 RX ORDER — LIFITEGRAST 50 MG/ML
1 SOLUTION/ DROPS OPHTHALMIC 2 TIMES DAILY
COMMUNITY

## 2023-10-10 RX ORDER — FLUCONAZOLE 100 MG/1
100 TABLET ORAL DAILY
Qty: 7 TABLET | Refills: 0 | Status: SHIPPED | OUTPATIENT
Start: 2023-10-10 | End: 2023-10-17

## 2023-10-10 NOTE — PROGRESS NOTES
Subjective   Chief Complaint   Patient presents with    Sore Throat     Sore throat x 1.5 weeks       History of Present Illness     Pt is here today with ongoing sore throat. Her tongue is much better and so is the rest of her mouth, but she has continued to have a persistent sore throat. She states she feels achy and sick, she has some lymph nodes that are enlarged. Has pain with swallowing.      Patient Active Problem List   Diagnosis    Migraine without aura and without status migrainosus, not intractable    GERD (gastroesophageal reflux disease)    Irritable bowel syndrome with diarrhea    Depression with anxiety    Cervical spondylosis with radiculopathy    History of colon resection    Essential hypertension    Hypertriglyceridemia    Peripheral polyneuropathy    Snoring    Morning headache    Lumbar stenosis with neurogenic claudication    Lumbar radiculitis       No Known Allergies    Current Outpatient Medications on File Prior to Visit   Medication Sig Dispense Refill    albuterol sulfate  (90 Base) MCG/ACT inhaler INHALE TWO PUFFS INTO THE LUNGS EVERY FOUR HOURS AS NEEDED      ALPRAZolam (XANAX) 0.5 MG tablet Take 1 tablet by mouth 2 (Two) Times a Day As Needed for anxiety 60 tablet 2    amphetamine-dextroamphetamine (ADDERALL) 10 MG tablet Take 1 tablet by mouth Every Morning. 30 tablet 0    buPROPion XL (WELLBUTRIN XL) 150 MG 24 hr tablet Take 1 tablet by mouth Every Morning. 90 tablet 2    calcium carbonate (OS-MARY) 600 MG tablet Take 1 tablet by mouth 2 (Two) Times a Day.      cholecalciferol (VITAMIN D3) 25 MCG (1000 UT) tablet Take 1 tablet by mouth Daily.      Co-Enzyme Q10 200 MG capsule Take 1 capsule by mouth Every 12 (Twelve) Hours.      DULoxetine (CYMBALTA) 60 MG capsule Take 1 capsule by mouth Daily. 90 capsule 0    gabapentin (NEURONTIN) 400 MG capsule TAKE 1 CAPSULE BY MOUTH THREE TIMES A DAY 90 capsule 5    ipratropium-albuterol (DUO-NEB) 0.5-2.5 mg/3 ml nebulizer Take 3 mL by  nebulization Every 4 (Four) Hours As Needed. 360 mL 3    levOCARNitine (CARNITOR) 330 MG tablet Take 2 tablets by mouth Every 12 (Twelve) Hours.      Lifitegrast (Xiidra) 5 % ophthalmic solution Administer 1 drop to both eyes 2 (Two) Times a Day.      Loperamide HCl (IMODIUM A-D PO) Take  by mouth Daily.      losartan (COZAAR) 50 MG tablet TAKE 1 TABLET BY MOUTH EVERY DAY 90 tablet 3    Methylcellulose, Laxative, (CITRUCEL PO) Take  by mouth Daily.      Multiple Vitamin (MULTI-VITAMINS PO) Take 1 tablet by mouth Daily. HOLD FOR SURGERY      ondansetron (ZOFRAN) 4 MG tablet Take 1 tablet by mouth Every 8 (Eight) Hours As Needed for Nausea or Vomiting. 30 tablet 2    pantoprazole (PROTONIX) 40 MG EC tablet Take 1 tablet by mouth Every Morning Before Breakfast. 90 tablet 1    SUMAtriptan (IMITREX) 100 MG tablet TAKE 1 TABLET BY MOUTH AT ONSET OF HEADACHE, MAY REPEAT EVERY 2 HOURS AS NEEDED (MAX 200MG/DAY) 9 tablet 2    diclofenac (VOLTAREN) 0.1 % ophthalmic solution INSTILL 1 DROP IN RIGHT EYE 3 TIMES A DAY UNTIL FOLLOW UP (Patient not taking: Reported on 10/10/2023)      Flarex 0.1 % ophthalmic suspension PLACE 1 DROP IN EACH EYE TWICE A DAY (Patient not taking: Reported on 10/10/2023)      [] fluconazole (Diflucan) 100 MG tablet Take 1 tablet by mouth Daily for 7 days. 7 tablet 0    traZODone (DESYREL) 50 MG tablet Take 1.5 tablets by mouth Every Night for 90 days. 145 tablet 1    [DISCONTINUED] baclofen (LIORESAL) 20 MG tablet TAKE 1 TABLET BY MOUTH THREE TIMES A DAY 60 tablet 0     No current facility-administered medications on file prior to visit.       Past Medical History:   Diagnosis Date    Abnormal mammogram 2022    Anxiety     Cervical radiculopathy     Cervical spondylolysis     Chest pain 2022    ADMITTED TO [facility]    Chronic bilateral low back pain without sciatica     Colon polyps     FOLLOWED BY DR. ADRIA NYE    COVID-19 08/15/2020    SEEN AT     Delayed emergence from anesthesia      Depression 05/25/2016    Diverticulitis 01/16/2018    ADMITTED TO [facility]    Diverticulitis 11/24/2017    SEEN AT [facility] ER    Diverticulitis of colon 06/2020    Dysesthesia     Dyspepsia     Fecal incontinence 09/2022    GERD (gastroesophageal reflux disease)     Hair loss 12/2020    Hiatal hernia     History of measles, mumps, or rubella     MEASLES AND MUMPS AS A CHILD    Hypertension     IBS (irritable bowel syndrome)     ILD (interstitial lung disease)     Insomnia     Lumbosacral disc disease     Lung nodule 11/2020    Migraine without aura and without status migrainosus, not intractable 05/25/2016    NAFLD (nonalcoholic fatty liver disease)     Palpitations 04/09/2019    SEEN AT [facility] ER    Paresthesias     PNA (pneumonia) 08/23/2020    D/T COVID, ADMITTED TO [facility]    Polyneuropathy     Post-COVID chronic dyspnea     PUD (peptic ulcer disease)     Rotator cuff tendinitis, right 05/08/2018    Thrush 09/2020    Vestibular neuritis 09/2014       Family History   Problem Relation Age of Onset    Alzheimer's disease Mother         SDAT    Hypertension Mother     Diabetes type II Mother     Lung cancer Mother         POSSIBLE    Heart attack Mother     Cancer Mother         lung    COPD Mother     Depression Mother     Diabetes Mother     Heart disease Mother     Miscarriages / Stillbirths Mother     Alcohol abuse Father     Prostate cancer Father     Heart disease Father         THIRD DEGREE HEART BLOCK    Arthritis Father     Cancer Father         prostate    Drug abuse Father     Learning disabilities Father     Cancer Sister     Ovarian cancer Sister     Cancer Sister     Ovarian cancer Sister     Arthritis Sister     Cancer Sister         ovarian    Depression Sister     Hypertension Sister     Cancer Sister         ovarian    Depression Sister     Hypertension Sister     No Known Problems Brother     Arthritis Maternal Grandmother         rheumatoid    Breast cancer Other     Malig Hyperthermia Neg Hx        Social  History     Socioeconomic History    Marital status:      Spouse name: Lonnie    Number of children: 3   Tobacco Use    Smoking status: Former     Packs/day: 1.00     Years: 40.00     Additional pack years: 0.00     Total pack years: 40.00     Types: Cigarettes     Quit date: 3/10/2014     Years since quittin.5     Passive exposure: Past    Smokeless tobacco: Never   Vaping Use    Vaping Use: Former   Substance and Sexual Activity    Alcohol use: Yes     Comment: SELDOM    Drug use: No    Sexual activity: Defer     Partners: Male     Birth control/protection: Post-menopausal, Hysterectomy     Comment: .       Past Surgical History:   Procedure Laterality Date    ANTERIOR CERVICAL DISCECTOMY W/ FUSION N/A 2018    Procedure: C6 7 anterior cervical discectomy and fusion with allograft and plate;  Surgeon: Jacobo De Dios MD;  Location: [facility];  Service: Neurosurgery    CHOLECYSTECTOMY N/A     AT [place]    COLON RESECTION N/A 2018    Procedure: LAPAROSCOPIC SIGMOID COLON RESECTION WITH MOBILIZATION OF SPLENIC FLEXURE;  Surgeon: Eric Davidson MD;  Location: [facility];  Service:     COLONOSCOPY N/A 02/15/2017    6 MM TUBULAR ADENOMA POLYP IN HEPATIC FLEXURE, 5 MM TUBULAR ADENOAM POLYP IN RECTUM, DR. GERARDO NYE AT [facility]    COLONOSCOPY N/A 2003    Internal hemorrhoids-Dr. Gerardo Nye    COLONOSCOPY N/A 2022    4 MM TUBULAR ADENOMA POLYP IN ASCENDING, HEMORRHOIDS, DR. GERARDO NYE AT [facility]    DIAGNOSTIC LAPAROSCOPY N/A 2001    Cul-de-sac endometriosis and adhesions-Dr. Aamir Christine    ENDOSCOPY N/A 02/15/2017    SMALL HIATAL HERNIA, GASTRITIS, DR. GERARDO NYE AT [facility]    ENDOSCOPY N/A 2022    GASTRITIS, MILD ESOPHAGITIS, Z LINE IRREGULAR, DR. GERARDO NYE AT [facility]    ENDOSCOPY N/A 2014    GASTRITIS, DR. GERARDO NYE AT [facility]    ENDOSCOPY AND COLONOSCOPY N/A 2009    SMALL HIATAL HERNIA, ESOPHAGITIS, HEMORRHOIDS, DIVERTICULOSIS, TORTUOUS COLON,   "ADRIA NYE AT [facility]    LUMBAR EPIDURAL INJECTION N/A 9/20/2023    Procedure: LUMBAR EPIDURAL 1ST VISIT L4-5 87487;  Surgeon: Sarah Bernal MD;  [place] [facility];  Service: Pain Management;  Laterality: N/A;    SKIN BIOPSY Left 07/08/2022    LEFT CALF, PATH: SMALL FIBER NEUROPATHY    TOTAL ABDOMINAL HYSTERECTOMY WITH SALPINGO OOPHORECTOMY Bilateral 07/31/2001    Dr. Aamir Christine AT [facility]         The following portions of the patient's history were reviewed and updated as appropriate: problem list, allergies, current medications, past medical history, past family history, past social history, and past surgical history.    ROS    See HPI    Immunization History   Administered Date(s) Administered    COVID-19 (PFIZER) Purple Cap Monovalent 01/05/2021, 01/26/2021, 10/06/2021    Covid-19 (Pfizer) Gray Cap Monovalent 07/08/2022    DTaP 09/09/2013    FluMist 2-49yrs 10/05/2017    Fluzone High Dose =>65 Years (Vaxcare ONLY) 09/29/2020    Influenza, Unspecified 11/10/2018, 11/05/2019    Pneumococcal Conjugate 13-Valent (PCV13) 07/23/2020    Pneumococcal Polysaccharide (PPSV23) 08/31/2021    Shingrix 05/09/2018, 07/27/2021    Tdap 11/10/2021       Objective   Vitals:    10/10/23 1142   Temp: 98.4 øF (36.9 øC)   Weight: 84.8 kg (187 lb)   Height: 167.6 cm (65.98\")     Body mass index is 30.2 kg/mý.  Physical Exam  Vitals reviewed.   Constitutional:       Appearance: Normal appearance.   HENT:      Head: Normocephalic and atraumatic.      Mouth/Throat:      Pharynx: Posterior oropharyngeal erythema present.   Lymphadenopathy:      Cervical: Cervical adenopathy present.   Neurological:      Mental Status: She is alert.         Assessment & Plan   Diagnoses and all orders for this visit:    1. Sore throat (Primary)  -     POCT rapid strep A  -     fluconazole (Diflucan) 100 MG tablet; Take 1 tablet by mouth Daily for 7 days.  Dispense: 7 tablet; Refill: 0  -     Throat / Upper Respiratory Culture - Swab, Throat     Rapid " strep is negative, will send out throat cx. I am concerned about oral thrush still, will send in another week of Difulcan. She has Letitia's magic mouthwash, can swish and swallow as well.

## 2023-10-12 LAB
BACTERIA SPEC RESP CULT: NORMAL
BACTERIA SPEC RESP CULT: NORMAL

## 2023-10-16 DIAGNOSIS — J02.8 PHARYNGITIS DUE TO OTHER ORGANISM: Primary | ICD-10-CM

## 2023-10-27 DIAGNOSIS — R25.2 MUSCLE CRAMPS: ICD-10-CM

## 2023-10-29 RX ORDER — BACLOFEN 20 MG/1
TABLET ORAL
Qty: 60 TABLET | Refills: 0 | Status: SHIPPED | OUTPATIENT
Start: 2023-10-29

## 2023-10-31 ENCOUNTER — OFFICE VISIT (OUTPATIENT)
Dept: PAIN MEDICINE | Facility: CLINIC | Age: 68
End: 2023-10-31
Payer: MEDICARE

## 2023-10-31 ENCOUNTER — PREP FOR SURGERY (OUTPATIENT)
Dept: SURGERY | Facility: SURGERY CENTER | Age: 68
End: 2023-10-31
Payer: MEDICARE

## 2023-10-31 VITALS
HEIGHT: 66 IN | DIASTOLIC BLOOD PRESSURE: 94 MMHG | SYSTOLIC BLOOD PRESSURE: 138 MMHG | OXYGEN SATURATION: 98 % | BODY MASS INDEX: 30.18 KG/M2 | HEART RATE: 98 BPM | TEMPERATURE: 96.9 F | WEIGHT: 187.8 LBS

## 2023-10-31 DIAGNOSIS — M54.16 LUMBAR RADICULITIS: ICD-10-CM

## 2023-10-31 DIAGNOSIS — M48.062 LUMBAR STENOSIS WITH NEUROGENIC CLAUDICATION: Primary | ICD-10-CM

## 2023-10-31 DIAGNOSIS — M47.816 LUMBAR FACET ARTHROPATHY: ICD-10-CM

## 2023-10-31 PROCEDURE — 1159F MED LIST DOCD IN RCRD: CPT

## 2023-10-31 PROCEDURE — 1160F RVW MEDS BY RX/DR IN RCRD: CPT

## 2023-10-31 PROCEDURE — 3075F SYST BP GE 130 - 139MM HG: CPT

## 2023-10-31 PROCEDURE — 99214 OFFICE O/P EST MOD 30 MIN: CPT

## 2023-10-31 PROCEDURE — 1125F AMNT PAIN NOTED PAIN PRSNT: CPT

## 2023-10-31 PROCEDURE — 3080F DIAST BP >= 90 MM HG: CPT

## 2023-10-31 NOTE — PROGRESS NOTES
CHIEF COMPLAINT  Back pain    Subjective   Magdalena Dickey is a 68 y.o. female  who presents to the office for follow-up of procedure.  She completed a L4/L5 LESI on  9/20/23 performed by Dr. Bernal for management of back pain. Patient reports 50% relief from the procedure, but the pain has returned but not quite back to baseline yet.     Today pain is 2/10VAS in severity. Pain is located in her low back and radiates into bilateral buttocks and hips. Denies radicular symptoms but reports numbness/tingling to bilateral feet. Describes this pain as an intermittent aching and burning.  Pain is worsened by prolonged standing or sitting, bending/twisting, and walking long distances. Pain improves with rest/reposition and medications. She has completed PT in the past. No relief with use of a TENS unit.     Continues with Gabapentin 400mg TID, Baclofen 10mg BID PRN. The medications are managed by her PCP. She also utilizes OTC Tylenol Arthritis as needed.     Procedures:  9/20/23 - L4/L5 LESI - 50% relief x 1 month    Surgical History:  [facility] - Dr. De Dios    Back Pain  This is a chronic problem. The current episode started more than 1 year ago. The problem has been waxing and waning since onset. The pain is present in the lumbar spine and sacro-iliac. The quality of the pain is described as aching and burning. The pain does not radiate (discomfort to bilateral feet). The pain is at a severity of 2/10. The pain is mild. The symptoms are aggravated by position, standing, sitting and bending. Associated symptoms include numbness (bilateral foot). Pertinent negatives include no abdominal pain, dysuria, fever or weakness. She has tried muscle relaxant and bed rest (Tylenol, Gabapentin, TENS unit (no relief), PT) for the symptoms. The treatment provided mild relief.      PEG Assessment   What number best describes your pain on average in the past week?7  What number best describes how, during the past week, pain has interfered  "with your enjoyment of life?8  What number best describes how, during the past week, pain has interfered with your general activity?  8    Review of Pertinent Medical Data ---  Reviewed office note from Dr. Sarah Bernal from 8/29/23. Patient was scheduled for L4-L5 LESI. Dr. Bernal noted that patient may also benefit from a Lumber MBB with goal of RFA secondary to facet arthropathy. Patient was counseled on the use of Ibuprofen due to elevated creatinine level.     The following portions of the patient's history were reviewed and updated as appropriate: allergies, current medications, past family history, past medical history, past social history, past surgical history, and problem list.    Review of Systems   Constitutional:  Positive for fatigue. Negative for chills and fever.   Respiratory:  Negative for cough and shortness of breath.    Gastrointestinal:  Negative for abdominal pain, constipation and diarrhea.   Genitourinary:  Negative for difficulty urinating and dysuria.   Musculoskeletal:  Positive for back pain.   Neurological:  Positive for light-headedness (occasional) and numbness (bilateral foot). Negative for dizziness and weakness.   Psychiatric/Behavioral:  Positive for sleep disturbance.      I have reviewed and confirmed the accuracy of the ROS as documented by the MA/LPN/RN ORIANA Diamond     Vitals:    10/31/23 1130   BP: 138/94   BP Location: Left arm   Patient Position: Sitting   Pulse: 98   Temp: 96.9 °F (36.1 °C)   TempSrc: Temporal   SpO2: 98%   Weight: 85.2 kg (187 lb 12.8 oz)   Height: 167.6 cm (65.98\")   PainSc:   2   PainLoc: Back     Objective   Physical Exam  Constitutional:       Appearance: Normal appearance.   HENT:      Head: Normocephalic.   Cardiovascular:      Rate and Rhythm: Normal rate and regular rhythm.   Pulmonary:      Effort: Pulmonary effort is normal.      Breath sounds: Normal breath sounds.   Musculoskeletal:         General: Normal range of motion.      " Cervical back: Normal range of motion.      Lumbar back: Tenderness present. Decreased range of motion. Negative right straight leg raise test and negative left straight leg raise test.      Comments: + lumbar facet loading/tenderness   Skin:     General: Skin is warm and dry.      Capillary Refill: Capillary refill takes less than 2 seconds.   Neurological:      General: No focal deficit present.      Mental Status: She is alert and oriented to person, place, and time.   Psychiatric:         Mood and Affect: Mood normal.         Behavior: Behavior normal.         Thought Content: Thought content normal.         Cognition and Memory: Cognition normal.       Assessment & Plan   Diagnoses and all orders for this visit:    1. Lumbar stenosis with neurogenic claudication (Primary)    2. Lumbar facet arthropathy    3. Lumbar radiculitis    --- Bilateral L4 vs L5 TF LESI (please evaluate under fluro)  ---  Indications for epidural injection:  Plan is to proceed with epidural at the appropriate level.  If the patient receives significant pain reduction and improvement in function and the plan will be to repeat the epidural when the pain worsens.  If a second epidural provides at least 6 weeks of sustained improvement that includes both pain reduction and improvement in function then an epidural injection could be repeated once again at the same level.  This is a mutual decision between the clinician and the patient that includes discussions including risks and benefits in detail as well as alternative therapies.  Patient's questions were answered to their satisfaction and to their understanding.  ---  --- If no relief from TF LESI, may consider lumbar MBB with goal of RFA due to facet arthropathy and pain  --- Follow-up for procedure     Magdalena Dickey reports a pain score of 2.  Given her pain assessment as noted, treatment options were discussed and the following options were decided upon as a follow-up plan to address  the patient's pain: steroid injections.     MAXIME REPORT  As the clinician, I personally reviewed the MAXIME from 10/31/23 while the patient was in the office today.    Dictated utilizing Dragon dictation.

## 2023-11-03 ENCOUNTER — TRANSCRIBE ORDERS (OUTPATIENT)
Dept: SURGERY | Facility: SURGERY CENTER | Age: 68
End: 2023-11-03
Payer: MEDICARE

## 2023-11-03 DIAGNOSIS — Z41.9 SURGERY, ELECTIVE: Primary | ICD-10-CM

## 2023-11-12 DIAGNOSIS — R25.2 MUSCLE CRAMPS: ICD-10-CM

## 2023-11-13 RX ORDER — BACLOFEN 20 MG/1
TABLET ORAL
Qty: 60 TABLET | Refills: 0 | Status: SHIPPED | OUTPATIENT
Start: 2023-11-13

## 2023-11-14 ENCOUNTER — OFFICE VISIT (OUTPATIENT)
Dept: INTERNAL MEDICINE | Facility: CLINIC | Age: 68
End: 2023-11-14
Payer: MEDICARE

## 2023-11-14 VITALS — TEMPERATURE: 98.6 F | WEIGHT: 187 LBS | BODY MASS INDEX: 30.05 KG/M2 | OXYGEN SATURATION: 98 % | HEIGHT: 66 IN

## 2023-11-14 DIAGNOSIS — J02.9 SORE THROAT: Primary | ICD-10-CM

## 2023-11-14 LAB
EXPIRATION DATE: NORMAL
EXPIRATION DATE: NORMAL
FLUAV AG UPPER RESP QL IA.RAPID: NOT DETECTED
FLUBV AG UPPER RESP QL IA.RAPID: NOT DETECTED
INTERNAL CONTROL: NORMAL
INTERNAL CONTROL: NORMAL
Lab: NORMAL
Lab: NORMAL
S PYO AG THROAT QL: NEGATIVE
SARS-COV-2 AG UPPER RESP QL IA.RAPID: NOT DETECTED

## 2023-11-14 PROCEDURE — 87428 SARSCOV & INF VIR A&B AG IA: CPT | Performed by: INTERNAL MEDICINE

## 2023-11-14 PROCEDURE — 1160F RVW MEDS BY RX/DR IN RCRD: CPT | Performed by: INTERNAL MEDICINE

## 2023-11-14 PROCEDURE — 1159F MED LIST DOCD IN RCRD: CPT | Performed by: INTERNAL MEDICINE

## 2023-11-14 PROCEDURE — 87880 STREP A ASSAY W/OPTIC: CPT | Performed by: INTERNAL MEDICINE

## 2023-11-14 PROCEDURE — 99213 OFFICE O/P EST LOW 20 MIN: CPT | Performed by: INTERNAL MEDICINE

## 2023-11-14 NOTE — PROGRESS NOTES
Subjective        Chief Complaint   Patient presents with    Sore Throat     X1 mo            Magdalena Dickey is a 68 y.o. female who presents for    Patient Active Problem List   Diagnosis    Migraine without aura and without status migrainosus, not intractable    GERD (gastroesophageal reflux disease)    Irritable bowel syndrome with diarrhea    Depression with anxiety    Cervical spondylosis with radiculopathy    History of colon resection    Essential hypertension    Hypertriglyceridemia    Peripheral polyneuropathy    Snoring    Morning headache    Lumbar stenosis with neurogenic claudication    Lumbar radiculitis       History of Present Illness     She has had a sore throat for a month. She had a negative rapid strep and strep culture one month ago. She was treated for thrush with diflucan. She was referred to Dr. Kiser and he had to cancel her appt for tomorrow. Her temp has been 98 at home. She denies cough. She has reflux once per month. She has not been on any oral abx or inhaled steroids. APAP helps her throat pain.     No Known Allergies    Current Outpatient Medications on File Prior to Visit   Medication Sig Dispense Refill    albuterol sulfate  (90 Base) MCG/ACT inhaler INHALE TWO PUFFS INTO THE LUNGS EVERY FOUR HOURS AS NEEDED      ALPRAZolam (XANAX) 0.5 MG tablet Take 1 tablet by mouth 2 (Two) Times a Day As Needed for anxiety 60 tablet 2    amphetamine-dextroamphetamine (ADDERALL) 10 MG tablet Take 1 tablet by mouth Every Morning. 30 tablet 0    baclofen (LIORESAL) 20 MG tablet TAKE 1 TABLET BY MOUTH THREE TIMES A DAY 60 tablet 0    buPROPion XL (WELLBUTRIN XL) 150 MG 24 hr tablet Take 1 tablet by mouth Every Morning. 90 tablet 2    calcium carbonate (OS-MARY) 600 MG tablet Take 1 tablet by mouth 2 (Two) Times a Day.      cholecalciferol (VITAMIN D3) 25 MCG (1000 UT) tablet Take 1 tablet by mouth Daily.      Co-Enzyme Q10 200 MG capsule Take 1 capsule by mouth Every 12 (Twelve) Hours.       DULoxetine (CYMBALTA) 60 MG capsule Take 1 capsule by mouth Daily. 90 capsule 0    gabapentin (NEURONTIN) 400 MG capsule TAKE 1 CAPSULE BY MOUTH THREE TIMES A DAY 90 capsule 5    ipratropium-albuterol (DUO-NEB) 0.5-2.5 mg/3 ml nebulizer Take 3 mL by nebulization Every 4 (Four) Hours As Needed. 360 mL 3    levOCARNitine (CARNITOR) 330 MG tablet Take 2 tablets by mouth Every 12 (Twelve) Hours.      Lifitegrast (Xiidra) 5 % ophthalmic solution Administer 1 drop to both eyes 2 (Two) Times a Day.      Loperamide HCl (IMODIUM A-D PO) Take  by mouth Daily.      losartan (COZAAR) 50 MG tablet TAKE 1 TABLET BY MOUTH EVERY DAY 90 tablet 3    MAGNESIUM PO Take  by mouth.      Methylcellulose, Laxative, (CITRUCEL PO) Take  by mouth Daily.      Multiple Vitamin (MULTI-VITAMINS PO) Take 1 tablet by mouth Daily. HOLD FOR SURGERY      ondansetron (ZOFRAN) 4 MG tablet Take 1 tablet by mouth Every 8 (Eight) Hours As Needed for Nausea or Vomiting. 30 tablet 2    pantoprazole (PROTONIX) 40 MG EC tablet Take 1 tablet by mouth Every Morning Before Breakfast. 90 tablet 1    SUMAtriptan (IMITREX) 100 MG tablet TAKE 1 TABLET BY MOUTH AT ONSET OF HEADACHE, MAY REPEAT EVERY 2 HOURS AS NEEDED (MAX 200MG/DAY) 9 tablet 2    traZODone (DESYREL) 50 MG tablet Take 1.5 tablets by mouth Every Night for 90 days. 145 tablet 1    [DISCONTINUED] diclofenac (VOLTAREN) 0.1 % ophthalmic solution INSTILL 1 DROP IN RIGHT EYE 3 TIMES A DAY UNTIL FOLLOW UP (Patient not taking: Reported on 10/10/2023)      [DISCONTINUED] Flarex 0.1 % ophthalmic suspension PLACE 1 DROP IN EACH EYE TWICE A DAY (Patient not taking: Reported on 10/10/2023)       No current facility-administered medications on file prior to visit.       Past Medical History:   Diagnosis Date    Abnormal mammogram 03/2022    Anxiety     Cervical radiculopathy     Cervical spondylolysis     Chest pain 08/19/2022    ADMITTED TO [facility]    Chronic bilateral low back pain without sciatica     Colon polyps      FOLLOWED BY DR. GERARDO NYE    COVID-19 08/15/2020    SEEN AT     Delayed emergence from anesthesia 2018    Depression 05/25/2016    Diverticulitis 01/16/2018    ADMITTED TO [facility]    Diverticulitis 11/24/2017    SEEN AT [facility] ER    Diverticulitis of colon 06/2020    Dysesthesia     Dyspepsia     Fecal incontinence 09/2022    GERD (gastroesophageal reflux disease)     Hair loss 12/2020    Hiatal hernia     History of measles, mumps, or rubella     MEASLES AND MUMPS AS A CHILD    Hypertension     IBS (irritable bowel syndrome)     ILD (interstitial lung disease)     Insomnia     Lumbosacral disc disease     Lung nodule 11/2020    Migraine without aura and without status migrainosus, not intractable 05/25/2016    NAFLD (nonalcoholic fatty liver disease)     Palpitations 04/09/2019    SEEN AT [facility] ER    Paresthesias     PNA (pneumonia) 08/23/2020    D/T COVID, ADMITTED TO [facility]    Polyneuropathy     Post-COVID chronic dyspnea     PUD (peptic ulcer disease)     Rotator cuff tendinitis, right 05/08/2018    Thrush 09/2020    Vestibular neuritis 09/2014       Past Surgical History:   Procedure Laterality Date    ANTERIOR CERVICAL DISCECTOMY W/ FUSION N/A 06/21/2018    Procedure: C6 7 anterior cervical discectomy and fusion with allograft and plate;  Surgeon: Jacobo De Dios MD;  Location: [facility] OR;  Service: Neurosurgery    CHOLECYSTECTOMY N/A 1980    AT [place]    COLON RESECTION N/A 01/16/2018    Procedure: LAPAROSCOPIC SIGMOID COLON RESECTION WITH MOBILIZATION OF SPLENIC FLEXURE;  Surgeon: Eric Davidson MD;  Location: [facility] MAIN OR;  Service:     COLONOSCOPY N/A 02/15/2017    6 MM TUBULAR ADENOMA POLYP IN HEPATIC FLEXURE, 5 MM TUBULAR ADENOAM POLYP IN RECTUM, DR. GERARDO NYE AT [facility]    COLONOSCOPY N/A 03/01/2003    Internal hemorrhoids-Dr. Gerardo Nye    COLONOSCOPY N/A 02/08/2022    4 MM TUBULAR ADENOMA POLYP IN ASCENDING, HEMORRHOIDS, DR. GERARDO NYE AT [facility]    DIAGNOSTIC LAPAROSCOPY N/A 06/06/2001    Cul-de-sac  endometriosis and adhesions-Dr. Aamir Christine    ENDOSCOPY N/A 02/15/2017    SMALL HIATAL HERNIA, GASTRITIS, DR. ADRIA NYE AT [facility]    ENDOSCOPY N/A 02/08/2022    GASTRITIS, MILD ESOPHAGITIS, Z LINE IRREGULAR, DR. ADRIA NYE AT [facility]    ENDOSCOPY N/A 09/12/2014    GASTRITIS, DR. ADRIA NYE AT [facility]    ENDOSCOPY AND COLONOSCOPY N/A 02/04/2009    SMALL HIATAL HERNIA, ESOPHAGITIS, HEMORRHOIDS, DIVERTICULOSIS, TORTUOUS COLON, DR. ADRIA NYE AT [facility]    LUMBAR EPIDURAL INJECTION N/A 9/20/2023    Procedure: LUMBAR EPIDURAL 1ST VISIT L4-5 96470;  Surgeon: Sarah Bernal MD;  Location: [facility] MAIN OR;  Service: Pain Management;  Laterality: N/A;    SKIN BIOPSY Left 07/08/2022    LEFT CALF, PATH: SMALL FIBER NEUROPATHY    TOTAL ABDOMINAL HYSTERECTOMY WITH SALPINGO OOPHORECTOMY Bilateral 07/31/2001    Dr. Aamir Christine AT [facility]       Family History   Problem Relation Age of Onset    Alzheimer's disease Mother         SDAT    Hypertension Mother     Diabetes type II Mother     Lung cancer Mother         POSSIBLE    Heart attack Mother     Cancer Mother         lung    COPD Mother     Depression Mother     Diabetes Mother     Heart disease Mother     Miscarriages / Stillbirths Mother     Alcohol abuse Father     Prostate cancer Father     Heart disease Father         THIRD DEGREE HEART BLOCK    Arthritis Father     Cancer Father         prostate    Drug abuse Father     Learning disabilities Father     Cancer Sister     Ovarian cancer Sister     Cancer Sister     Ovarian cancer Sister     Arthritis Sister     Cancer Sister         ovarian    Depression Sister     Hypertension Sister     Cancer Sister         ovarian    Depression Sister     Hypertension Sister     No Known Problems Brother     Arthritis Maternal Grandmother         rheumatoid    Breast cancer Other     Malig Hyperthermia Neg Hx        Social History     Socioeconomic History    Marital status:      Spouse name: Lonnie    Number of children: 3   Tobacco Use     "Smoking status: Former     Packs/day: 1.00     Years: 40.00     Additional pack years: 0.00     Total pack years: 40.00     Types: Cigarettes     Quit date: 3/10/2014     Years since quittin.6     Passive exposure: Past    Smokeless tobacco: Never   Vaping Use    Vaping Use: Former   Substance and Sexual Activity    Alcohol use: Yes     Comment: SELDOM    Drug use: No    Sexual activity: Defer     Partners: Male     Birth control/protection: Post-menopausal, Hysterectomy     Comment: .           The following portions of the patient's history were reviewed and updated as appropriate: problem list, allergies, current medications, past medical history, past family history, past social history, and past surgical history.    Review of Systems    Immunization History   Administered Date(s) Administered    COVID-19 (PFIZER) Purple Cap Monovalent 2021, 2021, 10/06/2021    COVID-19 F23 (MODERNA) 12YRS+ (SPIKEVAX) 11/10/2023    Covid-19 (Pfizer) Gray Cap Monovalent 2022    DTaP 2013    FluMist 2-49yrs 10/05/2017    Fluzone High Dose =>65 Years (Vaxcare ONLY) 2020    Influenza, Unspecified 11/10/2018, 2019, 10/04/2021, 11/10/2023    Pneumococcal Conjugate 13-Valent (PCV13) 2020    Pneumococcal Polysaccharide (PPSV23) 2021    Shingrix 2018, 2021    Tdap 11/10/2021       Objective   Vitals:    23 1633   Temp: 98.6 °F (37 °C)   SpO2: 98%   Weight: 84.8 kg (187 lb)   Height: 167.6 cm (65.98\")     Body mass index is 30.2 kg/m².  Physical Exam  HENT:      Mouth/Throat:      Mouth: Mucous membranes are moist.      Pharynx: Oropharynx is clear.   Neck:      Thyroid: No thyromegaly.   Lymphadenopathy:      Cervical: No cervical adenopathy.   Neurological:      Mental Status: She is alert and oriented to person, place, and time.   Psychiatric:         Mood and Affect: Mood normal.         Procedures    Assessment & Plan   Diagnoses and all orders for this " visit:    1. Sore throat (Primary)  -     Ambulatory Referral to ENT (Otolaryngology)  -     POCT SARS-CoV-2 Antigen ROBERT + Flu  -     POCT rapid strep A        Negative flu, COVID and rapid strep.           No follow-ups on file.

## 2023-11-17 ENCOUNTER — TRANSCRIBE ORDERS (OUTPATIENT)
Dept: ADMINISTRATIVE | Facility: HOSPITAL | Age: 68
End: 2023-11-17
Payer: MEDICARE

## 2023-11-17 DIAGNOSIS — Z87.891 PERSONAL HISTORY OF TOBACCO USE, PRESENTING HAZARDS TO HEALTH: ICD-10-CM

## 2023-11-17 DIAGNOSIS — Z12.2 SCREENING FOR LUNG CANCER: ICD-10-CM

## 2023-12-02 DIAGNOSIS — R25.2 MUSCLE CRAMPS: ICD-10-CM

## 2023-12-04 RX ORDER — BACLOFEN 20 MG/1
TABLET ORAL
Qty: 60 TABLET | Refills: 0 | Status: SHIPPED | OUTPATIENT
Start: 2023-12-04

## 2023-12-05 NOTE — H&P
[facility]   HISTORY AND PHYSICAL    Patient Name: Magdalena Dickey  : 1955  MRN: [id number]  Primary Care Physician:  Sun Guerrier MD  Date of admission: 2023    Subjective   Subjective     Chief Complaint: Low back pain    History of Present Illness  Chronic low back pain that radiates into bilateral buttocks and hips.      Review of Systems   Constitutional:  Negative for chills and fever.   Respiratory:  Negative for cough and shortness of breath.    Musculoskeletal:  Positive for back pain.        Personal History     Past Medical History:   Diagnosis Date    Abnormal mammogram 2022    Anxiety     Cervical radiculopathy     Cervical spondylolysis     Chest pain 2022    ADMITTED TO [facility]    Chronic bilateral low back pain without sciatica     Colon polyps     FOLLOWED BY DR. ADRIA NYE    COVID-19 08/15/2020    SEEN AT     Delayed emergence from anesthesia     Depression 2016    Diverticulitis 2018    ADMITTED TO [facility]    Diverticulitis 2017    SEEN AT [facility] ER    Diverticulitis of colon 2020    Dysesthesia     Dyspepsia     Fecal incontinence 2022    GERD (gastroesophageal reflux disease)     Hair loss 2020    Hiatal hernia     History of measles, mumps, or rubella     MEASLES AND MUMPS AS A CHILD    Hypertension     IBS (irritable bowel syndrome)     ILD (interstitial lung disease)     Insomnia     Lumbosacral disc disease     Lung nodule 2020    Migraine without aura and without status migrainosus, not intractable 2016    NAFLD (nonalcoholic fatty liver disease)     Palpitations 2019    SEEN AT [facility] ER    Paresthesias     PNA (pneumonia) 2020    D/T COVID, ADMITTED TO [facility]    Polyneuropathy     Post-COVID chronic dyspnea     PUD (peptic ulcer disease)     Rotator cuff tendinitis, right 2018    Thrush 2020    Vestibular neuritis 2014       Past Surgical History:   Procedure Laterality Date    ANTERIOR CERVICAL DISCECTOMY W/  FUSION N/A 06/21/2018    Procedure: C6 7 anterior cervical discectomy and fusion with allograft and plate;  Surgeon: Jacobo De Dios MD;  Location: [facility] MAIN OR;  Service: Neurosurgery    CHOLECYSTECTOMY N/A 1980    AT [place]    COLON RESECTION N/A 01/16/2018    Procedure: LAPAROSCOPIC SIGMOID COLON RESECTION WITH MOBILIZATION OF SPLENIC FLEXURE;  Surgeon: Eric Davidson MD;  Location: [facility] MAIN OR;  Service:     COLONOSCOPY N/A 02/15/2017    6 MM TUBULAR ADENOMA POLYP IN HEPATIC FLEXURE, 5 MM TUBULAR ADENOAM POLYP IN RECTUM, DR. GERARDO NYE AT [facility]    COLONOSCOPY N/A 03/01/2003    Internal hemorrhoids-Dr. Gerardo Nye    COLONOSCOPY N/A 02/08/2022    4 MM TUBULAR ADENOMA POLYP IN ASCENDING, HEMORRHOIDS, DR. GERARDO NYE AT [facility]    DIAGNOSTIC LAPAROSCOPY N/A 06/06/2001    Cul-de-sac endometriosis and adhesions-Dr. Aamir Christine    ENDOSCOPY N/A 02/15/2017    SMALL HIATAL HERNIA, GASTRITIS, DR. GERARDO NYE AT [facility]    ENDOSCOPY N/A 02/08/2022    GASTRITIS, MILD ESOPHAGITIS, Z LINE IRREGULAR, DR. GERARDO NYE AT [facility]    ENDOSCOPY N/A 09/12/2014    GASTRITIS, DR. GERARDO NYE AT [facility]    ENDOSCOPY AND COLONOSCOPY N/A 02/04/2009    SMALL HIATAL HERNIA, ESOPHAGITIS, HEMORRHOIDS, DIVERTICULOSIS, TORTUOUS COLON, DR. GERARDO NYE AT [facility]    LUMBAR EPIDURAL INJECTION N/A 9/20/2023    Procedure: LUMBAR EPIDURAL 1ST VISIT L4-5 41970;  Surgeon: Sarah Bernal MD;  Location: [facility] MAIN OR;  Service: Pain Management;  Laterality: N/A;    SKIN BIOPSY Left 07/08/2022    LEFT CALF, PATH: SMALL FIBER NEUROPATHY    TOTAL ABDOMINAL HYSTERECTOMY WITH SALPINGO OOPHORECTOMY Bilateral 07/31/2001    Dr. Aamir Christine AT [facility]       Family History: family history includes Alcohol abuse in her father; Alzheimer's disease in her mother; Arthritis in her father, maternal grandmother, and sister; Breast cancer in an other family member; COPD in her mother; Cancer in her father, mother, sister, sister, sister, and sister; Depression in her mother,  sister, and sister; Diabetes in her mother; Diabetes type II in her mother; Drug abuse in her father; Heart attack in her mother; Heart disease in her father and mother; Hypertension in her mother, sister, and sister; Learning disabilities in her father; Lung cancer in her mother; Miscarriages / Stillbirths in her mother; No Known Problems in her brother; Ovarian cancer in her sister and sister; Prostate cancer in her father. Otherwise pertinent FHx was reviewed and not pertinent to current issue.    Social History:  reports that she quit smoking about 9 years ago. Her smoking use included cigarettes. She has a 40.00 pack-year smoking history. She has been exposed to tobacco smoke. She has never used smokeless tobacco. She reports current alcohol use. She reports that she does not use drugs.    Home Medications:  ALPRAZolam, Co-Enzyme Q10, DULoxetine, Lifitegrast, Loperamide HCl, Magnesium, Methylcellulose (Laxative), SUMAtriptan, albuterol sulfate HFA, amphetamine-dextroamphetamine, baclofen, buPROPion XL, calcium carbonate, cholecalciferol, gabapentin, ipratropium-albuterol, levOCARNitine, losartan, multivitamin, ondansetron, pantoprazole, and traZODone    Allergies:  No Known Allergies    Objective    Objective     Vitals:        Physical Exam  Constitutional:       General: She is not in acute distress.  Pulmonary:      Effort: Pulmonary effort is normal. No respiratory distress.   Skin:     General: Skin is warm and dry.   Neurological:      Mental Status: She is alert.   Psychiatric:         Mood and Affect: Mood normal.         Thought Content: Thought content normal.         Result Review    Result Review:  I have personally reviewed the results from the time of this admission to 12/6/2023 10:12 EST and agree with these findings:  []  Laboratory list / accordion  []  Microbiology  []  Radiology  []  EKG/Telemetry   []  Cardiology/Vascular   []  Pathology  []  Old records  []  Other:  Most notable findings  include: No new      Assessment & Plan   Assessment / Plan     Brief Patient Summary:  Magdalena Dickey is a 68 y.o. female who has chronic low back pain that radiates into bilateral buttocks and hips    Active Hospital Problems:  Active Hospital Problems    Diagnosis     Lumbar stenosis with neurogenic claudication     Lumbar radiculitis      Plan: Lumbar epidural steroid injection      DVT prophylaxis:  No DVT prophylaxis order currently exists.      Sarah Bernal MD

## 2023-12-06 ENCOUNTER — HOSPITAL ENCOUNTER (OUTPATIENT)
Dept: GENERAL RADIOLOGY | Facility: SURGERY CENTER | Age: 68
Setting detail: HOSPITAL OUTPATIENT SURGERY
End: 2023-12-06
Payer: MEDICARE

## 2023-12-06 ENCOUNTER — HOSPITAL ENCOUNTER (OUTPATIENT)
Facility: SURGERY CENTER | Age: 68
Setting detail: HOSPITAL OUTPATIENT SURGERY
Discharge: HOME | End: 2023-12-06
Attending: ANESTHESIOLOGY | Admitting: ANESTHESIOLOGY
Payer: MEDICARE

## 2023-12-06 VITALS
WEIGHT: 187 LBS | TEMPERATURE: 97.2 F | HEART RATE: 69 BPM | RESPIRATION RATE: 18 BRPM | OXYGEN SATURATION: 96 % | DIASTOLIC BLOOD PRESSURE: 87 MMHG | BODY MASS INDEX: 30.05 KG/M2 | HEIGHT: 66 IN | SYSTOLIC BLOOD PRESSURE: 144 MMHG

## 2023-12-06 DIAGNOSIS — M48.062 LUMBAR STENOSIS WITH NEUROGENIC CLAUDICATION: ICD-10-CM

## 2023-12-06 DIAGNOSIS — Z41.9 SURGERY, ELECTIVE: ICD-10-CM

## 2023-12-06 DIAGNOSIS — M54.16 LUMBAR RADICULITIS: ICD-10-CM

## 2023-12-06 PROCEDURE — 77002 NEEDLE LOCALIZATION BY XRAY: CPT

## 2023-12-06 PROCEDURE — 25510000001 IOPAMIDOL 61 % SOLUTION 30 ML VIAL: Performed by: ANESTHESIOLOGY

## 2023-12-06 PROCEDURE — 25010000002 DEXAMETHASONE SODIUM PHOSPHATE 100 MG/10ML SOLUTION 10 ML VIAL: Performed by: ANESTHESIOLOGY

## 2023-12-06 PROCEDURE — 76000 FLUOROSCOPY <1 HR PHYS/QHP: CPT

## 2023-12-06 PROCEDURE — 64483 NJX AA&/STRD TFRM EPI L/S 1: CPT | Performed by: ANESTHESIOLOGY

## 2023-12-06 PROCEDURE — 25010000002 BUPIVACAINE (PF) 0.25 % SOLUTION 10 ML VIAL: Performed by: ANESTHESIOLOGY

## 2023-12-06 NOTE — OP NOTE
Lumbar Transforaminal Epidural Steroid Injection Bilateral L4-5 Levels  [facility]    PREOPERATIVE DIAGNOSIS:  Lumbar radicular pain, Lumbar Spinal Stenosis WITH Neurogenic Claudication and Lumbar radiculitis    POSTOPERATIVE DIAGNOSIS:  Same as preop diagnosis    PROCEDURE:    1. CPT 97696 --  Diagnostic & Therapeutic Transforaminal Epidural Steroid Injection at the L4 level, on the bilateral     PRE-PROCEDURE DISCUSSION WITH PATIENT:    Risks and complications were discussed with the patient prior to starting the procedure and informed consent was obtained.  We discussed various topics including but not limited to bleeding, infection, injury, nerve injury, paralysis, coma, death, postprocedural painful flare-up, postprocedural site soreness, and a lack of pain relief.  We discussed the diagnostic aspect of transforaminal epidural / selective nerve root blockade.    SURGEON:  Sarah Bernal MD    SEDATION:  No sedation was used for this procedure  ANESTHETIC:  0.25% bupivacaine  STEROID:  10mg dexamethasone    DESCRIPTON OF PROCEDURE:  After obtaining informed consent, an I.V. was not started in the preoperative area. The patient taken to the operating room and was placed in the prone position with a pillow under the abdomen.  All pressure points were well padded.  EKG, blood pressure, and pulse oximeter were monitored.  The lumbar area was prepped with Chloraprep and draped in a sterile fashion.     The AP fluoroscopic image was used to visualize the L4 vertebral body.  The superior endplate was squared off radiographically.  The image was then obliqued towards the patient's right side to maximize visualization of the pedicle. The skin and subcutaneous tissue at the 6 o'clock position of the pedicle was anesthetized with 1% lidocaine. A 22-gauge spinal needle was then advanced percutaneously through the anesthetized skin tract under fluoroscopic guidance in AP and lateral views until the  needle tip lie in the farhat-lateral aspect of the epidural space.  After negative aspiration of the needle for blood or CSF, a volume of 1 mL of Isovue was injected producing good epidural spread with no evidence of loculation, vascular run-off, or intrathecal spread. Subsequently, a volume of 3 mL of injectate was administered without resistance.  The needle was removed intact.  Vital signs were stable throughout.      The same procedure was then performed on the contralateral side in the exact same fashion.      The total volume injected consisted of 10 mg of dexamethasone with 1 mL of 0.25% bupivacaine and preservative-free normal saline.       ESTIMATED BLOOD LOSS:  <5 mL  SPECIMENS:  none    COMPLICATIONS:   No complications were noted., There was no indication of vascular uptake on live injection of contrast dye., and There was no indication of intrathecal uptake on live injection of contrast dye.    TOLERANCE & DISCHARGE CONDITION:    The patient tolerated the procedure well.  The patient was transported to the recovery area without difficulties.  The patient was discharged to home under the care of family in stable and satisfactory condition.    PLAN OF CARE:  The patient was given our standard instruction sheet.  The patient will Return to clinic 4-6 wks.  The patient will resume all medications as per the medication reconciliation sheet.

## 2023-12-08 ENCOUNTER — TELEPHONE (OUTPATIENT)
Dept: INTERNAL MEDICINE | Facility: CLINIC | Age: 68
End: 2023-12-08

## 2023-12-08 NOTE — TELEPHONE ENCOUNTER
Caller: OTHER    Relationship: Other    Best call back number: [phone]     What is the best time to reach you: 8:30 AM TO 4 30 PM    Who are you requesting to speak with (clinical staff, provider,  specific staff member): CLINICAL STAFF     Do you know the name of the person who called: BONI     What was the call regarding: BONI FROM  OFFICE NEEDS MEDICAL CLEARANCE TO HAVE SURGERY 12/21/23 FOR VOCAL CHORD INJECTION. PLEASE CALL.     Is it okay if the provider responds through MyChart: N/A

## 2023-12-13 ENCOUNTER — OFFICE VISIT (OUTPATIENT)
Dept: INTERNAL MEDICINE | Facility: CLINIC | Age: 68
End: 2023-12-13
Payer: MEDICARE

## 2023-12-13 VITALS
HEART RATE: 76 BPM | WEIGHT: 185 LBS | SYSTOLIC BLOOD PRESSURE: 140 MMHG | DIASTOLIC BLOOD PRESSURE: 84 MMHG | BODY MASS INDEX: 29.73 KG/M2 | HEIGHT: 66 IN

## 2023-12-13 DIAGNOSIS — J84.9 ILD (INTERSTITIAL LUNG DISEASE): ICD-10-CM

## 2023-12-13 DIAGNOSIS — Z01.818 PREOPERATIVE CLEARANCE: ICD-10-CM

## 2023-12-13 DIAGNOSIS — I10 ESSENTIAL HYPERTENSION: Primary | ICD-10-CM

## 2023-12-13 RX ORDER — SUCRALFATE 1 G/1
TABLET ORAL
COMMUNITY
Start: 2023-11-15

## 2023-12-13 RX ORDER — OMEPRAZOLE 40 MG/1
CAPSULE, DELAYED RELEASE ORAL
COMMUNITY
Start: 2023-12-12

## 2023-12-13 NOTE — PROGRESS NOTES
"Chief Complaint  Pre-op Exam    Subjective        Magdalena Dickey presents to [facility]  History of Present Illness to have surgery under general anesthesia to have vocal cord injection.   She has known Covid related ILD- Dr. Johansen recently put her on nebulizers- can't seem to tell if helps- hopes to slow progression. Breathing stable if not a bit worse, daphne with climbing stairs.     Objective   Vital Signs:  /84   Pulse 76   Ht 167.6 cm (66\")   Wt 83.9 kg (185 lb)   BMI 29.86 kg/m²   Estimated body mass index is 29.86 kg/m² as calculated from the following:    Height as of this encounter: 167.6 cm (66\").    Weight as of this encounter: 83.9 kg (185 lb).               Physical Exam  Constitutional:       General: She is not in acute distress.     Appearance: Normal appearance.   Cardiovascular:      Rate and Rhythm: Normal rate and regular rhythm.   Pulmonary:      Effort: Pulmonary effort is normal.      Breath sounds: Normal breath sounds. No wheezing.   Musculoskeletal:      Right lower leg: No edema.      Left lower leg: No edema.   Lymphadenopathy:      Cervical: No cervical adenopathy.        Result Review :  The following data was reviewed by: Sun Guerrier MD on 12/13/2023:    Data reviewed : Consultant notes Dr. Johansen        ECG 12 Lead    Date/Time: 12/13/2023 3:19 PM  Performed by: Sun Guerrier MD    Authorized by: Sun Guerrier MD  Comparison: compared with previous ECG from 8/20/2022  Similar to previous ECG    Clinical impression: normal ECG            Assessment and Plan   Diagnoses and all orders for this visit:    1. Essential hypertension (Primary)  Comments:  better here- would like her to check at home- goal < 140/80 consistently.    2. ILD (interstitial lung disease)  Comments:  stable- encouraged her to keep moving, pushing, walking, etc.    3. Preoperative clearance  Comments:  taking into account her medical issues, she remains low risk for " CP complications related to her upcoming surgery. She can proceed without any changes.    Other orders  -     ECG 12 Lead             Follow Up   No follow-ups on file.  Patient was given instructions and counseling regarding her condition or for health maintenance advice. Please see specific information pulled into the AVS if appropriate.

## 2023-12-20 ENCOUNTER — TELEPHONE (OUTPATIENT)
Dept: INTERNAL MEDICINE | Facility: CLINIC | Age: 68
End: 2023-12-20
Payer: MEDICARE

## 2023-12-20 NOTE — TELEPHONE ENCOUNTER
Kenisha from Dr. Sin office called, last office not, EKG, surgery clearance faxed to [phone]. Patient is scheduled for surgery tomorrow.

## 2023-12-22 NOTE — PROGRESS NOTES
[facility]               ASSOCIATES     LOS: 10 days     Name: Magdalena Dickey  Age: 65 y.o.  Sex: female  :  1955  MRN: [id number]         Primary Care Physician: Sun Guerrier MD    Diet Soft Texture; [employer]; Cardiac; Safe T[employer]    Subjective    Patient seen at bedside, no new complaints.    Objective   Temp:  [96.4 °F (35.8 °C)-98.4 °F (36.9 °C)] 96.4 °F (35.8 °C)  Heart Rate:  [73-87] 82  Resp:  [18-20] 20  BP: (118-122)/(69-73) 122/69  SpO2:  [91 %-94 %] 94 %  on  Flow (L/min):  [3-6] 3;   Device (Oxygen Therapy): nasal cannula  Body mass index is 31.01 kg/m².    Physical Exam    General: patient is awake and alert.  Head and ENT: normocephalic and atraumatic.  Lungs: symmetric expansion and equal air entry bilaterally.  Heart: regular rate, rhythm, no murmurs.  Abdomen: soft, nontender, bowel sounds present.  Extremities:  No clubbing, cyanosis or edema.  Neurologic:  No focal neurological deficits present.  Cranial nerves intact.  Psychiatry:  Normal mood and affect.  Skin:  Warm and no rash.    Reviewed medications and new clinical results        Results from last 7 days   Lab Units 20  0620  0620  0835 20  0620  0429   WBC 10*3/mm3 9.63 9.85 10.25 11.23* 12.09* 10.51 10.87*   HEMOGLOBIN g/dL 12.5 12.5 13.0 13.0 13.1 13.2 12.9   PLATELETS 10*3/mm3 409 446 462* 502* 466* 442 349     Results from last 7 days   Lab Units 20  0620  0603 20  0835 20  0624 20  0429   SODIUM mmol/L 141 138 138 136 139 141 140   POTASSIUM mmol/L 3.6 3.5 3.8 3.8 3.8 3.6 4.1   CHLORIDE mmol/L 109* 109* 107 107 108* 111* 112*   CO2 mmol/L 22.4 22.0 23.3 19.3* 20.9* 21.4* 17.8*   BUN mg/dL 24* 25* 24* 25* 25* 20 21   CREATININE mg/dL 0.85 0.74 0.72 0.74 0.67 0.67 0.70   CALCIUM mg/dL 8.6 8.3* 8.7 9.1 8.8 8.9 8.6   GLUCOSE mg/dL 77 84 92 89 95 106* 108*     Lab  4 Eyes Skin Assessment     NAME:  Desiree Laureano  YOB: 1961  MEDICAL RECORD NUMBER:  [id number]    The patient is being assessed for  Shift Handoff    I agree that at least one RN has performed a thorough Head to Toe Skin Assessment on the patient. ALL assessment sites listed below have been assessed. Areas assessed by both nurses:    Head, Face, Ears, Shoulders, Back, Chest, Arms, Elbows, Hands, Sacrum. Buttock, Coccyx, Ischium, Legs. Feet and Heels, and Under Medical Devices         Does the Patient have a Wound?  No noted wound(s)       Mauricio Prevention initiated by RN: Yes  Wound Care Orders initiated by RN: Yes    Pressure Injury (Stage 3,4, Unstageable, DTI, NWPT, and Complex wounds) if present, place Wound referral order by RN under : No    New Ostomies, if present place, Ostomy referral order under : No     Nurse 1 eSignature: Electronically signed by Patricio Schwartz RN on 12/22/23 at 6:53 PM EST    **SHARE this note so that the co-signing nurse can place an eSignature**    Nurse 2 eSignature: Electronically signed by Malcolm Gonzalez RN on 12/22/23 at 9:19 PM EST Results   Component Value Date    ANIONGAP 9.6 09/03/2020     No results found for: POCGLU  No results found for: HGBA1C  Estimated Creatinine Clearance: 73.3 mL/min (by C-G formula based on SCr of 0.85 mg/dL).    Lab Results   Component Value Date    COVID19 Detected (C) 08/23/2020     Assessment/Plan   Active Hospital Problems    Diagnosis  POA   • **Pneumonia due to COVID-19 virus [U07.1, J12.89]  Yes   • Hypertriglyceridemia [E78.1]  Yes   • History of colon resection [Z90.49]  Not Applicable   • Cervical spondylosis with radiculopathy [M47.22]  Yes   • Depression with anxiety [F41.8]  Yes   • GERD (gastroesophageal reflux disease) [K21.9]  Yes      Resolved Hospital Problems   No resolved problems to display.     65 y.o. female       Assessment and plan:  1. Acute respiratory failure with hypoxia, patient had underlying COVID 19 pneumonia.  Patient did receive IV Remdesivir and doxycycline therapy.  Continue dexamethasone.  Infectious Diseases on board.  2.  Cytokine storm and elevated D-dimer, patient also had underlying transaminitis due to acute viral illness.  3.  Volume overload, it was thought due to underlying convalescent plasma administration, continue to reassess volume status changes.  4. Gastroesophageal reflux disease, continue Protonix.  5.  Depression with anxiety, Continue Wellbutrin.  Patient is also on Latuda.  6.  Further plans based on hospital course.    Ambrocio Ramirez MD   09/03/20  16:23

## 2023-12-29 DIAGNOSIS — R20.2 PARESTHESIAS: ICD-10-CM

## 2023-12-29 DIAGNOSIS — G43.009 MIGRAINE WITHOUT AURA AND WITHOUT STATUS MIGRAINOSUS, NOT INTRACTABLE: ICD-10-CM

## 2023-12-29 RX ORDER — GABAPENTIN 400 MG/1
CAPSULE ORAL
Qty: 90 CAPSULE | Refills: 0 | Status: SHIPPED | OUTPATIENT
Start: 2023-12-29

## 2024-01-02 DIAGNOSIS — R25.2 MUSCLE CRAMPS: ICD-10-CM

## 2024-01-02 RX ORDER — BACLOFEN 20 MG/1
TABLET ORAL
Qty: 60 TABLET | Refills: 0 | Status: SHIPPED | OUTPATIENT
Start: 2024-01-02

## 2024-01-04 ENCOUNTER — LAB (OUTPATIENT)
Dept: LAB | Facility: HOSPITAL | Age: 69
End: 2024-01-04
Payer: MEDICARE

## 2024-01-04 ENCOUNTER — OFFICE VISIT (OUTPATIENT)
Dept: PAIN MEDICINE | Facility: CLINIC | Age: 69
End: 2024-01-04
Payer: MEDICARE

## 2024-01-04 ENCOUNTER — PREP FOR SURGERY (OUTPATIENT)
Dept: SURGERY | Facility: SURGERY CENTER | Age: 69
End: 2024-01-04
Payer: MEDICARE

## 2024-01-04 ENCOUNTER — HOSPITAL ENCOUNTER (OUTPATIENT)
Dept: CT IMAGING | Facility: HOSPITAL | Age: 69
Discharge: HOME | End: 2024-01-04
Payer: MEDICARE

## 2024-01-04 VITALS
HEIGHT: 66 IN | DIASTOLIC BLOOD PRESSURE: 71 MMHG | SYSTOLIC BLOOD PRESSURE: 130 MMHG | HEART RATE: 95 BPM | OXYGEN SATURATION: 96 % | BODY MASS INDEX: 29.8 KG/M2 | TEMPERATURE: 96.9 F | WEIGHT: 185.4 LBS | RESPIRATION RATE: 12 BRPM

## 2024-01-04 DIAGNOSIS — M47.816 LUMBAR FACET ARTHROPATHY: Primary | ICD-10-CM

## 2024-01-04 DIAGNOSIS — Z12.2 SCREENING FOR LUNG CANCER: ICD-10-CM

## 2024-01-04 DIAGNOSIS — Z87.891 PERSONAL HISTORY OF TOBACCO USE, PRESENTING HAZARDS TO HEALTH: ICD-10-CM

## 2024-01-04 DIAGNOSIS — M47.816 LUMBAR FACET ARTHROPATHY: ICD-10-CM

## 2024-01-04 DIAGNOSIS — G89.29 CHRONIC BILATERAL THORACIC BACK PAIN: Primary | ICD-10-CM

## 2024-01-04 DIAGNOSIS — M54.2 NECK PAIN: ICD-10-CM

## 2024-01-04 DIAGNOSIS — M48.062 LUMBAR STENOSIS WITH NEUROGENIC CLAUDICATION: ICD-10-CM

## 2024-01-04 DIAGNOSIS — M54.6 CHRONIC BILATERAL THORACIC BACK PAIN: Primary | ICD-10-CM

## 2024-01-04 DIAGNOSIS — M54.16 LUMBAR RADICULITIS: ICD-10-CM

## 2024-01-04 PROCEDURE — 1159F MED LIST DOCD IN RCRD: CPT

## 2024-01-04 PROCEDURE — 3078F DIAST BP <80 MM HG: CPT

## 2024-01-04 PROCEDURE — 3075F SYST BP GE 130 - 139MM HG: CPT

## 2024-01-04 PROCEDURE — 71271 CT THORAX LUNG CANCER SCR C-: CPT

## 2024-01-04 PROCEDURE — 80061 LIPID PANEL: CPT | Performed by: INTERNAL MEDICINE

## 2024-01-04 PROCEDURE — 85025 COMPLETE CBC W/AUTO DIFF WBC: CPT | Performed by: INTERNAL MEDICINE

## 2024-01-04 PROCEDURE — 80053 COMPREHEN METABOLIC PANEL: CPT | Performed by: INTERNAL MEDICINE

## 2024-01-04 PROCEDURE — 1160F RVW MEDS BY RX/DR IN RCRD: CPT

## 2024-01-04 PROCEDURE — 99214 OFFICE O/P EST MOD 30 MIN: CPT

## 2024-01-04 PROCEDURE — 1125F AMNT PAIN NOTED PAIN PRSNT: CPT

## 2024-01-04 NOTE — PROGRESS NOTES
CHIEF COMPLAINT  Back pain    Subjective   Magdalena Dickey is a 68 y.o. female  who presents to the office for follow-up of procedure.  She completed a Bilateral L4 TF LESI on 12/6/23 performed by Dr. Bernal for management of low back and bilateral foot pain. Patient reports 50% relief from the procedure x 1 month.     Today pain is 3/10VAS in severity. Her main complaint today is mid back pain that is located near her bra line and radiates across her back. Low back pain is also present and Pain is located in her low back and radiates into bilateral buttocks and hips. Denies radicular symptoms but reports numbness/tingling to bilateral feet. Describes this pain as an intermittent aching and burning.  Pain is worsened by prolonged standing or sitting, bending/twisting, and walking long distances. Pain improves with rest/reposition and medications. She has completed PT in the past. No relief with use of a TENS unit.      Continues with Gabapentin 400mg TID, Baclofen 10mg BID PRN, Cymbalta 60mg, Trazodone 50mg PRN, and Xanax 0.25mg PRN for anxiety. The medications are managed by her PCP. She also utilizes OTC Tylenol Arthritis as needed.      Procedures:  12/6/23 - Bilateral L4 TF LESI - 50% relief  9/20/23 - L4/L5 LESI - 50% relief x 1 month     Surgical History:  [facility] - Dr. De Dios    Back Pain  This is a chronic problem. The current episode started more than 1 year ago. The problem has been waxing and waning since onset. The pain is present in the lumbar spine and sacro-iliac. The quality of the pain is described as aching and burning. The pain does not radiate (discomfort to bilateral feet). The pain is at a severity of 3/10. The pain is mild. The symptoms are aggravated by position, standing, sitting and bending. Associated symptoms include numbness (feet). Pertinent negatives include no abdominal pain, chest pain, dysuria, fever, headaches or weakness. She has tried muscle relaxant and bed rest (Tylenol,  Gabapentin, TENS unit (no relief), PT) for the symptoms. The treatment provided mild relief.   Neck Pain   This is a chronic problem. The current episode started more than 1 year ago. The problem occurs constantly. The problem has been gradually worsening. The pain is associated with an unknown factor. The pain is present in the left side, midline and right side (radaites into bilateral shoulders). The quality of the pain is described as aching. The pain is at a severity of 3/10. The pain is mild. The symptoms are aggravated by position, twisting, bending and stress. Associated symptoms include numbness (feet). Pertinent negatives include no chest pain, fever, headaches or weakness. She has tried oral narcotics, heat and ice (Gabapentin, Cymbalta) for the symptoms.      PEG Assessment   What number best describes your pain on average in the past week?8  What number best describes how, during the past week, pain has interfered with your enjoyment of life?8  What number best describes how, during the past week, pain has interfered with your general activity?  8    Review of Pertinent Medical Data ---      The following portions of the patient's history were reviewed and updated as appropriate: allergies, current medications, past family history, past medical history, past social history, past surgical history, and problem list.    Review of Systems   Constitutional:  Negative for activity change, fatigue and fever.   HENT:  Negative for congestion.    Respiratory:  Negative for cough and chest tightness.    Cardiovascular:  Negative for chest pain.   Gastrointestinal:  Positive for constipation and diarrhea. Negative for abdominal pain.   Genitourinary:  Positive for difficulty urinating. Negative for dysuria.   Musculoskeletal:  Positive for back pain and neck pain.   Neurological:  Positive for numbness (feet). Negative for dizziness, weakness, light-headedness and headaches.   Psychiatric/Behavioral:  Negative for  "agitation, sleep disturbance and suicidal ideas. The patient is not nervous/anxious.      I have reviewed and confirmed the accuracy of the ROS as documented by the MA/LPN/RN ORIANA Diamond     Vitals:    01/04/24 1113   BP: 130/71   BP Location: Right arm   Patient Position: Sitting   Cuff Size: Adult   Pulse: 95   Resp: 12   Temp: 96.9 °F (36.1 °C)   TempSrc: Temporal   SpO2: 96%   Weight: 84.1 kg (185 lb 6.4 oz)   Height: 167.6 cm (66\")   PainSc:   3     Objective   Physical Exam  Constitutional:       Appearance: Normal appearance.   HENT:      Head: Normocephalic.   Cardiovascular:      Rate and Rhythm: Normal rate and regular rhythm.   Pulmonary:      Effort: Pulmonary effort is normal.      Breath sounds: Normal breath sounds.   Musculoskeletal:      Cervical back: Normal range of motion.      Lumbar back: Tenderness present. Decreased range of motion. Negative right straight leg raise test and negative left straight leg raise test.      Comments: + lumbar facet loading/tenderness   Skin:     General: Skin is warm and dry.      Capillary Refill: Capillary refill takes less than 2 seconds.   Neurological:      General: No focal deficit present.      Mental Status: She is alert and oriented to person, place, and time.   Psychiatric:         Mood and Affect: Mood normal.         Behavior: Behavior normal.         Thought Content: Thought content normal.         Cognition and Memory: Cognition normal.       Assessment & Plan   Diagnoses and all orders for this visit:    1. Chronic bilateral thoracic back pain (Primary)  -     MRI Thoracic Spine Without Contrast; Future    2. Neck pain  -     MRI Cervical Spine With & Without Contrast; Future  -     Ambulatory Referral to Neurosurgery    3. Lumbar stenosis with neurogenic claudication  -     Ambulatory Referral to Neurosurgery    4. Lumbar facet arthropathy    5. Lumbar radiculitis      Magdalena LUIS MANUEL Noble reports a pain score of 3.  Given her pain assessment " "as noted, treatment options were discussed and the following options were decided upon as a follow-up plan to address the patient's pain: referral to specialist for assistance in pain treatment guidance and patient scheduled for bilateral L3-L5 MBB .    --- Bilateral L3-L5 MBB x 2 with goal of RFA  -------  Education about Medial Branch Blockade and RF Therapy:  This medial branch blockade (MBB) suggested is intended for diagnostic purposes, with the intent of offering the patient Radiofrequency thermal rhizotomy (RF) if the MBB is diagnostically effective.  The diagnostic blockade is necessary to determine the likelihood that RF therapy could be efficacious in providing long term relief to the patient.    Medial branches are sensory nerve branches that connect to a facet joint and transmit sensations & pain signals from that joint.  Facet is a term for the type of joints found in the spine.  Medial branches are the nerves that go to a facet, and therefore are also sometimes called \"facet joint nerves\" (FJNs).      In a medial branch blockade procedure, xray fluoroscopy is used to verify the locations of the outside of the joint lines which are being targeted.  Under xray guidance, needles are placed to these areas.  Contrast dye is injected to confirm proper placement, with dye flowing over the joint area, and to ensure that the dye does not flow into unintended areas such as a vein.  When this is confirmed, local anesthetic is injected to block the medial branch at that joint level.      If MBBs are diagnostically successful in blocking pain, then the patient is most likely a great candidate for Radiofrequency of those facet joint nerves.  In the RF procedure, needles are placed to the joint lines in the same fashion, and after testing, the needle tips are heated to thermally treat the nerves, blocking the nerves by in essence damaging the nerves with the heat treatment(non-pulsed).       Medically, a successful " RF procedure should provide a patient with 50% pain relief or more for at least 6 months.  Clinical experience suggests that successful patients receive relief more in the range of 12 months on average.  We also discussed that a fortunate minority of patients receive therapeutic success from the MBB, and may not require RF ablation.  If a patient receives more than 8 weeks of relief from MBB, then occasional repeat MBB for therapeutic purposes is a very reasonable alternative therapy.  This course of therapy is consistent with our LCDs according to our CMS  in the area, and therefore other insurance providers should follow accordingly.  We will monitor our patients to screen for these therapeutic responders and will offer RF therapy only when necessary.      We discussed that MBB & RF are not without risks.  Guidelines regarding anticoagulant use & neuraxial procedures will be respected.  Patients that are ill or otherwise may be at risk for sepsis will not have their spines accessed by neuraxial injections of any type.  This patient will not be offered these therapies if there is an increased risk.   We discussed that there is a risk of postprocedural pain and also a risk of worsening of clinical picture with these procedures as with any neuraxial procedure.    -------  --- Update Cervical MRI due to worsening neck pain  --- Thoracic MRI ordered due to worsening mid back pain  --- Patient to discuss Gabapentin increased with PCP. May assume responsibility for medication if MD uncomfortable with increasing medication  --- Referral to Dr. Sims with Neurosurgery per patient request   --- Follow-up for procedure     Diagnostic Facet Joint Procedure:   MBB   The first diagnostic facet joint procedure is considered medically reasonable and necessary for the diagnosis and treatment of chronic pain for this patient due to the patient meeting all of the following criteria:    1. Moderate to severe chronic neck  or low back pain, predominantly axial, that causes functional deficit measured on pain or disability scale.  2. Pain present for minimum of 3 months with documented failure to respond to noninvasive conservative management (as tolerated)  3. Absence of untreated radiculopathy or neurogenic claudication (except for radiculopathy caused by facet joint synovial cyst)  4. There is no non-facet pathology per clinical assessment or radiology studies that could explain the source of the patient’s pain, including but not limited to fracture, tumor, infection, or significant deformity.     MAXIME REPORT  As the clinician, I personally reviewed the MAXIME from 1/4/24 while the patient was in the office today.    Dictated utilizing Dragon dictation.

## 2024-01-17 ENCOUNTER — TRANSCRIBE ORDERS (OUTPATIENT)
Dept: SURGERY | Facility: SURGERY CENTER | Age: 69
End: 2024-01-17
Payer: MEDICARE

## 2024-01-17 DIAGNOSIS — Z41.9 SURGERY, ELECTIVE: Primary | ICD-10-CM

## 2024-01-17 PROBLEM — M47.816 LUMBAR FACET ARTHROPATHY: Status: ACTIVE | Noted: 2024-01-04

## 2024-01-18 ENCOUNTER — TRANSCRIBE ORDERS (OUTPATIENT)
Dept: SURGERY | Facility: SURGERY CENTER | Age: 69
End: 2024-01-18
Payer: MEDICARE

## 2024-01-18 DIAGNOSIS — Z41.9 SURGERY, ELECTIVE: Primary | ICD-10-CM

## 2024-01-26 DIAGNOSIS — R25.2 MUSCLE CRAMPS: ICD-10-CM

## 2024-01-26 RX ORDER — BACLOFEN 20 MG/1
TABLET ORAL
Qty: 60 TABLET | Refills: 0 | Status: SHIPPED | OUTPATIENT
Start: 2024-01-26

## 2024-02-05 ENCOUNTER — HOSPITAL ENCOUNTER (OUTPATIENT)
Dept: MRI IMAGING | Facility: HOSPITAL | Age: 69
Discharge: HOME | End: 2024-02-05
Payer: MEDICARE

## 2024-02-05 DIAGNOSIS — M54.6 CHRONIC BILATERAL THORACIC BACK PAIN: ICD-10-CM

## 2024-02-05 DIAGNOSIS — M54.2 NECK PAIN: ICD-10-CM

## 2024-02-05 DIAGNOSIS — G89.29 CHRONIC BILATERAL THORACIC BACK PAIN: ICD-10-CM

## 2024-02-05 PROCEDURE — 82565 ASSAY OF CREATININE: CPT

## 2024-02-05 PROCEDURE — A9577 INJ MULTIHANCE: HCPCS

## 2024-02-05 PROCEDURE — 72156 MRI NECK SPINE W/O & W/DYE: CPT

## 2024-02-05 PROCEDURE — 72146 MRI CHEST SPINE W/O DYE: CPT

## 2024-02-05 PROCEDURE — 0 GADOBENATE DIMEGLUMINE 529 MG/ML SOLUTION

## 2024-02-05 RX ADMIN — GADOBENATE DIMEGLUMINE 17 ML: 529 INJECTION, SOLUTION INTRAVENOUS at 20:08

## 2024-02-06 NOTE — PROGRESS NOTES
Please let patient know that her cervical MRI shows a small bone spur at the C4-5 level and a mild degree of foraminal narrowing. There is also a a bone spur at C5-6 which is causing mild to moderate degree of canal stenosis.  This is more prominent than on the last study.    In regards to her thoracic spine there is no significant canal or foraminal stenosis seen throughout the thoracic spine.  There is a chronic compression deformity at L1 but otherwise the thoracic spine is within normal limits.

## 2024-02-08 LAB — CREAT BLDA-MCNC: 1.3 MG/DL (ref 0.6–1.3)

## 2024-02-11 DIAGNOSIS — R25.2 MUSCLE CRAMPS: ICD-10-CM

## 2024-02-12 RX ORDER — BACLOFEN 20 MG/1
TABLET ORAL
Qty: 60 TABLET | Refills: 0 | Status: SHIPPED | OUTPATIENT
Start: 2024-02-12

## 2024-02-14 ENCOUNTER — TRANSCRIBE ORDERS (OUTPATIENT)
Dept: ADMINISTRATIVE | Facility: HOSPITAL | Age: 69
End: 2024-02-14
Payer: MEDICARE

## 2024-02-14 DIAGNOSIS — R91.8 LUNG NODULES: Primary | ICD-10-CM

## 2024-02-16 ENCOUNTER — PATIENT MESSAGE (OUTPATIENT)
Dept: INTERNAL MEDICINE | Facility: CLINIC | Age: 69
End: 2024-02-16
Payer: MEDICARE

## 2024-02-16 DIAGNOSIS — R20.2 PARESTHESIAS: ICD-10-CM

## 2024-02-16 DIAGNOSIS — G43.009 MIGRAINE WITHOUT AURA AND WITHOUT STATUS MIGRAINOSUS, NOT INTRACTABLE: ICD-10-CM

## 2024-02-16 DIAGNOSIS — M54.16 LUMBAR RADICULOPATHY: Primary | ICD-10-CM

## 2024-02-16 RX ORDER — GABAPENTIN 600 MG/1
600 TABLET ORAL 3 TIMES DAILY
Qty: 90 TABLET | Refills: 1 | Status: SHIPPED | OUTPATIENT
Start: 2024-02-16

## 2024-02-16 RX ORDER — GABAPENTIN 400 MG/1
CAPSULE ORAL
Qty: 90 CAPSULE | Refills: 0 | Status: SHIPPED | OUTPATIENT
Start: 2024-02-16 | End: 2024-02-16 | Stop reason: DRUGHIGH

## 2024-02-21 NOTE — DISCHARGE INSTRUCTIONS
[facility] [place] [facility] - Post-procedure Instructions          --  While there are no absolute restrictions, it is recommended that you do not perform strenuous activity today. In the morning, you may resume your level of activity as before your block.    --  If you have a band-aid at your injection site, please remove it later today. Observe the area for any redness, swelling, pus-like drainage, or a temperature over 101°. If any of these symptoms occur, please call your doctor at [phone]. If after office hours, leave a message and the on-call provider will return your call.    --  Ice may be applied to your injection site. It is recommended you avoid direct heat (heating pad; hot tub) for 1-2 days.    --  Call [facility] [place] at [phone] if you experience persistent headache, persistent bleeding from the injection site, or severe pain not relieved by heat or oral medication.    --  Do not make important decisions today.    --  Due to the effects of the block and/or the I.V. Sedation, DO NOT drive or operate hazardous machinery for 12 hours.  Local anesthetics may cause numbness after procedure and precautions must be taken with regards to operating equipment as well as with walking, even if ambulating with assistance of another person or with an assistive device.    --  Do not drink alcohol for 12 hours.    -- You may return to work tomorrow, or as directed by your referring doctor.    --  Occasionally you may notice a slight increase in your pain after the procedure. This should start to improve within the next 24-48 hours. Radiofrequency ablation procedure pain may last 3-4 weeks.    --  It may take as long as 3-4 days before you notice a gradual improvement in your pain and/or other symptoms.    -- You may continue to take your prescribed pain medication as needed.    --  Some normal possible side effects of steroid use could include fluid retention, increased blood sugar, dull headache,  increased sweating, increased appetite, mood swings and flushing.    --  Diabetics are recommended to watch their blood glucose level closely for 24-48 hours after the injection.    --  Must stay in PACU for 20 min upon arrival and prove no leg weakness before being discharged.    --  IN THE EVENT OF A LIFE THREATENING EMERGENCY, (CHEST PAIN, BREATHING DIFFICULTIES, PARALYSIS…) YOU SHOULD GO TO YOUR NEAREST EMERGENCY ROOM.    --  You should be contacted by our office within 2-3 days to schedule follow up or next appointment date.  If not contacted within 7 days, please call the office at [phone]     If you have even 1 hour of relief, these injections are considered successful.

## 2024-02-21 NOTE — H&P
[facility]   HISTORY AND PHYSICAL    Patient Name: Magdalena Dickey  : 1955  MRN: [id number]  Primary Care Physician:  Sun Guerrier MD  Date of admission: 2024    Subjective   Subjective     Chief Complaint: Low back pain    History of Present Illness  Chronic, axial low back pain with referred pain into the legs.       Review of Systems   Constitutional:  Negative for chills and fever.   Respiratory:  Negative for cough and shortness of breath.    Musculoskeletal:  Positive for back pain.        Personal History     Past Medical History:   Diagnosis Date    Abnormal mammogram 2022    Anxiety     Cervical radiculopathy     Cervical spondylolysis     Chest pain 2022    ADMITTED TO [facility]    Chronic bilateral low back pain without sciatica     Colon polyps     FOLLOWED BY DR. ADRIA SPAINID-19 08/15/2020    SEEN AT     Delayed emergence from anesthesia     Depression 2016    Diverticulitis 2018    ADMITTED TO [facility]    Diverticulitis 2017    SEEN AT [facility] ER    Diverticulitis of colon 2020    Dysesthesia     Dyspepsia     Fecal incontinence 2022    GERD (gastroesophageal reflux disease)     Hair loss 2020    Hiatal hernia     History of measles, mumps, or rubella     MEASLES AND MUMPS AS A CHILD    Hypertension     IBS (irritable bowel syndrome)     ILD (interstitial lung disease)     Insomnia     Lumbosacral disc disease     Lung nodule 2020    Migraine without aura and without status migrainosus, not intractable 2016    NAFLD (nonalcoholic fatty liver disease)     Palpitations 2019    SEEN AT [facility] ER    Paresthesias     PNA (pneumonia) 2020    D/T COVID, ADMITTED TO [facility]    Polyneuropathy     Post-COVID chronic dyspnea     PUD (peptic ulcer disease)     Rotator cuff tendinitis, right 2018    Thrush 2020    Vestibular neuritis 2014       Past Surgical History:   Procedure Laterality Date    ANTERIOR CERVICAL DISCECTOMY W/  FUSION N/A 06/21/2018    Procedure: C6 7 anterior cervical discectomy and fusion with allograft and plate;  Surgeon: Jacobo De Dios MD;  Location: [facility] MAIN OR;  Service: Neurosurgery    CHOLECYSTECTOMY N/A 1980    AT [place]    COLON RESECTION N/A 01/16/2018    Procedure: LAPAROSCOPIC SIGMOID COLON RESECTION WITH MOBILIZATION OF SPLENIC FLEXURE;  Surgeon: Eric Davidson MD;  Location: [facility] MAIN OR;  Service:     COLONOSCOPY N/A 02/15/2017    6 MM TUBULAR ADENOMA POLYP IN HEPATIC FLEXURE, 5 MM TUBULAR ADENOAM POLYP IN RECTUM, DR. GERARDO NYE AT [facility]    COLONOSCOPY N/A 03/01/2003    Internal hemorrhoids-Dr. Gerardo Nye    COLONOSCOPY N/A 02/08/2022    4 MM TUBULAR ADENOMA POLYP IN ASCENDING, HEMORRHOIDS, DR. GERARDO NYE AT [facility]    DIAGNOSTIC LAPAROSCOPY N/A 06/06/2001    Cul-de-sac endometriosis and adhesions-Dr. Aamir Christine    ENDOSCOPY N/A 02/15/2017    SMALL HIATAL HERNIA, GASTRITIS, DR. GERARDO NYE AT [facility]    ENDOSCOPY N/A 02/08/2022    GASTRITIS, MILD ESOPHAGITIS, Z LINE IRREGULAR, DR. GERARDO NYE AT [facility]    ENDOSCOPY N/A 09/12/2014    GASTRITIS, DR. GERARDO NYE AT [facility]    ENDOSCOPY AND COLONOSCOPY N/A 02/04/2009    SMALL HIATAL HERNIA, ESOPHAGITIS, HEMORRHOIDS, DIVERTICULOSIS, TORTUOUS COLON, DR. GERARDO NYE AT [facility]    EPIDURAL Bilateral 12/6/2023    Procedure: BILATERAL L4 TRANSFORAMINAL LUMBAR EPIDURAL STEROID INJECTION CPT: 13512, 74539;  Surgeon: Sarah Bernal MD;  Location: [facility] MA[facility];  Service: Pain Management;  Laterality: Bilateral;    LUMBAR EPIDURAL INJECTION N/A 9/20/2023    Procedure: LUMBAR EPIDURAL 1ST VISIT L4-5 32090;  Surgeon: Sarah Bernal MD;  Location: [facility] MAIN OR;  Service: Pain Management;  Laterality: N/A;    SKIN BIOPSY Left 07/08/2022    LEFT CALF, PATH: SMALL FIBER NEUROPATHY    TOTAL ABDOMINAL HYSTERECTOMY WITH SALPINGO OOPHORECTOMY Bilateral 07/31/2001    Dr. Aamir Christine AT [facility]       Family History: family history includes Alcohol abuse in her father; Alzheimer's  disease in her mother; Arthritis in her father, maternal grandmother, and sister; Breast cancer in an other family member; COPD in her mother; Cancer in her father, mother, sister, sister, sister, and sister; Depression in her mother, sister, and sister; Diabetes in her mother; Diabetes type II in her mother; Drug abuse in her father; Heart attack in her mother; Heart disease in her father and mother; Hypertension in her mother, sister, and sister; Learning disabilities in her father; Lung cancer in her mother; Miscarriages / Stillbirths in her mother; No Known Problems in her brother; Ovarian cancer in her sister and sister; Prostate cancer in her father. Otherwise pertinent FHx was reviewed and not pertinent to current issue.    Social History:  reports that she quit smoking about 9 years ago. Her smoking use included cigarettes. She has a 40.00 pack-year smoking history. She has been exposed to tobacco smoke. She has never used smokeless tobacco. She reports current alcohol use. She reports that she does not use drugs.    Home Medications:  ALPRAZolam, Co-Enzyme Q10, DULoxetine, Lifitegrast, Loperamide HCl, Magnesium, Methylcellulose (Laxative), SUMAtriptan, albuterol sulfate HFA, amphetamine-dextroamphetamine, baclofen, buPROPion XL, calcium carbonate, cholecalciferol, gabapentin, ipratropium-albuterol, levOCARNitine, losartan, lurasidone, multivitamin, omeprazole, ondansetron, pantoprazole, sucralfate, and traZODone    Allergies:  No Known Allergies    Objective    Objective     Vitals:   Temp:  [98 °F (36.7 °C)] 98 °F (36.7 °C)  Heart Rate:  [92] 92  Resp:  [20] 20    Physical Exam  Constitutional:       General: She is not in acute distress.  Pulmonary:      Effort: Pulmonary effort is normal. No respiratory distress.   Skin:     General: Skin is warm and dry.   Neurological:      Mental Status: She is alert.   Psychiatric:         Mood and Affect: Mood normal.         Thought Content: Thought content normal.          Result Review    Result Review:  I have personally reviewed the results from the time of this admission to 2/26/2024 08:28 EST and agree with these findings:  []  Laboratory list / accordion  []  Microbiology  []  Radiology  []  EKG/Telemetry   []  Cardiology/Vascular   []  Pathology  []  Old records  []  Other:  Most notable findings include: No new       Assessment & Plan   Assessment / Plan     Brief Patient Summary:  Magdalena Dickey is a 68 y.o. female who has chronic axial low back pain    Active Hospital Problems:  Active Hospital Problems    Diagnosis     **Lumbar facet arthropathy      Plan: Lumbar Medial branch blocks       DVT prophylaxis:  No DVT prophylaxis order currently exists.      Sarah Bernal MD   IMP:  sigmoid diverticulitis with intramural abscess failing outpatient antibiotics  hypothyroidism    PLAN:  - bowel rest x 5-7 days and observe  - PPN  - famotidine included in PPN (d/c PPI, one less IVPB med)  - check zinc and 25oh vitamin D levels  d/w Dr Tamayo  full note to follow

## 2024-02-23 RX ORDER — DEXTROAMPHETAMINE SACCHARATE, AMPHETAMINE ASPARTATE, DEXTROAMPHETAMINE SULFATE AND AMPHETAMINE SULFATE 5; 5; 5; 5 MG/1; MG/1; MG/1; MG/1
20 TABLET ORAL EVERY MORNING
COMMUNITY
Start: 2024-01-13

## 2024-02-23 RX ORDER — LURASIDONE HYDROCHLORIDE 40 MG/1
40 TABLET, FILM COATED ORAL DAILY
COMMUNITY
Start: 2024-01-21

## 2024-02-23 RX ORDER — ALPRAZOLAM 0.25 MG/1
0.25 TABLET ORAL DAILY
COMMUNITY
Start: 2023-12-11

## 2024-02-26 ENCOUNTER — HOSPITAL ENCOUNTER (OUTPATIENT)
Dept: GENERAL RADIOLOGY | Facility: SURGERY CENTER | Age: 69
Setting detail: HOSPITAL OUTPATIENT SURGERY
End: 2024-02-26
Payer: MEDICARE

## 2024-02-26 ENCOUNTER — HOSPITAL ENCOUNTER (OUTPATIENT)
Facility: SURGERY CENTER | Age: 69
Setting detail: HOSPITAL OUTPATIENT SURGERY
Discharge: HOME | End: 2024-02-26
Attending: ANESTHESIOLOGY | Admitting: ANESTHESIOLOGY
Payer: MEDICARE

## 2024-02-26 VITALS
BODY MASS INDEX: 30.82 KG/M2 | WEIGHT: 185 LBS | DIASTOLIC BLOOD PRESSURE: 78 MMHG | HEIGHT: 65 IN | SYSTOLIC BLOOD PRESSURE: 128 MMHG | RESPIRATION RATE: 18 BRPM | OXYGEN SATURATION: 93 % | HEART RATE: 86 BPM | TEMPERATURE: 97.1 F

## 2024-02-26 DIAGNOSIS — M47.816 LUMBAR FACET ARTHROPATHY: ICD-10-CM

## 2024-02-26 DIAGNOSIS — Z41.9 SURGERY, ELECTIVE: ICD-10-CM

## 2024-02-26 PROCEDURE — 77002 NEEDLE LOCALIZATION BY XRAY: CPT

## 2024-02-26 PROCEDURE — 64494 INJ PARAVERT F JNT L/S 2 LEV: CPT | Performed by: ANESTHESIOLOGY

## 2024-02-26 PROCEDURE — 64493 INJ PARAVERT F JNT L/S 1 LEV: CPT | Performed by: ANESTHESIOLOGY

## 2024-02-26 PROCEDURE — 25010000002 BUPIVACAINE (PF) 0.25 % SOLUTION 10 ML VIAL: Performed by: ANESTHESIOLOGY

## 2024-02-26 PROCEDURE — 76000 FLUOROSCOPY <1 HR PHYS/QHP: CPT

## 2024-02-26 NOTE — OP NOTE
Lumbar Medial Branch Blockade at  Bilateral L3-L5  [facility]      PREOPERATIVE DIAGNOSIS:  Lumbar spondylosis without myelopathy    POSTOPERATIVE DIAGNOSIS:  Lumbar spondylosis without myelopathy    PROCEDURE:   Diagnostic Lumbar Medial Branch Nerve Blockades, with fluoroscopy:  at the L3, L4, L5 transverse processes)   16543-76 -- Lumbar Facet blocks, 1st Level  27089-69 -- Lumbar Facet blocks, 2nd  Level     PRE-PROCEDURE DISCUSSION WITH PATIENT:    Risks and complications were discussed with the patient prior to starting the procedure and informed consent was obtained.      SURGEON:  Sarah Bernal MD    REASON FOR PROCEDURE:    The patient complains of pain that seems to have a significant axial component and Painful area identified on exam under fluoroscopy    SEDATION:  No sedation was used for this procedure  ANESTHETIC:  0.25% bupivacaine  STEROID:  None  TOTAL VOLUME OF SOLUTION:  6ml    DESCRIPTON OF PROCEDURE:  After obtaining informed consent, IV access was not obtained in the preoperative area.   The patient was taken to the operating room.  The patient was placed in the prone position with a pillow under the abdomen. All pressure points were well padded.  EKG, blood pressure, and pulse oximeter were monitored.  The patient was monitored and sedated by the RN under my direction. The lumbosacral area was prepped with Chloraprep and draped in a sterile fashion.     AP fluoroscopic image was used to visualize the junction of the right S1 superior articular process with the sacral ala.  The image was then optimized to maximize visualization of the junctions of the L3, L4, L5 superior articular processes with the transverse processes.  The skin and subcutaneous tissue over the areas was anesthetized with 1% lidocaine.  A 22-gauge spinal needle was then advanced percutaneously through the anesthetized skin tracts under fluoroscopic guidance in a coaxial view to contact periosteum at the  target sites.  After negative aspiration, a volume of 1 mL of the local anesthetic  was injected without resistance at each of the target sites.      The same procedure was then performed on the contralateral side in the exact same fashion.        Onset of analgesia was noted.  Vital signs remained stable throughout.      ESTIMATED BLOOD LOSS:  <5 mL  SPECIMENS:  none    COMPLICATIONS:   No complications were noted.    TOLERANCE & DISCHARGE CONDITION:    The patient tolerated the procedure well.  The patient was transported to the recovery area without difficulties.  The patient was discharged to home under the care of family in stable and satisfactory condition.    Pre-procedure pain score: 2/10 at rest, 6/10 with activity  Post-procedure pain score: 0/10    PLAN OF CARE:  The patient was given our standard instruction sheet.  We discussed that Lumbar Medial Branch Blockade is a diagnostic procedure in consideration for radiofrequency ablation if two diagnostic procedures prove to be positive for significant benefit.  An alternative plan could be therapeutic lumbar branch blockades.  The patient is asked to keep an account of pain relief after the procedure today.  The patient will  Return to clinic 1-2 wks.  The patient will resume all medications as per the medication reconciliation sheet.

## 2024-03-04 ENCOUNTER — OFFICE VISIT (OUTPATIENT)
Dept: PAIN MEDICINE | Facility: CLINIC | Age: 69
End: 2024-03-04
Payer: MEDICARE

## 2024-03-04 VITALS
HEART RATE: 86 BPM | SYSTOLIC BLOOD PRESSURE: 143 MMHG | TEMPERATURE: 96.9 F | HEIGHT: 65 IN | DIASTOLIC BLOOD PRESSURE: 86 MMHG | BODY MASS INDEX: 31.22 KG/M2 | OXYGEN SATURATION: 97 % | WEIGHT: 187.4 LBS | RESPIRATION RATE: 18 BRPM

## 2024-03-04 DIAGNOSIS — M54.6 CHRONIC BILATERAL THORACIC BACK PAIN: Primary | ICD-10-CM

## 2024-03-04 DIAGNOSIS — M47.816 LUMBAR FACET ARTHROPATHY: ICD-10-CM

## 2024-03-04 DIAGNOSIS — G89.29 CHRONIC BILATERAL THORACIC BACK PAIN: Primary | ICD-10-CM

## 2024-03-04 DIAGNOSIS — M48.062 LUMBAR STENOSIS WITH NEUROGENIC CLAUDICATION: ICD-10-CM

## 2024-03-04 DIAGNOSIS — M54.2 NECK PAIN: ICD-10-CM

## 2024-03-04 PROCEDURE — 99214 OFFICE O/P EST MOD 30 MIN: CPT

## 2024-03-04 PROCEDURE — 1159F MED LIST DOCD IN RCRD: CPT

## 2024-03-04 PROCEDURE — 1125F AMNT PAIN NOTED PAIN PRSNT: CPT

## 2024-03-04 PROCEDURE — 3077F SYST BP >= 140 MM HG: CPT

## 2024-03-04 PROCEDURE — 3079F DIAST BP 80-89 MM HG: CPT

## 2024-03-04 PROCEDURE — 1160F RVW MEDS BY RX/DR IN RCRD: CPT

## 2024-03-04 RX ORDER — BUPROPION HYDROCHLORIDE 300 MG/1
TABLET ORAL
COMMUNITY
Start: 2024-02-25

## 2024-03-04 NOTE — PROGRESS NOTES
CHIEF COMPLAINT  Back pain    Subjective   Magdalena Dickey is a 68 y.o. female  who presents to the office for follow-up of procedure.  She completed a Bilateral L3-L5 MBB on 2/26/24 performed by Dr. Bernal for management of  back pain. Patient reports 100% relief from the procedure for several hours. She was able to get her hair done and sit for longer periods of time without experiencing increased pain.    Today pain is 3/10VAS in severity. Her main complaint today is mid back pain that is located near her bra line and radiates across her back. Low back pain is also present and Pain is located in her low back and radiates into bilateral buttocks and hips. Denies radicular symptoms but reports numbness/tingling to bilateral feet. Describes this pain as an intermittent aching and burning.  Pain is worsened by prolonged standing or sitting, bending/twisting, and walking long distances. Pain improves with rest/reposition and medications. She has completed PT in the past. No relief with use of a TENS unit.      Continues with Gabapentin 400mg TID, Baclofen 10mg BID PRN, Cymbalta 60mg, Trazodone 50mg PRN, and Xanax 0.25mg PRN for anxiety. The medications are managed by her PCP. She also utilizes OTC Tylenol Arthritis as needed.     She is scheduled to see Dr. Sims with Neurosurgery on 3/7/24.      Procedures:  2/26/24 - Bilateral L3-L5 MBB - 100% for several hours   12/6/23 - Bilateral L4 TF LESI - 50% relief  9/20/23 - L4/L5 LESI - 50% relief x 1 month     Surgical History:  [facility] - Dr. De Dios    Back Pain  This is a chronic problem. The current episode started more than 1 year ago. The problem is unchanged (unchanged since last office visit). The pain is present in the lumbar spine and sacro-iliac. The quality of the pain is described as aching and burning. The pain does not radiate (discomfort to bilateral feet). The pain is at a severity of 3/10. The pain is mild. The symptoms are aggravated by position, standing,  sitting and bending. Pertinent negatives include no abdominal pain, chest pain, dysuria, fever, headaches, numbness or weakness. She has tried muscle relaxant and bed rest (Tylenol, Gabapentin, TENS unit (no relief), PT) for the symptoms. The treatment provided mild relief.   Neck Pain   This is a chronic problem. The current episode started more than 1 year ago. The problem occurs constantly. The problem has been waxing and waning. The pain is associated with an unknown factor. The pain is present in the left side, midline and right side (radaites into bilateral shoulders). The quality of the pain is described as aching. The pain is at a severity of 5/10. The pain is mild. The symptoms are aggravated by position, twisting, bending and stress. Pertinent negatives include no chest pain, fever, headaches, numbness or weakness. She has tried oral narcotics, heat and ice (Gabapentin, Cymbalta) for the symptoms.      PEG Assessment   What number best describes your pain on average in the past week?6  What number best describes how, during the past week, pain has interfered with your enjoyment of life?5  What number best describes how, during the past week, pain has interfered with your general activity?  5    Review of Pertinent Medical Data ---      MRI OF THE CERVICAL SPINE WITH AND WITHOUT AN MRI OF THE THORACIC SPINE  WITHOUT CONTRAST     CLINICAL HISTORY: Neck and mid back pain radiating into shoulders.     TECHNIQUE: MRI of the cervical spine was obtained with sagittal pre and  postgadolinium T1, gradient echo, and T2-weighted images. Additionally,  there are axial pre and postgadolinium T1, gradient echo, and  T2-weighted images through the cervical spine.     COMPARISON: Cervical spine MRI dated 6/14/2018.     FINDINGS:     At C2-3 and C3-4, there is no significant canal or foraminal stenosis.     At C4-5, there is a minimal disc osteophyte complex minimally indenting  the ventral subarachnoid space. There is a  mild degree of foraminal  narrowing as a result of uncovertebral joint hypertrophy. No significant  interval change is seen.     At C5-6, there is a disc osteophyte complex which results in a mild to  moderate degree of canal stenosis. This is more prominent when compared  to the prior study. There is mild left and moderate to severe right  foraminal narrowing as a result of uncovertebral joint hypertrophy. A  similar degree of foraminal stenosis was appreciated on the prior study.     At the C6-7 level, there has been an interval anterior cervical  discectomy and fusion procedure. The previously identified disc  extrusion C6-7 is no longer evident. There is no significant canal  stenosis.     At C7-T1, there is no significant degree of canal or foraminal  narrowing.     The cervical spinal cord has normal signal intensity. The visualized  contents of the cranial vault are unremarkable. There are no abnormal  foci of contrast enhancement     IMPRESSION:     In the interval since the prior study, the patient has undergone an  anterior cervical discectomy and fusion procedure at the level the C6-7  disc space. The previously identified extruded disc material at C6-7 is  no longer evident. There is no significant canal or foraminal narrowing  at C6-7. At the C5-6 level, there is a disc osteophyte complex which  results in a mild to moderate degree of canal stenosis. This is the  level of the most prominent canal narrowing. This canal narrowing  appears mildly more prominent when compared to the prior study. There is  mild left and moderate to severe right C5-6 foraminal narrowing as a  result of uncovertebral joint hypertrophy. Similar findings are noted on  the prior exam.     The remaining stable degenerative phenomena within the cervical spine  are as discussed in detail above.     TECHNIQUE: MRI of the thoracic spine was obtained with sagittal T1,  proton density, and T2 weighted images. Additionally, there are  axial T1  and T2 weighted images through the thoracic spine.     FINDINGS:     There is no significant canal or foraminal stenosis seen throughout the  thoracic spine. The thoracic spinal cord has normal signal intensity.     There is a mild chronic compression deformity at L1 with approximately  10 to 20% loss of vertebral body height within the maximally compressed  portion of the vertebra. The vertebral body heights within the thoracic  spine are well-maintained.     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     IMPRESSION:     The thoracic spine is unremarkable in appearance.     Note is made of a mild chronic compression deformity at L1 with  approximately 10 to 20% loss of vertebral body height within the  maximally compressed portion of the vertebra.     This report was finalized on 2/6/2024 10:00 AM by Dr. Vahid Linares M.D  on Workstation: [id number]    The following portions of the patient's history were reviewed and updated as appropriate: allergies, current medications, past family history, past medical history, past social history, past surgical history, and problem list.    Review of Systems   Constitutional:  Positive for activity change and fatigue. Negative for fever.   Cardiovascular:  Negative for chest pain.   Gastrointestinal:  Negative for abdominal pain, constipation and diarrhea.   Genitourinary:  Positive for difficulty urinating (weak). Negative for dysuria.   Musculoskeletal:  Positive for back pain.   Neurological:  Negative for dizziness, weakness, light-headedness, numbness and headaches.   Psychiatric/Behavioral:  Negative for agitation, sleep disturbance and suicidal ideas. The patient is not nervous/anxious.      I have reviewed and confirmed the accuracy of the ROS as documented by the MA/LPN/RN ORIANA Diamond     Vitals:    03/04/24 0936   BP: 143/86   BP Location: Right arm   Patient Position: Sitting   Cuff Size: Large Adult   Pulse: 86   Resp: 18  "  Temp: 96.9 °F (36.1 °C)   SpO2: 97%   Weight: 85 kg (187 lb 6.4 oz)   Height: 165.1 cm (65\")   PainSc:   3   PainLoc: Back     Objective   Physical Exam  Constitutional:       Appearance: Normal appearance.   HENT:      Head: Normocephalic.   Cardiovascular:      Rate and Rhythm: Normal rate and regular rhythm.   Pulmonary:      Effort: Pulmonary effort is normal.      Breath sounds: Normal breath sounds.   Musculoskeletal:      Cervical back: Normal range of motion.      Lumbar back: Tenderness present. Decreased range of motion. Negative right straight leg raise test and negative left straight leg raise test.      Comments: + lumbar facet loading/tenderness   Skin:     General: Skin is warm and dry.      Capillary Refill: Capillary refill takes less than 2 seconds.   Neurological:      General: No focal deficit present.      Mental Status: She is alert and oriented to person, place, and time.   Psychiatric:         Mood and Affect: Mood normal.         Behavior: Behavior normal.         Thought Content: Thought content normal.         Cognition and Memory: Cognition normal.       Assessment & Plan   Diagnoses and all orders for this visit:    1. Chronic bilateral thoracic back pain (Primary)    2. Neck pain    3. Lumbar stenosis with neurogenic claudication    4. Lumbar facet arthropathy      Magdalena Dickey reports a pain score of 3.  Given her pain assessment as noted, treatment options were discussed and the following options were decided upon as a follow-up plan to address the patient's pain: continuation of current treatment plan for pain.    --- Proceed with 2nd Bilateral L3-L5 MBB - scheduled on 3/11/24  -------  Education about Medial Branch Blockade and RF Therapy:  This medial branch blockade (MBB) suggested is intended for diagnostic purposes, with the intent of offering the patient Radiofrequency thermal rhizotomy (RF) if the MBB is diagnostically effective.  The diagnostic blockade is necessary to " "determine the likelihood that RF therapy could be efficacious in providing long term relief to the patient.    Medial branches are sensory nerve branches that connect to a facet joint and transmit sensations & pain signals from that joint.  Facet is a term for the type of joints found in the spine.  Medial branches are the nerves that go to a facet, and therefore are also sometimes called \"facet joint nerves\" (FJNs).      In a medial branch blockade procedure, xray fluoroscopy is used to verify the locations of the outside of the joint lines which are being targeted.  Under xray guidance, needles are placed to these areas.  Contrast dye is injected to confirm proper placement, with dye flowing over the joint area, and to ensure that the dye does not flow into unintended areas such as a vein.  When this is confirmed, local anesthetic is injected to block the medial branch at that joint level.      If MBBs are diagnostically successful in blocking pain, then the patient is most likely a great candidate for Radiofrequency of those facet joint nerves.  In the RF procedure, needles are placed to the joint lines in the same fashion, and after testing, the needle tips are heated to thermally treat the nerves, blocking the nerves by in essence damaging the nerves with the heat treatment(non-pulsed).       Medically, a successful RF procedure should provide a patient with 50% pain relief or more for at least 6 months.  Clinical experience suggests that successful patients receive relief more in the range of 12 months on average.  We also discussed that a fortunate minority of patients receive therapeutic success from the MBB, and may not require RF ablation.  If a patient receives more than 8 weeks of relief from MBB, then occasional repeat MBB for therapeutic purposes is a very reasonable alternative therapy.  This course of therapy is consistent with our LCDs according to our CMS  in the area, and therefore " other insurance providers should follow accordingly.  We will monitor our patients to screen for these therapeutic responders and will offer RF therapy only when necessary.      We discussed that MBB & RF are not without risks.  Guidelines regarding anticoagulant use & neuraxial procedures will be respected.  Patients that are ill or otherwise may be at risk for sepsis will not have their spines accessed by neuraxial injections of any type.  This patient will not be offered these therapies if there is an increased risk.   We discussed that there is a risk of postprocedural pain and also a risk of worsening of clinical picture with these procedures as with any neuraxial procedure.    -------  --- Follow-up for procedure - scheduled 3/18/24    Diagnostic Facet Joint Procedure:   MBB   The confirmatory diagnostic facet joint procedure is considered medically reasonable and necessary for the diagnosis and treatment of chronic pain for this patient due to the patient meeting all of the following criteria:    1. Moderate to severe chronic neck or low back pain, predominantly axial, that causes functional deficit measured on pain or disability scale.  2. Pain present for minimum of 3 months with documented failure to respond to noninvasive conservative management (as tolerated)  3. Absence of untreated radiculopathy or neurogenic claudication (except for radiculopathy caused by facet joint synovial cyst)  4. There is no non-facet pathology per clinical assessment or radiology studies that could explain the source of the patient’s pain, including but not limited to fracture, tumor, infection, or significant deformity.    The patient has also shown a consistent positive response of at least 80% relief of primary (index) pain (with the duration of relief being consistent with the agent used).        MAXIME REPORT  As the clinician, I personally reviewed the MAXIME from 3/4/24 while the patient was in the office  today.    Dictated utilizing Dragon dictation.

## 2024-03-04 NOTE — H&P (VIEW-ONLY)
CHIEF COMPLAINT  Back pain    Subjective   Magdalena Dickey is a 68 y.o. female  who presents to the office for follow-up of procedure.  She completed a Bilateral L3-L5 MBB on 2/26/24 performed by Dr. Bernal for management of  back pain. Patient reports 100% relief from the procedure for several hours. She was able to get her hair done and sit for longer periods of time without experiencing increased pain.    Today pain is 3/10VAS in severity. Her main complaint today is mid back pain that is located near her bra line and radiates across her back. Low back pain is also present and Pain is located in her low back and radiates into bilateral buttocks and hips. Denies radicular symptoms but reports numbness/tingling to bilateral feet. Describes this pain as an intermittent aching and burning.  Pain is worsened by prolonged standing or sitting, bending/twisting, and walking long distances. Pain improves with rest/reposition and medications. She has completed PT in the past. No relief with use of a TENS unit.      Continues with Gabapentin 400mg TID, Baclofen 10mg BID PRN, Cymbalta 60mg, Trazodone 50mg PRN, and Xanax 0.25mg PRN for anxiety. The medications are managed by her PCP. She also utilizes OTC Tylenol Arthritis as needed.     She is scheduled to see Dr. Sims with Neurosurgery on 3/7/24.      Procedures:  2/26/24 - Bilateral L3-L5 MBB - 100% for several hours   12/6/23 - Bilateral L4 TF LESI - 50% relief  9/20/23 - L4/L5 LESI - 50% relief x 1 month     Surgical History:  [facility] - Dr. De Dios    Back Pain  This is a chronic problem. The current episode started more than 1 year ago. The problem is unchanged (unchanged since last office visit). The pain is present in the lumbar spine and sacro-iliac. The quality of the pain is described as aching and burning. The pain does not radiate (discomfort to bilateral feet). The pain is at a severity of 3/10. The pain is mild. The symptoms are aggravated by position, standing,  sitting and bending. Pertinent negatives include no abdominal pain, chest pain, dysuria, fever, headaches, numbness or weakness. She has tried muscle relaxant and bed rest (Tylenol, Gabapentin, TENS unit (no relief), PT) for the symptoms. The treatment provided mild relief.   Neck Pain   This is a chronic problem. The current episode started more than 1 year ago. The problem occurs constantly. The problem has been waxing and waning. The pain is associated with an unknown factor. The pain is present in the left side, midline and right side (radaites into bilateral shoulders). The quality of the pain is described as aching. The pain is at a severity of 5/10. The pain is mild. The symptoms are aggravated by position, twisting, bending and stress. Pertinent negatives include no chest pain, fever, headaches, numbness or weakness. She has tried oral narcotics, heat and ice (Gabapentin, Cymbalta) for the symptoms.      PEG Assessment   What number best describes your pain on average in the past week?6  What number best describes how, during the past week, pain has interfered with your enjoyment of life?5  What number best describes how, during the past week, pain has interfered with your general activity?  5    Review of Pertinent Medical Data ---      MRI OF THE CERVICAL SPINE WITH AND WITHOUT AN MRI OF THE THORACIC SPINE  WITHOUT CONTRAST     CLINICAL HISTORY: Neck and mid back pain radiating into shoulders.     TECHNIQUE: MRI of the cervical spine was obtained with sagittal pre and  postgadolinium T1, gradient echo, and T2-weighted images. Additionally,  there are axial pre and postgadolinium T1, gradient echo, and  T2-weighted images through the cervical spine.     COMPARISON: Cervical spine MRI dated 6/14/2018.     FINDINGS:     At C2-3 and C3-4, there is no significant canal or foraminal stenosis.     At C4-5, there is a minimal disc osteophyte complex minimally indenting  the ventral subarachnoid space. There is a  mild degree of foraminal  narrowing as a result of uncovertebral joint hypertrophy. No significant  interval change is seen.     At C5-6, there is a disc osteophyte complex which results in a mild to  moderate degree of canal stenosis. This is more prominent when compared  to the prior study. There is mild left and moderate to severe right  foraminal narrowing as a result of uncovertebral joint hypertrophy. A  similar degree of foraminal stenosis was appreciated on the prior study.     At the C6-7 level, there has been an interval anterior cervical  discectomy and fusion procedure. The previously identified disc  extrusion C6-7 is no longer evident. There is no significant canal  stenosis.     At C7-T1, there is no significant degree of canal or foraminal  narrowing.     The cervical spinal cord has normal signal intensity. The visualized  contents of the cranial vault are unremarkable. There are no abnormal  foci of contrast enhancement     IMPRESSION:     In the interval since the prior study, the patient has undergone an  anterior cervical discectomy and fusion procedure at the level the C6-7  disc space. The previously identified extruded disc material at C6-7 is  no longer evident. There is no significant canal or foraminal narrowing  at C6-7. At the C5-6 level, there is a disc osteophyte complex which  results in a mild to moderate degree of canal stenosis. This is the  level of the most prominent canal narrowing. This canal narrowing  appears mildly more prominent when compared to the prior study. There is  mild left and moderate to severe right C5-6 foraminal narrowing as a  result of uncovertebral joint hypertrophy. Similar findings are noted on  the prior exam.     The remaining stable degenerative phenomena within the cervical spine  are as discussed in detail above.     TECHNIQUE: MRI of the thoracic spine was obtained with sagittal T1,  proton density, and T2 weighted images. Additionally, there are  axial T1  and T2 weighted images through the thoracic spine.     FINDINGS:     There is no significant canal or foraminal stenosis seen throughout the  thoracic spine. The thoracic spinal cord has normal signal intensity.     There is a mild chronic compression deformity at L1 with approximately  10 to 20% loss of vertebral body height within the maximally compressed  portion of the vertebra. The vertebral body heights within the thoracic  spine are well-maintained.     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     IMPRESSION:     The thoracic spine is unremarkable in appearance.     Note is made of a mild chronic compression deformity at L1 with  approximately 10 to 20% loss of vertebral body height within the  maximally compressed portion of the vertebra.     This report was finalized on 2/6/2024 10:00 AM by Dr. Vahid Linares M.D  on Workstation: [employer]    The following portions of the patient's history were reviewed and updated as appropriate: allergies, current medications, past family history, past medical history, past social history, past surgical history, and problem list.    Review of Systems   Constitutional:  Positive for activity change and fatigue. Negative for fever.   Cardiovascular:  Negative for chest pain.   Gastrointestinal:  Negative for abdominal pain, constipation and diarrhea.   Genitourinary:  Positive for difficulty urinating (weak). Negative for dysuria.   Musculoskeletal:  Positive for back pain.   Neurological:  Negative for dizziness, weakness, light-headedness, numbness and headaches.   Psychiatric/Behavioral:  Negative for agitation, sleep disturbance and suicidal ideas. The patient is not nervous/anxious.      I have reviewed and confirmed the accuracy of the ROS as documented by the MA/LPN/RN ORIANA Diamond     Vitals:    03/04/24 0936   BP: 143/86   BP Location: Right arm   Patient Position: Sitting   Cuff Size: Large Adult   Pulse: 86   Resp: 18  "  Temp: 96.9 °F (36.1 °C)   SpO2: 97%   Weight: 85 kg (187 lb 6.4 oz)   Height: 165.1 cm (65\")   PainSc:   3   PainLoc: Back     Objective   Physical Exam  Constitutional:       Appearance: Normal appearance.   HENT:      Head: Normocephalic.   Cardiovascular:      Rate and Rhythm: Normal rate and regular rhythm.   Pulmonary:      Effort: Pulmonary effort is normal.      Breath sounds: Normal breath sounds.   Musculoskeletal:      Cervical back: Normal range of motion.      Lumbar back: Tenderness present. Decreased range of motion. Negative right straight leg raise test and negative left straight leg raise test.      Comments: + lumbar facet loading/tenderness   Skin:     General: Skin is warm and dry.      Capillary Refill: Capillary refill takes less than 2 seconds.   Neurological:      General: No focal deficit present.      Mental Status: She is alert and oriented to person, place, and time.   Psychiatric:         Mood and Affect: Mood normal.         Behavior: Behavior normal.         Thought Content: Thought content normal.         Cognition and Memory: Cognition normal.       Assessment & Plan   Diagnoses and all orders for this visit:    1. Chronic bilateral thoracic back pain (Primary)    2. Neck pain    3. Lumbar stenosis with neurogenic claudication    4. Lumbar facet arthropathy      Magdalena Dickey reports a pain score of 3.  Given her pain assessment as noted, treatment options were discussed and the following options were decided upon as a follow-up plan to address the patient's pain: continuation of current treatment plan for pain.    --- Proceed with 2nd Bilateral L3-L5 MBB - scheduled on 3/11/24  -------  Education about Medial Branch Blockade and RF Therapy:  This medial branch blockade (MBB) suggested is intended for diagnostic purposes, with the intent of offering the patient Radiofrequency thermal rhizotomy (RF) if the MBB is diagnostically effective.  The diagnostic blockade is necessary to " "determine the likelihood that RF therapy could be efficacious in providing long term relief to the patient.    Medial branches are sensory nerve branches that connect to a facet joint and transmit sensations & pain signals from that joint.  Facet is a term for the type of joints found in the spine.  Medial branches are the nerves that go to a facet, and therefore are also sometimes called \"facet joint nerves\" (FJNs).      In a medial branch blockade procedure, xray fluoroscopy is used to verify the locations of the outside of the joint lines which are being targeted.  Under xray guidance, needles are placed to these areas.  Contrast dye is injected to confirm proper placement, with dye flowing over the joint area, and to ensure that the dye does not flow into unintended areas such as a vein.  When this is confirmed, local anesthetic is injected to block the medial branch at that joint level.      If MBBs are diagnostically successful in blocking pain, then the patient is most likely a great candidate for Radiofrequency of those facet joint nerves.  In the RF procedure, needles are placed to the joint lines in the same fashion, and after testing, the needle tips are heated to thermally treat the nerves, blocking the nerves by in essence damaging the nerves with the heat treatment(non-pulsed).       Medically, a successful RF procedure should provide a patient with 50% pain relief or more for at least 6 months.  Clinical experience suggests that successful patients receive relief more in the range of 12 months on average.  We also discussed that a fortunate minority of patients receive therapeutic success from the MBB, and may not require RF ablation.  If a patient receives more than 8 weeks of relief from MBB, then occasional repeat MBB for therapeutic purposes is a very reasonable alternative therapy.  This course of therapy is consistent with our LCDs according to our CMS  in the area, and therefore " other insurance providers should follow accordingly.  We will monitor our patients to screen for these therapeutic responders and will offer RF therapy only when necessary.      We discussed that MBB & RF are not without risks.  Guidelines regarding anticoagulant use & neuraxial procedures will be respected.  Patients that are ill or otherwise may be at risk for sepsis will not have their spines accessed by neuraxial injections of any type.  This patient will not be offered these therapies if there is an increased risk.   We discussed that there is a risk of postprocedural pain and also a risk of worsening of clinical picture with these procedures as with any neuraxial procedure.    -------  --- Follow-up for procedure - scheduled 3/18/24    Diagnostic Facet Joint Procedure:   MBB   The confirmatory diagnostic facet joint procedure is considered medically reasonable and necessary for the diagnosis and treatment of chronic pain for this patient due to the patient meeting all of the following criteria:    1. Moderate to severe chronic neck or low back pain, predominantly axial, that causes functional deficit measured on pain or disability scale.  2. Pain present for minimum of 3 months with documented failure to respond to noninvasive conservative management (as tolerated)  3. Absence of untreated radiculopathy or neurogenic claudication (except for radiculopathy caused by facet joint synovial cyst)  4. There is no non-facet pathology per clinical assessment or radiology studies that could explain the source of the patient’s pain, including but not limited to fracture, tumor, infection, or significant deformity.    The patient has also shown a consistent positive response of at least 80% relief of primary (index) pain (with the duration of relief being consistent with the agent used).        MAXIME REPORT  As the clinician, I personally reviewed the MAXIME from 3/4/24 while the patient was in the office  today.    Dictated utilizing Dragon dictation.

## 2024-03-07 NOTE — SIGNIFICANT NOTE
Patient educated on the following :    - If you are receiving Sedation for your procedure Nothing to Eat 6 hours and only clear liquids for 2 hours prior to your procedure.    -You will need to have someone drive you home after your PROCEDURE and remain with you for 24 hours after the PROCEDURE  - The date of your procedure, your are welcome to have one visitor at bedside or remain within 10-15 minutes of [facility]  -You will need to arrive at 0700 on 3/11/2024 PROCEDURE  -Please contact [facility] [facility] at: [phone] with any questions and/or concerns

## 2024-03-07 NOTE — DISCHARGE INSTRUCTIONS
[facility] - Post-procedure Instructions          --  While there are no absolute restrictions, it is recommended that you do not perform strenuous activity today. In the morning, you may resume your level of activity as before your block.    --  If you have a band-aid at your injection site, please remove it later today. Observe the area for any redness, swelling, pus-like drainage, or a temperature over 101°. If any of these symptoms occur, please call your doctor at [phone]. If after office hours, leave a message and the on-call provider will return your call.    --  Ice may be applied to your injection site. It is recommended you avoid direct heat (heating pad; hot tub) for 1-2 days.    --  Call [facility] [place] at [phone] if you experience persistent headache, persistent bleeding from the injection site, or severe pain not relieved by heat or oral medication.    --  Do not make important decisions today.    --  Due to the effects of the block and/or the I.V. Sedation, DO NOT drive or operate hazardous machinery for 12 hours.  Local anesthetics may cause numbness after procedure and precautions must be taken with regards to operating equipment as well as with walking, even if ambulating with assistance of another person or with an assistive device.    --  Do not drink alcohol for 12 hours.    -- You may return to work tomorrow, or as directed by your referring doctor.    --  Occasionally you may notice a slight increase in your pain after the procedure. This should start to improve within the next 24-48 hours. Radiofrequency ablation procedure pain may last 3-4 weeks.    --  It may take as long as 3-4 days before you notice a gradual improvement in your pain and/or other symptoms.    -- You may continue to take your prescribed pain medication as needed.    --  Some normal possible side effects of steroid use could include fluid retention, increased blood sugar, dull headache,  increased sweating, increased appetite, mood swings and flushing.    --  Diabetics are recommended to watch their blood glucose level closely for 24-48 hours after the injection.    --  Must stay in PACU for 20 min upon arrival and prove no leg weakness before being discharged.    --  IN THE EVENT OF A LIFE THREATENING EMERGENCY, (CHEST PAIN, BREATHING DIFFICULTIES, PARALYSIS…) YOU SHOULD GO TO YOUR NEAREST EMERGENCY ROOM.    --  You should be contacted by our office within 2-3 days to schedule follow up or next appointment date.  If not contacted within 7 days, please call the office at [phone]     If you have even 1 hour of relief, these injections are considered successful.

## 2024-03-11 ENCOUNTER — HOSPITAL ENCOUNTER (OUTPATIENT)
Dept: GENERAL RADIOLOGY | Facility: SURGERY CENTER | Age: 69
Setting detail: HOSPITAL OUTPATIENT SURGERY
End: 2024-03-11
Payer: MEDICARE

## 2024-03-11 ENCOUNTER — HOSPITAL ENCOUNTER (OUTPATIENT)
Facility: SURGERY CENTER | Age: 69
Setting detail: HOSPITAL OUTPATIENT SURGERY
Discharge: HOME | End: 2024-03-11
Attending: ANESTHESIOLOGY | Admitting: ANESTHESIOLOGY
Payer: MEDICARE

## 2024-03-11 VITALS
WEIGHT: 185 LBS | HEART RATE: 75 BPM | DIASTOLIC BLOOD PRESSURE: 85 MMHG | HEIGHT: 66 IN | BODY MASS INDEX: 29.73 KG/M2 | TEMPERATURE: 97.8 F | OXYGEN SATURATION: 96 % | RESPIRATION RATE: 16 BRPM | SYSTOLIC BLOOD PRESSURE: 130 MMHG

## 2024-03-11 DIAGNOSIS — Z41.9 SURGERY, ELECTIVE: ICD-10-CM

## 2024-03-11 DIAGNOSIS — M47.816 LUMBAR FACET ARTHROPATHY: ICD-10-CM

## 2024-03-11 PROCEDURE — 64494 INJ PARAVERT F JNT L/S 2 LEV: CPT | Performed by: ANESTHESIOLOGY

## 2024-03-11 PROCEDURE — 25010000002 BUPIVACAINE (PF) 0.25 % SOLUTION 10 ML VIAL: Performed by: ANESTHESIOLOGY

## 2024-03-11 PROCEDURE — 76000 FLUOROSCOPY <1 HR PHYS/QHP: CPT

## 2024-03-11 PROCEDURE — 77002 NEEDLE LOCALIZATION BY XRAY: CPT

## 2024-03-11 PROCEDURE — 64493 INJ PARAVERT F JNT L/S 1 LEV: CPT | Performed by: ANESTHESIOLOGY

## 2024-03-11 RX ORDER — ARFORMOTEROL TARTRATE 15 UG/2ML
SOLUTION RESPIRATORY (INHALATION)
COMMUNITY

## 2024-03-11 RX ORDER — REVEFENACIN 175 UG/3ML
SOLUTION RESPIRATORY (INHALATION)
COMMUNITY

## 2024-03-11 RX ORDER — CYCLOSPORINE 0.5 MG/ML
EMULSION OPHTHALMIC
COMMUNITY
Start: 2024-03-04

## 2024-03-11 NOTE — OP NOTE
Lumbar Medial Branch Blockade at  Bilateral L3-l5  [facility]      PREOPERATIVE DIAGNOSIS:  Lumbar spondylosis without myelopathy    POSTOPERATIVE DIAGNOSIS:  Lumbar spondylosis without myelopathy    PROCEDURE:   Diagnostic Lumbar Medial Branch Nerve Blockades, with fluoroscopy:  at the L3, L4, L5 transverse processes)   09630-56 -- Lumbar Facet blocks, 1st Level  70856-53 -- Lumbar Facet blocks, 2nd  Level     PRE-PROCEDURE DISCUSSION WITH PATIENT:    Risks and complications were discussed with the patient prior to starting the procedure and informed consent was obtained.      SURGEON:  Sarah Bernal MD    REASON FOR PROCEDURE:    The patient complains of pain that seems to have a significant axial component and Previous diagnostic positivity of a Lumbar Medial Branch Blockade at the same levels    SEDATION:  No sedation was used for this procedure  ANESTHETIC:  0.25% bupivacaine  STEROID:  None  TOTAL VOLUME OF SOLUTION:  6ml    DESCRIPTON OF PROCEDURE:  After obtaining informed consent, IV access was not obtained in the preoperative area.   The patient was taken to the operating room.  The patient was placed in the prone position with a pillow under the abdomen. All pressure points were well padded.  EKG, blood pressure, and pulse oximeter were monitored.  The patient was monitored and sedated by the RN under my direction. The lumbosacral area was prepped with Chloraprep and draped in a sterile fashion.     AP fluoroscopic image was used to visualize the junction of the right S1 superior articular process with the sacral ala.  The image was then optimized to maximize visualization of the junctions of the L3, L4, L5 superior articular processes with the transverse processes.  The skin and subcutaneous tissue over the areas was anesthetized with 1% lidocaine.  A 22-gauge spinal needle was then advanced percutaneously through the anesthetized skin tracts under fluoroscopic guidance in a coaxial  view to contact periosteum at the target sites.  After negative aspiration, a volume of 1 mL of the local anesthetic  was injected without resistance at each of the target sites.      The same procedure was then performed on the contralateral side in the exact same fashion.        Onset of analgesia was noted.  Vital signs remained stable throughout.      ESTIMATED BLOOD LOSS:  <5 mL  SPECIMENS:  none    COMPLICATIONS:   No complications were noted.    TOLERANCE & DISCHARGE CONDITION:    The patient tolerated the procedure well.  The patient was transported to the recovery area without difficulties.  The patient was discharged to home under the care of family in stable and satisfactory condition.    Pre-procedure pain score: 1/10 at rest, 7/10 with activity  Post-procedure pain score: 2/10    PLAN OF CARE:  The patient was given our standard instruction sheet.  We discussed that Lumbar Medial Branch Blockade is a diagnostic procedure in consideration for radiofrequency ablation if two diagnostic procedures prove to be positive for significant benefit.  An alternative plan could be therapeutic lumbar branch blockades.  The patient is asked to keep an account of pain relief after the procedure today.  The patient will  Return to clinic 1-2 wks.  The patient will resume all medications as per the medication reconciliation sheet.

## 2024-03-12 ENCOUNTER — OFFICE VISIT (OUTPATIENT)
Dept: INTERNAL MEDICINE | Facility: CLINIC | Age: 69
End: 2024-03-12
Payer: MEDICARE

## 2024-03-12 VITALS
HEIGHT: 66 IN | HEART RATE: 74 BPM | SYSTOLIC BLOOD PRESSURE: 142 MMHG | BODY MASS INDEX: 29.73 KG/M2 | WEIGHT: 185 LBS | DIASTOLIC BLOOD PRESSURE: 84 MMHG

## 2024-03-12 DIAGNOSIS — M79.672 BILATERAL FOOT PAIN: Primary | ICD-10-CM

## 2024-03-12 DIAGNOSIS — K21.9 GASTROESOPHAGEAL REFLUX DISEASE, UNSPECIFIED WHETHER ESOPHAGITIS PRESENT: ICD-10-CM

## 2024-03-12 DIAGNOSIS — I10 ESSENTIAL HYPERTENSION: Chronic | ICD-10-CM

## 2024-03-12 DIAGNOSIS — M79.671 BILATERAL FOOT PAIN: Primary | ICD-10-CM

## 2024-03-12 PROCEDURE — 99214 OFFICE O/P EST MOD 30 MIN: CPT | Performed by: INTERNAL MEDICINE

## 2024-03-12 PROCEDURE — 3077F SYST BP >= 140 MM HG: CPT | Performed by: INTERNAL MEDICINE

## 2024-03-12 PROCEDURE — 1159F MED LIST DOCD IN RCRD: CPT | Performed by: INTERNAL MEDICINE

## 2024-03-12 PROCEDURE — 3079F DIAST BP 80-89 MM HG: CPT | Performed by: INTERNAL MEDICINE

## 2024-03-12 PROCEDURE — 1160F RVW MEDS BY RX/DR IN RCRD: CPT | Performed by: INTERNAL MEDICINE

## 2024-03-12 RX ORDER — LOSARTAN POTASSIUM 100 MG/1
100 TABLET ORAL DAILY
Qty: 90 TABLET | Refills: 1 | Status: SHIPPED | OUTPATIENT
Start: 2024-03-12

## 2024-03-12 RX ORDER — PANTOPRAZOLE SODIUM 40 MG/1
40 TABLET, DELAYED RELEASE ORAL
Start: 2024-03-12

## 2024-03-12 NOTE — PROGRESS NOTES
"Chief Complaint  Hypertension    Subjective        Magdalena Dickey presents to [facility]  History of Present Illness here with several things to discuss-  -Neuropathy in feet is better since increase in the gabapentin. She still have some pain in feet - mainly on top with walking. Wonders if there is some OA.  -ongoing back problems- seeing pain MD- had trial of ablation, may get the full ablation done. She only takes Tylenol for pain but does not think it works.   -she has seen Dr. Roe about her neck- plans to have cervical disc surgery but needs an ENT also due to h/o vocal cord paralysis during previous surgery.   -wants to be able to try nsaids but was told kidney issue may stop her.   - Dr. Tan put her on Carafate for her nausea but doesn't really take it. She takes multiple supplements meant to help the nausea but nothing seems to work well- would like to just be able to take the Zofran on a very limited basis.   - she is riding an exercise bicycle and using minineb per Dr. Yolanda garcia.   - more sweating, using extra stength meds deodorant without help.  -newer problem with urine stream- d/w Dr. Roe who does not think related to back. Has to strain. She does not leak. No trouble with bowel movements.     Objective   Vital Signs:  /84   Pulse 74   Ht 167.6 cm (66\")   Wt 83.9 kg (185 lb)   BMI 29.86 kg/m²   Estimated body mass index is 29.86 kg/m² as calculated from the following:    Height as of this encounter: 167.6 cm (66\").    Weight as of this encounter: 83.9 kg (185 lb).               Physical Exam  Constitutional:       Appearance: Normal appearance.   Cardiovascular:      Rate and Rhythm: Normal rate and regular rhythm.   Musculoskeletal:      Right lower leg: No edema.      Left lower leg: No edema.   Neurological:      Gait: Gait normal.        Result Review :                     Assessment and Plan     Diagnoses and all orders for this visit:    1. " Bilateral foot pain (Primary)  Comments:  would like to her to see podiatrist, I think there is more than just neuropathy  Orders:  -     Ambulatory Referral to Podiatry    2. Gastroesophageal reflux disease, unspecified whether esophagitis present  Comments:  stable use of PPI. Hold Carafate  Orders:  -     pantoprazole (PROTONIX) 40 MG EC tablet; Take 1 tablet by mouth Daily With Dinner.    3. Essential hypertension  Comments:  increase losartan to 100 mg daily- goal < 130/80. recheck 3 m with renal function.  Orders:  -     losartan (COZAAR) 100 MG tablet; Take 1 tablet by mouth Daily.  Dispense: 90 tablet; Refill: 1  -     Basic Metabolic Panel; Future             Follow Up     Return in about 3 months (around 6/12/2024) for Recheck, Lab Before FUP.  Patient was given instructions and counseling regarding her condition or for health maintenance advice. Please see specific information pulled into the AVS if appropriate.

## 2024-03-13 DIAGNOSIS — M47.22 SPONDYLOSIS OF CERVICAL SPINE WITH RADICULOPATHY: Primary | ICD-10-CM

## 2024-03-16 DIAGNOSIS — R25.2 MUSCLE CRAMPS: ICD-10-CM

## 2024-03-18 ENCOUNTER — TELEPHONE (OUTPATIENT)
Dept: INTERNAL MEDICINE | Facility: CLINIC | Age: 69
End: 2024-03-18
Payer: MEDICARE

## 2024-03-18 ENCOUNTER — OFFICE VISIT (OUTPATIENT)
Dept: PAIN MEDICINE | Facility: CLINIC | Age: 69
End: 2024-03-18
Payer: MEDICARE

## 2024-03-18 ENCOUNTER — PREP FOR SURGERY (OUTPATIENT)
Dept: SURGERY | Facility: SURGERY CENTER | Age: 69
End: 2024-03-18
Payer: MEDICARE

## 2024-03-18 VITALS
WEIGHT: 186.4 LBS | RESPIRATION RATE: 18 BRPM | TEMPERATURE: 96.8 F | DIASTOLIC BLOOD PRESSURE: 89 MMHG | HEART RATE: 79 BPM | BODY MASS INDEX: 29.96 KG/M2 | SYSTOLIC BLOOD PRESSURE: 147 MMHG | HEIGHT: 66 IN | OXYGEN SATURATION: 98 %

## 2024-03-18 DIAGNOSIS — M47.816 LUMBAR FACET ARTHROPATHY: ICD-10-CM

## 2024-03-18 DIAGNOSIS — M48.062 LUMBAR STENOSIS WITH NEUROGENIC CLAUDICATION: ICD-10-CM

## 2024-03-18 DIAGNOSIS — M54.16 LUMBAR RADICULITIS: ICD-10-CM

## 2024-03-18 DIAGNOSIS — M54.6 CHRONIC BILATERAL THORACIC BACK PAIN: ICD-10-CM

## 2024-03-18 DIAGNOSIS — M47.816 LUMBAR FACET ARTHROPATHY: Primary | ICD-10-CM

## 2024-03-18 DIAGNOSIS — M54.2 NECK PAIN: ICD-10-CM

## 2024-03-18 DIAGNOSIS — M54.6 CHRONIC BILATERAL THORACIC BACK PAIN: Primary | ICD-10-CM

## 2024-03-18 DIAGNOSIS — G89.29 CHRONIC BILATERAL THORACIC BACK PAIN: Primary | ICD-10-CM

## 2024-03-18 DIAGNOSIS — G89.29 CHRONIC BILATERAL THORACIC BACK PAIN: ICD-10-CM

## 2024-03-18 PROCEDURE — 1160F RVW MEDS BY RX/DR IN RCRD: CPT

## 2024-03-18 PROCEDURE — 1125F AMNT PAIN NOTED PAIN PRSNT: CPT

## 2024-03-18 PROCEDURE — 1159F MED LIST DOCD IN RCRD: CPT

## 2024-03-18 PROCEDURE — 99214 OFFICE O/P EST MOD 30 MIN: CPT

## 2024-03-18 PROCEDURE — 3077F SYST BP >= 140 MM HG: CPT

## 2024-03-18 PROCEDURE — 3079F DIAST BP 80-89 MM HG: CPT

## 2024-03-18 RX ORDER — BACLOFEN 20 MG/1
TABLET ORAL
Qty: 60 TABLET | Refills: 3 | Status: SHIPPED | OUTPATIENT
Start: 2024-03-18

## 2024-03-18 NOTE — TELEPHONE ENCOUNTER
Love pharmacist at [facility] called and wasted to make sure you know the interaction between Gabapentin and Cymbalta    OK to still fill the gabapentin?

## 2024-03-18 NOTE — PROGRESS NOTES
CHIEF COMPLAINT  Back pain    Subjective   Magdalena Dickey is a 68 y.o. female  who presents to the office for follow-up of procedure.  She completed a Bilateral L3-L5 MBB on 3/11/24 performed by Dr. Bernal for management of low back pain. Patient reports 100% relief from the procedure for several hours. She reports that she was able to ride her recumbent bike without experiencing increased pain.     Today pain is 3/10VAS in severity. Her main complaint today is low back pain and radiates into bilateral buttocks/hips. Denies radicular symptoms but reports numbness/tingling to bilateral feet. Describes this pain as an intermittent aching and burning. Pain is worsened by prolonged standing or sitting, bending/twisting, and walking long distances. Pain improves with rest/reposition and medications. She has completed PT in the past. No relief with use of a TENS unit.      Continues with Gabapentin 400mg TID, Baclofen 10mg BID PRN, Cymbalta 60mg, Trazodone 50mg PRN, and Xanax 0.25mg PRN for anxiety. The medications are managed by her PCP. She also utilizes OTC Tylenol Arthritis as needed.      She saw Dr. Sims with Neurosurgery on 3/7/24.  He notes that patient has spondylosis at C5-6 level and that options include PT, injection therapy, and a C5-6 anterior decompression and fusion.  Patient states she did not feel like Dr. Sims answered all of her questions and asked her PCP for a new referral to [facility].  Referral was placed for Dr. Ojeda.  Appointment has not been scheduled yet.     Procedures:  3/11/24 - Bilateral L3-L5 MBB - 100% relief for several hours  2/26/24 - Bilateral L3-L5 MBB - 100% for several hours   12/6/23 - Bilateral L4 TF LESI - 50% relief  9/20/23 - L4/L5 LESI - 50% relief x 1 month     Surgical History:  [facility] - Dr. De Dios    Back Pain  This is a chronic problem. The current episode started more than 1 year ago. The problem has been comes and goes since onset. The pain is present in the  lumbar spine and sacro-iliac. The quality of the pain is described as aching and burning. The pain does not radiate (discomfort to bilateral feet). The pain is at a severity of 2/10. The pain is mild. The symptoms are aggravated by position, standing, sitting and bending. Associated symptoms include numbness (leg form ther procedure, lasted just for an hour). Pertinent negatives include no abdominal pain, chest pain, dysuria, fever, headaches or weakness. She has tried muscle relaxant and bed rest (Tylenol, Gabapentin, TENS unit (no relief), PT) for the symptoms. The treatment provided mild relief.   Neck Pain   This is a chronic problem. The current episode started more than 1 year ago. The problem occurs constantly. The problem has been unchanged (unchanged since last office visit). The pain is associated with an unknown factor. The pain is present in the left side, midline and right side (radaites into bilateral shoulders). The quality of the pain is described as aching. The pain is at a severity of 5/10. The pain is mild. The symptoms are aggravated by position, twisting, bending and stress. Associated symptoms include numbness (leg form ther procedure, lasted just for an hour). Pertinent negatives include no chest pain, fever, headaches or weakness. She has tried oral narcotics, heat and ice (Gabapentin, Cymbalta) for the symptoms.      PEG Assessment   What number best describes your pain on average in the past week?5  What number best describes how, during the past week, pain has interfered with your enjoyment of life?6  What number best describes how, during the past week, pain has interfered with your general activity?  6    Review of Pertinent Medical Data ---      The following portions of the patient's history were reviewed and updated as appropriate: allergies, current medications, past family history, past medical history, past social history, past surgical history, and problem list.    Review of  "Systems   Constitutional:  Negative for activity change, fatigue and fever.   Cardiovascular:  Negative for chest pain.   Gastrointestinal:  Negative for abdominal pain, constipation and diarrhea.   Genitourinary:  Positive for difficulty urinating. Negative for dysuria.   Musculoskeletal:  Positive for back pain and neck pain.   Neurological:  Positive for numbness (leg form ther procedure, lasted just for an hour). Negative for dizziness, weakness, light-headedness and headaches.   Psychiatric/Behavioral:  Positive for agitation and sleep disturbance. Negative for suicidal ideas. The patient is nervous/anxious.      I have reviewed and confirmed the accuracy of the ROS as documented by the MA/LPN/RN ORIANA Diamond     Vitals:    03/18/24 1029   BP: 147/89   BP Location: Left arm   Patient Position: Sitting   Cuff Size: Large Adult   Pulse: 79   Resp: 18   Temp: 96.8 °F (36 °C)   SpO2: 98%   Weight: 84.6 kg (186 lb 6.4 oz)   Height: 167.6 cm (66\")   PainSc:   2     Objective   Physical Exam  Constitutional:       Appearance: Normal appearance.   HENT:      Head: Normocephalic.   Cardiovascular:      Rate and Rhythm: Normal rate and regular rhythm.   Pulmonary:      Effort: Pulmonary effort is normal.      Breath sounds: Normal breath sounds.   Musculoskeletal:      Cervical back: Normal range of motion.      Lumbar back: Tenderness present. Decreased range of motion. Negative right straight leg raise test and negative left straight leg raise test.      Comments: + lumbar facet loading/tenderness   Skin:     General: Skin is warm and dry.      Capillary Refill: Capillary refill takes less than 2 seconds.   Neurological:      General: No focal deficit present.      Mental Status: She is alert and oriented to person, place, and time.   Psychiatric:         Mood and Affect: Mood normal.         Behavior: Behavior normal.         Thought Content: Thought content normal.         Cognition and Memory: Cognition " "normal.       Assessment & Plan   Diagnoses and all orders for this visit:    1. Chronic bilateral thoracic back pain (Primary)    2. Neck pain    3. Lumbar stenosis with neurogenic claudication    4. Lumbar facet arthropathy    5. Lumbar radiculitis      Magdalena Dickey reports a pain score of 2.  Given her pain assessment as noted, treatment options were discussed and the following options were decided upon as a follow-up plan to address the patient's pain: continuation of current treatment plan for pain.    --- Bilateral L3-L5 RFA  -------  Education about Medial Branch Blockade and RF Therapy:    This medial branch blockade (MBB) suggested is intended for diagnostic purposes, with the intent of offering the patient Radiofrequency thermal rhizotomy (RF) if the MBB is diagnostically effective.  The diagnostic blockade is necessary to determine the likelihood that RF therapy could be efficacious in providing long term relief to the patient.    Medial branches are sensory nerve branches that connect to a facet joint and transmit sensations & pain signals from that joint.  Facet is a term for the type of joints found in the spine.  Medial branches are the nerves that go to a facet, and therefore are also sometimes called \"facet joint nerves\" (FJNs).      In a medial branch blockade procedure, xray fluoroscopy is used to verify the locations of the outside of the joint lines which are being targeted.  Under xray guidance, needles are placed to these areas.  Contrast dye is injected to confirm proper placement, with dye flowing over the joint area, and to ensure that the dye does not flow into unintended areas such as a vein.  When this is confirmed, local anesthetic is injected to block the medial branch at that joint level.      If MBBs are diagnostically successful in blocking pain, then the patient is most likely a great candidate for Radiofrequency of those facet joint nerves.  In the RF procedure, needles are " placed to the joint lines in the same fashion, and after testing, the needle tips are heated to thermally treat the nerves, blocking the nerves by in essence damaging the nerves with the heat treatment(non-pulsed).       Medically, a successful RF procedure should provide a patient with 50% pain relief or more for at least 6 months.  Clinical experience suggests that successful patients receive relief more in the range of 12 months on average.  We also discussed that a fortunate minority of patients receive therapeutic success from the MBB, and may not require RF ablation.  If a patient receives more than 8 weeks of relief from MBB, then occasional repeat MBB for therapeutic purposes is a very reasonable alternative therapy.  This course of therapy is consistent with our LCDs according to our CMS  in the area, and therefore other insurance providers should follow accordingly.  We will monitor our patients to screen for these therapeutic responders and will offer RF therapy only when necessary.      We discussed that MBB & RF are not without risks.  Guidelines regarding anticoagulant use & neuraxial procedures will be respected.  Patients that are ill or otherwise may be at risk for sepsis will not have their spines accessed by neuraxial injections of any type.  This patient will not be offered these therapies if there is an increased risk.   We discussed that there is a risk of postprocedural pain and also a risk of worsening of clinical picture with these procedures as with any neuraxial procedure.    -------  Discussed with the patient that sedation is optional for this procedure.  The sedation offered is called conscious sedation which is different from general anesthesia that is utilized in surgical procedures. The dosing of the sedation is determined by the physician and they will be monitored throughout the procedure. With conscious sedation it is possible to remember parts or all of the  procedure, this is normal. They will need to have a  with them as driving is prohibited following conscious sedation.      NPO instructions for conscious sedation:  --- Do not eat 6 hours prior to the procedure.   --- Do not drink any dairy or citrus 4 hours prior to the procedure.   --- Do not drink anything, including clear liquids, 2 hours prior to procedure.      If the NPO instructions are not followed then the procedure may be performed without sedation or the procedure will need to be rescheduled.    --- Follow-up for procedure    Thermal Radiofrequency Destruction  This initial thermal radiofrequency destruction (RFA) of cervical, thoracic, or lumbar paravertebral facet joint (medial branch) nerves is considered medically reasonable and necessary as this patient has met the criteria of having at least two (2) medically reasonable and necessary diagnostic MBBs, with each one providing a consistent minimum of 80% sustained relief of primary (index) pain (with the duration of relief being consistent with the agent used).       MAXIME REPORT  As the clinician, I personally reviewed the MAXIME from 3/18/24 while the patient was in the office today.    Dictated utilizing Dragon dictation.

## 2024-03-18 NOTE — PATIENT INSTRUCTIONS
"-------  Education about Medial Branch Blockade and RF Therapy:    This medial branch blockade (MBB) suggested is intended for diagnostic purposes, with the intent of offering the patient Radiofrequency thermal rhizotomy (RF) if the MBB is diagnostically effective.  The diagnostic blockade is necessary to determine the likelihood that RF therapy could be efficacious in providing long term relief to the patient.    Medial branches are sensory nerve branches that connect to a facet joint and transmit sensations & pain signals from that joint.  Facet is a term for the type of joints found in the spine.  Medial branches are the nerves that go to a facet, and therefore are also sometimes called \"facet joint nerves\" (FJNs).      In a medial branch blockade procedure, xray fluoroscopy is used to verify the locations of the outside of the joint lines which are being targeted.  Under xray guidance, needles are placed to these areas.  Contrast dye is injected to confirm proper placement, with dye flowing over the joint area, and to ensure that the dye does not flow into unintended areas such as a vein.  When this is confirmed, local anesthetic is injected to block the medial branch at that joint level.      If MBBs are diagnostically successful in blocking pain, then the patient is most likely a great candidate for Radiofrequency of those facet joint nerves.  In the RF procedure, needles are placed to the joint lines in the same fashion, and after testing, the needle tips are heated to thermally treat the nerves, blocking the nerves by in essence damaging the nerves with the heat treatment(non-pulsed).       Medically, a successful RF procedure should provide a patient with 50% pain relief or more for at least 6 months.  Clinical experience suggests that successful patients receive relief more in the range of 12 months on average.  We also discussed that a fortunate minority of patients receive therapeutic success from the " MBB, and may not require RF ablation.  If a patient receives more than 8 weeks of relief from MBB, then occasional repeat MBB for therapeutic purposes is a very reasonable alternative therapy.  This course of therapy is consistent with our LCDs according to our CMS  in the area, and therefore other insurance providers should follow accordingly.  We will monitor our patients to screen for these therapeutic responders and will offer RF therapy only when necessary.      We discussed that MBB & RF are not without risks.  Guidelines regarding anticoagulant use & neuraxial procedures will be respected.  Patients that are ill or otherwise may be at risk for sepsis will not have their spines accessed by neuraxial injections of any type.  This patient will not be offered these therapies if there is an increased risk.   We discussed that there is a risk of postprocedural pain and also a risk of worsening of clinical picture with these procedures as with any neuraxial procedure.    -------    Discussed with the patient that sedation is optional for this procedure.  The sedation offered is called conscious sedation which is different from general anesthesia that is utilized in surgical procedures. The dosing of the sedation is determined by the physician and they will be monitored throughout the procedure. With conscious sedation it is possible to remember parts or all of the procedure, this is normal. They will need to have a  with them as driving is prohibited following conscious sedation.      NPO instructions for conscious sedation:  --- Do not eat 6 hours prior to the procedure.   --- Do not drink any dairy or citrus 4 hours prior to the procedure.   --- Do not drink anything, including clear liquids, 2 hours prior to procedure.      If the NPO instructions are not followed then the procedure may be performed without sedation or the procedure will need to be rescheduled.

## 2024-03-18 NOTE — H&P (VIEW-ONLY)
CHIEF COMPLAINT  Back pain    Subjective   Magdalena Dickey is a 68 y.o. female  who presents to the office for follow-up of procedure.  She completed a Bilateral L3-L5 MBB on 3/11/24 performed by Dr. Bernal for management of low back pain. Patient reports 100% relief from the procedure for several hours. She reports that she was able to ride her recumbent bike without experiencing increased pain.     Today pain is 3/10VAS in severity. Her main complaint today is low back pain and radiates into bilateral buttocks/hips. Denies radicular symptoms but reports numbness/tingling to bilateral feet. Describes this pain as an intermittent aching and burning. Pain is worsened by prolonged standing or sitting, bending/twisting, and walking long distances. Pain improves with rest/reposition and medications. She has completed PT in the past. No relief with use of a TENS unit.      Continues with Gabapentin 400mg TID, Baclofen 10mg BID PRN, Cymbalta 60mg, Trazodone 50mg PRN, and Xanax 0.25mg PRN for anxiety. The medications are managed by her PCP. She also utilizes OTC Tylenol Arthritis as needed.      She saw Dr. Sims with Neurosurgery on 3/7/24.  He notes that patient has spondylosis at C5-6 level and that options include PT, injection therapy, and a C5-6 anterior decompression and fusion.  Patient states she did not feel like Dr. Sims answered all of her questions and asked her PCP for a new referral to [facility].  Referral was placed for Dr. Ojeda.  Appointment has not been scheduled yet.     Procedures:  3/11/24 - Bilateral L3-L5 MBB - 100% relief for several hours  2/26/24 - Bilateral L3-L5 MBB - 100% for several hours   12/6/23 - Bilateral L4 TF LESI - 50% relief  9/20/23 - L4/L5 LESI - 50% relief x 1 month     Surgical History:  [facility] - Dr. De Dios    Back Pain  This is a chronic problem. The current episode started more than 1 year ago. The problem has been comes and goes since onset. The pain is present in the  lumbar spine and sacro-iliac. The quality of the pain is described as aching and burning. The pain does not radiate (discomfort to bilateral feet). The pain is at a severity of 2/10. The pain is mild. The symptoms are aggravated by position, standing, sitting and bending. Associated symptoms include numbness (leg form ther procedure, lasted just for an hour). Pertinent negatives include no abdominal pain, chest pain, dysuria, fever, headaches or weakness. She has tried muscle relaxant and bed rest (Tylenol, Gabapentin, TENS unit (no relief), PT) for the symptoms. The treatment provided mild relief.   Neck Pain   This is a chronic problem. The current episode started more than 1 year ago. The problem occurs constantly. The problem has been unchanged (unchanged since last office visit). The pain is associated with an unknown factor. The pain is present in the left side, midline and right side (radaites into bilateral shoulders). The quality of the pain is described as aching. The pain is at a severity of 5/10. The pain is mild. The symptoms are aggravated by position, twisting, bending and stress. Associated symptoms include numbness (leg form ther procedure, lasted just for an hour). Pertinent negatives include no chest pain, fever, headaches or weakness. She has tried oral narcotics, heat and ice (Gabapentin, Cymbalta) for the symptoms.      PEG Assessment   What number best describes your pain on average in the past week?5  What number best describes how, during the past week, pain has interfered with your enjoyment of life?6  What number best describes how, during the past week, pain has interfered with your general activity?  6    Review of Pertinent Medical Data ---      The following portions of the patient's history were reviewed and updated as appropriate: allergies, current medications, past family history, past medical history, past social history, past surgical history, and problem list.    Review of  "Systems   Constitutional:  Negative for activity change, fatigue and fever.   Cardiovascular:  Negative for chest pain.   Gastrointestinal:  Negative for abdominal pain, constipation and diarrhea.   Genitourinary:  Positive for difficulty urinating. Negative for dysuria.   Musculoskeletal:  Positive for back pain and neck pain.   Neurological:  Positive for numbness (leg form ther procedure, lasted just for an hour). Negative for dizziness, weakness, light-headedness and headaches.   Psychiatric/Behavioral:  Positive for agitation and sleep disturbance. Negative for suicidal ideas. The patient is nervous/anxious.      I have reviewed and confirmed the accuracy of the ROS as documented by the MA/LPN/RN ORIANA Diamond     Vitals:    03/18/24 1029   BP: 147/89   BP Location: Left arm   Patient Position: Sitting   Cuff Size: Large Adult   Pulse: 79   Resp: 18   Temp: 96.8 °F (36 °C)   SpO2: 98%   Weight: 84.6 kg (186 lb 6.4 oz)   Height: 167.6 cm (66\")   PainSc:   2     Objective   Physical Exam  Constitutional:       Appearance: Normal appearance.   HENT:      Head: Normocephalic.   Cardiovascular:      Rate and Rhythm: Normal rate and regular rhythm.   Pulmonary:      Effort: Pulmonary effort is normal.      Breath sounds: Normal breath sounds.   Musculoskeletal:      Cervical back: Normal range of motion.      Lumbar back: Tenderness present. Decreased range of motion. Negative right straight leg raise test and negative left straight leg raise test.      Comments: + lumbar facet loading/tenderness   Skin:     General: Skin is warm and dry.      Capillary Refill: Capillary refill takes less than 2 seconds.   Neurological:      General: No focal deficit present.      Mental Status: She is alert and oriented to person, place, and time.   Psychiatric:         Mood and Affect: Mood normal.         Behavior: Behavior normal.         Thought Content: Thought content normal.         Cognition and Memory: Cognition " "normal.       Assessment & Plan   Diagnoses and all orders for this visit:    1. Chronic bilateral thoracic back pain (Primary)    2. Neck pain    3. Lumbar stenosis with neurogenic claudication    4. Lumbar facet arthropathy    5. Lumbar radiculitis      Magdalena Dickey reports a pain score of 2.  Given her pain assessment as noted, treatment options were discussed and the following options were decided upon as a follow-up plan to address the patient's pain: continuation of current treatment plan for pain.    --- Bilateral L3-L5 RFA  -------  Education about Medial Branch Blockade and RF Therapy:    This medial branch blockade (MBB) suggested is intended for diagnostic purposes, with the intent of offering the patient Radiofrequency thermal rhizotomy (RF) if the MBB is diagnostically effective.  The diagnostic blockade is necessary to determine the likelihood that RF therapy could be efficacious in providing long term relief to the patient.    Medial branches are sensory nerve branches that connect to a facet joint and transmit sensations & pain signals from that joint.  Facet is a term for the type of joints found in the spine.  Medial branches are the nerves that go to a facet, and therefore are also sometimes called \"facet joint nerves\" (FJNs).      In a medial branch blockade procedure, xray fluoroscopy is used to verify the locations of the outside of the joint lines which are being targeted.  Under xray guidance, needles are placed to these areas.  Contrast dye is injected to confirm proper placement, with dye flowing over the joint area, and to ensure that the dye does not flow into unintended areas such as a vein.  When this is confirmed, local anesthetic is injected to block the medial branch at that joint level.      If MBBs are diagnostically successful in blocking pain, then the patient is most likely a great candidate for Radiofrequency of those facet joint nerves.  In the RF procedure, needles are " placed to the joint lines in the same fashion, and after testing, the needle tips are heated to thermally treat the nerves, blocking the nerves by in essence damaging the nerves with the heat treatment(non-pulsed).       Medically, a successful RF procedure should provide a patient with 50% pain relief or more for at least 6 months.  Clinical experience suggests that successful patients receive relief more in the range of 12 months on average.  We also discussed that a fortunate minority of patients receive therapeutic success from the MBB, and may not require RF ablation.  If a patient receives more than 8 weeks of relief from MBB, then occasional repeat MBB for therapeutic purposes is a very reasonable alternative therapy.  This course of therapy is consistent with our LCDs according to our CMS  in the area, and therefore other insurance providers should follow accordingly.  We will monitor our patients to screen for these therapeutic responders and will offer RF therapy only when necessary.      We discussed that MBB & RF are not without risks.  Guidelines regarding anticoagulant use & neuraxial procedures will be respected.  Patients that are ill or otherwise may be at risk for sepsis will not have their spines accessed by neuraxial injections of any type.  This patient will not be offered these therapies if there is an increased risk.   We discussed that there is a risk of postprocedural pain and also a risk of worsening of clinical picture with these procedures as with any neuraxial procedure.    -------  Discussed with the patient that sedation is optional for this procedure.  The sedation offered is called conscious sedation which is different from general anesthesia that is utilized in surgical procedures. The dosing of the sedation is determined by the physician and they will be monitored throughout the procedure. With conscious sedation it is possible to remember parts or all of the  procedure, this is normal. They will need to have a  with them as driving is prohibited following conscious sedation.      NPO instructions for conscious sedation:  --- Do not eat 6 hours prior to the procedure.   --- Do not drink any dairy or citrus 4 hours prior to the procedure.   --- Do not drink anything, including clear liquids, 2 hours prior to procedure.      If the NPO instructions are not followed then the procedure may be performed without sedation or the procedure will need to be rescheduled.    --- Follow-up for procedure    Thermal Radiofrequency Destruction  This initial thermal radiofrequency destruction (RFA) of cervical, thoracic, or lumbar paravertebral facet joint (medial branch) nerves is considered medically reasonable and necessary as this patient has met the criteria of having at least two (2) medically reasonable and necessary diagnostic MBBs, with each one providing a consistent minimum of 80% sustained relief of primary (index) pain (with the duration of relief being consistent with the agent used).       MAXIME REPORT  As the clinician, I personally reviewed the MAXIME from 3/18/24 while the patient was in the office today.    Dictated utilizing Dragon dictation.

## 2024-03-19 ENCOUNTER — TRANSCRIBE ORDERS (OUTPATIENT)
Dept: SURGERY | Facility: SURGERY CENTER | Age: 69
End: 2024-03-19
Payer: MEDICARE

## 2024-03-19 DIAGNOSIS — Z41.9 SURGERY, ELECTIVE: Primary | ICD-10-CM

## 2024-03-20 ENCOUNTER — TELEPHONE (OUTPATIENT)
Dept: GASTROENTEROLOGY | Facility: CLINIC | Age: 69
End: 2024-03-20
Payer: MEDICARE

## 2024-03-20 DIAGNOSIS — K21.9 GASTROESOPHAGEAL REFLUX DISEASE, UNSPECIFIED WHETHER ESOPHAGITIS PRESENT: ICD-10-CM

## 2024-03-20 NOTE — TELEPHONE ENCOUNTER
Rec'd refill fax request from [facility] for Pantoprazole.      Msg sent to [facility].

## 2024-03-21 ENCOUNTER — TELEPHONE (OUTPATIENT)
Dept: GASTROENTEROLOGY | Facility: CLINIC | Age: 69
End: 2024-03-21
Payer: MEDICARE

## 2024-03-21 RX ORDER — PANTOPRAZOLE SODIUM 40 MG/1
40 TABLET, DELAYED RELEASE ORAL
Qty: 90 TABLET | Refills: 0 | Status: SHIPPED | OUTPATIENT
Start: 2024-03-21

## 2024-03-21 NOTE — TELEPHONE ENCOUNTER
Former Dr. Guzman patient, needs yearly follow-up in May.  I refilled for 90 days.  Please let her know.

## 2024-03-21 NOTE — TELEPHONE ENCOUNTER
Provider: MIKE ALVAREZ    Caller: MILDRED MCINTYRE    Relationship to Patient: SELF    Pharmacy:     Phone Number: CALLED IN FROM [phone]    Reason for Call: PT RETURNED CALL TO [facility]. PT HAS CHANGED GASTRO PROVIDER TO DR CURTIS. NO NEED FOR APPT WITH DANA ALVAREZ.

## 2024-03-27 ENCOUNTER — TELEPHONE (OUTPATIENT)
Dept: GASTROENTEROLOGY | Facility: CLINIC | Age: 69
End: 2024-03-27
Payer: MEDICARE

## 2024-03-27 RX ORDER — ONDANSETRON 4 MG/1
4 TABLET, FILM COATED ORAL EVERY 8 HOURS PRN
Qty: 30 TABLET | Refills: 0 | Status: SHIPPED | OUTPATIENT
Start: 2024-03-27

## 2024-04-09 NOTE — DISCHARGE INSTRUCTIONS
[facility] - Post-procedure Instructions          --  While there are no absolute restrictions, it is recommended that you do not perform strenuous activity today. In the morning, you may resume your level of activity as before your block.    --  If you have a band-aid at your injection site, please remove it later today. Observe the area for any redness, swelling, pus-like drainage, or a temperature over 101°. If any of these symptoms occur, please call your doctor at [phone]. If after office hours, leave a message and the on-call provider will return your call.    --  Ice may be applied to your injection site. It is recommended you avoid direct heat (heating pad; hot tub) for 1-2 days.    --  Call [facility] at [phone] if you experience persistent headache, persistent bleeding from the injection site, or severe pain not relieved by heat or oral medication.    --  Do not make important decisions today.    --  Due to the effects of the block and/or the I.V. Sedation, DO NOT drive or operate hazardous machinery for 12 hours.  Local anesthetics may cause numbness after procedure and precautions must be taken with regards to operating equipment as well as with walking, even if ambulating with assistance of another person or with an assistive device.    --  Do not drink alcohol for 12 hours.    -- You may return to work tomorrow, or as directed by your referring doctor.    --  Occasionally you may notice a slight increase in your pain after the procedure. This should start to improve within the next 24-48 hours. Radiofrequency ablation procedure pain may last 3-4 weeks.    --  It may take as long as 3-4 days before you notice a gradual improvement in your pain and/or other symptoms.    -- You may continue to take your prescribed pain medication as needed.    --  Some normal possible side effects of steroid use could include fluid retention, increased blood sugar, dull headache,  "increased sweating, increased appetite, mood swings and flushing.    --  Diabetics are recommended to watch their blood glucose level closely for 24-48 hours after the injection.    --  Must stay in PACU for 20 min upon arrival and prove no leg weakness before being discharged.    --  IN THE EVENT OF A LIFE THREATENING EMERGENCY, (CHEST PAIN, BREATHING DIFFICULTIES, PARALYSIS…) YOU SHOULD GO TO YOUR NEAREST EMERGENCY ROOM.    --  You should be contacted by our office within 2-3 days to schedule follow up or next appointment date.  If not contacted within 7 days, please call the office at [phone]     Radiofrequency ablation (RFA):     - Radiofrequency ablation is a term used to describe cauterization or \"burning.\"   - In pain management, we can use this technique with a special needle to target and destroy areas that are causing your pain.   - In most cases, you must have TWO successful \"test injections\" before you are a candidate for RFA.    After your RFA:   - Because heat is used in this technique, it is common to have soreness after the procedure.  Sometimes \"neuritis\" occurs, which feels like tingling, prickly, or sunburn under the skin sensations.   - Ice packs are helpful in decreasing this soreness and preventing post-procedure \"neuritis\" pain.  Use an ice pack for 20 minutes at a time at least 3 times the day of and the day after your procedure.   - It is common to have arm/leg numbness or weakness the day of your procedure, but this should wear off by the following day.   - It may take up to 6 weeks to gain full benefit from this procedure.   "

## 2024-04-15 ENCOUNTER — HOSPITAL ENCOUNTER (OUTPATIENT)
Facility: SURGERY CENTER | Age: 69
Setting detail: HOSPITAL OUTPATIENT SURGERY
Discharge: HOME | End: 2024-04-15
Attending: ANESTHESIOLOGY | Admitting: ANESTHESIOLOGY
Payer: MEDICARE

## 2024-04-15 ENCOUNTER — HOSPITAL ENCOUNTER (OUTPATIENT)
Dept: GENERAL RADIOLOGY | Facility: SURGERY CENTER | Age: 69
Setting detail: HOSPITAL OUTPATIENT SURGERY
End: 2024-04-15
Payer: MEDICARE

## 2024-04-15 VITALS
RESPIRATION RATE: 20 BRPM | SYSTOLIC BLOOD PRESSURE: 133 MMHG | OXYGEN SATURATION: 95 % | HEIGHT: 66 IN | BODY MASS INDEX: 29.73 KG/M2 | HEART RATE: 95 BPM | WEIGHT: 185 LBS | TEMPERATURE: 98 F | DIASTOLIC BLOOD PRESSURE: 73 MMHG

## 2024-04-15 DIAGNOSIS — M48.062 LUMBAR STENOSIS WITH NEUROGENIC CLAUDICATION: ICD-10-CM

## 2024-04-15 DIAGNOSIS — M47.816 LUMBAR FACET ARTHROPATHY: ICD-10-CM

## 2024-04-15 DIAGNOSIS — Z41.9 SURGERY, ELECTIVE: ICD-10-CM

## 2024-04-15 PROCEDURE — 64636 DESTROY L/S FACET JNT ADDL: CPT | Performed by: ANESTHESIOLOGY

## 2024-04-15 PROCEDURE — 64635 DESTROY LUMB/SAC FACET JNT: CPT | Performed by: ANESTHESIOLOGY

## 2024-04-15 PROCEDURE — 76000 FLUOROSCOPY <1 HR PHYS/QHP: CPT

## 2024-04-15 PROCEDURE — 25010000002 BUPIVACAINE (PF) 0.25 % SOLUTION 10 ML VIAL: Performed by: ANESTHESIOLOGY

## 2024-04-15 PROCEDURE — 25010000002 FENTANYL CITRATE (PF) 50 MCG/ML SOLUTION: Performed by: ANESTHESIOLOGY

## 2024-04-15 PROCEDURE — 25010000002 MIDAZOLAM PER 1MG: Performed by: ANESTHESIOLOGY

## 2024-04-15 RX ORDER — SODIUM CHLORIDE 0.9 % (FLUSH) 0.9 %
10 SYRINGE (ML) INJECTION AS NEEDED
Status: DISCONTINUED | OUTPATIENT
Start: 2024-04-15 | End: 2024-04-15 | Stop reason: HOSPADM

## 2024-04-15 RX ORDER — MIDAZOLAM HYDROCHLORIDE 1 MG/ML
1 INJECTION INTRAMUSCULAR; INTRAVENOUS ONCE
Status: COMPLETED | OUTPATIENT
Start: 2024-04-15 | End: 2024-04-15

## 2024-04-15 RX ORDER — FENTANYL CITRATE 50 UG/ML
50 INJECTION, SOLUTION INTRAMUSCULAR; INTRAVENOUS ONCE
Status: COMPLETED | OUTPATIENT
Start: 2024-04-15 | End: 2024-04-15

## 2024-04-15 RX ORDER — SODIUM CHLORIDE 0.9 % (FLUSH) 0.9 %
10 SYRINGE (ML) INJECTION EVERY 12 HOURS SCHEDULED
Status: DISCONTINUED | OUTPATIENT
Start: 2024-04-15 | End: 2024-04-15 | Stop reason: HOSPADM

## 2024-04-15 RX ADMIN — MIDAZOLAM HYDROCHLORIDE 1 MG: 2 INJECTION, SOLUTION INTRAMUSCULAR; INTRAVENOUS at 09:01

## 2024-04-15 RX ADMIN — FENTANYL CITRATE 50 MCG: 0.05 INJECTION, SOLUTION INTRAMUSCULAR; INTRAVENOUS at 09:01

## 2024-04-15 NOTE — OP NOTE
Radiofrequency ablation of bilateral L3-L5  [facility]    PREOPERATIVE DIAGNOSIS:  Lumbar spondylosis without myelopathy   POSTOPERATIVE DIAGNOSIS:  Lumbar spondylosis without myelopathy     PROCEDURES PERFORMED:    Bilateral   lumbar radiofrequency ablation of the medial branches at the transverse processes of L3, L4, L5 to thermally treat these facet joints.  1.  88430 -50 Lumbar radiofrequency ablation 1st level.  2.  86797 -50 Lumbar radiofrequency ablation 2nd level.    INFORMED CONSENT:  In preprocedure discussion with the patient, the risks and complications were discussed prior to starting the procedure and informed consent was obtained.     SURGEON:   Sarah Bernal MD    INDICATIONS:  The patient presents with chronic lower back pain. The patient underwent 2 lumbar medial branch blocks with diagnostically positive relief. Given the patient’s significant pain relief, it is diagnostic that we have likely found the source of the patient’s pain; therefore, lumbar radiofrequency ablation has been indicated.     SEDATION:  Anxiolysis was used for this procedure which included Versed 1mg & Fentanyl 50mcg    TIME OF PROCEDURE:  The Interoperative procedure time, after administration of the IV sedative, was 3536-3399 minutes.    ANESTHETIC:  Lidocaine 1% for skin infiltration, 2% lidocaine and 0.25% bupivacaine for injection.    STEROID:  None.    DESCRIPTION OF PROCEDURE:  After obtaining informed consent an IV was  started in the preoperative area. The patient was taken to the operating room. The patient was placed in prone position with a pillow under the abdomen. All pressure points were padded. Heart rate, blood pressure, and pulse oximetry were monitored. The patient was  sedated. The lumbosacral area was prepped with ChloraPrep and draped in a sterile fashion.     The junction of the right S1 superior articular process and sacral ala was identified in a AP fluoroscopic view.  The image  was then obliqued towards the right side to maximize visualization of the junctions of the L3, L4, L5 superior articular processes with the transverse processes.  The skin and subcutaneous tissue inferior to the junction was anesthetized with 1% lidocaine. A 20-gauge 150mm RF Abbott needle was then advanced percutaneously through the anesthetized skin tract under fluoroscopic guidance until the non-insulated portion of the needle lie at the junction.  All needle tips were confirmed to be posterior to the neural foramen in the lateral fluoroscopic view. Sensory stimulation was then performed with good stimulation of the back at 0.5V or less at each level. Motor stimulation was performed up to 1.5V at each level producing stimulation of the multifidus muscles of the back and no stimulation of the lower extremity at any level. Each level was then anesthetized with 2% lidocaine prior to treatment with pulsed radiofrequency thermocoagulation at 42 degrees Celsius. Each level was then treated with thermal radiofrequency thermocoagulation at 80 degrees Celsius for 60 seconds in two separate cycles.  Prior to the removal of each needle, a volume of 1 mL of injectate was administered at each site.  The total volume consisted of 1mL of 2% lidocaine and 1mL of 0.25% bupivacaine.    The same procedure was then performed on the contralateral side in the exact same fashion.      ESTIMATED BLOOD LOSS:  Minimal.    SPECIMENS:  None.    COMPLICATIONS:  None.    TOLERANCE AND DISCHARGE:  The patient tolerated the procedure well. The patient was transported to the recovery area without difficulties. The patient was discharged home under the care of family in stable and satisfactory condition.    PLAN:  1.  The patient was given our standard instruction sheet.  2.  The patient will resume all medications per the medication reconciliation sheet.  3.  The patient will Return to clinic 6 wks.

## 2024-04-20 NOTE — DISCHARGE INSTRUCTIONS
[facility] [place] [facility] - Post-procedure Instructions          --  While there are no absolute restrictions, it is recommended that you do not perform strenuous activity today. In the morning, you may resume your level of activity as before your block.    --  If you have a band-aid at your injection site, please remove it later today. Observe the area for any redness, swelling, pus-like drainage, or a temperature over 101°. If any of these symptoms occur, please call your doctor at [phone]. If after office hours, leave a message and the on-call provider will return your call.    --  Ice may be applied to your injection site. It is recommended you avoid direct heat (heating pad; hot tub) for 1-2 days.    --  Call [facility] [place] at [phone] if you experience persistent headache, persistent bleeding from the injection site, or severe pain not relieved by heat or oral medication.    --  Do not make important decisions today.    --  Due to the effects of the block and/or the I.V. Sedation, DO NOT drive or operate hazardous machinery for 12 hours.  Local anesthetics may cause numbness after procedure and precautions must be taken with regards to operating equipment as well as with walking, even if ambulating with assistance of another person or with an assistive device.    --  Do not drink alcohol for 12 hours.    -- You may return to work tomorrow, or as directed by your referring doctor.    --  Occasionally you may notice a slight increase in your pain after the procedure. This should start to improve within the next 24-48 hours. Radiofrequency ablation procedure pain may last 3-4 weeks.    --  It may take as long as 3-4 days before you notice a gradual improvement in your pain and/or other symptoms.    -- You may continue to take your prescribed pain medication as needed.    --  Some normal possible side effects of steroid use could include fluid retention, increased blood sugar, dull headache,  increased sweating, increased appetite, mood swings and flushing.    --  Diabetics are recommended to watch their blood glucose level closely for 24-48 hours after the injection.    --  Must stay in PACU for 20 min upon arrival and prove no leg weakness before being discharged.    --  IN THE EVENT OF A LIFE THREATENING EMERGENCY, (CHEST PAIN, BREATHING DIFFICULTIES, PARALYSIS…) YOU SHOULD GO TO YOUR NEAREST EMERGENCY ROOM.    --  You should be contacted by our office within 2-3 days to schedule follow up or next appointment date.  If not contacted within 7 days, please call the office at [phone]    No

## 2024-04-25 ENCOUNTER — TELEPHONE (OUTPATIENT)
Dept: PAIN MEDICINE | Facility: CLINIC | Age: 69
End: 2024-04-25

## 2024-04-25 ENCOUNTER — OFFICE VISIT (OUTPATIENT)
Dept: NEUROSURGERY | Facility: CLINIC | Age: 69
End: 2024-04-25
Payer: MEDICARE

## 2024-04-25 VITALS
BODY MASS INDEX: 29.73 KG/M2 | SYSTOLIC BLOOD PRESSURE: 122 MMHG | HEIGHT: 66 IN | WEIGHT: 185 LBS | HEART RATE: 88 BPM | OXYGEN SATURATION: 94 % | DIASTOLIC BLOOD PRESSURE: 68 MMHG | RESPIRATION RATE: 16 BRPM

## 2024-04-25 DIAGNOSIS — M47.816 LUMBAR FACET ARTHROPATHY: ICD-10-CM

## 2024-04-25 DIAGNOSIS — M47.22 CERVICAL SPONDYLOSIS WITH RADICULOPATHY: Primary | ICD-10-CM

## 2024-04-25 NOTE — PROGRESS NOTES
Patient ID: Magdalena Dickey is a 68 y.o. female is being seen for consultation today at the request of Sun Guerrier MD for a second opinion on cervical spondylosis.      Imaging: Last MRI of the thoracic and cervical spine performed on 02/05/2024    Subjective     The patient is here in regards to   Chief Complaint   Patient presents with    Neck Pain       History of Present Illness  Madelaine has had several months of cervical pain with pain that radiates from her neck to both shoulders bilaterally.  This is reminiscent of her previous cervical radiculopathy for which she had a C6-7 ACDF with Dr. Palacios several years ago.  She is done well from that surgery.  She also has low back pain and recently had a lumbar RFA which was given her about 80% relief but she still has some tightness in her back but it is not as bad as before.  She is currently working with physical therapy and although she does not feel like it is making her symptoms any worse, she has not had any significant improvement in her neck symptoms.  Has not had a recent epidural steroid injection.      While in the room and during my examination of the patient I wore a mask and eye protection.  I washed my hands before and after this patient encounter.  The patient was also wearing a mask.    The following portions of the patient's history were reviewed and updated as appropriate: allergies, current medications, past family history, past medical history, past social history, past surgical history and problem list.    Review of Systems   Constitutional:  Positive for fatigue. Negative for fever.   HENT:  Negative for congestion.    Eyes:  Negative for visual disturbance.   Respiratory:  Negative for chest tightness and shortness of breath.    Cardiovascular:  Negative for chest pain.   Gastrointestinal:  Negative for diarrhea, nausea and vomiting.   Endocrine: Positive for heat intolerance.   Genitourinary:  Positive for difficulty urinating and  frequency.   Musculoskeletal:  Positive for back pain, neck pain and neck stiffness. Negative for gait problem.   Skin:  Negative for rash.   Allergic/Immunologic: Negative for food allergies.   Neurological:  Positive for dizziness, weakness, numbness (bilateral hands and feet) and headaches. Negative for light-headedness.   Hematological:  Does not bruise/bleed easily.   Psychiatric/Behavioral:  Negative for confusion and decreased concentration.         Past Medical History:   Diagnosis Date    Abnormal mammogram 03/2022    Anxiety     Cervical disc disorder     Cervical radiculopathy     Cervical spondylolysis     Chest pain 08/19/2022    ADMITTED TO [facility]    Chronic bilateral low back pain without sciatica     Chronic pain disorder     Colon polyps     FOLLOWED BY DR. ADRIA NYE    COVID-19 08/15/2020    SEEN AT     Delayed emergence from anesthesia 2018    Depression 05/25/2016    Diverticulitis 01/16/2018    ADMITTED TO [facility]    Diverticulitis 11/24/2017    SEEN AT [facility] ER    Diverticulitis of colon 06/2020    Dysesthesia     Dyspepsia     Extremity pain     Fecal incontinence 09/2022    GERD (gastroesophageal reflux disease)     Hair loss 12/2020    Headache, tension-type     Hiatal hernia     History of measles, mumps, or rubella     MEASLES AND MUMPS AS A CHILD    Hypertension     IBS (irritable bowel syndrome)     ILD (interstitial lung disease)     Insomnia     Joint pain     Lumbosacral disc disease     Lung nodule 11/2020    Migraine without aura and without status migrainosus, not intractable 05/25/2016    NAFLD (nonalcoholic fatty liver disease)     Neck pain     Osteoarthritis     Palpitations 04/09/2019    SEEN AT [facility] ER    Paresthesias     PNA (pneumonia) 08/23/2020    D/T COVID, ADMITTED TO [facility]    Polyneuropathy     Post-COVID chronic dyspnea     PUD (peptic ulcer disease)     Rotator cuff tendinitis, right 05/08/2018    Spinal stenosis     Thrush 09/2020    Vestibular neuritis 09/2014       No  Known Allergies    Family History   Problem Relation Age of Onset    Alzheimer's disease Mother         SDAT    Hypertension Mother     Diabetes type II Mother     Lung cancer Mother         POSSIBLE    Heart attack Mother     Cancer Mother         lung    COPD Mother     Depression Mother     Diabetes Mother     Heart disease Mother     Miscarriages / Stillbirths Mother     Alcohol abuse Father     Prostate cancer Father     Heart disease Father         THIRD DEGREE HEART BLOCK    Arthritis Father     Cancer Father         prostate    Drug abuse Father         Only alcohol    Learning disabilities Father     Cancer Sister     Ovarian cancer Sister     Cancer Sister     Ovarian cancer Sister     Arthritis Sister     Cancer Sister         ovarian    Depression Sister     Hypertension Sister     Cancer Sister         ovarian    Depression Sister     Hypertension Sister     COPD Sister     No Known Problems Brother     Arthritis Maternal Grandmother         rheumatoid    Breast cancer Other     Malig Hyperthermia Neg Hx        Social History     Socioeconomic History    Marital status:      Spouse name: Lonnie    Number of children: 3   Tobacco Use    Smoking status: Former     Current packs/day: 0.00     Average packs/day: 1 pack/day for 40.0 years (40.0 ttl pk-yrs)     Types: Cigarettes     Start date: 3/10/1974     Quit date: 3/10/2014     Years since quitting: 10.1     Passive exposure: Past    Smokeless tobacco: Never   Vaping Use    Vaping status: Former   Substance and Sexual Activity    Alcohol use: Yes     Comment: occasional, maybe 1 can of beer every other month    Drug use: No    Sexual activity: Not Currently     Partners: Male     Birth control/protection: Hysterectomy     Comment: .       Past Surgical History:   Procedure Laterality Date    ANTERIOR CERVICAL DISCECTOMY W/ FUSION N/A 06/21/2018    Procedure: C6 7 anterior cervical discectomy and fusion with allograft and plate;  Surgeon:  Jacobo De Dios MD;  Location: [facility] MAIN OR;  Service: Neurosurgery    CHOLECYSTECTOMY N/A 1980    AT [place]    COLON RESECTION N/A 01/16/2018    Procedure: LAPAROSCOPIC SIGMOID COLON RESECTION WITH MOBILIZATION OF SPLENIC FLEXURE;  Surgeon: Eric Davidson MD;  Location:  [facility] MAIN OR;  Service:     COLONOSCOPY N/A 02/15/2017    6 MM TUBULAR ADENOMA POLYP IN HEPATIC FLEXURE, 5 MM TUBULAR ADENOAM POLYP IN RECTUM, DR. GERARDO NYE AT [facility]    COLONOSCOPY N/A 03/01/2003    Internal hemorrhoids-Dr. Gerardo Nye    COLONOSCOPY N/A 02/08/2022    4 MM TUBULAR ADENOMA POLYP IN ASCENDING, HEMORRHOIDS, DR. GERARDO NYE AT [facility]    DIAGNOSTIC LAPAROSCOPY N/A 06/06/2001    Cul-de-sac endometriosis and adhesions-Dr. Aamir Christine    ENDOSCOPY N/A 02/15/2017    SMALL HIATAL HERNIA, GASTRITIS, DR. GERARDO NYE AT [facility]    ENDOSCOPY N/A 02/08/2022    GASTRITIS, MILD ESOPHAGITIS, Z LINE IRREGULAR, DR. GERARDO NYE AT [facility]    ENDOSCOPY N/A 09/12/2014    GASTRITIS, DR. GERARDO NYE AT [facility]    ENDOSCOPY AND COLONOSCOPY N/A 02/04/2009    SMALL HIATAL HERNIA, ESOPHAGITIS, HEMORRHOIDS, DIVERTICULOSIS, TORTUOUS COLON, DR. GERARDO NYE AT [facility]    EPIDURAL Bilateral 12/06/2023    Procedure: BILATERAL L4 TRANSFORAMINAL LUMBAR EPIDURAL STEROID INJECTION CPT: 86278, 69516;  Surgeon: Sarah Bernal MD;  Location: [facility];  Service: Pain Management;  Laterality: Bilateral;    EPIDURAL BLOCK      LUMBAR EPIDURAL INJECTION N/A 09/20/2023    Procedure: LUMBAR EPIDURAL 1ST VISIT L4-5 70951;  Surgeon: Sarah Bernal MD;  Location: [facility];  Service: Pain Management;  Laterality: N/A;    MEDIAL BRANCH BLOCK Bilateral 02/26/2024    Procedure: BILATERAL L3-L5 LUMBAR MEDIAL BRANCH BLOCK CPT: 39154, 23865;  Surgeon: Sarah Bernal MD;  Location: [facility];  Service: Pain Management;  Laterality: Bilateral;    MEDIAL BRANCH BLOCK Bilateral 3/11/2024    Procedure: BILATERAL L3-L5 LUMBAR MEDIAL BRANCH BLOCK CPT: 09042, 824706;  Surgeon: Dolores  Sarah ESPITIA MD;  [place] [facility] [place];  Service: Pain Management;  Laterality: Bilateral;    NECK SURGERY      RADIOFREQUENCY ABLATION Bilateral 4/15/2024    Procedure: BILATERAL L3-L5 RADIOFREQUENCY ABLATION LUMBAR CPT: 26414, 12542;  Surgeon: Sarah Bernal MD;  [place] [facility] [place];  Service: Pain Management;  Laterality: Bilateral;    SKIN BIOPSY Left 07/08/2022    LEFT CALF, PATH: SMALL FIBER NEUROPATHY    SPINAL FUSION      TOTAL ABDOMINAL HYSTERECTOMY WITH SALPINGO OOPHORECTOMY Bilateral 07/31/2001    Dr. Aamir Christine AT [facility]         Objective     Vitals:    04/25/24 1346   BP: 122/68   Pulse: 88   Resp: 16   SpO2: 94%     Body mass index is 29.86 kg/m².    Physical Exam  Constitutional:       Appearance: Normal appearance.   HENT:      Head: Normocephalic and atraumatic.   Eyes:      Extraocular Movements: Extraocular movements intact.      Conjunctiva/sclera: Conjunctivae normal.      Pupils: Pupils are equal, round, and reactive to light.   Cardiovascular:      Rate and Rhythm: Normal rate and regular rhythm.      Pulses: Normal pulses.   Pulmonary:      Breath sounds: Normal breath sounds.   Abdominal:      Palpations: Abdomen is soft.   Musculoskeletal:         General: Normal range of motion.      Cervical back: Normal range of motion and neck supple.   Skin:     General: Skin is warm and dry.   Neurological:      Mental Status: She is alert and oriented to person, place, and time.      Cranial Nerves: Cranial nerves 2-12 are intact.      Motor: Motor function is intact. No weakness or atrophy.      Coordination: Coordination is intact. Romberg sign negative. Romberg Test normal.      Gait: Gait is intact. Gait normal.      Deep Tendon Reflexes: Reflexes are normal and symmetric.      Reflex Scores:       Tricep reflexes are 2+ on the right side and 2+ on the left side.       Bicep reflexes are 2+ on the right side and 2+ on the left side.       Brachioradialis reflexes are 2+ on the right side  and 2+ on the left side.       Patellar reflexes are 2+ on the right side and 2+ on the left side.       Achilles reflexes are 2+ on the right side and 2+ on the left side.  Psychiatric:         Speech: Speech normal.         Neurologic Exam     Mental Status   Oriented to person, place, and time.   Attention: normal. Concentration: normal.   Speech: speech is normal   Level of consciousness: alert    Cranial Nerves   Cranial nerves II through XII intact.     CN III, IV, VI   Pupils are equal, round, and reactive to light.    Motor Exam   Muscle bulk: normal  Overall muscle tone: normal    Strength   Strength 5/5 except as noted.     Sensory Exam   Light touch normal.     Gait, Coordination, and Reflexes     Gait  Gait: normal    Coordination   Romberg: negative    Reflexes   Reflexes 2+ except as noted.   Right brachioradialis: 2+  Left brachioradialis: 2+  Right biceps: 2+  Left biceps: 2+  Right triceps: 2+  Left triceps: 2+  Right patellar: 2+  Left patellar: 2+  Right achilles: 2+  Left achilles: 2+      Assessment & Plan   Independent Review of Radiographic Studies:      I personally reviewed the images from the following studies.    MR: MRI of the cervical spine wo contrast was reviewed and shows previous C6-7 ACDF with adjacent segment disc bulge at C5-6 with disc osteophyte complex causing bilateral foraminal stenosis worse on the right than the left  MRI of the thoracic spine wo contrast was reviewed and shows normal-appearing thoracic spine without any significant compression of the spinal cord.  MRI of the lumbar spine wo contrast was reviewed and shows normal alignment of the lumbar spine with mild to moderate facet arthropathy at multiple levels    Assessment/Plan: She does have a C5-6 disc osteophyte complex which potentially could be causing her symptoms.  I think she would benefit from an epidural steroid injection both for symptomatic relief and also for diagnostic value.    Medical Decision  Making:      Epidural block         Diagnoses and all orders for this visit:    1. Cervical spondylosis with radiculopathy (Primary)  -     Epidural Block    2. Lumbar facet arthropathy             Patient Instructions/Recommendations:    Follow-up in 3 months      Finn Ojeda MD  04/25/24  14:47 EDT

## 2024-04-25 NOTE — TELEPHONE ENCOUNTER
"Provider: ANNA MARIE DUMONT     Caller: MILDRED MCINTYRE \"REINIER\"     Relationship to Patient: PATIENT     Phone Number: [phone]    Reason for Call: PATIENT WAS SEEN BY DR JULIA BASHIR 04/25/2024.    REFERENCE DX LUMBAR PAIN  DX NECK PAIN   PATIENT HAS QUESTIONS REGARDING TREATMENT /PROCEDURE PRIOR TO HER FOLLOW UP 05/30/2024.  PLEASE CALL PATIENT TO DISCUSS    When was the patient last seen: 04/15/2024 PROCEDURE     "

## 2024-04-26 NOTE — TELEPHONE ENCOUNTER
I spoke with Ms. Noble and made her a follow-up appt for 4/30/2024. She wants to discuss her neck pain and getting neck injections that her neurosurgeon suggested she gets.

## 2024-04-30 ENCOUNTER — PREP FOR SURGERY (OUTPATIENT)
Dept: SURGERY | Facility: SURGERY CENTER | Age: 69
End: 2024-04-30
Payer: MEDICARE

## 2024-04-30 ENCOUNTER — OFFICE VISIT (OUTPATIENT)
Age: 69
End: 2024-04-30
Payer: MEDICARE

## 2024-04-30 VITALS
SYSTOLIC BLOOD PRESSURE: 142 MMHG | OXYGEN SATURATION: 92 % | TEMPERATURE: 98.2 F | BODY MASS INDEX: 29.57 KG/M2 | HEIGHT: 66 IN | WEIGHT: 184 LBS | DIASTOLIC BLOOD PRESSURE: 78 MMHG | HEART RATE: 102 BPM

## 2024-04-30 DIAGNOSIS — M54.16 LUMBAR RADICULITIS: ICD-10-CM

## 2024-04-30 DIAGNOSIS — M48.062 LUMBAR STENOSIS WITH NEUROGENIC CLAUDICATION: ICD-10-CM

## 2024-04-30 DIAGNOSIS — M47.816 LUMBAR FACET ARTHROPATHY: ICD-10-CM

## 2024-04-30 DIAGNOSIS — M54.2 NECK PAIN: ICD-10-CM

## 2024-04-30 DIAGNOSIS — G89.29 CHRONIC BILATERAL THORACIC BACK PAIN: Primary | ICD-10-CM

## 2024-04-30 DIAGNOSIS — M47.22 CERVICAL SPONDYLOSIS WITH RADICULOPATHY: ICD-10-CM

## 2024-04-30 DIAGNOSIS — M54.6 CHRONIC BILATERAL THORACIC BACK PAIN: Primary | ICD-10-CM

## 2024-04-30 DIAGNOSIS — M48.02 CERVICAL SPINAL STENOSIS: ICD-10-CM

## 2024-04-30 DIAGNOSIS — M48.02 CERVICAL SPINAL STENOSIS: Primary | ICD-10-CM

## 2024-04-30 PROCEDURE — 99214 OFFICE O/P EST MOD 30 MIN: CPT

## 2024-04-30 PROCEDURE — 3078F DIAST BP <80 MM HG: CPT

## 2024-04-30 PROCEDURE — 1125F AMNT PAIN NOTED PAIN PRSNT: CPT

## 2024-04-30 PROCEDURE — 1160F RVW MEDS BY RX/DR IN RCRD: CPT

## 2024-04-30 PROCEDURE — 3077F SYST BP >= 140 MM HG: CPT

## 2024-04-30 PROCEDURE — 1159F MED LIST DOCD IN RCRD: CPT

## 2024-04-30 NOTE — PROGRESS NOTES
CHIEF COMPLAINT  Back and neck pain    Subjective   Magdalena Dickey is a 68 y.o. female  who presents to the office for follow-up of procedure.  She completed a Bilateral L3-L5 RFA on 4/15/24 performed by Dr. Bernal for management of low back pain. Patient reports 70% ongoing relief from the procedure. She reports that she is able to stand for longer periods of time and cook dinner without experiencing increased pain.     Today pain is 4/10VAS in severity. Her main complaint today is neck pain that radiates into bilateral shoulders. Describes this pain as an intermittent aching and burning. Pain is worsened by prolonged position, certain sleeps positions, and bending/twisting. Pain improves with rest/reposition and medications. She has completed PT in the past. No relief with use of a TENS unit. Back pain has improved since lumbar RFA on 4/15/24.     Continues with Gabapentin 400mg TID, Baclofen 10mg BID PRN, Cymbalta 60mg, Trazodone 50mg PRN, and Xanax 0.25mg PRN for anxiety. The medications are managed by her PCP. She also utilizes OTC Tylenol Arthritis as needed.      She saw Dr. Sims with Neurosurgery on 3/7/24.  He notes that patient has spondylosis at C5-6 level and that options include PT, injection therapy, and a C5-6 anterior decompression and fusion.  Patient states she did not feel like Dr. Sims answered all of her questions and asked her PCP for a new referral to [facility] neurosurgeon.  She was evaluated by Dr. Ojeda on 4/25/24.  In review of his office note he notes that patient has not had any improvement with physical therapy or other conservative treatments.  He recommended she follow-up with pain management in regard to a cervical epidural to see if this is helpful for her neck pain.     Procedures:  4/15/24 - Bilateral L3-L5  RFA - 70% ongoing relief  3/11/24 - Bilateral L3-L5 MBB - 100% relief for several hours  2/26/24 - Bilateral L3-L5 MBB - 100% for several hours   12/6/23 - Bilateral L4 TF LESI  - 50% relief  9/20/23 - L4/L5 LESI - 50% relief x 1 month     Surgical History:  [facility] - Dr. De Dios    Back Pain  This is a chronic problem. The current episode started more than 1 year ago. The problem has been worse since onset. The pain is present in the lumbar spine and sacro-iliac. The quality of the pain is described as aching and burning. The pain does not radiate (discomfort to bilateral feet). The pain is at a severity of 3/10. The pain is mild. The symptoms are aggravated by position, standing, sitting and bending. Pertinent negatives include no abdominal pain, chest pain, dysuria, fever, headaches, numbness or weakness. She has tried muscle relaxant and bed rest (Tylenol, Gabapentin, TENS unit (no relief), PT) for the symptoms. The treatment provided mild relief.   Neck Pain   This is a chronic problem. The current episode started more than 1 year ago. The problem occurs constantly. The problem has been unchanged (unchanged since last office visit). The pain is associated with an unknown factor. The pain is present in the left side, midline and right side (radaites into bilateral shoulders). The quality of the pain is described as aching. The pain is at a severity of 4/10. The pain is mild. The symptoms are aggravated by position, twisting, bending and stress. Pertinent negatives include no chest pain, fever, headaches, numbness or weakness. She has tried oral narcotics, heat and ice (Gabapentin, Cymbalta) for the symptoms.     PEG Assessment   What number best describes your pain on average in the past week?3  What number best describes how, during the past week, pain has interfered with your enjoyment of life?2  What number best describes how, during the past week, pain has interfered with your general activity?  2    Review of Pertinent Medical Data ---    MRI OF THE CERVICAL SPINE WITH AND WITHOUT AN MRI OF THE THORACIC SPINE  WITHOUT CONTRAST     CLINICAL HISTORY: Neck and mid back pain radiating into  shoulders.     TECHNIQUE: MRI of the cervical spine was obtained with sagittal pre and  postgadolinium T1, gradient echo, and T2-weighted images. Additionally,  there are axial pre and postgadolinium T1, gradient echo, and  T2-weighted images through the cervical spine.     COMPARISON: Cervical spine MRI dated 6/14/2018.     FINDINGS:     At C2-3 and C3-4, there is no significant canal or foraminal stenosis.     At C4-5, there is a minimal disc osteophyte complex minimally indenting  the ventral subarachnoid space. There is a mild degree of foraminal  narrowing as a result of uncovertebral joint hypertrophy. No significant  interval change is seen.     At C5-6, there is a disc osteophyte complex which results in a mild to  moderate degree of canal stenosis. This is more prominent when compared  to the prior study. There is mild left and moderate to severe right  foraminal narrowing as a result of uncovertebral joint hypertrophy. A  similar degree of foraminal stenosis was appreciated on the prior study.     At the C6-7 level, there has been an interval anterior cervical  discectomy and fusion procedure. The previously identified disc  extrusion C6-7 is no longer evident. There is no significant canal  stenosis.     At C7-T1, there is no significant degree of canal or foraminal  narrowing.     The cervical spinal cord has normal signal intensity. The visualized  contents of the cranial vault are unremarkable. There are no abnormal  foci of contrast enhancement     IMPRESSION:     In the interval since the prior study, the patient has undergone an  anterior cervical discectomy and fusion procedure at the level the C6-7  disc space. The previously identified extruded disc material at C6-7 is  no longer evident. There is no significant canal or foraminal narrowing  at C6-7. At the C5-6 level, there is a disc osteophyte complex which  results in a mild to moderate degree of canal stenosis. This is the  level of the most  prominent canal narrowing. This canal narrowing  appears mildly more prominent when compared to the prior study. There is  mild left and moderate to severe right C5-6 foraminal narrowing as a  result of uncovertebral joint hypertrophy. Similar findings are noted on  the prior exam.     The remaining stable degenerative phenomena within the cervical spine  are as discussed in detail above.        TECHNIQUE: MRI of the thoracic spine was obtained with sagittal T1,  proton density, and T2 weighted images. Additionally, there are axial T1  and T2 weighted images through the thoracic spine.     FINDINGS:     There is no significant canal or foraminal stenosis seen throughout the  thoracic spine. The thoracic spinal cord has normal signal intensity.     There is a mild chronic compression deformity at L1 with approximately  10 to 20% loss of vertebral body height within the maximally compressed  portion of the vertebra. The vertebral body heights within the thoracic  spine are well-maintained.     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     IMPRESSION:     The thoracic spine is unremarkable in appearance.     Note is made of a mild chronic compression deformity at L1 with  approximately 10 to 20% loss of vertebral body height within the  maximally compressed portion of the vertebra.     This report was finalized on 2/6/2024 10:00 AM by Dr. Vahid Linares M.D  on Workstation: [id number]    The following portions of the patient's history were reviewed and updated as appropriate: allergies, current medications, past family history, past medical history, past social history, past surgical history, and problem list.    Review of Systems   Constitutional:  Positive for fatigue. Negative for activity change (increased), chills and fever.   HENT:  Positive for congestion.    Respiratory:  Positive for shortness of breath. Negative for chest tightness.    Cardiovascular:  Negative for chest pain.  "  Gastrointestinal:  Positive for diarrhea. Negative for abdominal pain and constipation.   Genitourinary:  Negative for difficulty urinating, dyspareunia and dysuria.   Musculoskeletal:  Positive for back pain and neck pain.   Neurological:  Positive for light-headedness. Negative for dizziness, weakness, numbness and headaches.   Psychiatric/Behavioral:  Positive for sleep disturbance. Negative for agitation. The patient is not nervous/anxious.      I have reviewed and confirmed the accuracy of the ROS as documented by the MA/LPN/RN ORIANA Diamond     Vitals:    04/30/24 0928   BP: 142/78   Pulse: 102   Temp: 98.2 °F (36.8 °C)   SpO2: 92%   Weight: 83.5 kg (184 lb)   Height: 167.6 cm (66\")   PainSc:   4   PainLoc: Back     Objective   Physical Exam  Constitutional:       Appearance: Normal appearance.   HENT:      Head: Normocephalic.   Cardiovascular:      Rate and Rhythm: Normal rate and regular rhythm.   Pulmonary:      Effort: Pulmonary effort is normal.      Breath sounds: Normal breath sounds.   Musculoskeletal:      Cervical back: Normal range of motion. Tenderness present. Decreased range of motion.      Lumbar back: Tenderness present. Negative right straight leg raise test and negative left straight leg raise test.      Comments: + pain with cervical rotation   Skin:     General: Skin is warm and dry.      Capillary Refill: Capillary refill takes less than 2 seconds.   Neurological:      General: No focal deficit present.      Mental Status: She is alert and oriented to person, place, and time.   Psychiatric:         Mood and Affect: Mood normal.         Behavior: Behavior normal.         Thought Content: Thought content normal.         Cognition and Memory: Cognition normal.       Assessment & Plan   Diagnoses and all orders for this visit:    1. Chronic bilateral thoracic back pain (Primary)    2. Neck pain    3. Lumbar stenosis with neurogenic claudication    4. Lumbar facet arthropathy    5. " Lumbar radiculitis    6. Cervical spinal stenosis      Magdalena Dickey reports a pain score of 4.  Given her pain assessment as noted, treatment options were discussed and the following options were decided upon as a follow-up plan to address the patient's pain: continuation of current treatment plan for pain and steroid injections.    --- C7-T1 ARI  ---  Indications for epidural injection:  Plan is to proceed with epidural at the appropriate level.  If the patient receives significant pain reduction and improvement in function and the plan will be to repeat the epidural when the pain worsens.  If a second epidural provides at least 6 weeks of sustained improvement that includes both pain reduction and improvement in function then an epidural injection could be repeated once again at the same level.  This is a mutual decision between the clinician and the patient that includes discussions including risks and benefits in detail as well as alternative therapies.  Patient's questions were answered to their satisfaction and to their understanding.  ---   --- Follow-up for procedure     MAXIME REPORT  As the clinician, I personally reviewed the MAXIME from 4/30/24 while the patient was in the office today.    Dictated utilizing Dragon dictation.

## 2024-05-01 ENCOUNTER — OFFICE VISIT (OUTPATIENT)
Dept: INTERNAL MEDICINE | Facility: CLINIC | Age: 69
End: 2024-05-01
Payer: MEDICARE

## 2024-05-01 VITALS
WEIGHT: 183 LBS | DIASTOLIC BLOOD PRESSURE: 66 MMHG | HEIGHT: 66 IN | HEART RATE: 80 BPM | TEMPERATURE: 98.3 F | BODY MASS INDEX: 29.41 KG/M2 | SYSTOLIC BLOOD PRESSURE: 122 MMHG

## 2024-05-01 DIAGNOSIS — J40 BRONCHITIS: Primary | ICD-10-CM

## 2024-05-01 PROCEDURE — 3074F SYST BP LT 130 MM HG: CPT | Performed by: INTERNAL MEDICINE

## 2024-05-01 PROCEDURE — 3078F DIAST BP <80 MM HG: CPT | Performed by: INTERNAL MEDICINE

## 2024-05-01 PROCEDURE — 99214 OFFICE O/P EST MOD 30 MIN: CPT | Performed by: INTERNAL MEDICINE

## 2024-05-01 RX ORDER — AZITHROMYCIN 250 MG/1
TABLET, FILM COATED ORAL
Qty: 6 TABLET | Refills: 0 | Status: SHIPPED | OUTPATIENT
Start: 2024-05-01

## 2024-05-01 NOTE — PROGRESS NOTES
"Chief Complaint  Cough    Subjective        Magdalena Dickey presents to [facility]  History of Present Illness  Hasn't felt good for over a week- seems to be getting worse - mainly productive cough, sweats.  Has some SOB.     Objective   Vital Signs:  /66   Pulse 80   Temp 98.3 °F (36.8 °C)   Ht 167.6 cm (66\")   Wt 83 kg (183 lb)   BMI 29.54 kg/m²   Estimated body mass index is 29.54 kg/m² as calculated from the following:    Height as of this encounter: 167.6 cm (66\").    Weight as of this encounter: 83 kg (183 lb).             Physical Exam  Constitutional:       General: She is not in acute distress.     Appearance: She is ill-appearing.   Cardiovascular:      Rate and Rhythm: Normal rate and regular rhythm.   Pulmonary:      Effort: Pulmonary effort is normal.      Breath sounds: Rhonchi: RLL.   Musculoskeletal:      Right lower leg: No edema.      Left lower leg: No edema.   Lymphadenopathy:      Cervical: No cervical adenopathy.        Result Review :                     Assessment and Plan     Diagnoses and all orders for this visit:    1. Bronchitis (Primary)  Comments:  vs early pneumonia- will treat with abx and add Muciex - monitor sx closely -reasssess if not resolving.    Other orders  -     azithromycin (Zithromax Z-Demario) 250 MG tablet; Take 2 tablets by mouth on day 1, then 1 tablet daily on days 2-5  Dispense: 6 tablet; Refill: 0             Follow Up     No follow-ups on file.  Patient was given instructions and counseling regarding her condition or for health maintenance advice. Please see specific information pulled into the AVS if appropriate.         "

## 2024-05-02 ENCOUNTER — TRANSCRIBE ORDERS (OUTPATIENT)
Dept: SURGERY | Facility: SURGERY CENTER | Age: 69
End: 2024-05-02
Payer: MEDICARE

## 2024-05-02 DIAGNOSIS — Z41.9 SURGERY, ELECTIVE: Primary | ICD-10-CM

## 2024-05-02 PROBLEM — M54.2 NECK PAIN: Status: ACTIVE | Noted: 2024-04-30

## 2024-05-02 PROBLEM — M48.02 CERVICAL SPINAL STENOSIS: Status: ACTIVE | Noted: 2024-04-30

## 2024-05-12 DIAGNOSIS — M54.16 LUMBAR RADICULOPATHY: ICD-10-CM

## 2024-05-13 ENCOUNTER — LAB (OUTPATIENT)
Dept: LAB | Facility: HOSPITAL | Age: 69
End: 2024-05-13
Payer: MEDICARE

## 2024-05-13 PROCEDURE — 80048 BASIC METABOLIC PNL TOTAL CA: CPT | Performed by: INTERNAL MEDICINE

## 2024-05-13 RX ORDER — GABAPENTIN 600 MG/1
600 TABLET ORAL 3 TIMES DAILY
Qty: 90 TABLET | Refills: 5 | Status: SHIPPED | OUTPATIENT
Start: 2024-05-13

## 2024-05-20 ENCOUNTER — TELEPHONE (OUTPATIENT)
Dept: NEUROSURGERY | Facility: CLINIC | Age: 69
End: 2024-05-20
Payer: MEDICARE

## 2024-05-20 NOTE — TELEPHONE ENCOUNTER
Called and left  for Madelaine to let her know per Dr. Ojeda it is ok to go lower with her epidural and she should go a head with the epidural as planned per PM doctor.

## 2024-06-03 DIAGNOSIS — I10 ESSENTIAL HYPERTENSION: Primary | ICD-10-CM

## 2024-06-04 RX ORDER — OMEPRAZOLE 40 MG/1
CAPSULE, DELAYED RELEASE ORAL
COMMUNITY
Start: 2024-06-02 | End: 2024-06-04

## 2024-06-04 NOTE — SIGNIFICANT NOTE
Patient educated on the following :    - If you are receiving Sedation for your procedure Nothing to Eat 6 hours and only clear liquids for 2 hours prior to your procedure.     -You will need to have someone drive you home after your PROCEDURE and remain with you for 24 hours after the PROCEDURE  - The date of your procedure, your are welcome to have one visitor at bedside or remain within 10-15 minutes of [facility]  -You will need to arrive at 1300 on 6/5/24 for your PROCEDURE  -Please contact [facility] at: [phone] with any questions and/or concerns

## 2024-06-04 NOTE — DISCHARGE INSTRUCTIONS
[facility] - Post-procedure Instructions          --  While there are no absolute restrictions, it is recommended that you do not perform strenuous activity today. In the morning, you may resume your level of activity as before your block.    --  If you have a band-aid at your injection site, please remove it later today. Observe the area for any redness, swelling, pus-like drainage, or a temperature over 101°. If any of these symptoms occur, please call your doctor at [phone]. If after office hours, leave a message and the on-call provider will return your call.    --  Ice may be applied to your injection site. It is recommended you avoid direct heat (heating pad; hot tub) for 1-2 days.    --  Call [facility] at [phone] if you experience persistent headache, persistent bleeding from the injection site, or severe pain not relieved by heat or oral medication.    --  Do not make important decisions today.    --  Due to the effects of the block and/or the I.V. Sedation, DO NOT drive or operate hazardous machinery for 12 hours.  Local anesthetics may cause numbness after procedure and precautions must be taken with regards to operating equipment as well as with walking, even if ambulating with assistance of another person or with an assistive device.    --  Do not drink alcohol for 12 hours.    -- You may return to work tomorrow, or as directed by your referring doctor.    --  Occasionally you may notice a slight increase in your pain after the procedure. This should start to improve within the next 24-48 hours. Radiofrequency ablation procedure pain may last 3-4 weeks.    --  It may take as long as 3-4 days before you notice a gradual improvement in your pain and/or other symptoms.    -- You may continue to take your prescribed pain medication as needed.    --  Some normal possible side effects of steroid use could include fluid retention, increased blood sugar, dull headache,  increased sweating, increased appetite, mood swings and flushing.    --  Diabetics are recommended to watch their blood glucose level closely for 24-48 hours after the injection.    --  Must stay in PACU for 20 min upon arrival and prove no leg weakness before being discharged.    --  IN THE EVENT OF A LIFE THREATENING EMERGENCY, (CHEST PAIN, BREATHING DIFFICULTIES, PARALYSIS…) YOU SHOULD GO TO YOUR NEAREST EMERGENCY ROOM.    --  You should be contacted by our office within 2-3 days to schedule follow up or next appointment date.  If not contacted within 7 days, please call the office at [phone]

## 2024-06-04 NOTE — H&P
[facility]   HISTORY AND PHYSICAL    Patient Name: Magdalena Dickey  : 1955  MRN: [id number]  Primary Care Physician:  Sun Guerrier MD  Date of admission: 2024    Subjective   Subjective     Chief Complaint: Chronic neck pain    History of Present Illness  Chronic neck pain that radiates into bilateral upper extremities.      Review of Systems   Constitutional:  Negative for chills and fever.   Respiratory:  Negative for cough and shortness of breath.    Musculoskeletal:  Positive for neck pain.        Personal History     Past Medical History:   Diagnosis Date    Abnormal mammogram 2022    Anxiety     Cervical disc disorder     Cervical radiculopathy     Cervical spondylolysis     Chest pain 2022    ADMITTED TO [facility]    Chronic bilateral low back pain without sciatica     Chronic pain disorder     Colon polyps     FOLLOWED BY DR. ADRIA NYE    COVID-19 08/15/2020    SEEN AT     Delayed emergence from anesthesia     Depression 2016    Diverticulitis 2018    ADMITTED TO [facility]    Diverticulitis 2017    SEEN AT [facility] ER    Diverticulitis of colon 2020    Dysesthesia     Dyspepsia     Extremity pain     Fecal incontinence 2022    GERD (gastroesophageal reflux disease)     Hair loss 2020    Headache, tension-type     Hiatal hernia     History of measles, mumps, or rubella     MEASLES AND MUMPS AS A CHILD    Hypertension     IBS (irritable bowel syndrome)     ILD (interstitial lung disease)     Insomnia     Joint pain     Lumbosacral disc disease     Lung nodule 2020    Migraine without aura and without status migrainosus, not intractable 2016    NAFLD (nonalcoholic fatty liver disease)     Neck pain     Osteoarthritis     Palpitations 2019    SEEN AT [facility] ER    Paresthesias     PNA (pneumonia) 2020    D/T COVID, ADMITTED TO [facility]    Polyneuropathy     Post-COVID chronic dyspnea     PUD (peptic ulcer disease)     Rotator cuff tendinitis, right  05/08/2018    Spinal stenosis     Thrush 09/2020    Vestibular neuritis 09/2014       Past Surgical History:   Procedure Laterality Date    ANTERIOR CERVICAL DISCECTOMY W/ FUSION N/A 06/21/2018    Procedure: C6 7 anterior cervical discectomy and fusion with allograft and plate;  Surgeon: Jacobo De Dios MD;  Location: [facility] MAIN OR;  Service: Neurosurgery    CHOLECYSTECTOMY N/A 1980    AT [place]    COLON RESECTION N/A 01/16/2018    Procedure: LAPAROSCOPIC SIGMOID COLON RESECTION WITH MOBILIZATION OF SPLENIC FLEXURE;  Surgeon: Eric Davidson MD;  Location: [facility] MAIN OR;  Service:     COLONOSCOPY N/A 02/15/2017    6 MM TUBULAR ADENOMA POLYP IN HEPATIC FLEXURE, 5 MM TUBULAR ADENOAM POLYP IN RECTUM, DR. GERARDO NYE AT [facility]    COLONOSCOPY N/A 03/01/2003    Internal hemorrhoids-Dr. Gerardo Nye    COLONOSCOPY N/A 02/08/2022    4 MM TUBULAR ADENOMA POLYP IN ASCENDING, HEMORRHOIDS, DR. GERARDO NYE AT [facility]    DIAGNOSTIC LAPAROSCOPY N/A 06/06/2001    Cul-de-sac endometriosis and adhesions-Dr. Aamir Christine    ENDOSCOPY N/A 02/15/2017    SMALL HIATAL HERNIA, GASTRITIS, DR. GERARDO NYE AT [facility]    ENDOSCOPY N/A 02/08/2022    GASTRITIS, MILD ESOPHAGITIS, Z LINE IRREGULAR, DR. GERARDO NYE AT [facility]    ENDOSCOPY N/A 09/12/2014    GASTRITIS, DR. GERARDO NYE AT [facility]    ENDOSCOPY AND COLONOSCOPY N/A 02/04/2009    SMALL HIATAL HERNIA, ESOPHAGITIS, HEMORRHOIDS, DIVERTICULOSIS, TORTUOUS COLON, DR. GERARDO NYE AT [facility]    EPIDURAL Bilateral 12/06/2023    Procedure: BILATERAL L4 TRANSFORAMINAL LUMBAR EPIDURAL STEROID INJECTION CPT: 45598, 69217;  Surgeon: Sarah Bernal MD;  Location: [facility];  Service: Pain Management;  Laterality: Bilateral;    EPIDURAL BLOCK      LUMBAR EPIDURAL INJECTION N/A 09/20/2023    Procedure: LUMBAR EPIDURAL 1ST VISIT L4-5 51554;  Surgeon: Sarah Bernal MD;  Location: [facility];  Service: Pain Management;  Laterality: N/A;    MEDIAL BRANCH BLOCK Bilateral 02/26/2024    Procedure: BILATERAL L3-L5  LUMBAR MEDIAL BRANCH BLOCK CPT: 53193, 88926;  Surgeon: Sarah Bernal MD;  Location: [facility];  Service: Pain Management;  Laterality: Bilateral;    MEDIAL BRANCH BLOCK Bilateral 3/11/2024    Procedure: BILATERAL L3-L5 LUMBAR MEDIAL BRANCH BLOCK CPT: 51150, 297742;  Surgeon: Sarah Bernal MD;  Location: [facility];  Service: Pain Management;  Laterality: Bilateral;    NECK SURGERY      RADIOFREQUENCY ABLATION Bilateral 4/15/2024    Procedure: BILATERAL L3-L5 RADIOFREQUENCY ABLATION LUMBAR CPT: 25173, 12607;  Surgeon: Sarah Bernal MD;  Location: [facility];  Service: Pain Management;  Laterality: Bilateral;    SKIN BIOPSY Left 07/08/2022    LEFT CALF, PATH: SMALL FIBER NEUROPATHY    SPINAL FUSION      TOTAL ABDOMINAL HYSTERECTOMY WITH SALPINGO OOPHORECTOMY Bilateral 07/31/2001    Dr. Aamir Christine AT [facility]       Family History: family history includes Alcohol abuse in her father; Alzheimer's disease in her mother; Arthritis in her father, maternal grandmother, and sister; Breast cancer in an other family member; COPD in her mother and sister; Cancer in her father, mother, sister, sister, sister, and sister; Depression in her mother, sister, and sister; Diabetes in her mother; Diabetes type II in her mother; Drug abuse in her father; Heart attack in her mother; Heart disease in her father and mother; Hypertension in her mother, sister, and sister; Learning disabilities in her father; Lung cancer in her mother; Miscarriages / Stillbirths in her mother; No Known Problems in her brother; Ovarian cancer in her sister and sister; Prostate cancer in her father. Otherwise pertinent FHx was reviewed and not pertinent to current issue.    Social History:  reports that she quit smoking about 10 years ago. Her smoking use included cigarettes. She started smoking about 50 years ago. She has a 40 pack-year smoking history. She has been exposed to tobacco smoke. She has never used smokeless tobacco. She reports  current alcohol use. She reports that she does not use drugs.    Home Medications:  ALPRAZolam, DULoxetine, Loperamide HCl, Methylcellulose (Laxative), SUMAtriptan, albuterol sulfate HFA, arformoterol, baclofen, buPROPion XL, cycloSPORINE, gabapentin, ipratropium-albuterol, losartan, multivitamin, ondansetron, pantoprazole, revefenacin, and traZODone    Allergies:  No Known Allergies    Objective    Objective     Vitals:        Physical Exam  Constitutional:       General: She is not in acute distress.  Pulmonary:      Effort: Pulmonary effort is normal. No respiratory distress.   Skin:     General: Skin is warm and dry.   Neurological:      Mental Status: She is alert.   Psychiatric:         Mood and Affect: Mood normal.         Thought Content: Thought content normal.         Result Review    Result Review:  I have personally reviewed the results from the time of this admission to 6/5/2024 13:31 EDT and agree with these findings:  []  Laboratory list / accordion  []  Microbiology  []  Radiology  []  EKG/Telemetry   []  Cardiology/Vascular   []  Pathology  []  Old records  []  Other:  Most notable findings include: No new      Assessment & Plan   Assessment / Plan     Brief Patient Summary:  Magdalena Dickey is a 68 y.o. female who has chronic neck pain that radiates into bilateral upper extremities    Active Hospital Problems:  Active Hospital Problems    Diagnosis     Cervical spinal stenosis     Neck pain     Cervical spondylosis with radiculopathy      Plan: Cervical epidural steroid injection      DVT prophylaxis:  No DVT prophylaxis order currently exists.      Sarah Bernal MD

## 2024-06-05 ENCOUNTER — HOSPITAL ENCOUNTER (OUTPATIENT)
Dept: GENERAL RADIOLOGY | Facility: SURGERY CENTER | Age: 69
Setting detail: HOSPITAL OUTPATIENT SURGERY
End: 2024-06-05
Payer: MEDICARE

## 2024-06-05 ENCOUNTER — HOSPITAL ENCOUNTER (OUTPATIENT)
Facility: SURGERY CENTER | Age: 69
Setting detail: HOSPITAL OUTPATIENT SURGERY
Discharge: HOME | End: 2024-06-05
Attending: ANESTHESIOLOGY | Admitting: ANESTHESIOLOGY
Payer: MEDICARE

## 2024-06-05 VITALS
BODY MASS INDEX: 29.57 KG/M2 | HEIGHT: 66 IN | HEART RATE: 70 BPM | TEMPERATURE: 98.2 F | SYSTOLIC BLOOD PRESSURE: 147 MMHG | RESPIRATION RATE: 18 BRPM | DIASTOLIC BLOOD PRESSURE: 87 MMHG | OXYGEN SATURATION: 94 % | WEIGHT: 184 LBS

## 2024-06-05 DIAGNOSIS — Z41.9 SURGERY, ELECTIVE: ICD-10-CM

## 2024-06-05 DIAGNOSIS — M54.2 NECK PAIN: ICD-10-CM

## 2024-06-05 DIAGNOSIS — M48.02 CERVICAL SPINAL STENOSIS: ICD-10-CM

## 2024-06-05 DIAGNOSIS — M47.22 CERVICAL SPONDYLOSIS WITH RADICULOPATHY: ICD-10-CM

## 2024-06-05 PROCEDURE — 62321 NJX INTERLAMINAR CRV/THRC: CPT | Performed by: ANESTHESIOLOGY

## 2024-06-05 PROCEDURE — 76000 FLUOROSCOPY <1 HR PHYS/QHP: CPT

## 2024-06-05 PROCEDURE — 25510000001 IOPAMIDOL 61 % SOLUTION 30 ML VIAL: Performed by: ANESTHESIOLOGY

## 2024-06-05 PROCEDURE — 77002 NEEDLE LOCALIZATION BY XRAY: CPT

## 2024-06-05 PROCEDURE — 25010000002 DEXAMETHASONE SODIUM PHOSPHATE 100 MG/10ML SOLUTION 10 ML VIAL: Performed by: ANESTHESIOLOGY

## 2024-06-05 NOTE — OP NOTE
Cervical Epidural Steroid Injection   [facility]    PREOPERATIVE DIAGNOSIS:  Cervical Radiculopathy, Cervical Spinal Stenosis, and Chronic Neck Pain  POSTOPERATIVE DIAGNOSIS:  Same as preop diagnosis    PROCEDURE:   Cervical Epidural Steroid Injection, Therapeutic Interlaminar Injection, with epidurogram, at  C7-T1    PRE-PROCEDURE DISCUSSION WITH PATIENT:    Risks and complications were discussed with the patient prior to starting the procedure and informed consent was obtained.  We discussed various topics including but not limited to bleeding, infection, injury, paralysis, nerve injury, dural puncture, coma, death, worsening of clinical picture, lack of pain relief, and postprocedural soreness.    SURGEON:  Sarah Bernal MD    REASON FOR PROCEDURE:   Diagnostic injection at this level is needed    SEDATION:  No sedation was used for this procedure  ANESTHETIC:  None  STEROID:   10mg Dexamethasone    DESCRIPTON OF PROCEDURE:  After obtaining informed consent, the patient was taken to the operating room and placed in the prone position.  Heart rate, blood pressure, and pulse oximeter were monitored throughout, and sedation was provided as needed by the RN under my guidance. All pressure points were well padded.  The cervicothoracic spine area was prepped with Chloraprep and draped in a sterile fashion.      AP fluoroscopic image was used to visualize the C7-T1 interspace.  The skin and subcutaneous tissue over the area was anesthetized with 1% Lidocaine.  An 18-Gauge Tuohy needle was then advanced through the anesthetized skin tract under fluoroscopic guidance in a coaxial view using a loss of resistance technique.  Lateral fluoroscopy was used to verify appropriate needle depth.  Once the needle tip was felt to be in the posterior epidural space, aspiration was noted to be negative for blood or CSF.  A volume of 1mL of Isovue 61% was then injected under live fluoroscopy in an AP view which  produced good epidural spread with no evidence of loculation, vascular run-off or intrathecal spread.  Subsequently, a total volume of 3mL consisting of 10mg of dexamethasone mixed with normal saline was injected without resistance.      ESTIMATED BLOOD LOSS:  <5 mL  SPECIMENS:  None    COMPLICATIONS:     No complications were noted., There was no indication of vascular uptake on live injection of contrast dye., and There was no indication of intrathecal uptake on live injection of contrast dye.    TOLERANCE & DISCHARGE CONDITION:    The patient tolerated the procedure well.  The patient was transported to the recovery area without difficulties.  The patient was discharged to home under the care of family in stable and satisfactory condition.    PLAN OF CARE:  The patient was given our standard instruction sheet.        2.   The patient will Return to clinic 4-6 wks.  3.    The patient will resume all medications as per the medication reconciliation sheet.

## 2024-06-19 ENCOUNTER — OFFICE VISIT (OUTPATIENT)
Dept: INTERNAL MEDICINE | Facility: CLINIC | Age: 69
End: 2024-06-19
Payer: MEDICARE

## 2024-06-19 VITALS
SYSTOLIC BLOOD PRESSURE: 132 MMHG | DIASTOLIC BLOOD PRESSURE: 74 MMHG | BODY MASS INDEX: 29.57 KG/M2 | WEIGHT: 184 LBS | HEIGHT: 66 IN

## 2024-06-19 DIAGNOSIS — M65.4 DE QUERVAIN'S TENOSYNOVITIS, RIGHT: Primary | ICD-10-CM

## 2024-06-19 DIAGNOSIS — I10 ESSENTIAL HYPERTENSION: Chronic | ICD-10-CM

## 2024-06-19 DIAGNOSIS — M85.88 OTHER SPECIFIED DISORDERS OF BONE DENSITY AND STRUCTURE, OTHER SITE: ICD-10-CM

## 2024-06-19 DIAGNOSIS — Z12.31 VISIT FOR SCREENING MAMMOGRAM: ICD-10-CM

## 2024-06-19 PROCEDURE — 3075F SYST BP GE 130 - 139MM HG: CPT | Performed by: INTERNAL MEDICINE

## 2024-06-19 PROCEDURE — 99214 OFFICE O/P EST MOD 30 MIN: CPT | Performed by: INTERNAL MEDICINE

## 2024-06-19 PROCEDURE — 1160F RVW MEDS BY RX/DR IN RCRD: CPT | Performed by: INTERNAL MEDICINE

## 2024-06-19 PROCEDURE — 1159F MED LIST DOCD IN RCRD: CPT | Performed by: INTERNAL MEDICINE

## 2024-06-19 PROCEDURE — 1125F AMNT PAIN NOTED PAIN PRSNT: CPT | Performed by: INTERNAL MEDICINE

## 2024-06-19 PROCEDURE — 3078F DIAST BP <80 MM HG: CPT | Performed by: INTERNAL MEDICINE

## 2024-06-19 RX ORDER — AMLODIPINE BESYLATE 5 MG/1
5 TABLET ORAL DAILY
Qty: 90 TABLET | Refills: 1 | Status: SHIPPED | OUTPATIENT
Start: 2024-06-19

## 2024-06-19 NOTE — PROGRESS NOTES
"Chief Complaint  Hypertension    Subjective        Magdalena Dickey presents to [facility]  History of Present Illness Here for f/u of increase in BP medication- few checks have been 130-140s.  Thinks it may be making her not feel good- can't relate to anything else- does not think depression related. Doesn't feel like doing anything, even cooking which she loves.   Having some pain/tenderness at base of R thumb. No trauma.       Objective   Vital Signs:  /74   Ht 167.6 cm (66\")   Wt 83.5 kg (184 lb)   BMI 29.70 kg/m²   Estimated body mass index is 29.7 kg/m² as calculated from the following:    Height as of this encounter: 167.6 cm (66\").    Weight as of this encounter: 83.5 kg (184 lb).             Physical Exam  Constitutional:       Appearance: Normal appearance.   Cardiovascular:      Rate and Rhythm: Normal rate.   Pulmonary:      Effort: Pulmonary effort is normal.   Musculoskeletal:      Right lower leg: No edema.      Left lower leg: No edema.      Comments: R base of thumb/wrist, some swelling        Result Review :                     Assessment and Plan     Diagnoses and all orders for this visit:    1. De Quervain's tenosynovitis, right (Primary)  Comments:  is going to look online for PT exercises- formal pt or eval if fails to improve    2. Visit for screening mammogram  -     Mammo Screening Digital Tomosynthesis Bilateral With CAD; Future    3. Essential hypertension  Comments:  probable side effects of losartan- will try amlodipine 5 mg, increase as needed    4. Other specified disorders of bone density and structure, other site             Follow Up     Return in about 3 months (around 9/19/2024) for Recheck.  Patient was given instructions and counseling regarding her condition or for health maintenance advice. Please see specific information pulled into the AVS if appropriate.         "

## 2024-07-01 ENCOUNTER — HOSPITAL ENCOUNTER (OUTPATIENT)
Dept: CT IMAGING | Facility: HOSPITAL | Age: 69
Discharge: HOME | End: 2024-07-01
Admitting: INTERNAL MEDICINE
Payer: MEDICARE

## 2024-07-01 DIAGNOSIS — R91.8 LUNG NODULES: ICD-10-CM

## 2024-07-01 PROCEDURE — 71250 CT THORAX DX C-: CPT

## 2024-07-08 ENCOUNTER — OFFICE VISIT (OUTPATIENT)
Dept: PAIN MEDICINE | Facility: CLINIC | Age: 69
End: 2024-07-08
Payer: MEDICARE

## 2024-07-08 VITALS
RESPIRATION RATE: 16 BRPM | OXYGEN SATURATION: 95 % | HEIGHT: 66 IN | HEART RATE: 81 BPM | WEIGHT: 186.6 LBS | DIASTOLIC BLOOD PRESSURE: 80 MMHG | BODY MASS INDEX: 29.99 KG/M2 | SYSTOLIC BLOOD PRESSURE: 138 MMHG | TEMPERATURE: 96.9 F

## 2024-07-08 DIAGNOSIS — M54.16 LUMBAR RADICULITIS: ICD-10-CM

## 2024-07-08 DIAGNOSIS — M47.816 LUMBAR FACET ARTHROPATHY: ICD-10-CM

## 2024-07-08 DIAGNOSIS — M48.02 CERVICAL SPINAL STENOSIS: ICD-10-CM

## 2024-07-08 DIAGNOSIS — M54.6 CHRONIC BILATERAL THORACIC BACK PAIN: ICD-10-CM

## 2024-07-08 DIAGNOSIS — M48.062 LUMBAR STENOSIS WITH NEUROGENIC CLAUDICATION: ICD-10-CM

## 2024-07-08 DIAGNOSIS — G89.29 CHRONIC BILATERAL THORACIC BACK PAIN: ICD-10-CM

## 2024-07-08 DIAGNOSIS — M65.4 TENDINITIS, DE QUERVAIN'S: Primary | ICD-10-CM

## 2024-07-08 DIAGNOSIS — M54.2 NECK PAIN: ICD-10-CM

## 2024-07-08 PROCEDURE — 1160F RVW MEDS BY RX/DR IN RCRD: CPT

## 2024-07-08 PROCEDURE — 1125F AMNT PAIN NOTED PAIN PRSNT: CPT

## 2024-07-08 PROCEDURE — 3075F SYST BP GE 130 - 139MM HG: CPT

## 2024-07-08 PROCEDURE — 3079F DIAST BP 80-89 MM HG: CPT

## 2024-07-08 PROCEDURE — 1159F MED LIST DOCD IN RCRD: CPT

## 2024-07-08 PROCEDURE — 99214 OFFICE O/P EST MOD 30 MIN: CPT

## 2024-07-08 RX ORDER — METHYLPREDNISOLONE 4 MG/1
TABLET ORAL
Qty: 21 TABLET | Refills: 0 | Status: SHIPPED | OUTPATIENT
Start: 2024-07-08

## 2024-07-08 NOTE — PROGRESS NOTES
CHIEF COMPLAINT  Back and neck pain    Subjective   Magdalena Dickey is a 69 y.o. female  who presents to the office for follow-up of procedure.  She completed a C7-T1 ARI on 6/5/24 performed by Dr. Bernal for management of neck pain. Patient reports 50% ongoing relief from the procedure.     Today pain is 3/10VAS in severity. Her main complaint today is neck pain that radiates into bilateral shoulders. Describes this pain as an intermittent aching and burning. Pain is worsened by prolonged position, certain sleeps positions, and bending/twisting. Pain improves with rest/reposition and medications. She has completed PT in the past. No relief with use of a TENS unit. Back pain has improved since lumbar RFA on 4/15/24.     Continues with Gabapentin 400mg TID, Baclofen 10mg BID PRN, Cymbalta 60mg, Trazodone 50mg PRN, and Xanax 0.25mg PRN for anxiety. The medications are managed by her PCP. She also utilizes OTC Tylenol Arthritis as needed.      She saw Dr. Sims with Neurosurgery on 3/7/24.  He notes that patient has spondylosis at C5-6 level and that options include PT, injection therapy, and a C5-6 anterior decompression and fusion.  Patient states she did not feel like Dr. Sims answered all of her questions and asked her PCP for a new referral to [facility] neurosur[facility].  She was evaluated by Dr. Ojeda on 4/25/24.  In review of his office note he notes that patient has not had any improvement with physical therapy or other conservative treatments.  He recommended she follow-up with pain management in regard to a cervical epidural to see if this is helpful for her neck pain. She is scheduled follow up with him on 7/25/24.      Procedures:  6/5/24 - C7-T1 ARI - 50% ongoing relief  4/15/24 - Bilateral L3-L5  RFA - 70% ongoing relief  3/11/24 - Bilateral L3-L5 MBB - 100% relief for several hours  2/26/24 - Bilateral L3-L5 MBB - 100% for several hours   12/6/23 - Bilateral L4 TF LESI - 50% relief  9/20/23 - L4/L5 LESI - 50%  relief x 1 month     Surgical History:  [facility] - Dr. De Dios    Back Pain  This is a chronic problem. The current episode started more than 1 year ago. The problem occurs intermittently. The problem has been comes and goes since onset. The pain is present in the lumbar spine and sacro-iliac. The quality of the pain is described as aching and burning. The pain does not radiate (discomfort to bilateral feet). The pain is at a severity of 3/10. The pain is mild. The symptoms are aggravated by position, standing, sitting and bending. Associated symptoms include headaches, numbness and weakness. Pertinent negatives include no abdominal pain, chest pain, dysuria or fever. She has tried muscle relaxant and bed rest (Tylenol, Gabapentin, TENS unit (no relief), PT) for the symptoms. The treatment provided mild relief.   Neck Pain   This is a chronic problem. The current episode started more than 1 year ago. The problem occurs constantly. The problem has been gradually improving. The pain is associated with an unknown factor. The pain is present in the left side, midline and right side (radaites into bilateral shoulders). The quality of the pain is described as aching. The pain is at a severity of 0/10. The pain is mild. The symptoms are aggravated by position, twisting, bending and stress. Associated symptoms include headaches, numbness and weakness. Pertinent negatives include no chest pain or fever. She has tried oral narcotics, heat and ice (Gabapentin, Cymbalta) for the symptoms.     PEG Assessment   What number best describes your pain on average in the past week?3  What number best describes how, during the past week, pain has interfered with your enjoyment of life?0  What number best describes how, during the past week, pain has interfered with your general activity?  3    Review of Pertinent Medical Data ---      The following portions of the patient's history were reviewed and updated as appropriate: allergies, current  "medications, past family history, past medical history, past social history, past surgical history, and problem list.    Review of Systems   Constitutional:  Negative for activity change, fatigue and fever.   Respiratory:  Negative for cough and chest tightness.    Cardiovascular:  Negative for chest pain.   Gastrointestinal:  Negative for abdominal pain, constipation and diarrhea.   Genitourinary:  Negative for difficulty urinating and dysuria.   Musculoskeletal:  Positive for back pain and neck pain.   Neurological:  Positive for weakness, numbness and headaches. Negative for dizziness.   Psychiatric/Behavioral:  Negative for agitation, sleep disturbance and suicidal ideas. The patient is not nervous/anxious.      I have reviewed and confirmed the accuracy of the ROS as documented by the MA/LPN/RN ORIANA Diamond     Vitals:    07/08/24 1250   BP: 138/80   BP Location: Left arm   Patient Position: Sitting   Cuff Size: Large Adult   Pulse: 81   Resp: 16   Temp: 96.9 °F (36.1 °C)   TempSrc: Temporal   SpO2: 95%   Weight: 84.6 kg (186 lb 9.6 oz)   Height: 167.6 cm (66\")   PainSc:   3     Objective   Physical Exam  Constitutional:       Appearance: Normal appearance.   HENT:      Head: Normocephalic.   Cardiovascular:      Rate and Rhythm: Normal rate and regular rhythm.   Pulmonary:      Effort: Pulmonary effort is normal.      Breath sounds: Normal breath sounds.   Musculoskeletal:      Right wrist: Swelling and tenderness present.      Right hand: Swelling (right thumb) and tenderness present.      Cervical back: Normal range of motion. Tenderness present. Decreased range of motion.      Lumbar back: Tenderness present. Negative right straight leg raise test and negative left straight leg raise test.   Skin:     General: Skin is warm and dry.      Capillary Refill: Capillary refill takes less than 2 seconds.   Neurological:      General: No focal deficit present.      Mental Status: She is alert and oriented " to person, place, and time.   Psychiatric:         Mood and Affect: Mood normal.         Behavior: Behavior normal.         Thought Content: Thought content normal.         Cognition and Memory: Cognition normal.       Assessment & Plan   Diagnoses and all orders for this visit:    1. Tendinitis, de Quervain's (Primary)  -     methylPREDNISolone (MEDROL) 4 MG dose pack; Take as directed on package instructions.  Dispense: 21 tablet; Refill: 0    2. Chronic bilateral thoracic back pain    3. Neck pain    4. Lumbar stenosis with neurogenic claudication    5. Lumbar facet arthropathy    6. Lumbar radiculitis    7. Cervical spinal stenosis        Magdalena Dickey reports a pain score of 3.  Given her pain assessment as noted, treatment options were discussed and the following options were decided upon as a follow-up plan to address the patient's pain: continuation of current treatment plan for pain and prescription for non-opiod analgesics.    --- Patient asked about a steroid injection due to De Quervain's Tenosynovitis of right thumb/wrist. She saw her PCP in regards to this on 6/19/24 who recommended PT exercises. Patient asks about an steroid injection in her thumb. I discussed with her that this is not a procedure that our office performs. I will send a MDP to her pharmacy to see if this is helpful for the pain/swelling. Discussed a referral to a hand specialist if no improvement from MDP.   --- Follow-up with Dr. Ojeda with Neurosurgery as scheduled on 7/25/2024.  --- Follow-up as needed for worsening pain and/or to repeat procedures     MAXIME REPORT  As the clinician, I personally reviewed the MAXIME from 7/8/24 while the patient was in the office today.    Dictated utilizing Dragon dictation.

## 2024-07-17 ENCOUNTER — TRANSCRIBE ORDERS (OUTPATIENT)
Dept: ADMINISTRATIVE | Facility: HOSPITAL | Age: 69
End: 2024-07-17
Payer: MEDICARE

## 2024-07-17 DIAGNOSIS — M65.4 DE QUERVAIN'S TENOSYNOVITIS, RIGHT: Primary | ICD-10-CM

## 2024-07-19 ENCOUNTER — HOSPITAL ENCOUNTER (OUTPATIENT)
Dept: MAMMOGRAPHY | Facility: HOSPITAL | Age: 69
Discharge: HOME | End: 2024-07-19
Payer: MEDICARE

## 2024-07-19 ENCOUNTER — APPOINTMENT (OUTPATIENT)
Dept: BONE DENSITY | Facility: HOSPITAL | Age: 69
End: 2024-07-19
Payer: MEDICARE

## 2024-07-19 DIAGNOSIS — Z12.31 VISIT FOR SCREENING MAMMOGRAM: ICD-10-CM

## 2024-07-19 PROCEDURE — 77063 BREAST TOMOSYNTHESIS BI: CPT | Performed by: RADIOLOGY

## 2024-07-19 PROCEDURE — 77067 SCR MAMMO BI INCL CAD: CPT | Performed by: RADIOLOGY

## 2024-07-19 PROCEDURE — 77080 DXA BONE DENSITY AXIAL: CPT

## 2024-07-19 PROCEDURE — 77067 SCR MAMMO BI INCL CAD: CPT

## 2024-07-19 PROCEDURE — 77063 BREAST TOMOSYNTHESIS BI: CPT

## 2024-07-22 ENCOUNTER — TRANSCRIBE ORDERS (OUTPATIENT)
Dept: ADMINISTRATIVE | Facility: HOSPITAL | Age: 69
End: 2024-07-22
Payer: MEDICARE

## 2024-07-22 DIAGNOSIS — Z78.0 MENOPAUSE: Primary | ICD-10-CM

## 2024-07-22 DIAGNOSIS — Z12.31 VISIT FOR SCREENING MAMMOGRAM: ICD-10-CM

## 2024-07-23 NOTE — PROGRESS NOTES
Patient ID: Magdalena Dickey is a 69 y.o. female is here today for follow-up for cervical spondylolisthesis with radiculopathy.    Treatment: Last epidural performed on 06/05/2024    Subjective     The patient is here in regards to   Chief Complaint   Patient presents with    Neck Pain    Follow-up       History of Present Illness  W got excellent relief of her cervical spine pain as well as her cervical radiculopathy for 1 week after her injection.  That has since worn off and now she is back to her previous symptoms with severe neck pain and radiculopathy.  She also has localized inflammation in her right wrist in the lateral aspect around her radius.  This seems to be unresponsive to ice and anti-inflammatories and she is scheduled to see a hand doctor.      While in the room and during my examination of the patient I wore a mask and eye protection.  I washed my hands before and after this patient encounter.  The patient was also wearing a mask.    The following portions of the patient's history were reviewed and updated as appropriate: allergies, current medications, past family history, past medical history, past social history, past surgical history and problem list.    Review of Systems   Constitutional:  Negative for fever.   Musculoskeletal:  Positive for neck pain and neck stiffness.   Neurological:  Positive for dizziness, light-headedness, numbness and headaches. Negative for weakness.        Past Medical History:   Diagnosis Date    Abnormal mammogram 03/2022    Anxiety     Cervical disc disorder     Cervical radiculopathy     Cervical spondylolysis     Chest pain 08/19/2022    ADMITTED TO [facility]    Chronic bilateral low back pain without sciatica     Chronic pain disorder     Colon polyps     FOLLOWED BY DR. ADRIA NYE    COVID-19 08/15/2020    SEEN AT     Delayed emergence from anesthesia 2018    Depression 05/25/2016    Diverticulitis 01/16/2018    ADMITTED TO [facility]    Diverticulitis 11/24/2017    SEEN  AT [facility] ER    Diverticulitis of colon 06/2020    Dysesthesia     Dyspepsia     Extremity pain     Fecal incontinence 09/2022    GERD (gastroesophageal reflux disease)     Hair loss 12/2020    Headache, tension-type     Hiatal hernia     History of measles, mumps, or rubella     MEASLES AND MUMPS AS A CHILD    Hypertension     IBS (irritable bowel syndrome)     ILD (interstitial lung disease)     Insomnia     Joint pain     Lumbosacral disc disease     Lung nodule 11/2020    Migraine without aura and without status migrainosus, not intractable 05/25/2016    NAFLD (nonalcoholic fatty liver disease)     Neck pain     Osteoarthritis     Palpitations 04/09/2019    SEEN AT [facility] ER    Paresthesias     PNA (pneumonia) 08/23/2020    D/T COVID, ADMITTED TO [facility]    Polyneuropathy     Post-COVID chronic dyspnea     PUD (peptic ulcer disease)     Rotator cuff tendinitis, right 05/08/2018    Spinal stenosis     Thrush 09/2020    Vestibular neuritis 09/2014       No Known Allergies    Family History   Problem Relation Age of Onset    Alzheimer's disease Mother         SDAT    Hypertension Mother     Diabetes type II Mother     Lung cancer Mother         POSSIBLE    Heart attack Mother     Cancer Mother         lung    COPD Mother     Depression Mother     Diabetes Mother     Heart disease Mother     Miscarriages / Stillbirths Mother     Alcohol abuse Father     Prostate cancer Father     Heart disease Father         THIRD DEGREE HEART BLOCK    Arthritis Father     Cancer Father         prostate    Drug abuse Father         Only alcohol    Learning disabilities Father     Cancer Sister     Ovarian cancer Sister     Cancer Sister     Ovarian cancer Sister     Arthritis Sister     Cancer Sister         ovarian    Depression Sister     Hypertension Sister     Cancer Sister         ovarian    Depression Sister     Hypertension Sister     COPD Sister     No Known Problems Brother     Arthritis Maternal Grandmother         rheumatoid     Breast cancer Other     Malig Hyperthermia Neg Hx        Social History     Socioeconomic History    Marital status:      Spouse name: Lonnie    Number of children: 3   Tobacco Use    Smoking status: Former     Current packs/day: 0.00     Average packs/day: 1 pack/day for 40.0 years (40.0 ttl pk-yrs)     Types: Cigarettes     Start date: 3/10/1974     Quit date: 3/10/2014     Years since quitting: 10.3     Passive exposure: Past    Smokeless tobacco: Never   Vaping Use    Vaping status: Former   Substance and Sexual Activity    Alcohol use: Yes     Comment: occasional, maybe 1 can of beer every other month    Drug use: No    Sexual activity: Not Currently     Partners: Male     Birth control/protection: Hysterectomy     Comment: .       Past Surgical History:   Procedure Laterality Date    ANTERIOR CERVICAL DISCECTOMY W/ FUSION N/A 06/21/2018    Procedure: C6 7 anterior cervical discectomy and fusion with allograft and plate;  Surgeon: Jacobo De Dios MD;  Location: [facility];  Service: Neurosurgery    CERVICAL EPIDURAL N/A 6/5/2024    Procedure: C7-T1 CERVICAL EPIDURAL STEROID INJECTION CPT: 72451;  Surgeon: Sarah Bernal MD;  Location: [facility] OR;  Service: Pain Management;  Laterality: N/A;    CHOLECYSTECTOMY N/A 1980    AT [place]    COLON RESECTION N/A 01/16/2018    Procedure: LAPAROSCOPIC SIGMOID COLON RESECTION WITH MOBILIZATION OF SPLENIC FLEXURE;  Surgeon: Eric Davidson MD;  Location: [facility];  Service:     COLONOSCOPY N/A 02/15/2017    6 MM TUBULAR ADENOMA POLYP IN HEPATIC FLEXURE, 5 MM TUBULAR ADENOAM POLYP IN RECTUM, DR. GERARDO NYE AT [facility]    COLONOSCOPY N/A 03/01/2003    Internal hemorrhoids-Dr. Gerardo Nye    COLONOSCOPY N/A 02/08/2022    4 MM TUBULAR ADENOMA POLYP IN ASCENDING, HEMORRHOIDS, DR. GERARDO NYE AT [facility]    DIAGNOSTIC LAPAROSCOPY N/A 06/06/2001    Cul-de-sac endometriosis and adhesions-Dr. Aamir Christine    ENDOSCOPY N/A 02/15/2017    SMALL HIATAL HERNIA,  GASTRITIS, DR. ADRIA NYE AT [facility]    ENDOSCOPY N/A 02/08/2022    GASTRITIS, MILD ESOPHAGITIS, Z LINE IRREGULAR, DR. ADRIA NYE AT [facility]    ENDOSCOPY N/A 09/12/2014    GASTRITIS, DR. ADRIA NYE AT [facility]    ENDOSCOPY AND COLONOSCOPY N/A 02/04/2009    SMALL HIATAL HERNIA, ESOPHAGITIS, HEMORRHOIDS, DIVERTICULOSIS, TORTUOUS COLON, DR. ADRIA NYE AT [facility]    EPIDURAL Bilateral 12/06/2023    Procedure: BILATERAL L4 TRANSFORAMINAL LUMBAR EPIDURAL STEROID INJECTION CPT: 60747, 26564;  Surgeon: Sarah Bernal MD;  Location[facility];  Service: Pain Management;  Laterality: Bilateral;    EPIDURAL BLOCK      LUMBAR EPIDURAL INJECTION N/A 09/20/2023    Procedure: LUMBAR EPIDURAL 1ST VISIT L4-5 79965;  Surgeon: Sarah Bernal MD;  Location: [facility];  Service: Pain Management;  Laterality: N/A;    MEDIAL BRANCH BLOCK Bilateral 02/26/2024    Procedure: BILATERAL L3-L5 LUMBAR MEDIAL BRANCH BLOCK CPT: 63408, 79427;  Surgeon: Sarah Bernal MD;  Location: [facility];  Service: Pain Management;  Laterality: Bilateral;    MEDIAL BRANCH BLOCK Bilateral 3/11/2024    Procedure: BILATERAL L3-L5 LUMBAR MEDIAL BRANCH BLOCK CPT: 40214, 464425;  Surgeon: Sarah Bernal MD;  Location: [facility];  Service: Pain Management;  Laterality: Bilateral;    NECK SURGERY      RADIOFREQUENCY ABLATION Bilateral 4/15/2024    Procedure: BILATERAL L3-L5 RADIOFREQUENCY ABLATION LUMBAR CPT: 21294, 74129;  Surgeon: Sarah Bernal MD;  Location: [facility];  Service: Pain Management;  Laterality: Bilateral;    SKIN BIOPSY Left 07/08/2022    LEFT CALF, PATH: SMALL FIBER NEUROPATHY    SPINAL FUSION      TOTAL ABDOMINAL HYSTERECTOMY WITH SALPINGO OOPHORECTOMY Bilateral 07/31/2001    Dr. Aamir Christine AT [facility]         Objective     Vitals:    07/25/24 1133   BP: 118/70   Pulse: 83   Resp: 16   SpO2: 97%     Body mass index is 29.86 kg/m².    Physical Exam  Constitutional:       Appearance: Normal appearance.   HENT:      Head: Normocephalic  and atraumatic.   Eyes:      Extraocular Movements: Extraocular movements intact.      Conjunctiva/sclera: Conjunctivae normal.      Pupils: Pupils are equal, round, and reactive to light.   Cardiovascular:      Rate and Rhythm: Normal rate and regular rhythm.      Pulses: Normal pulses.   Pulmonary:      Breath sounds: Normal breath sounds.   Abdominal:      Palpations: Abdomen is soft.   Musculoskeletal:         General: Normal range of motion.      Right wrist: Swelling and tenderness present.      Cervical back: Normal range of motion and neck supple.   Skin:     General: Skin is warm and dry.   Neurological:      Mental Status: She is alert and oriented to person, place, and time.      Cranial Nerves: Cranial nerves 2-12 are intact.      Motor: Motor function is intact. No weakness or atrophy.      Coordination: Coordination is intact. Romberg sign negative. Romberg Test normal.      Gait: Gait is intact. Gait normal.      Deep Tendon Reflexes: Reflexes are normal and symmetric.      Reflex Scores:       Tricep reflexes are 2+ on the right side and 2+ on the left side.       Bicep reflexes are 2+ on the right side and 2+ on the left side.       Brachioradialis reflexes are 2+ on the right side and 2+ on the left side.       Patellar reflexes are 2+ on the right side and 2+ on the left side.       Achilles reflexes are 2+ on the right side and 2+ on the left side.  Psychiatric:         Speech: Speech normal.         Neurologic Exam     Mental Status   Oriented to person, place, and time.   Attention: normal. Concentration: normal.   Speech: speech is normal   Level of consciousness: alert    Cranial Nerves   Cranial nerves II through XII intact.     CN III, IV, VI   Pupils are equal, round, and reactive to light.    Motor Exam   Muscle bulk: normal  Overall muscle tone: normal    Strength   Strength 5/5 except as noted.     Sensory Exam   Light touch normal.     Gait, Coordination, and Reflexes     Gait  Gait:  normal    Coordination   Romberg: negative    Reflexes   Reflexes 2+ except as noted.   Right brachioradialis: 2+  Left brachioradialis: 2+  Right biceps: 2+  Left biceps: 2+  Right triceps: 2+  Left triceps: 2+  Right patellar: 2+  Left patellar: 2+  Right achilles: 2+  Left achilles: 2+      Assessment & Plan   Independent Review of Radiographic Studies:      I personally reviewed the images from the following studies.    No new neuroimaging.    Assessment/Plan: Madelaine has a history of pulmonary fibrosis after COVID and her pulmonologist was warned her that it may continue to get worse as she ages.  She would like to get this cervical spondylolisthesis and radiculopathy taken care of prior to developing worsening pulmonary fibrosis symptoms and therefore not being a candidate for surgery.  I think that is reasonable she is tried extensive physical therapy as well as epidural steroid injections and they have not been long-term solutions for her.  We discussed the risk and benefits and alternatives of surgery including CSF leak, injury to nerve roots, need for further surgery down the line.  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    Medical Decision Making:      C5-6 ACDF  X-ray cervical spine         Diagnoses and all orders for this visit:    1. Cervical spondylosis with radiculopathy (Primary)  -     XR spine cervical 2 or 3 vw; Future  -     Case Request; Standing  -     CBC & Differential; Future  -     Comprehensive Metabolic Panel; Future  -     aPTT; Future  -     Protime-INR; Future  -     Type & Screen; Future  -     ceFAZolin (ANCEF) 2,000 mg in sodium chloride 0.9 % 100 mL IVPB  -     Case Request    2. Adjacent segment disease of cervical spine at C5-C6 level with history of fusion procedure  -     XR spine cervical 2 or 3 vw; Future  -     Case Request;  Standing  -     CBC & Differential; Future  -     Comprehensive Metabolic Panel; Future  -     aPTT; Future  -     Protime-INR; Future  -     Type & Screen; Future  -     ceFAZolin (ANCEF) 2,000 mg in sodium chloride 0.9 % 100 mL IVPB  -     Case Request    3. Abnormal coagulation profile  -     aPTT; Future  -     Protime-INR; Future    Other orders  -     Inpatient Admission; Standing  -     Follow Anesthesia Guidelines / Protocol; Future  -     Follow Anesthesia Guidelines / Protocol; Standing  -     Verify / Perform Chlorhexidine Skin Prep; Standing  -     Provide Patient With Instructions on NPO Status; Future  -     Provide Chlorhexidine Skin Prep Wipes and Instructions; Future             Patient Instructions/Recommendations:    Call with any questions or concerns      Finn Ojeda MD  07/25/24  12:01 EDT

## 2024-07-25 ENCOUNTER — OFFICE VISIT (OUTPATIENT)
Dept: NEUROSURGERY | Facility: CLINIC | Age: 69
End: 2024-07-25
Payer: MEDICARE

## 2024-07-25 VITALS
RESPIRATION RATE: 16 BRPM | HEART RATE: 83 BPM | HEIGHT: 66 IN | DIASTOLIC BLOOD PRESSURE: 70 MMHG | OXYGEN SATURATION: 97 % | SYSTOLIC BLOOD PRESSURE: 118 MMHG | WEIGHT: 185 LBS | BODY MASS INDEX: 29.73 KG/M2

## 2024-07-25 DIAGNOSIS — M50.322 ADJACENT SEGMENT DISEASE OF CERVICAL SPINE AT C5-C6 LEVEL WITH HISTORY OF FUSION PROCEDURE: ICD-10-CM

## 2024-07-25 DIAGNOSIS — M47.22 CERVICAL SPONDYLOSIS WITH RADICULOPATHY: Primary | ICD-10-CM

## 2024-07-25 DIAGNOSIS — R79.1 ABNORMAL COAGULATION PROFILE: ICD-10-CM

## 2024-07-25 DIAGNOSIS — Z98.1 ADJACENT SEGMENT DISEASE OF CERVICAL SPINE AT C5-C6 LEVEL WITH HISTORY OF FUSION PROCEDURE: ICD-10-CM

## 2024-07-25 PROBLEM — M54.12 CERVICAL RADICULOPATHY: Status: ACTIVE | Noted: 2024-07-25

## 2024-08-12 ENCOUNTER — TELEPHONE (OUTPATIENT)
Dept: NEUROSURGERY | Facility: CLINIC | Age: 69
End: 2024-08-12
Payer: MEDICARE

## 2024-08-12 NOTE — TELEPHONE ENCOUNTER
Called patient. Informed patient  will be out of office on original appt this week. We need to reschedule. Rescheduled for 08/22/2024.

## 2024-08-19 ENCOUNTER — PRE-ADMISSION TESTING (OUTPATIENT)
Dept: PREADMISSION TESTING | Facility: HOSPITAL | Age: 69
End: 2024-08-19
Payer: MEDICARE

## 2024-08-19 VITALS
HEIGHT: 66 IN | OXYGEN SATURATION: 95 % | RESPIRATION RATE: 18 BRPM | SYSTOLIC BLOOD PRESSURE: 143 MMHG | WEIGHT: 185.6 LBS | BODY MASS INDEX: 29.83 KG/M2 | TEMPERATURE: 98.5 F | DIASTOLIC BLOOD PRESSURE: 88 MMHG | HEART RATE: 93 BPM

## 2024-08-19 DIAGNOSIS — M50.322 ADJACENT SEGMENT DISEASE OF CERVICAL SPINE AT C5-C6 LEVEL WITH HISTORY OF FUSION PROCEDURE: ICD-10-CM

## 2024-08-19 DIAGNOSIS — Z98.1 ADJACENT SEGMENT DISEASE OF CERVICAL SPINE AT C5-C6 LEVEL WITH HISTORY OF FUSION PROCEDURE: ICD-10-CM

## 2024-08-19 DIAGNOSIS — M47.22 CERVICAL SPONDYLOSIS WITH RADICULOPATHY: ICD-10-CM

## 2024-08-19 DIAGNOSIS — R79.1 ABNORMAL COAGULATION PROFILE: ICD-10-CM

## 2024-08-19 LAB
ABO GROUP BLD: NORMAL
ALBUMIN SERPL-MCNC: 4.2 G/DL (ref 3.5–5.2)
ALBUMIN/GLOB SERPL: 1.6 G/DL
ALP SERPL-CCNC: 128 U/L (ref 39–117)
ALT SERPL W P-5'-P-CCNC: 17 U/L (ref 1–33)
ANION GAP SERPL CALCULATED.3IONS-SCNC: 10.5 MMOL/L (ref 5–15)
APTT PPP: 26.9 SECONDS (ref 22.7–35.4)
AST SERPL-CCNC: 20 U/L (ref 1–32)
BASOPHILS # BLD AUTO: 0.14 10*3/MM3 (ref 0–0.2)
BASOPHILS NFR BLD AUTO: 1.6 % (ref 0–1.5)
BILIRUB SERPL-MCNC: 0.4 MG/DL (ref 0–1.2)
BLD GP AB SCN SERPL QL: NEGATIVE
BUN SERPL-MCNC: 13 MG/DL (ref 8–23)
BUN/CREAT SERPL: 12.5 (ref 7–25)
CALCIUM SPEC-SCNC: 9.7 MG/DL (ref 8.6–10.5)
CHLORIDE SERPL-SCNC: 109 MMOL/L (ref 98–107)
CO2 SERPL-SCNC: 20.5 MMOL/L (ref 22–29)
CREAT SERPL-MCNC: 1.04 MG/DL (ref 0.57–1)
DEPRECATED RDW RBC AUTO: 42.8 FL (ref 37–54)
EGFRCR SERPLBLD CKD-EPI 2021: 58.3 ML/MIN/1.73
EOSINOPHIL # BLD AUTO: 0.21 10*3/MM3 (ref 0–0.4)
EOSINOPHIL NFR BLD AUTO: 2.4 % (ref 0.3–6.2)
ERYTHROCYTE [DISTWIDTH] IN BLOOD BY AUTOMATED COUNT: 13.1 % (ref 12.3–15.4)
GLOBULIN UR ELPH-MCNC: 2.7 GM/DL
GLUCOSE SERPL-MCNC: 110 MG/DL (ref 65–99)
HCT VFR BLD AUTO: 41.5 % (ref 34–46.6)
HGB BLD-MCNC: 14.1 G/DL (ref 12–15.9)
IMM GRANULOCYTES # BLD AUTO: 0.03 10*3/MM3 (ref 0–0.05)
IMM GRANULOCYTES NFR BLD AUTO: 0.3 % (ref 0–0.5)
INR PPP: 1.05 (ref 0.9–1.1)
LYMPHOCYTES # BLD AUTO: 2.69 10*3/MM3 (ref 0.7–3.1)
LYMPHOCYTES NFR BLD AUTO: 30.7 % (ref 19.6–45.3)
MCH RBC QN AUTO: 30.5 PG (ref 26.6–33)
MCHC RBC AUTO-ENTMCNC: 34 G/DL (ref 31.5–35.7)
MCV RBC AUTO: 89.8 FL (ref 79–97)
MONOCYTES # BLD AUTO: 0.65 10*3/MM3 (ref 0.1–0.9)
MONOCYTES NFR BLD AUTO: 7.4 % (ref 5–12)
NEUTROPHILS NFR BLD AUTO: 5.05 10*3/MM3 (ref 1.7–7)
NEUTROPHILS NFR BLD AUTO: 57.6 % (ref 42.7–76)
NRBC BLD AUTO-RTO: 0 /100 WBC (ref 0–0.2)
PLATELET # BLD AUTO: 322 10*3/MM3 (ref 140–450)
PMV BLD AUTO: 9.6 FL (ref 6–12)
POTASSIUM SERPL-SCNC: 4.2 MMOL/L (ref 3.5–5.2)
PROT SERPL-MCNC: 6.9 G/DL (ref 6–8.5)
PROTHROMBIN TIME: 13.9 SECONDS (ref 11.7–14.2)
RBC # BLD AUTO: 4.62 10*6/MM3 (ref 3.77–5.28)
RH BLD: POSITIVE
SODIUM SERPL-SCNC: 140 MMOL/L (ref 136–145)
T&S EXPIRATION DATE: NORMAL
WBC NRBC COR # BLD AUTO: 8.77 10*3/MM3 (ref 3.4–10.8)

## 2024-08-19 PROCEDURE — 86850 RBC ANTIBODY SCREEN: CPT

## 2024-08-19 PROCEDURE — 85025 COMPLETE CBC W/AUTO DIFF WBC: CPT

## 2024-08-19 PROCEDURE — 80053 COMPREHEN METABOLIC PANEL: CPT

## 2024-08-19 PROCEDURE — 85610 PROTHROMBIN TIME: CPT

## 2024-08-19 PROCEDURE — 85730 THROMBOPLASTIN TIME PARTIAL: CPT

## 2024-08-19 PROCEDURE — 93005 ELECTROCARDIOGRAM TRACING: CPT

## 2024-08-19 PROCEDURE — 86901 BLOOD TYPING SEROLOGIC RH(D): CPT

## 2024-08-19 PROCEDURE — 86900 BLOOD TYPING SEROLOGIC ABO: CPT

## 2024-08-19 PROCEDURE — 36415 COLL VENOUS BLD VENIPUNCTURE: CPT

## 2024-08-19 NOTE — DISCHARGE INSTRUCTIONS
Take the following medications the morning of surgery:GABAPENTIN, WELLBUTRIN, CYMBALTA, AMLODIPINE, PROTONIX. MAY TAKE INHALERS/NEBULIZER.      If you are on prescription narcotic pain medication to control your pain you may also take that medication the morning of surgery.      General Instructions:     Do not eat solid food after midnight the night before surgery.  Clear liquids day of surgery are allowed but must be stopped at least two hours before your hospital arrival time.       Allowed clear liquids      Water, sodas, and tea or coffee with no cream or milk added.       12 to 20 ounces of a clear liquid that contains carbohydrates is recommended.  If non-diabetic, have Gatorade or Powerade.  If diabetic, have G2 or Powerade Zero.     Do not have liquids red in color.  Do not consume chicken, beef, pork or vegetable broth or bouillon cubes of any variety as they are not considered clear liquids and are not allowed.      Infants may have breast milk up to four hours before surgery.  Infants drinking formula may drink formula up to six hours before surgery.   Patients who avoid smoking, chewing tobacco and alcohol for 4 weeks prior to surgery have a reduced risk of post-operative complications.  Quit smoking as many days before surgery as you can.  Do not smoke, use chewing tobacco or drink alcohol the day of surgery.   If applicable bring your C-PAP/ BI-PAP machine in with you to preop day of surgery.  Bring any papers given to you in the doctor’s office.  Wear clean comfortable clothes.  Do not wear contact lenses, false eyelashes or make-up.  Bring a case for your glasses.   Bring crutches or walker if applicable.  Remove all piercings.  Leave jewelry and any other valuables at home.  Hair extensions with metal clips must be removed prior to surgery.  The Pre-Admission Testing nurse will instruct you to bring medications if unable to obtain an accurate list in Pre-Admission Testing.        If you were given  a blood bank ID arm band remember to bring it with you the day of surgery.    Preventing a Surgical Site Infection:  For 2 to 3 days before surgery, avoid shaving with a razor because the razor can irritate skin and make it easier to develop an infection.    Any areas of open skin can increase the risk of a post-operative wound infection by allowing bacteria to enter and travel throughout the body.  Notify your surgeon if you have any skin wounds / rashes even if it is not near the expected surgical site.  The area will need assessed to determine if surgery should be delayed until it is healed.  The night prior to surgery shower using a fresh bar of anti-bacterial soap (such as Dial) and clean washcloth.  Sleep in a clean bed with clean clothing.  Do not allow pets to sleep with you.  Shower on the morning of surgery using a fresh bar of anti-bacterial soap (such as Dial) and clean washcloth.  Dry with a clean towel and dress in clean clothing.  Ask your surgeon if you will be receiving antibiotics prior to surgery.  Make sure you, your family, and all healthcare providers clean their hands with soap and water or an alcohol based hand  before caring for you or your wound.  CHLORHEXIDINE CLOTH INSTRUCTIONS  The morning of surgery follow these instructions using the Chlorhexidine cloths you've been given.  These steps reduce bacteria on the body.  Do not use the cloths near your eyes, ears mouth, genitalia or on open wounds.  Throw the cloths away after use but do not try to flush them down a toilet.      Open and remove one cloth at a time from the package.    Leave the cloth unfolded and begin the bathing.  Massage the skin with the cloths using gentle pressure to remove bacteria.  Do not scrub harshly.   Follow the steps below with one 2% CHG cloth per area (6 total cloths).  One cloth for neck, shoulders and chest.  One cloth for both arms, hands, fingers and underarms (do underarms last).  One cloth for  the abdomen followed by groin.  One cloth for right leg and foot including between the toes.  One cloth for left leg and foot including between the toes.  The last cloth is to be used for the back of the neck, back and buttocks.    Allow the CHG to air dry 3 minutes on the skin which will give it time to work and decrease the chance of irritation.  The skin may feel sticky until it is dry.  Do not rinse with water or any other liquid or you will lose the beneficial effects of the CHG.  If mild skin irritation occurs, do rinse the skin to remove the CHG.  Report this to the nurse at time of admission.  Do not apply lotions, creams, ointments, deodorants or perfumes after using the clothes. Dress in clean clothes before coming to the hospital.  Day of surgery:  Your arrival time is approximately two hours before your scheduled surgery time.  Please note if you have an early arrival time the surgery doors do not open before 5:00 AM.  Upon arrival, a Pre-op nurse and Anesthesiologist will review your health history, obtain vital signs, and answer questions you may have.  The only belongings needed at this time will be a list of your home medications and if applicable your C-PAP/BI-PAP machine.  A Pre-op nurse will start an IV and you may receive medication in preparation for surgery, including something to help you relax.     Please be aware that surgery does come with discomfort.  We want to make every effort to control your discomfort so please discuss any uncontrolled symptoms with your nurse.   Your doctor will most likely have prescribed pain medications.      If you are going home after surgery you will receive individualized written care instructions before being discharged.  A responsible adult must drive you to and from the hospital on the day of your surgery and ideally stay with you through the night.   .  Discharge prescriptions can be filled by the hospital pharmacy during regular pharmacy hours.  If you  are having surgery late in the day/evening your prescription may be e-prescribed to your pharmacy.  Please verify your pharmacy hours or chose a 24 hour pharmacy to avoid not having access to your prescription because your pharmacy has closed for the day.    If you are staying overnight following surgery, you will be transported to your hospital room following the recovery period.  [facility] has all private rooms.    If you have any questions please call Pre-Admission Testing at [phone].  Deductibles and co-payments are collected on the day of service. Please be prepared to pay the required co-pay, deductible or deposit on the day of service as defined by your plan.    Call your surgeon immediately if you experience any of the following symptoms:  Sore Throat  Shortness of Breath or difficulty breathing  Cough  Chills  Body soreness or muscle pain  Headache  Fever  New loss of taste or smell  Do not arrive for your surgery ill.  Your procedure will need to be rescheduled to another time.  You will need to call your physician before the day of surgery to avoid any unnecessary exposure to hospital staff as well as other patients.

## 2024-08-20 LAB
QT INTERVAL: 403 MS
QTC INTERVAL: 435 MS

## 2024-08-22 ENCOUNTER — TELEPHONE (OUTPATIENT)
Dept: NEUROSURGERY | Facility: CLINIC | Age: 69
End: 2024-08-22

## 2024-08-22 DIAGNOSIS — M54.2 NECK PAIN: ICD-10-CM

## 2024-08-22 DIAGNOSIS — M48.02 CERVICAL SPINAL STENOSIS: ICD-10-CM

## 2024-08-22 DIAGNOSIS — M54.12 CERVICAL RADICULOPATHY: Primary | ICD-10-CM

## 2024-08-22 NOTE — TELEPHONE ENCOUNTER
Called spoke with patient. Read the reasons in the Medicare denial. Informed patient I had spoke with [employer] and [employer] for guidance for this authorization. She stated that she did the PT last year. Informed patient we will have to see if Medicare will accept that. For now we have to cancel surgery. We will resubmit for new authorization.

## 2024-08-22 NOTE — TELEPHONE ENCOUNTER
PT CALLED BACK: STATES SHE DID PT LAST YEAR MAY/JUNE/JULY FOR LUMBAR AND HER NECK AND SHE ALSO DID PM FOR NECK THIS YEAR. PLEASE ADVISE IF SHE NEEDS TO DO MORE PT.     HER PT DATES ARE: (INFO IS IN CHART)  5/15/23  5/24/23  5/31/23  6/7/23  6/14/23  7/24/23      PT CONTACT: [phone]  BEST TIME TO CALL: ANYTIME

## 2024-08-22 NOTE — TELEPHONE ENCOUNTER
Received VM from [facility] Macy tierney. She stated that the reasoning for denial of surgery is due to needing to see patient's ADL deficient. As how is neck pain effecting day to day living. Once patient follow up on 09/11/2024, we can re-submit clinical for approval.

## 2024-08-22 NOTE — TELEPHONE ENCOUNTER
First, called [employer] and [employer] to review authorization denial. [employer] and [employer] reviewed case with their expertise. Advised that the reason for the denial is it has not been 90 days since last conservative treatment. Patient had conservative treatment through pain management on 06/5/2024. 90 days will be 09/05/2024. Thusly the advisement was to have the patient follow up in office after 09/05/2024 for an office visit. After that office visit, resubmit all supporting documentation including the new office note to try to obtain auth from Medicare.     2nd, called patient to explain all of the previous information and review her case. Patient understood. Scheduled patient for office on 09/11/2024, which was patient's original visit. After this visit we will re-submit auth request from Medicare for code [id number] to obtain prior-authorization. Once prior authorization is obtained, patient can be rescheduled for surgery.

## 2024-08-22 NOTE — TELEPHONE ENCOUNTER
Called patient and left VM. Informed patient that we received today a denial from Medicare for pre-authorization of her surgery. Medicare denied due to patient not having 12 weeks of neck and arm treatment to prove she had exhausted conservative treatment. Informed patient we have to cancel her surgery. That she did not need to come in today at 1 pm for her appointment with . To call back so that we can route her PT referral to get her the conservative treatment that Medicare is requiring in order to approve surgery. Please see denial in media.

## 2024-09-10 NOTE — PROGRESS NOTES
Patient ID: Magdalena Dickey is a 69 y.o. female is here today for follow-up to discuss having surgery for cervical pain.    Subjective     The patient is here in regards to   Chief Complaint   Patient presents with    Neck Pain    Follow-up       History of Present Illness  Magdalena is here for her preoperative appointment.  She continues to have neck pain, cervical radiculopathy and headaches arising from her cervicalgia.      While in the room and during my examination of the patient I wore a mask and eye protection.  I washed my hands before and after this patient encounter.  The patient was also wearing a mask.    The following portions of the patient's history were reviewed and updated as appropriate: allergies, current medications, past family history, past medical history, past social history, past surgical history and problem list.    Review of Systems   Constitutional:  Negative for fever.   Eyes:  Negative for visual disturbance.   Gastrointestinal:  Positive for nausea. Negative for vomiting.   Musculoskeletal:  Positive for neck pain and neck stiffness.   Neurological:  Positive for dizziness, light-headedness and headaches. Negative for weakness and numbness.   Psychiatric/Behavioral:  Positive for decreased concentration. Negative for confusion.         Past Medical History:   Diagnosis Date    Abnormal mammogram 03/2022    Anesthesia     WAKES UP AGITATED    Anxiety     Cervical disc disorder     Cervical radiculopathy     Cervical spondylolysis     Chest pain 08/19/2022    ADMITTED TO [facility]    Chronic bilateral low back pain without sciatica     Chronic pain disorder     Colon polyps     FOLLOWED BY DR. ADRIA NYE    COPD (chronic obstructive pulmonary disease)     COVID-19 08/15/2020    SEEN AT     Delayed emergence from anesthesia 2018    Depression 05/25/2016    Diverticulitis 01/16/2018    ADMITTED TO [facility]    Diverticulitis 11/24/2017    SEEN AT [facility] ER    Diverticulitis of colon 06/2020     Dysesthesia     Dyspepsia     Extremity pain     Fecal incontinence 09/2022    GERD (gastroesophageal reflux disease)     Hair loss 12/2020    Headache, tension-type     Hiatal hernia     History of measles, mumps, or rubella     MEASLES AND MUMPS AS A CHILD    Hypertension     IBS (irritable bowel syndrome)     ILD (interstitial lung disease)     Insomnia     Joint pain     Lumbosacral disc disease     Lung nodule 11/2020    Migraine without aura and without status migrainosus, not intractable 05/25/2016    NAFLD (nonalcoholic fatty liver disease)     Neck pain     Osteoarthritis     Palpitations 04/09/2019    SEEN AT [facility] ER    Paralysis of right vocal cord     Paresthesias     PNA (pneumonia) 08/23/2020    D/T COVID, ADMITTED TO [facility]    Polyneuropathy     Post-COVID chronic dyspnea     PUD (peptic ulcer disease)     Rotator cuff tendinitis, right 05/08/2018    Spinal stenosis     Thrush 09/2020    Vestibular neuritis 09/2014       No Known Allergies    Family History   Problem Relation Age of Onset    Alzheimer's disease Mother         SDAT    Hypertension Mother     Diabetes type II Mother     Lung cancer Mother         POSSIBLE    Heart attack Mother     Cancer Mother         lung    COPD Mother     Depression Mother     Diabetes Mother     Heart disease Mother     Miscarriages / Stillbirths Mother     Alcohol abuse Father     Prostate cancer Father     Heart disease Father         THIRD DEGREE HEART BLOCK    Arthritis Father     Cancer Father         prostate    Drug abuse Father         Only alcohol    Learning disabilities Father     Cancer Sister     Ovarian cancer Sister     Cancer Sister     Ovarian cancer Sister     Arthritis Sister     Cancer Sister         ovarian    Depression Sister     Hypertension Sister     Cancer Sister         ovarian    Depression Sister     Hypertension Sister     COPD Sister     No Known Problems Brother     Arthritis Maternal Grandmother         rheumatoid    Breast cancer  Other     Malig Hyperthermia Neg Hx        Social History     Socioeconomic History    Marital status:      Spouse name: Lonnie    Number of children: 3   Tobacco Use    Smoking status: Former     Current packs/day: 0.00     Average packs/day: 1 pack/day for 40.0 years (40.0 ttl pk-yrs)     Types: Cigarettes     Start date: 3/10/1974     Quit date: 3/10/2014     Years since quitting: 10.5     Passive exposure: Past    Smokeless tobacco: Never   Vaping Use    Vaping status: Former   Substance and Sexual Activity    Alcohol use: Yes     Comment: occasional, maybe 1 can of beer every other month    Drug use: No    Sexual activity: Not Currently     Partners: Male     Birth control/protection: Hysterectomy     Comment: .       Past Surgical History:   Procedure Laterality Date    ANTERIOR CERVICAL DISCECTOMY W/ FUSION N/A 06/21/2018    Procedure: C6 7 anterior cervical discectomy and fusion with allograft and plate;  Surgeon: Jacobo De Dios MD;  Location: [facility];  Service: Neurosurgery    CERVICAL EPIDURAL N/A 6/5/2024    Procedure: C7-T1 CERVICAL EPIDURAL STEROID INJECTION CPT: 09760;  Surgeon: Sarah Bernal MD;  Location: [facility] OR;  Service: Pain Management;  Laterality: N/A;    CHOLECYSTECTOMY N/A 1980    AT [facility]    COLON RESECTION N/A 01/16/2018    Procedure: LAPAROSCOPIC SIGMOID COLON RESECTION WITH MOBILIZATION OF SPLENIC FLEXURE;  Surgeon: Eric Davidson MD;  Location: [facility];  Service:     COLONOSCOPY N/A 02/15/2017    6 MM TUBULAR ADENOMA POLYP IN HEPATIC FLEXURE, 5 MM TUBULAR ADENOAM POLYP IN RECTUM, DR. GERARDO NYE AT [facility]    COLONOSCOPY N/A 03/01/2003    Internal hemorrhoids-Dr. Gerardo Nye    COLONOSCOPY N/A 02/08/2022    4 MM TUBULAR ADENOMA POLYP IN ASCENDING, HEMORRHOIDS, DR. GERARDO NYE AT [facility]    DIAGNOSTIC LAPAROSCOPY N/A 06/06/2001    Cul-de-sac endometriosis and adhesions-Dr. Aamir Christine    ENDOSCOPY N/A 02/15/2017    SMALL HIATAL HERNIA, GASTRITIS,   ADRIA NYE AT [facility]    ENDOSCOPY N/A 02/08/2022    GASTRITIS, MILD ESOPHAGITIS, Z LINE IRREGULAR, DR. ADRIA NYE AT [facility]    ENDOSCOPY N/A 09/12/2014    GASTRITIS, DR. ADRIA NYE AT [facility]    ENDOSCOPY AND COLONOSCOPY N/A 02/04/2009    SMALL HIATAL HERNIA, ESOPHAGITIS, HEMORRHOIDS, DIVERTICULOSIS, TORTUOUS COLON, DR. ADRIA NYE AT [facility]    EPIDURAL Bilateral 12/06/2023    Procedure: BILATERAL L4 TRANSFORAMINAL LUMBAR EPIDURAL STEROID INJECTION CPT: 09693, 84501;  Surgeon: Sarah Bernal MD;  Location: [facility];  Service: Pain Management;  Laterality: Bilateral;    EPIDURAL BLOCK      LUMBAR EPIDURAL INJECTION N/A 09/20/2023    Procedure: LUMBAR EPIDURAL 1ST VISIT L4-5 38043;  Surgeon: Sarah Bernal MD;  Location: [facility];  Service: Pain Management;  Laterality: N/A;    MEDIAL BRANCH BLOCK Bilateral 02/26/2024    Procedure: BILATERAL L3-L5 LUMBAR MEDIAL BRANCH BLOCK CPT: 64214, 85709;  Surgeon: Sarah Bernal MD;  Location: [facility];  Service: Pain Management;  Laterality: Bilateral;    MEDIAL BRANCH BLOCK Bilateral 3/11/2024    Procedure: BILATERAL L3-L5 LUMBAR MEDIAL BRANCH BLOCK CPT: 83264, 097725;  Surgeon: Sarah Bernal MD;  Location: [facility];  Service: Pain Management;  Laterality: Bilateral;    NECK SURGERY      RADIOFREQUENCY ABLATION Bilateral 4/15/2024    Procedure: BILATERAL L3-L5 RADIOFREQUENCY ABLATION LUMBAR CPT: 50784, 64660;  Surgeon: Sarah Bernal MD;  Location: [facility];  Service: Pain Management;  Laterality: Bilateral;    SKIN BIOPSY Left 07/08/2022    LEFT CALF, PATH: SMALL FIBER NEUROPATHY    SPINAL FUSION      TOTAL ABDOMINAL HYSTERECTOMY WITH SALPINGO OOPHORECTOMY Bilateral 07/31/2001    Dr. Aamir Christine AT [facility]         Objective     Vitals:    09/11/24 1120   BP: 142/72   Pulse: 91   Resp: 16   Temp: 97.1 °F (36.2 °C)   SpO2: 95%     Body mass index is 30.04 kg/m².    Physical Exam  Constitutional:       Appearance: Normal appearance.   HENT:      Head:  Normocephalic and atraumatic.   Eyes:      Extraocular Movements: Extraocular movements intact.      Conjunctiva/sclera: Conjunctivae normal.      Pupils: Pupils are equal, round, and reactive to light.   Cardiovascular:      Rate and Rhythm: Normal rate and regular rhythm.      Pulses: Normal pulses.   Pulmonary:      Breath sounds: Normal breath sounds.   Abdominal:      Palpations: Abdomen is soft.   Musculoskeletal:         General: Normal range of motion.      Cervical back: Normal range of motion and neck supple.   Skin:     General: Skin is warm and dry.   Neurological:      Mental Status: She is alert and oriented to person, place, and time.      Cranial Nerves: Cranial nerves 2-12 are intact.      Motor: Motor function is intact. No weakness or atrophy.      Coordination: Coordination is intact. Romberg sign negative. Romberg Test normal.      Gait: Gait is intact. Gait normal.      Deep Tendon Reflexes: Reflexes are normal and symmetric.      Reflex Scores:       Tricep reflexes are 2+ on the right side and 2+ on the left side.       Bicep reflexes are 2+ on the right side and 2+ on the left side.       Brachioradialis reflexes are 2+ on the right side and 2+ on the left side.       Patellar reflexes are 2+ on the right side and 2+ on the left side.       Achilles reflexes are 2+ on the right side and 2+ on the left side.  Psychiatric:         Speech: Speech normal.         Neurologic Exam     Mental Status   Oriented to person, place, and time.   Attention: normal. Concentration: normal.   Speech: speech is normal   Level of consciousness: alert    Cranial Nerves   Cranial nerves II through XII intact.     CN III, IV, VI   Pupils are equal, round, and reactive to light.    Motor Exam   Muscle bulk: normal  Overall muscle tone: normal    Strength   Strength 5/5 except as noted.     Sensory Exam   Light touch normal.     Gait, Coordination, and Reflexes     Gait  Gait: normal    Coordination   Romberg:  negative    Reflexes   Reflexes 2+ except as noted.   Right brachioradialis: 2+  Left brachioradialis: 2+  Right biceps: 2+  Left biceps: 2+  Right triceps: 2+  Left triceps: 2+  Right patellar: 2+  Left patellar: 2+  Right achilles: 2+  Left achilles: 2+      Assessment & Plan   Independent Review of Radiographic Studies:      I personally reviewed the images from the following studies.    No new neuroimaging.    Assessment/Plan: Plans for C5-6 ACDF with removal of previous hardware.  We discussed the risk and benefits and alternatives of surgery including CSF leak, injury to nerve roots, need for further surgery down the line.  She understood and was willing to proceed with surgery    Medical Decision Making:      C5-6 ACDF with removal of previous hardware         Diagnoses and all orders for this visit:    1. Adjacent segment disease of cervical spine at C5-C6 level with history of fusion procedure (Primary)  -     Case Request; Standing  -     CBC & Differential; Future  -     Comprehensive Metabolic Panel; Future  -     aPTT; Future  -     Protime-INR; Future  -     Type & Screen; Future  -     ceFAZolin (ANCEF) 2,000 mg in sodium chloride 0.9 % 100 mL IVPB  -     Case Request    2. Abnormal coagulation profile  -     aPTT; Future  -     Protime-INR; Future    Other orders  -     Inpatient Admission; Standing  -     Follow Anesthesia Guidelines / Protocol; Future  -     Follow Anesthesia Guidelines / Protocol; Standing  -     Verify / Perform Chlorhexidine Skin Prep; Standing  -     Provide Patient With Instructions on NPO Status; Future  -     Provide Chlorhexidine Skin Prep Wipes and Instructions; Future             Patient Instructions/Recommendations:    Call with any questions or concerns      Finn Ojeda MD  09/12/24  14:05 EDT

## 2024-09-11 ENCOUNTER — TELEPHONE (OUTPATIENT)
Dept: NEUROSURGERY | Facility: CLINIC | Age: 69
End: 2024-09-11

## 2024-09-11 ENCOUNTER — OFFICE VISIT (OUTPATIENT)
Dept: NEUROSURGERY | Facility: CLINIC | Age: 69
End: 2024-09-11
Payer: MEDICARE

## 2024-09-11 VITALS
TEMPERATURE: 97.1 F | DIASTOLIC BLOOD PRESSURE: 72 MMHG | RESPIRATION RATE: 16 BRPM | HEIGHT: 66 IN | HEART RATE: 91 BPM | SYSTOLIC BLOOD PRESSURE: 142 MMHG | WEIGHT: 186.1 LBS | OXYGEN SATURATION: 95 % | BODY MASS INDEX: 29.91 KG/M2

## 2024-09-11 DIAGNOSIS — M50.322 ADJACENT SEGMENT DISEASE OF CERVICAL SPINE AT C5-C6 LEVEL WITH HISTORY OF FUSION PROCEDURE: Primary | ICD-10-CM

## 2024-09-11 DIAGNOSIS — R79.1 ABNORMAL COAGULATION PROFILE: ICD-10-CM

## 2024-09-11 DIAGNOSIS — Z98.1 ADJACENT SEGMENT DISEASE OF CERVICAL SPINE AT C5-C6 LEVEL WITH HISTORY OF FUSION PROCEDURE: Primary | ICD-10-CM

## 2024-09-11 PROCEDURE — S0260 H&P FOR SURGERY: HCPCS | Performed by: NEUROLOGICAL SURGERY

## 2024-09-11 PROCEDURE — 3077F SYST BP >= 140 MM HG: CPT | Performed by: NEUROLOGICAL SURGERY

## 2024-09-11 PROCEDURE — 1160F RVW MEDS BY RX/DR IN RCRD: CPT | Performed by: NEUROLOGICAL SURGERY

## 2024-09-11 PROCEDURE — 3078F DIAST BP <80 MM HG: CPT | Performed by: NEUROLOGICAL SURGERY

## 2024-09-11 PROCEDURE — 1159F MED LIST DOCD IN RCRD: CPT | Performed by: NEUROLOGICAL SURGERY

## 2024-09-12 DIAGNOSIS — I10 ESSENTIAL HYPERTENSION: Chronic | ICD-10-CM

## 2024-09-12 PROBLEM — M50.33: Status: ACTIVE | Noted: 2024-09-12

## 2024-09-12 PROBLEM — Z98.1: Status: ACTIVE | Noted: 2024-09-12

## 2024-09-12 RX ORDER — LOSARTAN POTASSIUM 100 MG/1
100 TABLET ORAL DAILY
Qty: 90 TABLET | Refills: 1 | OUTPATIENT
Start: 2024-09-12

## 2024-09-17 ENCOUNTER — OFFICE (OUTPATIENT)
Age: 69
End: 2024-09-17

## 2024-09-17 ENCOUNTER — OFFICE (OUTPATIENT)
Dept: URBAN - METROPOLITAN AREA CLINIC 76 | Facility: CLINIC | Age: 69
End: 2024-09-17

## 2024-09-17 VITALS
DIASTOLIC BLOOD PRESSURE: 78 MMHG | WEIGHT: 187 LBS | HEIGHT: 66 IN | WEIGHT: 187 LBS | SYSTOLIC BLOOD PRESSURE: 124 MMHG | SYSTOLIC BLOOD PRESSURE: 124 MMHG | OXYGEN SATURATION: 96 % | HEIGHT: 66 IN | HEART RATE: 79 BPM | DIASTOLIC BLOOD PRESSURE: 78 MMHG | OXYGEN SATURATION: 96 % | WEIGHT: 187 LBS | HEIGHT: 66 IN | HEART RATE: 79 BPM | SYSTOLIC BLOOD PRESSURE: 124 MMHG | DIASTOLIC BLOOD PRESSURE: 78 MMHG | DIASTOLIC BLOOD PRESSURE: 78 MMHG | WEIGHT: 187 LBS | OXYGEN SATURATION: 96 % | SYSTOLIC BLOOD PRESSURE: 124 MMHG | OXYGEN SATURATION: 96 % | OXYGEN SATURATION: 96 % | OXYGEN SATURATION: 96 % | HEIGHT: 66 IN | HEART RATE: 79 BPM | HEART RATE: 79 BPM | WEIGHT: 187 LBS | HEART RATE: 79 BPM | WEIGHT: 187 LBS | HEIGHT: 66 IN | SYSTOLIC BLOOD PRESSURE: 124 MMHG | HEIGHT: 66 IN | OXYGEN SATURATION: 96 % | HEART RATE: 79 BPM | DIASTOLIC BLOOD PRESSURE: 78 MMHG | DIASTOLIC BLOOD PRESSURE: 78 MMHG | HEIGHT: 66 IN | SYSTOLIC BLOOD PRESSURE: 124 MMHG | SYSTOLIC BLOOD PRESSURE: 124 MMHG | HEART RATE: 79 BPM | DIASTOLIC BLOOD PRESSURE: 78 MMHG | WEIGHT: 187 LBS

## 2024-09-17 DIAGNOSIS — K58.0 IRRITABLE BOWEL SYNDROME WITH DIARRHEA: ICD-10-CM

## 2024-09-17 DIAGNOSIS — K21.9 GASTRO-ESOPHAGEAL REFLUX DISEASE WITHOUT ESOPHAGITIS: ICD-10-CM

## 2024-09-17 PROCEDURE — 99213 OFFICE O/P EST LOW 20 MIN: CPT | Performed by: INTERNAL MEDICINE

## 2024-09-17 RX ORDER — ONDANSETRON HYDROCHLORIDE 4 MG/1
12 TABLET, FILM COATED ORAL
Qty: 50 | Refills: 6 | Status: ACTIVE

## 2024-09-19 ENCOUNTER — OFFICE VISIT (OUTPATIENT)
Dept: INTERNAL MEDICINE | Facility: CLINIC | Age: 69
End: 2024-09-19
Payer: MEDICARE

## 2024-09-19 VITALS
DIASTOLIC BLOOD PRESSURE: 86 MMHG | BODY MASS INDEX: 30.82 KG/M2 | HEART RATE: 78 BPM | SYSTOLIC BLOOD PRESSURE: 144 MMHG | HEIGHT: 65 IN | WEIGHT: 185 LBS

## 2024-09-19 DIAGNOSIS — I10 ESSENTIAL HYPERTENSION: Chronic | ICD-10-CM

## 2024-09-19 DIAGNOSIS — M54.12 CERVICAL RADICULOPATHY: ICD-10-CM

## 2024-09-19 DIAGNOSIS — R68.89 HEAT INTOLERANCE: Chronic | ICD-10-CM

## 2024-09-19 DIAGNOSIS — Z00.00 MEDICARE ANNUAL WELLNESS VISIT, SUBSEQUENT: Primary | ICD-10-CM

## 2024-09-19 PROBLEM — R51.9 MORNING HEADACHE: Status: RESOLVED | Noted: 2023-03-30 | Resolved: 2024-09-19

## 2024-09-19 PROBLEM — R06.83 SNORING: Status: RESOLVED | Noted: 2023-03-30 | Resolved: 2024-09-19

## 2024-09-19 PROCEDURE — 3079F DIAST BP 80-89 MM HG: CPT | Performed by: INTERNAL MEDICINE

## 2024-09-19 PROCEDURE — 3077F SYST BP >= 140 MM HG: CPT | Performed by: INTERNAL MEDICINE

## 2024-09-19 PROCEDURE — 1170F FXNL STATUS ASSESSED: CPT | Performed by: INTERNAL MEDICINE

## 2024-09-19 PROCEDURE — 1159F MED LIST DOCD IN RCRD: CPT | Performed by: INTERNAL MEDICINE

## 2024-09-19 PROCEDURE — 99214 OFFICE O/P EST MOD 30 MIN: CPT | Performed by: INTERNAL MEDICINE

## 2024-09-19 PROCEDURE — G0439 PPPS, SUBSEQ VISIT: HCPCS | Performed by: INTERNAL MEDICINE

## 2024-09-19 PROCEDURE — 1160F RVW MEDS BY RX/DR IN RCRD: CPT | Performed by: INTERNAL MEDICINE

## 2024-09-19 PROCEDURE — 1125F AMNT PAIN NOTED PAIN PRSNT: CPT | Performed by: INTERNAL MEDICINE

## 2024-09-19 RX ORDER — LOSARTAN POTASSIUM 50 MG/1
50 TABLET ORAL DAILY
Qty: 90 TABLET | Refills: 1 | Status: SHIPPED | OUTPATIENT
Start: 2024-09-19

## 2024-09-20 LAB
T3FREE SERPL-MCNC: 2.6 PG/ML (ref 2–4.4)
T4 FREE SERPL-MCNC: 1.18 NG/DL (ref 0.92–1.68)
THYROPEROXIDASE AB SERPL-ACNC: <9 IU/ML (ref 0–34)
TSH SERPL DL<=0.005 MIU/L-ACNC: 1.35 UIU/ML (ref 0.27–4.2)

## 2024-09-30 ENCOUNTER — PRE-ADMISSION TESTING (OUTPATIENT)
Dept: PREADMISSION TESTING | Facility: HOSPITAL | Age: 69
End: 2024-09-30
Payer: MEDICARE

## 2024-09-30 VITALS
OXYGEN SATURATION: 94 % | WEIGHT: 183 LBS | HEIGHT: 65 IN | HEART RATE: 81 BPM | TEMPERATURE: 98.3 F | SYSTOLIC BLOOD PRESSURE: 131 MMHG | RESPIRATION RATE: 18 BRPM | BODY MASS INDEX: 30.49 KG/M2 | DIASTOLIC BLOOD PRESSURE: 78 MMHG

## 2024-09-30 DIAGNOSIS — Z98.1 ADJACENT SEGMENT DISEASE OF CERVICAL SPINE AT C5-C6 LEVEL WITH HISTORY OF FUSION PROCEDURE: ICD-10-CM

## 2024-09-30 DIAGNOSIS — M50.322 ADJACENT SEGMENT DISEASE OF CERVICAL SPINE AT C5-C6 LEVEL WITH HISTORY OF FUSION PROCEDURE: ICD-10-CM

## 2024-09-30 DIAGNOSIS — R79.1 ABNORMAL COAGULATION PROFILE: ICD-10-CM

## 2024-09-30 LAB
ABO GROUP BLD: NORMAL
ALBUMIN SERPL-MCNC: 4.2 G/DL (ref 3.5–5.2)
ALBUMIN/GLOB SERPL: 1.5 G/DL
ALP SERPL-CCNC: 132 U/L (ref 39–117)
ALT SERPL W P-5'-P-CCNC: 16 U/L (ref 1–33)
ANION GAP SERPL CALCULATED.3IONS-SCNC: 11.8 MMOL/L (ref 5–15)
APTT PPP: 28 SECONDS (ref 22.7–35.4)
AST SERPL-CCNC: 18 U/L (ref 1–32)
BASOPHILS # BLD AUTO: 0.13 10*3/MM3 (ref 0–0.2)
BASOPHILS NFR BLD AUTO: 1.2 % (ref 0–1.5)
BILIRUB SERPL-MCNC: 0.3 MG/DL (ref 0–1.2)
BLD GP AB SCN SERPL QL: NEGATIVE
BUN SERPL-MCNC: 13 MG/DL (ref 8–23)
BUN/CREAT SERPL: 12.6 (ref 7–25)
CALCIUM SPEC-SCNC: 9.8 MG/DL (ref 8.6–10.5)
CHLORIDE SERPL-SCNC: 106 MMOL/L (ref 98–107)
CO2 SERPL-SCNC: 22.2 MMOL/L (ref 22–29)
CREAT SERPL-MCNC: 1.03 MG/DL (ref 0.57–1)
DEPRECATED RDW RBC AUTO: 41.5 FL (ref 37–54)
EGFRCR SERPLBLD CKD-EPI 2021: 59 ML/MIN/1.73
EOSINOPHIL # BLD AUTO: 0.24 10*3/MM3 (ref 0–0.4)
EOSINOPHIL NFR BLD AUTO: 2.2 % (ref 0.3–6.2)
ERYTHROCYTE [DISTWIDTH] IN BLOOD BY AUTOMATED COUNT: 12.7 % (ref 12.3–15.4)
GLOBULIN UR ELPH-MCNC: 2.8 GM/DL
GLUCOSE SERPL-MCNC: 88 MG/DL (ref 65–99)
HCT VFR BLD AUTO: 42.4 % (ref 34–46.6)
HGB BLD-MCNC: 14.3 G/DL (ref 12–15.9)
IMM GRANULOCYTES # BLD AUTO: 0.06 10*3/MM3 (ref 0–0.05)
IMM GRANULOCYTES NFR BLD AUTO: 0.5 % (ref 0–0.5)
INR PPP: 1.1 (ref 0.9–1.1)
LYMPHOCYTES # BLD AUTO: 2.99 10*3/MM3 (ref 0.7–3.1)
LYMPHOCYTES NFR BLD AUTO: 26.8 % (ref 19.6–45.3)
MCH RBC QN AUTO: 30.1 PG (ref 26.6–33)
MCHC RBC AUTO-ENTMCNC: 33.7 G/DL (ref 31.5–35.7)
MCV RBC AUTO: 89.3 FL (ref 79–97)
MONOCYTES # BLD AUTO: 0.75 10*3/MM3 (ref 0.1–0.9)
MONOCYTES NFR BLD AUTO: 6.7 % (ref 5–12)
NEUTROPHILS NFR BLD AUTO: 6.97 10*3/MM3 (ref 1.7–7)
NEUTROPHILS NFR BLD AUTO: 62.6 % (ref 42.7–76)
NRBC BLD AUTO-RTO: 0 /100 WBC (ref 0–0.2)
PLATELET # BLD AUTO: 308 10*3/MM3 (ref 140–450)
PMV BLD AUTO: 9.8 FL (ref 6–12)
POTASSIUM SERPL-SCNC: 4.4 MMOL/L (ref 3.5–5.2)
PROT SERPL-MCNC: 7 G/DL (ref 6–8.5)
PROTHROMBIN TIME: 14.4 SECONDS (ref 11.7–14.2)
RBC # BLD AUTO: 4.75 10*6/MM3 (ref 3.77–5.28)
RH BLD: POSITIVE
SODIUM SERPL-SCNC: 140 MMOL/L (ref 136–145)
T&S EXPIRATION DATE: NORMAL
WBC NRBC COR # BLD AUTO: 11.14 10*3/MM3 (ref 3.4–10.8)

## 2024-09-30 PROCEDURE — 85610 PROTHROMBIN TIME: CPT

## 2024-09-30 PROCEDURE — 36415 COLL VENOUS BLD VENIPUNCTURE: CPT

## 2024-09-30 PROCEDURE — 86850 RBC ANTIBODY SCREEN: CPT

## 2024-09-30 PROCEDURE — 86901 BLOOD TYPING SEROLOGIC RH(D): CPT

## 2024-09-30 PROCEDURE — 86900 BLOOD TYPING SEROLOGIC ABO: CPT

## 2024-09-30 PROCEDURE — 85730 THROMBOPLASTIN TIME PARTIAL: CPT

## 2024-09-30 PROCEDURE — 80053 COMPREHEN METABOLIC PANEL: CPT

## 2024-09-30 PROCEDURE — 85025 COMPLETE CBC W/AUTO DIFF WBC: CPT

## 2024-09-30 NOTE — DISCHARGE INSTRUCTIONS
Take the following medications the morning of surgery: AMLODIPINE, PANTOPRAZOLE, WELLBUTRIN, CYMBALTA, AND GABAPENTIN      If you are on prescription narcotic pain medication to control your pain you may also take that medication the morning of surgery.      General Instructions:     Do not eat solid food after midnight the night before surgery.  Clear liquids day of surgery are allowed but must be stopped at least two hours before your hospital arrival time.       Allowed clear liquids      Water, sodas, and tea or coffee with no cream or milk added.       12 to 20 ounces of a clear liquid that contains carbohydrates is recommended.  If non-diabetic, have Gatorade or Powerade.  If diabetic, have G2 or Powerade Zero.     Do not have liquids red in color.  Do not consume chicken, beef, pork or vegetable broth or bouillon cubes of any variety as they are not considered clear liquids and are not allowed.      Infants may have breast milk up to four hours before surgery.  Infants drinking formula may drink formula up to six hours before surgery.   Patients who avoid smoking, chewing tobacco and alcohol for 4 weeks prior to surgery have a reduced risk of post-operative complications.  Quit smoking as many days before surgery as you can.  Do not smoke, use chewing tobacco or drink alcohol the day of surgery.   If applicable bring your C-PAP/ BI-PAP machine in with you to preop day of surgery.  Bring any papers given to you in the doctor’s office.  Wear clean comfortable clothes.  Do not wear contact lenses, false eyelashes or make-up.  Bring a case for your glasses.   Bring crutches or walker if applicable.  Remove all piercings.  Leave jewelry and any other valuables at home.  Hair extensions with metal clips must be removed prior to surgery.  The Pre-Admission Testing nurse will instruct you to bring medications if unable to obtain an accurate list in Pre-Admission Testing.        If you were given a blood bank ID arm  band remember to bring it with you the day of surgery.    Preventing a Surgical Site Infection:  For 2 to 3 days before surgery, avoid shaving with a razor because the razor can irritate skin and make it easier to develop an infection.    Any areas of open skin can increase the risk of a post-operative wound infection by allowing bacteria to enter and travel throughout the body.  Notify your surgeon if you have any skin wounds / rashes even if it is not near the expected surgical site.  The area will need assessed to determine if surgery should be delayed until it is healed.  The night prior to surgery shower using a fresh bar of anti-bacterial soap (such as Dial) and clean washcloth.  Sleep in a clean bed with clean clothing.  Do not allow pets to sleep with you.  Shower on the morning of surgery using a fresh bar of anti-bacterial soap (such as Dial) and clean washcloth.  Dry with a clean towel and dress in clean clothing.  Ask your surgeon if you will be receiving antibiotics prior to surgery.  CHLORHEXIDINE CLOTH INSTRUCTIONS  The morning of surgery follow these instructions using the Chlorhexidine cloths you've been given.  These steps reduce bacteria on the body.  Do not use the cloths near your eyes, ears mouth, genitalia or on open wounds.  Throw the cloths away after use but do not try to flush them down a toilet.      Open and remove one cloth at a time from the package.    Leave the cloth unfolded and begin the bathing.  Massage the skin with the cloths using gentle pressure to remove bacteria.  Do not scrub harshly.   Follow the steps below with one 2% CHG cloth per area (6 total cloths).  One cloth for neck, shoulders and chest.  One cloth for both arms, hands, fingers and underarms (do underarms last).  One cloth for the abdomen followed by groin.  One cloth for right leg and foot including between the toes.  One cloth for left leg and foot including between the toes.  The last cloth is to be used for  the back of the neck, back and buttocks.    Allow the CHG to air dry 3 minutes on the skin which will give it time to work and decrease the chance of irritation.  The skin may feel sticky until it is dry.  Do not rinse with water or any other liquid or you will lose the beneficial effects of the CHG.  If mild skin irritation occurs, do rinse the skin to remove the CHG.  Report this to the nurse at time of admission.  Do not apply lotions, creams, ointments, deodorants or perfumes after using the clothes. Dress in clean clothes before coming to the hospital.Make sure you, your family, and all healthcare providers clean their hands with soap and water or an alcohol based hand  before caring for you or your wound.    Day of surgery:  Your arrival time is approximately two hours before your scheduled surgery time.  Please note if you have an early arrival time the surgery doors do not open before 5:00 AM.  Upon arrival, a Pre-op nurse and Anesthesiologist will review your health history, obtain vital signs, and answer questions you may have.  The only belongings needed at this time will be a list of your home medications and if applicable your C-PAP/BI-PAP machine.  A Pre-op nurse will start an IV and you may receive medication in preparation for surgery, including something to help you relax.     Please be aware that surgery does come with discomfort.  We want to make every effort to control your discomfort so please discuss any uncontrolled symptoms with your nurse.   Your doctor will most likely have prescribed pain medications.      If you are going home after surgery you will receive individualized written care instructions before being discharged.  A responsible adult must drive you to and from the hospital on the day of your surgery and ideally stay with you through the night.   .  Discharge prescriptions can be filled by the hospital pharmacy during regular pharmacy hours.  If you are having surgery  late in the day/evening your prescription may be e-prescribed to your pharmacy.  Please verify your pharmacy hours or chose a 24 hour pharmacy to avoid not having access to your prescription because your pharmacy has closed for the day.    If you are staying overnight following surgery, you will be transported to your hospital room following the recovery period.  [facility] has all private rooms.    If you have any questions please call Pre-Admission Testing at [phone].  Deductibles and co-payments are collected on the day of service. Please be prepared to pay the required co-pay, deductible or deposit on the day of service as defined by your plan.    Call your surgeon immediately if you experience any of the following symptoms:  Sore Throat  Shortness of Breath or difficulty breathing  Cough  Chills  Body soreness or muscle pain  Headache  Fever  New loss of taste or smell  Do not arrive for your surgery ill.  Your procedure will need to be rescheduled to another time.  You will need to call your physician before the day of surgery to avoid any unnecessary exposure to hospital staff as well as other patients.

## 2024-10-02 ENCOUNTER — TELEPHONE (OUTPATIENT)
Dept: NEUROSURGERY | Facility: CLINIC | Age: 69
End: 2024-10-02
Payer: MEDICARE

## 2024-10-02 NOTE — TELEPHONE ENCOUNTER
Called patient. Informed her we have received her authorization. She is set for surgery. She needs to arrive a 9:30 am for surgery.

## 2024-10-05 ENCOUNTER — ANESTHESIA EVENT (OUTPATIENT)
Dept: PERIOP | Facility: HOSPITAL | Age: 69
End: 2024-10-05
Payer: MEDICARE

## 2024-10-07 ENCOUNTER — APPOINTMENT (OUTPATIENT)
Dept: GENERAL RADIOLOGY | Facility: HOSPITAL | Age: 69
End: 2024-10-07
Payer: MEDICARE

## 2024-10-07 ENCOUNTER — HOSPITAL ENCOUNTER (INPATIENT)
Facility: HOSPITAL | Age: 69
LOS: 2 days | Discharge: HOME | End: 2024-10-09
Attending: NEUROLOGICAL SURGERY | Admitting: NEUROLOGICAL SURGERY
Payer: MEDICARE

## 2024-10-07 ENCOUNTER — ANESTHESIA (OUTPATIENT)
Dept: PERIOP | Facility: HOSPITAL | Age: 69
End: 2024-10-07
Payer: MEDICARE

## 2024-10-07 DIAGNOSIS — Z98.1 ADJACENT SEGMENT DISEASE OF CERVICOTHORACIC SPINE WITH HISTORY OF FUSION PROCEDURE: Primary | ICD-10-CM

## 2024-10-07 DIAGNOSIS — M48.02 CERVICAL SPINAL STENOSIS: ICD-10-CM

## 2024-10-07 DIAGNOSIS — M50.33 ADJACENT SEGMENT DISEASE OF CERVICOTHORACIC SPINE WITH HISTORY OF FUSION PROCEDURE: Primary | ICD-10-CM

## 2024-10-07 DIAGNOSIS — M54.2 NECK PAIN: ICD-10-CM

## 2024-10-07 DIAGNOSIS — M50.322 ADJACENT SEGMENT DISEASE OF CERVICAL SPINE AT C5-C6 LEVEL WITH HISTORY OF FUSION PROCEDURE: ICD-10-CM

## 2024-10-07 DIAGNOSIS — Z98.1 ADJACENT SEGMENT DISEASE OF CERVICAL SPINE AT C5-C6 LEVEL WITH HISTORY OF FUSION PROCEDURE: ICD-10-CM

## 2024-10-07 DIAGNOSIS — M54.12 CERVICAL RADICULOPATHY: ICD-10-CM

## 2024-10-07 LAB — GLUCOSE BLDC GLUCOMTR-MCNC: 133 MG/DL (ref 70–130)

## 2024-10-07 PROCEDURE — 25010000002 HYDROMORPHONE 1 MG/ML SOLUTION

## 2024-10-07 PROCEDURE — 25010000002 HYDROMORPHONE PER 4 MG: Performed by: NEUROLOGICAL SURGERY

## 2024-10-07 PROCEDURE — 0PP304Z REMOVAL OF INTERNAL FIXATION DEVICE FROM CERVICAL VERTEBRA, OPEN APPROACH: ICD-10-PCS | Performed by: NEUROLOGICAL SURGERY

## 2024-10-07 PROCEDURE — 22853 INSJ BIOMECHANICAL DEVICE: CPT | Performed by: NEUROLOGICAL SURGERY

## 2024-10-07 PROCEDURE — 25010000002 CEFAZOLIN PER 500 MG: Performed by: NEUROLOGICAL SURGERY

## 2024-10-07 PROCEDURE — C1713 ANCHOR/SCREW BN/BN,TIS/BN: HCPCS | Performed by: NEUROLOGICAL SURGERY

## 2024-10-07 PROCEDURE — 25010000002 DIPHENHYDRAMINE PER 50 MG

## 2024-10-07 PROCEDURE — 25010000002 PROPOFOL 200 MG/20ML EMULSION

## 2024-10-07 PROCEDURE — 25010000002 MIDAZOLAM PER 1 MG: Performed by: ANESTHESIOLOGY

## 2024-10-07 PROCEDURE — 25010000002 FENTANYL CITRATE (PF) 50 MCG/ML SOLUTION

## 2024-10-07 PROCEDURE — 0RG10K0 FUSION OF CERVICAL VERTEBRAL JOINT WITH NONAUTOLOGOUS TISSUE SUBSTITUTE, ANTERIOR APPROACH, ANTERIOR COLUMN, OPEN APPROACH: ICD-10-PCS | Performed by: NEUROLOGICAL SURGERY

## 2024-10-07 PROCEDURE — 82948 REAGENT STRIP/BLOOD GLUCOSE: CPT

## 2024-10-07 PROCEDURE — 25010000002 DEXAMETHASONE SODIUM PHOSPHATE 20 MG/5ML SOLUTION

## 2024-10-07 PROCEDURE — 22551 ARTHRD ANT NTRBDY CERVICAL: CPT | Performed by: NEUROLOGICAL SURGERY

## 2024-10-07 PROCEDURE — 76000 FLUOROSCOPY <1 HR PHYS/QHP: CPT

## 2024-10-07 PROCEDURE — 0RB30ZZ EXCISION OF CERVICAL VERTEBRAL DISC, OPEN APPROACH: ICD-10-PCS | Performed by: NEUROLOGICAL SURGERY

## 2024-10-07 PROCEDURE — 25010000002 HYDROMORPHONE PER 4 MG

## 2024-10-07 PROCEDURE — 25010000002 SUGAMMADEX 200 MG/2ML SOLUTION

## 2024-10-07 PROCEDURE — 22849 REINSERT SPINAL FIXATION: CPT | Performed by: NEUROLOGICAL SURGERY

## 2024-10-07 PROCEDURE — 25810000003 LACTATED RINGERS PER 1000 ML: Performed by: ANESTHESIOLOGY

## 2024-10-07 PROCEDURE — 25010000002 LIDOCAINE 2% SOLUTION

## 2024-10-07 PROCEDURE — 25010000002 ONDANSETRON PER 1 MG

## 2024-10-07 DEVICE — IMPLANTABLE DEVICE: Type: IMPLANTABLE DEVICE | Site: SPINE CERVICAL | Status: FUNCTIONAL

## 2024-10-07 DEVICE — CLIP LIGAT VASC HORIZON TI MD BLU 6CT: Type: IMPLANTABLE DEVICE | Site: SPINE CERVICAL | Status: FUNCTIONAL

## 2024-10-07 DEVICE — HEMOST ABS SURGIFOAM SZ100 8X12 10MM: Type: IMPLANTABLE DEVICE | Site: SPINE CERVICAL | Status: FUNCTIONAL

## 2024-10-07 DEVICE — FLOSEAL WITH RECOTHROM - 10ML.
Type: IMPLANTABLE DEVICE | Site: SPINE CERVICAL | Status: FUNCTIONAL
Brand: FLOSEAL HEMOSTATIC MATRIX

## 2024-10-07 DEVICE — ALLOGRFT BONE VIVIGEN CELLUAR MATRX FORMABLE 1CC: Type: IMPLANTABLE DEVICE | Site: SPINE CERVICAL | Status: FUNCTIONAL

## 2024-10-07 DEVICE — WAX,BONE,NATURAL
Type: IMPLANTABLE DEVICE | Site: SPINE CERVICAL | Status: FUNCTIONAL
Brand: MEDLINE INDUSTRIES

## 2024-10-07 DEVICE — SCRW VARI CODA SLF/DRL 3.5X14MM: Type: IMPLANTABLE DEVICE | Site: SPINE CERVICAL | Status: FUNCTIONAL

## 2024-10-07 DEVICE — CLIP LIGAT VASC HORIZON TI SM/WD RD 6CT: Type: IMPLANTABLE DEVICE | Site: SPINE CERVICAL | Status: FUNCTIONAL

## 2024-10-07 RX ORDER — SODIUM CHLORIDE, SODIUM LACTATE, POTASSIUM CHLORIDE, CALCIUM CHLORIDE 600; 310; 30; 20 MG/100ML; MG/100ML; MG/100ML; MG/100ML
9 INJECTION, SOLUTION INTRAVENOUS CONTINUOUS
Status: ACTIVE | OUTPATIENT
Start: 2024-10-07 | End: 2024-10-08

## 2024-10-07 RX ORDER — ARFORMOTEROL TARTRATE 15 UG/2ML
15 SOLUTION RESPIRATORY (INHALATION) 2 TIMES DAILY
Status: DISCONTINUED | OUTPATIENT
Start: 2024-10-07 | End: 2024-10-09 | Stop reason: HOSPADM

## 2024-10-07 RX ORDER — DEXAMETHASONE SODIUM PHOSPHATE 4 MG/ML
INJECTION, SOLUTION INTRA-ARTICULAR; INTRALESIONAL; INTRAMUSCULAR; INTRAVENOUS; SOFT TISSUE AS NEEDED
Status: DISCONTINUED | OUTPATIENT
Start: 2024-10-07 | End: 2024-10-07 | Stop reason: SURG

## 2024-10-07 RX ORDER — ONDANSETRON 2 MG/ML
4 INJECTION INTRAMUSCULAR; INTRAVENOUS EVERY 6 HOURS PRN
Status: DISCONTINUED | OUTPATIENT
Start: 2024-10-07 | End: 2024-10-09 | Stop reason: HOSPADM

## 2024-10-07 RX ORDER — ALPRAZOLAM 0.25 MG
0.25 TABLET ORAL DAILY PRN
Status: DISCONTINUED | OUTPATIENT
Start: 2024-10-07 | End: 2024-10-09 | Stop reason: HOSPADM

## 2024-10-07 RX ORDER — SODIUM CHLORIDE 0.9 % (FLUSH) 0.9 %
3 SYRINGE (ML) INJECTION EVERY 12 HOURS SCHEDULED
Status: DISCONTINUED | OUTPATIENT
Start: 2024-10-07 | End: 2024-10-07 | Stop reason: HOSPADM

## 2024-10-07 RX ORDER — DEXMEDETOMIDINE HYDROCHLORIDE 4 UG/ML
INJECTION, SOLUTION INTRAVENOUS AS NEEDED
Status: DISCONTINUED | OUTPATIENT
Start: 2024-10-07 | End: 2024-10-07 | Stop reason: SURG

## 2024-10-07 RX ORDER — HYDROMORPHONE HYDROCHLORIDE 1 MG/ML
0.25 INJECTION, SOLUTION INTRAMUSCULAR; INTRAVENOUS; SUBCUTANEOUS EVERY 4 HOURS PRN
Status: DISCONTINUED | OUTPATIENT
Start: 2024-10-07 | End: 2024-10-09 | Stop reason: HOSPADM

## 2024-10-07 RX ORDER — BISACODYL 5 MG/1
5 TABLET, DELAYED RELEASE ORAL DAILY PRN
Status: DISCONTINUED | OUTPATIENT
Start: 2024-10-07 | End: 2024-10-09 | Stop reason: HOSPADM

## 2024-10-07 RX ORDER — HYDROMORPHONE HYDROCHLORIDE 1 MG/ML
0.5 INJECTION, SOLUTION INTRAMUSCULAR; INTRAVENOUS; SUBCUTANEOUS
Status: DISCONTINUED | OUTPATIENT
Start: 2024-10-07 | End: 2024-10-07 | Stop reason: HOSPADM

## 2024-10-07 RX ORDER — BISACODYL 10 MG
10 SUPPOSITORY, RECTAL RECTAL DAILY PRN
Status: DISCONTINUED | OUTPATIENT
Start: 2024-10-07 | End: 2024-10-09 | Stop reason: HOSPADM

## 2024-10-07 RX ORDER — SODIUM CHLORIDE 0.9 % (FLUSH) 0.9 %
10 SYRINGE (ML) INJECTION EVERY 12 HOURS SCHEDULED
Status: DISCONTINUED | OUTPATIENT
Start: 2024-10-07 | End: 2024-10-09 | Stop reason: HOSPADM

## 2024-10-07 RX ORDER — IPRATROPIUM BROMIDE AND ALBUTEROL SULFATE 2.5; .5 MG/3ML; MG/3ML
3 SOLUTION RESPIRATORY (INHALATION) ONCE AS NEEDED
Status: DISCONTINUED | OUTPATIENT
Start: 2024-10-07 | End: 2024-10-07 | Stop reason: HOSPADM

## 2024-10-07 RX ORDER — MAGNESIUM HYDROXIDE 1200 MG/15ML
LIQUID ORAL AS NEEDED
Status: DISCONTINUED | OUTPATIENT
Start: 2024-10-07 | End: 2024-10-07 | Stop reason: HOSPADM

## 2024-10-07 RX ORDER — ACETAMINOPHEN 160 MG/5ML
650 SOLUTION ORAL EVERY 4 HOURS PRN
Status: DISCONTINUED | OUTPATIENT
Start: 2024-10-07 | End: 2024-10-09 | Stop reason: HOSPADM

## 2024-10-07 RX ORDER — SODIUM CHLORIDE 9 MG/ML
40 INJECTION, SOLUTION INTRAVENOUS AS NEEDED
Status: ACTIVE | OUTPATIENT
Start: 2024-10-07 | End: 2024-10-08

## 2024-10-07 RX ORDER — AMLODIPINE BESYLATE 10 MG/1
5 TABLET ORAL DAILY
Status: DISCONTINUED | OUTPATIENT
Start: 2024-10-07 | End: 2024-10-09 | Stop reason: HOSPADM

## 2024-10-07 RX ORDER — IPRATROPIUM BROMIDE AND ALBUTEROL SULFATE 2.5; .5 MG/3ML; MG/3ML
3 SOLUTION RESPIRATORY (INHALATION) EVERY 4 HOURS PRN
Status: DISCONTINUED | OUTPATIENT
Start: 2024-10-07 | End: 2024-10-09 | Stop reason: HOSPADM

## 2024-10-07 RX ORDER — DULOXETIN HYDROCHLORIDE 60 MG/1
60 CAPSULE, DELAYED RELEASE ORAL DAILY
Status: DISCONTINUED | OUTPATIENT
Start: 2024-10-07 | End: 2024-10-09 | Stop reason: HOSPADM

## 2024-10-07 RX ORDER — METHOCARBAMOL 500 MG/1
500 TABLET, FILM COATED ORAL 4 TIMES DAILY PRN
Status: DISCONTINUED | OUTPATIENT
Start: 2024-10-07 | End: 2024-10-09 | Stop reason: HOSPADM

## 2024-10-07 RX ORDER — FENTANYL CITRATE 50 UG/ML
50 INJECTION, SOLUTION INTRAMUSCULAR; INTRAVENOUS
Status: DISCONTINUED | OUTPATIENT
Start: 2024-10-07 | End: 2024-10-07 | Stop reason: HOSPADM

## 2024-10-07 RX ORDER — SUMATRIPTAN 100 MG/1
100 TABLET, FILM COATED ORAL
Status: DISCONTINUED | OUTPATIENT
Start: 2024-10-07 | End: 2024-10-09 | Stop reason: HOSPADM

## 2024-10-07 RX ORDER — ONDANSETRON 4 MG/1
4 TABLET, ORALLY DISINTEGRATING ORAL EVERY 6 HOURS PRN
Status: DISCONTINUED | OUTPATIENT
Start: 2024-10-07 | End: 2024-10-09 | Stop reason: HOSPADM

## 2024-10-07 RX ORDER — CYCLOSPORINE 0.5 MG/ML
1 EMULSION OPHTHALMIC EVERY 12 HOURS
Status: DISCONTINUED | OUTPATIENT
Start: 2024-10-07 | End: 2024-10-09 | Stop reason: HOSPADM

## 2024-10-07 RX ORDER — MIDAZOLAM HYDROCHLORIDE 1 MG/ML
0.5 INJECTION INTRAMUSCULAR; INTRAVENOUS
Status: DISCONTINUED | OUTPATIENT
Start: 2024-10-07 | End: 2024-10-07 | Stop reason: HOSPADM

## 2024-10-07 RX ORDER — EPHEDRINE SULFATE 50 MG/ML
5 INJECTION, SOLUTION INTRAVENOUS ONCE AS NEEDED
Status: DISCONTINUED | OUTPATIENT
Start: 2024-10-07 | End: 2024-10-07 | Stop reason: HOSPADM

## 2024-10-07 RX ORDER — OXYCODONE AND ACETAMINOPHEN 7.5; 325 MG/1; MG/1
1 TABLET ORAL EVERY 4 HOURS PRN
Status: DISCONTINUED | OUTPATIENT
Start: 2024-10-07 | End: 2024-10-07 | Stop reason: HOSPADM

## 2024-10-07 RX ORDER — ACETAMINOPHEN 325 MG/1
650 TABLET ORAL EVERY 4 HOURS PRN
Status: DISCONTINUED | OUTPATIENT
Start: 2024-10-07 | End: 2024-10-09 | Stop reason: HOSPADM

## 2024-10-07 RX ORDER — FLUMAZENIL 0.1 MG/ML
0.2 INJECTION INTRAVENOUS AS NEEDED
Status: DISCONTINUED | OUTPATIENT
Start: 2024-10-07 | End: 2024-10-07 | Stop reason: HOSPADM

## 2024-10-07 RX ORDER — DIPHENHYDRAMINE HYDROCHLORIDE 50 MG/ML
12.5 INJECTION INTRAMUSCULAR; INTRAVENOUS
Status: DISCONTINUED | OUTPATIENT
Start: 2024-10-07 | End: 2024-10-07 | Stop reason: HOSPADM

## 2024-10-07 RX ORDER — ONDANSETRON 4 MG/1
4 TABLET, ORALLY DISINTEGRATING ORAL EVERY 8 HOURS PRN
Status: DISCONTINUED | OUTPATIENT
Start: 2024-10-07 | End: 2024-10-09 | Stop reason: SDUPTHER

## 2024-10-07 RX ORDER — NALOXONE HCL 0.4 MG/ML
0.2 VIAL (ML) INJECTION AS NEEDED
Status: DISCONTINUED | OUTPATIENT
Start: 2024-10-07 | End: 2024-10-07 | Stop reason: HOSPADM

## 2024-10-07 RX ORDER — ONDANSETRON 2 MG/ML
INJECTION INTRAMUSCULAR; INTRAVENOUS AS NEEDED
Status: DISCONTINUED | OUTPATIENT
Start: 2024-10-07 | End: 2024-10-07 | Stop reason: SURG

## 2024-10-07 RX ORDER — HYDROCODONE BITARTRATE AND ACETAMINOPHEN 5; 325 MG/1; MG/1
1 TABLET ORAL ONCE AS NEEDED
Status: DISCONTINUED | OUTPATIENT
Start: 2024-10-07 | End: 2024-10-07 | Stop reason: HOSPADM

## 2024-10-07 RX ORDER — SODIUM CHLORIDE 0.9 % (FLUSH) 0.9 %
10 SYRINGE (ML) INJECTION AS NEEDED
Status: DISCONTINUED | OUTPATIENT
Start: 2024-10-07 | End: 2024-10-09 | Stop reason: HOSPADM

## 2024-10-07 RX ORDER — NALOXONE HCL 0.4 MG/ML
0.4 VIAL (ML) INJECTION
Status: DISCONTINUED | OUTPATIENT
Start: 2024-10-07 | End: 2024-10-09 | Stop reason: HOSPADM

## 2024-10-07 RX ORDER — PROMETHAZINE HYDROCHLORIDE 25 MG/1
25 SUPPOSITORY RECTAL ONCE AS NEEDED
Status: DISCONTINUED | OUTPATIENT
Start: 2024-10-07 | End: 2024-10-07 | Stop reason: HOSPADM

## 2024-10-07 RX ORDER — FENTANYL CITRATE 50 UG/ML
INJECTION, SOLUTION INTRAMUSCULAR; INTRAVENOUS AS NEEDED
Status: DISCONTINUED | OUTPATIENT
Start: 2024-10-07 | End: 2024-10-07 | Stop reason: SURG

## 2024-10-07 RX ORDER — GABAPENTIN 300 MG/1
600 CAPSULE ORAL EVERY 8 HOURS SCHEDULED
Status: DISCONTINUED | OUTPATIENT
Start: 2024-10-07 | End: 2024-10-09 | Stop reason: HOSPADM

## 2024-10-07 RX ORDER — BACLOFEN 10 MG/1
20 TABLET ORAL 3 TIMES DAILY
Status: DISCONTINUED | OUTPATIENT
Start: 2024-10-07 | End: 2024-10-09 | Stop reason: HOSPADM

## 2024-10-07 RX ORDER — SODIUM CHLORIDE 0.9 % (FLUSH) 0.9 %
3-10 SYRINGE (ML) INJECTION AS NEEDED
Status: DISCONTINUED | OUTPATIENT
Start: 2024-10-07 | End: 2024-10-07 | Stop reason: HOSPADM

## 2024-10-07 RX ORDER — PROPOFOL 10 MG/ML
INJECTION, EMULSION INTRAVENOUS AS NEEDED
Status: DISCONTINUED | OUTPATIENT
Start: 2024-10-07 | End: 2024-10-07 | Stop reason: SURG

## 2024-10-07 RX ORDER — ROCURONIUM BROMIDE 10 MG/ML
INJECTION, SOLUTION INTRAVENOUS AS NEEDED
Status: DISCONTINUED | OUTPATIENT
Start: 2024-10-07 | End: 2024-10-07 | Stop reason: SURG

## 2024-10-07 RX ORDER — ALPRAZOLAM 0.25 MG
0.25 TABLET ORAL AS NEEDED
Status: DISCONTINUED | OUTPATIENT
Start: 2024-10-07 | End: 2024-10-07

## 2024-10-07 RX ORDER — LABETALOL HYDROCHLORIDE 5 MG/ML
5 INJECTION, SOLUTION INTRAVENOUS
Status: DISCONTINUED | OUTPATIENT
Start: 2024-10-07 | End: 2024-10-07 | Stop reason: HOSPADM

## 2024-10-07 RX ORDER — ONDANSETRON 2 MG/ML
4 INJECTION INTRAMUSCULAR; INTRAVENOUS ONCE AS NEEDED
Status: DISCONTINUED | OUTPATIENT
Start: 2024-10-07 | End: 2024-10-07 | Stop reason: HOSPADM

## 2024-10-07 RX ORDER — ENOXAPARIN SODIUM 100 MG/ML
40 INJECTION SUBCUTANEOUS NIGHTLY
Status: DISCONTINUED | OUTPATIENT
Start: 2024-10-08 | End: 2024-10-09 | Stop reason: HOSPADM

## 2024-10-07 RX ORDER — LOSARTAN POTASSIUM 50 MG/1
50 TABLET ORAL DAILY
Status: DISCONTINUED | OUTPATIENT
Start: 2024-10-07 | End: 2024-10-09 | Stop reason: HOSPADM

## 2024-10-07 RX ORDER — HYDROCODONE BITARTRATE AND ACETAMINOPHEN 5; 325 MG/1; MG/1
1 TABLET ORAL EVERY 4 HOURS PRN
Status: DISCONTINUED | OUTPATIENT
Start: 2024-10-07 | End: 2024-10-08

## 2024-10-07 RX ORDER — LIDOCAINE HYDROCHLORIDE 20 MG/ML
INJECTION, SOLUTION INFILTRATION; PERINEURAL AS NEEDED
Status: DISCONTINUED | OUTPATIENT
Start: 2024-10-07 | End: 2024-10-07 | Stop reason: SURG

## 2024-10-07 RX ORDER — LIDOCAINE HYDROCHLORIDE 10 MG/ML
0.5 INJECTION, SOLUTION INFILTRATION; PERINEURAL ONCE AS NEEDED
Status: DISCONTINUED | OUTPATIENT
Start: 2024-10-07 | End: 2024-10-07 | Stop reason: HOSPADM

## 2024-10-07 RX ORDER — PROMETHAZINE HYDROCHLORIDE 25 MG/1
25 TABLET ORAL ONCE AS NEEDED
Status: DISCONTINUED | OUTPATIENT
Start: 2024-10-07 | End: 2024-10-07 | Stop reason: HOSPADM

## 2024-10-07 RX ORDER — FAMOTIDINE 10 MG/ML
20 INJECTION, SOLUTION INTRAVENOUS ONCE
Status: COMPLETED | OUTPATIENT
Start: 2024-10-07 | End: 2024-10-07

## 2024-10-07 RX ORDER — PANTOPRAZOLE SODIUM 40 MG/1
40 TABLET, DELAYED RELEASE ORAL 2 TIMES DAILY
Status: DISCONTINUED | OUTPATIENT
Start: 2024-10-07 | End: 2024-10-09 | Stop reason: HOSPADM

## 2024-10-07 RX ORDER — HYDRALAZINE HYDROCHLORIDE 20 MG/ML
5 INJECTION INTRAMUSCULAR; INTRAVENOUS
Status: DISCONTINUED | OUTPATIENT
Start: 2024-10-07 | End: 2024-10-07 | Stop reason: HOSPADM

## 2024-10-07 RX ORDER — ACETAMINOPHEN 650 MG/1
650 SUPPOSITORY RECTAL EVERY 4 HOURS PRN
Status: DISCONTINUED | OUTPATIENT
Start: 2024-10-07 | End: 2024-10-08

## 2024-10-07 RX ORDER — BUPROPION HYDROCHLORIDE 300 MG/1
300 TABLET ORAL EVERY MORNING
Status: DISCONTINUED | OUTPATIENT
Start: 2024-10-08 | End: 2024-10-09 | Stop reason: HOSPADM

## 2024-10-07 RX ORDER — BUPIVACAINE HYDROCHLORIDE AND EPINEPHRINE 5; 5 MG/ML; UG/ML
INJECTION, SOLUTION EPIDURAL; INTRACAUDAL; PERINEURAL AS NEEDED
Status: DISCONTINUED | OUTPATIENT
Start: 2024-10-07 | End: 2024-10-07 | Stop reason: HOSPADM

## 2024-10-07 RX ORDER — FENTANYL CITRATE 50 UG/ML
50 INJECTION, SOLUTION INTRAMUSCULAR; INTRAVENOUS ONCE AS NEEDED
Status: DISCONTINUED | OUTPATIENT
Start: 2024-10-07 | End: 2024-10-07 | Stop reason: HOSPADM

## 2024-10-07 RX ORDER — POLYETHYLENE GLYCOL 3350 17 G/17G
17 POWDER, FOR SOLUTION ORAL DAILY PRN
Status: DISCONTINUED | OUTPATIENT
Start: 2024-10-07 | End: 2024-10-09 | Stop reason: HOSPADM

## 2024-10-07 RX ORDER — AMOXICILLIN 250 MG
2 CAPSULE ORAL 2 TIMES DAILY
Status: DISCONTINUED | OUTPATIENT
Start: 2024-10-07 | End: 2024-10-09 | Stop reason: HOSPADM

## 2024-10-07 RX ADMIN — SUGAMMADEX 200 MG: 100 INJECTION, SOLUTION INTRAVENOUS at 12:38

## 2024-10-07 RX ADMIN — DULOXETINE HYDROCHLORIDE 60 MG: 60 CAPSULE, DELAYED RELEASE ORAL at 16:55

## 2024-10-07 RX ADMIN — FENTANYL CITRATE 50 MCG: 50 INJECTION, SOLUTION INTRAMUSCULAR; INTRAVENOUS at 13:40

## 2024-10-07 RX ADMIN — LIDOCAINE HYDROCHLORIDE 80 MG: 20 INJECTION, SOLUTION INFILTRATION; PERINEURAL at 10:36

## 2024-10-07 RX ADMIN — ONDANSETRON 4 MG: 2 INJECTION INTRAMUSCULAR; INTRAVENOUS at 12:46

## 2024-10-07 RX ADMIN — HYDROMORPHONE HYDROCHLORIDE 0.5 MG: 1 INJECTION, SOLUTION INTRAMUSCULAR; INTRAVENOUS; SUBCUTANEOUS at 13:54

## 2024-10-07 RX ADMIN — PROPOFOL 20 MG: 10 INJECTION, EMULSION INTRAVENOUS at 10:50

## 2024-10-07 RX ADMIN — OXYCODONE HYDROCHLORIDE AND ACETAMINOPHEN 1 TABLET: 7.5; 325 TABLET ORAL at 14:06

## 2024-10-07 RX ADMIN — GABAPENTIN 600 MG: 300 CAPSULE ORAL at 21:02

## 2024-10-07 RX ADMIN — HYDROMORPHONE HYDROCHLORIDE 0.25 MG: 1 INJECTION, SOLUTION INTRAMUSCULAR; INTRAVENOUS; SUBCUTANEOUS at 12:07

## 2024-10-07 RX ADMIN — HYDROMORPHONE HYDROCHLORIDE 0.25 MG: 1 INJECTION, SOLUTION INTRAMUSCULAR; INTRAVENOUS; SUBCUTANEOUS at 23:21

## 2024-10-07 RX ADMIN — DEXMEDETOMIDINE HYDROCHLORIDE 12 MCG: 4 INJECTION, SOLUTION INTRAVENOUS at 10:36

## 2024-10-07 RX ADMIN — FAMOTIDINE 20 MG: 10 INJECTION INTRAVENOUS at 09:34

## 2024-10-07 RX ADMIN — ROCURONIUM BROMIDE 50 MG: 10 INJECTION, SOLUTION INTRAVENOUS at 10:39

## 2024-10-07 RX ADMIN — HYDROCODONE BITARTRATE AND ACETAMINOPHEN 1 TABLET: 5; 325 TABLET ORAL at 21:51

## 2024-10-07 RX ADMIN — Medication 10 ML: at 21:02

## 2024-10-07 RX ADMIN — GABAPENTIN 600 MG: 300 CAPSULE ORAL at 14:06

## 2024-10-07 RX ADMIN — DIPHENHYDRAMINE HYDROCHLORIDE 12.5 MG: 50 INJECTION, SOLUTION INTRAMUSCULAR; INTRAVENOUS at 14:06

## 2024-10-07 RX ADMIN — BACLOFEN 20 MG: 10 TABLET ORAL at 20:11

## 2024-10-07 RX ADMIN — CYCLOSPORINE 1 DROP: 0.5 EMULSION OPHTHALMIC at 16:56

## 2024-10-07 RX ADMIN — FENTANYL CITRATE 50 MCG: 50 INJECTION, SOLUTION INTRAMUSCULAR; INTRAVENOUS at 10:36

## 2024-10-07 RX ADMIN — BACLOFEN 20 MG: 10 TABLET ORAL at 16:55

## 2024-10-07 RX ADMIN — MIDAZOLAM 0.5 MG: 1 INJECTION INTRAMUSCULAR; INTRAVENOUS at 09:41

## 2024-10-07 RX ADMIN — PANTOPRAZOLE SODIUM 40 MG: 40 TABLET, DELAYED RELEASE ORAL at 20:11

## 2024-10-07 RX ADMIN — SODIUM CHLORIDE, POTASSIUM CHLORIDE, SODIUM LACTATE AND CALCIUM CHLORIDE 9 ML/HR: 600; 310; 30; 20 INJECTION, SOLUTION INTRAVENOUS at 10:27

## 2024-10-07 RX ADMIN — AMLODIPINE BESYLATE 5 MG: 5 TABLET ORAL at 16:55

## 2024-10-07 RX ADMIN — SODIUM CHLORIDE 2000 MG: 900 INJECTION INTRAVENOUS at 10:27

## 2024-10-07 RX ADMIN — FENTANYL CITRATE 50 MCG: 50 INJECTION, SOLUTION INTRAMUSCULAR; INTRAVENOUS at 13:24

## 2024-10-07 RX ADMIN — ROCURONIUM BROMIDE 20 MG: 10 INJECTION, SOLUTION INTRAVENOUS at 11:02

## 2024-10-07 RX ADMIN — LOSARTAN POTASSIUM 50 MG: 50 TABLET, FILM COATED ORAL at 16:55

## 2024-10-07 RX ADMIN — ROCURONIUM BROMIDE 20 MG: 10 INJECTION, SOLUTION INTRAVENOUS at 12:23

## 2024-10-07 RX ADMIN — SUGAMMADEX 200 MG: 100 INJECTION, SOLUTION INTRAVENOUS at 12:45

## 2024-10-07 RX ADMIN — HYDROMORPHONE HYDROCHLORIDE 0.5 MG: 1 INJECTION, SOLUTION INTRAMUSCULAR; INTRAVENOUS; SUBCUTANEOUS at 14:09

## 2024-10-07 RX ADMIN — HYDROMORPHONE HYDROCHLORIDE 0.25 MG: 1 INJECTION, SOLUTION INTRAMUSCULAR; INTRAVENOUS; SUBCUTANEOUS at 12:23

## 2024-10-07 RX ADMIN — SODIUM CHLORIDE, POTASSIUM CHLORIDE, SODIUM LACTATE AND CALCIUM CHLORIDE 9 ML/HR: 600; 310; 30; 20 INJECTION, SOLUTION INTRAVENOUS at 09:34

## 2024-10-07 RX ADMIN — SODIUM CHLORIDE 2000 MG: 900 INJECTION INTRAVENOUS at 18:24

## 2024-10-07 RX ADMIN — PROPOFOL 180 MG: 10 INJECTION, EMULSION INTRAVENOUS at 10:36

## 2024-10-07 RX ADMIN — DEXAMETHASONE SODIUM PHOSPHATE 6 MG: 4 INJECTION, SOLUTION INTRAMUSCULAR; INTRAVENOUS at 11:03

## 2024-10-07 RX ADMIN — DEXMEDETOMIDINE HYDROCHLORIDE 8 MCG: 4 INJECTION, SOLUTION INTRAVENOUS at 11:45

## 2024-10-07 RX ADMIN — HYDROMORPHONE HYDROCHLORIDE 0.5 MG: 1 INJECTION, SOLUTION INTRAMUSCULAR; INTRAVENOUS; SUBCUTANEOUS at 13:31

## 2024-10-07 NOTE — CONSULTS
Patient Care Team:  Sun Guerrier MD as PCP - General (Internal Medicine)  Boogie Parks Jr., MD as Consulting Physician (Psychiatry)  Maren Jin MD as Consulting Physician (Colon and Rectal Surgery)  Gerardo Nye MD as Consulting Physician (Gastroenterology)  Nic Johansen Jr., MD as Consulting Physician (Pulmonary Disease)  Finn Ojeda MD as Surgeon (Neurosurgery)      Subjective     Patient is a 69 y.o. female.  Symptoms critical care management patient is status post anterior cervical discectomy with allograft and instrumented fusion to C5-C6 secondary to cervical radiculopathy.  She is known to me she does have COPD, hypertension, nonalcoholic hepatic steatosis, hiatal hernia with gastroesophageal reflux disease.  Patient is doing pretty well she has some neck and headache pain no nausea notes no visual or motor changes.  No shortness of breath no nausea no bad heartburn or indigestion      Review of Systems:        History  Past Medical History:   Diagnosis Date    Abnormal mammogram 03/2022    Anesthesia     WAKES UP AGITATED    Anxiety     Cervical disc disorder     Cervical radiculopathy     Cervical spondylolysis     Chest pain 08/19/2022    ADMITTED TO [facility]    Chronic bilateral low back pain without sciatica     Chronic pain disorder     Colon polyps     FOLLOWED BY DR. GERARDO NYE    COPD (chronic obstructive pulmonary disease)     COVID-19 08/15/2020    SEEN AT     Delayed emergence from anesthesia 2018    Depression 05/25/2016    Diverticulitis 01/16/2018    ADMITTED TO [facility]    Diverticulitis 11/24/2017    SEEN AT [facility] ER    Diverticulitis of colon 06/2020    Dysesthesia     Dyspepsia     Extremity pain     Fecal incontinence 09/2022    GERD (gastroesophageal reflux disease)     Hair loss 12/2020    Headache, tension-type     Hiatal hernia     History of measles, mumps, or rubella     MEASLES AND MUMPS AS A CHILD    Hypertension     IBS (irritable bowel syndrome)      ILD (interstitial lung disease)     Insomnia     Joint pain     Lumbosacral disc disease     Lung nodule 11/2020    Migraine without aura and without status migrainosus, not intractable 05/25/2016    NAFLD (nonalcoholic fatty liver disease)     Neck pain     Osteoarthritis     Palpitations 04/09/2019    SEEN AT [facility] ER    Paralysis of right vocal cord     Paresthesias     PNA (pneumonia) 08/23/2020    D/T COVID, ADMITTED TO [facility]    Polyneuropathy     Post-COVID chronic dyspnea     PUD (peptic ulcer disease)     Rotator cuff tendinitis, right 05/08/2018    Spinal stenosis     Thrush 09/2020    Vestibular neuritis 09/2014     Past Surgical History:   Procedure Laterality Date    ANTERIOR CERVICAL DISCECTOMY W/ FUSION N/A 06/21/2018    Procedure: C6 7 anterior cervical discectomy and fusion with allograft and plate;  Surgeon: Jacobo De Dios MD;  Location: [facility] MAIN OR;  Service: Neurosurgery    CERVICAL EPIDURAL N/A 6/5/2024    Procedure: C7-T1 CERVICAL EPIDURAL STEROID INJECTION CPT: 05250;  Surgeon: Sarah Bernal MD;  [place] [facility] [place];  Service: Pain Management;  Laterality: N/A;    CHOLECYSTECTOMY N/A 1980    AT [place]    COLON RESECTION N/A 01/16/2018    Procedure: LAPAROSCOPIC SIGMOID COLON RESECTION WITH MOBILIZATION OF SPLENIC FLEXURE;  Surgeon: Eric Davidson MD;  Location: [facility] MAIN OR;  Service:     COLONOSCOPY N/A 02/15/2017    6 MM TUBULAR ADENOMA POLYP IN HEPATIC FLEXURE, 5 MM TUBULAR ADENOAM POLYP IN RECTUM, DR. GERARDO NYE AT [facility]    COLONOSCOPY N/A 03/01/2003    Internal hemorrhoids-Dr. Gerardo Nye    COLONOSCOPY N/A 02/08/2022    4 MM TUBULAR ADENOMA POLYP IN ASCENDING, HEMORRHOIDS, DR. GERARDO NYE AT [facility]    DIAGNOSTIC LAPAROSCOPY N/A 06/06/2001    Cul-de-sac endometriosis and adhesions-Dr. Aamir Christine    ENDOSCOPY N/A 02/15/2017    SMALL HIATAL HERNIA, GASTRITIS, DR. GERARDO NYE AT [facility]    ENDOSCOPY N/A 02/08/2022    GASTRITIS, MILD ESOPHAGITIS, Z LINE IRREGULAR, DR. GERARDO NYE AT [facility]     ENDOSCOPY N/A 09/12/2014    GASTRITIS, DR. ADRIA NYE AT [facility]    ENDOSCOPY AND COLONOSCOPY N/A 02/04/2009    SMALL HIATAL HERNIA, ESOPHAGITIS, HEMORRHOIDS, DIVERTICULOSIS, TORTUOUS COLON, DR. ADRIA NYE AT [facility]    EPIDURAL Bilateral 12/06/2023    Procedure: BILATERAL L4 TRANSFORAMINAL LUMBAR EPIDURAL STEROID INJECTION CPT: 25911, 74508;  Surgeon: Sarah Bernal MD;  Location: [facility] MAIN OR;  Service: Pain Management;  Laterality: Bilateral;    EPIDURAL BLOCK      LUMBAR EPIDURAL INJECTION N/A 09/20/2023    Procedure: LUMBAR EPIDURAL 1ST VISIT L4-5 23406;  Surgeon: Sarah Bernal MD;  Location: [facility] MAIN OR;  Service: Pain Management;  Laterality: N/A;    MEDIAL BRANCH BLOCK Bilateral 02/26/2024    Procedure: BILATERAL L3-L5 LUMBAR MEDIAL BRANCH BLOCK CPT: 05782, 98368;  Surgeon: Sarah Bernal MD;  Location: [facility] MAIN OR;  Service: Pain Management;  Laterality: Bilateral;    MEDIAL BRANCH BLOCK Bilateral 3/11/2024    Procedure: BILATERAL L3-L5 LUMBAR MEDIAL BRANCH BLOCK CPT: 91502, 247410;  Surgeon: Sarah Bernal MD;  Location: [facility] MA[facility];  Service: Pain Management;  Laterality: Bilateral;    NECK SURGERY      RADIOFREQUENCY ABLATION Bilateral 4/15/2024    Procedure: BILATERAL L3-L5 RADIOFREQUENCY ABLATION LUMBAR CPT: 27935, 37769;  Surgeon: Sarah Bernal MD;  Location: [facility] MAIN [facility];  Service: Pain Management;  Laterality: Bilateral;    SKIN BIOPSY Left 07/08/2022    LEFT CALF, PATH: SMALL FIBER NEUROPATHY    SPINAL FUSION      TOTAL ABDOMINAL HYSTERECTOMY WITH SALPINGO OOPHORECTOMY Bilateral 07/31/2001    Dr. Aamir Christine AT [facility]     Social History     Socioeconomic History    Marital status:      Spouse name: Lonnie    Number of children: 3   Tobacco Use    Smoking status: Former     Current packs/day: 0.00     Average packs/day: 1 pack/day for 40.0 years (40.0 ttl pk-yrs)     Types: Cigarettes     Start date: 3/10/1974     Quit date: 3/10/2014     Years since quitting: 10.5     Passive  exposure: Past    Smokeless tobacco: Never   Vaping Use    Vaping status: Former   Substance and Sexual Activity    Alcohol use: Yes     Comment: occasional, maybe 1 can of beer every other month    Drug use: No    Sexual activity: Not Currently     Partners: Male     Birth control/protection: Hysterectomy     Comment: .     Family History   Problem Relation Age of Onset    Alzheimer's disease Mother         SDAT    Hypertension Mother     Diabetes type II Mother     Lung cancer Mother         POSSIBLE    Heart attack Mother     Cancer Mother         lung    COPD Mother     Depression Mother     Diabetes Mother     Heart disease Mother     Miscarriages / Stillbirths Mother     Alcohol abuse Father     Prostate cancer Father     Heart disease Father         THIRD DEGREE HEART BLOCK    Arthritis Father     Cancer Father         prostate    Drug abuse Father         Only alcohol    Learning disabilities Father     Cancer Sister     Ovarian cancer Sister     Cancer Sister     Ovarian cancer Sister     Arthritis Sister     Cancer Sister         ovarian    Depression Sister     Hypertension Sister     Cancer Sister         ovarian    Depression Sister     Hypertension Sister     COPD Sister     No Known Problems Brother     Arthritis Maternal Grandmother         rheumatoid    Breast cancer Other     Malig Hyperthermia Neg Hx          Allergies:  Patient has no known allergies.    Medications:  Prior to Admission medications    Medication Sig Start Date End Date Taking? Authorizing Provider   ALPRAZolam (XANAX) 0.25 MG tablet Take 1 tablet by mouth As Needed. 12/11/23  Yes Livan Kovacs MD   amLODIPine (NORVASC) 5 MG tablet Take 1 tablet by mouth Daily. 6/19/24  Yes Sun Guerrier MD   arformoterol (BROVANA) 15 MCG/2ML nebulizer solution Take 2 mL by nebulization 2 (Two) Times a Day.   Yes Livan Kovacs MD   baclofen (LIORESAL) 20 MG tablet TAKE 1 TABLET BY MOUTH THREE TIMES A DAY  Patient taking  differently: Take 1 tablet by mouth 3 (Three) Times a Day. 3/18/24  Yes Sun Guerrier MD   buPROPion XL (WELLBUTRIN XL) 300 MG 24 hr tablet Take 1 tablet by mouth Every Morning. 2/25/24  Yes Livan Kovacs MD   DULoxetine (CYMBALTA) 60 MG capsule Take 1 capsule by mouth Daily. 6/28/18  Yes Fermín Bella MD   gabapentin (NEURONTIN) 600 MG tablet TAKE 1 TABLET BY MOUTH THREE TIMES A DAY 5/13/24  Yes Sun Guerrier MD   Loperamide HCl (IMODIUM A-D PO) Take 1 tablet by mouth Daily.   Yes Livan Kovacs MD   losartan (Cozaar) 50 MG tablet Take 1 tablet by mouth Daily.  Patient taking differently: Take 1 tablet by mouth Daily. PT TO HOLD 24 HOURS PRIOR TO SURGERY 9/19/24  Yes Sun Guerrier MD   Methylcellulose, Laxative, (CITRUCEL PO) Take 1 tablet by mouth Daily.   Yes Livan Kovacs MD   Multiple Vitamin (MULTI-VITAMINS PO) Take 1 tablet by mouth Daily. HOLD FOR SURGERY   Yes Livan Kovacs MD   ondansetron (ZOFRAN) 4 MG tablet TAKE 1 TABLET BY MOUTH EVERY 8 HOURS AS NEEDED FOR NAUSEA OR VOMITING. 3/27/24  Yes Cora Harvey PA-C   pantoprazole (PROTONIX) 40 MG EC tablet Take 1 tablet by mouth Daily With Dinner.  Patient taking differently: Take 1 tablet by mouth 2 (Two) Times a Day. 3/21/24  Yes Cora Harvey PA-C   Restasis 0.05 % ophthalmic emulsion Administer 1 drop to both eyes Every 12 (Twelve) Hours. 3/4/24  Yes Livan Kovacs MD   revefenacin (Yupelri) 175 MCG/3ML nebulizer solution Take 3 mL by nebulization Daily.   Yes Livan Kovacs MD   ipratropium-albuterol (DUO-NEB) 0.5-2.5 mg/3 ml nebulizer Take 3 mL by nebulization Every 4 (Four) Hours As Needed. 2/9/21      SUMAtriptan (IMITREX) 100 MG tablet TAKE 1 TABLET BY MOUTH AT ONSET OF HEADACHE, MAY REPEAT EVERY 2 HOURS AS NEEDED (MAX 200MG/DAY)  Patient taking differently: Take 1 tablet by mouth Every 2 (Two) Hours As Needed for Migraine. 2/14/23   Sun Guerrier MD     amLODIPine, 5 mg, Oral,  "Daily  arformoterol, 15 mcg, Nebulization, BID  baclofen, 20 mg, Oral, TID  [START ON 10/8/2024] buPROPion XL, 300 mg, Oral, QAM  ceFAZolin, 2,000 mg, Intravenous, Q8H  cycloSPORINE, 1 drop, Both Eyes, Q12H  DULoxetine, 60 mg, Oral, Daily  [START ON 10/8/2024] enoxaparin, 40 mg, Subcutaneous, Nightly  gabapentin, 600 mg, Oral, Q8H  losartan, 50 mg, Oral, Daily  pantoprazole, 40 mg, Oral, BID  senna-docusate sodium, 2 tablet, Oral, BID  sodium chloride, 10 mL, Intravenous, Q12H      lactated ringers, 9 mL/hr, Last Rate: 9 mL/hr (10/07/24 1031)        Objective     Vital Signs  Vital Sign Min/Max for last 24 hours  Temp  Min: 97.9 °F (36.6 °C)  Max: 98.3 °F (36.8 °C)   BP  Min: 111/63  Max: 171/88   Pulse  Min: 77  Max: 94   Resp  Min: 13  Max: 16   SpO2  Min: 88 %  Max: 100 %   Flow (L/min)  Min: 4  Max: 15   No data recorded       Intake/Output Summary (Last 24 hours) at 10/7/2024 1535  Last data filed at 10/7/2024 1252  Gross per 24 hour   Intake 700 ml   Output 185 ml   Net 515 ml     I/O this shift:  In: 700 [I.V.:700]  Out: 185 [Urine:185]  Last Weight and Admission Weight    There were no vitals filed for this visit.  Flowsheet Rows      Flowsheet Row First Filed Value   Admission Height 167.6 cm (66\") Documented at 10/07/2024 0908   Admission Weight --            Body mass index is 29.54 kg/m².           Physical Exam:  General Appearance: White female she is resting in bed she does not look in particular distress  Eyes: Conjunctiva are clear and anicteric pupils are equal and reactive to light  ENT: Mucous membranes are moist no erythema no exudates, no facial asymmetry and tongue is midline speech is fluent  Neck: She has postsurgical changes with a dressing and padding around the right side of her neck  Lungs: Clear nonlabored symmetric expansion  Cardiac: Regular rate rhythm no murmur  Abdomen: Soft nontender no palpable hepatosplenomegaly or masses  : Not examined  Musculoskeletal: Grossly normal  Skin: " Warm and dry no jaundice no petechiae  Neuro: She is alert and oriented cooperative following commands she is got good dorsiflexion plantarflexion straight leg raise no pronator drift good  and good finger-to-nose bilaterally  Extremities/P Vascular: No clubbing cyanosis no edema palpable radial and dorsalis pedis pulses  MSE: She is pleasant and appropriate      Labs:      Estimated Creatinine Clearance: 56 mL/min (A) (by C-G formula based on SCr of 1.03 mg/dL (H)).                                  Microbiology Results (last 10 days)       ** No results found for the last 240 hours. **              Diagnostics:  FL C Arm During Surgery    Result Date: 10/7/2024  This procedure was auto-finalized with no dictation required.    Results for orders placed in visit on 06/24/19    Adult Stress Echo W/ Cont or Stress Agent if Necessary Per Protocol    Interpretation Summary  · Estimated EF = 65%.  · Left ventricular systolic function is normal.  · Left ventricular diastolic dysfunction (grade I) consistent with impaired relaxation.  · Normal stress echo with no significant echocardiographic evidence for myocardial ischemia.          Assessment & Plan     Cervical radiculopathy status post anterior cervical discectomy with allograft and instrumented fusion at C5-C6.  Seems to be doing pretty well postoperatively  COPD does not appear to be in exacerbation she had some desaturation sounds like it was associated with some pain medicines in PACU.  She is saturating 99 to 100% now on room air will wean O2 aggressively.  Continue home bronchodilators  Hiatal hernia with gastroesophageal reflux disease continue PPI  Hypertension continue home medications, amlodipine and losartan  DVT prophylaxis SCDs in place Lovenox has been ordered to start tomorrow per neurosurgery      Nic Johansen Jr, MD  10/07/24  15:35 EDT    Time:

## 2024-10-07 NOTE — ANESTHESIA PROCEDURE NOTES
Airway  Urgency: elective    Date/Time: 10/7/2024 10:42 AM  Airway not difficult    General Information and Staff    Patient location during procedure: OR  CRNA/CAA: Elza Luong CRNA    Indications and Patient Condition  Indications for airway management: airway protection    Preoxygenated: yes  MILS maintained throughout  Mask difficulty assessment: 1 - vent by mask    Final Airway Details  Final airway type: endotracheal airway      Successful airway: ETT  Cuffed: yes   Successful intubation technique: video laryngoscopy  Facilitating devices/methods: intubating stylet  Endotracheal tube insertion site: oral  Blade: CMAC  Blade size: 3  ETT size (mm): 7.0  Cormack-Lehane Classification: grade I - full view of glottis  Placement verified by: chest auscultation and capnometry   Cuff volume (mL): 7  Measured from: gums  ETT/EBT to gums (cm): 22  Number of attempts at approach: 1  Assessment: lips, teeth, and gum same as pre-op and atraumatic intubation

## 2024-10-07 NOTE — ANESTHESIA PREPROCEDURE EVALUATION
Anesthesia Evaluation     Patient summary reviewed and Nursing notes reviewed   history of anesthetic complications (combative emergence  5 years ago with colon resection):  prolonged sedation  NPO Solid Status: > 8 hours  NPO Liquid Status: > 4 hours           Airway   Mallampati: II  Neck ROM: full  Possible difficult intubation  Dental    (+) upper dentures, lower dentures and partials    Comment: Full upper, partial lower    Pulmonary     breath sounds clear to auscultation  (+) pneumonia , a smoker Former, COPD,shortness of breath    ROS comment: Interstitial lung disease, paralysis right vocal cord  Cardiovascular     Rhythm: regular    (+) hypertension, hyperlipidemia      Neuro/Psych  (+) headaches, numbness, psychiatric history Anxiety and Depression  GI/Hepatic/Renal/Endo    (+) hiatal hernia, GERD, PUD, liver disease fatty liver disease    Musculoskeletal     (+) neck pain  Abdominal    Substance History      OB/GYN          Other   arthritis,       Other Comment: Previous spinal fusion (cervical)                Anesthesia Plan    ASA 3     intravenous induction     Anesthetic plan, risks, benefits, and alternatives have been provided, discussed and informed consent has been obtained with: patient.    CODE STATUS:

## 2024-10-07 NOTE — INTERVAL H&P NOTE
H&P reviewed. The patient was examined and there are no changes to the H&P.      We discussed the risk and benefits and alternatives of surgery including CSF leak, injury to the nerve roots, need for further surgery down the line.  She understood and was willing to proceed with surgery.

## 2024-10-07 NOTE — ANESTHESIA POSTPROCEDURE EVALUATION
"Patient: Magdalena Dickey    Procedure Summary       Date: 10/07/24 Room / Location: [facility] OR [address] [place] [address] MAIN OR    Anesthesia Start: 1031 Anesthesia Stop: 1312    Procedure: CERVICAL FIVE TO SIX DISCECTOMY ANTERIOR WITH FUSION, HARDWARE REMOVAL (Right: Spine Cervical) Diagnosis:       Adjacent segment disease of cervical spine at C5-C6 level with history of fusion procedure      (Adjacent segment disease of cervical spine at C5-C6 level with history of fusion procedure [M50.322, Z98.1])    Surgeons: Finn Ojeda MD Provider: Deep Marte MD    Anesthesia Type: Not recorded ASA Status: 3            Anesthesia Type: No value filed.    Vitals  Vitals Value Taken Time   /74 10/07/24 1345   Temp 36.6 °C (97.9 °F) 10/07/24 1321   Pulse 94 10/07/24 1355   Resp 13 10/07/24 1321   SpO2 99 % 10/07/24 1355   Vitals shown include unfiled device data.        Post Anesthesia Care and Evaluation    Patient location during evaluation: PACU  Patient participation: complete - patient participated  Level of consciousness: awake and alert  Pain management: adequate    Airway patency: patent  Anesthetic complications: No anesthetic complications  PONV Status: controlled  Cardiovascular status: acceptable and hemodynamically stable  Respiratory status: acceptable  Hydration status: acceptable    Comments: /74   Pulse 82   Temp 36.6 °C (97.9 °F) (Oral)   Resp 13   Ht 167.6 cm (66\")   LMP  (LMP Unknown)   SpO2 (!) 88%   BMI 29.54 kg/m²     "

## 2024-10-07 NOTE — PLAN OF CARE
Goal Outcome Evaluation:  Plan of Care Reviewed With: patient        Progress: no change  Outcome Evaluation: Patient received from PACU post C5-6 ACDF to monitor airway. Received on NRB. Weaned to 2L NC. Tolerating diet. Pain tolerable per patient.

## 2024-10-07 NOTE — OP NOTE
Anterior Cervical Discectomy with Allograft and Instrumented Fusion C5C6 Operative Note    Magdalena Dickey  10/7/2024  [id number]    SURGEON   Finn Ojeda MD    ASSISTANT:  Kathe Perez CSA was present throughout the entire surgery to provide intraoperative suction, retraction, suturing, and irrigation to promote visualization by the operative surgeon and assist with opening and closure.  The skilled assistance was necessary for the success of this case.  Our practice does not have a relationship with neurosurgical residents.      INDICATION:  Magdalena Dickey is a 69 y.o. female who presents with cervical radiculopathy secondary to adjacent segment disease from her previous cervical fusion.  We discussed the risk benefits alternatives of surgery including CSF leak, injury to the nerve roots, need for further surgery down the line.  She understood and was willing to proceed with surgery.    Pre-op Diagnosis:     Adjacent segment disease of cervical spine at C5-C6 level with history of fusion procedure [M50.322, Z98.1]    Post-op Diagnosis:     Post-Op Diagnosis Codes:     * Adjacent segment disease of cervical spine at C5-C6 level with history of fusion procedure [M50.322, Z98.1]    PROCEDURE PERFORMED:    Spinal Surgery Levels Completed:1 Level    Anterior Cervical Discectomy and Fusion C5C6    Arthrodesis, anterior interbody, including disc space preparation, discectomy, osteophytectomy and decompression of spinal cord and/or nerve roots at C5C6  Anterior titanium instrumentation at C5C6,   Morselized Vivigen allograft packed into interbody cages  Microdissection technique with the use of the operating microscope  Previous spinal hardware      Anesthesia: General    Staff:   Circulator: Susy Jhaveri RN; Jennifer Ness RN  Scrub Person: Chuyita Rutledge; Jamarcus Romero; Nicole Matute  Assistant: Dionne Quiñones CSA; Kathe Perez CSA    Estimated Blood Loss:  minimal    Specimens: * No orders  in the log *              Drains:   Urethral Catheter Non-latex 16 Fr. (Active)       Findings: Severe stenosis and large osteophytes    Complications: None apparent    Narrative Description:    Positioning: After operative consent the patient was taken to the operating room in stable condition and placed under general endotracheal anesthesia. Once adequate anesthesia was established the patient was kept supine on the operating table with a shoulder roll beneath their shoulders. Their neck was placed in a slightly extended position to expose the anterior neck which was prepped and draped in the usual sterile fashion. The shoulders were then taped down using 4 inch silk tape. All pressure points were padded along the extremities to protect neurologic function.    Approach: Using intraoperative fluoroscopy the anterior cervical spine was localized. A sharp incision was made in the mid neck on the right and taken through the platysma layer using cautery. Blunt dissection was then used to expose the anterior cervical spine medial to the carotid artery and lateral to the esophagus.  There was significant scar tissue from her previous surgery and we took careful care to dissect the carotid artery and carotid sheath off of the adjacent musculature.  A bayoneted needle was placed in C5C6 to confirm the level fluoroscopically.  We also were able to visualize the previous hardware and this was removed without incident.  The Mayorga-Loredo retractor was placed beneath the longus colli muscles bilaterally. A 15 scalpel was used to cut into the anterior longitudinal ligament and annulus of the disc space and curettes were used to remove disc material deep to the osteophyte. A Kerrison rongeur was then used to remove the osteophyte of the upper level.  At this point the operating microscope was brought into the field.     Operating Microscope: The operating microscope was draped using sterile technique and brought into the field  and the rest of the operation was performed under operative magnification with microdissection technique and micro-illumination with the aid of the operating microscope.    Cervical Discectomy/Decompression: Curettes were used to continue the discectomy until the posterior osteophytes were visualized.  At this point a vertebral body  self-retaining retractor device was used to distract the disc space.  A small sharp curette was then used to find the plane behind the posterior longitudinal ligament and Kerrison rongeurs were used to remove the posterior osteophyte and posterior longitudinal ligament together.  The microscope was then used to decompress the disc space centrally and towards both foramen.  The discectomy was then completed towards both uncovertebral joints and the endplates were prepared using the flat portion of a sharp curette, using caution not to disturb the normal endplate.  Herniated disc material was found along the C6 nerve root on the  bilaterally  at C5C6.     Arthodesis and Intrumentation: A 7 mm spacing device was placed in the disc base and visualized under fluoroscopic guidance.  Then a 8 degree 7 mm DePuy PROTI cage was placed at the C5C6 disc space under fluoroscopic guidance.  The longus coli bilaterally was then cauterized using bipolar cautery and a 12 mm DePuy Coda plate was secured anterior to the arthrodesis using 14 mm variable angle screws.  Fluoroscopy was then used to document to confirm the placement and alignment of the plate.    Closure: The wound was copiously irrigated and Surgiflo and bipolar cautery were used for hemostasis.The deep platysma was reapproximated with the 3-O Vicryl suture and the superficial skin was closed with 3-O Vicryl suture.  A 4-0 Monocryl was then used to close the skin and the incision was dressed with Dermabond.  The needle and sponge counts were correct at the end of the case. The patient was transported to postop recovery in stable  condition.    Finn Ojeda MD     Date: 10/7/2024  Time: 13:01 EDT

## 2024-10-08 ENCOUNTER — APPOINTMENT (OUTPATIENT)
Dept: GENERAL RADIOLOGY | Facility: HOSPITAL | Age: 69
End: 2024-10-08
Payer: MEDICARE

## 2024-10-08 LAB
ANION GAP SERPL CALCULATED.3IONS-SCNC: 10 MMOL/L (ref 5–15)
BUN SERPL-MCNC: 19 MG/DL (ref 8–23)
BUN/CREAT SERPL: 20 (ref 7–25)
CALCIUM SPEC-SCNC: 9.1 MG/DL (ref 8.6–10.5)
CHLORIDE SERPL-SCNC: 105 MMOL/L (ref 98–107)
CO2 SERPL-SCNC: 24 MMOL/L (ref 22–29)
CREAT SERPL-MCNC: 0.95 MG/DL (ref 0.57–1)
DEPRECATED RDW RBC AUTO: 42.1 FL (ref 37–54)
EGFRCR SERPLBLD CKD-EPI 2021: 65 ML/MIN/1.73
ERYTHROCYTE [DISTWIDTH] IN BLOOD BY AUTOMATED COUNT: 12.7 % (ref 12.3–15.4)
GLUCOSE SERPL-MCNC: 115 MG/DL (ref 65–99)
HCT VFR BLD AUTO: 39.5 % (ref 34–46.6)
HGB BLD-MCNC: 13.3 G/DL (ref 12–15.9)
MCH RBC QN AUTO: 30.4 PG (ref 26.6–33)
MCHC RBC AUTO-ENTMCNC: 33.7 G/DL (ref 31.5–35.7)
MCV RBC AUTO: 90.2 FL (ref 79–97)
PLATELET # BLD AUTO: 265 10*3/MM3 (ref 140–450)
PMV BLD AUTO: 9.5 FL (ref 6–12)
POTASSIUM SERPL-SCNC: 4.4 MMOL/L (ref 3.5–5.2)
RBC # BLD AUTO: 4.38 10*6/MM3 (ref 3.77–5.28)
SODIUM SERPL-SCNC: 139 MMOL/L (ref 136–145)
WBC NRBC COR # BLD AUTO: 17.2 10*3/MM3 (ref 3.4–10.8)

## 2024-10-08 PROCEDURE — 99024 POSTOP FOLLOW-UP VISIT: CPT

## 2024-10-08 PROCEDURE — 97162 PT EVAL MOD COMPLEX 30 MIN: CPT

## 2024-10-08 PROCEDURE — 85027 COMPLETE CBC AUTOMATED: CPT | Performed by: NEUROLOGICAL SURGERY

## 2024-10-08 PROCEDURE — 25010000002 ENOXAPARIN PER 10 MG: Performed by: NEUROLOGICAL SURGERY

## 2024-10-08 PROCEDURE — 94799 UNLISTED PULMONARY SVC/PX: CPT

## 2024-10-08 PROCEDURE — 97530 THERAPEUTIC ACTIVITIES: CPT

## 2024-10-08 PROCEDURE — 80048 BASIC METABOLIC PNL TOTAL CA: CPT | Performed by: NEUROLOGICAL SURGERY

## 2024-10-08 PROCEDURE — 25010000002 CEFAZOLIN PER 500 MG: Performed by: NEUROLOGICAL SURGERY

## 2024-10-08 PROCEDURE — 25010000002 HYDROMORPHONE PER 4 MG: Performed by: NEUROLOGICAL SURGERY

## 2024-10-08 PROCEDURE — 72040 X-RAY EXAM NECK SPINE 2-3 VW: CPT

## 2024-10-08 RX ORDER — HYDROCODONE BITARTRATE AND ACETAMINOPHEN 7.5; 325 MG/1; MG/1
1 TABLET ORAL EVERY 4 HOURS PRN
Status: DISCONTINUED | OUTPATIENT
Start: 2024-10-08 | End: 2024-10-09 | Stop reason: HOSPADM

## 2024-10-08 RX ORDER — HYDROCODONE BITARTRATE AND ACETAMINOPHEN 7.5; 325 MG/1; MG/1
1 TABLET ORAL EVERY 6 HOURS PRN
Status: DISCONTINUED | OUTPATIENT
Start: 2024-10-08 | End: 2024-10-08

## 2024-10-08 RX ADMIN — HYDROMORPHONE HYDROCHLORIDE 0.25 MG: 1 INJECTION, SOLUTION INTRAMUSCULAR; INTRAVENOUS; SUBCUTANEOUS at 06:19

## 2024-10-08 RX ADMIN — PANTOPRAZOLE SODIUM 40 MG: 40 TABLET, DELAYED RELEASE ORAL at 08:44

## 2024-10-08 RX ADMIN — BUPROPION HYDROCHLORIDE 300 MG: 300 TABLET, EXTENDED RELEASE ORAL at 06:20

## 2024-10-08 RX ADMIN — LOSARTAN POTASSIUM 50 MG: 50 TABLET, FILM COATED ORAL at 08:43

## 2024-10-08 RX ADMIN — CYCLOSPORINE 1 DROP: 0.5 EMULSION OPHTHALMIC at 04:56

## 2024-10-08 RX ADMIN — ALPRAZOLAM 0.25 MG: 0.25 TABLET ORAL at 14:19

## 2024-10-08 RX ADMIN — SODIUM CHLORIDE 2000 MG: 900 INJECTION INTRAVENOUS at 11:09

## 2024-10-08 RX ADMIN — BACLOFEN 20 MG: 10 TABLET ORAL at 15:56

## 2024-10-08 RX ADMIN — GABAPENTIN 600 MG: 300 CAPSULE ORAL at 05:00

## 2024-10-08 RX ADMIN — HYDROCODONE BITARTRATE AND ACETAMINOPHEN 1 TABLET: 7.5; 325 TABLET ORAL at 12:50

## 2024-10-08 RX ADMIN — HYDROMORPHONE HYDROCHLORIDE 0.25 MG: 1 INJECTION, SOLUTION INTRAMUSCULAR; INTRAVENOUS; SUBCUTANEOUS at 15:56

## 2024-10-08 RX ADMIN — PANTOPRAZOLE SODIUM 40 MG: 40 TABLET, DELAYED RELEASE ORAL at 21:38

## 2024-10-08 RX ADMIN — HYDROCODONE BITARTRATE AND ACETAMINOPHEN 1 TABLET: 7.5; 325 TABLET ORAL at 17:11

## 2024-10-08 RX ADMIN — GABAPENTIN 600 MG: 300 CAPSULE ORAL at 14:19

## 2024-10-08 RX ADMIN — ENOXAPARIN SODIUM 40 MG: 100 INJECTION SUBCUTANEOUS at 21:38

## 2024-10-08 RX ADMIN — DULOXETINE HYDROCHLORIDE 60 MG: 60 CAPSULE, DELAYED RELEASE ORAL at 08:43

## 2024-10-08 RX ADMIN — HYDROCODONE BITARTRATE AND ACETAMINOPHEN 1 TABLET: 5; 325 TABLET ORAL at 04:56

## 2024-10-08 RX ADMIN — GABAPENTIN 600 MG: 300 CAPSULE ORAL at 21:38

## 2024-10-08 RX ADMIN — BACLOFEN 20 MG: 10 TABLET ORAL at 21:38

## 2024-10-08 RX ADMIN — ARFORMOTEROL TARTRATE 15 MCG: 15 SOLUTION RESPIRATORY (INHALATION) at 08:24

## 2024-10-08 RX ADMIN — SODIUM CHLORIDE 2000 MG: 900 INJECTION INTRAVENOUS at 02:20

## 2024-10-08 RX ADMIN — Medication 10 ML: at 21:39

## 2024-10-08 RX ADMIN — CYCLOSPORINE 1 DROP: 0.5 EMULSION OPHTHALMIC at 16:00

## 2024-10-08 RX ADMIN — SODIUM CHLORIDE 2000 MG: 900 INJECTION INTRAVENOUS at 17:50

## 2024-10-08 RX ADMIN — HYDROMORPHONE HYDROCHLORIDE 0.25 MG: 1 INJECTION, SOLUTION INTRAMUSCULAR; INTRAVENOUS; SUBCUTANEOUS at 11:09

## 2024-10-08 RX ADMIN — HYDROCODONE BITARTRATE AND ACETAMINOPHEN 1 TABLET: 5; 325 TABLET ORAL at 08:40

## 2024-10-08 RX ADMIN — HYDROCODONE BITARTRATE AND ACETAMINOPHEN 1 TABLET: 7.5; 325 TABLET ORAL at 21:38

## 2024-10-08 RX ADMIN — AMLODIPINE BESYLATE 5 MG: 5 TABLET ORAL at 08:42

## 2024-10-08 RX ADMIN — BACLOFEN 20 MG: 10 TABLET ORAL at 08:43

## 2024-10-08 NOTE — PROGRESS NOTES
[facility] NEUROSURGERY CERVICAL POSTOP NOTE      CC:POD #1 s/p C5-6 anterior discectomy with fusion and previous hardware removal      Subjective     Interval History: No report of any acute issues overnight.  Patient in intensive care unit.  Ambulated couple times overnight.  Some hypotension overnight but currently vital signs stable.  Notes some very mild issues with swallowing pills and solids but nursing reports he did not have any significant coughing with solids and liquids.  She notes some mild voice change but she does have chronic voice changes.      Objective     Vital signs in last 24 hours:  Temp:  [97.6 °F (36.4 °C)-98.5 °F (36.9 °C)] 97.6 °F (36.4 °C)  Heart Rate:  [70-94] 77  Resp:  [13-20] 20  BP: ()/(50-94) 135/77    Intake/Output this shift:  No intake/output data recorded.        LABS:  .  Results from last 7 days   Lab Units 10/08/24  0506   WBC 10*3/mm3 17.20*   HEMOGLOBIN g/dL 13.3   HEMATOCRIT % 39.5   PLATELETS 10*3/mm3 265     .  Results from last 7 days   Lab Units 10/08/24  0506   SODIUM mmol/L 139   POTASSIUM mmol/L 4.4   CHLORIDE mmol/L 105   CO2 mmol/L 24.0   BUN mg/dL 19   CREATININE mg/dL 0.95   CALCIUM mg/dL 9.1   GLUCOSE mg/dL 115*         IMAGING STUDIES:  X-ray cervical spine pending    I personally viewed and interpreted the patient's labs, medications and chart.    Meds reviewed/changed: Yes  Baclofen 20 mg 3 times daily  Starting Lovenox today  Gabapentin 600 mg every 8 hours  Norco 5 mg every 4 hours as needed: 1 dose yesterday and 1 dose today as of 7:48 AM.        Physical Exam:    General:   Awake, alert, oriented x3. Speech clear with no aphasia.  Right anterior cervical incision  Neck:    With surgical glue in place.  No erythema, swelling or drainage.  ROM deferred   motor:    Normal muscle strength, bulk and tone in upper and lower extremities.  No fasciculations, rigidity, spasticity, or abnormal movements.  Reflexes:   2+ in the upper extremities and 2+ in  "bilateral patellar.. No Longoria  Sensation:   Normal to light touch except for tingling in bilateral fingers of the hands.  She notes chronic carpal tunnel syndrome and has this issue intermittently.  Station and Gait:             Gait deferred.  Pending PT eval  Extremities:   SCD in place    Assessment & Plan     ASSESSMENT:      Adjacent segment disease of cervical spine at C5-C6 level with history of fusion procedure    Adjacent segment disease of cervicothoracic spine with history of fusion procedure    69-year-old female who presents with cervical radiculopathy secondary to adjacent level disease from a previous cervical fusion in the past.  She is now postop day #1 and doing quite well.  She does have expected surgical site pain and we will increase her oral pain med dosage.  She only notes mild voice change and swallowing difficulty.  We will continue to observe and get a cervical x-ray today.  Otherwise, neurosurgery requests nursing to please mobilize and physical therapy to evaluate.  Please encourage use of ice and observe for difficulty swallowing, breathing or with speech change.  We will get her transferred to the floor today.    PLAN:     Cervical x-ray pending  P.o. Norco as needed  Observe for swallowing/breathing difficulty, speech change  Try to avoid IV narcotics  Encourage use of incentive spirometer  Utilize ice  Utilize muscle relaxers as needed  Bowel regimen as needed  Mobilize   SCD/Lovenox for DVT prophy  PT/OT    I discussed the patient's findings and my recommendations with patient, nursing staff, and Dr. Ojeda.    During patient visit, I utilized appropriate personal protective equipment including mask and gloves.  Mask used was standard procedure mask. Appropriate PPE was worn during the entire visit.  Hand hygiene was completed before and after.      LOS: 1 day       Ricco Gaitangaudencionae, APRN  10/8/2024  07:44 EDT    \"Dictated utilizing Dragon dictation\".    "

## 2024-10-08 NOTE — THERAPY EVALUATION
Patient Name: Magdalena Dickey  : 1955    MRN: [id number]                              Today's Date: 10/8/2024       Admit Date: 10/7/2024    Visit Dx:     ICD-10-CM ICD-9-CM   1. Adjacent segment disease of cervical spine at C5-C6 level with history of fusion procedure  M50.322 722.6    Z98.1 V45.4     Patient Active Problem List   Diagnosis    Migraine without aura and without status migrainosus, not intractable    GERD (gastroesophageal reflux disease)    Irritable bowel syndrome with diarrhea    Depression with anxiety    Adjacent segment disease of cervical spine at C5-C6 level with history of fusion procedure    History of colon resection    Essential hypertension    Hypertriglyceridemia    Peripheral polyneuropathy    Lumbar stenosis with neurogenic claudication    Lumbar radiculitis    ILD (interstitial lung disease)    Lumbar facet arthropathy    Cervical spinal stenosis    Neck pain    Cervical radiculopathy    Adjacent segment disease of cervicothoracic spine with history of fusion procedure     Past Medical History:   Diagnosis Date    Abnormal mammogram 2022    Anesthesia     WAKES UP AGITATED    Anxiety     Cervical disc disorder     Cervical radiculopathy     Cervical spondylolysis     Chest pain 2022    ADMITTED TO [facility]    Chronic bilateral low back pain without sciatica     Chronic pain disorder     Colon polyps     FOLLOWED BY DR. ADRIA NYE    COPD (chronic obstructive pulmonary disease)     COVID-19 08/15/2020    SEEN AT     Delayed emergence from anesthesia     Depression 2016    Diverticulitis 2018    ADMITTED TO [facility]    Diverticulitis 2017    SEEN AT [facility] ER    Diverticulitis of colon 2020    Dysesthesia     Dyspepsia     Extremity pain     Fecal incontinence 2022    GERD (gastroesophageal reflux disease)     Hair loss 2020    Headache, tension-type     Hiatal hernia     History of measles, mumps, or rubella     MEASLES AND MUMPS AS A CHILD     Hypertension     IBS (irritable bowel syndrome)     ILD (interstitial lung disease)     Insomnia     Joint pain     Lumbosacral disc disease     Lung nodule 11/2020    Migraine without aura and without status migrainosus, not intractable 05/25/2016    NAFLD (nonalcoholic fatty liver disease)     Neck pain     Osteoarthritis     Palpitations 04/09/2019    SEEN AT [facility] ER    Paralysis of right vocal cord     Paresthesias     PNA (pneumonia) 08/23/2020    D/T COVID, ADMITTED TO [facility]    Polyneuropathy     Post-COVID chronic dyspnea     PUD (peptic ulcer disease)     Rotator cuff tendinitis, right 05/08/2018    Spinal stenosis     Thrush 09/2020    Vestibular neuritis 09/2014     Past Surgical History:   Procedure Laterality Date    ANTERIOR CERVICAL DISCECTOMY W/ FUSION N/A 06/21/2018    Procedure: C6 7 anterior cervical discectomy and fusion with allograft and plate;  Surgeon: Jacobo De Dios MD;  Location: [facility] MAIN OR;  Service: Neurosurgery    ANTERIOR CERVICAL DISCECTOMY W/ FUSION Right 10/7/2024    Procedure: CERVICAL FIVE TO SIX DISCECTOMY ANTERIOR WITH FUSION, HARDWARE REMOVAL;  Surgeon: Finn Ojeda MD;  Location: [facility] MAIN OR;  Service: Neurosurgery;  Laterality: Right;    CERVICAL EPIDURAL N/A 6/5/2024    Procedure: C7-T1 CERVICAL EPIDURAL STEROID INJECTION CPT: 72653;  Surgeon: Sarah Bernal MD;  Location: [facility] MAIN OR;  Service: Pain Management;  Laterality: N/A;    CHOLECYSTECTOMY N/A 1980    AT [place]    COLON RESECTION N/A 01/16/2018    Procedure: LAPAROSCOPIC SIGMOID COLON RESECTION WITH MOBILIZATION OF SPLENIC FLEXURE;  Surgeon: Eric Davidson MD;  Location: [facility] MAIN OR;  Service:     COLONOSCOPY N/A 02/15/2017    6 MM TUBULAR ADENOMA POLYP IN HEPATIC FLEXURE, 5 MM TUBULAR ADENOAM POLYP IN RECTUM, DR. GERARDO NYE AT [facility]    COLONOSCOPY N/A 03/01/2003    Internal hemorrhoids-Dr. Gerardo Nye    COLONOSCOPY N/A 02/08/2022    4 MM TUBULAR ADENOMA POLYP IN ASCENDING, HEMORRHOIDS, DR. GERARDO NYE  AT [facility]    DIAGNOSTIC LAPAROSCOPY N/A 06/06/2001    Cul-de-sac endometriosis and adhesions-Dr. Aamir Christine    ENDOSCOPY N/A 02/15/2017    SMALL HIATAL HERNIA, GASTRITIS, DR. ADRIA NYE AT [facility]    ENDOSCOPY N/A 02/08/2022    GASTRITIS, MILD ESOPHAGITIS, Z LINE IRREGULAR, DR. ADRIA NYE AT [facility]    ENDOSCOPY N/A 09/12/2014    GASTRITIS, DR. ADRIA NYE AT [facility]    ENDOSCOPY AND COLONOSCOPY N/A 02/04/2009    SMALL HIATAL HERNIA, ESOPHAGITIS, HEMORRHOIDS, DIVERTICULOSIS, TORTUOUS COLON, DR. ADRIA NYE AT [facility]    EPIDURAL Bilateral 12/06/2023    Procedure: BILATERAL L4 TRANSFORAMINAL LUMBAR EPIDURAL STEROID INJECTION CPT: 41297, 61827;  Surgeon: Sarah Bernal MD;  Location: [facility] MA[facility];  Service: Pain Management;  Laterality: Bilateral;    EPIDURAL BLOCK      LUMBAR EPIDURAL INJECTION N/A 09/20/2023    Procedure: LUMBAR EPIDURAL 1ST VISIT L4-5 61369;  Surgeon: Sarah Bernal MD;  Location: [facility];  Service: Pain Management;  Laterality: N/A;    MEDIAL BRANCH BLOCK Bilateral 02/26/2024    Procedure: BILATERAL L3-L5 LUMBAR MEDIAL BRANCH BLOCK CPT: 90930, 06808;  Surgeon: Sarah Bernal MD;  Location: [facility];  Service: Pain Management;  Laterality: Bilateral;    MEDIAL BRANCH BLOCK Bilateral 3/11/2024    Procedure: BILATERAL L3-L5 LUMBAR MEDIAL BRANCH BLOCK CPT: 42769, 977771;  Surgeon: Sarah Bernal MD;  Location: [facility];  Service: Pain Management;  Laterality: Bilateral;    NECK SURGERY      RADIOFREQUENCY ABLATION Bilateral 4/15/2024    Procedure: BILATERAL L3-L5 RADIOFREQUENCY ABLATION LUMBAR CPT: 75617, 69949;  Surgeon: Sarah Bernal MD;  Location: [facility];  Service: Pain Management;  Laterality: Bilateral;    SKIN BIOPSY Left 07/08/2022    LEFT CALF, PATH: SMALL FIBER NEUROPATHY    SPINAL FUSION      TOTAL ABDOMINAL HYSTERECTOMY WITH SALPINGO OOPHORECTOMY Bilateral 07/31/2001    Dr. Aamir Christine AT [facility]      General Information       Row Name 10/08/24 1147          Physical  Therapy Time and Intention    Document Type evaluation (P)   -DG     Mode of Treatment physical therapy;individual therapy (P)   -DG       Row Name 10/08/24 1147          General Information    Patient Profile Reviewed yes (P)   -DG     Prior Level of Function independent: (P)   -DG     Existing Precautions/Restrictions -- (P)   -DG     Barriers to Rehab none identified (P)   -DG       Row Name 10/08/24 1147          Living Environment    People in Home child(mindy), dependent;spouse (P)   -DG       Row Name 10/08/24 1147          Home Main Entrance    Number of Stairs, Main Entrance five (P)   -DG       Row Name 10/08/24 1147          Stairs Within Home, Primary    Number of Stairs, Within Home, Primary twelve (P)   -DG     Stairs Comment, Within Home, Primary flight of stairs to the basement. Frequently used. (P)   -DG       Row Name 10/08/24 1147          Cognition    Orientation Status (Cognition) oriented x 4 (P)   -DG               User Key  (r) = Recorded By, (t) = Taken By, (c) = Cosigned By      Initials Name Provider Type    Jamal Miramontes, PT Student PT Student                   Mobility       Row Name 10/08/24 1148          Bed Mobility    Bed Mobility supine-sit;sit-supine (P)   -DG     Supine-Sit [place] (Bed Mobility) supervision (P)   -DG     Sit-Supine [place] (Bed Mobility) supervision (P)   -DG     Assistive Device (Bed Mobility) head of bed elevated (P)   -DG       Row Name 10/08/24 1148          Sit-Stand Transfer    Sit-Stand [place] (Transfers) supervision (P)   -DG     Comment, (Sit-Stand Transfer) No AD used. (P)   -DG       Row Name 10/08/24 1148          Gait/Stairs (Locomotion)    Arlee Level (Gait) contact guard (P)   -DG     Patient was able to Ambulate yes (P)   -DG     Distance in Feet (Gait) 150 (P)   -DG     Deviations/Abnormal Patterns (Gait) gait speed decreased (P)   -DG     Comment, (Gait/Stairs) CGA for safety, no loss of balance, no AD used. (P)   -DG                User Key  (r) = Recorded By, (t) = Taken By, (c) = Cosigned By      Initials Name Provider Type    Jamal Miramontes, PT Student PT Student                   Obj/Interventions       Row Name 10/08/24 1150          Range of Motion Comprehensive    General Range of Motion no range of motion deficits identified (P)   -DG     Comment, General Range of Motion WNL (P)   -DG       Row Name 10/08/24 1150          Strength Comprehensive (MMT)    Comment, General Manual Muscle Testing (MMT) Assessment Generalized post-operative weakness. (P)   -DG       Row Name 10/08/24 1150          Motor Skills    Therapeutic Exercise -- (P)   At EOB, performed ankle pumps x10e reps, LAQ x10e reps and seated marches x10 reps.  -DG       Row Name 10/08/24 1150          Balance    Balance Assessment sitting static balance;sitting dynamic balance;standing static balance;standing dynamic balance (P)   -DG     Static Sitting Balance supervision (P)   -DG     Dynamic Sitting Balance supervision (P)   -DG     Position, Sitting Balance sitting edge of bed;unsupported (P)   -DG     Static Standing Balance supervision (P)   -DG     Dynamic Standing Balance contact guard (P)   -DG     Balance Interventions dynamic;weight shifting activity (P)   -DG     Comment, Balance No loss of balance present. CGA for safety. Pt stood approx 5 mins in hallway talking to Dr. Johansen. (P)   -DG       Row Name 10/08/24 1150          Sensory Assessment (Somatosensory)    Sensory Assessment (Somatosensory) sensation intact (P)   -DG               User Key  (r) = Recorded By, (t) = Taken By, (c) = Cosigned By      Initials Name Provider Type    Jamal Miramontes, PT Student PT Student                   Goals/Plan    No documentation.                  Clinical Impression       Row Name 10/08/24 1152          Pain    Pretreatment Pain Rating 3/10 (P)   -DG     Posttreatment Pain Rating 4/10 (P)   -DG     Pain Location - neck (P)   -DG     Pre/Posttreatment Pain  Comment Stated having neck pain. (P)   -DG     Pain Intervention(s) Medication (See MAR);Repositioned;Ambulation/increased activity (P)   -DG       Row Name 10/08/24 1152          Plan of Care Review    Plan of Care Reviewed With patient (P)   -DG     Outcome Evaluation Pt is a 70 yo female admitted to [facility] for s/p ACDF of C5-C6 and removal of hardware. PMH consists of ACDF of C6-C7 in 2018, COPD, GERD, cervical radiculopathy, cervical spondylolysis. Pt was awake, alert, and agreeable to PT. Pt c/o of post-operative neck pain and was able to follow commands. Pt lives at home with family, no assistive device, uses basement frequently, IND with ADLs. Pt performed bed mobility supine-sit w/ SUP, sit to stand OOB w/ SUP, ambulated 150' w/ CGA for safety, sit-supine w/ SUP. Pt was able to  hallway for approx 5 mins talking to Dr. Johansen. No loss of balance present. Pt deficits include post-op generalized weakness. Pt is near basline level of function. DC recommendation is home with familly. (P)   -DG       Row Name 10/08/24 1152          Therapy Assessment/Plan (PT)    Therapy Frequency (PT) evaluation only (P)   -DG       Row Name 10/08/24 1152          Positioning and Restraints    Pre-Treatment Position in bed (P)   -DG     Post Treatment Position bed (P)   -DG     In Bed notified nsg;sitting;call light within reach;encouraged to call for assist (P)   -DG               User Key  (r) = Recorded By, (t) = Taken By, (c) = Cosigned By      Initials Name Provider Type    Jamal Miramontes, PT Student PT Student                   Outcome Measures       Row Name 10/08/24 1158 10/08/24 0816       How much help from another person do you currently need...    Turning from your back to your side while in flat bed without using bedrails? 4 (P)   -DG 3  -TS    Moving from lying on back to sitting on the side of a flat bed without bedrails? 4 (P)   -DG 3  -TS    Moving to and from a bed to a chair (including a wheelchair)? 4  (P)   -DG 3  -TS    Standing up from a chair using your arms (e.g., wheelchair, bedside chair)? 4 (P)   -DG 4  -TS    Climbing 3-5 steps with a railing? 4 (P)   -DG 3  -TS    To walk in hospital room? 4 (P)   -DG 4  -TS    AM-PAC 6 Clicks Score (PT) 24 (P)   -DG 20  -TS    Highest Level of Mobility Goal 8 --> Walked 250 feet or more (P)   -DG 6 --> Walk 10 steps or more  -TS      Row Name 10/08/24 1158          Functional Assessment    Outcome Measure Options AM-PAC 6 Clicks Basic Mobility (PT) (P)   -DG               User Key  (r) = Recorded By, (t) = Taken By, (c) = Cosigned By      Initials Name Provider Type    TS Philomena Dela Cruz, RN Registered Nurse    Jamal Miramontes, PT Student PT Student                                 Physical Therapy Education       Title: PT OT SLP Therapies (Done)       Topic: Physical Therapy (Done)       Point: Mobility training (Done)       Learning Progress Summary             Patient Acceptance, E, VU,DU by MAXINE at 10/8/2024 1159                         Point: Home exercise program (Done)       Learning Progress Summary             Patient Acceptance, E, VU,DU by MAXINE at 10/8/2024 1159                         Point: Body mechanics (Done)       Learning Progress Summary             Patient Acceptance, E, VU,DU by MAXINE at 10/8/2024 1159                         Point: Precautions (Done)       Learning Progress Summary             Patient Acceptance, E, VU,DU by MAXINE at 10/8/2024 1159                                         User Key       Initials Effective Dates Name Provider Type Discipline     08/22/24 -  Jamal Vieyra, PT Student PT Student PT                  PT Recommendation and Plan     Plan of Care Reviewed With: (P) patient  Outcome Evaluation: (P) Pt is a 68 yo female admitted to [facility] for s/p ACDF of C5-C6 and removal of hardware. PMH consists of ACDF of C6-C7 in 2018, COPD, GERD, cervical radiculopathy, cervical spondylolysis. Pt was awake, alert, and agreeable to PT. Pt c/o of  post-operative neck pain and was able to follow commands. Pt lives at home with family, no assistive device, uses basement frequently, IND with ADLs. Pt performed bed mobility supine-sit w/ SUP, sit to stand OOB w/ SUP, ambulated 150' w/ CGA for safety, sit-supine w/ SUP. Pt was able to  hallway for approx 5 mins talking to Dr. Johansen. No loss of balance present. Pt deficits include post-op generalized weakness. Pt is near basline level of function. DC recommendation is home with familly.     Time Calculation:         PT Charges       Row Name 10/08/24 1159             Time Calculation    Start Time 1131 (P)   -DG      Stop Time 1145 (P)   -DG      Time Calculation (min) 14 min (P)   -DG      PT Received On 10/08/24 (P)   -DG         Time Calculation- PT    Total Timed Code Minutes- PT 8 minute(s) (P)   -DG         Timed Charges    18877 - PT Therapeutic Activity Minutes 8 (P)   -DG         Total Minutes    Timed Charges Total Minutes 8 (P)   -DG       Total Minutes 8 (P)   -DG                User Key  (r) = Recorded By, (t) = Taken By, (c) = Cosigned By      Initials Name Provider Type    Jamal Miramontes, PT Student PT Student                  Therapy Charges for Today       Code Description Service Date Service Provider Modifiers Qty    [id number]  PT THERAPEUTIC ACT EA 15 MIN 10/8/2024 Jamal Vieyra, PT Student GP 1    [id number]  PT EVAL MOD COMPLEXITY 3 10/8/2024 Jamal Vieyra, PT Student GP 1            PT G-Codes  Outcome Measure Options: (P) AM-PAC 6 Clicks Basic Mobility (PT)  AM-PAC 6 Clicks Score (PT): (P) 24  PT Discharge Summary  Anticipated Discharge Disposition (PT): (P) home with assist, home    MARY Guillen  10/8/2024

## 2024-10-08 NOTE — PLAN OF CARE
Goal Outcome Evaluation:  Plan of Care Reviewed With: (P) patient           Outcome Evaluation: (P) Pt is a 70 yo female admitted to [facility] for s/p ACDF of C5-C6 and removal of hardware. PMH consists of ACDF of C6-C7 in 2018, COPD, GERD, cervical radiculopathy, cervical spondylolysis. Pt was awake, alert, and agreeable to PT. Pt c/o of post-operative neck pain and was able to follow commands. Pt lives at home with family, no assistive device, uses basement frequently, IND with ADLs. Pt performed bed mobility supine-sit w/ SUP, sit to stand OOB w/ SUP, ambulated 150' w/ CGA for safety, sit-supine w/ SUP. Pt was able to  hallway for approx 5 mins talking to Dr. Johansen. No loss of balance present. Pt deficits include post-op generalized weakness. Pt is near basline level of function. DC recommendation is home with familly.      Anticipated Discharge Disposition (PT): (P) home with assist, home

## 2024-10-08 NOTE — PLAN OF CARE
Goal Outcome Evaluation:         Pt remains in ICU on room air. PRN pain medication given. Pt ambulated to the restroom x2.

## 2024-10-08 NOTE — CONSULTS
Pt in bed. Chp offered introductions to Pt and sat with Pt to provide a supportive presence. Chp provided spiritual care and prayer. Follow up welcomed.      Chp remains available to Pt and family as needed.

## 2024-10-08 NOTE — PROGRESS NOTES
LOS: 1 day   Patient Care Team:  Sun Guerrier MD as PCP - General (Internal Medicine)  Boogie Parks Jr., MD as Consulting Physician (Psychiatry)  Maren Jin MD as Consulting Physician (Colon and Rectal Surgery)  Gerardo Tan MD as Consulting Physician (Gastroenterology)  Nic Johansen Jr., MD as Consulting Physician (Pulmonary Disease)  Finn Ojeda MD as Surgeon (Neurosurgery)    Subjective     Patient is up walking in the paz with physical therapy this morning her only problem that she notes is that her voice is very hoarse and feels like she has a grilled cheese stuck back in her throat she is not have any breathing issues with this.  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  Her voice was normal yesterday early postoperatively.    Review of Systems:          Objective     Vital Signs  Vital Sign Min/Max for last 24 hours  Temp  Min: 97.6 °F (36.4 °C)  Max: 98.5 °F (36.9 °C)   BP  Min: 89/52  Max: 171/88   Pulse  Min: 70  Max: 94   Resp  Min: 13  Max: 20   SpO2  Min: 88 %  Max: 100 %   Flow (L/min)  Min: 1  Max: 15   Weight  Min: 83 kg (182 lb 15.7 oz)  Max: 83 kg (182 lb 15.7 oz)        Ventilator/Non-Invasive Ventilation Settings (From admission, onward)      None                         Body mass index is 29.55 kg/m².  I/O last 3 completed shifts:  In: 700 [I.V.:700]  Out: 185 [Urine:185]  I/O this shift:  In: 300 [P.O.:200; IV Piggyback:100]  Out: -         Physical Exam:  General Appearance: White female a couple times a day she is up walking floors right now in no distress  Eyes: Conjunctiva are clear and anicteric pupils are equal and reactive to light  ENT: Mucous membranes are moist no erythema no exudates, no facial asymmetry and tongue is midline speech is very hoarse voice.  Neck: She has postsurgical changes with a dressing and padding around the right side of her neck.   She has no stridor even with deep inspiration  Lungs: Clear nonlabored symmetric expansion  Cardiac: Regular rate rhythm no murmur  Abdomen: Soft nontender no palpable hepatosplenomegaly or masses  : Not examined  Musculoskeletal: Grossly normal  Skin: Warm and dry no jaundice no petechiae  Neuro: She is alert and oriented cooperative following commands she is walking the halls without difficulty  Extremities/P Vascular: No clubbing cyanosis no edema palpable radial and dorsalis pedis pulses  MSE: She is pleasant and appropriate  Labs:  Estimated Creatinine Clearance: 60.7 mL/min (by C-G formula based on SCr of 0.95 mg/dL).      Results from last 7 days   Lab Units 10/08/24  0506   WBC 10*3/mm3 17.20*   RBC 10*6/mm3 4.38   HEMOGLOBIN g/dL 13.3   HEMATOCRIT % 39.5   MCV fL 90.2   MCH pg 30.4   MCHC g/dL 33.7   RDW % 12.7   RDW-SD fl 42.1   MPV fL 9.5   PLATELETS 10*3/mm3 265                             Microbiology Results (last 10 days)       ** No results found for the last 240 hours. **                amLODIPine, 5 mg, Oral, Daily  arformoterol, 15 mcg, Nebulization, BID  baclofen, 20 mg, Oral, TID  buPROPion XL, 300 mg, Oral, QAM  ceFAZolin, 2,000 mg, Intravenous, Q8H  cycloSPORINE, 1 drop, Both Eyes, Q12H  DULoxetine, 60 mg, Oral, Daily  enoxaparin, 40 mg, Subcutaneous, Nightly  gabapentin, 600 mg, Oral, Q8H  losartan, 50 mg, Oral, Daily  pantoprazole, 40 mg, Oral, BID  senna-docusate sodium, 2 tablet, Oral, BID  sodium chloride, 10 mL, Intravenous, Q12H      lactated ringers, 9 mL/hr, Last Rate: 9 mL/hr (10/07/24 1031)        Diagnostics:  FL C Arm During Surgery    Result Date: 10/7/2024  This procedure was auto-finalized with no dictation required.    Results for orders placed in visit on 06/24/19    Adult Stress Echo W/ Cont or Stress Agent if Necessary Per Protocol    Interpretation Summary  · Estimated EF = 65%.  · Left ventricular systolic function is normal.  · Left ventricular diastolic dysfunction  (grade I) consistent with impaired relaxation.  · Normal stress echo with no significant echocardiographic evidence for myocardial ischemia.          Active Hospital Problems    Diagnosis  POA    **Adjacent segment disease of cervical spine at C5-C6 level with history of fusion procedure [M50.322, Z98.1]  Not Applicable    Adjacent segment disease of cervicothoracic spine with history of fusion procedure [M50.33, Z98.1]  Not Applicable      Resolved Hospital Problems   No resolved problems to display.         Assessment & Plan     Cervical radiculopathy status post anterior cervical discectomy with allograft and instrumented fusion at C5-C6.  Seems to be doing pretty well postoperatively some voice hoarseness she did not have this postoperatively immediately so maybe this is related to some swelling.  She does not have any stridor or difficulty swallowing secretions.  COPD does not appear to be in exacerbation she had some desaturation sounds like it was associated with some pain medicines in PACU.  Direct well on room air.  Continue home bronchodilators  Hiatal hernia with gastroesophageal reflux disease continue PPI  Hypertension continue home medications, amlodipine and losartan  DVT prophylaxis SCDs in place Lovenox has been ordered to start this evening per neurosurgery    Plan for disposition:   I will see as needed out of ICU    Nic Johansen Jr, MD  10/08/24  06:57 EDT    Time:

## 2024-10-09 ENCOUNTER — READMISSION MANAGEMENT (OUTPATIENT)
Dept: CALL CENTER | Facility: HOSPITAL | Age: 69
End: 2024-10-09
Payer: MEDICARE

## 2024-10-09 VITALS
WEIGHT: 182.98 LBS | OXYGEN SATURATION: 93 % | RESPIRATION RATE: 18 BRPM | TEMPERATURE: 98.2 F | HEART RATE: 69 BPM | HEIGHT: 66 IN | DIASTOLIC BLOOD PRESSURE: 81 MMHG | BODY MASS INDEX: 29.41 KG/M2 | SYSTOLIC BLOOD PRESSURE: 141 MMHG

## 2024-10-09 LAB
BASOPHILS # BLD AUTO: 0.07 10*3/MM3 (ref 0–0.2)
BASOPHILS NFR BLD AUTO: 0.6 % (ref 0–1.5)
DEPRECATED RDW RBC AUTO: 43.6 FL (ref 37–54)
EOSINOPHIL # BLD AUTO: 0.05 10*3/MM3 (ref 0–0.4)
EOSINOPHIL NFR BLD AUTO: 0.4 % (ref 0.3–6.2)
ERYTHROCYTE [DISTWIDTH] IN BLOOD BY AUTOMATED COUNT: 13 % (ref 12.3–15.4)
HCT VFR BLD AUTO: 37.7 % (ref 34–46.6)
HGB BLD-MCNC: 12.8 G/DL (ref 12–15.9)
HOLD SPECIMEN: NORMAL
IMM GRANULOCYTES # BLD AUTO: 0.04 10*3/MM3 (ref 0–0.05)
IMM GRANULOCYTES NFR BLD AUTO: 0.3 % (ref 0–0.5)
LYMPHOCYTES # BLD AUTO: 2.74 10*3/MM3 (ref 0.7–3.1)
LYMPHOCYTES NFR BLD AUTO: 21.5 % (ref 19.6–45.3)
MCH RBC QN AUTO: 31.1 PG (ref 26.6–33)
MCHC RBC AUTO-ENTMCNC: 34 G/DL (ref 31.5–35.7)
MCV RBC AUTO: 91.7 FL (ref 79–97)
MONOCYTES # BLD AUTO: 1.36 10*3/MM3 (ref 0.1–0.9)
MONOCYTES NFR BLD AUTO: 10.7 % (ref 5–12)
NEUTROPHILS NFR BLD AUTO: 66.5 % (ref 42.7–76)
NEUTROPHILS NFR BLD AUTO: 8.46 10*3/MM3 (ref 1.7–7)
NRBC BLD AUTO-RTO: 0 /100 WBC (ref 0–0.2)
PLATELET # BLD AUTO: 224 10*3/MM3 (ref 140–450)
PMV BLD AUTO: 9.3 FL (ref 6–12)
RBC # BLD AUTO: 4.11 10*6/MM3 (ref 3.77–5.28)
WBC NRBC COR # BLD AUTO: 12.72 10*3/MM3 (ref 3.4–10.8)

## 2024-10-09 PROCEDURE — 85025 COMPLETE CBC W/AUTO DIFF WBC: CPT

## 2024-10-09 PROCEDURE — 25010000002 CEFAZOLIN PER 500 MG: Performed by: NEUROLOGICAL SURGERY

## 2024-10-09 PROCEDURE — 90662 IIV NO PRSV INCREASED AG IM: CPT | Performed by: INTERNAL MEDICINE

## 2024-10-09 PROCEDURE — 25010000002 INFLUENZA VAC SPLIT HIGH-DOSE 0.5 ML SUSPENSION PREFILLED SYRINGE: Performed by: INTERNAL MEDICINE

## 2024-10-09 PROCEDURE — 97165 OT EVAL LOW COMPLEX 30 MIN: CPT

## 2024-10-09 PROCEDURE — 99024 POSTOP FOLLOW-UP VISIT: CPT

## 2024-10-09 PROCEDURE — G0008 ADMIN INFLUENZA VIRUS VAC: HCPCS | Performed by: INTERNAL MEDICINE

## 2024-10-09 RX ORDER — AMOXICILLIN 250 MG
2 CAPSULE ORAL 2 TIMES DAILY
Qty: 40 TABLET | Refills: 0 | Status: SHIPPED | OUTPATIENT
Start: 2024-10-09 | End: 2024-10-09

## 2024-10-09 RX ORDER — METHYLPREDNISOLONE 4 MG
TABLET, DOSE PACK ORAL
Qty: 21 TABLET | Refills: 0 | Status: SHIPPED | OUTPATIENT
Start: 2024-10-09

## 2024-10-09 RX ORDER — HYDROCODONE BITARTRATE AND ACETAMINOPHEN 7.5; 325 MG/1; MG/1
1 TABLET ORAL EVERY 4 HOURS PRN
Qty: 24 TABLET | Refills: 0 | Status: SHIPPED | OUTPATIENT
Start: 2024-10-09

## 2024-10-09 RX ORDER — AMOXICILLIN 250 MG
2 CAPSULE ORAL 2 TIMES DAILY
Qty: 40 TABLET | Refills: 0 | Status: SHIPPED | OUTPATIENT
Start: 2024-10-09 | End: 2024-10-19

## 2024-10-09 RX ORDER — HYDROCODONE BITARTRATE AND ACETAMINOPHEN 7.5; 325 MG/1; MG/1
1 TABLET ORAL EVERY 4 HOURS PRN
Qty: 25 TABLET | Refills: 0 | Status: SHIPPED | OUTPATIENT
Start: 2024-10-09 | End: 2024-10-09 | Stop reason: HOSPADM

## 2024-10-09 RX ORDER — METHYLPREDNISOLONE 4 MG
TABLET, DOSE PACK ORAL
Qty: 21 TABLET | Refills: 0 | Status: SHIPPED | OUTPATIENT
Start: 2024-10-09 | End: 2024-10-09

## 2024-10-09 RX ADMIN — HYDROCODONE BITARTRATE AND ACETAMINOPHEN 1 TABLET: 7.5; 325 TABLET ORAL at 13:09

## 2024-10-09 RX ADMIN — INFLUENZA A VIRUS A/VICTORIA/4897/2022 IVR-238 (H1N1) ANTIGEN (FORMALDEHYDE INACTIVATED), INFLUENZA A VIRUS A/CALIFORNIA/122/2022 SAN-022 (H3N2) ANTIGEN (FORMALDEHYDE INACTIVATED), AND INFLUENZA B VIRUS B/MICHIGAN/01/2021 ANTIGEN (FORMALDEHYDE INACTIVATED) 0.5 ML: 60; 60; 60 INJECTION, SUSPENSION INTRAMUSCULAR at 14:31

## 2024-10-09 RX ADMIN — DULOXETINE HYDROCHLORIDE 60 MG: 60 CAPSULE, DELAYED RELEASE ORAL at 08:33

## 2024-10-09 RX ADMIN — GABAPENTIN 600 MG: 300 CAPSULE ORAL at 13:09

## 2024-10-09 RX ADMIN — AMLODIPINE BESYLATE 5 MG: 5 TABLET ORAL at 08:33

## 2024-10-09 RX ADMIN — BACLOFEN 20 MG: 10 TABLET ORAL at 08:33

## 2024-10-09 RX ADMIN — SODIUM CHLORIDE 2000 MG: 900 INJECTION INTRAVENOUS at 02:58

## 2024-10-09 RX ADMIN — HYDROCODONE BITARTRATE AND ACETAMINOPHEN 1 TABLET: 7.5; 325 TABLET ORAL at 08:33

## 2024-10-09 RX ADMIN — GABAPENTIN 600 MG: 300 CAPSULE ORAL at 06:24

## 2024-10-09 RX ADMIN — BUPROPION HYDROCHLORIDE 300 MG: 300 TABLET, EXTENDED RELEASE ORAL at 06:24

## 2024-10-09 RX ADMIN — SENNOSIDES AND DOCUSATE SODIUM 2 TABLET: 50; 8.6 TABLET ORAL at 08:33

## 2024-10-09 RX ADMIN — LOSARTAN POTASSIUM 50 MG: 50 TABLET, FILM COATED ORAL at 08:33

## 2024-10-09 RX ADMIN — SODIUM CHLORIDE 2000 MG: 900 INJECTION INTRAVENOUS at 10:22

## 2024-10-09 RX ADMIN — HYDROCODONE BITARTRATE AND ACETAMINOPHEN 1 TABLET: 7.5; 325 TABLET ORAL at 02:52

## 2024-10-09 RX ADMIN — Medication 10 ML: at 08:34

## 2024-10-09 RX ADMIN — CYCLOSPORINE 1 DROP: 0.5 EMULSION OPHTHALMIC at 08:33

## 2024-10-09 RX ADMIN — PANTOPRAZOLE SODIUM 40 MG: 40 TABLET, DELAYED RELEASE ORAL at 08:32

## 2024-10-09 NOTE — PLAN OF CARE
Goal Outcome Evaluation:  Plan of Care Reviewed With: patient        Progress: improving  Outcome Evaluation: 69 y.o. female admitted to hospital for ACDF of C5-C6 and removal of hardware.  PMH sig for COPD, GERD, Cervical radiculopathy, servical spondyolysis.  A&Ox4. At baseline, Patient lived with spouse, children and sister at home.  Patient is (I) with ADLs and ambulates w/o any AD.  House has 5 steps to enter and 14 steps to basement which she uses frequently.   Patient sitting up in bed taking meds upon arrival.  Patient agreeable to Eval.  Patient states she just took pain pill and rated pain at 5/10 for neck and upper back.  Patient transitioned to EOB with Supervision.  Patient donned  non-skid socks with Supervision.  STS from EOB with Supervision, ambulated to/from nursing ststion with Supervision.   Patient returned to bed.  BUCHEY WNL, 4/5.  Patient states she is at her baseline with mobility/ADLs and is wishful to d/c to home today. Patient is near baseline for ADLs.  No skilled intervention indicated at this time.  OT to sign off.  D/C recommendations is home with family.      Anticipated Discharge Disposition (OT): home

## 2024-10-09 NOTE — DISCHARGE SUMMARY
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    Consultants:   Consults       No orders found from 9/8/2024 to 10/8/2024.            Condition on Discharge: stable    Discharge disposition: home    Pertinent Test Results:   X-ray cervical spine 10/8/2024:  Shows anterior plate and screw fusion hardware at C5-7 with intervertebral disc spacer.  No acute fracture or malalignment.  Hardware is intact.  There is prevertebral soft tissue swelling and gas apparent.      Brief HPI: Patient evaluated in office for complaints of cervical radiculopathy. Imaging revealed adjacent level disease from her previous cervical fusion at C6/7. RBAs of treatment were discussed including the above procedure. Patient consented to above procedure.    Hospital Course: Patient admitted for above procedure. The procedure itself was without complication. The patient was transferred to ICU following recovery and transferred to the following day to [facility].  Postop day 1, she was noting some mild worsening of her swallowing ability as well as mild speech change.  Postop day 2 she continues to have the same issues but notes no worsening of speech change as well as swallowing ability.   She is able to swallow liquids but has some difficulty with solids and is planning on eating soft foods.  She denies breathing difficulty.  She is ambulating and urinating without difficulty.  She has not had a bowel movement but is passing flatus.  Surgical incision site looks good.  I instructed her to call for signs of infection including but not limited to fever, wound drainage, increased pain, swelling or redness.  She will also need to avoid significant bending or twisting of her neck, lifting anything greater than 5 pounds.  Her to avoid driving.  She can be driven but should avoid long car rides and try to limit riding in the car is much as possible.      I again instructed her to observe for any voice changes, difficulty breathing or increased difficulty swallowing.  She will look out for these changes.  She lives with multiple family members she will watch out for her.  He will notify us immediately and/or call 911 if she develops any of these issues.  We will send her home on a Medrol Dosepak to help the swelling.  She has not been prescribed any muscle relaxants as she takes baclofen at home.  She will continue this.  Discharge Physical Exam:    Temp:  [98.1 °F (36.7 °C)-98.8 °F (37.1 °C)] 98.2 °F (36.8 °C)  Heart Rate:  [69-86] 69  Resp:  [16-20] 18  BP: (114-141)/(60-82) 141/81    Current labs:  Lab Results (last 24 hours)       Procedure Component Value Units Date/Time    CBC & Differential [[id number]]  (Abnormal) Collected: 10/09/24 0610    Specimen: Blood Updated: 10/09/24 0641    Narrative:      The following orders were created for panel order CBC & Differential.  Procedure                               Abnormality         Status                     ---------                               -----------         ------                     CBC Auto Differential[[id number]]        Abnormal            Final result                 Please view results for these tests on the individual orders.    CBC Auto  Differential [[id number]]  (Abnormal) Collected: 10/09/24 0610    Specimen: Blood Updated: 10/09/24 0641     WBC 12.72 10*3/mm3      RBC 4.11 10*6/mm3      Hemoglobin 12.8 g/dL      Hematocrit 37.7 %      MCV 91.7 fL      MCH 31.1 pg      MCHC 34.0 g/dL      RDW 13.0 %      RDW-SD 43.6 fl      MPV 9.3 fL      Platelets 224 10*3/mm3      Neutrophil % 66.5 %      Lymphocyte % 21.5 %      Monocyte % 10.7 %      Eosinophil % 0.4 %      Basophil % 0.6 %      Immature Grans % 0.3 %      Neutrophils, Absolute 8.46 10*3/mm3      Lymphocytes, Absolute 2.74 10*3/mm3      Monocytes, Absolute 1.36 10*3/mm3      Eosinophils, Absolute 0.05 10*3/mm3      Basophils, Absolute 0.07 10*3/mm3      Immature Grans, Absolute 0.04 10*3/mm3      nRBC 0.0 /100 WBC               General Appearance No acute distress   HEENT Right anterior cervical incision site with surgical glue in place.  No erythema, swelling or drainage   Neurological Awake, Alert, and oriented x 3   Motor Strength 5/5 BUE, tone normal   Sensory Intact to light touch throughout   Reflexes DTRs 2+ BUE.  DTRs 2+ bilateral patellar.   Gait and station Ambulating independently without difficulty.  Per PT note 10/8/2024:  Pt performed bed mobility supine-sit w/ SUP, sit to stand OOB w/ SUP, ambulated 150' w/ CGA for safety, sit-supine w/ SUP. Pt was able to  hallway for approx 5 mins talking to Dr. Johansen. No loss of balance present. Pt deficits include post-op generalized weakness. Pt is near basline level of function. DC recommendation is home with familly.   Per OT note 10/9/2024:   Patient states she is at her baseline with mobility/ADLs and is wishful to d/c to home today. Patient is near baseline for ADLs. No skilled intervention indicated at this time. OT to sign off. D/C recommendations is home with family.    Neck Supple, trachea midline, no carotid bruit,    Extremities Moves all extremities well, no edema, no cyanosis, no redness         Discharge  Medications  Raciel has been reviewed and narcotic consent is on file in the patient's chart.     Your medication list        START taking these medications        Instructions Last Dose Given Next Dose Due   HYDROcodone-acetaminophen 7.5-325 MG per tablet  Commonly known as: NORCO      Take 1 tablet by mouth Every 4 (Four) Hours As Needed for Moderate Pain for up to 4 days.       methylPREDNISolone 4 MG dose pack  Commonly known as: MEDROL      Take as directed on package instructions.       naloxone 4 MG/0.1ML nasal spray  Commonly known as: NARCAN      Call 911. Don't prime. [place] in 1 nostril for overdose. Repeat in 2-3 minutes in other nostril if no or minimal breathing/responsiveness.       sennosides-docusate 8.6-50 MG per tablet  Commonly known as: PERICOLACE      Take 2 tablets by mouth 2 (Two) Times a Day for 10 days.              CHANGE how you take these medications        Instructions Last Dose Given Next Dose Due   SUMAtriptan 100 MG tablet  Commonly known as: IMITREX  What changed: See the new instructions.      TAKE 1 TABLET BY MOUTH AT ONSET OF HEADACHE, MAY REPEAT EVERY 2 HOURS AS NEEDED (MAX 200MG/DAY)              CONTINUE taking these medications        Instructions Last Dose Given Next Dose Due   ALPRAZolam 0.25 MG tablet  Commonly known as: XANAX      Take 1 tablet by mouth At Night As Needed for Anxiety.       amLODIPine 5 MG tablet  Commonly known as: NORVASC      Take 1 tablet by mouth Daily.       arformoterol 15 MCG/2ML nebulizer solution  Commonly known as: BROVANA      Take 2 mL by nebulization 2 (Two) Times a Day.       baclofen 20 MG tablet  Commonly known as: LIORESAL      TAKE 1 TABLET BY MOUTH THREE TIMES A DAY       buPROPion  MG 24 hr tablet  Commonly known as: WELLBUTRIN XL      Take 1 tablet by mouth Every Morning.       CITRUCEL PO      Take 1 tablet by mouth Daily.       DULoxetine 60 MG capsule  Commonly known as: CYMBALTA      Take 1 capsule by mouth Daily.        gabapentin 600 MG tablet  Commonly known as: NEURONTIN      TAKE 1 TABLET BY MOUTH THREE TIMES A DAY       IMODIUM A-D PO      Take 1 tablet by mouth Daily.       ipratropium-albuterol 0.5-2.5 mg/3 ml nebulizer  Commonly known as: DUO-NEB      Take 3 mL by nebulization Every 4 (Four) Hours As Needed.       losartan 50 MG tablet  Commonly known as: Cozaar      Take 1 tablet by mouth Daily.       multivitamin tablet tablet  Commonly known as: THERAGRAN      Take 1 tablet by mouth Daily. HOLD FOR SURGERY       ondansetron 4 MG tablet  Commonly known as: ZOFRAN      TAKE 1 TABLET BY MOUTH EVERY 8 HOURS AS NEEDED FOR NAUSEA OR VOMITING.       pantoprazole 40 MG EC tablet  Commonly known as: PROTONIX      Take 1 tablet by mouth Daily With Dinner.       Restasis 0.05 % ophthalmic emulsion  Generic drug: cycloSPORINE      Administer 1 drop to both eyes Every 12 (Twelve) Hours.       Yupelri 175 MCG/3ML nebulizer solution  Generic drug: revefenacin      Take 3 mL by nebulization Daily.                 Where to Get Your Medications        These medications were sent to [place] [id number]      Hours: Monday to Friday 7 AM to 6 PM, Saturday & Sunday 8 AM to 4:30 PM (Closed 12 PM to 12:30 PM) Phone: [phone]   HYDROcodone-acetaminophen 7.5-325 MG per tablet  methylPREDNISolone 4 MG dose pack  naloxone 4 MG/0.1ML nasal spray  sennosides-docusate 8.6-50 MG per tablet         Discharge Diet:   Diet Instructions       Advance Diet As Tolerated -Target Diet: Start with soft/liquid diet and eventually work up to regular diet      Target Diet: Start with soft/liquid diet and eventually work up to regular diet          Diet Order   Procedures    Diet: Regular/House; Fluid Consistency: Thin (IDDSI 0)       Activity at Discharge:   Activity Instructions       Discharge Activity      1) No driving until cleared by us and no longer taking narcotics.   2) Off work/school until  "cleared by us.  3) May shower but no submerging wound in tub, pool, etc.  4) Do not lift / push / pull more then 5 lbs.  5.) No overhead activity/ No bending/twisting/impact activity    Other Restrictions (Specify)      Pelvic Rest              Call for: questions or concerns    Follow-up Appointments  Future Appointments   Date Time Provider Department Center   10/25/2024  1:30 PM Finn Ojeda MD [facility]   3/19/2025 11:15 AM Sun Guerrier MD [facility]      Follow-up Information       Sun Guerrier MD .    Specialty: Internal Medicine  Why: 2 to 4 weeks  Contact information:  [address] [address]  [phone]                           Additional Instructions for the Follow-ups that You Need to Schedule       Discharge Follow-up with PCP   As directed       Currently Documented PCP:    Sun Guerrier MD    PCP Phone Number:    [phone]     Follow Up Details: 2 to 4 weeks        Discharge Follow-up with Specialty: Neurosurgery; 2 Weeks   As directed      Specialty: Neurosurgery   Follow Up: 2 Weeks        Notify Physician or Go To The ED For the Following Conditions   As directed      Including but not limited to fever >100.5, chills, wound concerns (redness, swelling, drainage), new symptoms of numbness, tingling, weakness; new or uncontrolled pain despite using prescribed medications    Order Comments: Including but not limited to fever >100.5, chills, wound concerns (redness, swelling, drainage), new symptoms of numbness, tingling, weakness; new or uncontrolled pain despite using prescribed medications                 Test Results Pending at Discharge     None    I discussed the discharge instructions with patient and Dr. Ojeda.    Ricco Guardado, APRN  10/09/24  14:06 EDT        \"Dictated utilizing Dragon dictation\".    "

## 2024-10-09 NOTE — OUTREACH NOTE
Prep Survey      Flowsheet Row Responses   [facility] patient discharged from? [facility]   Is LACE score < 7 ? 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 General Surgery   Does the patient have Home health ordered? No   Is there a DME ordered? No   Prep survey completed? Yes            [facility] [employer] - Registered Nurse

## 2024-10-09 NOTE — DISCHARGE INSTRUCTIONS
[facility] Neurological Surgery  [address], [address]  [place] [address]  Phone:  [phone]  Fax:  [phone]    Dr. Finn Ojeda MD          Anterior Cervical Surgery Post-Operative Instructions        It will be normal to have a sore throat and have some difficulty swallowing food for a couple of weeks following her surgery.  See your surgeon if this seems to be getting worse rather than better.  A few days of hoarseness is also typical.    You may resume your usual diet as you tolerate    No lifting overhead, although you may place your arms above your head.  DO NOT lift anything over five (5) pounds.  Walking is allowed and encouraged. There are no restrictions on sitting or climbing stairs, but refrain from overly strenuous activity.  You may notice that a constant position (standing or sitting) greater than 45 minutes may tend to make you more sore and therefore it is recommended that you change positions frequently. Do not drive until you are seen back for your first postoperative visit, although you may be a passenger in a car.      If you were given a cervical collar, it was given to you for comfort and if it does not help you do not need to wear it    Refrain from any sexual activity until after your first evaluation at the office.     Remove your dressing the day after your surgery and leave it off.  You may shower and pat the incision dry, but do not soak your wound in water such as a swimming pool, hot tub or lake. Keep your incision clean and dry at all times. Avoid direct sunlight to the wound for at least three (3) months.  You may apply ice to the surgical site for 15 to 20 minutes each hour while awake for the first few days following surgery.  Simply put the ice in a plastic bag in place a towel between the bag of ice and your skin.    You have Steri-Strips applied to the skin edges of your incision.  They should fall off in 7 to 10 days, however, if they do not fall off by the 10th day  you may remove them.    You will leave the hospital with prescriptions for pain medicine and a muscle relaxer.  These medications must be taken as prescribed only.  If a refill of your pain medication is required, in order to have this medication refilled, you must contact the office 3 days (72 hrs) prior to running out, but the amount prescribed is generally enough to last until the first post-operative visit.  After your first week post surgery, If tolerable, please wean yourself from the narcotic pain medications slowly but it is important that you do not stop taking the narcotics all at once.    A small amount of bleeding from the incision during the first few days is not unusual.       You should have a post-operative visit approximately 2 weeks after surgery.  If you do not have a scheduled appointment, call the office at [phone] to schedule one.  We will discuss return to work at your first post-operative visit, but you can expect to be off of work for an average of 6 weeks.     Notify the office if there are any problems, such as:    Excessive neck or arm pain  Persistent temperature of 101.5° F (38.6 ° C) or greater that is not relieved by Tylenol.  Excessive bleeding, redness, heat, swelling or pus around the incision   If you cannot swallow, have difficulty breathing, have chest pain or shortness of breath, call 911.   Your pain is not controlled with medication  You seem to be getting worse rather than better    Thank you.

## 2024-10-09 NOTE — PLAN OF CARE
Problem: Adult Inpatient Plan of Care  Goal: Plan of Care Review  Outcome: Progressing  Goal: Patient-Specific Goal (Individualized)  Outcome: Progressing  Goal: Absence of Hospital-Acquired Illness or Injury  Outcome: Progressing  Intervention: Identify and Manage Fall Risk  Recent Flowsheet Documentation  Taken 10/9/2024 0608 by Michaela Orlando, RN  Safety Promotion/Fall Prevention:   activity supervised   assistive device/personal items within reach   clutter free environment maintained   fall prevention program maintained   nonskid shoes/slippers when out of bed   room organization consistent   safety round/check completed  Taken 10/9/2024 0426 by Michaela Orlando, RN  Safety Promotion/Fall Prevention:   activity supervised   assistive device/personal items within reach   clutter free environment maintained   fall prevention program maintained   nonskid shoes/slippers when out of bed   room organization consistent   safety round/check completed  Taken 10/9/2024 0252 by Michaela Orlando, RN  Safety Promotion/Fall Prevention:   activity supervised   assistive device/personal items within reach   clutter free environment maintained   fall prevention program maintained   nonskid shoes/slippers when out of bed   room organization consistent   safety round/check completed  Taken 10/9/2024 0151 by Michaela Orlando, RN  Safety Promotion/Fall Prevention:   activity supervised   assistive device/personal items within reach   clutter free environment maintained   fall prevention program maintained   nonskid shoes/slippers when out of bed   room organization consistent   safety round/check completed  Taken 10/9/2024 0006 by Michaela Orlando, RN  Safety Promotion/Fall Prevention:   activity supervised   assistive device/personal items within reach   clutter free environment maintained   fall prevention program maintained   nonskid shoes/slippers when out of bed   room organization consistent    safety round/check completed  Taken 10/8/2024 2138 by Michaela Orlando RN  Safety Promotion/Fall Prevention:   activity supervised   assistive device/personal items within reach   clutter free environment maintained   fall prevention program maintained   nonskid shoes/slippers when out of bed   room organization consistent   safety round/check completed  Taken 10/8/2024 2027 by Michaela Orlando RN  Safety Promotion/Fall Prevention:   activity supervised   assistive device/personal items within reach   clutter free environment maintained   fall prevention program maintained   nonskid shoes/slippers when out of bed   room organization consistent   safety round/check completed  Intervention: Prevent Skin Injury  Recent Flowsheet Documentation  Taken 10/9/2024 0608 by Michaela Orlando RN  Body Position: position changed independently  Taken 10/9/2024 0426 by Michaela Orlando RN  Body Position: position changed independently  Taken 10/9/2024 0252 by Michaela Orlando RN  Body Position: position changed independently  Taken 10/9/2024 0151 by Michaela Orlando RN  Body Position: position changed independently  Taken 10/9/2024 0006 by Michaela Orlando RN  Body Position: position changed independently  Taken 10/8/2024 2138 by Michaela Orlando RN  Body Position: position changed independently  Skin Protection: incontinence pads utilized  Taken 10/8/2024 2027 by Michaela Orlando RN  Body Position: position changed independently  Goal: Optimal Comfort and Wellbeing  Outcome: Progressing  Intervention: Monitor Pain and Promote Comfort  Recent Flowsheet Documentation  Taken 10/8/2024 2138 by Michaela Orlando RN  Pain Management Interventions:   cold applied   see MAR   pillow support provided   position adjusted  Intervention: Provide Person-Centered Care  Recent Flowsheet Documentation  Taken 10/8/2024 2138 by Michaela Orlando RN  Trust Relationship/Rapport: care  explained  Goal: Readiness for Transition of Care  Outcome: Progressing   Goal Outcome Evaluation:

## 2024-10-09 NOTE — THERAPY EVALUATION
Patient Name: Magdalena Dickey  : 1955    MRN: [id number]                              Today's Date: 10/9/2024       Admit Date: 10/7/2024    Visit Dx:     ICD-10-CM ICD-9-CM   1. Adjacent segment disease of cervical spine at C5-C6 level with history of fusion procedure  M50.322 722.6    Z98.1 V45.4     Patient Active Problem List   Diagnosis    Migraine without aura and without status migrainosus, not intractable    GERD (gastroesophageal reflux disease)    Irritable bowel syndrome with diarrhea    Depression with anxiety    Adjacent segment disease of cervical spine at C5-C6 level with history of fusion procedure    History of colon resection    Essential hypertension    Hypertriglyceridemia    Peripheral polyneuropathy    Lumbar stenosis with neurogenic claudication    Lumbar radiculitis    ILD (interstitial lung disease)    Lumbar facet arthropathy    Cervical spinal stenosis    Neck pain    Cervical radiculopathy    Adjacent segment disease of cervicothoracic spine with history of fusion procedure     Past Medical History:   Diagnosis Date    Abnormal mammogram 2022    Anesthesia     WAKES UP AGITATED    Anxiety     Cervical disc disorder     Cervical radiculopathy     Cervical spondylolysis     Chest pain 2022    ADMITTED TO [facility]    Chronic bilateral low back pain without sciatica     Chronic pain disorder     Colon polyps     FOLLOWED BY DR. ADRIA NYE    COPD (chronic obstructive pulmonary disease)     COVID-19 08/15/2020    SEEN AT     Delayed emergence from anesthesia     Depression 2016    Diverticulitis 2018    ADMITTED TO [facility]    Diverticulitis 2017    SEEN AT [facility] ER    Diverticulitis of colon 2020    Dysesthesia     Dyspepsia     Extremity pain     Fecal incontinence 2022    GERD (gastroesophageal reflux disease)     Hair loss 2020    Headache, tension-type     Hiatal hernia     History of measles, mumps, or rubella     MEASLES AND MUMPS AS A CHILD     Hypertension     IBS (irritable bowel syndrome)     ILD (interstitial lung disease)     Insomnia     Joint pain     Lumbosacral disc disease     Lung nodule 11/2020    Migraine without aura and without status migrainosus, not intractable 05/25/2016    NAFLD (nonalcoholic fatty liver disease)     Neck pain     Osteoarthritis     Palpitations 04/09/2019    SEEN AT [facility] ER    Paralysis of right vocal cord     Paresthesias     PNA (pneumonia) 08/23/2020    D/T COVID, ADMITTED TO [facility]    Polyneuropathy     Post-COVID chronic dyspnea     PUD (peptic ulcer disease)     Rotator cuff tendinitis, right 05/08/2018    Spinal stenosis     Thrush 09/2020    Vestibular neuritis 09/2014     Past Surgical History:   Procedure Laterality Date    ANTERIOR CERVICAL DISCECTOMY W/ FUSION N/A 06/21/2018    Procedure: C6 7 anterior cervical discectomy and fusion with allograft and plate;  Surgeon: Jacoob De Dios MD;  Location: [facility] MAIN OR;  Service: Neurosurgery    ANTERIOR CERVICAL DISCECTOMY W/ FUSION Right 10/7/2024    Procedure: CERVICAL FIVE TO SIX DISCECTOMY ANTERIOR WITH FUSION, HARDWARE REMOVAL;  Surgeon: Finn Ojeda MD;  Location: [facility] MAIN OR;  Service: Neurosurgery;  Laterality: Right;    CERVICAL EPIDURAL N/A 6/5/2024    Procedure: C7-T1 CERVICAL EPIDURAL STEROID INJECTION CPT: 56620;  Surgeon: Sarah Bernal MD;  Location: [facility] [place];  Service: Pain Management;  Laterality: N/A;    CHOLECYSTECTOMY N/A 1980    AT [place]    COLON RESECTION N/A 01/16/2018    Procedure: LAPAROSCOPIC SIGMOID COLON RESECTION WITH MOBILIZATION OF SPLENIC FLEXURE;  Surgeon: Eric Davidson MD;  Location: [facility] MAIN OR;  Service:     COLONOSCOPY N/A 02/15/2017    6 MM TUBULAR ADENOMA POLYP IN HEPATIC FLEXURE, 5 MM TUBULAR ADENOAM POLYP IN RECTUM, DR. GERARDO NYE AT [facility]    COLONOSCOPY N/A 03/01/2003    Internal hemorrhoids-Dr. Gerardo Nye    COLONOSCOPY N/A 02/08/2022    4 MM TUBULAR ADENOMA POLYP IN ASCENDING, HEMORRHOIDS, DR. GERARDO NYE  AT [facility]    DIAGNOSTIC LAPAROSCOPY N/A 06/06/2001    Cul-de-sac endometriosis and adhesions-Dr. Aamir Christine    ENDOSCOPY N/A 02/15/2017    SMALL HIATAL HERNIA, GASTRITIS, DR. ADRIA NYE AT [facility]    ENDOSCOPY N/A 02/08/2022    GASTRITIS, MILD ESOPHAGITIS, Z LINE IRREGULAR, DR. ADRIA NYE AT [facility]    ENDOSCOPY N/A 09/12/2014    GASTRITIS, DR. ADRIA NYE AT [facility]    ENDOSCOPY AND COLONOSCOPY N/A 02/04/2009    SMALL HIATAL HERNIA, ESOPHAGITIS, HEMORRHOIDS, DIVERTICULOSIS, TORTUOUS COLON, DR. ADRIA NYE AT [facility]    EPIDURAL Bilateral 12/06/2023    Procedure: BILATERAL L4 TRANSFORAMINAL LUMBAR EPIDURAL STEROID INJECTION CPT: 69704, 07679;  Surgeon: Sarah Bernal MD;  Location[facility] MA[facility];  Service: Pain Management;  Laterality: Bilateral;    EPIDURAL BLOCK      LUMBAR EPIDURAL INJECTION N/A 09/20/2023    Procedure: LUMBAR EPIDURAL 1ST VISIT L4-5 80206;  Surgeon: Sarah Bernal MD;  Location: [facility] MAIN OR;  Service: Pain Management;  Laterality: N/A;    MEDIAL BRANCH BLOCK Bilateral 02/26/2024    Procedure: BILATERAL L3-L5 LUMBAR MEDIAL BRANCH BLOCK CPT: 20885, 06207;  Surgeon: Sarah Bernal MD;  Location: [facility] MAIN OR;  Service: Pain Management;  Laterality: Bilateral;    MEDIAL BRANCH BLOCK Bilateral 3/11/2024    Procedure: BILATERAL L3-L5 LUMBAR MEDIAL BRANCH BLOCK CPT: 12825, 738447;  Surgeon: Sarah Bernal MD;  Location: [facility];  Service: Pain Management;  Laterality: Bilateral;    NECK SURGERY      RADIOFREQUENCY ABLATION Bilateral 4/15/2024    Procedure: BILATERAL L3-L5 RADIOFREQUENCY ABLATION LUMBAR CPT: 63292, 42435;  Surgeon: Sarah Bernal MD;  Location: [facility] MA[facility];  Service: Pain Management;  Laterality: Bilateral;    SKIN BIOPSY Left 07/08/2022    LEFT CALF, PATH: SMALL FIBER NEUROPATHY    SPINAL FUSION      TOTAL ABDOMINAL HYSTERECTOMY WITH SALPINGO OOPHORECTOMY Bilateral 07/31/2001    Dr. Aamir Christine AT [facility]      General Information       Row Name 10/09/24 0852          OT Time and  Intention    Document Type evaluation  -JR     Mode of Treatment individual therapy;occupational therapy  -       Row Name 10/09/24 0852          General Information    Patient Profile Reviewed yes  -JR     Prior Level of Function independent:;ADL's  -JR     Barriers to Rehab none identified  -       Row Name 10/09/24 0852          Living Environment    People in Home child(mindy), dependent;spouse  -       Row Name 10/09/24 0852          Home Main Entrance    Number of Stairs, Main Entrance five  -JR     Stair Railings, Main Entrance none  -       Row Name 10/09/24 0852          Stairs Within Home, Primary    Number of Stairs, Within Home, Primary twelve  -       Row Name 10/09/24 0852          Cognition    Orientation Status (Cognition) oriented x 4  -               User Key  (r) = Recorded By, (t) = Taken By, (c) = Cosigned By      Initials Name Provider Type    JR Roderick Lucero OT Occupational Therapist                     Mobility/ADL's       Row Name 10/09/24 0852          Bed Mobility    Bed Mobility supine-sit;sit-supine  -JR     Supine-Sit [place] (Bed Mobility) supervision  -JR     Sit-Supine [place] (Bed Mobility) supervision  -JR     Assistive Device (Bed Mobility) head of bed elevated  -       Row Name 10/09/24 0852          Sit-Stand Transfer    Sit-Stand [place] (Transfers) supervision  -JR     Comment, (Sit-Stand Transfer) No AD used  -       Row Name 10/09/24 0852          Functional Mobility    Patient was able to Ambulate yes  -       Row Name 10/09/24 0852          Activities of Daily Living    BADL Assessment/Intervention lower body dressing  -       Row Name 10/09/24 0852          Lower Body Dressing Assessment/Training    [place] Level (Lower Body Dressing) don;socks;supervision  -JR     Position (Lower Body Dressing) edge of bed sitting  -               User Key  (r) = Recorded By, (t) = Taken By, (c) = Cosigned By      Initials Name Provider Type    JR  Roderick Lucero OT Occupational Therapist                   Obj/Interventions       Row Name 10/09/24 0853          Sensory Assessment (Somatosensory)    Sensory Assessment (Somatosensory) sensation intact  -       Row Name 10/09/24 0853          Vision Assessment/Intervention    Visual Impairment/Limitations WFL  -       Row Name 10/09/24 0853          Range of Motion Comprehensive    General Range of Motion no range of motion deficits identified  -       Row Name 10/09/24 0853          Strength Comprehensive (MMT)    Comment, General Manual Muscle Testing (MMT) Assessment Post-op weakness noted  -       Row Name 10/09/24 0853          Balance    Balance Assessment sitting static balance;sitting dynamic balance;standing dynamic balance  -     Static Sitting Balance supervision  -     Dynamic Sitting Balance supervision  -     Position, Sitting Balance sitting edge of bed  -     Static Standing Balance supervision  -     Dynamic Standing Balance supervision  -     Comment, Balance No LOb with standing balance, SBA for ambulation  -               User Key  (r) = Recorded By, (t) = Taken By, (c) = Cosigned By      Initials Name Provider Type     Roderick Lucero OT Occupational Therapist                   Goals/Plan    No documentation.                  Clinical Impression       Row Name 10/09/24 0855          Pain Assessment    Pretreatment Pain Rating 5/10  -     Posttreatment Pain Rating 5/10  -     Pain Location upper  -     Pain Location - back;neck  -     Pre/Posttreatment Pain Comment Patient just received pain pill.  -     Pain Intervention(s) Medication (See MAR);Repositioned  -       Row Name 10/09/24 0855          Plan of Care Review    Plan of Care Reviewed With patient  -     Progress improving  -     Outcome Evaluation 69 y.o. female admitted to hospital for ACDF of C5-C6 and removal of hardware.  PMH sig for COPD, GERD, Cervical radiculopathy, servical spondyolysis.   A&Ox4. At baseline, Patient lived with spouse, children and sister at home.  Patient is (I) with ADLs and ambulates w/o any AD.  House has 5 steps to enter and 14 steps to basement which she uses frequently.   Patient sitting up in bed taking meds upon arrival.  Patient agreeable to Eval.  Patient states she just took pain pill and rated pain at 5/10 for neck and upper back.  Patient transitioned to EOB with Supervision.  Patient donned  non-skid socks with Supervision.  STS from EOB with Supervision, ambulated to/from nursing ststion with Supervision.   Patient returned to bed.  BUE WNL, 4/5.  Patient states she is at her baseline with mobility/ADLs and is wishful to d/c to home today. Patient is near baseline for ADLs.  No skilled intervention indicated at this time.  OT to sign off.  D/C recommendations is home with family.  -       Row Name 10/09/24 0855          Therapy Assessment/Plan (OT)    Criteria for Skilled Therapeutic Interventions Met (OT) no problems identified which require skilled intervention  -     Therapy Frequency (OT) evaluation only  -       Row Name 10/09/24 0855          Therapy Plan Review/Discharge Plan (OT)    Anticipated Discharge Disposition (OT) home  -       Row Name 10/09/24 0855          Vital Signs    O2 Delivery Pre Treatment room air  -JR     O2 Delivery Intra Treatment room air  -JR     O2 Delivery Post Treatment room air  -JR     Pre Patient Position Sitting  -JR     Intra Patient Position Standing  -JR     Post Patient Position Sitting  -       Row Name 10/09/24 0855          Positioning and Restraints    Pre-Treatment Position in bed  -JR     Post Treatment Position bed  -JR     In Bed notified nsg;call light within reach;encouraged to call for assist;exit alarm on;sitting  -JR               User Key  (r) = Recorded By, (t) = Taken By, (c) = Cosigned By      Initials Name Provider Type    Roderick Garner, OT Occupational Therapist                   Outcome Measures        Row Name 10/09/24 0907          How much help from another is currently needed...    Putting on and taking off regular lower body clothing? 3  -JR     Bathing (including washing, rinsing, and drying) 3  -JR     Toileting (which includes using toilet bed pan or urinal) 4  -JR     Putting on and taking off regular upper body clothing 4  -JR     Taking care of personal grooming (such as brushing teeth) 4  -JR     Eating meals 4  -JR     AM-PAC 6 Clicks Score (OT) 22  -JR       Row Name 10/09/24 0907          Functional Assessment    Outcome Measure Options AM-PAC 6 Clicks Daily Activity (OT)  -               User Key  (r) = Recorded By, (t) = Taken By, (c) = Cosigned By      Initials Name Provider Type    Roderick Garner OT Occupational Therapist                    Occupational Therapy Education       Title: PT OT SLP Therapies (Done)       Topic: Occupational Therapy (Done)       Point: ADL training (Done)       Description:   Instruct learner(s) on proper safety adaptation and remediation techniques during self care or transfers.   Instruct in proper use of assistive devices.                  Learning Progress Summary             Patient CHEY Gaffney VU by  at 10/9/2024 0907    Comment: Role of OT                         Point: Home exercise program (Done)       Description:   Instruct learner(s) on appropriate technique for monitoring, assisting and/or progressing therapeutic exercises/activities.                  Learning Progress Summary             CHEY Marcos VU by  at 10/9/2024 0907    Comment: Role of OT                         Point: Precautions (Done)       Description:   Instruct learner(s) on prescribed precautions during self-care and functional transfers.                  Learning Progress Summary             CHEY Marcos VU by  at 10/9/2024 0907    Comment: Role of OT                         Point: Body mechanics (Done)       Description:   Instruct learner(s) on proper positioning  and spine alignment during self-care, functional mobility activities and/or exercises.                  Learning Progress Summary             Patient CHEY Gaffney VU by  at 10/9/2024 0907    Comment: Role of OT                                         User Key       Initials Effective Dates Name Provider Type Discipline     07/24/24 -  Roderick Lucero OT Occupational Therapist OT                  OT Recommendation and Plan  Therapy Frequency (OT): evaluation only  Plan of Care Review  Plan of Care Reviewed With: patient  Progress: improving  Outcome Evaluation: 69 y.o. female admitted to hospital for ACDF of C5-C6 and removal of hardware.  PMH sig for COPD, GERD, Cervical radiculopathy, servical spondyolysis.  A&Ox4. At baseline, Patient lived with spouse, children and sister at home.  Patient is (I) with ADLs and ambulates w/o any AD.  House has 5 steps to enter and 14 steps to basement which she uses frequently.   Patient sitting up in bed taking meds upon arrival.  Patient agreeable to Eval.  Patient states she just took pain pill and rated pain at 5/10 for neck and upper back.  Patient transitioned to EOB with Supervision.  Patient donned  non-skid socks with Supervision.  STS from EOB with Supervision, ambulated to/from nursing ststion with Supervision.   Patient returned to bed.  BUE WNL, 4/5.  Patient states she is at her baseline with mobility/ADLs and is wishful to d/c to home today. Patient is near baseline for ADLs.  No skilled intervention indicated at this time.  OT to sign off.  D/C recommendations is home with family.     Time Calculation:   Evaluation Complexity (OT)  Review Occupational Profile/Medical/Therapy History Complexity: brief/low complexity  Assessment, Occupational Performance/Identification of Deficit Complexity: 1-3 performance deficits  Clinical Decision Making Complexity (OT): problem focused assessment/low complexity  Overall Complexity of Evaluation (OT): low complexity     Time  Calculation- OT       Row Name 10/09/24 0909 10/09/24 0908          Time Calculation- OT    OT Start Time -- 0835  -JR     OT Stop Time -- 0847  -     OT Time Calculation (min) -- 12 min  -JR     OT Non-Billable Time (min) -- 12 min  -     OT Received On -- 10/09/24  -        Timed Charges    70068 - OT Self Care/Mgmt Minutes -- 12  -JR        Untimed Charges    OT Eval/Re-eval Minutes 12  -JR --        Total Minutes    Timed Charges Total Minutes -- 12  -JR     Untimed Charges Total Minutes 12  -JR --      Total Minutes 12  -JR 12  -JR               User Key  (r) = Recorded By, (t) = Taken By, (c) = Cosigned By      Initials Name Provider Type    Roderick Garner OT Occupational Therapist                  Therapy Charges for Today       Code Description Service Date Service Provider Modifiers Qty    [id number] HC OT EVAL LOW COMPLEXITY 3 10/9/2024 Roderick Lucero OT GO 1                 Roderick Lucero OT  10/9/2024

## 2024-10-10 ENCOUNTER — TRANSITIONAL CARE MANAGEMENT TELEPHONE ENCOUNTER (OUTPATIENT)
Dept: CALL CENTER | Facility: HOSPITAL | Age: 69
End: 2024-10-10
Payer: MEDICARE

## 2024-10-10 DIAGNOSIS — I10 ESSENTIAL HYPERTENSION: Primary | ICD-10-CM

## 2024-10-10 DIAGNOSIS — M47.22 SPONDYLOSIS OF CERVICAL SPINE WITH RADICULOPATHY: ICD-10-CM

## 2024-10-10 RX ORDER — LOSARTAN POTASSIUM 50 MG/1
50 TABLET ORAL DAILY
Qty: 90 TABLET | Refills: 1 | Status: SHIPPED | OUTPATIENT
Start: 2024-10-10

## 2024-10-10 RX ORDER — DULOXETIN HYDROCHLORIDE 60 MG/1
60 CAPSULE, DELAYED RELEASE ORAL DAILY
Qty: 90 CAPSULE | Refills: 0 | Status: SHIPPED | OUTPATIENT
Start: 2024-10-10

## 2024-10-10 NOTE — CASE MANAGEMENT/SOCIAL WORK
Case Management Discharge Note      Final Note: Home--no needs per private auto         Selected Continued Care - Discharged on 10/9/2024 Admission date: 10/7/2024 - Discharge disposition: Home or Self Care      Destination    No services have been selected for the patient.                Durable Medical Equipment    No services have been selected for the patient.                Dialysis/Infusion    No services have been selected for the patient.                Home Medical Care    No services have been selected for the patient.                Therapy    No services have been selected for the patient.                Community Resources    No services have been selected for the patient.                Community & DME    No services have been selected for the patient.                    Transportation Services  Private: Car    Final Discharge Disposition Code: 01 - home or self-care

## 2024-10-10 NOTE — OUTREACH NOTE
Call Center TCM Note      Flowsheet Row Responses   [facility] patient discharged from? [place]   Does the patient have one of the following disease processes/diagnoses(primary or secondary)? General Surgery   TCM attempt successful? Yes   Call start time 1130   Call end time 1133   Discharge diagnosis Adjacent segment disease of cervical spine at C5-C6 level with history of fusion procedure,  CERVICAL FIVE TO SIX DISCECTOMY ANTERIOR WITH FUSION, HARDWARE REMOVAL   Person spoke with today (if not patient) and relationship pt   Meds reviewed with patient/caregiver? Yes   Is the patient having any side effects they believe may be caused by any medication additions or changes? No   Does the patient have all medications related to this admission filled (includes all antibiotics, pain medications, etc.) No   Nursing Interventions Nurse provided patient education, Nurse advised patient to call provider   Prescription comments Did not  Colace,  pt educated on the need for colace   Is the patient taking all medications as directed (includes completed medication regime)? N/A   Comments Post-Op 10/25/24   Does the patient have an appointment with their PCP within 7-14 days of discharge? No appointments available   Nursing Interventions Routed TCM call to PCP office   Psychosocial issues? No   Did the patient receive a copy of their discharge instructions? Yes   Nursing interventions Reviewed instructions with patient   What is the patient's perception of their health status since discharge? Improving   Nursing interventions Nurse provided patient education   Is the patient /caregiver able to teach back basic post-op care? Take showers only when approved by MD-sponge bathe until then, No tub bath, swimming, or hot tub until instructed by MD, Keep incision areas clean,dry and protected, Lifting as instructed by MD in discharge instructions   Is the patient/caregiver able to teach back signs and symptoms of  incisional infection? Increased redness, swelling or pain at the incisonal site, Increased drainage or bleeding, Incisional warmth, Pus or odor from incision, Fever   Is the patient/caregiver able to teach back steps to recovery at home? Rest and rebuild strength, gradually increase activity, Eat a well-balance diet   Is the patient/caregiver able to teach back the hierarchy of who to call/visit for symptoms/problems? PCP, Specialist, Home health nurse, Urgent Care, ED, 911 Yes   TCM call completed? Yes   Wrap up additional comments Pt denies fever, or increased redness, swelling, drainage at incisional site. Reviewed AVS/meds/instructions with pt. Pt verified Post-op appt   Call end time 1133   Would this patient benefit from a Referral to [facility] Social Work? No   Is the patient interested in additional calls from an ambulatory ? No            Lorene Pastrana RN    10/10/2024, 11:36 EDT

## 2024-10-21 NOTE — PROGRESS NOTES
Patient ID: Magdalena Dickey is a 69 y.o. 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    Subjective:     History of Present Illness  Madelaine is overall doing well.  She denies any significant radiculopathy.  She has a little bit of posterior neck pain along her splenius capitis are present trapezius muscles but she has not needed to take any medication for that.  She has not needed to take any hydrocodone since a few days ago.  She is feeling ill right now currently due to an ear infection.  She has some hoarseness of her voice with a very soft voice.  This is similar to what happened after her first surgery which has resulted in some permanent voice hoarseness.      Review of Systems   Eyes:  Negative for visual disturbance.   Gastrointestinal:  Negative for nausea and vomiting.   Musculoskeletal:  Positive for neck pain. Negative for neck stiffness.   Neurological:  Positive for dizziness and light-headedness. Negative for weakness, numbness and headaches.        While in the room and during my examination of the patient I wore a mask and eye protection.  I washed my hands before and after this patient encounter.  The patient was also wearing a mask.    The following portions of the patient's history were reviewed and updated as appropriate: allergies, current medications, past family history, past medical history, past social history, past surgical history and problem list.     Objective:    Vitals:    10/25/24 1326   BP: 118/76   Pulse: 95   Resp: 16   Temp: 97 °F (36.1 °C)   SpO2: 95%     Body mass index is 29.53 kg/m².    Physical Exam  Constitutional:       General: She is awake.      Appearance: Normal appearance.   HENT:      Head: Normocephalic and atraumatic.   Eyes:      General: Lids are normal.      Extraocular Movements:  Extraocular movements intact.      Conjunctiva/sclera: Conjunctivae normal.      Pupils: Pupils are equal, round, and reactive to light.   Cardiovascular:      Rate and Rhythm: Normal rate and regular rhythm.      Pulses: Normal pulses.   Pulmonary:      Breath sounds: Normal breath sounds.   Abdominal:      Palpations: Abdomen is soft.   Musculoskeletal:         General: Normal range of motion.      Cervical back: Normal range of motion and neck supple.   Skin:     General: Skin is warm and dry.   Neurological:      Mental Status: She is alert and oriented to person, place, and time.      Motor: Motor function is intact. No weakness or atrophy.      Coordination: Coordination is intact. Romberg sign negative.      Gait: Gait is intact. Gait normal.      Deep Tendon Reflexes: Reflexes are normal and symmetric.      Reflex Scores:       Tricep reflexes are 2+ on the right side and 2+ on the left side.       Bicep reflexes are 2+ on the right side and 2+ on the left side.       Brachioradialis reflexes are 2+ on the right side and 2+ on the left side.       Patellar reflexes are 2+ on the right side and 2+ on the left side.       Achilles reflexes are 2+ on the right side and 2+ on the left side.      Neurological Exam  Mental Status  Awake and alert. Oriented to person, place and time. Oriented to person, place, and time.    Cranial Nerves  CN II: Visual acuity is normal. Visual fields full to confrontation.  CN III, IV, VI: Extraocular movements intact bilaterally. Normal lids and orbits bilaterally. Pupils equal round and reactive to light bilaterally.  CN V: Facial sensation is normal.  CN VII: Full and symmetric facial movement.  CN IX, X: Palate elevates symmetrically. Normal gag reflex.  CN XI: Shoulder shrug strength is normal.  CN XII: Tongue midline without atrophy or fasciculations.    Motor                                               Right                     Left  Deltoid                                    5                          5   Biceps                                   5                          5   Iliopsoas                               5                          5   Quadriceps                           5                          5   Hamstring                             5                          5   Gastrocnemius                     5                           5   Anterior tibialis                      5                          5    Sensory  Sensation is intact to light touch, pinprick, vibration and proprioception in all four extremities.    Reflexes  Deep tendon reflexes are 2+ and symmetric in all four extremities.                                            Right                      Left  Brachioradialis                    2+                         2+  Biceps                                 2+                         2+  Triceps                                2+                         2+  Patellar                                2+                         2+  Achilles                                2+                         2+    Coordination    Finger-to-nose, rapid alternating movements and heel-to-shin normal bilaterally without dysmetria.    Gait   Normal gait. Normal gait. Romberg is absent.       Assessment/Plan: Overall seems to be doing well.  I think that her voice hoarseness will gradually improve over time.  I prescribed her some Voltaren gel for her sore muscles as needed.  I would like to see her back in 4 weeks with a follow-up x-ray.  She is cleared to lift up to 15 pounds.       Diagnoses and all orders for this visit:    1. Adjacent segment disease of cervical spine at C5-C6 level with history of fusion procedure (Primary)  -     XR spine cervical 2 or 3 vw; Future    Other orders  -     Diclofenac Sodium (Voltaren Arthritis Pain) 1 % gel gel; Apply 4 g topically to the appropriate area as directed 4 (Four) Times a Day As Needed (Apply to the back of the neck when sore).   Dispense: 150 g; Refill: 0                Finn Ojeda MD  10/25/24  13:42 EDT

## 2024-10-24 DIAGNOSIS — R25.2 MUSCLE CRAMPS: ICD-10-CM

## 2024-10-24 RX ORDER — BACLOFEN 20 MG/1
TABLET ORAL
Qty: 60 TABLET | Refills: 3 | Status: SHIPPED | OUTPATIENT
Start: 2024-10-24

## 2024-10-25 ENCOUNTER — OFFICE VISIT (OUTPATIENT)
Dept: NEUROSURGERY | Facility: CLINIC | Age: 69
End: 2024-10-25
Payer: MEDICARE

## 2024-10-25 VITALS
WEIGHT: 182.98 LBS | DIASTOLIC BLOOD PRESSURE: 76 MMHG | BODY MASS INDEX: 29.41 KG/M2 | HEART RATE: 95 BPM | HEIGHT: 66 IN | RESPIRATION RATE: 16 BRPM | TEMPERATURE: 97 F | OXYGEN SATURATION: 95 % | SYSTOLIC BLOOD PRESSURE: 118 MMHG

## 2024-10-25 DIAGNOSIS — Z98.1 ADJACENT SEGMENT DISEASE OF CERVICAL SPINE AT C5-C6 LEVEL WITH HISTORY OF FUSION PROCEDURE: Primary | ICD-10-CM

## 2024-10-25 DIAGNOSIS — M50.322 ADJACENT SEGMENT DISEASE OF CERVICAL SPINE AT C5-C6 LEVEL WITH HISTORY OF FUSION PROCEDURE: Primary | ICD-10-CM

## 2024-10-25 RX ORDER — SUCRALFATE 1 G/1
TABLET ORAL
COMMUNITY

## 2024-10-25 RX ORDER — NEOMYCIN SULFATE, POLYMYXIN B SULFATE, HYDROCORTISONE 3.5; 10000; 1 MG/ML; [USP'U]/ML; MG/ML
3 SOLUTION/ DROPS AURICULAR (OTIC)
COMMUNITY
Start: 2024-10-22 | End: 2024-11-01

## 2024-10-25 RX ORDER — AMOXICILLIN 875 MG
875 TABLET ORAL
COMMUNITY
Start: 2024-10-22 | End: 2024-11-01

## 2024-11-21 DIAGNOSIS — M54.16 LUMBAR RADICULOPATHY: ICD-10-CM

## 2024-11-21 DIAGNOSIS — I10 ESSENTIAL HYPERTENSION: Chronic | ICD-10-CM

## 2024-11-21 NOTE — PROGRESS NOTES
Patient ID: Magdalena Dickey is a 69 y.o. female is here today for follow-up for adjacent segment disease of the cervical spine.    Imaging: XR cervical spine completed on 11/22/2024    Subjective     The patient is here in regards to   Chief Complaint   Patient presents with    Neck Pain    Follow-up       History of Present Illness  Neck pain has improved since I last saw her.  She still has some soreness in the back of her neck.  She notes that Voltaren gel seems to be helping her.  Denies any radiculopathy.  Swallowing difficulties are persistent but improved.  Her voice is back to normal.  She is concerned about keloiding around her scar      While in the room and during my examination of the patient I wore a mask and eye protection.  I washed my hands before and after this patient encounter.  The patient was also wearing a mask.    The following portions of the patient's history were reviewed and updated as appropriate: allergies, current medications, past family history, past medical history, past social history, past surgical history and problem list.    Review of Systems   Constitutional:  Negative for fever.   Eyes:  Negative for visual disturbance.   Gastrointestinal:  Positive for nausea. Negative for vomiting.   Musculoskeletal:  Positive for neck pain and neck stiffness.   Neurological:  Positive for headaches. Negative for dizziness, weakness, light-headedness and numbness.   Psychiatric/Behavioral:  Negative for confusion and decreased concentration.         Past Medical History:   Diagnosis Date    Abnormal mammogram 03/2022    Anesthesia     WAKES UP AGITATED    Anxiety     Cervical disc disorder     Cervical radiculopathy     Cervical spondylolysis     Chest pain 08/19/2022    ADMITTED TO [facility]    Chronic bilateral low back pain without sciatica     Chronic pain disorder     Colon polyps     FOLLOWED BY DR. ADRIA NYE    COPD (chronic obstructive pulmonary disease)     COVID-19 08/15/2020    SEEN  AT     Delayed emergence from anesthesia 2018    Depression 05/25/2016    Diverticulitis 01/16/2018    ADMITTED TO [facility]    Diverticulitis 11/24/2017    SEEN AT [facility] ER    Diverticulitis of colon 06/2020    Dysesthesia     Dyspepsia     Extremity pain     Fecal incontinence 09/2022    GERD (gastroesophageal reflux disease)     Hair loss 12/2020    Headache, tension-type     Hiatal hernia     History of measles, mumps, or rubella     MEASLES AND MUMPS AS A CHILD    Hypertension     IBS (irritable bowel syndrome)     ILD (interstitial lung disease)     Insomnia     Joint pain     Lumbosacral disc disease     Lung nodule 11/2020    Migraine without aura and without status migrainosus, not intractable 05/25/2016    NAFLD (nonalcoholic fatty liver disease)     Neck pain     Osteoarthritis     Palpitations 04/09/2019    SEEN AT [facility] ER    Paralysis of right vocal cord     Paresthesias     PNA (pneumonia) 08/23/2020    D/T COVID, ADMITTED TO [facility]    Polyneuropathy     Post-COVID chronic dyspnea     PUD (peptic ulcer disease)     Rotator cuff tendinitis, right 05/08/2018    Spinal stenosis     Thrush 09/2020    Vestibular neuritis 09/2014       No Known Allergies    Family History   Problem Relation Age of Onset    Alzheimer's disease Mother         SDAT    Hypertension Mother     Diabetes type II Mother     Lung cancer Mother         POSSIBLE    Heart attack Mother     Cancer Mother         lung    COPD Mother     Depression Mother     Diabetes Mother     Heart disease Mother     Miscarriages / Stillbirths Mother     Alcohol abuse Father     Prostate cancer Father     Heart disease Father         THIRD DEGREE HEART BLOCK    Arthritis Father     Cancer Father         prostate    Drug abuse Father         Only alcohol    Learning disabilities Father     Cancer Sister     Ovarian cancer Sister     Cancer Sister     Ovarian cancer Sister     Arthritis Sister     Cancer Sister         ovarian    Depression Sister     Hypertension  Sister     Cancer Sister         ovarian    Depression Sister     Hypertension Sister     COPD Sister     No Known Problems Brother     Arthritis Maternal Grandmother         rheumatoid    Breast cancer Other     Malig Hyperthermia Neg Hx        Social History     Socioeconomic History    Marital status:      Spouse name: Lonnie    Number of children: 3   Tobacco Use    Smoking status: Former     Current packs/day: 0.00     Average packs/day: 1 pack/day for 40.0 years (40.0 ttl pk-yrs)     Types: Cigarettes     Start date: 3/10/1974     Quit date: 3/10/2014     Years since quitting: 10.7     Passive exposure: Past    Smokeless tobacco: Never   Vaping Use    Vaping status: Former   Substance and Sexual Activity    Alcohol use: Yes     Comment: occasional, maybe 1 can of beer every other month    Drug use: No    Sexual activity: Not Currently     Partners: Male     Birth control/protection: Hysterectomy     Comment: .       Past Surgical History:   Procedure Laterality Date    ANTERIOR CERVICAL DISCECTOMY W/ FUSION N/A 06/21/2018    Procedure: C6 7 anterior cervical discectomy and fusion with allograft and plate;  Surgeon: Jacobo De Dios MD;  Location: [facility] OR;  Service: Neurosurgery    ANTERIOR CERVICAL DISCECTOMY W/ FUSION Right 10/7/2024    Procedure: CERVICAL FIVE TO SIX DISCECTOMY ANTERIOR WITH FUSION, HARDWARE REMOVAL;  Surgeon: Finn Ojeda MD;  Location: [facility] MAIN OR;  Service: Neurosurgery;  Laterality: Right;    CERVICAL EPIDURAL N/A 6/5/2024    Procedure: C7-T1 CERVICAL EPIDURAL STEROID INJECTION CPT: 09153;  Surgeon: Sarah Bernal MD;  [place] [facility] [place];  Service: Pain Management;  Laterality: N/A;    CHOLECYSTECTOMY N/A 1980    AT [place]    COLON RESECTION N/A 01/16/2018    Procedure: LAPAROSCOPIC SIGMOID COLON RESECTION WITH MOBILIZATION OF SPLENIC FLEXURE;  Surgeon: Eric Davidson MD;  Location: [facility] OR;  Service:     COLONOSCOPY N/A 02/15/2017    6 MM TUBULAR  ADENOMA POLYP IN HEPATIC FLEXURE, 5 MM TUBULAR ADENOAM POLYP IN RECTUM, DR. GERARDO NYE AT [facility]    COLONOSCOPY N/A 03/01/2003    Internal hemorrhoids-Dr. Gerardo Nye    COLONOSCOPY N/A 02/08/2022    4 MM TUBULAR ADENOMA POLYP IN ASCENDING, HEMORRHOIDS, DR. GERARDO NYE AT [facility]    DIAGNOSTIC LAPAROSCOPY N/A 06/06/2001    Cul-de-sac endometriosis and adhesions-Dr. Aamir Christine    ENDOSCOPY N/A 02/15/2017    SMALL HIATAL HERNIA, GASTRITIS, DR. GERARDO NYE AT [facility]    ENDOSCOPY N/A 02/08/2022    GASTRITIS, MILD ESOPHAGITIS, Z LINE IRREGULAR, DR. GERARDO NYE AT [facility]    ENDOSCOPY N/A 09/12/2014    GASTRITIS, DR. GERARDO NYE AT [facility]    ENDOSCOPY AND COLONOSCOPY N/A 02/04/2009    SMALL HIATAL HERNIA, ESOPHAGITIS, HEMORRHOIDS, DIVERTICULOSIS, TORTUOUS COLON, DR. GERARDO NYE AT [facility]    EPIDURAL Bilateral 12/06/2023    Procedure: BILATERAL L4 TRANSFORAMINAL LUMBAR EPIDURAL STEROID INJECTION CPT: 47284, 07426;  Surgeon: Sarah Bernal MD;  Location: [facility];  Service: Pain Management;  Laterality: Bilateral;    EPIDURAL BLOCK      LUMBAR EPIDURAL INJECTION N/A 09/20/2023    Procedure: LUMBAR EPIDURAL 1ST VISIT L4-5 66152;  Surgeon: Sarah Bernal MD;  Location: [facility];  Service: Pain Management;  Laterality: N/A;    MEDIAL BRANCH BLOCK Bilateral 02/26/2024    Procedure: BILATERAL L3-L5 LUMBAR MEDIAL BRANCH BLOCK CPT: 29252, 94511;  Surgeon: Sarah Bernal MD;  Location: [facility];  Service: Pain Management;  Laterality: Bilateral;    MEDIAL BRANCH BLOCK Bilateral 3/11/2024    Procedure: BILATERAL L3-L5 LUMBAR MEDIAL BRANCH BLOCK CPT: 13225, 194979;  Surgeon: Sarah Bernal MD;  Location: [facility];  Service: Pain Management;  Laterality: Bilateral;    NECK SURGERY      RADIOFREQUENCY ABLATION Bilateral 4/15/2024    Procedure: BILATERAL L3-L5 RADIOFREQUENCY ABLATION LUMBAR CPT: 88396, 53969;  Surgeon: Sarah Bernal MD;  Location: [facility];  Service: Pain Management;  Laterality: Bilateral;    SKIN  BIOPSY Left 07/08/2022    LEFT CALF, PATH: SMALL FIBER NEUROPATHY    SPINAL FUSION      TOTAL ABDOMINAL HYSTERECTOMY WITH SALPINGO OOPHORECTOMY Bilateral 07/31/2001    Dr. Aamir Christine AT [facility]         Objective     Vitals:    11/22/24 1058   BP: 112/68   Pulse: 85   Resp: 16   Temp: 97.1 °F (36.2 °C)   SpO2: 96%     Body mass index is 29.53 kg/m².    Physical Exam  Constitutional:       General: She is awake.      Appearance: Normal appearance.   HENT:      Head: Normocephalic and atraumatic.   Eyes:      General: Lids are normal.      Extraocular Movements: Extraocular movements intact.      Conjunctiva/sclera: Conjunctivae normal.      Pupils: Pupils are equal, round, and reactive to light.   Cardiovascular:      Rate and Rhythm: Normal rate and regular rhythm.      Pulses: Normal pulses.   Pulmonary:      Breath sounds: Normal breath sounds.   Abdominal:      Palpations: Abdomen is soft.   Musculoskeletal:         General: Normal range of motion.      Cervical back: Normal range of motion and neck supple.   Skin:     General: Skin is warm and dry.   Neurological:      Mental Status: She is alert and oriented to person, place, and time.      Motor: Motor function is intact. No weakness or atrophy.      Coordination: Coordination is intact. Romberg sign negative.      Gait: Gait is intact. Gait normal.      Deep Tendon Reflexes: Reflexes are normal and symmetric.      Reflex Scores:       Tricep reflexes are 2+ on the right side and 2+ on the left side.       Bicep reflexes are 2+ on the right side and 2+ on the left side.       Brachioradialis reflexes are 2+ on the right side and 2+ on the left side.       Patellar reflexes are 2+ on the right side and 2+ on the left side.       Achilles reflexes are 2+ on the right side and 2+ on the left side.        Neurological Exam  Mental Status  Awake and alert. Oriented to person, place and time. Oriented to person, place, and time.    Cranial Nerves  CN II: Visual acuity  is normal. Visual fields full to confrontation.  CN III, IV, VI: Extraocular movements intact bilaterally. Normal lids and orbits bilaterally. Pupils equal round and reactive to light bilaterally.  CN V: Facial sensation is normal.  CN VII: Full and symmetric facial movement.  CN IX, X: Palate elevates symmetrically. Normal gag reflex.  CN XI: Shoulder shrug strength is normal.  CN XII: Tongue midline without atrophy or fasciculations.    Motor                                               Right                     Left  Deltoid                                   5                          5   Biceps                                   5                          5   Iliopsoas                               5                          5   Quadriceps                           5                          5   Hamstring                             5                          5   Gastrocnemius                     5                           5   Anterior tibialis                      5                          5    Sensory  Sensation is intact to light touch, pinprick, vibration and proprioception in all four extremities.    Reflexes  Deep tendon reflexes are 2+ and symmetric in all four extremities.                                            Right                      Left  Brachioradialis                    2+                         2+  Biceps                                 2+                         2+  Triceps                                2+                         2+  Patellar                                2+                         2+  Achilles                                2+                         2+    Coordination    Finger-to-nose, rapid alternating movements and heel-to-shin normal bilaterally without dysmetria.    Gait   Normal gait. Normal gait. Romberg is absent.      Assessment & Plan   Independent Review of Radiographic Studies:      I personally reviewed the images from the following  studies.    INDICATION: Follow up from fusion    FINDINGS:    XR: XR of the cervical spine was reviewed and shows good alignment of C5-6 fusion.  This is stable compared to previous x-ray    Assessment/Plan: Currently progressing as expected.  I do think she has some posterior facet pain secondary to the change in alignment of her cervical spine.  I recommended that she have a massage and if that does not work we can prescribe a facet block at that level.  She is cleared to lift up to 30 pounds.  I would like to see her back in 6 weeks with another x-ray.    Medical Decision Making:      X-ray cervical spine         Diagnoses and all orders for this visit:    1. Cervical radiculopathy (Primary)  -     XR spine cervical 2 or 3 vw; Future             Patient Instructions/Recommendations:    Follow-up in 6 weeks with x-ray      Finn Ojeda MD  11/22/24  11:37 EST

## 2024-11-22 ENCOUNTER — OFFICE VISIT (OUTPATIENT)
Dept: NEUROSURGERY | Facility: CLINIC | Age: 69
End: 2024-11-22
Payer: MEDICARE

## 2024-11-22 VITALS
OXYGEN SATURATION: 96 % | SYSTOLIC BLOOD PRESSURE: 112 MMHG | HEIGHT: 66 IN | RESPIRATION RATE: 16 BRPM | TEMPERATURE: 97.1 F | DIASTOLIC BLOOD PRESSURE: 68 MMHG | WEIGHT: 182.98 LBS | HEART RATE: 85 BPM | BODY MASS INDEX: 29.41 KG/M2

## 2024-11-22 DIAGNOSIS — M54.12 CERVICAL RADICULOPATHY: Primary | ICD-10-CM

## 2024-11-22 DIAGNOSIS — I10 ESSENTIAL HYPERTENSION: Chronic | ICD-10-CM

## 2024-11-22 DIAGNOSIS — M54.16 LUMBAR RADICULOPATHY: ICD-10-CM

## 2024-11-22 RX ORDER — LOSARTAN POTASSIUM 100 MG/1
100 TABLET ORAL DAILY
Qty: 90 TABLET | Refills: 1 | OUTPATIENT
Start: 2024-11-22

## 2024-11-22 RX ORDER — GABAPENTIN 600 MG/1
600 TABLET ORAL 3 TIMES DAILY
Qty: 90 TABLET | OUTPATIENT
Start: 2024-11-22

## 2024-11-22 RX ORDER — AMLODIPINE BESYLATE 5 MG/1
5 TABLET ORAL DAILY
Qty: 90 TABLET | Refills: 1 | OUTPATIENT
Start: 2024-11-22

## 2024-11-22 RX ORDER — AMLODIPINE BESYLATE 5 MG/1
5 TABLET ORAL DAILY
Qty: 90 TABLET | Refills: 1 | Status: SHIPPED | OUTPATIENT
Start: 2024-11-22

## 2024-11-22 RX ORDER — GABAPENTIN 600 MG/1
600 TABLET ORAL 3 TIMES DAILY
Qty: 90 TABLET | Refills: 5 | Status: SHIPPED | OUTPATIENT
Start: 2024-11-22

## 2024-12-02 RX ORDER — SUMATRIPTAN SUCCINATE 100 MG/1
100 TABLET ORAL
Qty: 36 TABLET | Refills: 1 | Status: SHIPPED | OUTPATIENT
Start: 2024-12-02

## 2024-12-26 NOTE — PROGRESS NOTES
Patient ID: Magdalena Dickey is a 69 y.o. female is here today for follow-up for cervical radiculopathy.    Imaging: XR of the cervical spine completed on 12/27/2024    Subjective     The patient is here in regards to   Chief Complaint   Patient presents with    Neck Pain    Follow-up       History of Present Illness  Madelaine continues to have some posterior splenius capitis pain but her trapezius pain is relatively well-controlled.  She has run out of Voltaren gel.  She has no residual radiculopathy but she does have some bilateral carpal tunnel issues.  She has some difficulty turning to the right compared to the left and notes some additional soft tissue scar tissue near her incision on the right side.      While in the room and during my examination of the patient I wore a mask and eye protection.  I washed my hands before and after this patient encounter.  The patient was also wearing a mask.    The following portions of the patient's history were reviewed and updated as appropriate: allergies, current medications, past family history, past medical history, past social history, past surgical history and problem list.    Review of Systems   Eyes:  Negative for visual disturbance.   Gastrointestinal:  Positive for nausea. Negative for vomiting.   Musculoskeletal:  Positive for neck pain and neck stiffness.   Neurological:  Positive for dizziness, light-headedness, numbness and headaches. Negative for weakness.        Past Medical History:   Diagnosis Date    Abnormal mammogram 03/2022    Anesthesia     WAKES UP AGITATED    Anxiety     Cervical disc disorder     Cervical radiculopathy     Cervical spondylolysis     Chest pain 08/19/2022    ADMITTED TO [facility]    Chronic bilateral low back pain without sciatica     Chronic pain disorder     Colon polyps     FOLLOWED BY DR. ADIRA NYE    COPD (chronic obstructive pulmonary disease)     COVID-19 08/15/2020    SEEN AT     Delayed emergence from anesthesia 2018     Depression 05/25/2016    Diverticulitis 01/16/2018    ADMITTED TO [facility]    Diverticulitis 11/24/2017    SEEN AT [facility] ER    Diverticulitis of colon 06/2020    Dysesthesia     Dyspepsia     Extremity pain     Fecal incontinence 09/2022    GERD (gastroesophageal reflux disease)     Hair loss 12/2020    Headache, tension-type     Hiatal hernia     History of measles, mumps, or rubella     MEASLES AND MUMPS AS A CHILD    Hypertension     IBS (irritable bowel syndrome)     ILD (interstitial lung disease)     Insomnia     Joint pain     Lumbosacral disc disease     Lung nodule 11/2020    Migraine without aura and without status migrainosus, not intractable 05/25/2016    NAFLD (nonalcoholic fatty liver disease)     Neck pain     Osteoarthritis     Palpitations 04/09/2019    SEEN AT [facility] ER    Paralysis of right vocal cord     Paresthesias     PNA (pneumonia) 08/23/2020    D/T COVID, ADMITTED TO [facility]    Polyneuropathy     Post-COVID chronic dyspnea     PUD (peptic ulcer disease)     Rotator cuff tendinitis, right 05/08/2018    Spinal stenosis     Thrush 09/2020    Vestibular neuritis 09/2014       No Known Allergies    Family History   Problem Relation Age of Onset    Alzheimer's disease Mother         SDAT    Hypertension Mother     Diabetes type II Mother     Lung cancer Mother         POSSIBLE    Heart attack Mother     Cancer Mother         lung    COPD Mother     Depression Mother     Diabetes Mother     Heart disease Mother     Miscarriages / Stillbirths Mother     Alcohol abuse Father     Prostate cancer Father     Heart disease Father         THIRD DEGREE HEART BLOCK    Arthritis Father     Cancer Father         prostate    Drug abuse Father         Only alcohol    Learning disabilities Father     Cancer Sister     Ovarian cancer Sister     Cancer Sister     Ovarian cancer Sister     Arthritis Sister     Cancer Sister         ovarian    Depression Sister     Hypertension Sister     Cancer Sister         ovarian     Depression Sister     Hypertension Sister     COPD Sister     No Known Problems Brother     Arthritis Maternal Grandmother         rheumatoid    Breast cancer Other     Malig Hyperthermia Neg Hx        Social History     Socioeconomic History    Marital status:      Spouse name: Lonnie    Number of children: 3   Tobacco Use    Smoking status: Former     Current packs/day: 0.00     Average packs/day: 1 pack/day for 40.0 years (40.0 ttl pk-yrs)     Types: Cigarettes     Start date: 3/10/1974     Quit date: 3/10/2014     Years since quitting: 10.8     Passive exposure: Past    Smokeless tobacco: Never   Vaping Use    Vaping status: Former   Substance and Sexual Activity    Alcohol use: Yes     Comment: occasional, maybe 1 can of beer every other month    Drug use: No    Sexual activity: Not Currently     Partners: Male     Birth control/protection: Hysterectomy     Comment: .       Past Surgical History:   Procedure Laterality Date    ANTERIOR CERVICAL DISCECTOMY W/ FUSION N/A 06/21/2018    Procedure: C6 7 anterior cervical discectomy and fusion with allograft and plate;  Surgeon: Jacobo De Dios MD;  Location: [facility] OR;  Service: Neurosurgery    ANTERIOR CERVICAL DISCECTOMY W/ FUSION Right 10/7/2024    Procedure: CERVICAL FIVE TO SIX DISCECTOMY ANTERIOR WITH FUSION, HARDWARE REMOVAL;  Surgeon: Finn Ojeda MD;  Location: [facility] OR;  Service: Neurosurgery;  Laterality: Right;    CERVICAL EPIDURAL N/A 6/5/2024    Procedure: C7-T1 CERVICAL EPIDURAL STEROID INJECTION CPT: 69924;  Surgeon: Sarah Bernal MD;  Location: [facility] MA[facility] OR;  Service: Pain Management;  Laterality: N/A;    CHOLECYSTECTOMY N/A 1980    AT [place]    COLON RESECTION N/A 01/16/2018    Procedure: LAPAROSCOPIC SIGMOID COLON RESECTION WITH MOBILIZATION OF SPLENIC FLEXURE;  Surgeon: Eric Davidson MD;  Location: [facility] OR;  Service:     COLONOSCOPY N/A 02/15/2017    6 MM TUBULAR ADENOMA POLYP IN HEPATIC FLEXURE, 5 MM  TUBULAR ADENOAM POLYP IN RECTUM, DR. GERARDO NYE AT [facility]    COLONOSCOPY N/A 03/01/2003    Internal hemorrhoids-Dr. Gerardo Nye    COLONOSCOPY N/A 02/08/2022    4 MM TUBULAR ADENOMA POLYP IN ASCENDING, HEMORRHOIDS, DR. GERARDO NYE AT [facility]    DIAGNOSTIC LAPAROSCOPY N/A 06/06/2001    Cul-de-sac endometriosis and adhesions-Dr. Aamir Christine    ENDOSCOPY N/A 02/15/2017    SMALL HIATAL HERNIA, GASTRITIS, DR. GERARDO NYE AT [facility]    ENDOSCOPY N/A 02/08/2022    GASTRITIS, MILD ESOPHAGITIS, Z LINE IRREGULAR, DR. GERARDO NYE AT [facility]    ENDOSCOPY N/A 09/12/2014    GASTRITIS, DR. GERARDO NYE AT [facility]    ENDOSCOPY AND COLONOSCOPY N/A 02/04/2009    SMALL HIATAL HERNIA, ESOPHAGITIS, HEMORRHOIDS, DIVERTICULOSIS, TORTUOUS COLON, DR. GERARDO NYE AT [facility]    EPIDURAL Bilateral 12/06/2023    Procedure: BILATERAL L4 TRANSFORAMINAL LUMBAR EPIDURAL STEROID INJECTION CPT: 48293, 59915;  Surgeon: Sarah Bernal MD;  Location: [facility];  Service: Pain Management;  Laterality: Bilateral;    EPIDURAL BLOCK      LUMBAR EPIDURAL INJECTION N/A 09/20/2023    Procedure: LUMBAR EPIDURAL 1ST VISIT L4-5 36878;  Surgeon: Sarah Bernal MD;  Location: [facility];  Service: Pain Management;  Laterality: N/A;    MEDIAL BRANCH BLOCK Bilateral 02/26/2024    Procedure: BILATERAL L3-L5 LUMBAR MEDIAL BRANCH BLOCK CPT: 32235, 79462;  Surgeon: Sarah Bernal MD;  Location: [facility];  Service: Pain Management;  Laterality: Bilateral;    MEDIAL BRANCH BLOCK Bilateral 3/11/2024    Procedure: BILATERAL L3-L5 LUMBAR MEDIAL BRANCH BLOCK CPT: 30959, 861575;  Surgeon: Sarah Bernal MD;  Location: [facility];  Service: Pain Management;  Laterality: Bilateral;    NECK SURGERY      RADIOFREQUENCY ABLATION Bilateral 4/15/2024    Procedure: BILATERAL L3-L5 RADIOFREQUENCY ABLATION LUMBAR CPT: 76411, 90774;  Surgeon: Sarah Bernal MD;  Location: [facility];  Service: Pain Management;  Laterality: Bilateral;    SKIN BIOPSY Left 07/08/2022    LEFT CALF,  PATH: SMALL FIBER NEUROPATHY    SPINAL FUSION      TOTAL ABDOMINAL HYSTERECTOMY WITH SALPINGO OOPHORECTOMY Bilateral 07/31/2001    Dr. Aamir Christine AT [facility]         Objective     Vitals:    12/27/24 0937   BP: 124/76   Pulse: 84   Resp: 16   Temp: 97.1 °F (36.2 °C)   SpO2: 95%     Body mass index is 30.46 kg/m².    Physical Exam  Constitutional:       General: She is awake.      Appearance: Normal appearance.   HENT:      Head: Normocephalic and atraumatic.   Eyes:      General: Lids are normal.      Extraocular Movements: Extraocular movements intact.      Conjunctiva/sclera: Conjunctivae normal.      Pupils: Pupils are equal, round, and reactive to light.   Cardiovascular:      Rate and Rhythm: Normal rate and regular rhythm.      Pulses: Normal pulses.   Pulmonary:      Breath sounds: Normal breath sounds.   Abdominal:      Palpations: Abdomen is soft.   Musculoskeletal:         General: Normal range of motion.      Cervical back: Normal range of motion and neck supple.   Skin:     General: Skin is warm and dry.   Neurological:      Mental Status: She is alert and oriented to person, place, and time.      Motor: Motor function is intact. No weakness or atrophy.      Coordination: Coordination is intact. Romberg sign negative.      Gait: Gait is intact. Gait normal.      Deep Tendon Reflexes: Reflexes are normal and symmetric.      Reflex Scores:       Tricep reflexes are 2+ on the right side and 2+ on the left side.       Bicep reflexes are 2+ on the right side and 2+ on the left side.       Brachioradialis reflexes are 2+ on the right side and 2+ on the left side.       Patellar reflexes are 2+ on the right side and 2+ on the left side.       Achilles reflexes are 2+ on the right side and 2+ on the left side.        Neurological Exam  Mental Status  Awake and alert. Oriented to person, place and time. Oriented to person, place, and time.    Cranial Nerves  CN II: Visual acuity is normal. Visual fields full to  confrontation.  CN III, IV, VI: Extraocular movements intact bilaterally. Normal lids and orbits bilaterally. Pupils equal round and reactive to light bilaterally.  CN V: Facial sensation is normal.  CN VII: Full and symmetric facial movement.  CN IX, X: Palate elevates symmetrically. Normal gag reflex.  CN XI: Shoulder shrug strength is normal.  CN XII: Tongue midline without atrophy or fasciculations.    Motor                                               Right                     Left  Deltoid                                   5                          5   Biceps                                   5                          5   Iliopsoas                               5                          5   Quadriceps                           5                          5   Hamstring                             5                          5   Gastrocnemius                     5                           5   Anterior tibialis                      5                          5    Sensory  Sensation is intact to light touch, pinprick, vibration and proprioception in all four extremities.    Reflexes  Deep tendon reflexes are 2+ and symmetric in all four extremities.                                            Right                      Left  Brachioradialis                    2+                         2+  Biceps                                 2+                         2+  Triceps                                2+                         2+  Patellar                                2+                         2+  Achilles                                2+                         2+    Coordination    Finger-to-nose, rapid alternating movements and heel-to-shin normal bilaterally without dysmetria.    Gait   Normal gait. Normal gait. Romberg is absent.      Assessment & Plan   Independent Review of Radiographic Studies:      I personally reviewed the images from the following studies.    INDICATION: Follow up from  fusion    FINDINGS:    XR: XR of the cervical spine was reviewed and shows no evidence of any hardware failure.  There appears to be some early fusion across the implant compared to previous x-ray.  Stable alignment.    Assessment/Plan: X-ray looks good.  No further activity restrictions she appears to be fusing well.  I would recommend continue with Voltaren gel and massage at home of the splenius capitis and trapezius muscles.    Medical Decision Making:      X-ray cervical spine         Diagnoses and all orders for this visit:    1. Cervical radiculopathy (Primary)  -     XR spine cervical 2 or 3 vw; Future    Other orders  -     Diclofenac Sodium (Voltaren Arthritis Pain) 1 % gel gel; Apply 4 g topically to the appropriate area as directed 4 (Four) Times a Day As Needed (Apply to the back of the neck when sore).  Dispense: 350 g; Refill: 5             Patient Instructions/Recommendations:    Follow-up in 3 months with x-ray      Finn Ojeda MD  12/27/24  09:56 EST

## 2024-12-27 ENCOUNTER — OFFICE VISIT (OUTPATIENT)
Dept: NEUROSURGERY | Facility: CLINIC | Age: 69
End: 2024-12-27
Payer: MEDICARE

## 2024-12-27 VITALS
WEIGHT: 188.7 LBS | BODY MASS INDEX: 30.33 KG/M2 | HEIGHT: 66 IN | RESPIRATION RATE: 16 BRPM | HEART RATE: 84 BPM | DIASTOLIC BLOOD PRESSURE: 76 MMHG | SYSTOLIC BLOOD PRESSURE: 124 MMHG | OXYGEN SATURATION: 95 % | TEMPERATURE: 97.1 F

## 2024-12-27 DIAGNOSIS — M54.12 CERVICAL RADICULOPATHY: Primary | ICD-10-CM

## 2024-12-27 RX ORDER — OMEPRAZOLE 40 MG/1
CAPSULE, DELAYED RELEASE ORAL
COMMUNITY
Start: 2024-12-01 | End: 2024-12-27

## 2024-12-27 RX ORDER — LOSARTAN POTASSIUM 50 MG/1
1 TABLET ORAL DAILY
COMMUNITY
Start: 2024-12-16

## 2024-12-31 ENCOUNTER — OFFICE (OUTPATIENT)
Dept: URBAN - METROPOLITAN AREA CLINIC 76 | Facility: CLINIC | Age: 69
End: 2024-12-31

## 2024-12-31 ENCOUNTER — OFFICE (OUTPATIENT)
Dept: URBAN - METROPOLITAN AREA CLINIC 76 | Facility: CLINIC | Age: 69
End: 2024-12-31
Payer: MEDICARE

## 2024-12-31 DIAGNOSIS — K21.9 GASTRO-ESOPHAGEAL REFLUX DISEASE WITHOUT ESOPHAGITIS: ICD-10-CM

## 2024-12-31 DIAGNOSIS — K58.9 IRRITABLE BOWEL SYNDROME, UNSPECIFIED: ICD-10-CM

## 2024-12-31 PROCEDURE — 99490 CHRNC CARE MGMT STAFF 1ST 20: CPT | Performed by: INTERNAL MEDICINE

## 2025-01-17 ENCOUNTER — PREP FOR SURGERY (OUTPATIENT)
Dept: SURGERY | Facility: SURGERY CENTER | Age: 70
End: 2025-01-17
Payer: MEDICARE

## 2025-01-17 ENCOUNTER — OFFICE VISIT (OUTPATIENT)
Dept: PAIN MEDICINE | Facility: CLINIC | Age: 70
End: 2025-01-17
Payer: MEDICARE

## 2025-01-17 VITALS
WEIGHT: 187.8 LBS | TEMPERATURE: 96.9 F | HEIGHT: 66 IN | OXYGEN SATURATION: 95 % | SYSTOLIC BLOOD PRESSURE: 141 MMHG | BODY MASS INDEX: 30.18 KG/M2 | HEART RATE: 95 BPM | DIASTOLIC BLOOD PRESSURE: 84 MMHG | RESPIRATION RATE: 20 BRPM

## 2025-01-17 DIAGNOSIS — M54.2 NECK PAIN: ICD-10-CM

## 2025-01-17 DIAGNOSIS — M47.816 LUMBAR FACET ARTHROPATHY: Primary | ICD-10-CM

## 2025-01-17 DIAGNOSIS — M51.360 DEGENERATION OF INTERVERTEBRAL DISC OF LUMBAR REGION WITH DISCOGENIC BACK PAIN: ICD-10-CM

## 2025-01-17 PROCEDURE — 3079F DIAST BP 80-89 MM HG: CPT

## 2025-01-17 PROCEDURE — 99214 OFFICE O/P EST MOD 30 MIN: CPT

## 2025-01-17 PROCEDURE — 1125F AMNT PAIN NOTED PAIN PRSNT: CPT

## 2025-01-17 PROCEDURE — 1159F MED LIST DOCD IN RCRD: CPT

## 2025-01-17 PROCEDURE — 3077F SYST BP >= 140 MM HG: CPT

## 2025-01-17 PROCEDURE — 1160F RVW MEDS BY RX/DR IN RCRD: CPT

## 2025-01-17 RX ORDER — SODIUM CHLORIDE 0.9 % (FLUSH) 0.9 %
10 SYRINGE (ML) INJECTION AS NEEDED
OUTPATIENT
Start: 2025-01-17

## 2025-01-17 RX ORDER — SODIUM CHLORIDE 0.9 % (FLUSH) 0.9 %
10 SYRINGE (ML) INJECTION EVERY 12 HOURS SCHEDULED
OUTPATIENT
Start: 2025-01-17

## 2025-01-17 NOTE — PROGRESS NOTES
CHIEF COMPLAINT  Back and neck pain    Subjective   Magdalena Dickey is a 69 y.o. female  who presents for follow-up.  She has a history of chronic low back and neck pain. She .    Today pain is 2/10VAS in severity (severity in pain can increase to 8/10 VAS with activity). Her main complaint today is axial low back pain that radiates across her low back pain a bandlike pattern. Pain intermittent radiates into bilateral buttocks but she denies radicular leg pain. Pain is worsened by prolonged sitting or standing, walking long distances, and increased physical activity. Pain improves with rest/reposition, medications, and procedures. Neck pain has remained consistent since her last office visit. Describes this pain as an intermittent aching and burning. She has completed PT in the past. No relief with use of a TENS unit.      Continues with Gabapentin 600mg TID, Baclofen 10mg BID PRN, Cymbalta 60mg, Trazodone 50mg PRN, and Xanax 0.25mg PRN for anxiety. The medications are managed by her PCP. She also utilizes OTC Tylenol Arthritis as needed.     She underwent a C5-C6 ACDF performed by Dr. Ojeda on 10/7/2024.  Reviewed office note from him from 12/27/2024.  He notes she continues to have some posterior pain but otherwise her pain is relatively well-controlled.  She is out of Voltaren gel.  No residual radiculopathy but has issues with carpal tunnel.  He recommended she continue with Voltaren gel.  No activity restrictions.  He previously noted that if no relief to her pain could prescribe a facet block and recommended massage therapy.     Procedures:  6/5/24 - C7-T1 ARI - 50% ongoing relief  4/15/24 - Bilateral L3-L5  RFA - 70% relief x 6-7 months   3/11/24 - Bilateral L3-L5 MBB - 100% relief for several hours  2/26/24 - Bilateral L3-L5 MBB - 100% for several hours   12/6/23 - Bilateral L4 TF LESI - 50% relief  9/20/23 - L4/L5 LESI - 50% relief x 1 month     Surgical History:  10/7/24 - C5-C6 ACDF - Dr. Ojeda  ACDF -  Dr. De Dios    Back Pain  This is a chronic problem. The current episode started more than 1 year ago. The problem occurs intermittently. The problem has been worse (gradually worsening) since onset. The pain is present in the lumbar spine and sacro-iliac. The quality of the pain is described as aching and burning. The pain does not radiate (discomfort to bilateral feet). The pain is at a severity of 2/10 (severity in pain can increase to 8/10 VAS with activity). The pain is mild. The symptoms are aggravated by position, standing, sitting and bending. Pertinent negatives include no abdominal pain, chest pain, dysuria, fever, headaches, numbness or weakness. She has tried muscle relaxant and bed rest (Tylenol, Gabapentin, TENS unit (no relief), PT) for the symptoms. The treatment provided mild relief.   Neck Pain   This is a chronic problem. The current episode started more than 1 year ago. The problem occurs constantly. The problem has been waxing and waning. The pain is associated with an unknown factor. The pain is present in the left side, midline and right side (radaites into bilateral shoulders). The quality of the pain is described as aching. The pain is at a severity of 4/10. The pain is mild. The symptoms are aggravated by position, twisting, bending and stress. Pertinent negatives include no chest pain, fever, headaches, numbness or weakness. She has tried oral narcotics, heat and ice (Gabapentin, Cymbalta) for the symptoms.     PEG Assessment   What number best describes your pain on average in the past week?3  What number best describes how, during the past week, pain has interfered with your enjoyment of life?8  What number best describes how, during the past week, pain has interfered with your general activity?  8    Review of Pertinent Medical Data ---      The following portions of the patient's history were reviewed and updated as appropriate: allergies, current medications, past family history, past  "medical history, past social history, past surgical history, and problem list.    Review of Systems   Constitutional:  Negative for activity change, fatigue and fever.   Respiratory:  Negative for cough and chest tightness.    Cardiovascular:  Negative for chest pain.   Gastrointestinal:  Negative for abdominal pain, constipation and diarrhea.   Genitourinary:  Negative for dysuria.   Musculoskeletal:  Positive for back pain.   Neurological:  Negative for dizziness, weakness, light-headedness, numbness and headaches.   Psychiatric/Behavioral:  Negative for agitation, sleep disturbance and suicidal ideas. The patient is not nervous/anxious.      I have reviewed and confirmed the accuracy of the ROS as documented by the MA/LPN/RN Kathe Frazier, APRN    Vitals:    01/17/25 1001   BP: 141/84   BP Location: Left arm   Patient Position: Sitting   Cuff Size: Large Adult   Pulse: 95   Resp: 20   Temp: 96.9 °F (36.1 °C)   TempSrc: Temporal   SpO2: 95%   Weight: 85.2 kg (187 lb 12.8 oz)   Height: 167.6 cm (66\")   PainSc:   2     Objective   Physical Exam  Constitutional:       Appearance: Normal appearance.   HENT:      Head: Normocephalic.   Cardiovascular:      Rate and Rhythm: Normal rate and regular rhythm.   Pulmonary:      Effort: Pulmonary effort is normal.      Breath sounds: Normal breath sounds.   Musculoskeletal:      Right wrist: Swelling and tenderness present.      Right hand: Swelling (right thumb) and tenderness present.      Cervical back: Normal range of motion. Tenderness present. Decreased range of motion.      Lumbar back: Tenderness and bony tenderness present. Decreased range of motion. Negative right straight leg raise test and negative left straight leg raise test.      Comments: + lumbar facet loading/tenderness    Skin:     General: Skin is warm and dry.      Capillary Refill: Capillary refill takes less than 2 seconds.   Neurological:      General: No focal deficit present.      Mental Status: " "She is alert and oriented to person, place, and time.   Psychiatric:         Mood and Affect: Mood normal.         Behavior: Behavior normal.         Thought Content: Thought content normal.         Cognition and Memory: Cognition normal.       Assessment & Plan   Diagnoses and all orders for this visit:    1. Lumbar facet arthropathy (Primary)    2. Degeneration of intervertebral disc of lumbar region with discogenic back pain    3. Neck pain      Magdalena Dickey reports a pain score of 2.  Given her pain assessment as noted, treatment options were discussed and the following options were decided upon as a follow-up plan to address the patient's pain: continuation of current treatment plan for pain.    --- Repeat Bilateral L3-L5 RFA  -------  Education about Medial Branch Blockade and RF Therapy:  This medial branch blockade (MBB) suggested is intended for diagnostic purposes, with the intent of offering the patient Radiofrequency thermal rhizotomy (RF) if the MBB is diagnostically effective.  The diagnostic blockade is necessary to determine the likelihood that RF therapy could be efficacious in providing long term relief to the patient.    Medial branches are sensory nerve branches that connect to a facet joint and transmit sensations & pain signals from that joint.  Facet is a term for the type of joints found in the spine.  Medial branches are the nerves that go to a facet, and therefore are also sometimes called \"facet joint nerves\" (FJNs).      In a medial branch blockade procedure, xray fluoroscopy is used to verify the locations of the outside of the joint lines which are being targeted.  Under xray guidance, needles are placed to these areas.  Contrast dye is injected to confirm proper placement, with dye flowing over the joint area, and to ensure that the dye does not flow into unintended areas such as a vein.  When this is confirmed, local anesthetic is injected to block the medial branch at that joint " level.      If MBBs are diagnostically successful in blocking pain, then the patient is most likely a great candidate for Radiofrequency of those facet joint nerves.  In the RF procedure, needles are placed to the joint lines in the same fashion, and after testing, the needle tips are heated to thermally treat the nerves, blocking the nerves by in essence damaging the nerves with the heat treatment(non-pulsed).       Medically, a successful RF procedure should provide a patient with 50% pain relief or more for at least 6 months.  Clinical experience suggests that successful patients receive relief more in the range of 12 months on average.  We also discussed that a fortunate minority of patients receive therapeutic success from the MBB, and may not require RF ablation.  If a patient receives more than 8 weeks of relief from MBB, then occasional repeat MBB for therapeutic purposes is a very reasonable alternative therapy.  This course of therapy is consistent with our LCDs according to our CMS  in the area, and therefore other insurance providers should follow accordingly.  We will monitor our patients to screen for these therapeutic responders and will offer RF therapy only when necessary.      We discussed that MBB & RF are not without risks.  Guidelines regarding anticoagulant use & neuraxial procedures will be respected.  Patients that are ill or otherwise may be at risk for sepsis will not have their spines accessed by neuraxial injections of any type.  This patient will not be offered these therapies if there is an increased risk.   We discussed that there is a risk of postprocedural pain and also a risk of worsening of clinical picture with these procedures as with any neuraxial procedure.    -------  Discussed with the patient that sedation is optional for this procedure.  The sedation offered is called conscious sedation which is different from general anesthesia that is utilized in surgical  procedures. The dosing of the sedation is determined by the physician and they will be monitored throughout the procedure. With conscious sedation it is possible to remember parts or all of the procedure, this is normal. They will need to have a  with them as driving is prohibited following conscious sedation.      NPO instructions for conscious sedation:  --- Do not eat 6 hours prior to the procedure.   --- Do not drink any dairy or citrus 4 hours prior to the procedure.   --- Do not drink anything, including clear liquids, 2 hours prior to procedure.      If the NPO instructions are not followed then the procedure may be performed without sedation or the procedure will need to be rescheduled.    --- If no improvement to her neck pain, will discuss interventional options at her next office visit.   --- Follow-up for procedure     Thermal Radiofrequency Destruction  This repeat thermal radiofrequency destruction (RFA) of cervical, thoracic, or lumbar paravertebral facet joint (medial branch) nerves at the same anatomical site is considered medically reasonable and necessary as this patient has met the criteria of having a minimum of consistent 50% improvement in pain for at least six (6) months or at least 50% consistent improvement in the ability to perform previously painful movements and ADLs as compared to baseline measurement using the same scale.        Pain / Disability Scale  The scale used for measurement of pain and/or disability for this patient was the [place] back pain disability scale.  The score was 43 on 01/17/2025    MAXIME REPORT  As the clinician, I personally reviewed the MAXIME from 1/17/25 while the patient was in the office today.    Dictated utilizing Dragon dictation.

## 2025-01-20 ENCOUNTER — TRANSCRIBE ORDERS (OUTPATIENT)
Dept: SURGERY | Facility: SURGERY CENTER | Age: 70
End: 2025-01-20
Payer: MEDICARE

## 2025-01-20 DIAGNOSIS — Z41.9 SURGERY, ELECTIVE: Primary | ICD-10-CM

## 2025-01-24 RX ORDER — MIDAZOLAM HYDROCHLORIDE 1 MG/ML
1 INJECTION, SOLUTION INTRAMUSCULAR; INTRAVENOUS ONCE
Status: COMPLETED | OUTPATIENT
Start: 2025-01-24 | End: 2025-01-27

## 2025-01-24 RX ORDER — FENTANYL CITRATE 50 UG/ML
50 INJECTION, SOLUTION INTRAMUSCULAR; INTRAVENOUS ONCE
Status: COMPLETED | OUTPATIENT
Start: 2025-01-24 | End: 2025-01-27

## 2025-01-24 NOTE — DISCHARGE INSTRUCTIONS
[facility] - Post-procedure Instructions          --  While there are no absolute restrictions, it is recommended that you do not perform strenuous activity today. In the morning, you may resume your level of activity as before your block.    --  If you have a band-aid at your injection site, please remove it later today. Observe the area for any redness, swelling, pus-like drainage, or a temperature over 101°. If any of these symptoms occur, please call your doctor at [phone]. If after office hours, leave a message and the on-call provider will return your call.    --  Ice may be applied to your injection site. It is recommended you avoid direct heat (heating pad; hot tub) for 1-2 days.    --  Call [facility] at [phone] if you experience persistent headache, persistent bleeding from the injection site, or severe pain not relieved by heat or oral medication.    --  Do not make important decisions today.    --  Due to the effects of the block and/or the I.V. Sedation, DO NOT drive or operate hazardous machinery for 12 hours.  Local anesthetics may cause numbness after procedure and precautions must be taken with regards to operating equipment as well as with walking, even if ambulating with assistance of another person or with an assistive device.    --  Do not drink alcohol for 12 hours.    -- You may return to work tomorrow, or as directed by your referring doctor.    --  Occasionally you may notice a slight increase in your pain after the procedure. This should start to improve within the next 24-48 hours. Radiofrequency ablation procedure pain may last 3-4 weeks.    --  It may take as long as 3-4 days before you notice a gradual improvement in your pain and/or other symptoms.    -- You may continue to take your prescribed pain medication as needed.    --  Some normal possible side effects of steroid use could include fluid retention, increased blood sugar, dull headache,  "increased sweating, increased appetite, mood swings and flushing.    --  Diabetics are recommended to watch their blood glucose level closely for 24-48 hours after the injection.    --  Must stay in PACU for 20 min upon arrival and prove no leg weakness before being discharged.    --  IN THE EVENT OF A LIFE THREATENING EMERGENCY, (CHEST PAIN, BREATHING DIFFICULTIES, PARALYSIS…) YOU SHOULD GO TO YOUR NEAREST EMERGENCY ROOM.    --  You should be contacted by our office within 2-3 days to schedule follow up or next appointment date.  If not contacted within 7 days, please call the office at [phone]     Radiofrequency ablation (RFA):     - Radiofrequency ablation is a term used to describe cauterization or \"burning.\"   - In pain management, we can use this technique with a special needle to target and destroy areas that are causing your pain.   - In most cases, you must have TWO successful \"test injections\" before you are a candidate for RFA.    After your RFA:   - Because heat is used in this technique, it is common to have soreness after the procedure.  Sometimes \"neuritis\" occurs, which feels like tingling, prickly, or sunburn under the skin sensations.   - Ice packs are helpful in decreasing this soreness and preventing post-procedure \"neuritis\" pain.  Use an ice pack for 20 minutes at a time at least 3 times the day of and the day after your procedure.   - It is common to have arm/leg numbness or weakness the day of your procedure, but this should wear off by the following day.   - It may take up to 6 weeks to gain full benefit from this procedure.   "

## 2025-01-27 ENCOUNTER — HOSPITAL ENCOUNTER (OUTPATIENT)
Dept: GENERAL RADIOLOGY | Facility: SURGERY CENTER | Age: 70
Setting detail: HOSPITAL OUTPATIENT SURGERY
End: 2025-01-27
Payer: MEDICARE

## 2025-01-27 ENCOUNTER — HOSPITAL ENCOUNTER (OUTPATIENT)
Facility: SURGERY CENTER | Age: 70
Setting detail: HOSPITAL OUTPATIENT SURGERY
Discharge: HOME | End: 2025-01-27
Attending: ANESTHESIOLOGY | Admitting: ANESTHESIOLOGY
Payer: MEDICARE

## 2025-01-27 VITALS
HEIGHT: 66 IN | DIASTOLIC BLOOD PRESSURE: 79 MMHG | WEIGHT: 187 LBS | HEART RATE: 75 BPM | BODY MASS INDEX: 30.05 KG/M2 | TEMPERATURE: 98.7 F | OXYGEN SATURATION: 96 % | RESPIRATION RATE: 16 BRPM | SYSTOLIC BLOOD PRESSURE: 134 MMHG

## 2025-01-27 DIAGNOSIS — M48.02 CERVICAL SPINAL STENOSIS: Primary | ICD-10-CM

## 2025-01-27 DIAGNOSIS — M47.816 LUMBAR FACET ARTHROPATHY: ICD-10-CM

## 2025-01-27 DIAGNOSIS — Z41.9 SURGERY, ELECTIVE: ICD-10-CM

## 2025-01-27 DIAGNOSIS — M51.360 DEGENERATION OF INTERVERTEBRAL DISC OF LUMBAR REGION WITH DISCOGENIC BACK PAIN: ICD-10-CM

## 2025-01-27 PROCEDURE — 25010000002 LIDOCAINE PF 2% 2 % SOLUTION 5 ML VIAL: Performed by: ANESTHESIOLOGY

## 2025-01-27 PROCEDURE — 64635 DESTROY LUMB/SAC FACET JNT: CPT | Performed by: ANESTHESIOLOGY

## 2025-01-27 PROCEDURE — 25010000002 MIDAZOLAM PER 1MG: Performed by: ANESTHESIOLOGY

## 2025-01-27 PROCEDURE — 25010000002 FENTANYL CITRATE (PF) 50 MCG/ML SOLUTION: Performed by: ANESTHESIOLOGY

## 2025-01-27 PROCEDURE — 64636 DESTROY L/S FACET JNT ADDL: CPT | Performed by: ANESTHESIOLOGY

## 2025-01-27 PROCEDURE — 25010000002 LIDOCAINE PF 1% 1 % SOLUTION 5 ML VIAL: Performed by: ANESTHESIOLOGY

## 2025-01-27 PROCEDURE — 25010000002 BUPIVACAINE (PF) 0.25 % SOLUTION 10 ML VIAL: Performed by: ANESTHESIOLOGY

## 2025-01-27 PROCEDURE — 76000 FLUOROSCOPY <1 HR PHYS/QHP: CPT

## 2025-01-27 PROCEDURE — 99152 MOD SED SAME PHYS/QHP 5/>YRS: CPT | Performed by: ANESTHESIOLOGY

## 2025-01-27 RX ORDER — SODIUM CHLORIDE 0.9 % (FLUSH) 0.9 %
10 SYRINGE (ML) INJECTION AS NEEDED
Status: DISCONTINUED | OUTPATIENT
Start: 2025-01-27 | End: 2025-01-27 | Stop reason: HOSPADM

## 2025-01-27 RX ORDER — SODIUM CHLORIDE 0.9 % (FLUSH) 0.9 %
10 SYRINGE (ML) INJECTION EVERY 12 HOURS SCHEDULED
Status: DISCONTINUED | OUTPATIENT
Start: 2025-01-27 | End: 2025-01-27 | Stop reason: HOSPADM

## 2025-01-27 RX ADMIN — FENTANYL CITRATE 50 MCG: 50 INJECTION INTRAMUSCULAR; INTRAVENOUS at 08:08

## 2025-01-27 RX ADMIN — MIDAZOLAM HYDROCHLORIDE 1 MG: 2 INJECTION, SOLUTION INTRAMUSCULAR; INTRAVENOUS at 08:07

## 2025-01-27 NOTE — OP NOTE
Radiofrequency ablation of Bilateral L3-L5  [facility]    PREOPERATIVE DIAGNOSIS:  Lumbar spondylosis without myelopathy   POSTOPERATIVE DIAGNOSIS:  Lumbar spondylosis without myelopathy     PROCEDURES PERFORMED:   Bilateral  lumbar radiofrequency ablation of the medial branches at the transverse processes of L3, L4, L5 to thermally treat these facet joints.  1.  46552 -50 Lumbar radiofrequency ablation 1st level.  2.  32490 -50 Lumbar radiofrequency ablation 2nd level.    INFORMED CONSENT:  In preprocedure discussion with the patient, the risks and complications were discussed prior to starting the procedure and informed consent was obtained.     SURGEON:   Sarah Bernal MD    INDICATIONS:  The patient presents with chronic lower back pain. The patient underwent 2 lumbar medial branch blocks with diagnostically positive relief. Given the patient’s significant pain relief, it is diagnostic that we have likely found the source of the patient’s pain; therefore, lumbar radiofrequency ablation has been indicated.     SEDATION:  Anxiolysis was used for this procedure which included Versed 1mg & Fentanyl 50mcg    TIME OF PROCEDURE:  The Interoperative procedure time, after administration of the IV sedative, was 2347-4360 minutes.    ANESTHETIC:  Lidocaine 1% for skin infiltration, 2% lidocaine and 0.25% bupivacaine for injection.    STEROID:  None.    DESCRIPTION OF PROCEDURE:  After obtaining informed consent an IV was  started in the preoperative area. The patient was taken to the operating room. The patient was placed in prone position. All pressure points were padded. Heart rate, blood pressure, and pulse oximetry were monitored. The patient was  sedated. The lumbosacral area was prepped with ChloraPrep and draped in a sterile fashion.     The junction of the right S1 superior articular process and sacral ala was identified in a AP fluoroscopic view.  The image was then obliqued towards the right  side to maximize visualization of the junctions of the L3, L4, L5 superior articular processes with the transverse processes.  The skin and subcutaneous tissue inferior to the junction was anesthetized with 1% lidocaine. A 20-gauge 150mm RF Abbott needle was then advanced percutaneously through the anesthetized skin tract under fluoroscopic guidance until the non-insulated portion of the needle lie at the junction. All needle tips were confirmed to be posterior to the neural foramen in the lateral fluoroscopic view. Sensory stimulation was then performed with good stimulation of the back at 0.5V or less at each level. Motor stimulation was performed up to 1.5V at each level producing stimulation of the multifidus muscles of the back and no stimulation of the lower extremity at any level. Each level was then anesthetized with 2% lidocaine prior to treatment with pulsed radiofrequency thermocoagulation at 42 degrees Celsius. Each level was then treated with thermal radiofrequency thermocoagulation at 80 degrees Celsius for 60 seconds in two separate cycles.  Prior to the removal of each needle, a volume of 1 mL of injectate was administered at each site.  The total volume consisted of 1mL of 2% lidocaine and 1mL of 0.25% bupivacaine.    The same procedure was then performed on the contralateral side in the exact same fashion.      ESTIMATED BLOOD LOSS:  Minimal.    SPECIMENS:  None.    COMPLICATIONS:  None.    TOLERANCE AND DISCHARGE:  The patient tolerated the procedure well. The patient was transported to the recovery area without difficulties. The patient was discharged home under the care of family in stable and satisfactory condition.    PLAN:  1.  The patient was given our standard instruction sheet.  2.  The patient will resume all medications per the medication reconciliation sheet.  3.  The patient will Return to clinic 4-6 wks.

## 2025-01-30 DIAGNOSIS — M47.22 SPONDYLOSIS OF CERVICAL SPINE WITH RADICULOPATHY: ICD-10-CM

## 2025-01-30 RX ORDER — DULOXETIN HYDROCHLORIDE 60 MG/1
60 CAPSULE, DELAYED RELEASE ORAL DAILY
Qty: 90 CAPSULE | Refills: 0 | Status: SHIPPED | OUTPATIENT
Start: 2025-01-30

## 2025-02-10 ENCOUNTER — HOSPITAL ENCOUNTER (OUTPATIENT)
Dept: MRI IMAGING | Facility: HOSPITAL | Age: 70
Discharge: HOME | End: 2025-02-10
Admitting: NEUROLOGICAL SURGERY
Payer: MEDICARE

## 2025-02-10 DIAGNOSIS — M48.02 CERVICAL SPINAL STENOSIS: ICD-10-CM

## 2025-02-10 PROCEDURE — 72141 MRI NECK SPINE W/O DYE: CPT

## 2025-02-22 ENCOUNTER — APPOINTMENT (OUTPATIENT)
Dept: GENERAL RADIOLOGY | Facility: HOSPITAL | Age: 70
End: 2025-02-22
Payer: MEDICARE

## 2025-02-22 ENCOUNTER — HOSPITAL ENCOUNTER (EMERGENCY)
Facility: HOSPITAL | Age: 70
Discharge: HOME | End: 2025-02-22
Attending: EMERGENCY MEDICINE
Payer: MEDICARE

## 2025-02-22 VITALS
HEART RATE: 72 BPM | BODY MASS INDEX: 30.22 KG/M2 | RESPIRATION RATE: 18 BRPM | DIASTOLIC BLOOD PRESSURE: 81 MMHG | SYSTOLIC BLOOD PRESSURE: 124 MMHG | TEMPERATURE: 98.3 F | HEIGHT: 66 IN | OXYGEN SATURATION: 95 % | WEIGHT: 188 LBS
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
## 2025-02-22 DIAGNOSIS — S63.502A SPRAIN OF LEFT WRIST, INITIAL ENCOUNTER: Primary | ICD-10-CM

## 2025-02-22 PROCEDURE — 99283 EMERGENCY DEPT VISIT LOW MDM: CPT

## 2025-02-22 PROCEDURE — 73110 X-RAY EXAM OF WRIST: CPT

## 2025-02-22 RX ORDER — IBUPROFEN 800 MG/1
800 TABLET, FILM COATED ORAL EVERY 8 HOURS PRN
Qty: 30 TABLET | Refills: 0 | Status: SHIPPED | OUTPATIENT
Start: 2025-02-22

## 2025-02-22 NOTE — ED PROVIDER NOTES
"SHARED VISIT: This visit was performed by BOTH a physician and an APC. The substantive portion of the medical decision making was performed by this attesting physician who made or approved the management plan and takes responsibility for patient management. All studies in the APC note (if performed) were independently interpreted by me.     The GUY and I have discussed this patient's history, physical exam, and treatment plan.  I have reviewed the documentation and personally had a face to face interaction with the patient. I affirm the documentation and agree with the treatment and plan.  The attached note describes my personal findings.      I provided a substantive portion of the care of the patient.  I personally performed the physical exam in its entirety, and below are my findings.     Brief history of present illness: 69-year-old female who is right-hand dominant reports right wrist injury from slip and fall yesterday at home.  Denies any other injuries including head, neck, back, left upper extremity or bilateral lower extremities.    Physical exam:    /91   Pulse 79   Temp 98.3 °F (36.8 °C)   Resp 18   Ht 167.6 cm (66\")   Wt 85.3 kg (188 lb)   LMP  (LMP Unknown)   SpO2 95%   BMI 30.34 kg/m²       Physical Exam   Constitutional: No distress.  Nontoxic  HENT:  Head: Normocephalic and atraumatic.   Oropharynx: Mucous membranes are moist.   Eyes: . No scleral icterus.   Neck: Normal range of motion. Neck supple.   Cardiovascular: Pink warm and well perfused throughout.    Pulmonary/Chest: No respiratory distress.    Musculoskeletal: Moves all extremities equally.  Ecchymosis and swelling about the right wrist with tenderness specifically over the distal ulna.  Full finger extension and  strength normal with sensation to light touch intact throughout the median, radial, ulnar nerve distributions.  Normal capillary refill.  Atraumatic right elbow and shoulder.  Neurological: Alert and oriented.  " Speech fluent and easily intelligible  Skin: Skin is pink, warm, and dry.   Psychiatric: Mood and affect normal.   Nursing note and vitals reviewed.        MDM:  My differential diagnosis of the upper extremity pain or injury includes but is not limited to contusions of the shoulder, forearm, arm, wrist, elbow or hand, dislocations of shoulder, elbow, wrist, digits, nursemaid's elbow (age-appropriate), shoulder sprain, elbow sprain, wrist sprain, digit sprain, shoulder strain, arm strain, forearm strain, elbow strain, wrist strain, hand sprain, digit strain, lacerations of the upper extremity, fractures both closed and open of clavicle, scapula, humerus, radius, ulna, bones of the wrist, hands and digits, cellulitis or abscess, cervical radiculopathy, radial nerve palsy, neurogenic upper extremity pain, angina equivalent, SVT, DVT, arterial occlusion, compartment syndrome.      Please note that portions of this were completed with a voice recognition program.       Note Disclaimer: At [facility], we believe that sharing information builds trust and better relationships. You are receiving this note because you are receiving care at [facility] or recently visited. It is possible you will see health information before a provider has talked with you about it. This kind of information can be easy to misunderstand. To help you fully understand what it means for your health, we urge you to discuss this note with your provider.     Jonny Otero MD  02/22/25 2260

## 2025-02-22 NOTE — DISCHARGE INSTRUCTIONS
Wear wrist splint for 5-7 days  Ice to painful areas for 20 minutes at a time, 4 times a day  Elevated to help reduce swelling  Tylenol or Motrin as needed for mild-moderate pain-take as instructed on package  Follow up with PMD or Ortho in 5-7 days if symptoms not improving, call to schedule an appointment  Return to er for any worsening or new concerns including increased pain or swelling

## 2025-02-22 NOTE — ED PROVIDER NOTES
EMERGENCY DEPARTMENT ENCOUNTER  Room Number:  01/01  PCP: Sun Guerrier MD  Independent Historians: Patient      HPI:  Chief Complaint: had concerns including Fall and Wrist Injury.     A complete HPI/ROS/PMH/PSH/SH/FH are unobtainable due to: None    Chronic or social conditions impacting patient care (Social Determinants of Health): None      Context: Magdalena Dickey is a 69 y.o. female with a medical history of HTN, GERD, IBS who presents to the ED c/o acute right wrist pain.  Patient states that she slipped on the ice last night and caught herself with her right wrist.  Denies hitting her head.  She denies neck or back pain.  No numbness or tingling in her fingers.  Denies any other extremity injuries.  She is right-hand dominant.      Review of prior external notes (non-ED) -and- Review of prior external test results outside of this encounter:  Records reviewed in epic, patient had lumbar frequency ablation of bilateral L3-L5 by Dr. Bernal on 1/27/2025.    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 procedure 06/19/2018    History of colon resection 09/18/2019    Essential hypertension 09/24/2019    Hypertriglyceridemia 03/24/2020    Peripheral polyneuropathy 11/10/2021    Lumbar stenosis with neurogenic claudication 09/05/2023    Lumbar radiculitis 09/05/2023    ILD (interstitial lung disease) 12/13/2023    Lumbar facet arthropathy 01/04/2024    Cervical spinal stenosis 04/30/2024    Neck pain 04/30/2024    Cervical radiculopathy 07/25/2024    Adjacent segment disease of cervicothoracic spine with history of fusion  procedure 09/12/2024    Degeneration of intervertebral disc of lumbar region with discogenic back pain 01/17/2025     Resolved Ambulatory Problems     Diagnosis Date Noted    Diverticulitis of large intestine without perforation or abscess without bleeding 01/04/2018    Personal history of colonic polyps 02/19/2020    Pneumonia due to COVID-19 virus 08/24/2020    Mononeuropathy due to underlying disease 11/10/2021    Nausea 12/13/2021    Abdominal cramping 06/13/2022    Fecal smearing 06/13/2022    Chest pain 08/19/2022    Full incontinence of feces 09/26/2022    Observed sleep apnea 03/30/2023    Snoring 03/30/2023    Hypersomnia 03/30/2023    Morning headache 03/30/2023    Non-restorative sleep 03/30/2023    Encounter to discuss test results 05/10/2023     Past Medical History:   Diagnosis Date    Abnormal mammogram 03/2022    Anesthesia     Anxiety     Cervical disc disorder     Cervical spondylolysis     Chronic bilateral low back pain without sciatica     Chronic pain disorder     Colon polyps     COPD (chronic obstructive pulmonary disease)     COVID-19 08/15/2020    Delayed emergence from anesthesia 2018    Depression 05/25/2016    Diverticulitis 01/16/2018    Diverticulitis 11/24/2017    Diverticulitis of colon 06/2020    Dysesthesia     Dyspepsia     Extremity pain     Fecal incontinence 09/2022    Hair loss 12/2020    Headache, tension-type     Hiatal hernia     History of measles, mumps, or rubella     Hypertension     IBS (irritable bowel syndrome)     Insomnia     Joint pain     Lumbosacral disc disease     Lung nodule 11/2020    NAFLD (nonalcoholic fatty liver disease)     Osteoarthritis     Palpitations 04/09/2019    Paralysis of right vocal cord     Paresthesias     PNA (pneumonia) 08/23/2020    Polyneuropathy     Post-COVID chronic dyspnea     PUD (peptic ulcer disease)     Rotator cuff tendinitis, right 05/08/2018    Spinal stenosis     Thrush 09/2020    Vestibular neuritis 09/2014         PAST  SURGICAL HISTORY  Past Surgical History:   Procedure Laterality Date    ANTERIOR CERVICAL DISCECTOMY W/ FUSION N/A 06/21/2018    Procedure: C6 7 anterior cervical discectomy and fusion with allograft and plate;  Surgeon: Jacobo De Dios MD;  Location: [facility] MAIN OR;  Service: Neurosurgery    ANTERIOR CERVICAL DISCECTOMY W/ FUSION Right 10/7/2024    Procedure: CERVICAL FIVE TO SIX DISCECTOMY ANTERIOR WITH FUSION, HARDWARE REMOVAL;  Surgeon: Finn Ojeda MD;  Location: [facility] MAIN OR;  Service: Neurosurgery;  Laterality: Right;    CERVICAL EPIDURAL N/A 6/5/2024    Procedure: C7-T1 CERVICAL EPIDURAL STEROID INJECTION CPT: 77304;  Surgeon: Sarah Bernal MD;  [place] [facility] [place];  Service: Pain Management;  Laterality: N/A;    CHOLECYSTECTOMY N/A 1980    AT [place]    COLON RESECTION N/A 01/16/2018    Procedure: LAPAROSCOPIC SIGMOID COLON RESECTION WITH MOBILIZATION OF SPLENIC FLEXURE;  Surgeon: Eric Davidson MD;  Location: [facility] MAIN OR;  Service:     COLONOSCOPY N/A 02/15/2017    6 MM TUBULAR ADENOMA POLYP IN HEPATIC FLEXURE, 5 MM TUBULAR ADENOAM POLYP IN RECTUM, DR. GERARDO NYE AT [facility]    COLONOSCOPY N/A 03/01/2003    Internal hemorrhoids-Dr. Gerardo Nye    COLONOSCOPY N/A 02/08/2022    4 MM TUBULAR ADENOMA POLYP IN ASCENDING, HEMORRHOIDS, DR. GERARDO NYE AT [facility]    DIAGNOSTIC LAPAROSCOPY N/A 06/06/2001    Cul-de-sac endometriosis and adhesions-Dr. Aamir Christine    ENDOSCOPY N/A 02/15/2017    SMALL HIATAL HERNIA, GASTRITIS, DR. GERARDO NYE AT [facility]    ENDOSCOPY N/A 02/08/2022    GASTRITIS, MILD ESOPHAGITIS, Z LINE IRREGULAR, DR. GERARDO NYE AT [facility]    ENDOSCOPY N/A 09/12/2014    GASTRITIS, DR. GERARDO NYE AT [facility]    ENDOSCOPY AND COLONOSCOPY N/A 02/04/2009    SMALL HIATAL HERNIA, ESOPHAGITIS, HEMORRHOIDS, DIVERTICULOSIS, TORTUOUS COLON, DR. GERARDO NYE AT [facility]    EPIDURAL Bilateral 12/06/2023    Procedure: BILATERAL L4 TRANSFORAMINAL LUMBAR EPIDURAL STEROID INJECTION CPT: 90554, 15029;  Surgeon: Sarah Bernal  MD;  [facility];  Service: Pain Management;  Laterality: Bilateral;    EPIDURAL BLOCK      LUMBAR EPIDURAL INJECTION N/A 09/20/2023    Procedure: LUMBAR EPIDURAL 1ST VISIT L4-5 78190;  Surgeon: Sarah Bernal MD;  Location: [facility];  Service: Pain Management;  Laterality: N/A;    MEDIAL BRANCH BLOCK Bilateral 02/26/2024    Procedure: BILATERAL L3-L5 LUMBAR MEDIAL BRANCH BLOCK CPT: 75106, 37014;  Surgeon: Sarah Bernal MD;  Location[facility];  Service: Pain Management;  Laterality: Bilateral;    MEDIAL BRANCH BLOCK Bilateral 3/11/2024    Procedure: BILATERAL L3-L5 LUMBAR MEDIAL BRANCH BLOCK CPT: 27757, 708190;  Surgeon: Sarah Bernal MD;  Location[facility];  Service: Pain Management;  Laterality: Bilateral;    NECK SURGERY      RADIOFREQUENCY ABLATION Bilateral 4/15/2024    Procedure: BILATERAL L3-L5 RADIOFREQUENCY ABLATION LUMBAR CPT: 60428, 27687;  Surgeon: Sarah Bernal MD;  Location[facility];  Service: Pain Management;  Laterality: Bilateral;    RADIOFREQUENCY ABLATION Bilateral 1/27/2025    Procedure: BILATERAL L3-L5 RADIOFREQUENCY ABLATION LUMBAR CPT: 11128, 79524;  Surgeon: Sarah Bernal MD;  Location: [facility];  Service: Pain Management;  Laterality: Bilateral;    SKIN BIOPSY Left 07/08/2022    LEFT CALF, PATH: SMALL FIBER NEUROPATHY    SPINAL FUSION      TOTAL ABDOMINAL HYSTERECTOMY WITH SALPINGO OOPHORECTOMY Bilateral 07/31/2001    Dr. Aamir Christine AT [facility]         FAMILY HISTORY  Family History   Problem Relation Age of Onset    Alzheimer's disease Mother         SDAT    Hypertension Mother     Diabetes type II Mother     Lung cancer Mother         POSSIBLE    Heart attack Mother     Cancer Mother         lung    COPD Mother     Depression Mother     Diabetes Mother     Heart disease Mother     Miscarriages / Stillbirths Mother     Alcohol abuse Father     Prostate cancer Father     Heart disease Father         THIRD DEGREE HEART BLOCK    Arthritis  Father     Cancer Father         prostate    Drug abuse Father         Only alcohol    Learning disabilities Father     Cancer Sister     Ovarian cancer Sister     Cancer Sister     Ovarian cancer Sister     Arthritis Sister     Cancer Sister         ovarian    Depression Sister     Hypertension Sister     Cancer Sister         ovarian    Depression Sister     Hypertension Sister     COPD Sister     No Known Problems Brother     Arthritis Maternal Grandmother         rheumatoid    Breast cancer Other     Malig Hyperthermia Neg Hx          SOCIAL HISTORY  Social History     Socioeconomic History    Marital status:      Spouse name: Lonnie    Number of children: 3   Tobacco Use    Smoking status: Former     Current packs/day: 0.00     Average packs/day: 1 pack/day for 40.0 years (40.0 ttl pk-yrs)     Types: Cigarettes     Start date: 3/10/1974     Quit date: 3/10/2014     Years since quitting: 10.9     Passive exposure: Past    Smokeless tobacco: Never   Vaping Use    Vaping status: Former   Substance and Sexual Activity    Alcohol use: Yes     Comment: occasional, maybe 1 can of beer every other month    Drug use: No    Sexual activity: Not Currently     Partners: Male     Birth control/protection: Hysterectomy     Comment: .         ALLERGIES  Patient has no known allergies.      REVIEW OF SYSTEMS  Review of Systems  Included in HPI  All systems reviewed and negative except for those discussed in HPI.      PHYSICAL EXAM    I have reviewed the triage vital signs and nursing notes.    ED Triage Vitals   Temp Heart Rate Resp BP SpO2   02/22/25 0739 02/22/25 0739 02/22/25 0739 02/22/25 0740 02/22/25 0739   98.3 °F (36.8 °C) 90 18 (!) 152/102 94 %       Physical Exam    GENERAL: Well appearing, nontoxic appearing, not distressed  HENT: normocephalic, atraumatic  EYES: no scleral icterus, PERRL  CV: regular rhythm, regular rate, no murmur  RESPIRATORY: normal effort, CTAB  ABDOMEN: soft   MUSCULOSKELETAL: no  deformity  Ecchymosis and swelling to the right wrist, tenderness to palpation. No deformity to the right wrist.  No snuffbox tenderness.  Able to make a fist with right hand, normal sensation in fingers, normal cap refill.  2+ radial pulse on the right.  Reproducible tenderness throughout the remaining right upper extremity.  No cervical, thoracic or lumbar tenderness to palpation  NEURO: alert, moves all extremities, follows commands, mental status normal/baseline  SKIN: warm, dry, no rash   Psych: Appropriate mood and affect  Nursing notes and vital signs reviewed    Vital signs and nursing notes reviewed.              LAB RESULTS  No results found for this or any previous visit (from the past 24 hours).      RADIOLOGY  XR Wrist 3+ View Right    Result Date: 2/22/2025  XR WRIST 3+ VW RIGHT-3/22/2025  HISTORY: Fell. Right wrist injury with pain.  No fractures or other acute abnormalities are seen.   This report was finalized on 2/22/2025 8:26 AM by Dr. Js Hammond M.D on Workstation: [id number]         MEDICATIONS GIVEN IN ER  Medications - No data to display      ORDERS PLACED DURING THIS VISIT:  Orders Placed This Encounter   Procedures    [facility] 05. Wrist Brace (); Right (with thumb spica)    XR Wrist 3+ View Right         DIFFERENTIAL DIAGNOSIS:  Differential Diagnosis for Upper Extremity Problem/Injury include but are not limited to the following:    Contusion of the shoulder/arm/elbow/forearm/wrist/hand/digits  Dislocation of the shoulder/elbow/wrist/digits  Sprains of the shoulder/elbow/wrist/hand/digits  Fractures (open/closed) of the shoulder, humerus, radius, ulna, hand or digits  Laceration or abrasions        OUTPATIENT MEDICATION MANAGEMENT:  No current Epic-ordered facility-administered medications on file.     Current Outpatient Medications Ordered in Epic   Medication Sig Dispense Refill    ALPRAZolam (XANAX) 0.25 MG tablet Take 1 tablet by mouth At Night As Needed for Anxiety.       amLODIPine (NORVASC) 5 MG tablet Take 1 tablet by mouth Daily. 90 tablet 1    arformoterol (BROVANA) 15 MCG/2ML nebulizer solution Take 2 mL by nebulization 2 (Two) Times a Day.      baclofen (LIORESAL) 20 MG tablet TAKE 1 TABLET BY MOUTH THREE TIMES A DAY 60 tablet 3    buPROPion XL (WELLBUTRIN XL) 300 MG 24 hr tablet Take 1 tablet by mouth Every Morning. 90 tablet 1    Diclofenac Sodium (Voltaren Arthritis Pain) 1 % gel gel Apply 4 g topically to the appropriate area as directed 4 (Four) Times a Day As Needed (Apply to the back of the neck when sore). 350 g 5    DULoxetine (CYMBALTA) 60 MG capsule TAKE 1 CAPSULE BY MOUTH EVERY DAY 90 capsule 0    gabapentin (NEURONTIN) 600 MG tablet Take 1 tablet by mouth 3 (Three) Times a Day. 90 tablet 5    ibuprofen (ADVIL,MOTRIN) 800 MG tablet Take 1 tablet by mouth Every 8 (Eight) Hours As Needed for Moderate Pain or Mild Pain. 30 tablet 0    ipratropium-albuterol (DUO-NEB) 0.5-2.5 mg/3 ml nebulizer Take 3 mL by nebulization Every 4 (Four) Hours As Needed. 360 mL 3    Loperamide HCl (IMODIUM A-D PO) Take 1 tablet by mouth Daily.      losartan (COZAAR) 50 MG tablet Take 1 tablet by mouth Daily.      Methylcellulose, Laxative, (CITRUCEL PO) Take 1 tablet by mouth Daily.      Multiple Vitamin (MULTI-VITAMINS PO) Take 1 tablet by mouth Daily. HOLD FOR SURGERY      ondansetron (ZOFRAN) 4 MG tablet TAKE 1 TABLET BY MOUTH EVERY 8 HOURS AS NEEDED FOR NAUSEA OR VOMITING. 30 tablet 0    pantoprazole (PROTONIX) 40 MG EC tablet Take 1 tablet by mouth Daily With Dinner. (Patient taking differently: Take 1 tablet by mouth 2 (Two) Times a Day.) 90 tablet 0    Restasis 0.05 % ophthalmic emulsion Administer 1 drop to both eyes Every 12 (Twelve) Hours.      revefenacin (Yupelri) 175 MCG/3ML nebulizer solution Take 3 mL by nebulization Daily.      sucralfate (CARAFATE) 1 g tablet Take 1 tablet 4 times a day by oral route.      SUMAtriptan (IMITREX) 100 MG tablet Take 1 tablet by mouth Every  2 (Two) Hours As Needed for Migraine. 36 tablet 1         PROCEDURES  Procedures            PROGRESS, DATA ANALYSIS, CONSULTS, AND MEDICAL DECISION MAKING  All labs have been independently interpreted by me.  All radiology studies have been reviewed by me. All EKG's have been independently viewed and interpreted by me.  Discussion below represents my analysis of pertinent findings related to patient's condition, differential diagnosis, treatment plan and final disposition        Clinical Scores:                  ED Course as of 02/22/25 1518   Sat Feb 22, 2025   0825 Right-hand-dominant 69-year-old who presents with right wrist pain after slipping on the ice and falling last night.  X-ray of the right wrist has been ordered. [MS]   0825 Reviewed pt's history and workup with Dr. Otero.  After a bedside evaluation, he agrees with the plan of care.     [MS]   0843 Radiology study right wrist independently interpreted by me and my findings are no fracture or dislocation.  See dictation for official radiology interpretation.   [MS]   0923 Patient updated on right wrist x-ray that showed no fracture.  She will be placed in a thumb spica Velcro splint.  She was given orthopedic follow-up, she should call in 5 to 7 days if symptoms not improving.  She was given strict return to ER precautions.  She verbalized understanding and is agreeable to this plan. [MS]      ED Course User Index  [MS] Nessa Cruz, APRN             AS OF 15:18 EST VITALS:    BP - 124/81  HR - 72  TEMP - 98.3 °F (36.8 °C)  O2 SATS - 95%      DIAGNOSIS  Final diagnoses:   Sprain of left wrist, initial encounter   Fall, initial encounter         DISPOSITION  ED Disposition       ED Disposition   Discharge    Condition   Stable    Comment   --              Discussed plan for discharge, as there is no emergent indication for admission. Pt/family is agreeable and understands need for follow up and repeat testing.  Pt is aware that discharge does  not mean that nothing is wrong but it indicates no emergency is present that requires admission and they must continue care with follow-up as given below or physician of their choice.   Patient/Family voiced understanding of above instructions.  Patient discharged in stable condition.    FOLLOW UP   Sun Guerrier MD  [address]  [address]  [facility] [id number] [phone]    Call   If symptoms worsen    Eddie Hughes MD  [address] [id number] [phone]    Call in 1 week  If symptoms worsen      RX     Medication List        New Prescriptions      ibuprofen 800 MG tablet  Commonly known as: ADVIL,MOTRIN  Take 1 tablet by mouth Every 8 (Eight) Hours As Needed for Moderate Pain or Mild Pain.            Changed      pantoprazole 40 MG EC tablet  Commonly known as: PROTONIX  Take 1 tablet by mouth Daily With Dinner.  What changed: when to take this               Where to Get Your Medications        These medications were sent to [facility] [address] [place] [facility] [place], [place] - [address] [facility] [phone] [facility] [phone]   [address] [place] [id number]      Phone: [phone]   ibuprofen 800 MG tablet           Please note that portions of this document were completed with a voice recognition program.    Note Disclaimer: At [facility], we believe that sharing information builds trust and better relationships. You are receiving this note because you recently visited [facility]. It is possible you will see health information before a provider has talked with you about it. This kind of information can be easy to misunderstand. To help you fully understand what it means for your health, we urge you to discuss this note with your provider.         Nessa Cruz, APRN  02/22/25 1518

## 2025-02-22 NOTE — ED NOTES
Pt arrives for a fall on the ice yesterday. Pt reports right wrist pain. Pt denies hitting her head or LOC

## 2025-02-28 ENCOUNTER — OFFICE (OUTPATIENT)
Dept: URBAN - METROPOLITAN AREA CLINIC 76 | Facility: CLINIC | Age: 70
End: 2025-02-28
Payer: MEDICARE

## 2025-02-28 DIAGNOSIS — K21.9 GASTRO-ESOPHAGEAL REFLUX DISEASE WITHOUT ESOPHAGITIS: ICD-10-CM

## 2025-02-28 DIAGNOSIS — I10 ESSENTIAL HYPERTENSION: Chronic | ICD-10-CM

## 2025-02-28 DIAGNOSIS — K58.9 IRRITABLE BOWEL SYNDROME, UNSPECIFIED: ICD-10-CM

## 2025-02-28 PROCEDURE — 99490 CHRNC CARE MGMT STAFF 1ST 20: CPT | Performed by: INTERNAL MEDICINE

## 2025-02-28 RX ORDER — LOSARTAN POTASSIUM 100 MG/1
100 TABLET ORAL DAILY
Qty: 90 TABLET | Refills: 1 | OUTPATIENT
Start: 2025-02-28

## 2025-03-10 ENCOUNTER — OFFICE VISIT (OUTPATIENT)
Dept: PAIN MEDICINE | Facility: CLINIC | Age: 70
End: 2025-03-10
Payer: MEDICARE

## 2025-03-10 VITALS
OXYGEN SATURATION: 95 % | SYSTOLIC BLOOD PRESSURE: 128 MMHG | HEIGHT: 66 IN | HEART RATE: 77 BPM | BODY MASS INDEX: 30.05 KG/M2 | DIASTOLIC BLOOD PRESSURE: 79 MMHG | WEIGHT: 187 LBS | TEMPERATURE: 97.3 F

## 2025-03-10 DIAGNOSIS — M54.16 LUMBAR RADICULITIS: ICD-10-CM

## 2025-03-10 DIAGNOSIS — M47.816 LUMBAR FACET ARTHROPATHY: Primary | ICD-10-CM

## 2025-03-10 DIAGNOSIS — M51.360 DEGENERATION OF INTERVERTEBRAL DISC OF LUMBAR REGION WITH DISCOGENIC BACK PAIN: ICD-10-CM

## 2025-03-10 DIAGNOSIS — M48.02 CERVICAL SPINAL STENOSIS: ICD-10-CM

## 2025-03-10 DIAGNOSIS — G89.29 CHRONIC BILATERAL THORACIC BACK PAIN: ICD-10-CM

## 2025-03-10 DIAGNOSIS — M48.062 LUMBAR STENOSIS WITH NEUROGENIC CLAUDICATION: ICD-10-CM

## 2025-03-10 DIAGNOSIS — M54.6 CHRONIC BILATERAL THORACIC BACK PAIN: ICD-10-CM

## 2025-03-10 DIAGNOSIS — M54.2 NECK PAIN: ICD-10-CM

## 2025-03-10 PROCEDURE — 3074F SYST BP LT 130 MM HG: CPT

## 2025-03-10 PROCEDURE — 1125F AMNT PAIN NOTED PAIN PRSNT: CPT

## 2025-03-10 PROCEDURE — 99214 OFFICE O/P EST MOD 30 MIN: CPT

## 2025-03-10 PROCEDURE — 3078F DIAST BP <80 MM HG: CPT

## 2025-03-10 PROCEDURE — 1159F MED LIST DOCD IN RCRD: CPT

## 2025-03-10 PROCEDURE — 1160F RVW MEDS BY RX/DR IN RCRD: CPT

## 2025-03-10 NOTE — PROGRESS NOTES
CHIEF COMPLAINT  Back and neck pain       Subjective   Magdalena Dickey is a 69 y.o. female  who presents to the office for follow-up of procedure.  She completed a Bilateral L3-L5 RFA on 1/27/25 performed by Dr. Bernal for management of back pain. Patient reports 50% ongoing relief from the procedure. She reports that she has been able to be more active and rest better since the procedure.     Today pain is 2/10VAS in severity (severity in pain can increase to 8/10 VAS with activity). Her main complaint today is axial low back pain that radiates across her low back pain a bandlike pattern. Pain intermittent radiates into bilateral buttocks but she denies radicular leg pain. Pain is worsened by prolonged sitting or standing, walking long distances, and increased physical activity. Pain improves with rest/reposition, medications, and procedures. Neck pain has remained consistent since her last office visit. Describes this pain as an intermittent aching and burning. She has completed PT in the past. No relief with use of a TENS unit.      Continues with Gabapentin 600mg TID, Baclofen 10mg BID PRN, Cymbalta 60mg, Trazodone 50mg PRN, and Xanax 0.25mg PRN for anxiety. The medications are managed by her PCP. She also utilizes OTC Tylenol Arthritis as needed.      She underwent a C5-C6 ACDF performed by Dr. Ojeda on 10/7/2024.  Reviewed office note from him from 12/27/2024.  A cervical MRI was ordered following a telephone message to Dr. Ojeda on 1/27/2025 where patient noted her right arm was going numb intermittently.  She was completed on 2/10/25. She is scheduled to see him again on 3/27/25.    She was seen in the ED on 2/22/25 for right wrist pain following a fall. Reviewed ED note from Dr. Jonny Otero.  X-ray of her right wrist was obtained but showed no fractures or abnormalities.  Was recommended that she wear a thumb spica Velcro splint and instructed to follow-up with orthopedics.  She was given a prescription for  ibuprofen 800 mg every 8 hours as needed.     Procedures:  1/27/25 - Bilateral L3-L5 RFA - 50% ongoing relief  6/5/24 - C7-T1 ARI - 50% ongoing relief  4/15/24 - Bilateral L3-L5  RFA - 70% relief x 6-7 months   3/11/24 - Bilateral L3-L5 MBB - 100% relief for several hours  2/26/24 - Bilateral L3-L5 MBB - 100% for several hours   12/6/23 - Bilateral L4 TF LESI - 50% relief  9/20/23 - L4/L5 LESI - 50% relief x 1 month     Surgical History:  10/7/24 - C5-C6 ACDF - Dr. Ojeda  ACDF - Dr. De Dios    Back Pain  This is a chronic problem. The current episode started more than 1 year ago. The problem occurs intermittently. The problem has been comes and goes (gradually worsening) since onset. The pain is present in the lumbar spine and sacro-iliac. The quality of the pain is described as aching and burning. The pain does not radiate (discomfort to bilateral feet). The pain is at a severity of 3/10 (severity in pain can increase to 8/10 VAS with activity). The pain is mild. The symptoms are aggravated by position, standing, sitting and bending. Associated symptoms include headaches, numbness and weakness. Pertinent negatives include no abdominal pain, chest pain, dysuria or fever. She has tried muscle relaxant and bed rest (Tylenol, Gabapentin, TENS unit (no relief), PT) for the symptoms. The treatment provided mild relief.   Neck Pain   This is a chronic problem. The current episode started more than 1 year ago. The problem occurs constantly. The problem has been unchanged. The pain is associated with an unknown factor. The pain is present in the left side, midline and right side (radaites into bilateral shoulders). The quality of the pain is described as aching. The pain is at a severity of 4/10. The pain is mild. The symptoms are aggravated by position, twisting, bending and stress. Associated symptoms include headaches, numbness and weakness. Pertinent negatives include no chest pain or fever. She has tried oral narcotics,  "heat and ice (Gabapentin, Cymbalta) for the symptoms.     PEG Assessment   What number best describes your pain on average in the past week?3  What number best describes how, during the past week, pain has interfered with your enjoyment of life?4  What number best describes how, during the past week, pain has interfered with your general activity?  4    Review of Pertinent Medical Data ---      The following portions of the patient's history were reviewed and updated as appropriate: allergies, current medications, past family history, past medical history, past social history, past surgical history, and problem list.    Review of Systems   Constitutional:  Negative for activity change, chills, fatigue and fever.   HENT:  Negative for congestion.    Eyes:  Negative for visual disturbance.   Respiratory:  Positive for chest tightness. Negative for shortness of breath.    Cardiovascular:  Negative for chest pain.   Gastrointestinal:  Negative for abdominal pain, constipation and diarrhea.   Genitourinary:  Negative for difficulty urinating, dyspareunia and dysuria.   Musculoskeletal:  Positive for back pain and neck pain.   Neurological:  Positive for weakness, light-headedness, numbness and headaches. Negative for dizziness.   Psychiatric/Behavioral:  Positive for sleep disturbance. Negative for agitation, self-injury and suicidal ideas. The patient is not nervous/anxious.      I have reviewed and confirmed the accuracy of the ROS as documented by the MA/BUTCHN/RN ORIANA Diamond     Vitals:    03/10/25 1122   BP: 128/79   Pulse: 77   Temp: 97.3 °F (36.3 °C)   SpO2: 95%   Weight: 84.8 kg (187 lb)   Height: 167.6 cm (66\")   PainSc: 3    PainLoc: Back     Objective   Physical Exam  Constitutional:       Appearance: Normal appearance.   HENT:      Head: Normocephalic.   Cardiovascular:      Rate and Rhythm: Normal rate and regular rhythm.   Pulmonary:      Effort: Pulmonary effort is normal.      Breath sounds: " Normal breath sounds.   Musculoskeletal:      Right wrist: Swelling and tenderness present.      Right hand: Swelling (right thumb) and tenderness present.      Cervical back: Normal range of motion. Tenderness present. Decreased range of motion.      Lumbar back: Tenderness and bony tenderness present. Decreased range of motion. Negative right straight leg raise test and negative left straight leg raise test.      Comments: + lumbar facet loading/tenderness    Skin:     General: Skin is warm and dry.      Capillary Refill: Capillary refill takes less than 2 seconds.   Neurological:      General: No focal deficit present.      Mental Status: She is alert and oriented to person, place, and time.   Psychiatric:         Mood and Affect: Mood normal.         Behavior: Behavior normal.         Thought Content: Thought content normal.         Cognition and Memory: Cognition normal.       Assessment & Plan   Diagnoses and all orders for this visit:    1. Lumbar facet arthropathy (Primary)    2. Degeneration of intervertebral disc of lumbar region with discogenic back pain    3. Neck pain    4. Chronic bilateral thoracic back pain    5. Lumbar stenosis with neurogenic claudication    6. Lumbar radiculitis    7. Cervical spinal stenosis      Magdalena LUIS MANUEL Dickey reports a pain score of 3.  Given her pain assessment as noted, treatment options were discussed and the following options were decided upon as a follow-up plan to address the patient's pain: continuation of current treatment plan for pain.    --- Reviewed cervical MRI  from 2/10/25. Dicussed that she may benefit from a Bilateral C4-C6 MBB/RFA. She is scheduled to follow up with Dr. Ojeda on 3/27/25 in regards to worsening neck pain. She will discuss these procedures with him.   --- Repeat Lumbar RFA every 6 months as needed   --- Follow-up as needed for worsening pain and/or to repeat injections     MAXIME REPORT  As the clinician, I personally reviewed the MAXIME from  3/10/25 while the patient was in the office today.    Dictated utilizing Dragon dictation.

## 2025-03-19 ENCOUNTER — OFFICE VISIT (OUTPATIENT)
Dept: INTERNAL MEDICINE | Facility: CLINIC | Age: 70
End: 2025-03-19
Payer: MEDICARE

## 2025-03-19 VITALS
WEIGHT: 191 LBS | DIASTOLIC BLOOD PRESSURE: 74 MMHG | HEART RATE: 78 BPM | BODY MASS INDEX: 30.7 KG/M2 | SYSTOLIC BLOOD PRESSURE: 124 MMHG | HEIGHT: 66 IN

## 2025-03-19 DIAGNOSIS — F41.8 DEPRESSION WITH ANXIETY: Primary | Chronic | ICD-10-CM

## 2025-03-19 DIAGNOSIS — J84.9 ILD (INTERSTITIAL LUNG DISEASE): Chronic | ICD-10-CM

## 2025-03-19 DIAGNOSIS — I10 ESSENTIAL HYPERTENSION: Chronic | ICD-10-CM

## 2025-03-19 RX ORDER — BUPROPION HYDROCHLORIDE 150 MG/1
150 TABLET ORAL EVERY MORNING
Qty: 90 TABLET | Refills: 1 | Status: SHIPPED | OUTPATIENT
Start: 2025-03-19

## 2025-03-19 NOTE — PROGRESS NOTES
"Chief Complaint  Hypertension    Subjective        Magdalena Dickey presents to [facility]  History of Present Illness here for reg f/u- feels pain after surgery is doing well- had 2 falls- she doesn't think related - sprained wrist. She is riding stationery bicycle for 3 minutes daily. She does eat too much sweets, trying to cut back.   Thinks the Wellbutrin may be causing her to shake- trouble writing, etc. Would like to cut back and see if she does well.  Has cut her Xanax back to 1/2 0.25 mg prn- usually just a couple of times per month.  Her sister and step son live with them- adding a lot of stress  Does feel her lung function is worsening slightly- she uses nebulizers and exercisee    Objective   Vital Signs:  /74   Pulse 78   Ht 167.6 cm (66\")   Wt 86.6 kg (191 lb)   BMI 30.83 kg/m²   Estimated body mass index is 30.83 kg/m² as calculated from the following:    Height as of this encounter: 167.6 cm (66\").    Weight as of this encounter: 86.6 kg (191 lb).            Physical Exam  Constitutional:       Appearance: Normal appearance.   Cardiovascular:      Rate and Rhythm: Normal rate.   Pulmonary:      Effort: Pulmonary effort is normal.      Breath sounds: Normal breath sounds.   Musculoskeletal:      Right lower leg: No edema.      Left lower leg: No edema.   Psychiatric:         Mood and Affect: Mood normal.        Result Review :                Assessment and Plan   Diagnoses and all orders for this visit:    1. Depression with anxiety (Primary)  Comments:  agree with trying to reduce dose Wellbutrin for question of side effects- if fails, will call  Orders:  -     buPROPion XL (WELLBUTRIN XL) 150 MG 24 hr tablet; Take 1 tablet by mouth Every Morning.  Dispense: 90 tablet; Refill: 1    2. Essential hypertension  Comments:  Controlled, no change    3. ILD (interstitial lung disease)  Comments:  encouraged to cont working on exercise tolerance- keep f/u with  " Haily             Follow Up   Return in about 6 months (around 9/19/2025) for Medicare Wellness, Lab Before FUP.  Patient was given instructions and counseling regarding her condition or for health maintenance advice. Please see specific information pulled into the AVS if appropriate.

## 2025-03-26 NOTE — PROGRESS NOTES
Patient ID: Magdalena Dickey is a 69 y.o. female is here today for follow-up for cervical radiculopathy.    Imaging: XR of the cervical spine completed on 03/27/2025    Subjective     The patient is here in regards to   Chief Complaint   Patient presents with    Neck Pain    Follow-up       History of Present Illness  Madelaine tripped and fell on the ice a month ago and landed on her right wrist.  She still has some tenderness of her right wrist.  The numbness and tingling going down her right arm seems to have lessened and become more intermittent.  No further residual radiculopathy.  Neck pain has improved.      While in the room and during my examination of the patient I wore a mask and eye protection.  I washed my hands before and after this patient encounter.  The patient was also wearing a mask.    The following portions of the patient's history were reviewed and updated as appropriate: allergies, current medications, past family history, past medical history, past social history, past surgical history and problem list.    Review of Systems   Constitutional:  Negative for fever.   Musculoskeletal:  Positive for neck pain and neck stiffness.   Neurological:  Positive for dizziness, light-headedness and numbness. Negative for weakness and headaches.        Past Medical History:   Diagnosis Date    Abnormal mammogram 03/2022    Anesthesia     WAKES UP AGITATED    Anxiety     Cervical disc disorder     Cervical radiculopathy     Cervical spondylolysis     Chest pain 08/19/2022    ADMITTED TO [facility]    Chronic bilateral low back pain without sciatica     Chronic pain disorder     Colon polyps     FOLLOWED BY DR. ADRIA NYE    COPD (chronic obstructive pulmonary disease)     COVID-19 08/15/2020    SEEN AT     Delayed emergence from anesthesia 2018    Depression 05/25/2016    Diverticulitis 01/16/2018    ADMITTED TO [facility]    Diverticulitis 11/24/2017    SEEN AT [facility] ER    Diverticulitis of colon 06/2020    Dysesthesia      Dyspepsia     Extremity pain     Fecal incontinence 09/2022    GERD (gastroesophageal reflux disease)     Hair loss 12/2020    Headache, tension-type     Hiatal hernia     History of measles, mumps, or rubella     MEASLES AND MUMPS AS A CHILD    Hypertension     IBS (irritable bowel syndrome)     ILD (interstitial lung disease)     Insomnia     Joint pain     Lumbosacral disc disease     Lung nodule 11/2020    Migraine without aura and without status migrainosus, not intractable 05/25/2016    NAFLD (nonalcoholic fatty liver disease)     Neck pain     Osteoarthritis     Palpitations 04/09/2019    SEEN AT [facility] ER    Paralysis of right vocal cord     Paresthesias     PNA (pneumonia) 08/23/2020    D/T COVID, ADMITTED TO [facility]    Polyneuropathy     Post-COVID chronic dyspnea     PUD (peptic ulcer disease)     Rotator cuff tendinitis, right 05/08/2018    Spinal stenosis     Thrush 09/2020    Vestibular neuritis 09/2014       No Known Allergies    Family History   Problem Relation Age of Onset    Alzheimer's disease Mother         SDAT    Hypertension Mother     Diabetes type II Mother     Lung cancer Mother         POSSIBLE    Heart attack Mother     Cancer Mother         lung    COPD Mother     Depression Mother     Diabetes Mother     Heart disease Mother     Miscarriages / Stillbirths Mother     Alcohol abuse Father     Prostate cancer Father     Heart disease Father         THIRD DEGREE HEART BLOCK    Arthritis Father     Cancer Father         prostate    Drug abuse Father         Only alcohol    Learning disabilities Father     Cancer Sister     Ovarian cancer Sister     Cancer Sister     Ovarian cancer Sister     Arthritis Sister     Cancer Sister         ovarian    Depression Sister     Hypertension Sister     Cancer Sister         ovarian    Depression Sister     Hypertension Sister     COPD Sister     No Known Problems Brother     Arthritis Maternal Grandmother         rheumatoid    Breast cancer Other     Malig  Hyperthermia Neg Hx        Social History     Socioeconomic History    Marital status:      Spouse name: Lonnie    Number of children: 3   Tobacco Use    Smoking status: Former     Current packs/day: 0.00     Average packs/day: 1 pack/day for 40.0 years (40.0 ttl pk-yrs)     Types: Cigarettes     Start date: 3/10/1974     Quit date: 3/10/2014     Years since quittin.0     Passive exposure: Past    Smokeless tobacco: Never   Vaping Use    Vaping status: Former   Substance and Sexual Activity    Alcohol use: Yes     Comment: occasional, maybe 1 can of beer every other month    Drug use: No    Sexual activity: Not Currently     Partners: Male     Birth control/protection: Hysterectomy     Comment: .       Past Surgical History:   Procedure Laterality Date    ANTERIOR CERVICAL DISCECTOMY W/ FUSION N/A 2018    Procedure: C6 7 anterior cervical discectomy and fusion with allograft and plate;  Surgeon: Jacobo De Dios MD;  Location: [facility];  Service: Neurosurgery    ANTERIOR CERVICAL DISCECTOMY W/ FUSION Right 10/7/2024    Procedure: CERVICAL FIVE TO SIX DISCECTOMY ANTERIOR WITH FUSION, HARDWARE REMOVAL;  Surgeon: Finn Ojeda MD;  Location: [facility] OR;  Service: Neurosurgery;  Laterality: Right;    CERVICAL EPIDURAL N/A 2024    Procedure: C7-T1 CERVICAL EPIDURAL STEROID INJECTION CPT: 31009;  Surgeon: Sarah Bernal MD;  Location: [facility] OR;  Service: Pain Management;  Laterality: N/A;    CHOLECYSTECTOMY N/A     AT [place]    COLON RESECTION N/A 2018    Procedure: LAPAROSCOPIC SIGMOID COLON RESECTION WITH MOBILIZATION OF SPLENIC FLEXURE;  Surgeon: Eric Davidson MD;  Location: [facility] OR;  Service:     COLONOSCOPY N/A 02/15/2017    6 MM TUBULAR ADENOMA POLYP IN HEPATIC FLEXURE, 5 MM TUBULAR ADENOAM POLYP IN RECTUM, DR. GERARDO NYE AT [facility]    COLONOSCOPY N/A 2003    Internal hemorrhoids-Dr. Gerardo Nye    COLONOSCOPY N/A 2022    4 MM TUBULAR ADENOMA  POLYP IN ASCENDING, HEMORRHOIDS, DR. ADRIA NYE AT [facility]    DIAGNOSTIC LAPAROSCOPY N/A 06/06/2001    Cul-de-sac endometriosis and adhesions-Dr. Aamir Christine    ENDOSCOPY N/A 02/15/2017    SMALL HIATAL HERNIA, GASTRITIS, DR. ADRIA NYE AT [facility]    ENDOSCOPY N/A 02/08/2022    GASTRITIS, MILD ESOPHAGITIS, Z LINE IRREGULAR, DR. ADRIA NYE AT [facility]    ENDOSCOPY N/A 09/12/2014    GASTRITIS, DR. ADRIA NYE AT [facility]    ENDOSCOPY AND COLONOSCOPY N/A 02/04/2009    SMALL HIATAL HERNIA, ESOPHAGITIS, HEMORRHOIDS, DIVERTICULOSIS, TORTUOUS COLON, DR. ADRIA NYE AT [facility]    EPIDURAL Bilateral 12/06/2023    Procedure: BILATERAL L4 TRANSFORAMINAL LUMBAR EPIDURAL STEROID INJECTION CPT: 86283, 91106;  Surgeon: Sarah Bernal MD;  Location: [facility];  Service: Pain Management;  Laterality: Bilateral;    EPIDURAL BLOCK      LUMBAR EPIDURAL INJECTION N/A 09/20/2023    Procedure: LUMBAR EPIDURAL 1ST VISIT L4-5 22601;  Surgeon: Sarah Bernal MD;  Location: [facility];  Service: Pain Management;  Laterality: N/A;    MEDIAL BRANCH BLOCK Bilateral 02/26/2024    Procedure: BILATERAL L3-L5 LUMBAR MEDIAL BRANCH BLOCK CPT: 78879, 69705;  Surgeon: Sarah Bernal MD;  Location: [facility];  Service: Pain Management;  Laterality: Bilateral;    MEDIAL BRANCH BLOCK Bilateral 3/11/2024    Procedure: BILATERAL L3-L5 LUMBAR MEDIAL BRANCH BLOCK CPT: 78540, 981847;  Surgeon: Sarah Bernal MD;  Location: [facility];  Service: Pain Management;  Laterality: Bilateral;    NECK SURGERY      RADIOFREQUENCY ABLATION Bilateral 4/15/2024    Procedure: BILATERAL L3-L5 RADIOFREQUENCY ABLATION LUMBAR CPT: 98469, 85248;  Surgeon: Sarah Bernal MD;  Location: [facility];  Service: Pain Management;  Laterality: Bilateral;    RADIOFREQUENCY ABLATION Bilateral 1/27/2025    Procedure: BILATERAL L3-L5 RADIOFREQUENCY ABLATION LUMBAR CPT: 00176, 99646;  Surgeon: Sarah Bernal MD;  Location: [facility];  Service: Pain Management;  Laterality:  Bilateral;    SKIN BIOPSY Left 07/08/2022    LEFT CALF, PATH: SMALL FIBER NEUROPATHY    SPINAL FUSION      TOTAL ABDOMINAL HYSTERECTOMY WITH SALPINGO OOPHORECTOMY Bilateral 07/31/2001    Dr. Aamir Christine AT [facility]         Objective     Vitals:    03/27/25 0949   BP: 126/82   Pulse: 93   Resp: 16   Temp: 97 °F (36.1 °C)   SpO2: 95%     Body mass index is 30.31 kg/m².    Physical Exam  Constitutional:       General: She is awake.   HENT:      Head: Normocephalic and atraumatic.   Eyes:      General: Lids are normal.      Extraocular Movements: Extraocular movements intact.      Conjunctiva/sclera: Conjunctivae normal.      Pupils: Pupils are equal, round, and reactive to light.   Pulmonary:      Breath sounds: Normal breath sounds.   Abdominal:      Palpations: Abdomen is soft.   Musculoskeletal:         General: Normal range of motion.   Skin:     General: Skin is warm and dry.   Neurological:      Mental Status: She is alert.      Coordination: Coordination is intact.      Deep Tendon Reflexes: Reflexes are normal and symmetric.   Psychiatric:         Behavior: Behavior is cooperative.         Neurological Exam  Mental Status  Awake and alert. Oriented to person, place and time.    Cranial Nerves  CN II: Visual acuity is normal. Visual fields full to confrontation.  CN III, IV, VI: Extraocular movements intact bilaterally. Normal lids and orbits bilaterally. Pupils equal round and reactive to light bilaterally.  CN V: Facial sensation is normal.  CN VII: Full and symmetric facial movement.  CN IX, X: Palate elevates symmetrically. Normal gag reflex.  CN XI: Shoulder shrug strength is normal.  CN XII: Tongue midline without atrophy or fasciculations.    Motor                                               Right                     Left  Deltoid                                   5                          5   Biceps                                   5                          5   Triceps                                   5                          5   Iliopsoas                               5                          5   Quadriceps                           5                          5   Hamstring                             5                          5   Gastrocnemius                     5                           5   Anterior tibialis                      5                          5    Sensory  Sensation is intact to light touch, pinprick, vibration and proprioception in all four extremities.    Reflexes  Deep tendon reflexes are 2+ and symmetric in all four extremities.    Coordination    Finger-to-nose, rapid alternating movements and heel-to-shin normal bilaterally without dysmetria.    Gait  Normal casual, toe, heel and tandem gait.      Assessment & Plan   Independent Review of Radiographic Studies:      I personally reviewed the images from the following studies.    INDICATION: Follow up from fusion    FINDINGS:    XR: XR of the cervical spine was reviewed and shows what appears to be solid fusion at the C5-6 level  MR: MRI of the cervical spine wo contrast was reviewed and shows no residual compression of the C5-6 foramen or central canal    Assessment/Plan: Doing well and appears to be fully fused.  I have no further activity restrictions.    Medical Decision Making:      Follow-up as needed         Diagnoses and all orders for this visit:    1. Cervical radiculopathy (Primary)    2. Adjacent segment disease of cervical spine at C5-C6 level with history of fusion procedure             Patient Instructions/Recommendations:    Call with any questions or concerns      Finn Ojeda MD  03/27/25  11:03 EDT

## 2025-03-27 ENCOUNTER — OFFICE VISIT (OUTPATIENT)
Dept: NEUROSURGERY | Facility: CLINIC | Age: 70
End: 2025-03-27
Payer: MEDICARE

## 2025-03-27 VITALS
HEIGHT: 66 IN | TEMPERATURE: 97 F | RESPIRATION RATE: 16 BRPM | OXYGEN SATURATION: 95 % | SYSTOLIC BLOOD PRESSURE: 126 MMHG | HEART RATE: 93 BPM | DIASTOLIC BLOOD PRESSURE: 82 MMHG | BODY MASS INDEX: 30.18 KG/M2 | WEIGHT: 187.8 LBS

## 2025-03-27 DIAGNOSIS — M50.322 ADJACENT SEGMENT DISEASE OF CERVICAL SPINE AT C5-C6 LEVEL WITH HISTORY OF FUSION PROCEDURE: ICD-10-CM

## 2025-03-27 DIAGNOSIS — Z98.1 ADJACENT SEGMENT DISEASE OF CERVICAL SPINE AT C5-C6 LEVEL WITH HISTORY OF FUSION PROCEDURE: ICD-10-CM

## 2025-03-27 DIAGNOSIS — M54.12 CERVICAL RADICULOPATHY: Primary | ICD-10-CM

## 2025-03-31 ENCOUNTER — OFFICE (OUTPATIENT)
Dept: URBAN - METROPOLITAN AREA CLINIC 76 | Facility: CLINIC | Age: 70
End: 2025-03-31
Payer: MEDICARE

## 2025-03-31 DIAGNOSIS — K21.9 GASTRO-ESOPHAGEAL REFLUX DISEASE WITHOUT ESOPHAGITIS: ICD-10-CM

## 2025-03-31 DIAGNOSIS — K58.9 IRRITABLE BOWEL SYNDROME, UNSPECIFIED: ICD-10-CM

## 2025-03-31 PROCEDURE — 99439 CHRNC CARE MGMT STAF EA ADDL: CPT | Performed by: INTERNAL MEDICINE

## 2025-03-31 PROCEDURE — 99490 CHRNC CARE MGMT STAFF 1ST 20: CPT | Performed by: INTERNAL MEDICINE

## 2025-04-28 DIAGNOSIS — R25.2 MUSCLE CRAMPS: ICD-10-CM

## 2025-04-28 DIAGNOSIS — M25.539 PAIN IN WRIST, UNSPECIFIED LATERALITY: Primary | ICD-10-CM

## 2025-04-28 RX ORDER — BACLOFEN 20 MG/1
20 TABLET ORAL 3 TIMES DAILY
Qty: 60 TABLET | Refills: 3 | Status: SHIPPED | OUTPATIENT
Start: 2025-04-28

## 2025-04-30 ENCOUNTER — OFFICE (OUTPATIENT)
Dept: URBAN - METROPOLITAN AREA CLINIC 76 | Facility: CLINIC | Age: 70
End: 2025-04-30
Payer: MEDICARE

## 2025-04-30 DIAGNOSIS — K58.9 IRRITABLE BOWEL SYNDROME, UNSPECIFIED: ICD-10-CM

## 2025-04-30 DIAGNOSIS — K21.9 GASTRO-ESOPHAGEAL REFLUX DISEASE WITHOUT ESOPHAGITIS: ICD-10-CM

## 2025-04-30 PROCEDURE — 99426 PRIN CARE MGMT STAFF 1ST 30: CPT | Performed by: INTERNAL MEDICINE

## 2025-04-30 PROCEDURE — 99490 CHRNC CARE MGMT STAFF 1ST 20: CPT | Performed by: INTERNAL MEDICINE

## 2025-05-08 ENCOUNTER — OFFICE VISIT (OUTPATIENT)
Dept: INTERNAL MEDICINE | Facility: CLINIC | Age: 70
End: 2025-05-08
Payer: MEDICARE

## 2025-05-08 VITALS — WEIGHT: 192 LBS | BODY MASS INDEX: 30.86 KG/M2 | HEIGHT: 66 IN

## 2025-05-08 DIAGNOSIS — H92.02 LEFT EAR PAIN: Primary | ICD-10-CM

## 2025-05-08 PROCEDURE — 99213 OFFICE O/P EST LOW 20 MIN: CPT | Performed by: INTERNAL MEDICINE

## 2025-05-08 PROCEDURE — 1125F AMNT PAIN NOTED PAIN PRSNT: CPT | Performed by: INTERNAL MEDICINE

## 2025-05-08 PROCEDURE — G2211 COMPLEX E/M VISIT ADD ON: HCPCS | Performed by: INTERNAL MEDICINE

## 2025-05-08 NOTE — PROGRESS NOTES
"Chief Complaint  Earache (Left ear)    Subjective        Magdalena Dickey presents to [facility]  History of Present Illness had similar issue in November- went to [facility]- treated polymixin/hydrocortisone drops- helped after a few days but hasn't helped this time. She also has a blister on her tongue- these come and go- this one is worse than normal. Same side as ear pain.   Quit smoking 2014    Objective   Vital Signs:  Ht 167.6 cm (66\")   Wt 87.1 kg (192 lb)   BMI 30.99 kg/m²   Estimated body mass index is 30.99 kg/m² as calculated from the following:    Height as of this encounter: 167.6 cm (66\").    Weight as of this encounter: 87.1 kg (192 lb).            Physical Exam  Constitutional:       Appearance: Normal appearance.   HENT:      Right Ear: Tympanic membrane, ear canal and external ear normal.      Left Ear: Tympanic membrane, ear canal and external ear normal.      Mouth/Throat:      Mouth: Mucous membranes are moist. Mucous membranes are dry.      Pharynx: Oropharynx is clear.   Lymphadenopathy:      Cervical: No cervical adenopathy.        Result Review :                Assessment and Plan   Diagnoses and all orders for this visit:    1. Left ear pain (Primary)  Comments:  no clear cause- discussed could be referred pain from jaw or throat- will follow- call if fails to resolve             Follow Up   No follow-ups on file.  Patient was given instructions and counseling regarding her condition or for health maintenance advice. Please see specific information pulled into the AVS if appropriate.           "

## 2025-05-12 DIAGNOSIS — M54.16 LUMBAR RADICULOPATHY: ICD-10-CM

## 2025-05-12 RX ORDER — GABAPENTIN 600 MG/1
600 TABLET ORAL 3 TIMES DAILY
Qty: 90 TABLET | Refills: 5 | Status: SHIPPED | OUTPATIENT
Start: 2025-05-12

## 2025-06-11 ENCOUNTER — TRANSCRIBE ORDERS (OUTPATIENT)
Dept: ADMINISTRATIVE | Facility: HOSPITAL | Age: 70
End: 2025-06-11
Payer: MEDICARE

## 2025-06-11 DIAGNOSIS — Z87.891 PERSONAL HISTORY OF TOBACCO USE, PRESENTING HAZARDS TO HEALTH: Primary | ICD-10-CM

## 2025-06-15 DIAGNOSIS — M47.22 SPONDYLOSIS OF CERVICAL SPINE WITH RADICULOPATHY: ICD-10-CM

## 2025-06-15 DIAGNOSIS — M54.16 LUMBAR RADICULOPATHY: ICD-10-CM

## 2025-06-16 RX ORDER — DULOXETIN HYDROCHLORIDE 60 MG/1
60 CAPSULE, DELAYED RELEASE ORAL DAILY
Qty: 90 CAPSULE | Refills: 1 | Status: SHIPPED | OUTPATIENT
Start: 2025-06-16

## 2025-06-16 RX ORDER — GABAPENTIN 600 MG/1
600 TABLET ORAL 3 TIMES DAILY
Qty: 90 TABLET | Refills: 1 | Status: SHIPPED | OUTPATIENT
Start: 2025-06-16

## 2025-06-20 DIAGNOSIS — I10 ESSENTIAL HYPERTENSION: Chronic | ICD-10-CM

## 2025-06-20 DIAGNOSIS — I10 ESSENTIAL (PRIMARY) HYPERTENSION: ICD-10-CM

## 2025-06-20 RX ORDER — LOSARTAN POTASSIUM 50 MG/1
50 TABLET ORAL DAILY
Qty: 90 TABLET | Refills: 1 | Status: SHIPPED | OUTPATIENT
Start: 2025-06-20

## 2025-06-20 RX ORDER — AMLODIPINE BESYLATE 5 MG/1
5 TABLET ORAL DAILY
Qty: 90 TABLET | Refills: 1 | Status: SHIPPED | OUTPATIENT
Start: 2025-06-20

## 2025-07-08 ENCOUNTER — HOSPITAL ENCOUNTER (OUTPATIENT)
Facility: HOSPITAL | Age: 70
Discharge: HOME | End: 2025-07-08
Admitting: INTERNAL MEDICINE
Payer: MEDICARE

## 2025-07-08 DIAGNOSIS — Z87.891 PERSONAL HISTORY OF TOBACCO USE, PRESENTING HAZARDS TO HEALTH: ICD-10-CM

## 2025-07-08 PROCEDURE — 71271 CT THORAX LUNG CANCER SCR C-: CPT

## 2025-07-22 ENCOUNTER — TRANSCRIBE ORDERS (OUTPATIENT)
Dept: ADMINISTRATIVE | Facility: HOSPITAL | Age: 70
End: 2025-07-22
Payer: MEDICARE

## 2025-07-22 DIAGNOSIS — R91.1 PULMONARY NODULE: Primary | ICD-10-CM

## 2025-07-31 ENCOUNTER — OFFICE VISIT (OUTPATIENT)
Dept: PAIN MEDICINE | Facility: CLINIC | Age: 70
End: 2025-07-31
Payer: MEDICARE

## 2025-07-31 VITALS
HEART RATE: 78 BPM | OXYGEN SATURATION: 95 % | BODY MASS INDEX: 30.67 KG/M2 | DIASTOLIC BLOOD PRESSURE: 83 MMHG | SYSTOLIC BLOOD PRESSURE: 145 MMHG | WEIGHT: 190.8 LBS | TEMPERATURE: 96.9 F | HEIGHT: 66 IN | RESPIRATION RATE: 18 BRPM

## 2025-07-31 DIAGNOSIS — M51.360 DEGENERATION OF INTERVERTEBRAL DISC OF LUMBAR REGION WITH DISCOGENIC BACK PAIN: ICD-10-CM

## 2025-07-31 DIAGNOSIS — M47.816 LUMBAR FACET ARTHROPATHY: Primary | ICD-10-CM

## 2025-07-31 DIAGNOSIS — M54.2 NECK PAIN: ICD-10-CM

## 2025-07-31 DIAGNOSIS — M54.6 CHRONIC BILATERAL THORACIC BACK PAIN: ICD-10-CM

## 2025-07-31 DIAGNOSIS — G89.29 CHRONIC BILATERAL THORACIC BACK PAIN: ICD-10-CM

## 2025-07-31 DIAGNOSIS — M48.062 LUMBAR STENOSIS WITH NEUROGENIC CLAUDICATION: ICD-10-CM

## 2025-07-31 LAB
POC AMPHETAMINES: NEGATIVE
POC BARBITURATES: NEGATIVE
POC BENZODIAZEPHINES: NEGATIVE
POC BUPRENORPHINE: NEGATIVE
POC COCAINE: NEGATIVE
POC METHADONE: NEGATIVE
POC METHAMPHETAMINE SCREEN URINE: NEGATIVE
POC OPIATES: NEGATIVE
POC OXYCODONE: NEGATIVE
POC PHENCYCLIDINE: NEGATIVE
POC PROPOXYPHENE: NEGATIVE
POC THC: NEGATIVE

## 2025-07-31 RX ORDER — HYDROCODONE BITARTRATE AND ACETAMINOPHEN 5; 325 MG/1; MG/1
1 TABLET ORAL 2 TIMES DAILY PRN
Qty: 60 TABLET | Refills: 0 | Status: SHIPPED | OUTPATIENT
Start: 2025-07-31

## 2025-07-31 RX ORDER — SUCRALFATE 1 G/1
1 TABLET ORAL 4 TIMES DAILY
COMMUNITY
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
## 2025-08-06 DIAGNOSIS — R25.2 MUSCLE CRAMPS: ICD-10-CM

## 2025-08-06 RX ORDER — BACLOFEN 20 MG/1
20 TABLET ORAL 3 TIMES DAILY
Qty: 60 TABLET | Refills: 3 | Status: SHIPPED | OUTPATIENT
Start: 2025-08-06

## 2025-08-22 DIAGNOSIS — F41.8 DEPRESSION WITH ANXIETY: Chronic | ICD-10-CM

## 2025-08-22 RX ORDER — BUPROPION HYDROCHLORIDE 300 MG/1
300 TABLET ORAL EVERY MORNING
Qty: 90 TABLET | Refills: 0 | Status: SHIPPED | OUTPATIENT
Start: 2025-08-22

## 2025-08-26 DIAGNOSIS — M54.16 LUMBAR RADICULOPATHY: ICD-10-CM

## 2025-08-26 RX ORDER — GABAPENTIN 600 MG/1
600 TABLET ORAL 3 TIMES DAILY
Qty: 90 TABLET | Refills: 1 | Status: SHIPPED | OUTPATIENT
Start: 2025-08-26

## 2025-08-28 ENCOUNTER — OFFICE VISIT (OUTPATIENT)
Dept: PAIN MEDICINE | Facility: CLINIC | Age: 70
End: 2025-08-28
Payer: MEDICARE

## 2025-08-28 VITALS
TEMPERATURE: 98.9 F | HEIGHT: 66 IN | SYSTOLIC BLOOD PRESSURE: 123 MMHG | BODY MASS INDEX: 31.05 KG/M2 | HEART RATE: 80 BPM | WEIGHT: 193.2 LBS | DIASTOLIC BLOOD PRESSURE: 76 MMHG | OXYGEN SATURATION: 96 %

## 2025-08-28 DIAGNOSIS — G89.29 CHRONIC BILATERAL THORACIC BACK PAIN: ICD-10-CM

## 2025-08-28 DIAGNOSIS — M47.816 LUMBAR FACET ARTHROPATHY: ICD-10-CM

## 2025-08-28 DIAGNOSIS — M54.6 CHRONIC BILATERAL THORACIC BACK PAIN: ICD-10-CM

## 2025-08-28 DIAGNOSIS — M54.2 NECK PAIN: ICD-10-CM

## 2025-08-28 DIAGNOSIS — M51.360 DEGENERATION OF INTERVERTEBRAL DISC OF LUMBAR REGION WITH DISCOGENIC BACK PAIN: Primary | ICD-10-CM

## 2025-08-28 DIAGNOSIS — M48.062 LUMBAR STENOSIS WITH NEUROGENIC CLAUDICATION: ICD-10-CM

## 2025-08-28 DIAGNOSIS — Z79.899 HIGH RISK MEDICATION USE: ICD-10-CM

## 2025-08-28 LAB
POC AMPHETAMINES: NEGATIVE
POC BARBITURATES: NEGATIVE
POC BENZODIAZEPHINES: NEGATIVE
POC BUPRENORPHINE: NEGATIVE
POC COCAINE: NEGATIVE
POC METHADONE: NEGATIVE
POC METHAMPHETAMINE SCREEN URINE: NEGATIVE
POC OPIATES: POSITIVE
POC OXYCODONE: NEGATIVE
POC PHENCYCLIDINE: NEGATIVE
POC PROPOXYPHENE: NEGATIVE
POC THC: NEGATIVE
POC TRICYCLIC ANTIDEPRESSANTS: NEGATIVE

## 2025-08-28 RX ORDER — HYDROCODONE BITARTRATE AND ACETAMINOPHEN 5; 325 MG/1; MG/1
1 TABLET ORAL 2 TIMES DAILY PRN
Qty: 60 TABLET | Refills: 0 | Status: SHIPPED | OUTPATIENT
Start: 2025-08-28

## (undated) DEVICE — SUT SILK 2/0 TIES 18IN A185H

## (undated) DEVICE — DRN JP FLT NO TROC SIL FUL/PERF 7MM

## (undated) DEVICE — DRSNG WND GZ PAD BORDERED 4X8IN STRL

## (undated) DEVICE — SMOKE EVACUATION TUBING WITH 7/8 IN TO 1/4 IN REDUCER: Brand: BUFFALO FILTER

## (undated) DEVICE — ELECTRD BLD EZ CLN MOD XLNG 2.75IN

## (undated) DEVICE — GLV SURG TRIUMPH PF LTX 7.5 STRL

## (undated) DEVICE — NDL,TUOHY EPID,18GX3.5",PLASTIC STYLET: Brand: MEDLINE

## (undated) DEVICE — TOWEL,OR,DSP,ST,BLUE,STD,4/PK,20PK/CS: Brand: MEDLINE

## (undated) DEVICE — NDL SPINE 22G 31/2IN BLK

## (undated) DEVICE — ENDOPATH XCEL BLADELESS TROCARS WITH STABILITY SLEEVES: Brand: ENDOPATH XCEL

## (undated) DEVICE — CONN TBG Y 5 IN 1 LF STRL

## (undated) DEVICE — FRCP BX RADJAW4 NDL 2.8 240CM LG OG BX40

## (undated) DEVICE — APPL DURAPREP IODOPHOR APL 26ML

## (undated) DEVICE — STPLR SKIN VISISTAT WD 35CT

## (undated) DEVICE — SUT SILK 3/0 TIES 18IN A184H

## (undated) DEVICE — JACKSON-PRATT 100CC BULB RESERVOIR: Brand: CARDINAL HEALTH

## (undated) DEVICE — EPIDURAL TRAY: Brand: MEDLINE INDUSTRIES, INC.

## (undated) DEVICE — CANN O2 ETCO2 FITS ALL CONN CO2 SMPL A/ 7IN DISP LF

## (undated) DEVICE — ENCORE® LATEX ORTHO SIZE 8, STERILE LATEX POWDER-FREE SURGICAL GLOVE: Brand: ENCORE

## (undated) DEVICE — ENDOPATH PNEUMONEEDLE INSUFFLATION NEEDLES WITH LUER LOCK CONNECTORS 120MM: Brand: ENDOPATH

## (undated) DEVICE — SINGLE-USE BIOPSY FORCEPS: Brand: RADIAL JAW 4

## (undated) DEVICE — SUT PDS 0 CT1 36IN Z346H

## (undated) DEVICE — BITEBLOCK OMNI BLOC

## (undated) DEVICE — Device: Brand: DEFENDO AIR/WATER/SUCTION AND BIOPSY VALVE

## (undated) DEVICE — GLOVE,SURG,SENSICARE,ALOE,LF,PF,7: Brand: MEDLINE

## (undated) DEVICE — GLV SURG TRIUMPH PF LTX 7 STRL

## (undated) DEVICE — DRSNG TELFA PAD NONADH STR 1S 3X4IN

## (undated) DEVICE — GOWN ,SIRUS,NONREINFORCED SMALL: Brand: MEDLINE

## (undated) DEVICE — SUT VIC 2/0 CT1 36IN

## (undated) DEVICE — Device

## (undated) DEVICE — ADHS SKIN DERMABOND TOP ADVANCED

## (undated) DEVICE — LOU LAP SIGMOID COLON: Brand: MEDLINE INDUSTRIES, INC.

## (undated) DEVICE — DISPOSABLE GRASPER CARTRIDGE: Brand: DIRECT DRIVE REPOSABLE GRASPERS

## (undated) DEVICE — Device: Brand: PORTEX

## (undated) DEVICE — DRSNG WND BORDR/ADHS NONADHR/GZ LF 4X4IN STRL

## (undated) DEVICE — SUT VIC 0 TN 27IN DYED JTN0G

## (undated) DEVICE — VISUALIZATION SYSTEM: Brand: CLEARIFY

## (undated) DEVICE — WOUND RETRACTOR AND PROTECTOR: Brand: ALEXIS WOUND PROTECTOR-RETRACTOR

## (undated) DEVICE — SUT MNCRYL PLS ANTIB UD 4/0 PS2 18IN

## (undated) DEVICE — COLR CERV FM 3IN LG

## (undated) DEVICE — SUT PROLN 2/0 SH 36IN 8523H

## (undated) DEVICE — DISPOSABLE BIPOLAR CABLE 12FT. (3.6M): Brand: KIRWAN

## (undated) DEVICE — NEEDLE, QUINCKE, 18GX3.5": Brand: MEDLINE

## (undated) DEVICE — ADAPT CLN BIOGUARD AIR/H2O DISP

## (undated) DEVICE — MEDI-VAC YANKAUER SUCTION HANDLE W/BULBOUS TIP: Brand: CARDINAL HEALTH

## (undated) DEVICE — ANTIBACTERIAL UNDYED BRAIDED (POLYGLACTIN 910), SYNTHETIC ABSORBABLE SUTURE: Brand: COATED VICRYL

## (undated) DEVICE — GLV SURG BIOGEL LTX PF 7 1/2

## (undated) DEVICE — NDL SPINE 22G 5IN BLK

## (undated) DEVICE — GLV SURG BIOGEL LTX PF 7

## (undated) DEVICE — KT ORCA ORCAPOD DISP STRL

## (undated) DEVICE — NDL EPID TUOHY 18G 31/2IN

## (undated) DEVICE — HARMONIC ACE +7 LAPAROSCOPIC SHEARS ADVANCED HEMOSTASIS 5MM DIAMETER 36CM SHAFT LENGTH  FOR USE WITH GRAY HAND PIECE ONLY: Brand: HARMONIC ACE

## (undated) DEVICE — GOWN,ECLIPSE,FABRIC-REINFORCED,X-LARGE: Brand: MEDLINE

## (undated) DEVICE — TRY EPID CUST BHSCE

## (undated) DEVICE — SOL ISO/ALC 70PCT 4OZ

## (undated) DEVICE — LAPAROSCOPIC GAS CONDITIONING DEVICE.: Brand: INSUFLOW

## (undated) DEVICE — SKIN PREP TRAY W/CHG: Brand: MEDLINE INDUSTRIES, INC.

## (undated) DEVICE — PAD GRND CATHAY W/CABL DISP

## (undated) DEVICE — MATCH HEAD

## (undated) DEVICE — ENDOCUT SCISSOR TIP, DISPOSABLE: Brand: RENEW

## (undated) DEVICE — APPL CHLORAPREP W/TINT 10.5ML PERC STRL

## (undated) DEVICE — SPNG GZ WOVN 4X4IN 12PLY 10/BX STRL

## (undated) DEVICE — PK NEURO SPINE 40

## (undated) DEVICE — DRSNG GZ PETROLTM XEROFORM CURAD 1X8IN STRL

## (undated) DEVICE — DRP SLUSH WARMR MACH CIR 44X44IN

## (undated) DEVICE — EAGLE PLUS ANTERIOR CERVICAL PLATE SYSTEM FIXED DEPTH DRILL - AO 12MM: Brand: EAGLE

## (undated) DEVICE — THE TORRENT IRRIGATION SCOPE CONNECTOR IS USED WITH THE TORRENT IRRIGATION TUBING TO PROVIDE IRRIGATION FLUIDS SUCH AS STERILE WATER DURING GASTROINTESTINAL ENDOSCOPIC PROCEDURES WHEN USED IN CONJUNCTION WITH AN IRRIGATION PUMP (OR ELECTROSURGICAL UNIT).: Brand: TORRENT

## (undated) DEVICE — LAPAROSCOPIC SMOKE ELIMINATION DEVICE: Brand: PNEUVIEW XE

## (undated) DEVICE — DIFFUSER: Brand: CORE, MAESTRO

## (undated) DEVICE — SOL NACL 0.9PCT 1000ML

## (undated) DEVICE — SUT SILK 0 TIES 18IN SA66G

## (undated) DEVICE — ELECTRD BLD EDGE/INSUL1P 2.4X5.1MM STRL

## (undated) DEVICE — LABEL SHEET CUSTOM 2X2 YELLOW: Brand: MEDLINE INDUSTRIES, INC.

## (undated) DEVICE — SPNG DISECTOR KTNER XRAY COTN 1/4X9/16IN PK/5

## (undated) DEVICE — LIMB HOLDER, WRIST/ANKLE: Brand: DEROYAL

## (undated) DEVICE — DRSNG SURESITE WNDW 4X4.5

## (undated) DEVICE — TOTAL TRAY, 16FR 10ML SIL FOLEY, URN: Brand: MEDLINE

## (undated) DEVICE — SPONGE,NEURO,.75"X.75",XR,STRL,LF,10/PK: Brand: MEDLINE

## (undated) DEVICE — SUT VIC 5/0 PS2 18IN J495H

## (undated) DEVICE — CANN NASL CO2 TRULINK W/O2 A/

## (undated) DEVICE — ENCORE® LATEX ORTHO SIZE 7.5, STERILE LATEX POWDER-FREE SURGICAL GLOVE: Brand: ENCORE

## (undated) DEVICE — TUBING, SUCTION, 1/4" X 10', STRAIGHT: Brand: MEDLINE

## (undated) DEVICE — ECHELON FLEX 60 ARTICULATING ENDOSCOPIC LINEAR CUTTER (NO CARTRIDGE): Brand: ECHELON FLEX ENDOPATH

## (undated) DEVICE — NDL SPINE 18G 31/2IN PNK

## (undated) DEVICE — MSK ENDO PORT O2 POM ELITE CURAPLEX A/

## (undated) DEVICE — HALTR TRACT HD PAD DLX UNIV

## (undated) DEVICE — GLV SURG BIOGEL LTX PF 6

## (undated) DEVICE — SPONGE,DISSECTOR,K,XRAY,9/16"X1/4",STRL: Brand: MEDLINE

## (undated) DEVICE — NDL HYPO PRECISIONGLIDE REG 25G 1 1/2

## (undated) DEVICE — COVER,C-ARM,41X74: Brand: MEDLINE

## (undated) DEVICE — ADHS SKIN SURG TISS VISC PREMIERPRO EXOFIN HI/VISC FAST/DRY

## (undated) DEVICE — COVER,MAYO STAND,STERILE: Brand: MEDLINE

## (undated) DEVICE — LN SMPL CO2 SHTRM SD STREAM W/M LUER

## (undated) DEVICE — OIL CARTRIDGE: Brand: CORE, MAESTRO

## (undated) DEVICE — GLV SURG SENSICARE PI PF LF 7 GRN STRL

## (undated) DEVICE — MARKR SKIN W/RULR 2TP

## (undated) DEVICE — DRP MICROSCP LEICA 137X381CM

## (undated) DEVICE — SUT SILK 2/0 SH CR8 18IN CR8 C012D

## (undated) DEVICE — SENSR O2 OXIMAX FNGR A/ 18IN NONSTR

## (undated) DEVICE — ENDOPATH XCEL UNIVERSAL TROCAR STABLILITY SLEEVES: Brand: ENDOPATH XCEL